# Patient Record
Sex: MALE | Race: BLACK OR AFRICAN AMERICAN | NOT HISPANIC OR LATINO | Employment: UNEMPLOYED | URBAN - METROPOLITAN AREA
[De-identification: names, ages, dates, MRNs, and addresses within clinical notes are randomized per-mention and may not be internally consistent; named-entity substitution may affect disease eponyms.]

---

## 2020-03-17 ENCOUNTER — HOSPITAL ENCOUNTER (EMERGENCY)
Facility: HOSPITAL | Age: 50
Discharge: HOME | End: 2020-03-17
Payer: MEDICAID

## 2020-03-17 VITALS
WEIGHT: 204 LBS | HEART RATE: 103 BPM | TEMPERATURE: 99 F | HEIGHT: 69 IN | OXYGEN SATURATION: 96 % | BODY MASS INDEX: 30.21 KG/M2

## 2020-03-17 DIAGNOSIS — J06.9 VIRAL URI WITH COUGH: Primary | ICD-10-CM

## 2020-03-17 LAB
INFLUENZA A, MOLECULAR: NEGATIVE
INFLUENZA B, MOLECULAR: NEGATIVE
SPECIMEN SOURCE: NORMAL

## 2020-03-17 PROCEDURE — 99284 EMERGENCY DEPT VISIT MOD MDM: CPT

## 2020-03-17 PROCEDURE — 87502 INFLUENZA DNA AMP PROBE: CPT

## 2020-03-17 RX ORDER — ONDANSETRON 4 MG/1
4 TABLET, ORALLY DISINTEGRATING ORAL EVERY 6 HOURS PRN
Qty: 20 TABLET | Refills: 0 | Status: ON HOLD | OUTPATIENT
Start: 2020-03-17 | End: 2022-12-22 | Stop reason: HOSPADM

## 2020-03-17 RX ORDER — GUAIFENESIN 100 MG/5ML
100-200 SOLUTION ORAL EVERY 4 HOURS PRN
Qty: 60 ML | Refills: 0 | Status: SHIPPED | OUTPATIENT
Start: 2020-03-17 | End: 2020-03-27

## 2020-03-17 RX ORDER — LORATADINE 10 MG/1
10 TABLET ORAL DAILY
Qty: 30 TABLET | Refills: 0 | Status: ON HOLD | OUTPATIENT
Start: 2020-03-17 | End: 2022-12-22 | Stop reason: HOSPADM

## 2020-03-17 RX ORDER — FLUTICASONE PROPIONATE 50 MCG
1 SPRAY, SUSPENSION (ML) NASAL 2 TIMES DAILY PRN
Qty: 15 G | Refills: 0 | Status: SHIPPED | OUTPATIENT
Start: 2020-03-17

## 2020-03-17 RX ORDER — ALBUTEROL SULFATE 90 UG/1
1-2 AEROSOL, METERED RESPIRATORY (INHALATION) EVERY 6 HOURS PRN
Qty: 6.7 G | Refills: 0 | Status: ON HOLD | OUTPATIENT
Start: 2020-03-17 | End: 2022-12-22 | Stop reason: HOSPADM

## 2020-03-17 NOTE — ED NOTES
Unable to obtain bp after multiple attempts on all limbs with 2 different machines.  Pt cleared for discharge.

## 2020-03-17 NOTE — ED NOTES
Pt here for evaluation of fever, cough, diarrhea. Pt is awake, alert and oriented. Resp even and unlabored. Pt denies chest pain or sob.

## 2020-03-17 NOTE — DISCHARGE INSTRUCTIONS
You do not meet the criteria for testing. Recommend you self quarantine (stay home and avoid public places). If your symptoms change or worsen, you should call the [facility] On Call line at [phone] or [phone] for immediate assistance and guidance on whether an in-person visit is needed.?For worsening symptoms, chest pain, shortness of breath, increased abdominal pain, high grade fever, stroke or stroke like symptoms, immediately go to the nearest Emergency Room or call 911 as soon as possible.

## 2020-03-17 NOTE — ED PROVIDER NOTES
Encounter Date: 3/17/2020    SCRIBE #1 NOTE: IHolly, am scribing for, and in the presence of, Shivani Bryant PA-C.       History     Chief Complaint   Patient presents with    Fever    Cough    Diarrhea     Time seen by provider: 2:07 PM on 03/17/2020    Spencer Burton Jr. is a 49 y.o. male with PMHx of HTN, CHF, hyperlipidemia and DM-2 who presents to the ED with an onset of a fever, cough, congestion, diarrhea and vomiting for three days. The patient states that he has felt weak all weekend. He vomited 3 in the last 24 hours. He has been able to keep down food since today. He has not taken any medications at home. He denied any recent sick contact. He denied any recent travel. The patient denies having a runny nose, or any other symptoms at this time. No pertinent PSHx. He states he has no pre-existing lung conditions.     The history is provided by the patient.     Review of patient's allergies indicates:  No Known Allergies  Past Medical History:   Diagnosis Date    CHF (congestive heart failure)     COPD (chronic obstructive pulmonary disease)     Diabetes mellitus     Hyperlipidemia     Hypertension      Past Surgical History:   Procedure Laterality Date    APPENDECTOMY      CHOLECYSTECTOMY       No family history on file.  Social History     Tobacco Use    Smoking status: Never Smoker   Substance Use Topics    Alcohol use: No     Comment: socially    Drug use: No     Review of Systems   Constitutional: Positive for fever. Negative for activity change, appetite change and chills.   HENT: Positive for congestion. Negative for rhinorrhea and sore throat.    Eyes: Negative for redness and visual disturbance.   Respiratory: Positive for cough. Negative for chest tightness and shortness of breath.    Cardiovascular: Negative for chest pain.   Gastrointestinal: Positive for diarrhea and vomiting. Negative for abdominal pain and nausea.   Genitourinary: Negative for dysuria and frequency.    Musculoskeletal: Negative for back pain, neck pain and neck stiffness.   Skin: Negative for rash.   Neurological: Positive for weakness. Negative for dizziness, syncope, numbness and headaches.       Physical Exam     Initial Vitals [03/17/20 1413]   BP Pulse Resp Temp SpO2   -- 103 -- 99.1 °F (37.3 °C) 96 %      MAP       --         Physical Exam    Nursing note and vitals reviewed.  Constitutional: He appears well-developed and well-nourished. He is cooperative.  Non-toxic appearance. He does not have a sickly appearance.   HENT:   Head: Normocephalic and atraumatic.   Right Ear: External ear normal.   Left Ear: External ear normal.   Nose: Nose normal.   Mouth/Throat: Uvula is midline and oropharynx is clear and moist.   Eyes: Conjunctivae and lids are normal.   Neck: Normal range of motion and full passive range of motion without pain. Neck supple.   Cardiovascular: Normal rate, regular rhythm and normal heart sounds. Exam reveals no gallop and no friction rub.    No murmur heard.  Pulmonary/Chest: Breath sounds normal. He has no wheezes. He has no rhonchi. He has no rales.   Abdominal: Soft. Normal appearance. There is no tenderness. There is no rigidity, no rebound and no guarding.   Neurological: He is alert.   Skin: Skin is warm, dry and intact. No rash noted.         ED Course   Procedures  Labs Reviewed   INFLUENZA A & B BY MOLECULAR          Imaging Results    None          Medical Decision Making:   History:   Old Medical Records: I decided to obtain old medical records.  Clinical Tests:   Lab Tests: Ordered and Reviewed       APC / Resident Notes:   Urgent evaluation of a 49 year male who presents with flu like symptoms. Vital signs reviewed. Abdomen is soft and nontender.  There is no rebound, rigidity or distention.  I doubt intra-abdominal process.  Bilateral TMs with no erythema, retraction or perforation.  There is no mastoid tenderness.  There is no movement tenderness to bilateral ears.  No  tonsillar swelling or exudate noted.  Uvula is midline. I doubt strep. Breath sounds are clear and equal bilaterally. I doubt pneumonia. He requests to be notified if he test is positive and be discharge. He was given RX for symptomatic treatment. He states his BP is always 230/110 on a good day. He has been out of his medications for four years. He needs to see his primary care provider and keep a blood pressure log. Discussed results with patient. Return precautions given. Patient is to follow up with their primary care provider.          Scribe Attestation:   Scribe #1: I performed the above scribed service and the documentation accurately describes the services I performed. I attest to the accuracy of the note.                      I, Shivani Bryant PA-C, personally performed the services described in this documentation. All medical record entries made by the scribe were at my direction and in my presence.  I have reviewed the chart and agree that the record reflects my personal performance and is accurate and complete. Shivani Bryant PA-C.  3:36 PM 03/17/2020      Clinical Impression:       ICD-10-CM ICD-9-CM   1. Viral URI with cough J06.9 465.9    B97.89              ED Disposition Condition    Discharge Stable        ED Prescriptions     Medication Sig Dispense Start Date End Date Auth. Provider    fluticasone propionate (FLONASE) 50 mcg/actuation nasal spray 1 spray (50 mcg total) by Each Nostril route 2 (two) times daily as needed for Rhinitis. 15 g 3/17/2020  Shivani Bryant PA-C    albuterol (PROVENTIL/VENTOLIN HFA) 90 mcg/actuation inhaler Inhale 1-2 puffs into the lungs every 6 (six) hours as needed for Wheezing. Rescue 6.7 g 3/17/2020 3/17/2021 Shivani Bryant PA-C    guaifenesin 100 mg/5 ml (ROBITUSSIN) 100 mg/5 mL syrup Take 5-10 mLs (100-200 mg total) by mouth every 4 (four) hours as needed for Cough or Congestion. 60 mL 3/17/2020 3/27/2020 Shivani Rose  ANGELICA Bryant    loratadine (CLARITIN) 10 mg tablet Take 1 tablet (10 mg total) by mouth once daily. 30 tablet 3/17/2020 3/17/2021 Shivani Bryant PA-C    ondansetron (ZOFRAN-ODT) 4 MG TbDL Take 1 tablet (4 mg total) by mouth every 6 (six) hours as needed (nausea/vomiting). 20 tablet 3/17/2020  Shivani Bryant PA-C        Follow-up Information     Follow up With Specialties Details Why Contact Info    [address] [id number] [phone]      [facility]  Schedule an appointment as soon as possible for a visit  For follow up if you don't have a primary care provider [phone]    [facility] Emergency Medicine  As needed [address] [place] [id number] [phone]                                     Shivani Bryant PA-C  03/17/20 5543

## 2020-07-23 ENCOUNTER — HOSPITAL ENCOUNTER (EMERGENCY)
Facility: HOSPITAL | Age: 50
Discharge: HOME | End: 2020-07-23
Attending: EMERGENCY MEDICINE
Payer: MEDICAID

## 2020-07-23 VITALS
WEIGHT: 204.06 LBS | SYSTOLIC BLOOD PRESSURE: 175 MMHG | DIASTOLIC BLOOD PRESSURE: 111 MMHG | BODY MASS INDEX: 30.22 KG/M2 | HEART RATE: 77 BPM | RESPIRATION RATE: 16 BRPM | HEIGHT: 69 IN | OXYGEN SATURATION: 99 % | TEMPERATURE: 99 F

## 2020-07-23 DIAGNOSIS — E11.9 DM (DIABETES MELLITUS): ICD-10-CM

## 2020-07-23 DIAGNOSIS — I10 HTN (HYPERTENSION): ICD-10-CM

## 2020-07-23 DIAGNOSIS — H35.61 RETINAL HEMORRHAGE OF RIGHT EYE: ICD-10-CM

## 2020-07-23 DIAGNOSIS — I11.0 HYPERTENSIVE CARDIOMYOPATHY, WITH HEART FAILURE: ICD-10-CM

## 2020-07-23 DIAGNOSIS — E11.22 DIABETES MELLITUS WITH CHRONIC KIDNEY DISEASE: ICD-10-CM

## 2020-07-23 DIAGNOSIS — I10 UNCONTROLLED HYPERTENSION: ICD-10-CM

## 2020-07-23 DIAGNOSIS — Z23 NEED FOR PROPHYLACTIC VACCINATION AGAINST STREPTOCOCCUS PNEUMONIAE (PNEUMOCOCCUS): ICD-10-CM

## 2020-07-23 DIAGNOSIS — I10 HYPERTENSION: ICD-10-CM

## 2020-07-23 DIAGNOSIS — E78.5 HYPERLIPIDEMIA: ICD-10-CM

## 2020-07-23 DIAGNOSIS — E13.9 DIABETES MELLITUS DUE TO ABNORMAL INSULIN: ICD-10-CM

## 2020-07-23 DIAGNOSIS — S61.411A LACERATION OF SKIN OF RIGHT HAND, INITIAL ENCOUNTER: Primary | ICD-10-CM

## 2020-07-23 DIAGNOSIS — I43 HYPERTENSIVE CARDIOMYOPATHY, WITH HEART FAILURE: ICD-10-CM

## 2020-07-23 DIAGNOSIS — Z76.0 MEDICATION REFILL: ICD-10-CM

## 2020-07-23 DIAGNOSIS — R60.9 EDEMA: ICD-10-CM

## 2020-07-23 DIAGNOSIS — N18.30 KIDNEY DISEASE, CHRONIC, STAGE III (GFR 30-59 ML/MIN): ICD-10-CM

## 2020-07-23 PROCEDURE — 25000003 PHARM REV CODE 250: Performed by: EMERGENCY MEDICINE

## 2020-07-23 PROCEDURE — 12001 RPR S/N/AX/GEN/TRNK 2.5CM/<: CPT

## 2020-07-23 PROCEDURE — 99284 EMERGENCY DEPT VISIT MOD MDM: CPT | Mod: 25

## 2020-07-23 RX ORDER — BACITRACIN ZINC 500 UNIT/G
1 OINTMENT (GRAM) TOPICAL
Status: COMPLETED | OUTPATIENT
Start: 2020-07-23 | End: 2020-07-23

## 2020-07-23 RX ORDER — CARVEDILOL 6.25 MG/1
6.25 TABLET ORAL 2 TIMES DAILY WITH MEALS
Qty: 60 TABLET | Refills: 0 | Status: ON HOLD | OUTPATIENT
Start: 2020-07-23 | End: 2021-11-03

## 2020-07-23 RX ORDER — CEPHALEXIN 500 MG/1
500 CAPSULE ORAL EVERY 8 HOURS
Qty: 21 CAPSULE | Refills: 0 | Status: SHIPPED | OUTPATIENT
Start: 2020-07-23 | End: 2020-07-30

## 2020-07-23 RX ORDER — AMLODIPINE BESYLATE 10 MG/1
10 TABLET ORAL DAILY
Qty: 30 TABLET | Refills: 0 | Status: ON HOLD | OUTPATIENT
Start: 2020-07-23 | End: 2022-12-22 | Stop reason: HOSPADM

## 2020-07-23 RX ORDER — LIDOCAINE HYDROCHLORIDE 10 MG/ML
10 INJECTION INFILTRATION; PERINEURAL
Status: COMPLETED | OUTPATIENT
Start: 2020-07-23 | End: 2020-07-23

## 2020-07-23 RX ORDER — GLIPIZIDE 10 MG/1
10 TABLET ORAL 2 TIMES DAILY WITH MEALS
Qty: 60 TABLET | Refills: 0 | Status: ON HOLD | OUTPATIENT
Start: 2020-07-23 | End: 2022-12-22 | Stop reason: HOSPADM

## 2020-07-23 RX ORDER — TRAMADOL HYDROCHLORIDE 50 MG/1
50 TABLET ORAL EVERY 8 HOURS PRN
Qty: 9 TABLET | Refills: 0 | Status: ON HOLD | OUTPATIENT
Start: 2020-07-23 | End: 2022-12-22 | Stop reason: HOSPADM

## 2020-07-23 RX ORDER — ATORVASTATIN CALCIUM 40 MG/1
40 TABLET, FILM COATED ORAL NIGHTLY
Qty: 30 TABLET | Refills: 0 | Status: ON HOLD | OUTPATIENT
Start: 2020-07-23 | End: 2022-12-22 | Stop reason: HOSPADM

## 2020-07-23 RX ADMIN — LIDOCAINE HYDROCHLORIDE 10 ML: 10 INJECTION, SOLUTION INFILTRATION; PERINEURAL at 07:07

## 2020-07-23 RX ADMIN — BACITRACIN ZINC 1 TUBE: 500 OINTMENT TOPICAL at 07:07

## 2020-07-23 NOTE — ED PROVIDER NOTES
Encounter Date: 7/23/2020    SCRIBE #1 NOTE: I, Liseth Elias, am scribing for, and in the presence of, Rishi Coley MD.       History     Chief Complaint   Patient presents with    Laceration     laceration to right hand between second and third fingers       Time seen by provider: 7:24 PM on 07/23/2020    Spencer Burton Jr. is a 50 y.o. male with a PMHx of DM and HTN who presents to the ED with an onset of a wound between the 3rd and 4th fingers on the right hand. Patient states he was working on a car just PTA when he hit a sensor causing the airbag to deploy which resulted in the injury. Patient cannot recall last Tetanus. Patient is a nonsmoker. The patient denies any other symptoms at this time. No pertinent PSHx.      The history is provided by the patient.     Review of patient's allergies indicates:  No Known Allergies  Past Medical History:   Diagnosis Date    CHF (congestive heart failure)     COPD (chronic obstructive pulmonary disease)     Diabetes mellitus     Hyperlipidemia     Hypertension      Past Surgical History:   Procedure Laterality Date    APPENDECTOMY      CHOLECYSTECTOMY       No family history on file.  Social History     Tobacco Use    Smoking status: Never Smoker   Substance Use Topics    Alcohol use: No     Comment: socially    Drug use: No     Review of Systems   Constitutional: Negative for fever.   HENT: Negative for sore throat.    Respiratory: Negative for shortness of breath.    Cardiovascular: Negative for chest pain.   Gastrointestinal: Negative for nausea.   Genitourinary: Negative for dysuria.   Musculoskeletal: Negative for back pain.   Skin: Positive for wound. Negative for rash.   Neurological: Negative for weakness.   Hematological: Does not bruise/bleed easily.       Physical Exam     Initial Vitals [07/23/20 1848]   BP Pulse Resp Temp SpO2   (!) 175/111 77 16 98.8 °F (37.1 °C) 99 %      MAP       --         Physical Exam    Nursing note and vitals  reviewed.  Constitutional: He appears well-developed and well-nourished. No distress.   Non-toxic, well-appearing male.   HENT:   Head: Normocephalic and atraumatic.   Eyes: Conjunctivae and EOM are normal. Pupils are equal, round, and reactive to light.   Neck: Neck supple.   Cardiovascular: Normal rate, regular rhythm and normal heart sounds. Exam reveals no gallop and no friction rub.    No murmur heard.  Pulmonary/Chest: Breath sounds normal. No respiratory distress. He has no wheezes. He has no rhonchi. He has no rales.   Abdominal: Soft. Bowel sounds are normal. He exhibits no distension. There is no abdominal tenderness.   Musculoskeletal: Normal range of motion. No tenderness or edema.   Neurological: He is alert and oriented to person, place, and time.   Skin: Skin is warm and dry.   2 cm deep laceration to the web space between the 3rd and 4th fingers of the right hand that extends from the dorsal surface to the palmar surface.   Psychiatric: He has a normal mood and affect.         ED Course   Lac Repair    Date/Time: 7/23/2020 8:01 PM  Performed by: Rishi Coley MD  Authorized by: Rishi Coley MD   Consent Done: Yes  Consent: Verbal consent obtained. Written consent obtained.  Consent given by: patient  Body area: upper extremity (Web space between 3rd and 4th fingers of right hand)  Laceration length: 2 cm  Foreign bodies: no foreign bodies  Anesthesia: local infiltration    Anesthesia:  Local Anesthetic: lidocaine 1% without epinephrine  Patient sedated: no  Preparation: Patient was prepped and draped in the usual sterile fashion.  Number of sutures: 11  Suture technique: Simple interrupted.  Patient tolerance: Patient tolerated the procedure well with no immediate complications        Labs Reviewed - No data to display       Imaging Results    None          Medical Decision Making:   History:   Old Medical Records: I decided to obtain old medical records.  Laceration in the webspace of the  hand.  He appears to be neurovascularly and tendons are intact.  No foreign body.  No signs of bony injury.  Sutures need to be removed in approximately 10-14 days.  I explained to the patient he can lina tape his fingers.            Scribe Attestation:   Scribe #1: I performed the above scribed service and the documentation accurately describes the services I performed. I attest to the accuracy of the note.    I, Dr. Rishi Coley personally performed the services described in this documentation. All medical record entries made by the scribe were at my direction and in my presence.  I have reviewed the chart and agree that the record reflects my personal performance and is accurate and complete. Rishi Coley MD.  3:35 AM 07/24/2020    DISCLAIMER: This note was prepared with Dragon NaturallySpeaking voice recognition transcription software. Garbled syntax, mangled pronouns, and other bizarre constructions may be attributed to that software system                       Clinical Impression:       ICD-10-CM ICD-9-CM   1. Laceration of skin of right hand, initial encounter  S61.411A 882.0       Disposition:   Disposition: Discharged  Condition: Stable     ED Disposition Condition    Discharge Stable        ED Prescriptions     None        Follow-up Information    None                                    Rishi Coley MD  07/24/20 0335

## 2020-07-23 NOTE — Clinical Note
Spencer Burton was seen and treated in our emergency department on 7/23/2020.  He may return to work on 07/27/2020.       If you have any questions or concerns, please don't hesitate to call.       RN

## 2020-07-23 NOTE — ED TRIAGE NOTES
Patient presents with laceration to right hand between second and third fingers.  Bleeding is controlled.  ROM and sensation are intact.

## 2020-07-24 NOTE — ED NOTES
Laceration repair bandaged, THANH, pt. Tolerated well, no needs at this time, will continue to monitor.

## 2020-07-24 NOTE — ED NOTES
Laceration to R hand irrigated and cleaned, THANH, no needs at this time, will continue to monitor.

## 2020-08-10 ENCOUNTER — HOSPITAL ENCOUNTER (EMERGENCY)
Facility: HOSPITAL | Age: 50
Discharge: HOME | End: 2020-08-10
Attending: EMERGENCY MEDICINE
Payer: MEDICAID

## 2020-08-10 VITALS
BODY MASS INDEX: 29.62 KG/M2 | TEMPERATURE: 99 F | HEART RATE: 70 BPM | HEIGHT: 69 IN | DIASTOLIC BLOOD PRESSURE: 104 MMHG | RESPIRATION RATE: 18 BRPM | SYSTOLIC BLOOD PRESSURE: 158 MMHG | OXYGEN SATURATION: 98 % | WEIGHT: 200 LBS

## 2020-08-10 DIAGNOSIS — Z48.02 VISIT FOR SUTURE REMOVAL: Primary | ICD-10-CM

## 2020-08-10 PROCEDURE — 99283 EMERGENCY DEPT VISIT LOW MDM: CPT

## 2020-08-10 PROCEDURE — 25000003 PHARM REV CODE 250: Performed by: EMERGENCY MEDICINE

## 2020-08-10 RX ADMIN — BACITRACIN, NEOMYCIN, POLYMYXIN B 1 EACH: 400; 3.5; 5 OINTMENT TOPICAL at 09:08

## 2020-08-10 NOTE — Clinical Note
Spencer Burton was seen and treated in our emergency department on 8/10/2020.  He may return to work on 08/15/2020.       If you have any questions or concerns, please don't hesitate to call.       LPN

## 2020-08-10 NOTE — Clinical Note
Spencer Cousin was seen and treated in our emergency department on 8/10/2020.  He may return with limitations on 08/12/2020.  No heavy lifting more than 5 pounds for two weeks.     Sincerely,      Lee Ann ANDERSON

## 2020-08-11 NOTE — ED PROVIDER NOTES
Encounter Date: 8/10/2020    SCRIBE #1 NOTE: Linda MARINA, am scribing for, and in the presence of, Turner Sy MD.       History     Chief Complaint   Patient presents with    Suture / Staple Removal     placed 3 weeks ago on right hand       Time seen by provider: 9:40 PM on 08/10/2020    Spencer Burton Jr. is a 50 y.o. male who presents to the ED for suture removal. Patient had sutures placed to the right hand between the 3rd and 4th finger ~3 weeks ago on 7/23/2020. He denies fever or drainage from the suture site. He denies other new symptoms. PMHx includes HTN, CHF, HLD, and DM. No PSHx.     The history is provided by the patient.     Review of patient's allergies indicates:  No Known Allergies  Past Medical History:   Diagnosis Date    CHF (congestive heart failure)     COPD (chronic obstructive pulmonary disease)     Diabetes mellitus     Hyperlipidemia     Hypertension      Past Surgical History:   Procedure Laterality Date    APPENDECTOMY      CHOLECYSTECTOMY       History reviewed. No pertinent family history.  Social History     Tobacco Use    Smoking status: Never Smoker   Substance Use Topics    Alcohol use: No     Comment: socially    Drug use: No     Review of Systems   Constitutional: Negative for chills and fever.   Eyes: Negative for discharge.   Respiratory: Negative for cough and shortness of breath.    Cardiovascular: Negative for chest pain.   Gastrointestinal: Negative for vomiting.   Musculoskeletal: Negative for gait problem and joint swelling.   Skin: Positive for wound (sutures intact). Negative for pallor and rash.   Neurological: Negative for weakness and numbness.   Hematological: Does not bruise/bleed easily.   Psychiatric/Behavioral: The patient is not nervous/anxious.        Physical Exam     Initial Vitals [08/10/20 2124]   BP Pulse Resp Temp SpO2   (!) 158/104 70 18 99.1 °F (37.3 °C) 98 %      MAP       --         Physical Exam    Nursing note and vitals  reviewed.  Constitutional: He appears well-developed and well-nourished.   HENT:   Head: Normocephalic and atraumatic.   Eyes: Conjunctivae are normal.   Cardiovascular: Normal rate, regular rhythm and normal heart sounds. Exam reveals no gallop and no friction rub.    No murmur heard.  Pulmonary/Chest: Breath sounds normal. He has no wheezes.   Musculoskeletal: Normal range of motion.   Neurological: He is alert and oriented to person, place, and time.   Skin: Skin is warm and dry.   11 sutures intact to web space between 3rd and 4th digits of the right hand; dehisced, no erythema or drainage.          ED Course   Suture Removal    Date/Time: 8/10/2020 9:49 PM  Location procedure was performed: [facility] EMERGENCY DEPARTMENT  Performed by: Turner Sy III, MD  Authorized by: Turner Sy III, MD   Body area: upper extremity  Location details: right hand  Wound Appearance: clean, nontender and no drainage (dehisced)  Sutures Removed: 11  Post-removal: dressing applied  Facility: sutures placed in this facility  Complications: No  Specimens: No  Implants: No  Patient tolerance: Patient tolerated the procedure well with no immediate complications        Labs Reviewed - No data to display       Imaging Results    None          Medical Decision Making:   History:   Old Medical Records: I decided to obtain old medical records.  ED Management:  50-year-old male presents for suture removal of sutures that were placed 17 days ago.  There was poor wound margin approximation but no evidence of infection.  Sutures are removed and he is referred to Orthopedic surgery for further management       APC / Resident Notes:   I, Dr. Turner Sy III, personally performed the services described in this documentation. All medical record entries made by the scribe were at my direction and in my presence.  I have reviewed the chart and agree that the record reflects my personal performance and is accurate and complete       Scribe  Attestation:   Scribe #1: I performed the above scribed service and the documentation accurately describes the services I performed. I attest to the accuracy of the note.               Clinical Impression:       ICD-10-CM ICD-9-CM   1. Visit for suture removal  Z48.02 V58.32         Disposition:   Disposition: Discharged  Condition: Stable     ED Disposition Condition    Discharge Stable        ED Prescriptions     None        Follow-up Information     Follow up With Specialties Details Why Contact Info    Tre Valentine MD Orthopedic Surgery, Surgery In 3 days  [address] [place] [address] [address]fra [address] [id number]  [phone]                                       Turner Sy III, MD  08/10/20 0084

## 2020-08-11 NOTE — ED NOTES
Pt here for suture removal from previous accident between webbing of his R hand middle finger and Ring finger. No drainage or odor. Wound in healing stage, edges are well approximated. No signs of infection. Pt is AAOx4, ABC's intact, NADN.

## 2020-08-12 ENCOUNTER — PATIENT OUTREACH (OUTPATIENT)
Dept: EMERGENCY MEDICINE | Facility: HOSPITAL | Age: 50
End: 2020-08-12

## 2020-08-13 NOTE — PROGRESS NOTES
Shikha Otto  ED Navigator  Emergency Department    Project: [facility] ED Navigator  Role: Community Health Worker    Date: 08/13/2020  Patient Name: Spencer Burton Jr.  MRN: [id number]  PCP: Primary Doctor No    Assessment:     Spencer Burton Jr. is a 50 y.o. male who has presented to ED for Suture / Staple Removal  . Patient has visited the ED 2 times in the past 3 months. Patient did not contact PCP.     ED Navigator Patient Assessment    Consent to Services  Does patient consent to completing the assessment?: Yes  Transportation  Does the patient have issues with Transportation?: No  Insurance Coverage  Do you have coverage/adequate coverage?: Yes  Type/kind of coverage: Medicaid -[employer] community plan  Is patient able to afford co-pays/deductibles?: Yes  Is patient able to afford HME or supplies?: Yes  Does patient have an established [facility] PCP?: No  Does patient need assistance finding a PCP?: Yes  Does the patient have a lack of adequate coverage?: No  Specialist Appointment  Did the patient come to the ED to see a specialist?: No  Does the patient have a pending specialist referral?: No  Does the patient have a specialist appointment made?: No  PCP Follow Up Appointment  Does the patient have a follow up appontment with their PCP?: No  Why does the patient not have a follow up scheduled?: Other (see comments) (Comment: Patient would like to change PCP to an [facility] PCP)  Medications  Is patient able to afford medication?: Yes  Is patient unable to get medication due to lack of transportation?: No  Psychological  Does the patient have psycho-social concerns?: No  Food  Does the patient have concerns about food?: No  Communication/Education  Does the patient have limited English proficiency/English not primary language?: No  Does patient have low literacy and/or low health literacy?: No  Does patient have concerns with care?: No  Does patient have dissatisfaction with care?: No  Other Financial Concers  Does the  patient have immediate financial distress?: No  Does the patient have general financial concerns?: No  Other Social Barriers/Concerns  Does the patient have any additional barriers or concerns?: None         Social History     Socioeconomic History    Marital status: Single     Spouse name: Not on file    Number of children: Not on file    Years of education: Not on file    Highest education level: Not on file   Occupational History    Not on file   Social Needs    Financial resource strain: Not hard at all    Food insecurity     Worry: Never true     Inability: Never true    Transportation needs     Medical: No     Non-medical: No   Tobacco Use    Smoking status: Never Smoker   Substance and Sexual Activity    Alcohol use: No     Comment: socially    Drug use: No    Sexual activity: Yes     Partners: Female   Lifestyle    Physical activity     Days per week: 0 days     Minutes per session: 0 min    Stress: Not at all   Relationships    Social connections     Talks on phone: More than three times a week     Gets together: More than three times a week     Attends Spiritism service: Never     Active member of club or organization: No     Attends meetings of clubs or organizations: Never     Relationship status: Not on file   Other Topics Concern    Not on file   Social History Narrative    Not on file       Plan:   Patient reports having a PCP at [facility] but would like to obtain a [facility] PCP for established care. Discussed resources and information will be mailed for [facility] PCP assistance line ([phone]), [facility] On [facility] 24/7 Nurse triage line, [phone] or 1-866-[facility] ([phone]), [facility] and [facility] locations.     Education on COVID 19 provided to patient during call. Instructed patient to call [facility] on Call first if experiencing fever greater than 100.4, coughing and SOB. Provided patient [facility] on [facility] phone  number [phone] as well as [facility] COVID 19 hotline [phone], verbalized understanding. Informed patient that they may contact their PCP for further guidance as well. Reviewed with patient precautions to take to help prevent the spread of this respiratory virus: Wash hands often (for 20 seconds singing Happy Birthday), cover your cough or sneeze into your elbow or a tissue, stay away from people who are sick, don't touch your eyes, nose or mouth with unwashed hands. Provided Instruction to stay home as directed by local government officials and only make trips that are of essential needs.

## 2020-09-04 ENCOUNTER — PATIENT OUTREACH (OUTPATIENT)
Dept: EMERGENCY MEDICINE | Facility: HOSPITAL | Age: 50
End: 2020-09-04

## 2020-09-04 NOTE — PROGRESS NOTES
Spoke to patient for follow up.  Patient states he has not change PCP and no follow up appointments are scheduled at this time.  Encouraged patient to contact the Medicaid PCP line provided when he decides to change PCP and resource to schedule appointment.  Patient reports no other needs or concerns at this time.

## 2020-09-18 NOTE — TELEPHONE ENCOUNTER
I have reviewed the notes done by the ED Navigator, and I concur with the documentation.  Neeta Choe RN

## 2021-11-01 ENCOUNTER — HOSPITAL ENCOUNTER (OUTPATIENT)
Facility: HOSPITAL | Age: 51
Discharge: HOME | End: 2021-11-03
Attending: EMERGENCY MEDICINE | Admitting: STUDENT IN AN ORGANIZED HEALTH CARE EDUCATION/TRAINING PROGRAM
Payer: MEDICAID

## 2021-11-01 DIAGNOSIS — E87.6 HYPOKALEMIA: ICD-10-CM

## 2021-11-01 DIAGNOSIS — I11.0 HYPERTENSIVE CARDIOMYOPATHY, WITH HEART FAILURE: ICD-10-CM

## 2021-11-01 DIAGNOSIS — I87.2 VENOUS INSUFFICIENCY: ICD-10-CM

## 2021-11-01 DIAGNOSIS — M25.569 KNEE PAIN: ICD-10-CM

## 2021-11-01 DIAGNOSIS — R79.89 ELEVATED TROPONIN: ICD-10-CM

## 2021-11-01 DIAGNOSIS — M79.89 LEG SWELLING: ICD-10-CM

## 2021-11-01 DIAGNOSIS — I10 ESSENTIAL HYPERTENSION: ICD-10-CM

## 2021-11-01 DIAGNOSIS — I43 HYPERTENSIVE CARDIOMYOPATHY, WITH HEART FAILURE: ICD-10-CM

## 2021-11-01 DIAGNOSIS — R07.9 CHEST PAIN: ICD-10-CM

## 2021-11-01 DIAGNOSIS — I10 ACCELERATED HYPERTENSION: Primary | ICD-10-CM

## 2021-11-01 DIAGNOSIS — R79.89 TROPONIN I ABOVE REFERENCE RANGE: ICD-10-CM

## 2021-11-01 DIAGNOSIS — R06.02 SHORTNESS OF BREATH: ICD-10-CM

## 2021-11-01 DIAGNOSIS — I12.9 MALIGNANT HYPERTENSION (ARTERIOLAR NEPHROSCLEROSIS), STAGE 1-4 OR UNSPECIFIED CHRONIC KIDNEY DISEASE: ICD-10-CM

## 2021-11-01 DIAGNOSIS — E11.65 UNCONTROLLED TYPE 2 DIABETES MELLITUS WITH HYPERGLYCEMIA: ICD-10-CM

## 2021-11-01 DIAGNOSIS — M79.671 RIGHT FOOT PAIN: ICD-10-CM

## 2021-11-01 PROCEDURE — 96374 THER/PROPH/DIAG INJ IV PUSH: CPT

## 2021-11-01 PROCEDURE — 99285 EMERGENCY DEPT VISIT HI MDM: CPT | Mod: 25

## 2021-11-02 PROBLEM — N18.4 CKD (CHRONIC KIDNEY DISEASE), STAGE IV: Status: ACTIVE | Noted: 2021-11-02

## 2021-11-02 PROBLEM — N25.81 SECONDARY HYPERPARATHYROIDISM: Status: ACTIVE | Noted: 2021-11-02

## 2021-11-02 PROBLEM — I12.9: Status: ACTIVE | Noted: 2021-11-02

## 2021-11-02 LAB
ALBUMIN SERPL BCP-MCNC: 3.1 G/DL (ref 3.5–5.2)
ALBUMIN SERPL BCP-MCNC: 3.3 G/DL (ref 3.5–5.2)
ALP SERPL-CCNC: 78 U/L (ref 55–135)
ALP SERPL-CCNC: 88 U/L (ref 55–135)
ALT SERPL W/O P-5'-P-CCNC: 27 U/L (ref 10–44)
ALT SERPL W/O P-5'-P-CCNC: 28 U/L (ref 10–44)
AMPHET+METHAMPHET UR QL: NEGATIVE
ANION GAP SERPL CALC-SCNC: 10 MMOL/L (ref 8–16)
ANION GAP SERPL CALC-SCNC: 9 MMOL/L (ref 8–16)
AST SERPL-CCNC: 21 U/L (ref 10–40)
AST SERPL-CCNC: 23 U/L (ref 10–40)
BARBITURATES UR QL SCN>200 NG/ML: NEGATIVE
BASOPHILS # BLD AUTO: 0.06 K/UL (ref 0–0.2)
BASOPHILS # BLD AUTO: 0.06 K/UL (ref 0–0.2)
BASOPHILS NFR BLD: 0.5 % (ref 0–1.9)
BASOPHILS NFR BLD: 0.7 % (ref 0–1.9)
BENZODIAZ UR QL SCN>200 NG/ML: NEGATIVE
BILIRUB SERPL-MCNC: 0.7 MG/DL (ref 0.1–1)
BILIRUB SERPL-MCNC: 0.8 MG/DL (ref 0.1–1)
BNP SERPL-MCNC: 384 PG/ML (ref 0–99)
BUN SERPL-MCNC: 33 MG/DL (ref 6–20)
BUN SERPL-MCNC: 35 MG/DL (ref 6–20)
BZE UR QL SCN: NEGATIVE
CALCIUM SERPL-MCNC: 8.6 MG/DL (ref 8.7–10.5)
CALCIUM SERPL-MCNC: 9 MG/DL (ref 8.7–10.5)
CANNABINOIDS UR QL SCN: NEGATIVE
CHLORIDE SERPL-SCNC: 106 MMOL/L (ref 95–110)
CHLORIDE SERPL-SCNC: 107 MMOL/L (ref 95–110)
CO2 SERPL-SCNC: 26 MMOL/L (ref 23–29)
CO2 SERPL-SCNC: 31 MMOL/L (ref 23–29)
CREAT SERPL-MCNC: 3.4 MG/DL (ref 0.5–1.4)
CREAT SERPL-MCNC: 3.7 MG/DL (ref 0.5–1.4)
CREAT UR-MCNC: 49.1 MG/DL (ref 23–375)
DIFFERENTIAL METHOD: ABNORMAL
DIFFERENTIAL METHOD: ABNORMAL
EOSINOPHIL # BLD AUTO: 0 K/UL (ref 0–0.5)
EOSINOPHIL # BLD AUTO: 0.1 K/UL (ref 0–0.5)
EOSINOPHIL NFR BLD: 0.3 % (ref 0–8)
EOSINOPHIL NFR BLD: 0.8 % (ref 0–8)
ERYTHROCYTE [DISTWIDTH] IN BLOOD BY AUTOMATED COUNT: 13.2 % (ref 11.5–14.5)
ERYTHROCYTE [DISTWIDTH] IN BLOOD BY AUTOMATED COUNT: 13.3 % (ref 11.5–14.5)
EST. GFR  (AFRICAN AMERICAN): 21 ML/MIN/1.73 M^2
EST. GFR  (AFRICAN AMERICAN): 23 ML/MIN/1.73 M^2
EST. GFR  (NON AFRICAN AMERICAN): 18 ML/MIN/1.73 M^2
EST. GFR  (NON AFRICAN AMERICAN): 20 ML/MIN/1.73 M^2
GLUCOSE SERPL-MCNC: 207 MG/DL (ref 70–110)
GLUCOSE SERPL-MCNC: 305 MG/DL (ref 70–110)
HCT VFR BLD AUTO: 38.5 % (ref 40–54)
HCT VFR BLD AUTO: 41 % (ref 40–54)
HGB BLD-MCNC: 12.6 G/DL (ref 14–18)
HGB BLD-MCNC: 13.3 G/DL (ref 14–18)
IMM GRANULOCYTES # BLD AUTO: 0.03 K/UL (ref 0–0.04)
IMM GRANULOCYTES # BLD AUTO: 0.03 K/UL (ref 0–0.04)
IMM GRANULOCYTES NFR BLD AUTO: 0.2 % (ref 0–0.5)
IMM GRANULOCYTES NFR BLD AUTO: 0.3 % (ref 0–0.5)
LYMPHOCYTES # BLD AUTO: 1.5 K/UL (ref 1–4.8)
LYMPHOCYTES # BLD AUTO: 1.6 K/UL (ref 1–4.8)
LYMPHOCYTES NFR BLD: 12.3 % (ref 18–48)
LYMPHOCYTES NFR BLD: 18.6 % (ref 18–48)
MAGNESIUM SERPL-MCNC: 1.9 MG/DL (ref 1.6–2.6)
MCH RBC QN AUTO: 27.9 PG (ref 27–31)
MCH RBC QN AUTO: 27.9 PG (ref 27–31)
MCHC RBC AUTO-ENTMCNC: 32.4 G/DL (ref 32–36)
MCHC RBC AUTO-ENTMCNC: 32.7 G/DL (ref 32–36)
MCV RBC AUTO: 85 FL (ref 82–98)
MCV RBC AUTO: 86 FL (ref 82–98)
METHADONE UR QL SCN>300 NG/ML: NEGATIVE
MONOCYTES # BLD AUTO: 0.5 K/UL (ref 0.3–1)
MONOCYTES # BLD AUTO: 0.6 K/UL (ref 0.3–1)
MONOCYTES NFR BLD: 4.3 % (ref 4–15)
MONOCYTES NFR BLD: 6.7 % (ref 4–15)
NEUTROPHILS # BLD AUTO: 10.1 K/UL (ref 1.8–7.7)
NEUTROPHILS # BLD AUTO: 6.3 K/UL (ref 1.8–7.7)
NEUTROPHILS NFR BLD: 72.9 % (ref 38–73)
NEUTROPHILS NFR BLD: 82.4 % (ref 38–73)
NRBC BLD-RTO: 0 /100 WBC
NRBC BLD-RTO: 0 /100 WBC
OPIATES UR QL SCN: NEGATIVE
PCP UR QL SCN>25 NG/ML: NEGATIVE
PHOSPHATE SERPL-MCNC: 2 MG/DL (ref 2.7–4.5)
PLATELET # BLD AUTO: 157 K/UL (ref 150–450)
PLATELET # BLD AUTO: 172 K/UL (ref 150–450)
PMV BLD AUTO: 12.6 FL (ref 9.2–12.9)
PMV BLD AUTO: 12.8 FL (ref 9.2–12.9)
POCT GLUCOSE: 184 MG/DL (ref 70–110)
POCT GLUCOSE: 242 MG/DL (ref 70–110)
POCT GLUCOSE: 244 MG/DL (ref 70–110)
POCT GLUCOSE: 266 MG/DL (ref 70–110)
POTASSIUM SERPL-SCNC: 3.1 MMOL/L (ref 3.5–5.1)
POTASSIUM SERPL-SCNC: 3.3 MMOL/L (ref 3.5–5.1)
PROT SERPL-MCNC: 6.7 G/DL (ref 6–8.4)
PROT SERPL-MCNC: 7.1 G/DL (ref 6–8.4)
RBC # BLD AUTO: 4.52 M/UL (ref 4.6–6.2)
RBC # BLD AUTO: 4.77 M/UL (ref 4.6–6.2)
SARS-COV-2 RDRP RESP QL NAA+PROBE: NEGATIVE
SODIUM SERPL-SCNC: 143 MMOL/L (ref 136–145)
SODIUM SERPL-SCNC: 146 MMOL/L (ref 136–145)
TOXICOLOGY INFORMATION: NORMAL
TROPONIN I SERPL DL<=0.01 NG/ML-MCNC: 0.17 NG/ML (ref 0–0.03)
TROPONIN I SERPL DL<=0.01 NG/ML-MCNC: 0.17 NG/ML (ref 0–0.03)
TROPONIN I SERPL DL<=0.01 NG/ML-MCNC: 0.2 NG/ML (ref 0–0.03)
WBC # BLD AUTO: 12.21 K/UL (ref 3.9–12.7)
WBC # BLD AUTO: 8.67 K/UL (ref 3.9–12.7)

## 2021-11-02 PROCEDURE — 99220 PR INITIAL OBSERVATION CARE,LEVL III: CPT | Mod: ,,, | Performed by: GENERAL PRACTICE

## 2021-11-02 PROCEDURE — 93010 EKG 12-LEAD: ICD-10-PCS | Mod: ,,, | Performed by: INTERNAL MEDICINE

## 2021-11-02 PROCEDURE — 90715 TDAP VACCINE 7 YRS/> IM: CPT | Performed by: NURSE PRACTITIONER

## 2021-11-02 PROCEDURE — 84484 ASSAY OF TROPONIN QUANT: CPT | Mod: 91 | Performed by: NURSE PRACTITIONER

## 2021-11-02 PROCEDURE — 83880 ASSAY OF NATRIURETIC PEPTIDE: CPT | Performed by: EMERGENCY MEDICINE

## 2021-11-02 PROCEDURE — 63600175 PHARM REV CODE 636 W HCPCS: Performed by: NURSE PRACTITIONER

## 2021-11-02 PROCEDURE — 80053 COMPREHEN METABOLIC PANEL: CPT | Performed by: NURSE PRACTITIONER

## 2021-11-02 PROCEDURE — 99900035 HC TECH TIME PER 15 MIN (STAT)

## 2021-11-02 PROCEDURE — 96372 THER/PROPH/DIAG INJ SC/IM: CPT | Mod: 59

## 2021-11-02 PROCEDURE — 93005 ELECTROCARDIOGRAM TRACING: CPT

## 2021-11-02 PROCEDURE — 25000003 PHARM REV CODE 250: Performed by: EMERGENCY MEDICINE

## 2021-11-02 PROCEDURE — 94761 N-INVAS EAR/PLS OXIMETRY MLT: CPT

## 2021-11-02 PROCEDURE — 93010 ELECTROCARDIOGRAM REPORT: CPT | Mod: ,,, | Performed by: INTERNAL MEDICINE

## 2021-11-02 PROCEDURE — C9399 UNCLASSIFIED DRUGS OR BIOLOG: HCPCS | Performed by: NURSE PRACTITIONER

## 2021-11-02 PROCEDURE — G0378 HOSPITAL OBSERVATION PER HR: HCPCS

## 2021-11-02 PROCEDURE — 80053 COMPREHEN METABOLIC PANEL: CPT | Mod: 91 | Performed by: NURSE PRACTITIONER

## 2021-11-02 PROCEDURE — 25000003 PHARM REV CODE 250: Performed by: NURSE PRACTITIONER

## 2021-11-02 PROCEDURE — 90471 IMMUNIZATION ADMIN: CPT | Performed by: NURSE PRACTITIONER

## 2021-11-02 PROCEDURE — 36415 COLL VENOUS BLD VENIPUNCTURE: CPT | Performed by: EMERGENCY MEDICINE

## 2021-11-02 PROCEDURE — 83735 ASSAY OF MAGNESIUM: CPT | Performed by: NURSE PRACTITIONER

## 2021-11-02 PROCEDURE — 84100 ASSAY OF PHOSPHORUS: CPT | Performed by: NURSE PRACTITIONER

## 2021-11-02 PROCEDURE — 85025 COMPLETE CBC W/AUTO DIFF WBC: CPT | Mod: 91 | Performed by: NURSE PRACTITIONER

## 2021-11-02 PROCEDURE — 36415 COLL VENOUS BLD VENIPUNCTURE: CPT | Performed by: NURSE PRACTITIONER

## 2021-11-02 PROCEDURE — 85025 COMPLETE CBC W/AUTO DIFF WBC: CPT | Performed by: NURSE PRACTITIONER

## 2021-11-02 PROCEDURE — 99220 PR INITIAL OBSERVATION CARE,LEVL III: ICD-10-PCS | Mod: ,,, | Performed by: GENERAL PRACTICE

## 2021-11-02 PROCEDURE — 96375 TX/PRO/DX INJ NEW DRUG ADDON: CPT

## 2021-11-02 PROCEDURE — 84484 ASSAY OF TROPONIN QUANT: CPT | Performed by: NURSE PRACTITIONER

## 2021-11-02 PROCEDURE — 80307 DRUG TEST PRSMV CHEM ANLYZR: CPT | Performed by: EMERGENCY MEDICINE

## 2021-11-02 PROCEDURE — U0002 COVID-19 LAB TEST NON-CDC: HCPCS | Performed by: EMERGENCY MEDICINE

## 2021-11-02 RX ORDER — LANOLIN ALCOHOL/MO/W.PET/CERES
800 CREAM (GRAM) TOPICAL
Status: DISCONTINUED | OUTPATIENT
Start: 2021-11-02 | End: 2021-11-03 | Stop reason: HOSPADM

## 2021-11-02 RX ORDER — FUROSEMIDE 40 MG/1
40 TABLET ORAL 2 TIMES DAILY
Status: DISCONTINUED | OUTPATIENT
Start: 2021-11-02 | End: 2021-11-03

## 2021-11-02 RX ORDER — CETIRIZINE HYDROCHLORIDE 10 MG/1
10 TABLET ORAL DAILY
Status: DISCONTINUED | OUTPATIENT
Start: 2021-11-02 | End: 2021-11-03 | Stop reason: HOSPADM

## 2021-11-02 RX ORDER — MAG HYDROX/ALUMINUM HYD/SIMETH 200-200-20
30 SUSPENSION, ORAL (FINAL DOSE FORM) ORAL 4 TIMES DAILY PRN
Status: DISCONTINUED | OUTPATIENT
Start: 2021-11-02 | End: 2021-11-03 | Stop reason: HOSPADM

## 2021-11-02 RX ORDER — CALCITRIOL 0.25 UG/1
0.25 CAPSULE ORAL DAILY
Status: DISCONTINUED | OUTPATIENT
Start: 2021-11-02 | End: 2021-11-03 | Stop reason: HOSPADM

## 2021-11-02 RX ORDER — SODIUM,POTASSIUM PHOSPHATES 280-250MG
2 POWDER IN PACKET (EA) ORAL
Status: DISCONTINUED | OUTPATIENT
Start: 2021-11-02 | End: 2021-11-03 | Stop reason: HOSPADM

## 2021-11-02 RX ORDER — GLUCAGON 1 MG
1 KIT INJECTION
Status: DISCONTINUED | OUTPATIENT
Start: 2021-11-02 | End: 2021-11-03 | Stop reason: HOSPADM

## 2021-11-02 RX ORDER — AMLODIPINE BESYLATE 5 MG/1
10 TABLET ORAL DAILY
Status: DISCONTINUED | OUTPATIENT
Start: 2021-11-02 | End: 2021-11-03 | Stop reason: HOSPADM

## 2021-11-02 RX ORDER — FLUTICASONE PROPIONATE 50 MCG
1 SPRAY, SUSPENSION (ML) NASAL DAILY
Status: DISCONTINUED | OUTPATIENT
Start: 2021-11-02 | End: 2021-11-03 | Stop reason: HOSPADM

## 2021-11-02 RX ORDER — IBUPROFEN 200 MG
16 TABLET ORAL
Status: DISCONTINUED | OUTPATIENT
Start: 2021-11-02 | End: 2021-11-03 | Stop reason: HOSPADM

## 2021-11-02 RX ORDER — SILDENAFIL 100 MG/1
TABLET, FILM COATED ORAL
COMMUNITY
Start: 2021-08-07

## 2021-11-02 RX ORDER — CLONIDINE HYDROCHLORIDE 0.2 MG/1
0.2 TABLET ORAL
Status: COMPLETED | OUTPATIENT
Start: 2021-11-02 | End: 2021-11-02

## 2021-11-02 RX ORDER — FUROSEMIDE 40 MG/1
40 TABLET ORAL 2 TIMES DAILY
Status: ON HOLD | COMMUNITY
Start: 2021-05-31 | End: 2022-12-22 | Stop reason: HOSPADM

## 2021-11-02 RX ORDER — CALCITRIOL 0.25 UG/1
1 CAPSULE ORAL DAILY
Status: ON HOLD | COMMUNITY
Start: 2021-10-11 | End: 2022-12-22 | Stop reason: HOSPADM

## 2021-11-02 RX ORDER — TALC
6 POWDER (GRAM) TOPICAL NIGHTLY PRN
Status: DISCONTINUED | OUTPATIENT
Start: 2021-11-02 | End: 2021-11-03 | Stop reason: HOSPADM

## 2021-11-02 RX ORDER — ENOXAPARIN SODIUM 100 MG/ML
30 INJECTION SUBCUTANEOUS EVERY 24 HOURS
Status: DISCONTINUED | OUTPATIENT
Start: 2021-11-02 | End: 2021-11-03 | Stop reason: HOSPADM

## 2021-11-02 RX ORDER — IPRATROPIUM BROMIDE AND ALBUTEROL SULFATE 2.5; .5 MG/3ML; MG/3ML
3 SOLUTION RESPIRATORY (INHALATION) EVERY 6 HOURS PRN
Status: DISCONTINUED | OUTPATIENT
Start: 2021-11-02 | End: 2021-11-03 | Stop reason: HOSPADM

## 2021-11-02 RX ORDER — LABETALOL HYDROCHLORIDE 5 MG/ML
20 INJECTION, SOLUTION INTRAVENOUS
Status: COMPLETED | OUTPATIENT
Start: 2021-11-02 | End: 2021-11-02

## 2021-11-02 RX ORDER — HYDRALAZINE HYDROCHLORIDE 20 MG/ML
10 INJECTION INTRAMUSCULAR; INTRAVENOUS EVERY 8 HOURS PRN
Status: DISCONTINUED | OUTPATIENT
Start: 2021-11-02 | End: 2021-11-03 | Stop reason: HOSPADM

## 2021-11-02 RX ORDER — GLIPIZIDE 5 MG/1
5 TABLET ORAL
Status: ON HOLD | COMMUNITY
Start: 2021-08-13 | End: 2022-11-15 | Stop reason: HOSPADM

## 2021-11-02 RX ORDER — SODIUM CHLORIDE 0.9 % (FLUSH) 0.9 %
10 SYRINGE (ML) INJECTION EVERY 8 HOURS PRN
Status: DISCONTINUED | OUTPATIENT
Start: 2021-11-02 | End: 2021-11-03 | Stop reason: HOSPADM

## 2021-11-02 RX ORDER — L. ACIDOPHILUS/L.BULGARICUS 100MM CELL
1 GRANULES IN PACKET (EA) ORAL 2 TIMES DAILY
Status: DISCONTINUED | OUTPATIENT
Start: 2021-11-02 | End: 2021-11-03 | Stop reason: HOSPADM

## 2021-11-02 RX ORDER — SIMETHICONE 80 MG
1 TABLET,CHEWABLE ORAL 4 TIMES DAILY PRN
Status: DISCONTINUED | OUTPATIENT
Start: 2021-11-02 | End: 2021-11-03 | Stop reason: HOSPADM

## 2021-11-02 RX ORDER — CARVEDILOL 6.25 MG/1
6.25 TABLET ORAL
Status: COMPLETED | OUTPATIENT
Start: 2021-11-02 | End: 2021-11-02

## 2021-11-02 RX ORDER — CLONIDINE 0.3 MG/24H
1 PATCH, EXTENDED RELEASE TRANSDERMAL WEEKLY
Status: DISCONTINUED | OUTPATIENT
Start: 2021-11-02 | End: 2021-11-03 | Stop reason: HOSPADM

## 2021-11-02 RX ORDER — IBUPROFEN 200 MG
24 TABLET ORAL
Status: DISCONTINUED | OUTPATIENT
Start: 2021-11-02 | End: 2021-11-03 | Stop reason: HOSPADM

## 2021-11-02 RX ORDER — AMOXICILLIN 250 MG
1 CAPSULE ORAL DAILY PRN
Status: DISCONTINUED | OUTPATIENT
Start: 2021-11-02 | End: 2021-11-03 | Stop reason: HOSPADM

## 2021-11-02 RX ORDER — NALOXONE HCL 0.4 MG/ML
0.02 VIAL (ML) INJECTION
Status: DISCONTINUED | OUTPATIENT
Start: 2021-11-02 | End: 2021-11-03 | Stop reason: HOSPADM

## 2021-11-02 RX ORDER — AMLODIPINE BESYLATE 5 MG/1
10 TABLET ORAL
Status: COMPLETED | OUTPATIENT
Start: 2021-11-02 | End: 2021-11-02

## 2021-11-02 RX ORDER — PIOGLITAZONEHYDROCHLORIDE 15 MG/1
1 TABLET ORAL DAILY
Status: ON HOLD | COMMUNITY
Start: 2021-02-22 | End: 2022-12-22 | Stop reason: HOSPADM

## 2021-11-02 RX ORDER — OXYCODONE HYDROCHLORIDE 10 MG/1
10 TABLET ORAL
Status: COMPLETED | OUTPATIENT
Start: 2021-11-02 | End: 2021-11-02

## 2021-11-02 RX ORDER — CALCITRIOL 0.25 UG/1
0.25 CAPSULE ORAL DAILY
Status: ON HOLD | COMMUNITY
Start: 2021-10-11 | End: 2022-12-22 | Stop reason: HOSPADM

## 2021-11-02 RX ORDER — AMLODIPINE BESYLATE 10 MG/1
1 TABLET ORAL DAILY
Status: ON HOLD | COMMUNITY
Start: 2021-05-31 | End: 2022-12-22 | Stop reason: HOSPADM

## 2021-11-02 RX ORDER — INSULIN ASPART 100 [IU]/ML
1-10 INJECTION, SOLUTION INTRAVENOUS; SUBCUTANEOUS
Status: DISCONTINUED | OUTPATIENT
Start: 2021-11-02 | End: 2021-11-03 | Stop reason: HOSPADM

## 2021-11-02 RX ORDER — ACETAMINOPHEN 325 MG/1
650 TABLET ORAL EVERY 8 HOURS PRN
Status: DISCONTINUED | OUTPATIENT
Start: 2021-11-02 | End: 2021-11-03 | Stop reason: HOSPADM

## 2021-11-02 RX ORDER — LOPERAMIDE HYDROCHLORIDE 2 MG/1
2 CAPSULE ORAL 3 TIMES DAILY PRN
Status: DISCONTINUED | OUTPATIENT
Start: 2021-11-02 | End: 2021-11-03 | Stop reason: HOSPADM

## 2021-11-02 RX ORDER — PROCHLORPERAZINE EDISYLATE 5 MG/ML
5 INJECTION INTRAMUSCULAR; INTRAVENOUS EVERY 6 HOURS PRN
Status: DISCONTINUED | OUTPATIENT
Start: 2021-11-02 | End: 2021-11-03 | Stop reason: HOSPADM

## 2021-11-02 RX ORDER — ONDANSETRON 2 MG/ML
4 INJECTION INTRAMUSCULAR; INTRAVENOUS EVERY 8 HOURS PRN
Status: DISCONTINUED | OUTPATIENT
Start: 2021-11-02 | End: 2021-11-03 | Stop reason: HOSPADM

## 2021-11-02 RX ORDER — ATORVASTATIN CALCIUM 40 MG/1
40 TABLET, FILM COATED ORAL NIGHTLY
Status: DISCONTINUED | OUTPATIENT
Start: 2021-11-02 | End: 2021-11-03 | Stop reason: HOSPADM

## 2021-11-02 RX ORDER — POTASSIUM CHLORIDE 20 MEQ/1
40 TABLET, EXTENDED RELEASE ORAL
Status: COMPLETED | OUTPATIENT
Start: 2021-11-02 | End: 2021-11-02

## 2021-11-02 RX ADMIN — CLONIDINE 1 PATCH: 0.3 PATCH TRANSDERMAL at 09:11

## 2021-11-02 RX ADMIN — Medication 6 MG: at 10:11

## 2021-11-02 RX ADMIN — INSULIN ASPART 2 UNITS: 100 INJECTION, SOLUTION INTRAVENOUS; SUBCUTANEOUS at 04:11

## 2021-11-02 RX ADMIN — LACTOBACILLUS ACIDOPHILUS / LACTOBACILLUS BULGARICUS 1 EACH: 100 MILLION CFU STRENGTH GRANULES at 10:11

## 2021-11-02 RX ADMIN — HYDRALAZINE HYDROCHLORIDE 10 MG: 20 INJECTION INTRAMUSCULAR; INTRAVENOUS at 05:11

## 2021-11-02 RX ADMIN — LABETALOL HYDROCHLORIDE 20 MG: 5 INJECTION, SOLUTION INTRAVENOUS at 03:11

## 2021-11-02 RX ADMIN — LOPERAMIDE HYDROCHLORIDE 2 MG: 2 CAPSULE ORAL at 12:11

## 2021-11-02 RX ADMIN — ACETAMINOPHEN 650 MG: 325 TABLET ORAL at 09:11

## 2021-11-02 RX ADMIN — CARVEDILOL 6.25 MG: 6.25 TABLET, FILM COATED ORAL at 01:11

## 2021-11-02 RX ADMIN — INSULIN DETEMIR 15 UNITS: 100 INJECTION, SOLUTION SUBCUTANEOUS at 10:11

## 2021-11-02 RX ADMIN — TETANUS TOXOID, REDUCED DIPHTHERIA TOXOID AND ACELLULAR PERTUSSIS VACCINE, ADSORBED 0.5 ML: 5; 2.5; 8; 8; 2.5 SUSPENSION INTRAMUSCULAR at 01:11

## 2021-11-02 RX ADMIN — AMLODIPINE BESYLATE 10 MG: 5 TABLET ORAL at 09:11

## 2021-11-02 RX ADMIN — POTASSIUM BICARBONATE 35 MEQ: 391 TABLET, EFFERVESCENT ORAL at 10:11

## 2021-11-02 RX ADMIN — OXYCODONE HYDROCHLORIDE 10 MG: 10 TABLET ORAL at 01:11

## 2021-11-02 RX ADMIN — INSULIN ASPART 4 UNITS: 100 INJECTION, SOLUTION INTRAVENOUS; SUBCUTANEOUS at 11:11

## 2021-11-02 RX ADMIN — ACETAMINOPHEN 650 MG: 325 TABLET ORAL at 10:11

## 2021-11-02 RX ADMIN — INSULIN ASPART 2 UNITS: 100 INJECTION, SOLUTION INTRAVENOUS; SUBCUTANEOUS at 10:11

## 2021-11-02 RX ADMIN — CETIRIZINE HYDROCHLORIDE 10 MG: 10 TABLET, FILM COATED ORAL at 09:11

## 2021-11-02 RX ADMIN — LOPERAMIDE HYDROCHLORIDE 2 MG: 2 CAPSULE ORAL at 10:11

## 2021-11-02 RX ADMIN — ONDANSETRON 4 MG: 2 INJECTION INTRAMUSCULAR; INTRAVENOUS at 06:11

## 2021-11-02 RX ADMIN — INSULIN ASPART 4 UNITS: 100 INJECTION, SOLUTION INTRAVENOUS; SUBCUTANEOUS at 05:11

## 2021-11-02 RX ADMIN — FUROSEMIDE 40 MG: 40 TABLET ORAL at 10:11

## 2021-11-02 RX ADMIN — ENOXAPARIN SODIUM 30 MG: 30 INJECTION, SOLUTION INTRAVENOUS; SUBCUTANEOUS at 04:11

## 2021-11-02 RX ADMIN — POTASSIUM CHLORIDE 40 MEQ: 1500 TABLET, EXTENDED RELEASE ORAL at 02:11

## 2021-11-02 RX ADMIN — CLONIDINE HYDROCHLORIDE 0.2 MG: 0.2 TABLET ORAL at 02:11

## 2021-11-02 RX ADMIN — AMLODIPINE BESYLATE 10 MG: 5 TABLET ORAL at 01:11

## 2021-11-02 RX ADMIN — FUROSEMIDE 40 MG: 40 TABLET ORAL at 09:11

## 2021-11-02 RX ADMIN — CALCITRIOL CAPSULES 0.25 MCG 0.25 MCG: 0.25 CAPSULE ORAL at 09:11

## 2021-11-02 RX ADMIN — ATORVASTATIN CALCIUM 40 MG: 40 TABLET, FILM COATED ORAL at 10:11

## 2021-11-02 RX ADMIN — LACTOBACILLUS ACIDOPHILUS / LACTOBACILLUS BULGARICUS 1 EACH: 100 MILLION CFU STRENGTH GRANULES at 04:11

## 2021-11-03 VITALS
RESPIRATION RATE: 20 BRPM | HEIGHT: 69 IN | SYSTOLIC BLOOD PRESSURE: 168 MMHG | HEART RATE: 97 BPM | OXYGEN SATURATION: 97 % | BODY MASS INDEX: 31.1 KG/M2 | WEIGHT: 210 LBS | TEMPERATURE: 97 F | DIASTOLIC BLOOD PRESSURE: 104 MMHG

## 2021-11-03 PROBLEM — M79.671 RIGHT FOOT PAIN: Status: ACTIVE | Noted: 2021-11-03

## 2021-11-03 LAB
ALBUMIN SERPL BCP-MCNC: 2.9 G/DL (ref 3.5–5.2)
ALP SERPL-CCNC: 70 U/L (ref 55–135)
ALT SERPL W/O P-5'-P-CCNC: 21 U/L (ref 10–44)
ANION GAP SERPL CALC-SCNC: 11 MMOL/L (ref 8–16)
ANION GAP SERPL CALC-SCNC: 11 MMOL/L (ref 8–16)
AORTIC ROOT ANNULUS: 2.97 CM
AORTIC VALVE CUSP SEPERATION: 2.05 CM
AST SERPL-CCNC: 17 U/L (ref 10–40)
AV INDEX (PROSTH): 0.64
AV MEAN GRADIENT: 6 MMHG
AV PEAK GRADIENT: 10 MMHG
AV VALVE AREA: 1.99 CM2
AV VELOCITY RATIO: 0.71
BASOPHILS # BLD AUTO: 0.06 K/UL (ref 0–0.2)
BASOPHILS NFR BLD: 0.6 % (ref 0–1.9)
BILIRUB SERPL-MCNC: 0.8 MG/DL (ref 0.1–1)
BSA FOR ECHO PROCEDURE: 2.15 M2
BUN SERPL-MCNC: 27 MG/DL (ref 6–20)
BUN SERPL-MCNC: 27 MG/DL (ref 6–20)
CALCIUM SERPL-MCNC: 8.4 MG/DL (ref 8.7–10.5)
CALCIUM SERPL-MCNC: 8.9 MG/DL (ref 8.7–10.5)
CHLORIDE SERPL-SCNC: 103 MMOL/L (ref 95–110)
CHLORIDE SERPL-SCNC: 106 MMOL/L (ref 95–110)
CO2 SERPL-SCNC: 28 MMOL/L (ref 23–29)
CO2 SERPL-SCNC: 29 MMOL/L (ref 23–29)
CREAT SERPL-MCNC: 3.4 MG/DL (ref 0.5–1.4)
CREAT SERPL-MCNC: 3.4 MG/DL (ref 0.5–1.4)
CV ECHO LV RWT: 0.62 CM
DIFFERENTIAL METHOD: ABNORMAL
DOP CALC AO PEAK VEL: 1.57 M/S
DOP CALC AO VTI: 27.08 CM
DOP CALC LVOT AREA: 3.1 CM2
DOP CALC LVOT DIAMETER: 1.99 CM
DOP CALC LVOT PEAK VEL: 1.11 M/S
DOP CALC LVOT STROKE VOLUME: 53.78 CM3
DOP CALC MV VTI: 17.65 CM
DOP CALCLVOT PEAK VEL VTI: 17.3 CM
E WAVE DECELERATION TIME: 204.64 MSEC
E/A RATIO: 0.78
E/E' RATIO: 16.22 M/S
ECHO LV POSTERIOR WALL: 1.55 CM (ref 0.6–1.1)
EJECTION FRACTION: 60 %
EOSINOPHIL # BLD AUTO: 0.1 K/UL (ref 0–0.5)
EOSINOPHIL NFR BLD: 1 % (ref 0–8)
ERYTHROCYTE [DISTWIDTH] IN BLOOD BY AUTOMATED COUNT: 13.3 % (ref 11.5–14.5)
EST. GFR  (AFRICAN AMERICAN): 23 ML/MIN/1.73 M^2
EST. GFR  (AFRICAN AMERICAN): 23 ML/MIN/1.73 M^2
EST. GFR  (NON AFRICAN AMERICAN): 20 ML/MIN/1.73 M^2
EST. GFR  (NON AFRICAN AMERICAN): 20 ML/MIN/1.73 M^2
FRACTIONAL SHORTENING: 26 % (ref 28–44)
GLUCOSE SERPL-MCNC: 185 MG/DL (ref 70–110)
GLUCOSE SERPL-MCNC: 264 MG/DL (ref 70–110)
HCT VFR BLD AUTO: 38.4 % (ref 40–54)
HGB BLD-MCNC: 12.7 G/DL (ref 14–18)
IMM GRANULOCYTES # BLD AUTO: 0.02 K/UL (ref 0–0.04)
IMM GRANULOCYTES NFR BLD AUTO: 0.2 % (ref 0–0.5)
INTERVENTRICULAR SEPTUM: 1.59 CM (ref 0.6–1.1)
IVRT: 106.57 MSEC
LA MAJOR: 5.14 CM
LA MINOR: 5.62 CM
LA WIDTH: 3.22 CM
LEFT ATRIUM SIZE: 4.44 CM
LEFT ATRIUM VOLUME INDEX MOD: 20.3 ML/M2
LEFT ATRIUM VOLUME INDEX: 30.9 ML/M2
LEFT ATRIUM VOLUME MOD: 42.8 CM3
LEFT ATRIUM VOLUME: 65.25 CM3
LEFT INTERNAL DIMENSION IN SYSTOLE: 3.73 CM (ref 2.1–4)
LEFT VENTRICLE DIASTOLIC VOLUME INDEX: 56.32 ML/M2
LEFT VENTRICLE DIASTOLIC VOLUME: 118.83 ML
LEFT VENTRICLE MASS INDEX: 164 G/M2
LEFT VENTRICLE SYSTOLIC VOLUME INDEX: 28.2 ML/M2
LEFT VENTRICLE SYSTOLIC VOLUME: 59.45 ML
LEFT VENTRICULAR INTERNAL DIMENSION IN DIASTOLE: 5.01 CM (ref 3.5–6)
LEFT VENTRICULAR MASS: 346.37 G
LV LATERAL E/E' RATIO: 14.6 M/S
LV SEPTAL E/E' RATIO: 18.25 M/S
LYMPHOCYTES # BLD AUTO: 1.8 K/UL (ref 1–4.8)
LYMPHOCYTES NFR BLD: 18.4 % (ref 18–48)
MAGNESIUM SERPL-MCNC: 1.7 MG/DL (ref 1.6–2.6)
MCH RBC QN AUTO: 28.5 PG (ref 27–31)
MCHC RBC AUTO-ENTMCNC: 33.1 G/DL (ref 32–36)
MCV RBC AUTO: 86 FL (ref 82–98)
MONOCYTES # BLD AUTO: 0.7 K/UL (ref 0.3–1)
MONOCYTES NFR BLD: 6.7 % (ref 4–15)
MV A" WAVE DURATION": 9.9 MSEC
MV MEAN GRADIENT: 1 MMHG
MV PEAK A VEL: 0.94 M/S
MV PEAK E VEL: 0.73 M/S
MV PEAK GRADIENT: 3 MMHG
MV STENOSIS PRESSURE HALF TIME: 59.35 MS
MV VALVE AREA BY CONTINUITY EQUATION: 3.05 CM2
MV VALVE AREA P 1/2 METHOD: 3.71 CM2
NEUTROPHILS # BLD AUTO: 7.1 K/UL (ref 1.8–7.7)
NEUTROPHILS NFR BLD: 73.1 % (ref 38–73)
NRBC BLD-RTO: 0 /100 WBC
PHOSPHATE SERPL-MCNC: 2.2 MG/DL (ref 2.7–4.5)
PISA MRMAX VEL: 0.04 M/S
PISA TR MAX VEL: 2.79 M/S
PLATELET # BLD AUTO: 162 K/UL (ref 150–450)
PMV BLD AUTO: 12.9 FL (ref 9.2–12.9)
POCT GLUCOSE: 169 MG/DL (ref 70–110)
POCT GLUCOSE: 238 MG/DL (ref 70–110)
POTASSIUM SERPL-SCNC: 2.9 MMOL/L (ref 3.5–5.1)
POTASSIUM SERPL-SCNC: 3.8 MMOL/L (ref 3.5–5.1)
PROT SERPL-MCNC: 6.4 G/DL (ref 6–8.4)
PV PEAK VELOCITY: 1.07 CM/S
RA MAJOR: 4.63 CM
RA PRESSURE: 8 MMHG
RA WIDTH: 3.33 CM
RBC # BLD AUTO: 4.46 M/UL (ref 4.6–6.2)
RIGHT VENTRICULAR END-DIASTOLIC DIMENSION: 2.55 CM
RV TISSUE DOPPLER FREE WALL SYSTOLIC VELOCITY 1 (APICAL 4 CHAMBER VIEW): 11.83 CM/S
SODIUM SERPL-SCNC: 143 MMOL/L (ref 136–145)
SODIUM SERPL-SCNC: 145 MMOL/L (ref 136–145)
TDI LATERAL: 0.05 M/S
TDI SEPTAL: 0.04 M/S
TDI: 0.05 M/S
TR MAX PG: 31 MMHG
TRICUSPID ANNULAR PLANE SYSTOLIC EXCURSION: 2.97 CM
TV REST PULMONARY ARTERY PRESSURE: 39 MMHG
WBC # BLD AUTO: 9.77 K/UL (ref 3.9–12.7)

## 2021-11-03 PROCEDURE — 83735 ASSAY OF MAGNESIUM: CPT | Performed by: NURSE PRACTITIONER

## 2021-11-03 PROCEDURE — 96376 TX/PRO/DX INJ SAME DRUG ADON: CPT | Mod: 59

## 2021-11-03 PROCEDURE — 99900035 HC TECH TIME PER 15 MIN (STAT)

## 2021-11-03 PROCEDURE — 94761 N-INVAS EAR/PLS OXIMETRY MLT: CPT

## 2021-11-03 PROCEDURE — 96372 THER/PROPH/DIAG INJ SC/IM: CPT | Mod: 59

## 2021-11-03 PROCEDURE — 63600175 PHARM REV CODE 636 W HCPCS: Performed by: NURSE PRACTITIONER

## 2021-11-03 PROCEDURE — 25000003 PHARM REV CODE 250: Performed by: NURSE PRACTITIONER

## 2021-11-03 PROCEDURE — 36415 COLL VENOUS BLD VENIPUNCTURE: CPT | Performed by: NURSE PRACTITIONER

## 2021-11-03 PROCEDURE — 25000003 PHARM REV CODE 250: Performed by: STUDENT IN AN ORGANIZED HEALTH CARE EDUCATION/TRAINING PROGRAM

## 2021-11-03 PROCEDURE — 84100 ASSAY OF PHOSPHORUS: CPT | Performed by: NURSE PRACTITIONER

## 2021-11-03 PROCEDURE — 85025 COMPLETE CBC W/AUTO DIFF WBC: CPT | Performed by: NURSE PRACTITIONER

## 2021-11-03 PROCEDURE — 80048 BASIC METABOLIC PNL TOTAL CA: CPT | Performed by: NURSE PRACTITIONER

## 2021-11-03 PROCEDURE — 80053 COMPREHEN METABOLIC PANEL: CPT | Performed by: NURSE PRACTITIONER

## 2021-11-03 PROCEDURE — 97161 PT EVAL LOW COMPLEX 20 MIN: CPT

## 2021-11-03 PROCEDURE — G0378 HOSPITAL OBSERVATION PER HR: HCPCS

## 2021-11-03 PROCEDURE — 96375 TX/PRO/DX INJ NEW DRUG ADDON: CPT | Mod: 59

## 2021-11-03 RX ORDER — POTASSIUM CHLORIDE 20 MEQ/1
40 TABLET, EXTENDED RELEASE ORAL ONCE
Status: COMPLETED | OUTPATIENT
Start: 2021-11-03 | End: 2021-11-03

## 2021-11-03 RX ORDER — CARVEDILOL 6.25 MG/1
6.25 TABLET ORAL 2 TIMES DAILY WITH MEALS
Qty: 180 TABLET | Refills: 1 | Status: SHIPPED | OUTPATIENT
Start: 2021-11-03 | End: 2021-11-03 | Stop reason: HOSPADM

## 2021-11-03 RX ORDER — HYDRALAZINE HYDROCHLORIDE 25 MG/1
25 TABLET, FILM COATED ORAL EVERY 12 HOURS
Qty: 60 TABLET | Refills: 1 | Status: SHIPPED | OUTPATIENT
Start: 2021-11-03 | End: 2022-11-03

## 2021-11-03 RX ORDER — HYDRALAZINE HYDROCHLORIDE 25 MG/1
25 TABLET, FILM COATED ORAL EVERY 8 HOURS
Status: DISCONTINUED | OUTPATIENT
Start: 2021-11-03 | End: 2021-11-03 | Stop reason: HOSPADM

## 2021-11-03 RX ORDER — FUROSEMIDE 10 MG/ML
40 INJECTION INTRAMUSCULAR; INTRAVENOUS ONCE
Status: COMPLETED | OUTPATIENT
Start: 2021-11-03 | End: 2021-11-03

## 2021-11-03 RX ORDER — CARVEDILOL 6.25 MG/1
6.25 TABLET ORAL 2 TIMES DAILY
Qty: 180 TABLET | Refills: 1 | Status: SHIPPED | OUTPATIENT
Start: 2021-11-03 | End: 2022-11-03

## 2021-11-03 RX ORDER — CARVEDILOL 6.25 MG/1
6.25 TABLET ORAL 2 TIMES DAILY
Status: DISCONTINUED | OUTPATIENT
Start: 2021-11-03 | End: 2021-11-03 | Stop reason: HOSPADM

## 2021-11-03 RX ORDER — FUROSEMIDE 40 MG/1
40 TABLET ORAL 2 TIMES DAILY
Status: DISCONTINUED | OUTPATIENT
Start: 2021-11-03 | End: 2021-11-03 | Stop reason: HOSPADM

## 2021-11-03 RX ORDER — HYDROCODONE BITARTRATE AND ACETAMINOPHEN 7.5; 325 MG/1; MG/1
1 TABLET ORAL ONCE
Status: COMPLETED | OUTPATIENT
Start: 2021-11-03 | End: 2021-11-03

## 2021-11-03 RX ADMIN — ENOXAPARIN SODIUM 30 MG: 30 INJECTION, SOLUTION INTRAVENOUS; SUBCUTANEOUS at 04:11

## 2021-11-03 RX ADMIN — HYDRALAZINE HYDROCHLORIDE 25 MG: 25 TABLET, FILM COATED ORAL at 11:11

## 2021-11-03 RX ADMIN — CALCITRIOL CAPSULES 0.25 MCG 0.25 MCG: 0.25 CAPSULE ORAL at 09:11

## 2021-11-03 RX ADMIN — POTASSIUM BICARBONATE 35 MEQ: 391 TABLET, EFFERVESCENT ORAL at 12:11

## 2021-11-03 RX ADMIN — FUROSEMIDE 40 MG: 10 INJECTION INTRAMUSCULAR; INTRAVENOUS at 09:11

## 2021-11-03 RX ADMIN — CETIRIZINE HYDROCHLORIDE 10 MG: 10 TABLET, FILM COATED ORAL at 09:11

## 2021-11-03 RX ADMIN — LACTOBACILLUS ACIDOPHILUS / LACTOBACILLUS BULGARICUS 1 EACH: 100 MILLION CFU STRENGTH GRANULES at 09:11

## 2021-11-03 RX ADMIN — CARVEDILOL 6.25 MG: 6.25 TABLET, FILM COATED ORAL at 11:11

## 2021-11-03 RX ADMIN — INSULIN ASPART 4 UNITS: 100 INJECTION, SOLUTION INTRAVENOUS; SUBCUTANEOUS at 11:11

## 2021-11-03 RX ADMIN — HYDRALAZINE HYDROCHLORIDE 10 MG: 20 INJECTION INTRAMUSCULAR; INTRAVENOUS at 09:11

## 2021-11-03 RX ADMIN — AMLODIPINE BESYLATE 10 MG: 5 TABLET ORAL at 09:11

## 2021-11-03 RX ADMIN — POTASSIUM CHLORIDE 40 MEQ: 1500 TABLET, EXTENDED RELEASE ORAL at 11:11

## 2021-11-03 RX ADMIN — HYDROCODONE BITARTRATE AND ACETAMINOPHEN 1 TABLET: 7.5; 325 TABLET ORAL at 10:11

## 2022-02-22 ENCOUNTER — HOSPITAL ENCOUNTER (OUTPATIENT)
Dept: RADIOLOGY | Facility: HOSPITAL | Age: 52
Discharge: HOME | End: 2022-02-22
Attending: PHYSICAL MEDICINE & REHABILITATION
Payer: MEDICAID

## 2022-02-22 PROCEDURE — 71045 XR CHEST 1 VIEW: ICD-10-PCS | Mod: 26,,, | Performed by: RADIOLOGY

## 2022-02-22 PROCEDURE — 71045 X-RAY EXAM CHEST 1 VIEW: CPT | Mod: 26,,, | Performed by: RADIOLOGY

## 2022-02-22 PROCEDURE — 71045 X-RAY EXAM CHEST 1 VIEW: CPT | Mod: TC,PO

## 2022-03-02 ENCOUNTER — HOSPITAL ENCOUNTER (OUTPATIENT)
Dept: RADIOLOGY | Facility: HOSPITAL | Age: 52
Discharge: HOME | End: 2022-03-02
Attending: FAMILY MEDICINE
Payer: MEDICAID

## 2022-03-02 ENCOUNTER — HOSPITAL ENCOUNTER (OUTPATIENT)
Dept: RADIOLOGY | Facility: HOSPITAL | Age: 52
Discharge: HOME | End: 2022-03-02
Attending: NURSE PRACTITIONER
Payer: MEDICAID

## 2022-03-02 DIAGNOSIS — R60.9 SWELLING: ICD-10-CM

## 2022-03-02 PROCEDURE — 74018 XR ABDOMEN AP 1 VIEW: ICD-10-PCS | Mod: 26,,, | Performed by: RADIOLOGY

## 2022-03-02 PROCEDURE — 73590 X-RAY EXAM OF LOWER LEG: CPT | Mod: 26,RT,, | Performed by: RADIOLOGY

## 2022-03-02 PROCEDURE — 74018 RADEX ABDOMEN 1 VIEW: CPT | Mod: TC,PO

## 2022-03-02 PROCEDURE — 73590 X-RAY EXAM OF LOWER LEG: CPT | Mod: TC,PO,RT

## 2022-03-02 PROCEDURE — 73590 XR TIBIA FIBULA 2 VIEW RIGHT: ICD-10-PCS | Mod: 26,RT,, | Performed by: RADIOLOGY

## 2022-03-02 PROCEDURE — 74018 RADEX ABDOMEN 1 VIEW: CPT | Mod: 26,,, | Performed by: RADIOLOGY

## 2022-06-25 ENCOUNTER — HOSPITAL ENCOUNTER (EMERGENCY)
Facility: HOSPITAL | Age: 52
Discharge: HOME | End: 2022-06-25
Attending: EMERGENCY MEDICINE
Payer: MEDICAID

## 2022-06-25 VITALS
HEIGHT: 69 IN | OXYGEN SATURATION: 98 % | BODY MASS INDEX: 26.36 KG/M2 | WEIGHT: 178 LBS | SYSTOLIC BLOOD PRESSURE: 132 MMHG | HEART RATE: 82 BPM | RESPIRATION RATE: 18 BRPM | DIASTOLIC BLOOD PRESSURE: 94 MMHG | TEMPERATURE: 98 F

## 2022-06-25 DIAGNOSIS — T81.31XA POSTOPERATIVE DEHISCENCE OF SKIN WOUND, INITIAL ENCOUNTER: Primary | ICD-10-CM

## 2022-06-25 PROCEDURE — 99281 EMR DPT VST MAYX REQ PHY/QHP: CPT

## 2022-06-25 NOTE — ED PROVIDER NOTES
Encounter Date: 6/25/2022       History     Chief Complaint   Patient presents with    Post-op Problem     Bleeding from skin graft donor site / left leg      Chief complaint:  Bleeding from wound    HPI:  52-year-old male who head skin harvested from his left anterior thigh for a skin graft yesterday presents with bleeding from that wound today.  He denies any dizziness or palpitations.  He takes no blood thinners.  Past medical history is significant for COPD, diabetes, hypertension, hyperlipidemia and CHF.        Review of patient's allergies indicates:  No Known Allergies  Past Medical History:   Diagnosis Date    CHF (congestive heart failure)     COPD (chronic obstructive pulmonary disease)     Diabetes mellitus     Hyperlipidemia     Hypertension      Past Surgical History:   Procedure Laterality Date    APPENDECTOMY      CHOLECYSTECTOMY       Family History   Problem Relation Age of Onset    Hypertension Mother     Schizophrenia Father     Hypertension Father     Diabetes Father      Social History     Tobacco Use    Smoking status: Never Smoker    Smokeless tobacco: Never Used   Substance Use Topics    Alcohol use: No     Comment: socially    Drug use: No     Review of Systems   Constitutional: Negative for fever.   Respiratory: Negative for shortness of breath.    Genitourinary: Negative for flank pain.   Musculoskeletal: Negative for gait problem.   Neurological: Negative for weakness.   Psychiatric/Behavioral: Negative for confusion.       Physical Exam     Initial Vitals [06/25/22 1150]   BP Pulse Resp Temp SpO2   (!) 132/94 82 18 98.4 °F (36.9 °C) 98 %      MAP       --         Physical Exam    Constitutional: Vital signs are normal. He is not diaphoretic.  Non-toxic appearance. He does not have a sickly appearance. No distress.   HENT:   Head: Normocephalic and atraumatic.   Eyes: Conjunctivae are normal.   Neck: Phonation normal. Neck supple. No thyromegaly present. No tracheal  deviation present.   Cardiovascular: Normal rate.   Abdominal: He exhibits no distension.   Musculoskeletal:      Cervical back: Neck supple.     Neurological: He is alert and oriented to person, place, and time.   Skin: Skin is warm, dry and intact. No pallor.   Blood at the inferior portion of the skin graft on the left anterior thigh.  Good granulation of the tissue with no erythema, calor or exudates   Psychiatric: He has a normal mood and affect. His speech is normal and behavior is normal. Thought content normal.         ED Course   Procedures  Labs Reviewed - No data to display       Imaging Results    None          Medications - No data to display  Medical Decision Making:   ED Management:  52-year-old male presents with bleeding from a recent skin graft harvest site.  There is no active bleeding during the exam.  The dressing is removed and replaced with a pressure dressing.  He is encouraged to follow-up with his surgeon in 2 days and is to follow-up at [facility] for further bleeding.                      Clinical Impression:   Final diagnoses:  [T81.31XA] Postoperative dehiscence of skin wound, initial encounter (Primary)                 Turner Sy III, MD  06/25/22 0346

## 2022-06-25 NOTE — ED NOTES
S/P redressing of surgical graft donor (Lt anterior thigh) with op-site & bulky gauze dressing secured with Coban. Tolerated well

## 2022-08-27 ENCOUNTER — HOSPITAL ENCOUNTER (EMERGENCY)
Facility: HOSPITAL | Age: 52
Discharge: HOME | End: 2022-08-27
Attending: EMERGENCY MEDICINE
Payer: MEDICAID

## 2022-08-27 VITALS
WEIGHT: 162 LBS | SYSTOLIC BLOOD PRESSURE: 185 MMHG | TEMPERATURE: 99 F | HEIGHT: 69 IN | BODY MASS INDEX: 23.99 KG/M2 | HEART RATE: 59 BPM | DIASTOLIC BLOOD PRESSURE: 117 MMHG | OXYGEN SATURATION: 98 % | RESPIRATION RATE: 18 BRPM

## 2022-08-27 DIAGNOSIS — N48.1 BALANITIS: Primary | ICD-10-CM

## 2022-08-27 PROCEDURE — 99282 EMERGENCY DEPT VISIT SF MDM: CPT

## 2022-08-27 RX ORDER — CLOTRIMAZOLE 1 %
CREAM (GRAM) TOPICAL 2 TIMES DAILY
Qty: 1 EACH | Refills: 0 | Status: ON HOLD | OUTPATIENT
Start: 2022-08-27 | End: 2022-12-22 | Stop reason: HOSPADM

## 2022-08-27 NOTE — ED PROVIDER NOTES
Encounter Date: 8/27/2022       History     Chief Complaint   Patient presents with    Personal Problem     Possible paraphimosis or phimosis      52-year-old male CHF COPD diabetes presents with several days of irritation to the head of his penis.  He reports redness with a white discharge.  He reports the same symptoms previously when he had a yeast infection.  This resolved with topical anti fungal cream.  He has no discharge from the penis.  He has no dysuria he has no flank pain or other complaints.    The history is provided by the patient.   Review of patient's allergies indicates:  No Known Allergies  Past Medical History:   Diagnosis Date    CHF (congestive heart failure)     COPD (chronic obstructive pulmonary disease)     Diabetes mellitus     Hyperlipidemia     Hypertension      Past Surgical History:   Procedure Laterality Date    APPENDECTOMY      CHOLECYSTECTOMY       Family History   Problem Relation Age of Onset    Hypertension Mother     Schizophrenia Father     Hypertension Father     Diabetes Father      Social History     Tobacco Use    Smoking status: Never    Smokeless tobacco: Never   Substance Use Topics    Alcohol use: No     Comment: socially    Drug use: No     Review of Systems   Constitutional:  Negative for fever.   Genitourinary:  Positive for penile pain.   Skin:  Positive for rash.   Hematological:  Negative for adenopathy.     Physical Exam     Initial Vitals   BP Pulse Resp Temp SpO2   08/27/22 0348 08/27/22 0348 08/27/22 0348 08/27/22 0350 08/27/22 0348   (!) 185/117 (!) 59 18 98.9 °F (37.2 °C) 98 %      MAP       --                Physical Exam    Nursing note and vitals reviewed.  Constitutional: He appears well-developed and well-nourished. He is not diaphoretic.  Non-toxic appearance. He does not have a sickly appearance. He does not appear ill. No distress.   HENT:   Head: Normocephalic and atraumatic.   Eyes: EOM are normal.   Neck: Neck supple.   Normal range of  motion.  Pulmonary/Chest: No respiratory distress.   Genitourinary:    Genitourinary Comments: Uncircumcised, white discharge at the glans with irritation.     Musculoskeletal:         General: Normal range of motion.      Cervical back: Normal range of motion and neck supple. No rigidity. Normal range of motion.     Neurological: He is alert and oriented to person, place, and time.   Skin: Skin is warm and dry. No rash noted.   Psychiatric: He has a normal mood and affect. His behavior is normal. Judgment and thought content normal.       ED Course   Procedures  Labs Reviewed - No data to display       Imaging Results    None          Medications - No data to display              ED Course as of 08/27/22 0403   Sat Aug 27, 2022   0351 BP(!): 185/117 [EF]   0351 Temp: 98.9 °F (37.2 °C) [EF]   0351 Temp src: Oral [EF]   0351 Pulse(!): 59 [EF]   0351 Resp: 18 [EF]   0351 SpO2: 98 % [EF]      ED Course User Index  [EF] Richy Albarran MD             Clinical Impression:   Final diagnoses:  [N48.1] Balanitis (Primary)        ED Disposition Condition    Discharge Stable          ED Prescriptions       Medication Sig Dispense Start Date End Date Auth. Provider    clotrimazole (LOTRIMIN) 1 % cream Apply topically 2 (two) times daily. for 10 days 1 each 8/27/2022 9/6/2022 Richy Albarran MD          Follow-up Information       Follow up With Specialties Details Why Contact [facility] Emergency Dept Emergency Medicine  As needed, If symptoms worsen [address] [place] [id number] [phone]            Diabetic male presents with irritation to the glans penis with a white discharge.  He is uncircumcised but has no phimosis or paraphimosis.  Symptoms are classic for fungal balanitis.     Richy Albarran MD  08/27/22 0406

## 2022-11-06 ENCOUNTER — HOSPITAL ENCOUNTER (INPATIENT)
Facility: HOSPITAL | Age: 52
LOS: 1 days | Discharge: LEFT BEFORE DISCHARGE | DRG: 293 | End: 2022-11-06
Attending: STUDENT IN AN ORGANIZED HEALTH CARE EDUCATION/TRAINING PROGRAM | Admitting: STUDENT IN AN ORGANIZED HEALTH CARE EDUCATION/TRAINING PROGRAM
Payer: MEDICAID

## 2022-11-06 VITALS
HEIGHT: 69 IN | DIASTOLIC BLOOD PRESSURE: 148 MMHG | HEART RATE: 96 BPM | RESPIRATION RATE: 20 BRPM | OXYGEN SATURATION: 95 % | WEIGHT: 190 LBS | SYSTOLIC BLOOD PRESSURE: 208 MMHG | BODY MASS INDEX: 28.14 KG/M2 | TEMPERATURE: 98 F

## 2022-11-06 DIAGNOSIS — I10 HYPERTENSION: ICD-10-CM

## 2022-11-06 DIAGNOSIS — R06.02 SHORTNESS OF BREATH: ICD-10-CM

## 2022-11-06 DIAGNOSIS — I50.9 ACUTE ON CHRONIC CONGESTIVE HEART FAILURE, UNSPECIFIED HEART FAILURE TYPE: Primary | ICD-10-CM

## 2022-11-06 PROBLEM — J44.9 COPD (CHRONIC OBSTRUCTIVE PULMONARY DISEASE): Chronic | Status: ACTIVE | Noted: 2022-11-06

## 2022-11-06 PROBLEM — N18.4 CKD (CHRONIC KIDNEY DISEASE), STAGE IV: Chronic | Status: ACTIVE | Noted: 2021-11-02

## 2022-11-06 PROBLEM — J44.9 COPD (CHRONIC OBSTRUCTIVE PULMONARY DISEASE): Status: ACTIVE | Noted: 2022-11-06

## 2022-11-06 LAB
ALBUMIN SERPL BCP-MCNC: 3.6 G/DL (ref 3.5–5.2)
ALP SERPL-CCNC: 84 U/L (ref 55–135)
ALT SERPL W/O P-5'-P-CCNC: 41 U/L (ref 10–44)
ANION GAP SERPL CALC-SCNC: 16 MMOL/L (ref 8–16)
AST SERPL-CCNC: 19 U/L (ref 10–40)
BASOPHILS # BLD AUTO: 0.04 K/UL (ref 0–0.2)
BASOPHILS NFR BLD: 0.5 % (ref 0–1.9)
BILIRUB SERPL-MCNC: 1.9 MG/DL (ref 0.1–1)
BNP SERPL-MCNC: 2587 PG/ML (ref 0–99)
BUN SERPL-MCNC: 29 MG/DL (ref 6–20)
CALCIUM SERPL-MCNC: 9.7 MG/DL (ref 8.7–10.5)
CHLORIDE SERPL-SCNC: 105 MMOL/L (ref 95–110)
CO2 SERPL-SCNC: 23 MMOL/L (ref 23–29)
CREAT SERPL-MCNC: 3.9 MG/DL (ref 0.5–1.4)
DIFFERENTIAL METHOD: ABNORMAL
EOSINOPHIL # BLD AUTO: 0.1 K/UL (ref 0–0.5)
EOSINOPHIL NFR BLD: 0.8 % (ref 0–8)
ERYTHROCYTE [DISTWIDTH] IN BLOOD BY AUTOMATED COUNT: 14 % (ref 11.5–14.5)
EST. GFR  (NO RACE VARIABLE): 18 ML/MIN/1.73 M^2
GLUCOSE SERPL-MCNC: 163 MG/DL (ref 70–110)
HCT VFR BLD AUTO: 37.2 % (ref 40–54)
HGB BLD-MCNC: 12.2 G/DL (ref 14–18)
IMM GRANULOCYTES # BLD AUTO: 0.02 K/UL (ref 0–0.04)
IMM GRANULOCYTES NFR BLD AUTO: 0.3 % (ref 0–0.5)
INFLUENZA A, MOLECULAR: NEGATIVE
INFLUENZA B, MOLECULAR: NEGATIVE
LYMPHOCYTES # BLD AUTO: 1.7 K/UL (ref 1–4.8)
LYMPHOCYTES NFR BLD: 23.2 % (ref 18–48)
MAGNESIUM SERPL-MCNC: 1.9 MG/DL (ref 1.6–2.6)
MCH RBC QN AUTO: 28.4 PG (ref 27–31)
MCHC RBC AUTO-ENTMCNC: 32.8 G/DL (ref 32–36)
MCV RBC AUTO: 87 FL (ref 82–98)
MONOCYTES # BLD AUTO: 0.4 K/UL (ref 0.3–1)
MONOCYTES NFR BLD: 5.5 % (ref 4–15)
NEUTROPHILS # BLD AUTO: 5.1 K/UL (ref 1.8–7.7)
NEUTROPHILS NFR BLD: 69.7 % (ref 38–73)
NRBC BLD-RTO: 0 /100 WBC
PLATELET # BLD AUTO: 227 K/UL (ref 150–450)
PMV BLD AUTO: 11.5 FL (ref 9.2–12.9)
POTASSIUM SERPL-SCNC: 3 MMOL/L (ref 3.5–5.1)
PROT SERPL-MCNC: 7.5 G/DL (ref 6–8.4)
RBC # BLD AUTO: 4.3 M/UL (ref 4.6–6.2)
SARS-COV-2 RDRP RESP QL NAA+PROBE: NEGATIVE
SODIUM SERPL-SCNC: 144 MMOL/L (ref 136–145)
SPECIMEN SOURCE: NORMAL
TROPONIN I SERPL DL<=0.01 NG/ML-MCNC: 0.19 NG/ML (ref 0–0.03)
WBC # BLD AUTO: 7.32 K/UL (ref 3.9–12.7)

## 2022-11-06 PROCEDURE — U0002 COVID-19 LAB TEST NON-CDC: HCPCS | Performed by: STUDENT IN AN ORGANIZED HEALTH CARE EDUCATION/TRAINING PROGRAM

## 2022-11-06 PROCEDURE — 85025 COMPLETE CBC W/AUTO DIFF WBC: CPT | Performed by: STUDENT IN AN ORGANIZED HEALTH CARE EDUCATION/TRAINING PROGRAM

## 2022-11-06 PROCEDURE — 83735 ASSAY OF MAGNESIUM: CPT | Performed by: STUDENT IN AN ORGANIZED HEALTH CARE EDUCATION/TRAINING PROGRAM

## 2022-11-06 PROCEDURE — 96375 TX/PRO/DX INJ NEW DRUG ADDON: CPT

## 2022-11-06 PROCEDURE — 36415 COLL VENOUS BLD VENIPUNCTURE: CPT | Performed by: STUDENT IN AN ORGANIZED HEALTH CARE EDUCATION/TRAINING PROGRAM

## 2022-11-06 PROCEDURE — 84484 ASSAY OF TROPONIN QUANT: CPT | Performed by: STUDENT IN AN ORGANIZED HEALTH CARE EDUCATION/TRAINING PROGRAM

## 2022-11-06 PROCEDURE — 12000002 HC ACUTE/MED SURGE SEMI-PRIVATE ROOM

## 2022-11-06 PROCEDURE — 99285 EMERGENCY DEPT VISIT HI MDM: CPT | Mod: 25

## 2022-11-06 PROCEDURE — 25000003 PHARM REV CODE 250: Performed by: STUDENT IN AN ORGANIZED HEALTH CARE EDUCATION/TRAINING PROGRAM

## 2022-11-06 PROCEDURE — 93005 ELECTROCARDIOGRAM TRACING: CPT

## 2022-11-06 PROCEDURE — 83880 ASSAY OF NATRIURETIC PEPTIDE: CPT | Performed by: STUDENT IN AN ORGANIZED HEALTH CARE EDUCATION/TRAINING PROGRAM

## 2022-11-06 PROCEDURE — 63600175 PHARM REV CODE 636 W HCPCS: Performed by: STUDENT IN AN ORGANIZED HEALTH CARE EDUCATION/TRAINING PROGRAM

## 2022-11-06 PROCEDURE — 80053 COMPREHEN METABOLIC PANEL: CPT | Performed by: STUDENT IN AN ORGANIZED HEALTH CARE EDUCATION/TRAINING PROGRAM

## 2022-11-06 PROCEDURE — 93010 EKG 12-LEAD: ICD-10-PCS | Mod: ,,, | Performed by: INTERNAL MEDICINE

## 2022-11-06 PROCEDURE — 94761 N-INVAS EAR/PLS OXIMETRY MLT: CPT

## 2022-11-06 PROCEDURE — 93010 ELECTROCARDIOGRAM REPORT: CPT | Mod: ,,, | Performed by: INTERNAL MEDICINE

## 2022-11-06 PROCEDURE — 96374 THER/PROPH/DIAG INJ IV PUSH: CPT

## 2022-11-06 PROCEDURE — 87502 INFLUENZA DNA AMP PROBE: CPT | Performed by: STUDENT IN AN ORGANIZED HEALTH CARE EDUCATION/TRAINING PROGRAM

## 2022-11-06 RX ORDER — POTASSIUM CHLORIDE 20 MEQ/1
40 TABLET, EXTENDED RELEASE ORAL ONCE
Status: COMPLETED | OUTPATIENT
Start: 2022-11-06 | End: 2022-11-06

## 2022-11-06 RX ORDER — GLUCAGON 1 MG
1 KIT INJECTION
Status: CANCELLED | OUTPATIENT
Start: 2022-11-06

## 2022-11-06 RX ORDER — ONDANSETRON 4 MG/1
4 TABLET, ORALLY DISINTEGRATING ORAL EVERY 8 HOURS PRN
Status: CANCELLED | OUTPATIENT
Start: 2022-11-06

## 2022-11-06 RX ORDER — SODIUM CHLORIDE 0.9 % (FLUSH) 0.9 %
10 SYRINGE (ML) INJECTION
Status: CANCELLED | OUTPATIENT
Start: 2022-11-06

## 2022-11-06 RX ORDER — IBUPROFEN 200 MG
16 TABLET ORAL
Status: CANCELLED | OUTPATIENT
Start: 2022-11-06

## 2022-11-06 RX ORDER — ACETAMINOPHEN 325 MG/1
650 TABLET ORAL EVERY 6 HOURS PRN
Status: CANCELLED | OUTPATIENT
Start: 2022-11-06

## 2022-11-06 RX ORDER — INSULIN ASPART 100 [IU]/ML
0-5 INJECTION, SOLUTION INTRAVENOUS; SUBCUTANEOUS
Status: CANCELLED | OUTPATIENT
Start: 2022-11-06

## 2022-11-06 RX ORDER — IPRATROPIUM BROMIDE AND ALBUTEROL SULFATE 2.5; .5 MG/3ML; MG/3ML
3 SOLUTION RESPIRATORY (INHALATION) EVERY 6 HOURS PRN
Status: CANCELLED | OUTPATIENT
Start: 2022-11-06

## 2022-11-06 RX ORDER — CALCITRIOL 0.25 UG/1
0.25 CAPSULE ORAL DAILY
Status: CANCELLED | OUTPATIENT
Start: 2022-11-07

## 2022-11-06 RX ORDER — IBUPROFEN 200 MG
24 TABLET ORAL
Status: CANCELLED | OUTPATIENT
Start: 2022-11-06

## 2022-11-06 RX ORDER — HYDROCODONE BITARTRATE AND ACETAMINOPHEN 5; 325 MG/1; MG/1
1 TABLET ORAL EVERY 6 HOURS PRN
Status: CANCELLED | OUTPATIENT
Start: 2022-11-06

## 2022-11-06 RX ORDER — ONDANSETRON 2 MG/ML
4 INJECTION INTRAMUSCULAR; INTRAVENOUS
Status: COMPLETED | OUTPATIENT
Start: 2022-11-06 | End: 2022-11-06

## 2022-11-06 RX ORDER — FUROSEMIDE 10 MG/ML
60 INJECTION INTRAMUSCULAR; INTRAVENOUS
Status: CANCELLED | OUTPATIENT
Start: 2022-11-06

## 2022-11-06 RX ORDER — FUROSEMIDE 10 MG/ML
80 INJECTION INTRAMUSCULAR; INTRAVENOUS
Status: COMPLETED | OUTPATIENT
Start: 2022-11-06 | End: 2022-11-06

## 2022-11-06 RX ORDER — AMLODIPINE BESYLATE 5 MG/1
10 TABLET ORAL DAILY
Status: CANCELLED | OUTPATIENT
Start: 2022-11-07

## 2022-11-06 RX ORDER — ATORVASTATIN CALCIUM 40 MG/1
40 TABLET, FILM COATED ORAL NIGHTLY
Status: CANCELLED | OUTPATIENT
Start: 2022-11-06

## 2022-11-06 RX ORDER — CARVEDILOL 6.25 MG/1
6.25 TABLET ORAL 2 TIMES DAILY
Status: CANCELLED | OUTPATIENT
Start: 2022-11-06

## 2022-11-06 RX ORDER — HYDRALAZINE HYDROCHLORIDE 25 MG/1
25 TABLET, FILM COATED ORAL EVERY 12 HOURS
Status: CANCELLED | OUTPATIENT
Start: 2022-11-06

## 2022-11-06 RX ADMIN — ONDANSETRON 4 MG: 2 INJECTION INTRAMUSCULAR; INTRAVENOUS at 02:11

## 2022-11-06 RX ADMIN — POTASSIUM CHLORIDE 40 MEQ: 1500 TABLET, EXTENDED RELEASE ORAL at 04:11

## 2022-11-06 RX ADMIN — FUROSEMIDE 80 MG: 10 INJECTION, SOLUTION INTRAMUSCULAR; INTRAVENOUS at 02:11

## 2022-11-06 NOTE — ED NOTES
Mahamed Mojica supervisor, Dr. Howard, and Alis Dumont LPN educated pt on risks of leaving thus including pt takes full responsibilities of what may happen after leaving. Pt verbalized understanding, AMA paper signed.

## 2022-11-06 NOTE — ED NOTES
Pt AAO x 3, no distress noted, Vitals stable with High BP noted. IV intact and dry. Updated with care, verbalized understanding.

## 2022-11-06 NOTE — LETTER
Patient: Spencer Burton  YOB: 1970  Date: 11/6/2022 Time: 5:16 PM  Location: [facility]    Leaving the Hospital Against Medical Advice    Chart #:[id number]    This will certify that I, the undersigned,    ______________________________________________________________________    A patient in the above named medical center, having requested discharge and removal from the medical center against the advice of my attending physician(s), hereby release [facility], its physicians, officers and employees, severally and individually, from any and all liability of any nature whatsoever for any injury or harm or complication of any kind that may result directly or indirectly, by reason of my terminating my stay as a patient at [facility] and my departure from [facility], and hereby waive any and all rights of action I may now have or later acquire as a result of my voluntary departure from [facility] and the termination of my stay as a patient therein.    This release is made with the full knowledge of the danger that may result from the action which I am taking.      Date:_______________________                         ___________________________                                                                                    Patient/Legal Representative    Witness:        ____________________________                          ___________________________  Nurse                                                                        Physician

## 2022-11-06 NOTE — ED PROVIDER NOTES
"Encounter Date: 11/6/2022    SCRIBE #1 NOTE: I, Juana Pulido, am scribing for, and in the presence of,  Fabio Howard MD.     History     Chief Complaint   Patient presents with    Nausea     With diarrhea x 1 week, "not feeling well"     Time seen by provider: 2:32 PM on 11/06/2022    Spencer Burton Jr. is a 52 y.o. male who presents to the ED with an onset of worsening SOB x 1 week. SOB is exacerbated when patient is lying down flat on his back. Patient notes swelling to legs bilaterally. He has a Hx of heart failure for which he takes Lasix. However, he notes noncompliance with medication the last 2-3 days. Typically, when he is taking Lasix regularly he is urinating a lot and has good output. The patient denies any other symptoms at this time. He denies prior episodes of similar Sx. PMHx of CHF, COPD, HTN, DM, and HLD. No cardiopulmonary PSHx.     The history is provided by the patient.   Review of patient's allergies indicates:  No Known Allergies  Past Medical History:   Diagnosis Date    CHF (congestive heart failure)     COPD (chronic obstructive pulmonary disease)     Diabetes mellitus     Hyperlipidemia     Hypertension      Past Surgical History:   Procedure Laterality Date    APPENDECTOMY      CHOLECYSTECTOMY       Family History   Problem Relation Age of Onset    Hypertension Mother     Schizophrenia Father     Hypertension Father     Diabetes Father      Social History     Tobacco Use    Smoking status: Never    Smokeless tobacco: Never   Substance Use Topics    Alcohol use: No     Comment: socially    Drug use: No     Review of Systems   Constitutional:  Negative for fever.   HENT:  Negative for sore throat.    Respiratory:  Positive for shortness of breath.    Cardiovascular:  Positive for leg swelling. Negative for chest pain.   Gastrointestinal:  Negative for nausea.   Genitourinary:  Negative for dysuria.   Musculoskeletal:  Negative for back pain.   Skin:  Negative for rash.   Neurological:  " Negative for weakness.   Hematological:  Does not bruise/bleed easily.     Physical Exam     Initial Vitals   BP Pulse Resp Temp SpO2   11/06/22 1413 11/06/22 1413 11/06/22 1412 11/06/22 1412 11/06/22 1413   (!) 202/141 97 20 98.4 °F (36.9 °C) 99 %      MAP       --                Physical Exam    Nursing note and vitals reviewed.  Constitutional: He appears well-developed and well-nourished.   HENT:   Head: Normocephalic and atraumatic.   Eyes: Conjunctivae are normal.   Neck: Neck supple.   Normal range of motion.  Cardiovascular:  Normal rate and regular rhythm.     Exam reveals no gallop and no friction rub.       Murmur heard.  Systolic murmur is present.  Pulmonary/Chest: Breath sounds normal. Tachypnea noted. No respiratory distress. He has no wheezes. He has no rhonchi. He has no rales.   Mildly tachypneic. No increased work of breathing.   Abdominal: Abdomen is soft. He exhibits no distension. There is no abdominal tenderness.   Musculoskeletal:         General: Normal range of motion.      Cervical back: Normal range of motion and neck supple.      Right lower leg: Edema present.      Left lower leg: Edema present.      Comments: 2-3+ edema bilaterally.     Neurological: He is alert and oriented to person, place, and time.   Skin: Skin is warm and dry.   Psychiatric: He has a normal mood and affect.       ED Course   Procedures  Labs Reviewed   CBC W/ AUTO DIFFERENTIAL - Abnormal; Notable for the following components:       Result Value    RBC 4.30 (*)     Hemoglobin 12.2 (*)     Hematocrit 37.2 (*)     All other components within normal limits   COMPREHENSIVE METABOLIC PANEL - Abnormal; Notable for the following components:    Potassium 3.0 (*)     Glucose 163 (*)     BUN 29 (*)     Creatinine 3.9 (*)     Total Bilirubin 1.9 (*)     eGFR 18 (*)     All other components within normal limits   TROPONIN I - Abnormal; Notable for the following components:    Troponin I 0.188 (*)     All other components  within normal limits   B-TYPE NATRIURETIC PEPTIDE - Abnormal; Notable for the following components:    BNP 2,587 (*)     All other components within normal limits   INFLUENZA A & B BY MOLECULAR   SARS-COV-2 RNA AMPLIFICATION, QUAL   MAGNESIUM     EKG Readings: (Independently Interpreted)   Normal sinus rhythm at ventricular rate of 93 bpm. Left axis deviation. Poor R-wave progression and low amplitude. No STEMI.     Imaging Results              X-Ray Chest AP Portable (Final result)  Result time 11/06/22 14:57:34      Final result by Alexandria Noel MD (11/06/22 14:57:34)                   Impression:      There is bibasilar atelectasis versus infiltrate new from 02/22/2022      Electronically signed by: Alexandria Noel MD  Date:    11/06/2022  Time:    14:57               Narrative:    EXAMINATION:  XR CHEST AP PORTABLE    CLINICAL HISTORY:  CHF;    TECHNIQUE:  Single frontal view of the chest was performed.    COMPARISON:  None    FINDINGS:  There is hazy opacification at the lung bases new from prior exam.  There is no pneumothorax or pleural effusion.                                       Medications   furosemide injection 80 mg (80 mg Intravenous Given 11/6/22 1456)   ondansetron injection 4 mg (4 mg Intravenous Given 11/6/22 1456)   potassium chloride SA CR tablet 40 mEq (40 mEq Oral Given 11/6/22 1657)     Medical Decision Making:   History:   Old Medical Records: I decided to obtain old medical records.  Independently Interpreted Test(s):   I have ordered and independently interpreted X-rays - see summary below.       <> Summary of X-Ray Reading(s): Edema more likely than bilateral consolidation.  I have ordered and independently interpreted EKG Reading(s) - see prior notes  Clinical Tests:   Lab Tests: Reviewed and Ordered  Radiological Study: Ordered and Reviewed  Medical Tests: Reviewed and Ordered  ED Management:  Patient did have reasonable output with Lasix administration but maintained tachypnea and  intermittent hypoxia.  Patient very volume overloaded after being noncompliant with medications for the past week.  Also with hypertensive urgency.  Discussed case with Hospital Medicine who agreed to bring patient in for further evaluation management.  Patient however declined admission and decided to leave against medical advice.  Other:   I have discussed this case with another health care provider.  Additional MDM:     Heart Failure Score:   Systolic BP = >200  Heart Rate = 95-99  Sodium = >139  COPD = No  Black = Yes  BUN = 20-29  Age = 50-59  Patient has a GWTG HF Risk Score for In-Hospital Mortality of 20 which translates into a probability of death of <1% (Low Risk).     Scribe Attestation:   Scribe #1: I performed the above scribed service and the documentation accurately describes the services I performed. I attest to the accuracy of the note.    Attending Attestation:           Physician Attestation for Scribe:  Physician Attestation Statement for Scribe #1: I, Fabio Howard MD, reviewed documentation, as scribed by Juana Pulido in my presence, and it is both accurate and complete.         Attending ED Notes:   3:45 PM  Consulted with Dr. Natarajan. Patient to be admitted for heart failure.                 Clinical Impression:   Final diagnoses:  [I10] Hypertension  [R06.02] Shortness of breath  [I50.9] Acute on chronic congestive heart failure, unspecified heart failure type (Primary)        ED Disposition Condition    TATYANA Howard MD  11/06/22 7353

## 2022-11-06 NOTE — Clinical Note
Diagnosis: Acute on chronic congestive heart failure, unspecified heart failure type [[id number]]   Admitting Provider:: AVERY GRANT [[id number]]   Future Attending Provider: AVERY GRANT [[id number]]   Reason for IP Medical Treatment  (Clinical interventions that can only be accomplished in the IP setting? ) :: CHF needing significant diuresis   Estimated Length of Stay:: 2 midnights   I certify that Inpatient services for greater than or equal to 2 midnights are medically necessary:: Yes   Plans for Post-Acute care--if anticipated (pick the single best option):: A. No post acute care anticipated at this time

## 2022-11-13 ENCOUNTER — HOSPITAL ENCOUNTER (INPATIENT)
Facility: HOSPITAL | Age: 52
LOS: 2 days | Discharge: LEFT BEFORE DISCHARGE | DRG: 291 | End: 2022-11-15
Attending: EMERGENCY MEDICINE | Admitting: HOSPITALIST
Payer: MEDICAID

## 2022-11-13 DIAGNOSIS — R07.9 CHEST PAIN: ICD-10-CM

## 2022-11-13 DIAGNOSIS — I50.9 ACUTE CONGESTIVE HEART FAILURE, UNSPECIFIED HEART FAILURE TYPE: Primary | ICD-10-CM

## 2022-11-13 DIAGNOSIS — R06.02 SHORTNESS OF BREATH: ICD-10-CM

## 2022-11-13 DIAGNOSIS — I50.9 CHF (CONGESTIVE HEART FAILURE): ICD-10-CM

## 2022-11-13 PROBLEM — D64.9 ANEMIA: Status: ACTIVE | Noted: 2017-08-07

## 2022-11-13 LAB
ANION GAP SERPL CALC-SCNC: 11 MMOL/L (ref 8–16)
BASOPHILS # BLD AUTO: 0.06 K/UL (ref 0–0.2)
BASOPHILS NFR BLD: 0.8 % (ref 0–1.9)
BNP SERPL-MCNC: 2603 PG/ML (ref 0–99)
BUN SERPL-MCNC: 28 MG/DL (ref 6–20)
CALCIUM SERPL-MCNC: 8.8 MG/DL (ref 8.7–10.5)
CHLORIDE SERPL-SCNC: 104 MMOL/L (ref 95–110)
CO2 SERPL-SCNC: 25 MMOL/L (ref 23–29)
CREAT SERPL-MCNC: 3.9 MG/DL (ref 0.5–1.4)
DIFFERENTIAL METHOD: ABNORMAL
EOSINOPHIL # BLD AUTO: 0.1 K/UL (ref 0–0.5)
EOSINOPHIL NFR BLD: 1 % (ref 0–8)
ERYTHROCYTE [DISTWIDTH] IN BLOOD BY AUTOMATED COUNT: 13.9 % (ref 11.5–14.5)
EST. GFR  (NO RACE VARIABLE): 18 ML/MIN/1.73 M^2
GLUCOSE SERPL-MCNC: 175 MG/DL (ref 70–110)
HCT VFR BLD AUTO: 37 % (ref 40–54)
HGB BLD-MCNC: 12 G/DL (ref 14–18)
IMM GRANULOCYTES # BLD AUTO: 0.01 K/UL (ref 0–0.04)
IMM GRANULOCYTES NFR BLD AUTO: 0.1 % (ref 0–0.5)
LYMPHOCYTES # BLD AUTO: 1.5 K/UL (ref 1–4.8)
LYMPHOCYTES NFR BLD: 21 % (ref 18–48)
MCH RBC QN AUTO: 28 PG (ref 27–31)
MCHC RBC AUTO-ENTMCNC: 32.4 G/DL (ref 32–36)
MCV RBC AUTO: 86 FL (ref 82–98)
MONOCYTES # BLD AUTO: 0.4 K/UL (ref 0.3–1)
MONOCYTES NFR BLD: 5.3 % (ref 4–15)
NEUTROPHILS # BLD AUTO: 5.3 K/UL (ref 1.8–7.7)
NEUTROPHILS NFR BLD: 71.8 % (ref 38–73)
NRBC BLD-RTO: 0 /100 WBC
PLATELET # BLD AUTO: 243 K/UL (ref 150–450)
PMV BLD AUTO: 11.7 FL (ref 9.2–12.9)
POTASSIUM SERPL-SCNC: 2.9 MMOL/L (ref 3.5–5.1)
RBC # BLD AUTO: 4.29 M/UL (ref 4.6–6.2)
SODIUM SERPL-SCNC: 140 MMOL/L (ref 136–145)
TROPONIN I SERPL DL<=0.01 NG/ML-MCNC: 0.3 NG/ML (ref 0–0.03)
WBC # BLD AUTO: 7.33 K/UL (ref 3.9–12.7)

## 2022-11-13 PROCEDURE — 63600175 PHARM REV CODE 636 W HCPCS: Performed by: EMERGENCY MEDICINE

## 2022-11-13 PROCEDURE — 80048 BASIC METABOLIC PNL TOTAL CA: CPT | Performed by: EMERGENCY MEDICINE

## 2022-11-13 PROCEDURE — 36415 COLL VENOUS BLD VENIPUNCTURE: CPT | Performed by: EMERGENCY MEDICINE

## 2022-11-13 PROCEDURE — 85025 COMPLETE CBC W/AUTO DIFF WBC: CPT | Performed by: EMERGENCY MEDICINE

## 2022-11-13 PROCEDURE — 83880 ASSAY OF NATRIURETIC PEPTIDE: CPT | Performed by: NURSE PRACTITIONER

## 2022-11-13 PROCEDURE — 93010 ELECTROCARDIOGRAM REPORT: CPT | Mod: ,,, | Performed by: INTERNAL MEDICINE

## 2022-11-13 PROCEDURE — 12000002 HC ACUTE/MED SURGE SEMI-PRIVATE ROOM

## 2022-11-13 PROCEDURE — G0378 HOSPITAL OBSERVATION PER HR: HCPCS

## 2022-11-13 PROCEDURE — 96375 TX/PRO/DX INJ NEW DRUG ADDON: CPT

## 2022-11-13 PROCEDURE — 93010 EKG 12-LEAD: ICD-10-PCS | Mod: ,,, | Performed by: INTERNAL MEDICINE

## 2022-11-13 PROCEDURE — 96365 THER/PROPH/DIAG IV INF INIT: CPT

## 2022-11-13 PROCEDURE — 93005 ELECTROCARDIOGRAM TRACING: CPT

## 2022-11-13 PROCEDURE — 82962 GLUCOSE BLOOD TEST: CPT | Mod: 59

## 2022-11-13 PROCEDURE — 36415 COLL VENOUS BLD VENIPUNCTURE: CPT | Performed by: NURSE PRACTITIONER

## 2022-11-13 PROCEDURE — 99285 EMERGENCY DEPT VISIT HI MDM: CPT | Mod: 25

## 2022-11-13 PROCEDURE — 84484 ASSAY OF TROPONIN QUANT: CPT | Performed by: EMERGENCY MEDICINE

## 2022-11-13 RX ORDER — ISOSORBIDE MONONITRATE 60 MG/1
60 TABLET, EXTENDED RELEASE ORAL DAILY
Status: ON HOLD | COMMUNITY
Start: 2022-03-05 | End: 2022-12-22 | Stop reason: HOSPADM

## 2022-11-13 RX ORDER — DIPHENHYDRAMINE HCL 25 MG
TABLET ORAL
COMMUNITY
Start: 2022-05-22

## 2022-11-13 RX ORDER — GLUCOSAM/CHON-MSM1/C/MANG/BOSW 500-416.6
TABLET ORAL
Status: ON HOLD | COMMUNITY
Start: 2022-05-22 | End: 2022-12-22 | Stop reason: HOSPADM

## 2022-11-13 RX ORDER — CIPROFLOXACIN 500 MG/1
500 TABLET ORAL 2 TIMES DAILY
Status: ON HOLD | COMMUNITY
Start: 2022-06-24 | End: 2022-12-22 | Stop reason: HOSPADM

## 2022-11-13 RX ORDER — FERROUS SULFATE 325(65) MG
1 TABLET ORAL DAILY
Status: ON HOLD | COMMUNITY
Start: 2022-03-05 | End: 2022-12-22 | Stop reason: HOSPADM

## 2022-11-13 RX ORDER — GLIPIZIDE 5 MG/1
5 TABLET ORAL
Status: ON HOLD | COMMUNITY
Start: 2022-03-04 | End: 2022-11-15 | Stop reason: HOSPADM

## 2022-11-13 RX ORDER — ESCITALOPRAM OXALATE 20 MG/1
20 TABLET ORAL DAILY
Status: ON HOLD | COMMUNITY
Start: 2022-03-05 | End: 2022-12-22 | Stop reason: HOSPADM

## 2022-11-13 RX ORDER — HYDROCODONE BITARTRATE AND ACETAMINOPHEN 5; 325 MG/1; MG/1
1 TABLET ORAL EVERY 6 HOURS PRN
Status: ON HOLD | COMMUNITY
Start: 2022-06-15 | End: 2022-12-22 | Stop reason: HOSPADM

## 2022-11-13 RX ORDER — POTASSIUM CHLORIDE 7.45 MG/ML
10 INJECTION INTRAVENOUS
Status: COMPLETED | OUTPATIENT
Start: 2022-11-13 | End: 2022-11-14

## 2022-11-13 RX ORDER — POTASSIUM CHLORIDE 7.45 MG/ML
10 INJECTION INTRAVENOUS
Status: DISPENSED | OUTPATIENT
Start: 2022-11-14 | End: 2022-11-14

## 2022-11-13 RX ORDER — GLIPIZIDE 10 MG/1
10 TABLET ORAL
Status: ON HOLD | COMMUNITY
End: 2022-11-15 | Stop reason: HOSPADM

## 2022-11-13 RX ORDER — NAPROXEN SODIUM 220 MG/1
81 TABLET, FILM COATED ORAL DAILY
Status: ON HOLD | COMMUNITY
Start: 2022-03-05 | End: 2022-12-22 | Stop reason: HOSPADM

## 2022-11-13 RX ORDER — LOSARTAN POTASSIUM 100 MG/1
100 TABLET ORAL
Status: ON HOLD | COMMUNITY
Start: 2022-03-05 | End: 2022-12-22 | Stop reason: HOSPADM

## 2022-11-13 RX ORDER — CARVEDILOL 25 MG/1
25 TABLET ORAL
Status: ON HOLD | COMMUNITY
Start: 2022-03-04 | End: 2022-11-15 | Stop reason: HOSPADM

## 2022-11-13 RX ORDER — FUROSEMIDE 10 MG/ML
20 INJECTION INTRAMUSCULAR; INTRAVENOUS
Status: COMPLETED | OUTPATIENT
Start: 2022-11-13 | End: 2022-11-13

## 2022-11-13 RX ORDER — ACETAMINOPHEN 325 MG/1
650 TABLET ORAL EVERY 6 HOURS PRN
COMMUNITY
Start: 2022-03-04

## 2022-11-13 RX ORDER — SULFAMETHOXAZOLE AND TRIMETHOPRIM 400; 80 MG/1; MG/1
1 TABLET ORAL 2 TIMES DAILY
Status: ON HOLD | COMMUNITY
Start: 2022-06-15 | End: 2022-12-22 | Stop reason: HOSPADM

## 2022-11-13 RX ORDER — ATORVASTATIN CALCIUM 20 MG/1
20 TABLET, FILM COATED ORAL
Status: ON HOLD | COMMUNITY
Start: 2022-03-05 | End: 2022-11-15 | Stop reason: HOSPADM

## 2022-11-13 RX ORDER — HYDRALAZINE HYDROCHLORIDE 25 MG/1
25 TABLET, FILM COATED ORAL
Status: ON HOLD | COMMUNITY
Start: 2022-03-04 | End: 2022-11-15 | Stop reason: HOSPADM

## 2022-11-13 RX ORDER — SILVER SULFADIAZINE 10 G/1000G
CREAM TOPICAL
Status: ON HOLD | COMMUNITY
Start: 2022-03-04 | End: 2022-12-22 | Stop reason: HOSPADM

## 2022-11-13 RX ORDER — CALCIUM CITRATE/VITAMIN D3 200MG-6.25
1 TABLET ORAL 2 TIMES DAILY
COMMUNITY
Start: 2022-06-16

## 2022-11-13 RX ORDER — OXYCODONE AND ACETAMINOPHEN 5; 325 MG/1; MG/1
1 TABLET ORAL EVERY 4 HOURS PRN
Status: ON HOLD | COMMUNITY
Start: 2022-06-24 | End: 2022-12-22 | Stop reason: HOSPADM

## 2022-11-13 RX ORDER — INSULIN HUMAN 100 [IU]/ML
INJECTION, SUSPENSION SUBCUTANEOUS
Status: ON HOLD | COMMUNITY
Start: 2022-05-22 | End: 2022-12-22 | Stop reason: HOSPADM

## 2022-11-13 RX ORDER — FUROSEMIDE 10 MG/ML
20 INJECTION INTRAMUSCULAR; INTRAVENOUS ONCE
Status: COMPLETED | OUTPATIENT
Start: 2022-11-14 | End: 2022-11-14

## 2022-11-13 RX ADMIN — FUROSEMIDE 20 MG: 10 INJECTION, SOLUTION INTRAMUSCULAR; INTRAVENOUS at 11:11

## 2022-11-13 RX ADMIN — POTASSIUM CHLORIDE 10 MEQ: 7.46 INJECTION, SOLUTION INTRAVENOUS at 11:11

## 2022-11-13 NOTE — Clinical Note
Diagnosis: Acute congestive heart failure, unspecified heart failure type [[id number]]   Future Attending Provider: RUFUS DRAKE [[id number]]   Admitting Provider:: RUFUS DRAKE [[id number]]

## 2022-11-14 LAB
ALBUMIN/CREAT UR: 640 UG/MG (ref 0–30)
AORTIC ROOT ANNULUS: 3.11 CM
AORTIC VALVE CUSP SEPERATION: 2.11 CM
AV INDEX (PROSTH): 1
AV MEAN GRADIENT: 5 MMHG
AV PEAK GRADIENT: 7 MMHG
AV REGURGITATION PRESSURE HALF TIME: 531.17 MS
AV VALVE AREA: 3.68 CM2
AV VELOCITY RATIO: 1.07
BACTERIA #/AREA URNS HPF: NORMAL /HPF
BILIRUB UR QL STRIP: NEGATIVE
BSA FOR ECHO PROCEDURE: 2.1 M2
CLARITY UR: CLEAR
COLOR UR: YELLOW
CREAT UR-MCNC: 35 MG/DL (ref 23–375)
CREAT UR-MCNC: 35 MG/DL (ref 23–375)
CV ECHO LV RWT: 0.66 CM
DOP CALC AO PEAK VEL: 1.33 M/S
DOP CALC AO VTI: 20.8 CM
DOP CALC LVOT AREA: 3.7 CM2
DOP CALC LVOT DIAMETER: 2.16 CM
DOP CALC LVOT PEAK VEL: 1.42 M/S
DOP CALC LVOT STROKE VOLUME: 76.55 CM3
DOP CALC MV VTI: 21.6 CM
DOP CALCLVOT PEAK VEL VTI: 20.9 CM
E WAVE DECELERATION TIME: 100.37 MSEC
E/A RATIO: 4.04
E/E' RATIO: 13.63 M/S
ECHO LV POSTERIOR WALL: 1.59 CM (ref 0.6–1.1)
EJECTION FRACTION: 40 %
ESTIMATED AVG GLUCOSE: 197 MG/DL (ref 68–131)
FRACTIONAL SHORTENING: 20 % (ref 28–44)
GLUCOSE UR QL STRIP: ABNORMAL
HBA1C MFR BLD: 8.5 % (ref 4–5.6)
HGB UR QL STRIP: ABNORMAL
HYALINE CASTS #/AREA URNS LPF: 0 /LPF
INTERVENTRICULAR SEPTUM: 1.39 CM (ref 0.6–1.1)
IVRT: 79.92 MSEC
KETONES UR QL STRIP: NEGATIVE
LA MAJOR: 6.2 CM
LA MINOR: 5.71 CM
LEFT ATRIUM VOLUME INDEX MOD: 37.4 ML/M2
LEFT ATRIUM VOLUME MOD: 77.45 CM3
LEFT INTERNAL DIMENSION IN SYSTOLE: 3.88 CM (ref 2.1–4)
LEFT VENTRICLE DIASTOLIC VOLUME INDEX: 52.51 ML/M2
LEFT VENTRICLE DIASTOLIC VOLUME: 108.7 ML
LEFT VENTRICLE MASS INDEX: 146 G/M2
LEFT VENTRICLE SYSTOLIC VOLUME INDEX: 31.6 ML/M2
LEFT VENTRICLE SYSTOLIC VOLUME: 65.31 ML
LEFT VENTRICULAR INTERNAL DIMENSION IN DIASTOLE: 4.82 CM (ref 3.5–6)
LEFT VENTRICULAR MASS: 302.26 G
LEUKOCYTE ESTERASE UR QL STRIP: NEGATIVE
LV LATERAL E/E' RATIO: 10.9 M/S
LV SEPTAL E/E' RATIO: 18.17 M/S
LVOT MG: 3.93 MMHG
LVOT MV: 0.9 CM/S
MICROALBUMIN UR DL<=1MG/L-MCNC: 224 UG/ML
MICROSCOPIC COMMENT: NORMAL
MV A" WAVE DURATION": 121.79 MSEC
MV MEAN GRADIENT: 2 MMHG
MV PEAK A VEL: 0.27 M/S
MV PEAK E VEL: 1.09 M/S
MV PEAK GRADIENT: 5 MMHG
MV STENOSIS PRESSURE HALF TIME: 41.92 MS
MV VALVE AREA BY CONTINUITY EQUATION: 3.54 CM2
MV VALVE AREA P 1/2 METHOD: 5.25 CM2
NITRITE UR QL STRIP: NEGATIVE
PH UR STRIP: 7 [PH] (ref 5–8)
PISA AR MAX VEL: 4.81 M/S
PISA MRMAX VEL: 4.43 M/S
PISA TR MAX VEL: 3.28 M/S
POCT GLUCOSE: 126 MG/DL (ref 70–110)
POCT GLUCOSE: 130 MG/DL (ref 70–110)
POCT GLUCOSE: 203 MG/DL (ref 70–110)
POTASSIUM SERPL-SCNC: 3.1 MMOL/L (ref 3.5–5.1)
PROT UR QL STRIP: ABNORMAL
PULM VEIN S/D RATIO: 0.37
PV MV: 0.65 M/S
PV PEAK D VEL: 0.76 M/S
PV PEAK S VEL: 0.28 M/S
PV PEAK VELOCITY: 0.88 CM/S
RA MAJOR: 5.32 CM
RA PRESSURE: 8 MMHG
RA VOL SYS: 80.28 ML
RA WIDTH: 4.32 CM
RBC #/AREA URNS HPF: 2 /HPF (ref 0–4)
RIGHT VENTRICULAR END-DIASTOLIC DIMENSION: 3.46 CM
RIGHT VENTRICULAR LENGTH IN DIASTOLE (APICAL 4-CHAMBER VIEW): 6.79 CM
RV TISSUE DOPPLER FREE WALL SYSTOLIC VELOCITY 1 (APICAL 4 CHAMBER VIEW): 0.01 CM/S
SP GR UR STRIP: 1.01 (ref 1–1.03)
SQUAMOUS #/AREA URNS HPF: 3 /HPF
TDI LATERAL: 0.1 M/S
TDI SEPTAL: 0.06 M/S
TDI: 0.08 M/S
TR MAX PG: 43 MMHG
TRICUSPID ANNULAR PLANE SYSTOLIC EXCURSION: 2.09 CM
TROPONIN I SERPL DL<=0.01 NG/ML-MCNC: 0.31 NG/ML (ref 0–0.03)
TROPONIN I SERPL DL<=0.01 NG/ML-MCNC: 0.36 NG/ML (ref 0–0.03)
TV REST PULMONARY ARTERY PRESSURE: 51 MMHG
URN SPEC COLLECT METH UR: ABNORMAL
UROBILINOGEN UR STRIP-ACNC: NEGATIVE EU/DL
WBC #/AREA URNS HPF: 0 /HPF (ref 0–5)
YEAST URNS QL MICRO: NORMAL

## 2022-11-14 PROCEDURE — 11000001 HC ACUTE MED/SURG PRIVATE ROOM

## 2022-11-14 PROCEDURE — 36415 COLL VENOUS BLD VENIPUNCTURE: CPT | Performed by: NURSE PRACTITIONER

## 2022-11-14 PROCEDURE — 82043 UR ALBUMIN QUANTITATIVE: CPT | Performed by: NURSE PRACTITIONER

## 2022-11-14 PROCEDURE — 99223 PR INITIAL HOSPITAL CARE,LEVL III: ICD-10-PCS | Mod: ,,, | Performed by: INTERNAL MEDICINE

## 2022-11-14 PROCEDURE — 25000003 PHARM REV CODE 250: Performed by: NURSE PRACTITIONER

## 2022-11-14 PROCEDURE — 99223 1ST HOSP IP/OBS HIGH 75: CPT | Mod: ,,, | Performed by: INTERNAL MEDICINE

## 2022-11-14 PROCEDURE — 84484 ASSAY OF TROPONIN QUANT: CPT | Performed by: NURSE PRACTITIONER

## 2022-11-14 PROCEDURE — 94761 N-INVAS EAR/PLS OXIMETRY MLT: CPT

## 2022-11-14 PROCEDURE — 84132 ASSAY OF SERUM POTASSIUM: CPT

## 2022-11-14 PROCEDURE — 82570 ASSAY OF URINE CREATININE: CPT | Performed by: NURSE PRACTITIONER

## 2022-11-14 PROCEDURE — 83036 HEMOGLOBIN GLYCOSYLATED A1C: CPT | Performed by: NURSE PRACTITIONER

## 2022-11-14 PROCEDURE — 63600175 PHARM REV CODE 636 W HCPCS: Performed by: NURSE PRACTITIONER

## 2022-11-14 PROCEDURE — 27000221 HC OXYGEN, UP TO 24 HOURS

## 2022-11-14 PROCEDURE — 63600175 PHARM REV CODE 636 W HCPCS

## 2022-11-14 PROCEDURE — 36415 COLL VENOUS BLD VENIPUNCTURE: CPT

## 2022-11-14 PROCEDURE — 81000 URINALYSIS NONAUTO W/SCOPE: CPT | Performed by: NURSE PRACTITIONER

## 2022-11-14 PROCEDURE — 25000003 PHARM REV CODE 250

## 2022-11-14 RX ORDER — POTASSIUM CHLORIDE 7.45 MG/ML
10 INJECTION INTRAVENOUS 2 TIMES DAILY
Status: COMPLETED | OUTPATIENT
Start: 2022-11-14 | End: 2022-11-14

## 2022-11-14 RX ORDER — POTASSIUM CHLORIDE 20 MEQ/1
20 TABLET, EXTENDED RELEASE ORAL ONCE
Status: COMPLETED | OUTPATIENT
Start: 2022-11-14 | End: 2022-11-14

## 2022-11-14 RX ORDER — HYDRALAZINE HYDROCHLORIDE 20 MG/ML
10 INJECTION INTRAMUSCULAR; INTRAVENOUS EVERY 6 HOURS PRN
Status: DISCONTINUED | OUTPATIENT
Start: 2022-11-14 | End: 2022-11-15 | Stop reason: HOSPADM

## 2022-11-14 RX ORDER — INSULIN ASPART 100 [IU]/ML
1-10 INJECTION, SOLUTION INTRAVENOUS; SUBCUTANEOUS
Status: DISCONTINUED | OUTPATIENT
Start: 2022-11-14 | End: 2022-11-15 | Stop reason: HOSPADM

## 2022-11-14 RX ORDER — IBUPROFEN 200 MG
16 TABLET ORAL
Status: DISCONTINUED | OUTPATIENT
Start: 2022-11-14 | End: 2022-11-15 | Stop reason: HOSPADM

## 2022-11-14 RX ORDER — CARVEDILOL 25 MG/1
25 TABLET ORAL 2 TIMES DAILY
Status: DISCONTINUED | OUTPATIENT
Start: 2022-11-14 | End: 2022-11-15 | Stop reason: HOSPADM

## 2022-11-14 RX ORDER — FUROSEMIDE 10 MG/ML
40 INJECTION INTRAMUSCULAR; INTRAVENOUS
Status: DISCONTINUED | OUTPATIENT
Start: 2022-11-14 | End: 2022-11-14

## 2022-11-14 RX ORDER — NAPROXEN SODIUM 220 MG/1
81 TABLET, FILM COATED ORAL DAILY
Status: DISCONTINUED | OUTPATIENT
Start: 2022-11-14 | End: 2022-11-15 | Stop reason: HOSPADM

## 2022-11-14 RX ORDER — GLUCAGON 1 MG
1 KIT INJECTION
Status: DISCONTINUED | OUTPATIENT
Start: 2022-11-14 | End: 2022-11-15 | Stop reason: HOSPADM

## 2022-11-14 RX ORDER — ATORVASTATIN CALCIUM 40 MG/1
40 TABLET, FILM COATED ORAL NIGHTLY
Status: DISCONTINUED | OUTPATIENT
Start: 2022-11-14 | End: 2022-11-15 | Stop reason: HOSPADM

## 2022-11-14 RX ORDER — IBUPROFEN 200 MG
24 TABLET ORAL
Status: DISCONTINUED | OUTPATIENT
Start: 2022-11-14 | End: 2022-11-15 | Stop reason: HOSPADM

## 2022-11-14 RX ORDER — FUROSEMIDE 10 MG/ML
40 INJECTION INTRAMUSCULAR; INTRAVENOUS
Status: DISCONTINUED | OUTPATIENT
Start: 2022-11-14 | End: 2022-11-15 | Stop reason: HOSPADM

## 2022-11-14 RX ORDER — HYDRALAZINE HYDROCHLORIDE 25 MG/1
25 TABLET, FILM COATED ORAL EVERY 12 HOURS
Status: DISCONTINUED | OUTPATIENT
Start: 2022-11-14 | End: 2022-11-15 | Stop reason: HOSPADM

## 2022-11-14 RX ORDER — TALC
6 POWDER (GRAM) TOPICAL NIGHTLY PRN
Status: DISCONTINUED | OUTPATIENT
Start: 2022-11-14 | End: 2022-11-15 | Stop reason: HOSPADM

## 2022-11-14 RX ADMIN — Medication 6 MG: at 10:11

## 2022-11-14 RX ADMIN — ATORVASTATIN CALCIUM 40 MG: 40 TABLET, FILM COATED ORAL at 09:11

## 2022-11-14 RX ADMIN — ASPIRIN 81 MG CHEWABLE TABLET 81 MG: 81 TABLET CHEWABLE at 09:11

## 2022-11-14 RX ADMIN — HYDRALAZINE HYDROCHLORIDE 25 MG: 25 TABLET, FILM COATED ORAL at 09:11

## 2022-11-14 RX ADMIN — POTASSIUM CHLORIDE 20 MEQ: 1500 TABLET, EXTENDED RELEASE ORAL at 02:11

## 2022-11-14 RX ADMIN — POTASSIUM CHLORIDE 10 MEQ: 7.46 INJECTION, SOLUTION INTRAVENOUS at 10:11

## 2022-11-14 RX ADMIN — FUROSEMIDE 20 MG: 10 INJECTION, SOLUTION INTRAMUSCULAR; INTRAVENOUS at 01:11

## 2022-11-14 RX ADMIN — NICARDIPINE HYDROCHLORIDE 5 MG/HR: 0.2 INJECTION, SOLUTION INTRAVENOUS at 01:11

## 2022-11-14 RX ADMIN — INSULIN ASPART 2 UNITS: 100 INJECTION, SOLUTION INTRAVENOUS; SUBCUTANEOUS at 09:11

## 2022-11-14 RX ADMIN — CARVEDILOL 25 MG: 25 TABLET, FILM COATED ORAL at 09:11

## 2022-11-14 RX ADMIN — POTASSIUM CHLORIDE 10 MEQ: 7.46 INJECTION, SOLUTION INTRAVENOUS at 12:11

## 2022-11-14 RX ADMIN — FUROSEMIDE 40 MG: 10 INJECTION, SOLUTION INTRAMUSCULAR; INTRAVENOUS at 08:11

## 2022-11-14 RX ADMIN — FUROSEMIDE 40 MG: 10 INJECTION, SOLUTION INTRAMUSCULAR; INTRAVENOUS at 09:11

## 2022-11-14 NOTE — EICU
Rounding (Video Assessment):  Video rounding completed.  Pt resting quiet, no distress noted.  Infusing Nicardipine at 2.5 mg/hr.  B/P 159/110, HR 85, RR 20, POx 95%.

## 2022-11-14 NOTE — EICU
Video rounds completed.  Resting quietly in bed,  nad noted. Rounding (Video Assessment):  Yes    HR 89, RR22. B/P 152/96.ats 97%  Cardene infusing @ 2.5mg/hr.

## 2022-11-14 NOTE — NURSING
RESOURCE ROUNDING     Resource nurse rounding on pt. Awake and oriented. Sitting up in bed. No c/o pain. Hypertensive, denies headache. States he took his hydralazine prior to coming to ER. O2 sats 93% room air. IV access intact. Admission d/w pt. Verbalized understanding.  Will continue to monitor throughout shift.

## 2022-11-14 NOTE — CARE UPDATE
11/14/22 0711   Patient Assessment/Suction   Level of Consciousness (AVPU) alert   PRE-TX-O2   O2 Device (Oxygen Therapy) nasal cannula   $ Is the patient on Low Flow Oxygen? Yes   SpO2 97 %   Pulse Oximetry Type Continuous   $ Pulse Oximetry - Multiple Charge Pulse Oximetry - Multiple   Pulse (!) 235   Resp (!) 8

## 2022-11-14 NOTE — H&P
"[facility] Medicine  History & Physical    Patient Name: Spencer Burton Jr.  MRN: [id number]  Patient Class: IP- Inpatient  Admission Date: 11/13/2022  Attending Physician: Yariel Govea MD   Primary Care Provider: Primary Doctor No         Patient information was obtained from patient, past medical records and ER records.     Subjective:     Principal Problem:Acute on chronic congestive heart failure    Chief Complaint:   Chief Complaint   Patient presents with    Shortness of Breath     Has not improved since last week.        HPI: Spencer Burton Jr. is a 52 year old male with a past medical history of COPD, CHF, hypertension, and DM type 2 on insulin who presented with worsening shortness of breath for the past week.  Patient states he was seen in the ED last week with similar symptoms and admission was recommended.  Patient states he left AMA because he did not want to be admitted.  He reports shortness of breath worsens with movement and when laying down and also reports associated shortness of breath and bilateral lower extremity swelling.  Patient states he takes 10 mg of Lasix once a day and has had no improvement with his symptoms.  Patient hypertensive upon arriving to ED with /140's.  He admits that has not been taking his other medications as prescribed because he has been having problems getting them from the pharmacy and is not sure what he takes."  ED workup revealed mildly elevated troponin 0.3, creatinine 3.9 (baseline near 3.4), potassium 2.9 and BNP 2 603.  Chest x-ray showed pulmonary edema.  Patient received IV Lasix 20 mg and 10 mEq K+ in the ED and was referred to Hospital Medicine.  Patient reports feeling short of breath and denies chest pain.  Patient also denies fever, chills, headache, change in vision, nausea, vomiting, and diarrhea.  Patient will be admitted to Hospital Medicine for further evaluation and management.            Past Medical History: "   Diagnosis Date    CHF (congestive heart failure)     COPD (chronic obstructive pulmonary disease)     Diabetes mellitus     Hyperlipidemia     Hypertension        Past Surgical History:   Procedure Laterality Date    APPENDECTOMY      CHOLECYSTECTOMY         Review of patient's allergies indicates:  No Known Allergies    Current Facility-Administered Medications on File Prior to Encounter   Medication    nitroGLYCERIN 0.4 MG/DOSE TL SPRY 400 mcg/spray spray 2 spray     Current Outpatient Medications on File Prior to Encounter   Medication Sig    acetaminophen (TYLENOL) 325 MG tablet Take 650 mg by mouth every 6 (six) hours as needed.    aspirin 81 MG Chew Take 81 mg by mouth.    atorvastatin (LIPITOR) 20 MG tablet Take 20 mg by mouth.    carvediloL (COREG) 25 MG tablet Take 25 mg by mouth.    EScitalopram oxalate (LEXAPRO) 20 MG tablet Take 20 mg by mouth.    ferrous sulfate (FEOSOL) 325 mg (65 mg iron) Tab tablet Take 1 tablet by mouth once daily.    glipiZIDE (GLUCOTROL) 5 MG tablet Take 5 mg by mouth.    hydrALAZINE (APRESOLINE) 25 MG tablet Take 25 mg by mouth.    isosorbide mononitrate (IMDUR) 60 MG 24 hr tablet Take 60 mg by mouth.    losartan (COZAAR) 100 MG tablet Take 100 mg by mouth.    silver sulfADIAZINE 1% (SILVADENE) 1 % cream Apply topically.    albuterol (PROVENTIL/VENTOLIN HFA) 90 mcg/actuation inhaler Inhale 1-2 puffs into the lungs every 6 (six) hours as needed for Wheezing. Rescue    amLODIPine (NORVASC) 10 MG tablet Take 1 tablet (10 mg total) by mouth once daily.    amLODIPine (NORVASC) 10 MG tablet Take 1 tablet by mouth once daily.    atorvastatin (LIPITOR) 40 MG tablet Take 1 tablet (40 mg total) by mouth every evening.    calcitRIOL (ROCALTROL) 0.25 MCG Cap Take 0.25 mcg by mouth once daily.    calcitRIOL (ROCALTROL) 0.25 MCG Cap Take 1 capsule by mouth once daily.    ciprofloxacin HCl (CIPRO) 500 MG tablet Take 500 mg by mouth 2 (two) times daily.    cloNIDine  "0.3 mg/24 hr td ptwk (CATAPRES) 0.3 mg/24 hr Place 1 patch onto the skin once a week. medically necessary with dx codes 401.9, 428.43, 428.0.    clotrimazole (LOTRIMIN) 1 % cream Apply topically 2 (two) times daily. for 10 days    fluticasone propionate (FLONASE) 50 mcg/actuation nasal spray 1 spray (50 mcg total) by Each Nostril route 2 (two) times daily as needed for Rhinitis.    furosemide (LASIX) 40 MG tablet Take 40 mg by mouth 2 (two) times daily.    glipiZIDE (GLUCOTROL) 10 MG tablet Take 1 tablet (10 mg total) by mouth 2 (two) times daily with meals.    glipiZIDE (GLUCOTROL) 10 MG tablet Take 10 mg by mouth.    glipiZIDE (GLUCOTROL) 5 MG tablet Take 5 mg by mouth.    HUMULIN 70/30 U-100 INSULIN 100 unit/mL (70-30) injection Inject into the skin.    HYDROcodone-acetaminophen (NORCO) 5-325 mg per tablet Take 1 tablet by mouth every 6 (six) hours as needed.    insulin detemir (LEVEMIR) 100 unit/mL injection Inject 15 Units into the skin every evening.    insulin needles, disposable, 31 x 3/16 " Ndle Use daily to inject insulin  DX:250.00    lancets (ONE TOUCH DELICA LANCETS) 33 gauge Misc 1 lancet by Misc.(Non-Drug; Combo Route) route 4 (four) times daily. DX:250.00   Medically necessary to test blood sugar    loratadine (CLARITIN) 10 mg tablet Take 1 tablet (10 mg total) by mouth once daily.    ondansetron (ZOFRAN-ODT) 4 MG TbDL Take 1 tablet (4 mg total) by mouth every 6 (six) hours as needed (nausea/vomiting).    oxyCODONE-acetaminophen (PERCOCET) 5-325 mg per tablet Take 1 tablet by mouth every 4 (four) hours as needed.    pioglitazone (ACTOS) 15 MG tablet Take 1 tablet by mouth once daily.    potassium chloride (KLOR-CON) 10 MEQ TbSR     sildenafiL (VIAGRA) 100 MG tablet TAKE ONE DAILY AS NEEDED    sulfamethoxazole-trimethoprim 400-80mg (BACTRIM,SEPTRA) 400-80 mg per tablet Take 1 tablet by mouth 2 (two) times daily.    traMADoL (ULTRAM) 50 mg tablet Take 1 tablet (50 mg total) by mouth " every 8 (eight) hours as needed for Pain.    TRUE METRIX AIR GLUCOSE METER Misc USE TO CHECK GLUCOSE TWICE DAILY AS DIRECTED    TRUE METRIX GLUCOSE TEST STRIP Strp 1 strip 2 (two) times daily.    TRUEPLUS LANCETS 30 gauge Misc USE 1 TO CHECK GLUCOSE TWICE DAILY     Family History       Problem Relation (Age of Onset)    Diabetes Father    Hypertension Mother, Father    Schizophrenia Father          Tobacco Use    Smoking status: Never    Smokeless tobacco: Never   Substance and Sexual Activity    Alcohol use: No     Comment: socially    Drug use: No    Sexual activity: Yes     Partners: Female     Review of Systems   Constitutional:  Positive for appetite change. Negative for activity change, chills and fever.   HENT:  Negative for congestion, sore throat and trouble swallowing.    Eyes:  Negative for photophobia and visual disturbance.   Respiratory:  Positive for shortness of breath. Negative for cough and chest tightness.    Cardiovascular:  Positive for leg swelling. Negative for chest pain and palpitations.   Gastrointestinal:  Negative for abdominal pain, diarrhea and nausea.   Genitourinary:  Negative for dysuria, flank pain and hematuria.   Musculoskeletal:  Negative for back pain.   Neurological:  Negative for dizziness, weakness and headaches.   Psychiatric/Behavioral:  Negative for confusion.    Objective:     Vital Signs (Most Recent):  Temp: 98.4 °F (36.9 °C) (11/13/22 2127)  Pulse: 97 (11/13/22 2307)  Resp: 20 (11/13/22 2127)  BP: (!) 216/141 (11/13/22 2307)  SpO2: 96 % (11/13/22 2307)   Vital Signs (24h Range):  Temp:  [98.4 °F (36.9 °C)] 98.4 °F (36.9 °C)  Pulse:  [] 97  Resp:  [20] 20  SpO2:  [96 %-98 %] 96 %  BP: (184-216)/(131-141) 216/141     Weight: 86.2 kg (190 lb)  Body mass index is 28.06 kg/m².    Physical Exam  Vitals reviewed.   Constitutional:       Appearance: Normal appearance. He is normal weight.   HENT:      Head: Normocephalic.      Mouth/Throat:      Mouth: Mucous  membranes are moist.      Pharynx: Oropharynx is clear.   Eyes:      Pupils: Pupils are equal, round, and reactive to light.   Cardiovascular:      Rate and Rhythm: Normal rate and regular rhythm.      Pulses: Normal pulses.      Heart sounds: Normal heart sounds.   Pulmonary:      Effort: Pulmonary effort is normal.      Breath sounds: Normal breath sounds.   Abdominal:      General: Bowel sounds are normal.      Palpations: Abdomen is soft.   Musculoskeletal:         General: Normal range of motion.      Cervical back: Normal range of motion.      Right lower le+ Edema present.      Left lower le+ Edema present.   Skin:     General: Skin is warm and dry.   Neurological:      Mental Status: He is alert and oriented to person, place, and time. Mental status is at baseline.   Psychiatric:         Mood and Affect: Mood normal.         CRANIAL NERVES     CN III, IV, VI   Pupils are equal, round, and reactive to light.     Significant Labs: All pertinent labs within the past 24 hours have been reviewed.  CBC:   Recent Labs   Lab 22   WBC 7.33   HGB 12.0*   HCT 37.0*        CMP:   Recent Labs   Lab 22      K 2.9*      CO2 25   *   BUN 28*   CREATININE 3.9*   CALCIUM 8.8   ANIONGAP 11       Significant Imaging: I have reviewed all pertinent imaging results/findings within the past 24 hours.    Imaging Results              X-Ray Chest 1 View (Final result)  Result time 22 22:40:14      Final result by Joey Styles MD (22 22:40:14)                   Impression:      Persistent airspace opacities in both lung bases.  Correlate for pulmonary edema versus pneumonia.    Stable cardiac size with mild left ventricular enlargement.      Electronically signed by: Joey Styles  Date:    2022  Time:    22:40               Narrative:    EXAMINATION:  XR CHEST 1 VIEW    CLINICAL HISTORY:  SOB;    TECHNIQUE:  Single frontal view of the chest was  performed.    COMPARISON:  11/06/2022    FINDINGS:  The heart is stable in size with mild left ventricular configuration.  The bibasilar airspace opacities do not appear significantly changed.  The                                        Assessment/Plan:     * Acute on chronic congestive heart failure  Patient is identified as having Diastolic (HFpEF) heart failure that is Acute on Chronic. CHF is currently uncontrolled due to Dyspnea not returned to baseline after 1 doses of IV diuretic and Pulmonary edema/pleural effusion on CXR. Latest ECHO performed and demonstrates- Results for orders placed during the hospital encounter of 11/01/21    Echo    Interpretation Summary  · The left ventricle is normal in size with severe concentric hypertrophy and normal systolic function.  · Moderate left atrial enlargement.  · Indeterminate left ventricular diastolic function.  · The estimated PA systolic pressure is 39 mmHg.  · Normal right ventricular size with normal right ventricular systolic function.  · Intermediate central venous pressure (8 mmHg).  · Small circumferential pericardial effusion.  · The estimated ejection fraction is 60%.  · Mild right atrial enlargement.  · Mild tricuspid regurgitation.  · Mild-to-moderate aortic regurgitation.  . Continue Beta Blocker Furosemide and monitor clinical status closely. Monitor on telemetry. Patient is on CHF pathway.  Monitor strict Is&Os and daily weights.  Place on fluid restriction of 1 L. Continue to stress to patient importance of self efficacy and  on diet for CHF. Last BNP reviewed- and noted below   Recent Labs   Lab 11/13/22  2311   BNP 2,603*   .    -Cardiology consult  -Telemetry  -SW consult       CKD (chronic kidney disease), stage IV  Creatine stable for now. BMP reviewed- noted Estimated Creatinine Clearance: 25.2 mL/min (A) (based on SCr of 3.9 mg/dL (H)). according to latest data. Monitor UOP and serial BMP and adjust therapy as needed. Renally dose  meds.          Hypertension associated with diabetes  Chronic, uncontrolled.  Latest blood pressure and vitals reviewed-   Temp:  [98 °F (36.7 °C)-98.4 °F (36.9 °C)]   Pulse:  []   Resp:  [18-29]   BP: (152-216)/()   SpO2:  [91 %-100 %] .   Home meds for hypertension were reviewed and noted below.   Hypertension Medications             amLODIPine (NORVASC) 10 MG tablet Take 1 tablet (10 mg total) by mouth once daily.    amLODIPine (NORVASC) 10 MG tablet Take 1 tablet by mouth once daily.    carvediloL (COREG) 25 MG tablet Take 25 mg by mouth.    cloNIDine 0.3 mg/24 hr td ptwk (CATAPRES) 0.3 mg/24 hr Place 1 patch onto the skin once a week. medically necessary with dx codes 401.9, 428.43, 428.0.    furosemide (LASIX) 40 MG tablet Take 40 mg by mouth 2 (two) times daily.    hydrALAZINE (APRESOLINE) 25 MG tablet Take 25 mg by mouth.    isosorbide mononitrate (IMDUR) 60 MG 24 hr tablet Take 60 mg by mouth.    losartan (COZAAR) 100 MG tablet Take 100 mg by mouth.          While in the hospital, will manage blood pressure as follows; Adjust home antihypertensive regimen as follows- nicardipine gtt    Will utilize p.r.n. blood pressure medication only if patient's blood pressure greater than  180/110 and he develops symptoms such as worsening chest pain or shortness of breath.        Type 2 diabetes mellitus, with long-term current use of insulin  Patient's FSGs are controlled on current medication regimen.  Last A1c reviewed-   Lab Results   Component Value Date    HGBA1C 8.9 (H) 04/20/2022     Most recent fingerstick glucose reviewed-   Recent Labs   Lab 11/14/22  0025   POCTGLUCOSE 130*     Current correctional scale  Medium  Maintain anti-hyperglycemic dose as follows-   Antihyperglycemics (From admission, onward)    Start     Stop Route Frequency Ordered    11/14/22 0101  insulin aspart U-100 pen 1-10 Units         -- SubQ Before meals & nightly PRN 11/14/22 0002        Hold Oral hypoglycemics while  patient is in the hospital.      VTE Risk Mitigation (From admission, onward)    None             Cassandra Shrestha NP  Department of Hospital Medicine   [facility]

## 2022-11-14 NOTE — ASSESSMENT & PLAN NOTE
Patient's FSGs are controlled on current medication regimen.  Last A1c reviewed-   Lab Results   Component Value Date    HGBA1C 8.9 (H) 04/20/2022     Most recent fingerstick glucose reviewed-   Recent Labs   Lab 11/14/22  0025   POCTGLUCOSE 130*     Current correctional scale  Medium  Maintain anti-hyperglycemic dose as follows-   Antihyperglycemics (From admission, onward)    Start     Stop Route Frequency Ordered    11/14/22 0101  insulin aspart U-100 pen 1-10 Units         -- SubQ Before meals & nightly PRN 11/14/22 0002        Hold Oral hypoglycemics while patient is in the hospital.

## 2022-11-14 NOTE — ASSESSMENT & PLAN NOTE
Chronic, uncontrolled.  Latest blood pressure and vitals reviewed-   Temp:  [98 °F (36.7 °C)-98.4 °F (36.9 °C)]   Pulse:  []   Resp:  [0-29]   BP: (151-216)/()   SpO2:  [67 %-100 %] .   Home meds for hypertension were reviewed and noted below.   Hypertension Medications             amLODIPine (NORVASC) 10 MG tablet Take 1 tablet (10 mg total) by mouth once daily.    amLODIPine (NORVASC) 10 MG tablet Take 1 tablet by mouth once daily.    carvediloL (COREG) 25 MG tablet Take 25 mg by mouth.    cloNIDine 0.3 mg/24 hr td ptwk (CATAPRES) 0.3 mg/24 hr Place 1 patch onto the skin once a week. medically necessary with dx codes 401.9, 428.43, 428.0.    furosemide (LASIX) 40 MG tablet Take 40 mg by mouth 2 (two) times daily.    hydrALAZINE (APRESOLINE) 25 MG tablet Take 25 mg by mouth.    isosorbide mononitrate (IMDUR) 60 MG 24 hr tablet Take 60 mg by mouth.    losartan (COZAAR) 100 MG tablet Take 100 mg by mouth.          While in the hospital, will manage blood pressure as follows; Adjust home antihypertensive regimen as follows- nicardipine gtt    Will utilize p.r.n. blood pressure medication only if patient's blood pressure greater than  180/110 and he develops symptoms such as worsening chest pain or shortness of breath.

## 2022-11-14 NOTE — ED NOTES
Report given to PCU RN on PCU. All questions/concerns addressed at this time. Pt updated on plan of care. Pt to be transferred to room 220A via W/C with all personal belongings. Pt placed on [facility] for transport. Spencer Burton Jr. with MRN [id number] visualized by resource RN, Telma, at this time. Pt is suitable for inpatient step-down floor assignment.

## 2022-11-14 NOTE — SUBJECTIVE & OBJECTIVE
Interval History: Notes reviewed, no acute events overnight. Patient seen this morning resting in bed with no acute distress. He reports improvement of SOB this morning. He denies any chest pain, palpitations, N/V, or abdominal pain.  Voiced frustrations over NPO status. Instructed patient to maintain NPO status until cardiology evaluation. Echo ordered and pending. Nephrology consulted for CKD stage IV with poor compliance. Cardiology consult pending. Rechecking K+ after repletion. BP stable with IV cardene infusion at 25mg/hr. UOP approximately 1500ml since admission. Continue to monitor I&Os. Awaiting specialist recommendations.      Review of Systems   Constitutional:  Positive for fatigue. Negative for chills and fever.   Respiratory:  Positive for shortness of breath. Negative for cough and wheezing.    Cardiovascular:  Positive for leg swelling. Negative for chest pain and palpitations.   Gastrointestinal:  Negative for abdominal distention, abdominal pain, nausea and vomiting.   Endocrine: Negative for polydipsia and polyuria.   Genitourinary:  Negative for difficulty urinating, dysuria and hematuria.   Musculoskeletal:  Negative for arthralgias and myalgias.   Neurological:  Positive for headaches. Negative for tremors, seizures and light-headedness.   Psychiatric/Behavioral:  Negative for behavioral problems and confusion.    All other systems reviewed and are negative.  Objective:     Vital Signs (Most Recent):  Temp: 98.2 °F (36.8 °C) (11/14/22 0900)  Pulse: 88 (11/14/22 0900)  Resp: (!) 25 (11/14/22 0900)  BP: (!) 155/96 (11/14/22 0900)  SpO2: 96 % (11/14/22 0900)   Vital Signs (24h Range):  Temp:  [98 °F (36.7 °C)-98.4 °F (36.9 °C)] 98.2 °F (36.8 °C)  Pulse:  [] 88  Resp:  [0-29] 25  SpO2:  [67 %-100 %] 96 %  BP: (151-216)/() 155/96     Weight: 90.8 kg (200 lb 2.8 oz)  Body mass index is 29.56 kg/m².    Intake/Output Summary (Last 24 hours) at 11/14/2022 1119  Last data filed at 11/14/2022  0908  Gross per 24 hour   Intake 100 ml   Output 1575 ml   Net -1475 ml      Physical Exam  Constitutional:       General: He is not in acute distress.     Appearance: He is ill-appearing.   HENT:      Head: Normocephalic and atraumatic.      Right Ear: External ear normal.      Left Ear: External ear normal.   Cardiovascular:      Rate and Rhythm: Normal rate and regular rhythm.      Pulses: Normal pulses.      Heart sounds: No murmur heard.    No gallop.   Pulmonary:      Effort: Pulmonary effort is normal. No respiratory distress.      Breath sounds: No wheezing.   Abdominal:      General: Abdomen is flat. There is no distension.      Palpations: Abdomen is soft.      Tenderness: There is no abdominal tenderness.   Musculoskeletal:      Right lower leg: Edema present.      Left lower leg: Edema present.      Comments: 3+ pitting edema to BLE    Skin:     General: Skin is warm.      Coloration: Skin is not jaundiced.      Findings: No bruising.   Neurological:      General: No focal deficit present.      Mental Status: He is alert and oriented to person, place, and time.      Cranial Nerves: No cranial nerve deficit.      Sensory: No sensory deficit.   Psychiatric:         Mood and Affect: Mood normal.       Significant Labs: All pertinent labs within the past 24 hours have been reviewed.  CBC:   Recent Labs   Lab 11/13/22 2208   WBC 7.33   HGB 12.0*   HCT 37.0*        CMP:   Recent Labs   Lab 11/13/22 2208      K 2.9*      CO2 25   *   BUN 28*   CREATININE 3.9*   CALCIUM 8.8   ANIONGAP 11       Significant Imaging: I have reviewed all pertinent imaging results/findings within the past 24 hours.

## 2022-11-14 NOTE — ASSESSMENT & PLAN NOTE
Creatine stable for now. BMP reviewed- noted Estimated Creatinine Clearance: 25.2 mL/min (A) (based on SCr of 3.9 mg/dL (H)). according to latest data. Monitor UOP and serial BMP and adjust therapy as needed. Renally dose meds.

## 2022-11-14 NOTE — NURSING
Attempted to start second IV on patient due to needing continuous Cardene gtt and potassium replacements.  Attempted to stick patient x2 with no success.

## 2022-11-14 NOTE — ASSESSMENT & PLAN NOTE
Creatine stable for now. BMP reviewed- noted Estimated Creatinine Clearance: 25.2 mL/min (A) (based on SCr of 3.9 mg/dL (H)). according to latest data. Monitor UOP and serial BMP and adjust therapy as needed. Renally dose meds. Nephrology consulted

## 2022-11-14 NOTE — ASSESSMENT & PLAN NOTE
Patient is identified as having Diastolic (HFpEF) heart failure that is Acute on Chronic. CHF is currently uncontrolled due to Dyspnea not returned to baseline after 1 doses of IV diuretic and Pulmonary edema/pleural effusion on CXR. Latest ECHO performed and demonstrates- Results for orders placed during the hospital encounter of 11/01/21    Echo    Interpretation Summary  · The left ventricle is normal in size with severe concentric hypertrophy and normal systolic function.  · Moderate left atrial enlargement.  · Indeterminate left ventricular diastolic function.  · The estimated PA systolic pressure is 39 mmHg.  · Normal right ventricular size with normal right ventricular systolic function.  · Intermediate central venous pressure (8 mmHg).  · Small circumferential pericardial effusion.  · The estimated ejection fraction is 60%.  · Mild right atrial enlargement.  · Mild tricuspid regurgitation.  · Mild-to-moderate aortic regurgitation.  . Continue Beta Blocker Furosemide and monitor clinical status closely. Monitor on telemetry. Patient is on CHF pathway.  Monitor strict Is&Os and daily weights.  Place on fluid restriction of 1 L. Continue to stress to patient importance of self efficacy and  on diet for CHF. Last BNP reviewed- and noted below   Recent Labs   Lab 11/13/22  2311   BNP 2,603*   .    -Cardiology consult  -Telemetry  - consult

## 2022-11-14 NOTE — PROGRESS NOTES
"[facility] Hospital Medicine  Progress Note    Patient Name: Spencer Burton Jr.  MRN: [id number]  Patient Class: IP- Inpatient   Admission Date: 11/13/2022  Length of Stay: 0 days  Attending Physician: Tirso Cesar MD  Primary Care Provider: Primary Doctor No        Subjective:     Principal Problem:Acute on chronic congestive heart failure        HPI:  Spencer Burton Jr. is a 52 year old male with a past medical history of COPD, CHF, hypertension, and DM type 2 on insulin who presented with worsening shortness of breath for the past week.  Patient states he was seen in the ED last week with similar symptoms and admission was recommended.  Patient states he left AMA because he did not want to be admitted.  He reports shortness of breath worsens with movement and when laying down and also reports associated shortness of breath and bilateral lower extremity swelling.  Patient states he takes 10 mg of Lasix once a day and has had no improvement with his symptoms.  Patient hypertensive upon arriving to ED with /140's.  He admits that has not been taking his other medications as prescribed because he has been having problems getting them from the pharmacy and is not sure what he takes."  ED workup revealed mildly elevated troponin 0.3, creatinine 3.9 (baseline near 3.4), potassium 2.9 and BNP 2 603.  Chest x-ray showed pulmonary edema.  Patient received IV Lasix 20 mg and 10 mEq K+ in the ED and was referred to Hospital Medicine.  Patient reports feeling short of breath and denies chest pain.  Patient also denies fever, chills, headache, change in vision, nausea, vomiting, and diarrhea.  Patient will be admitted to Hospital Medicine for further evaluation and management.            Overview/Hospital Course:  No notes on file    Interval History: Notes reviewed, no acute events overnight. Patient seen this morning resting in bed with no acute distress. He reports improvement of SOB this morning. He " denies any chest pain, palpitations, N/V, or abdominal pain.  Voiced frustrations over NPO status. Instructed patient to maintain NPO status until cardiology evaluation. Echo ordered and pending. Nephrology consulted for CKD stage IV with poor compliance. Cardiology consult pending. Rechecking K+ after repletion. BP stable with IV cardene infusion at 25mg/hr. Will DC IV cardene and start home BP meds. UOP approximately 1500ml since admission. Continue to monitor I&Os. Awaiting specialist recommendations.      Review of Systems   Constitutional:  Positive for fatigue. Negative for chills and fever.   Respiratory:  Positive for shortness of breath. Negative for cough and wheezing.    Cardiovascular:  Positive for leg swelling. Negative for chest pain and palpitations.   Gastrointestinal:  Negative for abdominal distention, abdominal pain, nausea and vomiting.   Endocrine: Negative for polydipsia and polyuria.   Genitourinary:  Negative for difficulty urinating, dysuria and hematuria.   Musculoskeletal:  Negative for arthralgias and myalgias.   Neurological:  Positive for headaches. Negative for tremors, seizures and light-headedness.   Psychiatric/Behavioral:  Negative for behavioral problems and confusion.    All other systems reviewed and are negative.  Objective:     Vital Signs (Most Recent):  Temp: 98.2 °F (36.8 °C) (11/14/22 0900)  Pulse: 88 (11/14/22 0900)  Resp: (!) 25 (11/14/22 0900)  BP: (!) 155/96 (11/14/22 0900)  SpO2: 96 % (11/14/22 0900)   Vital Signs (24h Range):  Temp:  [98 °F (36.7 °C)-98.4 °F (36.9 °C)] 98.2 °F (36.8 °C)  Pulse:  [] 88  Resp:  [0-29] 25  SpO2:  [67 %-100 %] 96 %  BP: (151-216)/() 155/96     Weight: 90.8 kg (200 lb 2.8 oz)  Body mass index is 29.56 kg/m².    Intake/Output Summary (Last 24 hours) at 11/14/2022 1119  Last data filed at 11/14/2022 0908  Gross per 24 hour   Intake 100 ml   Output 1575 ml   Net -1475 ml      Physical Exam  Constitutional:       General: He is  not in acute distress.     Appearance: He is ill-appearing.   HENT:      Head: Normocephalic and atraumatic.      Right Ear: External ear normal.      Left Ear: External ear normal.   Cardiovascular:      Rate and Rhythm: Normal rate and regular rhythm.      Pulses: Normal pulses.      Heart sounds: No murmur heard.    No gallop.   Pulmonary:      Effort: Pulmonary effort is normal. No respiratory distress.      Breath sounds: No wheezing.   Abdominal:      General: Abdomen is flat. There is no distension.      Palpations: Abdomen is soft.      Tenderness: There is no abdominal tenderness.   Musculoskeletal:      Right lower leg: Edema present.      Left lower leg: Edema present.      Comments: 3+ pitting edema to BLE    Skin:     General: Skin is warm.      Coloration: Skin is not jaundiced.      Findings: No bruising.   Neurological:      General: No focal deficit present.      Mental Status: He is alert and oriented to person, place, and time.      Cranial Nerves: No cranial nerve deficit.      Sensory: No sensory deficit.   Psychiatric:         Mood and Affect: Mood normal.       Significant Labs: All pertinent labs within the past 24 hours have been reviewed.  CBC:   Recent Labs   Lab 11/13/22  2208   WBC 7.33   HGB 12.0*   HCT 37.0*        CMP:   Recent Labs   Lab 11/13/22 2208      K 2.9*      CO2 25   *   BUN 28*   CREATININE 3.9*   CALCIUM 8.8   ANIONGAP 11       Significant Imaging: I have reviewed all pertinent imaging results/findings within the past 24 hours.      Assessment/Plan:      * Acute on chronic congestive heart failure  Patient is identified as having Diastolic (HFpEF) heart failure that is Acute on Chronic. CHF is currently uncontrolled due to Dyspnea not returned to baseline after 1 doses of IV diuretic and Pulmonary edema/pleural effusion on CXR. Latest ECHO performed and demonstrates- Results for orders placed during the hospital encounter of  11/01/21    Echo    Interpretation Summary  · The left ventricle is normal in size with severe concentric hypertrophy and normal systolic function.  · Moderate left atrial enlargement.  · Indeterminate left ventricular diastolic function.  · The estimated PA systolic pressure is 39 mmHg.  · Normal right ventricular size with normal right ventricular systolic function.  · Intermediate central venous pressure (8 mmHg).  · Small circumferential pericardial effusion.  · The estimated ejection fraction is 60%.  · Mild right atrial enlargement.  · Mild tricuspid regurgitation.  · Mild-to-moderate aortic regurgitation.  . Continue Beta Blocker Furosemide and monitor clinical status closely. Monitor on telemetry. Patient is on CHF pathway.  Monitor strict Is&Os and daily weights.  Place on fluid restriction of 1 L. Continue to stress to patient importance of self efficacy and  on diet for CHF. Last BNP reviewed- and noted below   Recent Labs   Lab 11/13/22  2311   BNP 2,603*   .    -Cardiology consult  -Telemetry  -SW consult       CKD (chronic kidney disease), stage IV  Creatine stable for now. BMP reviewed- noted Estimated Creatinine Clearance: 25.2 mL/min (A) (based on SCr of 3.9 mg/dL (H)). according to latest data. Monitor UOP and serial BMP and adjust therapy as needed. Renally dose meds. Nephrology consulted           Dyslipidemia associated with type 2 diabetes mellitus  Chronic  Resume home medication    Hypertension associated with diabetes  Chronic, uncontrolled.  Latest blood pressure and vitals reviewed-   Temp:  [98 °F (36.7 °C)-98.4 °F (36.9 °C)]   Pulse:  []   Resp:  [0-29]   BP: (151-216)/()   SpO2:  [67 %-100 %] .   Home meds for hypertension were reviewed and noted below.   Hypertension Medications               amLODIPine (NORVASC) 10 MG tablet Take 1 tablet (10 mg total) by mouth once daily.    amLODIPine (NORVASC) 10 MG tablet Take 1 tablet by mouth once daily.    carvediloL (COREG)  25 MG tablet Take 25 mg by mouth.    cloNIDine 0.3 mg/24 hr td ptwk (CATAPRES) 0.3 mg/24 hr Place 1 patch onto the skin once a week. medically necessary with dx codes 401.9, 428.43, 428.0.    furosemide (LASIX) 40 MG tablet Take 40 mg by mouth 2 (two) times daily.    hydrALAZINE (APRESOLINE) 25 MG tablet Take 25 mg by mouth.    isosorbide mononitrate (IMDUR) 60 MG 24 hr tablet Take 60 mg by mouth.    losartan (COZAAR) 100 MG tablet Take 100 mg by mouth.            While in the hospital, will manage blood pressure as follows; Adjust home antihypertensive regimen as follows- nicardipine gtt    Will utilize p.r.n. blood pressure medication only if patient's blood pressure greater than  180/110 and he develops symptoms such as worsening chest pain or shortness of breath.        Type 2 diabetes mellitus, with long-term current use of insulin  Patient's FSGs are controlled on current medication regimen.  Last A1c reviewed-   Lab Results   Component Value Date    HGBA1C 8.9 (H) 04/20/2022     Most recent fingerstick glucose reviewed-   Recent Labs   Lab 11/14/22  0025 11/14/22  1113   POCTGLUCOSE 130* 126*     Current correctional scale  Medium  Maintain anti-hyperglycemic dose as follows-   Antihyperglycemics (From admission, onward)      Start     Stop Route Frequency Ordered    11/14/22 0101  insulin aspart U-100 pen 1-10 Units         -- SubQ Before meals & nightly PRN 11/14/22 0002          Hold Oral hypoglycemics while patient is in the hospital.      VTE Risk Mitigation (From admission, onward)      None            Discharge Planning   ADELINA:      Code Status: Prior   Is the patient medically ready for discharge?:     Reason for patient still in hospital (select all that apply): Patient trending condition and Consult recommendations                     Boris Fernando PA-C  Department of Hospital Medicine   [facility]

## 2022-11-14 NOTE — HPI
"Spencer Burton Jr. is a 52 year old male with a past medical history of COPD, CHF, hypertension, and DM type 2 on insulin who presented with worsening shortness of breath for the past week.  Patient states he was seen in the ED last week with similar symptoms and admission was recommended.  Patient states he left AMA because he did not want to be admitted.  He reports shortness of breath worsens with movement and when laying down and also reports associated shortness of breath and bilateral lower extremity swelling.  Patient states he takes 10 mg of Lasix once a day and has had no improvement with his symptoms.  Patient hypertensive upon arriving to ED with /140's.  He admits that has not been taking his other medications as prescribed because he has been having problems getting them from the pharmacy and is not sure what he takes."  ED workup revealed mildly elevated troponin 0.3, creatinine 3.9 (baseline near 3.4), potassium 2.9 and BNP 2 603.  Chest x-ray showed pulmonary edema.  Patient received IV Lasix 20 mg and 10 mEq K+ in the ED and was referred to Hospital Medicine.  Patient reports feeling short of breath and denies chest pain.  Patient also denies fever, chills, headache, change in vision, nausea, vomiting, and diarrhea.  Patient will be admitted to Hospital Medicine for further evaluation and management.        "

## 2022-11-14 NOTE — ASSESSMENT & PLAN NOTE
Chronic, uncontrolled.  Latest blood pressure and vitals reviewed-   Temp:  [98 °F (36.7 °C)-98.4 °F (36.9 °C)]   Pulse:  []   Resp:  [18-29]   BP: (152-216)/()   SpO2:  [91 %-100 %] .   Home meds for hypertension were reviewed and noted below.   Hypertension Medications             amLODIPine (NORVASC) 10 MG tablet Take 1 tablet (10 mg total) by mouth once daily.    amLODIPine (NORVASC) 10 MG tablet Take 1 tablet by mouth once daily.    carvediloL (COREG) 25 MG tablet Take 25 mg by mouth.    cloNIDine 0.3 mg/24 hr td ptwk (CATAPRES) 0.3 mg/24 hr Place 1 patch onto the skin once a week. medically necessary with dx codes 401.9, 428.43, 428.0.    furosemide (LASIX) 40 MG tablet Take 40 mg by mouth 2 (two) times daily.    hydrALAZINE (APRESOLINE) 25 MG tablet Take 25 mg by mouth.    isosorbide mononitrate (IMDUR) 60 MG 24 hr tablet Take 60 mg by mouth.    losartan (COZAAR) 100 MG tablet Take 100 mg by mouth.          While in the hospital, will manage blood pressure as follows; Adjust home antihypertensive regimen as follows- nicardipine gtt    Will utilize p.r.n. blood pressure medication only if patient's blood pressure greater than  180/110 and he develops symptoms such as worsening chest pain or shortness of breath.

## 2022-11-14 NOTE — PLAN OF CARE
[facility]  Initial Discharge Assessment       Primary Care Provider: Primary Doctor No    Admission Diagnosis: Shortness of breath [R06.02]  Acute congestive heart failure, unspecified heart failure type [I50.9]    Admission Date: 11/13/2022  Expected Discharge Date:  11/15/2022    SW met with pt at bedside to complete discharge assessment, verified pharmacy and information on facesheet.  No  HH or dialysis.  See DME below.  Pt hast difficulty purchasing Clonidine, may need assistance or change of med.  Pt will drive self home.    Discharge Barriers Identified: None    Payor: MEDICAID / Plan: [place] [employer] / Product Type: Managed Medicaid /     Extended Emergency Contact Information  Primary Emergency Contact: darinjoy  Mobile Phone: [phone]  Relation: Mother  Preferred language: English   needed? No    Discharge Plan A: Home  Discharge Plan B: Home      [facility] - [place], [place] - [address]  [address] [facility] [address]  Phone: [phone] Fax: [phone]      Initial Assessment (most recent)       Adult Discharge Assessment - 11/14/22 1606          Discharge Assessment    Assessment Type Discharge Planning Assessment     Confirmed/corrected address, phone number and insurance Yes     Confirmed Demographics Correct on Facesheet     Source of Information patient     Communicated ADELINA with patient/caregiver No     Lives With parent(s)     Do you expect to return to your current living situation? Yes     Prior to hospitilization cognitive status: Alert/Oriented     Current cognitive status: Alert/Oriented     Walking or Climbing Stairs Difficulty none     Dressing/Bathing Difficulty none     Equipment Currently Used at Home crutches;walker, rolling;bedside commode;glucometer     Readmission within 30 days? No     Patient currently being followed by outpatient case management? No     Do you currently have service(s) that help you manage your  care at home? No     Do you take prescription medications? Yes     Do you have prescription coverage? Yes     Coverage Medicaid     Do you have any problems affording any of your prescribed medications? Yes     If yes, what medications? Clonidine     Who is going to help you get home at discharge? self     How do you get to doctors appointments? car, drives self     Are you on dialysis? No     Do you take coumadin? No     Discharge Plan A Home     Discharge Plan B Home     DME Needed Upon Discharge  none     Discharge Barriers Identified None        Physical Activity    On average, how many days per week do you engage in moderate to strenuous exercise (like a brisk walk)? 0 days     On average, how many minutes do you engage in exercise at this level? 0 min        Financial Resource Strain    How hard is it for you to pay for the very basics like food, housing, medical care, and heating? Somewhat hard        Housing Stability    In the last 12 months, was there a time when you were not able to pay the mortgage or rent on time? No     In the last 12 months, was there a time when you did not have a steady place to sleep or slept in a shelter (including now)? No        Transportation Needs    In the past 12 months, has lack of transportation kept you from medical appointments or from getting medications? No     In the past 12 months, has lack of transportation kept you from meetings, work, or from getting things needed for daily living? No        Food Insecurity    Within the past 12 months, you worried that your food would run out before you got the money to buy more. Never true     Within the past 12 months, the food you bought just didn't last and you didn't have money to get more. Never true        Social Connections    In a typical week, how many times do you talk on the phone with family, friends, or neighbors? More than three times a week     How often do you get together with friends or relatives? Three times a  week     How often do you attend Religious or Zoroastrianism services? More than 4 times per year     Do you belong to any clubs or organizations such as Religious groups, unions, fraternal or athletic groups, or school groups? No     How often do you attend meetings of the clubs or organizations you belong to? Never     Are you , , , , never , or living with a partner? Never         Alcohol Use    Q1: How often do you have a drink containing alcohol? Never

## 2022-11-14 NOTE — SUBJECTIVE & OBJECTIVE
Past Medical History:   Diagnosis Date    CHF (congestive heart failure)     COPD (chronic obstructive pulmonary disease)     Diabetes mellitus     Hyperlipidemia     Hypertension        Past Surgical History:   Procedure Laterality Date    APPENDECTOMY      CHOLECYSTECTOMY         Review of patient's allergies indicates:  No Known Allergies    Current Facility-Administered Medications on File Prior to Encounter   Medication    nitroGLYCERIN 0.4 MG/DOSE TL SPRY 400 mcg/spray spray 2 spray     Current Outpatient Medications on File Prior to Encounter   Medication Sig    acetaminophen (TYLENOL) 325 MG tablet Take 650 mg by mouth every 6 (six) hours as needed.    aspirin 81 MG Chew Take 81 mg by mouth.    atorvastatin (LIPITOR) 20 MG tablet Take 20 mg by mouth.    carvediloL (COREG) 25 MG tablet Take 25 mg by mouth.    EScitalopram oxalate (LEXAPRO) 20 MG tablet Take 20 mg by mouth.    ferrous sulfate (FEOSOL) 325 mg (65 mg iron) Tab tablet Take 1 tablet by mouth once daily.    glipiZIDE (GLUCOTROL) 5 MG tablet Take 5 mg by mouth.    hydrALAZINE (APRESOLINE) 25 MG tablet Take 25 mg by mouth.    isosorbide mononitrate (IMDUR) 60 MG 24 hr tablet Take 60 mg by mouth.    losartan (COZAAR) 100 MG tablet Take 100 mg by mouth.    silver sulfADIAZINE 1% (SILVADENE) 1 % cream Apply topically.    albuterol (PROVENTIL/VENTOLIN HFA) 90 mcg/actuation inhaler Inhale 1-2 puffs into the lungs every 6 (six) hours as needed for Wheezing. Rescue    amLODIPine (NORVASC) 10 MG tablet Take 1 tablet (10 mg total) by mouth once daily.    amLODIPine (NORVASC) 10 MG tablet Take 1 tablet by mouth once daily.    atorvastatin (LIPITOR) 40 MG tablet Take 1 tablet (40 mg total) by mouth every evening.    calcitRIOL (ROCALTROL) 0.25 MCG Cap Take 0.25 mcg by mouth once daily.    calcitRIOL (ROCALTROL) 0.25 MCG Cap Take 1 capsule by mouth once daily.    ciprofloxacin HCl (CIPRO) 500 MG tablet Take 500 mg by mouth 2 (two) times daily.    cloNIDine 0.3  "mg/24 hr td ptwk (CATAPRES) 0.3 mg/24 hr Place 1 patch onto the skin once a week. medically necessary with dx codes 401.9, 428.43, 428.0.    clotrimazole (LOTRIMIN) 1 % cream Apply topically 2 (two) times daily. for 10 days    fluticasone propionate (FLONASE) 50 mcg/actuation nasal spray 1 spray (50 mcg total) by Each Nostril route 2 (two) times daily as needed for Rhinitis.    furosemide (LASIX) 40 MG tablet Take 40 mg by mouth 2 (two) times daily.    glipiZIDE (GLUCOTROL) 10 MG tablet Take 1 tablet (10 mg total) by mouth 2 (two) times daily with meals.    glipiZIDE (GLUCOTROL) 10 MG tablet Take 10 mg by mouth.    glipiZIDE (GLUCOTROL) 5 MG tablet Take 5 mg by mouth.    HUMULIN 70/30 U-100 INSULIN 100 unit/mL (70-30) injection Inject into the skin.    HYDROcodone-acetaminophen (NORCO) 5-325 mg per tablet Take 1 tablet by mouth every 6 (six) hours as needed.    insulin detemir (LEVEMIR) 100 unit/mL injection Inject 15 Units into the skin every evening.    insulin needles, disposable, 31 x 3/16 " Ndle Use daily to inject insulin  DX:250.00    lancets (ONE TOUCH DELICA LANCETS) 33 gauge Misc 1 lancet by Misc.(Non-Drug; Combo Route) route 4 (four) times daily. DX:250.00   Medically necessary to test blood sugar    loratadine (CLARITIN) 10 mg tablet Take 1 tablet (10 mg total) by mouth once daily.    ondansetron (ZOFRAN-ODT) 4 MG TbDL Take 1 tablet (4 mg total) by mouth every 6 (six) hours as needed (nausea/vomiting).    oxyCODONE-acetaminophen (PERCOCET) 5-325 mg per tablet Take 1 tablet by mouth every 4 (four) hours as needed.    pioglitazone (ACTOS) 15 MG tablet Take 1 tablet by mouth once daily.    potassium chloride (KLOR-CON) 10 MEQ TbSR     sildenafiL (VIAGRA) 100 MG tablet TAKE ONE DAILY AS NEEDED    sulfamethoxazole-trimethoprim 400-80mg (BACTRIM,SEPTRA) 400-80 mg per tablet Take 1 tablet by mouth 2 (two) times daily.    traMADoL (ULTRAM) 50 mg tablet Take 1 tablet (50 mg total) by mouth every 8 (eight) hours " as needed for Pain.    TRUE METRIX AIR GLUCOSE METER Misc USE TO CHECK GLUCOSE TWICE DAILY AS DIRECTED    TRUE METRIX GLUCOSE TEST STRIP Strp 1 strip 2 (two) times daily.    TRUEPLUS LANCETS 30 gauge Misc USE 1 TO CHECK GLUCOSE TWICE DAILY     Family History       Problem Relation (Age of Onset)    Diabetes Father    Hypertension Mother, Father    Schizophrenia Father          Tobacco Use    Smoking status: Never    Smokeless tobacco: Never   Substance and Sexual Activity    Alcohol use: No     Comment: socially    Drug use: No    Sexual activity: Yes     Partners: Female     Review of Systems   Constitutional:  Positive for appetite change. Negative for activity change, chills and fever.   HENT:  Negative for congestion, sore throat and trouble swallowing.    Eyes:  Negative for photophobia and visual disturbance.   Respiratory:  Positive for shortness of breath. Negative for cough and chest tightness.    Cardiovascular:  Positive for leg swelling. Negative for chest pain and palpitations.   Gastrointestinal:  Negative for abdominal pain, diarrhea and nausea.   Genitourinary:  Negative for dysuria, flank pain and hematuria.   Musculoskeletal:  Negative for back pain.   Neurological:  Negative for dizziness, weakness and headaches.   Psychiatric/Behavioral:  Negative for confusion.    Objective:     Vital Signs (Most Recent):  Temp: 98.4 °F (36.9 °C) (11/13/22 2127)  Pulse: 97 (11/13/22 2307)  Resp: 20 (11/13/22 2127)  BP: (!) 216/141 (11/13/22 2307)  SpO2: 96 % (11/13/22 2307)   Vital Signs (24h Range):  Temp:  [98.4 °F (36.9 °C)] 98.4 °F (36.9 °C)  Pulse:  [] 97  Resp:  [20] 20  SpO2:  [96 %-98 %] 96 %  BP: (184-216)/(131-141) 216/141     Weight: 86.2 kg (190 lb)  Body mass index is 28.06 kg/m².    Physical Exam  Vitals reviewed.   Constitutional:       Appearance: Normal appearance. He is normal weight.   HENT:      Head: Normocephalic.      Mouth/Throat:      Mouth: Mucous membranes are moist.       Pharynx: Oropharynx is clear.   Eyes:      Pupils: Pupils are equal, round, and reactive to light.   Cardiovascular:      Rate and Rhythm: Normal rate and regular rhythm.      Pulses: Normal pulses.      Heart sounds: Normal heart sounds.   Pulmonary:      Effort: Pulmonary effort is normal.      Breath sounds: Normal breath sounds.   Abdominal:      General: Bowel sounds are normal.      Palpations: Abdomen is soft.   Musculoskeletal:         General: Normal range of motion.      Cervical back: Normal range of motion.      Right lower le+ Edema present.      Left lower le+ Edema present.   Skin:     General: Skin is warm and dry.   Neurological:      Mental Status: He is alert and oriented to person, place, and time. Mental status is at baseline.   Psychiatric:         Mood and Affect: Mood normal.         CRANIAL NERVES     CN III, IV, VI   Pupils are equal, round, and reactive to light.     Significant Labs: All pertinent labs within the past 24 hours have been reviewed.  CBC:   Recent Labs   Lab 22   WBC 7.33   HGB 12.0*   HCT 37.0*        CMP:   Recent Labs   Lab 22      K 2.9*      CO2 25   *   BUN 28*   CREATININE 3.9*   CALCIUM 8.8   ANIONGAP 11       Significant Imaging: I have reviewed all pertinent imaging results/findings within the past 24 hours.    Imaging Results              X-Ray Chest 1 View (Final result)  Result time 22 22:40:14      Final result by Joey Styles MD (22 22:40:14)                   Impression:      Persistent airspace opacities in both lung bases.  Correlate for pulmonary edema versus pneumonia.    Stable cardiac size with mild left ventricular enlargement.      Electronically signed by: Joey Styles  Date:    2022  Time:    22:40               Narrative:    EXAMINATION:  XR CHEST 1 VIEW    CLINICAL HISTORY:  SOB;    TECHNIQUE:  Single frontal view of the chest was  performed.    COMPARISON:  11/06/2022    FINDINGS:  The heart is stable in size with mild left ventricular configuration.  The bibasilar airspace opacities do not appear significantly changed.  The

## 2022-11-14 NOTE — ASSESSMENT & PLAN NOTE
Patient's FSGs are controlled on current medication regimen.  Last A1c reviewed-   Lab Results   Component Value Date    HGBA1C 8.9 (H) 04/20/2022     Most recent fingerstick glucose reviewed-   Recent Labs   Lab 11/14/22  0025 11/14/22  1113   POCTGLUCOSE 130* 126*     Current correctional scale  Medium  Maintain anti-hyperglycemic dose as follows-   Antihyperglycemics (From admission, onward)    Start     Stop Route Frequency Ordered    11/14/22 0101  insulin aspart U-100 pen 1-10 Units         -- SubQ Before meals & nightly PRN 11/14/22 0002        Hold Oral hypoglycemics while patient is in the hospital.

## 2022-11-14 NOTE — CONSULTS
"[facility]  Nephrology  Consult Note    Patient Name: Spencer Burton Jr.  MRN: [id number]  Admission Date: 11/13/2022  Hospital Length of Stay: 0 days  Attending Provider: Tirso Cesar MD   Primary Care Physician: Primary Doctor No  Principal Problem:Acute on chronic congestive heart failure    Inpatient consult to Nephrology  Consult performed by: DINORA Pandya  Consult ordered by: Boris Fernando PA-C  Reason for consult: CKD 4 - poor compliance      Subjective:     HPI:   Spencer Burton is a 52 year old  male with a past medical history significant for HFrEF 40% w/ pulmonary hypertension, HTN and Type II DM "for a long time", > 15 yr, COPD, and HLD. Initially presented to [facility] emergency department with complaints of shortness of breath with exertion, orthopnea, and lower extremity edema. Work-up in the emergency department revealed an elevated , CXR with persistent airspace opacities in both lung bases. Correlate for pulmonary edema versus pneumonia. Cardiology was consulted and has been diuresed with improvement in oxygen demands (currently on room air). Initially required initiation of nicardipine infusion for hypertensive emergency--> since weaned and now controlled on oral regimen.     In regards to his renal history--> reports he follows with a nephrologist on the [place] (unable to recall name) and has an appointment with him in January. He is is aware of his CKD IV status and has been told he has proteinuria in the past (on Losartan outpatient, will quantify this admission).  Longstanding uncontrolled HTN and DM. No recent dye studies, no history of kidney stones. Denies chronic NSAID use. Denies hematuria, dysuria, pyuria, or flank pain at this time.     Nephrology consulted for evaluation and management of acute on chronic CKD IV.     Past Medical History:   Diagnosis Date    CHF (congestive heart failure)     COPD (chronic obstructive pulmonary disease)     " Diabetes mellitus     Hyperlipidemia     Hypertension        Past Surgical History:   Procedure Laterality Date    APPENDECTOMY      CHOLECYSTECTOMY         Review of patient's allergies indicates:  No Known Allergies  Current Facility-Administered Medications   Medication Frequency    aspirin chewable tablet 81 mg Daily    atorvastatin tablet 40 mg QHS    carvediloL tablet 25 mg BID    dextrose 10% bolus 125 mL PRN    dextrose 10% bolus 250 mL PRN    furosemide injection 40 mg Q12H    glucagon (human recombinant) injection 1 mg PRN    glucose chewable tablet 16 g PRN    glucose chewable tablet 24 g PRN    hydrALAZINE tablet 25 mg Q12H    insulin aspart U-100 pen 1-10 Units QID (AC + HS) PRN    potassium chloride 10 mEq in 100 mL IVPB BID     Family History       Problem Relation (Age of Onset)    Diabetes Father    Hypertension Mother, Father    Schizophrenia Father          Tobacco Use    Smoking status: Never    Smokeless tobacco: Never   Substance and Sexual Activity    Alcohol use: No     Comment: socially    Drug use: No    Sexual activity: Yes     Partners: Female     Review of Systems   Constitutional:  Positive for unexpected weight change.   Respiratory:  Positive for shortness of breath.    Cardiovascular:  Positive for leg swelling.   Objective:     Vital Signs (Most Recent):  Temp: 98.2 °F (36.8 °C) (11/14/22 0900)  Pulse: 88 (11/14/22 0900)  Resp: (!) 25 (11/14/22 0900)  BP: 132/86 (11/14/22 1206)  SpO2: 96 % (11/14/22 0900)  O2 Device (Oxygen Therapy): nasal cannula (11/14/22 0711)   Vital Signs (24h Range):  Temp:  [98 °F (36.7 °C)-98.4 °F (36.9 °C)] 98.2 °F (36.8 °C)  Pulse:  [] 88  Resp:  [0-29] 25  SpO2:  [67 %-100 %] 96 %  BP: (130-216)/() 132/86     Weight: 90.7 kg (200 lb) (11/14/22 0900)  Body mass index is 29.53 kg/m².  Body surface area is 2.1 meters squared.    I/O last 3 completed shifts:  In: 100 [IV Piggyback:100]  Out: 775 [Urine:775]    Physical Exam  Vitals and nursing  note reviewed.   Constitutional:       General: He is not in acute distress.     Appearance: Normal appearance. He is obese. He is not ill-appearing.   HENT:      Head: Normocephalic and atraumatic.      Mouth/Throat:      Mouth: Mucous membranes are moist.   Eyes:      Pupils: Pupils are equal, round, and reactive to light.   Neck:      Vascular: No JVD.   Cardiovascular:      Rate and Rhythm: Normal rate and regular rhythm.      Pulses: Normal pulses.      Heart sounds: Normal heart sounds.     No friction rub. No gallop.   Pulmonary:      Effort: Pulmonary effort is normal. No respiratory distress.      Breath sounds: Normal breath sounds. No wheezing or rales.   Abdominal:      General: Abdomen is flat. Bowel sounds are normal. There is no distension.      Palpations: Abdomen is soft.      Tenderness: There is no abdominal tenderness. There is no guarding or rebound.   Musculoskeletal:         General: Normal range of motion.      Cervical back: Normal range of motion and neck supple.      Right lower leg: 3+ Edema present.      Left lower leg: 3+ Edema present.   Skin:     General: Skin is warm and dry.      Capillary Refill: Capillary refill takes less than 2 seconds.   Neurological:      General: No focal deficit present.      Mental Status: He is alert and oriented to person, place, and time. Mental status is at baseline.   Psychiatric:         Behavior: Behavior normal.       Significant Labs:  ABGs: No results for input(s): PH, PCO2, HCO3, POCSATURATED, BE in the last 168 hours.  BMP:   Recent Labs   Lab 11/13/22 2208 11/14/22  1157   *  --      --    K 2.9* 3.1*     --    CO2 25  --    BUN 28*  --    CREATININE 3.9*  --    CALCIUM 8.8  --      Cardiac Markers: No results for input(s): CKMB, TROPONINT, MYOGLOBIN in the last 168 hours.  CBC:   Recent Labs   Lab 11/13/22 2208   WBC 7.33   RBC 4.29*   HGB 12.0*   HCT 37.0*      MCV 86   MCH 28.0   MCHC 32.4     CMP:   Recent Labs    Lab 11/13/22  2208 11/14/22  1157   *  --    CALCIUM 8.8  --      --    K 2.9* 3.1*   CO2 25  --      --    BUN 28*  --    CREATININE 3.9*  --      Coagulation: No results for input(s): PT, INR, APTT in the last 168 hours.  LFTs: No results for input(s): ALT, AST, ALKPHOS, BILITOT, PROT, ALBUMIN in the last 168 hours.  PTH: No results for input(s): PTH in the last 168 hours.  TSH: No results for input(s): TSH in the last 168 hours.  No results for input(s): COLORU, CLARITYU, SPECGRAV, PHUR, PROTEINUA, GLUCOSEU, BILIRUBINCON, BLOODU, WBCU, RBCU, BACTERIA, MUCUS, NITRITE, LEUKOCYTESUR, UROBILINOGEN, HYALINECASTS in the last 168 hours.    Significant Imaging:  Pertinent labs and imaging reviewed    Assessment/Plan:     Acute on Chronic CKD IV  -baseline Cr 3.4-3.7 since 2021  -presented with a sCr 3.9 and eGFR  18-> near baseline function  -obtain UA  -obtain UACR, UPCR, urine eosinophils  -f/u uric acid and CPK levels  -obtain baseline renal imaging  -maintain diuresis  -strict and accurate I&O  -avoiding nephrotoxic agents  -further recommendations pending clinical course and labs/imaging above.   -nephrology to follow.     HFrEF 40% w/ Exacerbation  -Echo dated 11/14: EF 40 %, Grade III DD, mildy decreased left ventricular systolic function, small posterior pericardial effusion, severe left atrial enlargement, moderate right atrial enlargement, mild to mod TR, mild to mod AR, CVP 8 mmHG with estimated PA systolic pressure 51 mmHg. Pulmonary hypertension.   -BNP elevated  -CXR with pulmonary edema  -cardiolgy consulted  -currently diuresisi--> UOP     Hypertensive Emergency  -blood pressure currently controlled 132/86 with HR 88\  -cardene weaned--> currently maintained on carvedilol 25 mg BID, hydralazine  25 mg BID, and Lasix 40 mg IV Q 12 hrs.   -noted hypokalemia--> although in the setting of diuretics outpatient. Will f/u renal US w/ duplex. F/u plasma renin activity and aldosterone levels  in am. F/u 24 hr metanephrines/catecholamines.     Uncontrolled Type II DM  -last HgbA1C 8.5% Nov 2022  -managed per hospital medicine service    Anemia Evaluation  -Hgb  12.0 (MCV 86)  -f/u iron studies, ferritin levels in am    MBD Evaluation  -calcium stable 8.8--> f/u renal panel for correction  -f/u PTH and Vit D levels    Hyperlipidemia  -continue statin for risk optimization and MACE reduction    Patient condition and plan of care discussed and formulated with Dr. Joey Mendieta. See above assessment and plan.     Thank you for this consultation. Further recommendations pending clinical course. Will follow.       LUIS Pandya FNP  Nephrology  [facility]

## 2022-11-14 NOTE — ED PROVIDER NOTES
Encounter Date: 11/13/2022    SCRIBE #1 NOTE: I, Sumeet Centeno, am scribing for, and in the presence of,  Diego Kimball MD.     History     Chief Complaint   Patient presents with    Shortness of Breath     Has not improved since last week.     Time seen by provider: 9:46 PM on 11/13/2022    Spencer Burton Jr. is a 52 y.o. male who presents to the ED with SOB. The patient reports experiencing SOB for a week. He notes being in the ER a week ago for the same complaint but left AMA and has not followed up with a doctor since then. The patient states that his symptoms currently feel the same as they did a week ago. The patient suspects that he has fluid in his lungs. He confirms experiencing a loss of appetite and currently feels a little SOB. The patient notes that the SOB is exacerbated when he lays down or walks around. He also mentions experiencing swelling at his bilateral lower extremities. Per the patient, he takes 10 mg of lasix once a day for his CHF with no improvement to his symptoms. He was recently admitted for CHF exacerbation and is non-compliant with medication. The patient denies fever, cough, congestion, or any other symptoms at this time. PMHx of COPD, CHF, HTN, and DM. No pertinent PSHx.    The history is provided by the patient.   Review of patient's allergies indicates:  No Known Allergies  Past Medical History:   Diagnosis Date    CHF (congestive heart failure)     COPD (chronic obstructive pulmonary disease)     Diabetes mellitus     Hyperlipidemia     Hypertension      Past Surgical History:   Procedure Laterality Date    APPENDECTOMY      CHOLECYSTECTOMY       Family History   Problem Relation Age of Onset    Hypertension Mother     Schizophrenia Father     Hypertension Father     Diabetes Father      Social History     Tobacco Use    Smoking status: Never    Smokeless tobacco: Never   Substance Use Topics    Alcohol use: No     Comment: socially    Drug use: No     Review of Systems    Constitutional:  Positive for appetite change. Negative for fever.   HENT:  Negative for congestion and sore throat.    Respiratory:  Positive for shortness of breath. Negative for cough.    Cardiovascular:  Positive for leg swelling. Negative for chest pain.   Gastrointestinal:  Negative for nausea.   Genitourinary:  Negative for difficulty urinating and dysuria.   Musculoskeletal:  Negative for back pain.   Skin:  Negative for rash.   Neurological:  Negative for weakness.   Hematological:  Does not bruise/bleed easily.     Physical Exam     Initial Vitals [22]   BP Pulse Resp Temp SpO2   (!) 191/131 102 20 98.4 °F (36.9 °C) 98 %      MAP       --         Physical Exam    Nursing note and vitals reviewed.  Constitutional: He appears well-developed and well-nourished. He is not diaphoretic. No distress.   HENT:   Head: Normocephalic and atraumatic.   Mouth/Throat: Oropharynx is clear and moist.   Eyes: Conjunctivae are normal.   Neck: Neck supple.   Cardiovascular:  Normal rate, regular rhythm, normal heart sounds and intact distal pulses.     Exam reveals no gallop and no friction rub.       No murmur heard.  Pulmonary/Chest: Breath sounds normal. No tachypnea. He has no wheezes. He has no rhonchi. He has no rales.   Abdominal: Abdomen is soft. He exhibits no distension. There is no abdominal tenderness.   Musculoskeletal:         General: Normal range of motion.      Cervical back: Neck supple.      Right lower le+ Pitting Edema present.      Left lower le+ Pitting Edema present.     Neurological: He is alert and oriented to person, place, and time.   Skin: No rash noted. No erythema.       ED Course   Procedures  Labs Reviewed   CBC W/ AUTO DIFFERENTIAL - Abnormal; Notable for the following components:       Result Value    RBC 4.29 (*)     Hemoglobin 12.0 (*)     Hematocrit 37.0 (*)     All other components within normal limits   BASIC METABOLIC PANEL - Abnormal; Notable for the following  components:    Potassium 2.9 (*)     Glucose 175 (*)     BUN 28 (*)     Creatinine 3.9 (*)     eGFR 18 (*)     All other components within normal limits   TROPONIN I - Abnormal; Notable for the following components:    Troponin I 0.301 (*)     All other components within normal limits   B-TYPE NATRIURETIC PEPTIDE          Imaging Results              X-Ray Chest 1 View (Final result)  Result time 11/13/22 22:40:14      Final result by Joey Styles MD (11/13/22 22:40:14)                   Impression:      Persistent airspace opacities in both lung bases.  Correlate for pulmonary edema versus pneumonia.    Stable cardiac size with mild left ventricular enlargement.      Electronically signed by: Joey Styles  Date:    11/13/2022  Time:    22:40               Narrative:    EXAMINATION:  XR CHEST 1 VIEW    CLINICAL HISTORY:  SOB;    TECHNIQUE:  Single frontal view of the chest was performed.    COMPARISON:  11/06/2022    FINDINGS:  The heart is stable in size with mild left ventricular configuration.  The bibasilar airspace opacities do not appear significantly changed.  The                                  (radiology reading, visualized by me)       Medications   potassium chloride 10 mEq in 100 mL IVPB (has no administration in time range)   furosemide injection 20 mg (has no administration in time range)     Medical Decision Making:   History:   Old Medical Records: I decided to obtain old medical records.  Independently Interpreted Test(s):   I have ordered and independently interpreted EKG Reading(s) - see prior notes  Clinical Tests:   Lab Tests: Ordered and Reviewed  Radiological Study: Ordered and Reviewed  Medical Tests: Ordered and Reviewed        Scribe Attestation:   Scribe #1: I performed the above scribed service and the documentation accurately describes the services I performed. I attest to the accuracy of the note.      ED Course as of 11/13/22 2305   Sun Nov 13, 2022   2205 CXR:  Faint  bibasilar infiltrate similar to last prior. (my read) [MR]      ED Course User Index  [MR] Diego Kimball MD               I, Dr. Diego Kimball, personally performed the services described in this documentation. All medical record entries made by the scribe were at my direction and in my presence.  I have reviewed the chart and agree that the record reflects my personal performance and is accurate and complete. Diego Kimball MD.  11:05 PM 11/13/2022    Spencer Burton Jr. is a 52 y.o. male presenting with shortness of breath worse with exertion and laying supine along with increased lower extremity consistent with CHF exacerbation.  Patient left against medical advice under similar circumstances.  Continues on furosemide only once daily at low dose.  I will admit for diuresis and further medical management.  He does not require positive-pressure ventilation or supplemental oxygen at this point.  I have very low suspicion for alternative life-threatening process such as pneumonia.  I do not think antibiotics are indicated.  I doubt PE.  I do not think D-dimer or CT angiography of the chest is indicated.  Mildly elevated troponin similar to previous range noted that may be trended.  I doubt ACS.  This is likely related to chronic heart failure.  No marked change in anemia or renal function.  Hypokalemia noted with repletion initiated here along with diuresis with IV furosemide begun in the emergency department.  I have discussed with hospital medicine who will assume care.    Clinical Impression:   Final diagnoses:  [R06.02] Shortness of breath  [I50.9] Acute congestive heart failure, unspecified heart failure type (Primary)      ED Disposition Condition    Admit Stable                Diego Kimball MD  11/13/22 4684

## 2022-11-14 NOTE — NURSING
Attempted to try to go over home medications, patient was unable to clearly tell me what meds he takes everyday and what he does not.

## 2022-11-15 LAB
25(OH)D3+25(OH)D2 SERPL-MCNC: 21 NG/ML (ref 30–96)
ALBUMIN SERPL BCP-MCNC: 2.6 G/DL (ref 3.5–5.2)
ANION GAP SERPL CALC-SCNC: 11 MMOL/L (ref 8–16)
BUN SERPL-MCNC: 29 MG/DL (ref 6–20)
CALCIUM SERPL-MCNC: 8.4 MG/DL (ref 8.7–10.5)
CHLORIDE SERPL-SCNC: 106 MMOL/L (ref 95–110)
CK SERPL-CCNC: 83 U/L (ref 20–200)
CO2 SERPL-SCNC: 27 MMOL/L (ref 23–29)
CREAT SERPL-MCNC: 3.8 MG/DL (ref 0.5–1.4)
EST. GFR  (NO RACE VARIABLE): 18 ML/MIN/1.73 M^2
FERRITIN SERPL-MCNC: 74 NG/ML (ref 20–300)
GLUCOSE SERPL-MCNC: 101 MG/DL (ref 70–110)
IRON SERPL-MCNC: 28 UG/DL (ref 45–160)
MAGNESIUM SERPL-MCNC: 1.7 MG/DL (ref 1.6–2.6)
PHOSPHATE SERPL-MCNC: 3.2 MG/DL (ref 2.7–4.5)
POCT GLUCOSE: 128 MG/DL (ref 70–110)
POCT GLUCOSE: 234 MG/DL (ref 70–110)
POCT GLUCOSE: 271 MG/DL (ref 70–110)
POTASSIUM SERPL-SCNC: 3.1 MMOL/L (ref 3.5–5.1)
PTH-INTACT SERPL-MCNC: 353.5 PG/ML (ref 9–77)
SATURATED IRON: 9 % (ref 20–50)
SODIUM SERPL-SCNC: 144 MMOL/L (ref 136–145)
T4 FREE SERPL-MCNC: 1.1 NG/DL (ref 0.71–1.51)
TOTAL IRON BINDING CAPACITY: 326 UG/DL (ref 250–450)
TRANSFERRIN SERPL-MCNC: 220 MG/DL (ref 200–375)
TSH SERPL DL<=0.005 MIU/L-ACNC: 0.37 UIU/ML (ref 0.4–4)
URATE SERPL-MCNC: 11.1 MG/DL (ref 3.4–7)

## 2022-11-15 PROCEDURE — 36415 COLL VENOUS BLD VENIPUNCTURE: CPT

## 2022-11-15 PROCEDURE — 82306 VITAMIN D 25 HYDROXY: CPT | Performed by: NURSE PRACTITIONER

## 2022-11-15 PROCEDURE — 80069 RENAL FUNCTION PANEL: CPT | Performed by: NURSE PRACTITIONER

## 2022-11-15 PROCEDURE — 27000221 HC OXYGEN, UP TO 24 HOURS

## 2022-11-15 PROCEDURE — 84550 ASSAY OF BLOOD/URIC ACID: CPT | Performed by: NURSE PRACTITIONER

## 2022-11-15 PROCEDURE — 84244 ASSAY OF RENIN: CPT | Performed by: NURSE PRACTITIONER

## 2022-11-15 PROCEDURE — 84443 ASSAY THYROID STIM HORMONE: CPT | Performed by: NURSE PRACTITIONER

## 2022-11-15 PROCEDURE — 25000003 PHARM REV CODE 250: Performed by: NURSE PRACTITIONER

## 2022-11-15 PROCEDURE — 83735 ASSAY OF MAGNESIUM: CPT

## 2022-11-15 PROCEDURE — 63600175 PHARM REV CODE 636 W HCPCS: Performed by: NURSE PRACTITIONER

## 2022-11-15 PROCEDURE — 94760 N-INVAS EAR/PLS OXIMETRY 1: CPT

## 2022-11-15 PROCEDURE — 82728 ASSAY OF FERRITIN: CPT | Performed by: NURSE PRACTITIONER

## 2022-11-15 PROCEDURE — 84439 ASSAY OF FREE THYROXINE: CPT | Performed by: NURSE PRACTITIONER

## 2022-11-15 PROCEDURE — 36415 COLL VENOUS BLD VENIPUNCTURE: CPT | Performed by: NURSE PRACTITIONER

## 2022-11-15 PROCEDURE — 83970 ASSAY OF PARATHORMONE: CPT | Performed by: NURSE PRACTITIONER

## 2022-11-15 PROCEDURE — 94761 N-INVAS EAR/PLS OXIMETRY MLT: CPT

## 2022-11-15 PROCEDURE — 82550 ASSAY OF CK (CPK): CPT | Performed by: NURSE PRACTITIONER

## 2022-11-15 PROCEDURE — 84466 ASSAY OF TRANSFERRIN: CPT | Performed by: NURSE PRACTITIONER

## 2022-11-15 PROCEDURE — 25000003 PHARM REV CODE 250

## 2022-11-15 RX ORDER — ALLOPURINOL 100 MG/1
200 TABLET ORAL DAILY
Status: DISCONTINUED | OUTPATIENT
Start: 2022-11-15 | End: 2022-11-15 | Stop reason: HOSPADM

## 2022-11-15 RX ORDER — LOSARTAN POTASSIUM 100 MG/1
100 TABLET ORAL DAILY
Status: CANCELLED | OUTPATIENT
Start: 2022-11-15

## 2022-11-15 RX ORDER — CALCITRIOL 0.25 UG/1
0.25 CAPSULE ORAL DAILY
Status: DISCONTINUED | OUTPATIENT
Start: 2022-11-15 | End: 2022-11-15 | Stop reason: HOSPADM

## 2022-11-15 RX ORDER — NALOXONE HCL 0.4 MG/ML
0.02 VIAL (ML) INJECTION
Status: DISCONTINUED | OUTPATIENT
Start: 2022-11-15 | End: 2022-11-15 | Stop reason: HOSPADM

## 2022-11-15 RX ORDER — POTASSIUM CHLORIDE 20 MEQ/1
40 TABLET, EXTENDED RELEASE ORAL ONCE
Status: COMPLETED | OUTPATIENT
Start: 2022-11-15 | End: 2022-11-15

## 2022-11-15 RX ORDER — AMLODIPINE BESYLATE 5 MG/1
10 TABLET ORAL DAILY
Status: DISCONTINUED | OUTPATIENT
Start: 2022-11-15 | End: 2022-11-15 | Stop reason: HOSPADM

## 2022-11-15 RX ADMIN — AMLODIPINE BESYLATE 10 MG: 5 TABLET ORAL at 08:11

## 2022-11-15 RX ADMIN — CARVEDILOL 25 MG: 25 TABLET, FILM COATED ORAL at 08:11

## 2022-11-15 RX ADMIN — INSULIN ASPART 4 UNITS: 100 INJECTION, SOLUTION INTRAVENOUS; SUBCUTANEOUS at 11:11

## 2022-11-15 RX ADMIN — INSULIN ASPART 4 UNITS: 100 INJECTION, SOLUTION INTRAVENOUS; SUBCUTANEOUS at 05:11

## 2022-11-15 RX ADMIN — HYDRALAZINE HYDROCHLORIDE 25 MG: 25 TABLET, FILM COATED ORAL at 08:11

## 2022-11-15 RX ADMIN — ASPIRIN 81 MG CHEWABLE TABLET 81 MG: 81 TABLET CHEWABLE at 08:11

## 2022-11-15 RX ADMIN — ALLOPURINOL 200 MG: 100 TABLET ORAL at 11:11

## 2022-11-15 RX ADMIN — FUROSEMIDE 40 MG: 10 INJECTION, SOLUTION INTRAMUSCULAR; INTRAVENOUS at 09:11

## 2022-11-15 RX ADMIN — CALCITRIOL CAPSULES 0.25 MCG 0.25 MCG: 0.25 CAPSULE ORAL at 11:11

## 2022-11-15 RX ADMIN — POTASSIUM CHLORIDE 40 MEQ: 1500 TABLET, EXTENDED RELEASE ORAL at 11:11

## 2022-11-15 NOTE — PROGRESS NOTES
[facility]  Nephrology  Progress Note    Patient Name: Spencer Burton Jr.  MRN: [id number]  Admission Date: 11/13/2022  Hospital Length of Stay: 1 days  Attending Provider: Yariel Govea MD   Primary Care Physician: Primary Doctor No  Principal Problem:Acute on chronic congestive heart failure    Subjective:     Interval History:   Seen and examined at bedside. No acute events overnight  Diuresing well--> UOP 3800 cc noted over last 24 hr interval.   Currently maintained on 2L NC  BP elevated this am 183/134 with HR 72--> improved 138/98 with HR 75 during rounds.   Renal functions remain stable    Review of patient's allergies indicates:  No Known Allergies  Current Facility-Administered Medications   Medication Frequency    amLODIPine tablet 10 mg Daily    aspirin chewable tablet 81 mg Daily    atorvastatin tablet 40 mg QHS    carvediloL tablet 25 mg BID    dextrose 10% bolus 125 mL PRN    dextrose 10% bolus 250 mL PRN    furosemide injection 40 mg Q12H    glucagon (human recombinant) injection 1 mg PRN    glucose chewable tablet 16 g PRN    glucose chewable tablet 24 g PRN    hydrALAZINE injection 10 mg Q6H PRN    hydrALAZINE tablet 25 mg Q12H    insulin aspart U-100 pen 1-10 Units QID (AC + HS) PRN    melatonin tablet 6 mg Nightly PRN    naloxone 0.4 mg/mL injection 0.02 mg PRN    potassium bicarbonate disintegrating tablet 35 mEq PRN    potassium bicarbonate disintegrating tablet 50 mEq PRN    potassium bicarbonate disintegrating tablet 60 mEq PRN       Objective:     Vital Signs (Most Recent):  Temp: 96.2 °F (35.7 °C) (11/15/22 0730)  Pulse: 72 (11/15/22 0730)  Resp: (!) 21 (11/15/22 0730)  BP: (!) 183/134 (11/15/22 0730)  SpO2: 97 % (11/15/22 0839)  O2 Device (Oxygen Therapy): nasal cannula (11/15/22 0815)   Vital Signs (24h Range):  Temp:  [96.2 °F (35.7 °C)-99.1 °F (37.3 °C)] 96.2 °F (35.7 °C)  Pulse:  [72-86] 72  Resp:  [16-23] 21  SpO2:  [84 %-100 %] 97 %  BP: (130-183)/() 183/134      Weight: 90.7 kg (200 lb) (11/14/22 0900)  Body mass index is 29.53 kg/m².  Body surface area is 2.1 meters squared.    I/O last 3 completed shifts:  In: 1180 [P.O.:1080; IV Piggyback:100]  Out: 4575 [Urine:4575]    Physical Exam  Vitals and nursing note reviewed.   Constitutional:       General: He is not in acute distress.     Appearance: Normal appearance. He is obese. He is not ill-appearing.   HENT:      Head: Normocephalic and atraumatic.      Mouth/Throat:      Mouth: Mucous membranes are moist.   Eyes:      Pupils: Pupils are equal, round, and reactive to light.   Neck:      Vascular: JVD present.   Cardiovascular:      Rate and Rhythm: Normal rate and regular rhythm.      Pulses: Normal pulses.      Heart sounds: Normal heart sounds.     No friction rub. No gallop.   Pulmonary:      Effort: Pulmonary effort is normal. No respiratory distress.      Breath sounds: Decreased breath sounds present. No wheezing or rales.   Abdominal:      General: Abdomen is flat. Bowel sounds are normal. There is no distension.      Palpations: Abdomen is soft.      Tenderness: There is no abdominal tenderness. There is no guarding or rebound.   Musculoskeletal:         General: Normal range of motion.      Cervical back: Normal range of motion and neck supple.      Right lower leg: 3+ Edema present.      Left lower leg: 3+ Edema present.   Skin:     General: Skin is warm and dry.      Capillary Refill: Capillary refill takes less than 2 seconds.   Neurological:      General: No focal deficit present.      Mental Status: He is alert and oriented to person, place, and time. Mental status is at baseline.   Psychiatric:         Behavior: Behavior normal.       Significant Labs:sureABGs: No results for input(s): PH, PCO2, HCO3, POCSATURATED, BE in the last 168 hours.  BMP:   Recent Labs   Lab 11/15/22  0453         K 3.1*      CO2 27   BUN 29*   CREATININE 3.8*   CALCIUM 8.4*   MG 1.7     Cardiac Markers: No  results for input(s): CKMB, TROPONINT, MYOGLOBIN in the last 168 hours.  CBC:   Recent Labs   Lab 11/13/22  2208   WBC 7.33   RBC 4.29*   HGB 12.0*   HCT 37.0*      MCV 86   MCH 28.0   MCHC 32.4     CMP:   Recent Labs   Lab 11/15/22  0453      CALCIUM 8.4*   ALBUMIN 2.6*      K 3.1*   CO2 27      BUN 29*   CREATININE 3.8*     Coagulation: No results for input(s): PT, INR, APTT in the last 168 hours.  LFTs:   Recent Labs   Lab 11/15/22  0453   ALBUMIN 2.6*     Microbiology Results (last 7 days)       ** No results found for the last 168 hours. **          Specimen (24h ago, onward)      None          PTH:   Recent Labs   Lab 11/15/22  0453   .5*     TSH:   Recent Labs   Lab 11/15/22  0453   TSH 0.366*     Recent Labs   Lab 11/14/22  1705   COLORU Yellow   SPECGRAV 1.015   PHUR 7.0   PROTEINUA 1+*   BACTERIA None   NITRITE Negative   LEUKOCYTESUR Negative   UROBILINOGEN Negative   HYALINECASTS 0       Significant Imaging:  Pertinent labs and imaging reviewed.     Assessment/Plan:     Acute on Chronic CKD IV  -baseline Cr 3.4-3.7 since 2021  -presented with a sCr 3.9 and eGFR  18-> near baseline function--> 3.8 mg/dL today  -UA reviewed: 1+ protein, 3+ glucose, trace blood/2 rbc  -urine eos pending (not collected)  -UACR 640 ug/mg and UPCR pending ( not collected)--> needs re-initiation of RAASi. Since renal functions at baseline, okay from a nephrology standpoint to resume home losartan.   -renal imaging pending  -maintain diuresis  -UOP excellent-->net negative 3.6 liters since admit--. 3,800 cc UOP documented over last 24 hr interval.   -maintain strict and accurate I&O  -avoid nephrotoxic agents  -further recommendations pending clinical course and labs/imaging above.   -nephrology to follow.      HFrEF 40% w/ Exacerbation  -Echo dated 11/14: EF 40 %, Grade III DD, mildy decreased left ventricular systolic function, small posterior pericardial effusion, severe left atrial  enlargement, moderate right atrial enlargement, mild to mod TR, mild to mod AR, CVP 8 mmHG with estimated PA systolic pressure 51 mmHg. Pulmonary hypertension.   -BNP elevated  -cardiolgy consulted and following  -currently diuresing well--> consider transition to oral torsemide upon discharge     Hypertensive Emergency  -blood pressure elevated--. 183/134 with HR 72  -cardene weaned--> currently maintained on carvedilol 25 mg BID, hydralazine  25 mg BID, Amlodipine 10 mg QD, and Lasix 40 mg IV Q 12 hrs.   -would resume home Losartan @ 100 mg QD.   -noted hypokalemia--> although in the setting of diuretics---> will f/u renal US w/ duplex. F/u plasma renin activity and aldosterone levels in am. F/u 24 hr metanephrines/catecholamines. (all remain pending)     Uncontrolled Type II DM  -last HgbA1C 8.5% Nov 2022  -managed per hospital medicine service  -discussed the importance of aggressive control    Hypokalemia  -in the setting of diuresis--> replace with 40 mEq PO x 1 dose today.     Hyperuricemia  -uric acid significantly elevated > 11  -start allopurinol 200 mg QD    Anemia Evaluation  -Hgb  12.0 (MCV 86)  -Ferritin 74--> recommend oral iron supplementation outpatient      MBD Evaluation  -calcium stable corrects for albumin to 9.5 mg/dL  -PO4 3.2 stable  -.5, Vit D levels pending  -resume home dose of calcitriol 0.25 mcg daily     Hyperlipidemia  -continue statin for risk optimization and MACE reduction           Patient condition and plan of care discussed and formulated with Dr. Joey Mendieta. See above assessment and plan.      Thank you for this consultation. Further recommendations pending clinical course. Will follow. Patient to follow up with his nephrologist Dr. Yunior Saldana MD upon discharge at                [facility]  Nephrology  [facility]

## 2022-11-15 NOTE — ASSESSMENT & PLAN NOTE
Chronic, uncontrolled.  Latest blood pressure and vitals reviewed-   Temp:  [96.2 °F (35.7 °C)-99.1 °F (37.3 °C)]   Pulse:  [72-86]   Resp:  [12-23]   BP: (138-183)/()   SpO2:  [84 %-100 %] .   Home meds for hypertension were reviewed and noted below.   Hypertension Medications             amLODIPine (NORVASC) 10 MG tablet Take 1 tablet (10 mg total) by mouth once daily.    amLODIPine (NORVASC) 10 MG tablet Take 1 tablet by mouth once daily.    carvediloL (COREG) 25 MG tablet Take 25 mg by mouth.    cloNIDine 0.3 mg/24 hr td ptwk (CATAPRES) 0.3 mg/24 hr Place 1 patch onto the skin once a week. medically necessary with dx codes 401.9, 428.43, 428.0.    furosemide (LASIX) 40 MG tablet Take 40 mg by mouth 2 (two) times daily.    hydrALAZINE (APRESOLINE) 25 MG tablet Take 25 mg by mouth.    isosorbide mononitrate (IMDUR) 60 MG 24 hr tablet Take 60 mg by mouth.    losartan (COZAAR) 100 MG tablet Take 100 mg by mouth.          While in the hospital, will manage blood pressure as follows; Adjust home antihypertensive regimen as follows- nicardipine gtt    Will utilize p.r.n. blood pressure medication only if patient's blood pressure greater than  180/110 and he develops symptoms such as worsening chest pain or shortness of breath.

## 2022-11-15 NOTE — CONSULTS
"[facility]  Department of Cardiology  Consult Note      PATIENT NAME: Spencer Burton Jr.    MRN: [id number]  TODAY'S DATE: 11/14/2022  ADMIT DATE: 11/13/2022                          CONSULT REQUESTED BY: Tirso Cesar MD    SUBJECTIVE     PRINCIPAL PROBLEM: Acute on chronic congestive heart failure  Spencer Burton Jr. is a 52 year old male with a past medical history of COPD, CHF, hypertension, and DM type 2 on insulin who presented with worsening shortness of breath for the past week.  Patient states he was seen in the ED last week with similar symptoms and admission was recommended.  Patient states he left AMA because he did not want to be admitted.  He reports shortness of breath worsens with movement and when laying down and also reports associated shortness of breath and bilateral lower extremity swelling.  Patient states he takes 10 mg of Lasix once a day and has had no improvement with his symptoms.  Patient hypertensive upon arriving to ED with /140's.  He admits that has not been taking his other medications as prescribed because he has been having problems getting them from the pharmacy and is not sure what he takes."  ED workup revealed mildly elevated troponin 0.3, creatinine 3.9 (baseline near 3.4), potassium 2.9 and BNP 2 603.  Chest x-ray showed pulmonary edema.  Patient received IV Lasix 20 mg and 10 mEq K+ in the ED and was referred to Hospital Medicine.  Patient reports feeling short of breath and denies chest pain.  Patient also denies fever, chills, headache, change in vision, nausea, vomiting, and diarrhea.  Patient will be admitted to Hospital Medicine for further evaluation and management.    Interval History: Notes reviewed, no acute events overnight. Patient seen this morning resting in bed with no acute distress. He reports improvement of SOB this morning. He denies any chest pain, palpitations, N/V, or abdominal pain.  Voiced frustrations over NPO status. Instructed patient " to maintain NPO status until cardiology evaluation. Echo ordered and pending. Nephrology consulted for CKD stage IV with poor compliance. Cardiology consult pending. Rechecking K+ after repletion. BP stable with IV cardene infusion at 25mg/hr. Will DC IV cardene and start home BP meds. UOP approximately 1500ml since admission. Continue to monitor I&Os. Awaiting specialist recommendations.    REASON FOR CONSULT:  Shortness  of breath      HPI:    Patient is a 52-year-old male presented to the emergency room with complaints of having increased shortness of breath or difficulty in breathing.  And patient states that this has been happening recurrently.  Patient was in the emergency room a week ago with complaints of having increased shortness of breath and dyspnea on exertion and patient had left the hospital against medical advice and has not seen any physician into in between.  Patient also stated that his increased shortness of breath is worse when he lies down flat.  And is unable to lie down flat and is also complaining of having increased swelling in both his lower extremities.  Patient had prior hospital admission with complaints of having shortness of breath was found to have CHF exacerbation.  Patient has not taken some of the medications that were prescribed to him.  At the present time patient denies any chest pain or tightness or heaviness his breathing has improved his shortness of breath is much better.  Denies any dizziness or lightheadedness or loss of consciousness        Review of patient's allergies indicates:  No Known Allergies    Past Medical History:   Diagnosis Date    CHF (congestive heart failure)     COPD (chronic obstructive pulmonary disease)     Diabetes mellitus     Hyperlipidemia     Hypertension      Past Surgical History:   Procedure Laterality Date    APPENDECTOMY      CHOLECYSTECTOMY       Social History     Tobacco Use    Smoking status: Never    Smokeless tobacco: Never   Substance  Use Topics    Alcohol use: No     Comment: socially    Drug use: No        REVIEW OF SYSTEMS  CONSTITUTIONAL: Negative for chills, fatigue and fever.   EYES: No double vision, No blurred vision  NEURO: No headaches, No dizziness  RESPIRATORY:  Positive for cough, shortness of breath and wheezing.    CARDIOVASCULAR: Negative for chest pain. Negative for palpitations and leg swelling.   GI: Negative for abdominal pain, No melena, diarrhea, nausea and vomiting.   : Negative for dysuria and frequency, Negative for hematuria  SKIN: Negative for bruising, Negative for edema or discoloration noted.   ENDOCRINE: Negative for polyphagia, Negative for heat intolerance, Negative for cold intolerance  PSYCHIATRIC: Negative for depression, Negative for anxiety, Negative for memory loss  MUSCULOSKELETAL: Negative for neck pain, Negative for muscle weakness, Negative for back pain     OBJECTIVE     VITAL SIGNS (Most Recent)  Temp: 98.2 °F (36.8 °C) (11/14/22 0900)  Pulse: 81 (11/14/22 1700)  Resp: 16 (11/14/22 1700)  BP: (!) 167/100 (11/14/22 1700)  SpO2: (!) 92 % (11/14/22 1700)    VENTILATION STATUS  Resp: 16 (11/14/22 1700)  SpO2: (!) 92 % (11/14/22 1700)       I & O (Last 24H):  Intake/Output Summary (Last 24 hours) at 11/14/2022 1833  Last data filed at 11/14/2022 1723  Gross per 24 hour   Intake 700 ml   Output 3675 ml   Net -2975 ml       WEIGHTS  Wt Readings from Last 1 Encounters:   11/14/22 0900 90.7 kg (200 lb)   11/14/22 0058 90.8 kg (200 lb 2.8 oz)   11/13/22 2127 86.2 kg (190 lb)       PHYSICAL EXAM  GENERAL: well built, well nourished, well-developed in no apparent distress alert and oriented.   HEENT: Normocephalic. Pupils normal ..   NECK: No JVD. No bruit..   THYROID: Thyroid not examined.   CARDIAC: Regular rate and rhythm. S1 is normal.S2 is normal.  Systolic murmur audible   CHEST ANATOMY: normal.   LUNGS:  Bilateral decreased breath sounds in both lung bases with scattered rhonchi and rales..   ABDOMEN:  Soft   URINARY: No rolle catheter   EXTREMITIES:  Bilateral lower extremity edema with chronic skin changes.   PERIPHERAL VASCULAR SYSTEM: palpable on chronic pulse systolic and diastolic heart failure distal pulses.   CENTRAL NERVOUS SYSTEM: No focal motor or sensory deficits noted.   SKIN: Skin without lesions, moist, well perfused.   MUSCLE STRENGTH & TONE: No noteable weakness, atrophy or abnormal movement.     HOME MEDICATIONS:  No current facility-administered medications on file prior to encounter.     Current Outpatient Medications on File Prior to Encounter   Medication Sig Dispense Refill    acetaminophen (TYLENOL) 325 MG tablet Take 650 mg by mouth every 6 (six) hours as needed.      amLODIPine (NORVASC) 10 MG tablet Take 1 tablet (10 mg total) by mouth once daily. 30 tablet 0    amLODIPine (NORVASC) 10 MG tablet Take 1 tablet by mouth once daily.      aspirin 81 MG Chew Take 81 mg by mouth.      atorvastatin (LIPITOR) 20 MG tablet Take 20 mg by mouth.      atorvastatin (LIPITOR) 40 MG tablet Take 1 tablet (40 mg total) by mouth every evening. 30 tablet 0    calcitRIOL (ROCALTROL) 0.25 MCG Cap Take 0.25 mcg by mouth once daily.      calcitRIOL (ROCALTROL) 0.25 MCG Cap Take 1 capsule by mouth once daily.      carvediloL (COREG) 25 MG tablet Take 25 mg by mouth.      ciprofloxacin HCl (CIPRO) 500 MG tablet Take 500 mg by mouth 2 (two) times daily.      cloNIDine 0.3 mg/24 hr td ptwk (CATAPRES) 0.3 mg/24 hr Place 1 patch onto the skin once a week. medically necessary with dx codes 401.9, 428.43, 428.0. 30 patch 11    EScitalopram oxalate (LEXAPRO) 20 MG tablet Take 20 mg by mouth.      ferrous sulfate (FEOSOL) 325 mg (65 mg iron) Tab tablet Take 1 tablet by mouth once daily.      fluticasone propionate (FLONASE) 50 mcg/actuation nasal spray 1 spray (50 mcg total) by Each Nostril route 2 (two) times daily as needed for Rhinitis. 15 g 0    furosemide (LASIX) 40 MG tablet Take 40 mg by mouth 2 (two) times  "daily.      glipiZIDE (GLUCOTROL) 10 MG tablet Take 1 tablet (10 mg total) by mouth 2 (two) times daily with meals. 60 tablet 0    glipiZIDE (GLUCOTROL) 10 MG tablet Take 10 mg by mouth.      glipiZIDE (GLUCOTROL) 5 MG tablet Take 5 mg by mouth.      glipiZIDE (GLUCOTROL) 5 MG tablet Take 5 mg by mouth.      HUMULIN 70/30 U-100 INSULIN 100 unit/mL (70-30) injection Inject into the skin.      hydrALAZINE (APRESOLINE) 25 MG tablet Take 25 mg by mouth.      HYDROcodone-acetaminophen (NORCO) 5-325 mg per tablet Take 1 tablet by mouth every 6 (six) hours as needed.      isosorbide mononitrate (IMDUR) 60 MG 24 hr tablet Take 60 mg by mouth.      losartan (COZAAR) 100 MG tablet Take 100 mg by mouth.      ondansetron (ZOFRAN-ODT) 4 MG TbDL Take 1 tablet (4 mg total) by mouth every 6 (six) hours as needed (nausea/vomiting). 20 tablet 0    sildenafiL (VIAGRA) 100 MG tablet TAKE ONE DAILY AS NEEDED      silver sulfADIAZINE 1% (SILVADENE) 1 % cream Apply topically.      sulfamethoxazole-trimethoprim 400-80mg (BACTRIM,SEPTRA) 400-80 mg per tablet Take 1 tablet by mouth 2 (two) times daily.      traMADoL (ULTRAM) 50 mg tablet Take 1 tablet (50 mg total) by mouth every 8 (eight) hours as needed for Pain. 9 tablet 0    TRUE METRIX AIR GLUCOSE METER [facility] USE TO CHECK GLUCOSE TWICE DAILY AS DIRECTED      TRUE METRIX GLUCOSE TEST STRIP Strp 1 strip 2 (two) times daily.      TRUEPLUS LANCETS 30 gauge Misc USE 1 TO CHECK GLUCOSE TWICE DAILY      albuterol (PROVENTIL/VENTOLIN HFA) 90 mcg/actuation inhaler Inhale 1-2 puffs into the lungs every 6 (six) hours as needed for Wheezing. Rescue 6.7 g 0    clotrimazole (LOTRIMIN) 1 % cream Apply topically 2 (two) times daily. for 10 days 1 each 0    insulin detemir (LEVEMIR) 100 unit/mL injection Inject 15 Units into the skin every evening. 4.5 mL 2    insulin needles, disposable, 31 x 3/16 " Ndle Use daily to inject insulin  DX:250.00 100 each 11    lancets (ONE TOUCH DELICA LANCETS) 33 gauge " Misc 1 lancet by Misc.(Non-Drug; Combo Route) route 4 (four) times daily. DX:250.00   Medically necessary to test blood sugar 200 each 6    loratadine (CLARITIN) 10 mg tablet Take 1 tablet (10 mg total) by mouth once daily. 30 tablet 0    oxyCODONE-acetaminophen (PERCOCET) 5-325 mg per tablet Take 1 tablet by mouth every 4 (four) hours as needed.      pioglitazone (ACTOS) 15 MG tablet Take 1 tablet by mouth once daily.      potassium chloride (KLOR-CON) 10 MEQ TbSR          SCHEDULED MEDS:   aspirin  81 mg Oral Daily    atorvastatin  40 mg Oral QHS    carvediloL  25 mg Oral BID    furosemide (LASIX) injection  40 mg Intravenous Q12H    hydrALAZINE  25 mg Oral Q12H    potassium chloride  10 mEq Intravenous BID       CONTINUOUS INFUSIONS:    PRN MEDS:dextrose 10%, dextrose 10%, glucagon (human recombinant), glucose, glucose, hydrALAZINE, insulin aspart U-100    LABS AND DIAGNOSTICS     CBC LAST 3 DAYS  Recent Labs   Lab 11/13/22 2208   WBC 7.33   RBC 4.29*   HGB 12.0*   HCT 37.0*   MCV 86   MCH 28.0   MCHC 32.4   RDW 13.9      MPV 11.7   GRAN 71.8  5.3   LYMPH 21.0  1.5   MONO 5.3  0.4   BASO 0.06   NRBC 0       COAGULATION LAST 3 DAYS  No results for input(s): LABPT, INR, APTT in the last 168 hours.    CHEMISTRY LAST 3 DAYS  Recent Labs   Lab 11/13/22 2208 11/14/22  1157     --    K 2.9* 3.1*     --    CO2 25  --    ANIONGAP 11  --    BUN 28*  --    CREATININE 3.9*  --    *  --    CALCIUM 8.8  --        CARDIAC PROFILE LAST 3 DAYS  Recent Labs   Lab 11/13/22 2208 11/13/22  2311 11/14/22  0043 11/14/22  0604   BNP  --  2,603*  --   --    TROPONINI 0.301*  --  0.310* 0.359*       ENDOCRINE LAST 3 DAYS  No results for input(s): TSH, PROCAL in the last 168 hours.    LAST ARTERIAL BLOOD GAS  ABG  No results for input(s): PH, PO2, PCO2, HCO3, BE in the last 168 hours.    LAST 7 DAYS MICROBIOLOGY   Microbiology Results (last 7 days)       ** No results found for the last 168 hours. **             MOST RECENT IMAGING  Echo  · The left ventricle is normal in size with mildly decreased systolic   function.  · The estimated ejection fraction is 40%.  · Grade III left ventricular diastolic dysfunction.  · Small posterior pericardial effusion.  · There is mild left ventricular global hypokinesis.  · Normal right ventricular size with normal right ventricular systolic   function.  · Severe left atrial enlargement.  · Moderate right atrial enlargement.  · Mild pulmonic regurgitation.  · Mild to moderate tricuspid regurgitation.  · Mild-to-moderate aortic regurgitation.  · Intermediate central venous pressure (8 mmHg).  · The estimated PA systolic pressure is 51 mmHg.  · There is pulmonary hypertension.         ECHOCARDIOGRAM RESULTS (last 5)  Results for orders placed during the hospital encounter of 11/13/22    Echo    Interpretation Summary  · The left ventricle is normal in size with mildly decreased systolic function.  · The estimated ejection fraction is 40%.  · Grade III left ventricular diastolic dysfunction.  · Small posterior pericardial effusion.  · There is mild left ventricular global hypokinesis.  · Normal right ventricular size with normal right ventricular systolic function.  · Severe left atrial enlargement.  · Moderate right atrial enlargement.  · Mild pulmonic regurgitation.  · Mild to moderate tricuspid regurgitation.  · Mild-to-moderate aortic regurgitation.  · Intermediate central venous pressure (8 mmHg).  · The estimated PA systolic pressure is 51 mmHg.  · There is pulmonary hypertension.      Results for orders placed during the hospital encounter of 11/01/21    Echo    Interpretation Summary  · The left ventricle is normal in size with severe concentric hypertrophy and normal systolic function.  · Moderate left atrial enlargement.  · Indeterminate left ventricular diastolic function.  · The estimated PA systolic pressure is 39 mmHg.  · Normal right ventricular size with normal right  ventricular systolic function.  · Intermediate central venous pressure (8 mmHg).  · Small circumferential pericardial effusion.  · The estimated ejection fraction is 60%.  · Mild right atrial enlargement.  · Mild tricuspid regurgitation.  · Mild-to-moderate aortic regurgitation.      CURRENT/PREVIOUS VISIT EKG  Results for orders placed or performed during the hospital encounter of 11/13/22   EKG 12-lead    Collection Time: 11/13/22 10:38 PM    Narrative    Test Reason : R06.02,    Vent. Rate : 094 BPM     Atrial Rate : 094 BPM     P-R Int : 150 ms          QRS Dur : 088 ms      QT Int : 402 ms       P-R-T Axes : 070 178 065 degrees     QTc Int : 502 ms    Normal sinus rhythm  Right axis deviation  Septal infarct (cited on or before 02-NOV-2021)  Prolonged QT  Abnormal ECG  When compared with ECG of 06-NOV-2022 14:18,  Questionable change in The axis    Referred By: [email or web address]   SELF           Confirmed By:            ASSESSMENT/PLAN:     Active Hospital Problems    Diagnosis    *Acute on chronic congestive heart failure    CKD (chronic kidney disease), stage IV    Hypertension associated with diabetes    Dyslipidemia associated with type 2 diabetes mellitus    Type 2 diabetes mellitus, with long-term current use of insulin       ASSESSMENT & PLAN:    1. Acute on chronic congestive heart failure both systolic and diastolic heart failure.  2. Chronic kidney disease  3. Hypertension associated with diabetes   4. Mixed hyperlipidemia  5. Type 2 diabetes mellitus  6. Noncompliance with medication   7. Hypokalemia        RECOMMENDATIONS:  1. Patient is responding well to IV Lasix would continue IV Lasix at the present time.  Patient would benefit from hydralazine.  2. Strict input and output and daily weights.    3. Patient has underlying renal insufficiency with a creatinine of 3.8 and significant hypokalemia will carefully replace potassium and avoid hyperkalemia.  4. After admission I hope patient stays as he has a  tendency to leave the hospital against medical advice.  5. Further recommendations follow will follow with you thank you for the consultation.          Dustin Lee MD  [facility]  Department of Cardiology  Date of Service: 11/14/2022  6:33 PM

## 2022-11-15 NOTE — PLAN OF CARE
24 hour urinated initiated at 19:17     Problem: Adult Inpatient Plan of Care  Goal: Plan of Care Review  Outcome: Ongoing, Progressing     Problem: Diabetes Comorbidity  Goal: Blood Glucose Level Within Targeted Range  Outcome: Ongoing, Progressing     Problem: Fluid Volume Excess  Goal: Fluid Balance  Outcome: Ongoing, Progressing    Problem: Hypertension Acute  Goal: Blood Pressure Within Desired Range  Outcome: Ongoing, Progressing      Problem: Fall Injury Risk  Goal: Absence of Fall and Fall-Related Injury  Outcome: Ongoing, Progressing

## 2022-11-15 NOTE — ASSESSMENT & PLAN NOTE
Creatine stable for now. BMP reviewed- noted Estimated Creatinine Clearance: 25.9 mL/min (A) (based on SCr of 3.8 mg/dL (H)). according to latest data. Monitor UOP and serial BMP and adjust therapy as needed. Renally dose meds. Nephrology consulted

## 2022-11-15 NOTE — PROGRESS NOTES
"[facility] Hospital Medicine  Progress Note    Patient Name: Spencer Burton Jr.  MRN: [id number]  Patient Class: IP- Inpatient   Admission Date: 11/13/2022  Length of Stay: 1 days  Attending Physician: Yariel Govea MD  Primary Care Provider: Primary Doctor No        Subjective:     Principal Problem:Acute on chronic congestive heart failure        HPI:  Spencer Burton Jr. is a 52 year old male with a past medical history of COPD, CHF, hypertension, and DM type 2 on insulin who presented with worsening shortness of breath for the past week.  Patient states he was seen in the ED last week with similar symptoms and admission was recommended.  Patient states he left AMA because he did not want to be admitted.  He reports shortness of breath worsens with movement and when laying down and also reports associated shortness of breath and bilateral lower extremity swelling.  Patient states he takes 10 mg of Lasix once a day and has had no improvement with his symptoms.  Patient hypertensive upon arriving to ED with /140's.  He admits that has not been taking his other medications as prescribed because he has been having problems getting them from the pharmacy and is not sure what he takes."  ED workup revealed mildly elevated troponin 0.3, creatinine 3.9 (baseline near 3.4), potassium 2.9 and BNP 2 603.  Chest x-ray showed pulmonary edema.  Patient received IV Lasix 20 mg and 10 mEq K+ in the ED and was referred to Hospital Medicine.  Patient reports feeling short of breath and denies chest pain.  Patient also denies fever, chills, headache, change in vision, nausea, vomiting, and diarrhea.  Patient will be admitted to Hospital Medicine for further evaluation and management.            Overview/Hospital Course:  No notes on file    Interval History: Notes reviewed, no acute events overnight. Patient seen this morning resting in bed with no acute distress. He reports improvement in SOB this morning. " BP controlled with home BP medications at this time. Cr back to baseline at 3.8.  Patient reports diuresing well. UOP approximately 4 L. Awaiting cardiac recs. Replace K+ with IV and oral replacement. Started on allopurinol per nephrology for hyperuricemia. Patient eager for discharge. Continue with IV diuresis and follow specialist recommendations.     Review of Systems   Constitutional:  Negative for chills, fatigue and fever.   Respiratory:  Negative for cough, shortness of breath and wheezing.    Cardiovascular:  Positive for leg swelling. Negative for chest pain and palpitations.   Gastrointestinal:  Negative for abdominal distention, abdominal pain, nausea and vomiting.   Endocrine: Negative for polydipsia and polyuria.   Genitourinary:  Positive for frequency. Negative for difficulty urinating and hematuria.   Musculoskeletal:  Negative for arthralgias and myalgias.   Neurological:  Negative for dizziness, light-headedness and headaches.   Psychiatric/Behavioral:  Negative for behavioral problems and confusion.    All other systems reviewed and are negative.  Objective:     Vital Signs (Most Recent):  Temp: 98.2 °F (36.8 °C) (11/15/22 1100)  Pulse: 72 (11/15/22 1100)  Resp: 12 (11/15/22 1100)  BP: (!) 138/98 (11/15/22 1100)  SpO2: 97 % (11/15/22 0839)   Vital Signs (24h Range):  Temp:  [96.2 °F (35.7 °C)-99.1 °F (37.3 °C)] 98.2 °F (36.8 °C)  Pulse:  [72-86] 72  Resp:  [12-23] 12  SpO2:  [84 %-100 %] 97 %  BP: (138-183)/() 138/98     Weight: 90.7 kg (200 lb)  Body mass index is 29.53 kg/m².    Intake/Output Summary (Last 24 hours) at 11/15/2022 1245  Last data filed at 11/15/2022 0715  Gross per 24 hour   Intake 1080 ml   Output 3250 ml   Net -2170 ml      Physical Exam  Vitals and nursing note reviewed.   Constitutional:       General: He is not in acute distress.     Appearance: He is ill-appearing.   HENT:      Head: Normocephalic and atraumatic.      Right Ear: External ear normal.      Left Ear:  External ear normal.   Cardiovascular:      Rate and Rhythm: Normal rate and regular rhythm.      Pulses: Normal pulses.      Heart sounds: Normal heart sounds. No murmur heard.    No gallop.   Pulmonary:      Effort: Pulmonary effort is normal. No respiratory distress.      Breath sounds: No wheezing or rales.      Comments: Decreased breathe sounds   Abdominal:      General: Abdomen is flat. There is no distension.      Palpations: Abdomen is soft.      Tenderness: There is no abdominal tenderness.   Musculoskeletal:      Right lower leg: Edema present.      Left lower leg: Edema present.      Comments: BLE swelling with mild improvement    Neurological:      General: No focal deficit present.      Mental Status: He is alert and oriented to person, place, and time.      Cranial Nerves: No cranial nerve deficit.      Motor: No weakness.   Psychiatric:         Mood and Affect: Mood normal.         Behavior: Behavior normal.       Significant Labs: All pertinent labs within the past 24 hours have been reviewed.  CBC:   Recent Labs   Lab 11/13/22 2208   WBC 7.33   HGB 12.0*   HCT 37.0*        CMP:   Recent Labs   Lab 11/13/22  2208 11/14/22  1157 11/15/22  0453     --  144   K 2.9* 3.1* 3.1*     --  106   CO2 25  --  27   *  --  101   BUN 28*  --  29*   CREATININE 3.9*  --  3.8*   CALCIUM 8.8  --  8.4*   ALBUMIN  --   --  2.6*   ANIONGAP 11  --  11       Significant Imaging: I have reviewed all pertinent imaging results/findings within the past 24 hours.      Assessment/Plan:      * Acute on chronic congestive heart failure  Patient is identified as having Diastolic (HFpEF) heart failure that is Acute on Chronic. CHF is currently uncontrolled due to Dyspnea not returned to baseline after 1 doses of IV diuretic and Pulmonary edema/pleural effusion on CXR. Latest ECHO performed and demonstrates- Results for orders placed during the hospital encounter of 11/01/21    Echo    Interpretation  Summary  · The left ventricle is normal in size with severe concentric hypertrophy and normal systolic function.  · Moderate left atrial enlargement.  · Indeterminate left ventricular diastolic function.  · The estimated PA systolic pressure is 39 mmHg.  · Normal right ventricular size with normal right ventricular systolic function.  · Intermediate central venous pressure (8 mmHg).  · Small circumferential pericardial effusion.  · The estimated ejection fraction is 60%.  · Mild right atrial enlargement.  · Mild tricuspid regurgitation.  · Mild-to-moderate aortic regurgitation.  . Continue Beta Blocker Furosemide and monitor clinical status closely. Monitor on telemetry. Patient is on CHF pathway.  Monitor strict Is&Os and daily weights.  Place on fluid restriction of 1 L. Continue to stress to patient importance of self efficacy and  on diet for CHF. Last BNP reviewed- and noted below   Recent Labs   Lab 11/13/22  2311   BNP 2,603*   .    -Cardiology consult  -Telemetry  -SW consult       CKD (chronic kidney disease), stage IV  Creatine stable for now. BMP reviewed- noted Estimated Creatinine Clearance: 25.9 mL/min (A) (based on SCr of 3.8 mg/dL (H)). according to latest data. Monitor UOP and serial BMP and adjust therapy as needed. Renally dose meds. Nephrology consulted           Dyslipidemia associated with type 2 diabetes mellitus  Chronic  Resume home medication    Hypertension associated with diabetes  Chronic, uncontrolled.  Latest blood pressure and vitals reviewed-   Temp:  [96.2 °F (35.7 °C)-99.1 °F (37.3 °C)]   Pulse:  [72-86]   Resp:  [12-23]   BP: (138-183)/()   SpO2:  [84 %-100 %] .   Home meds for hypertension were reviewed and noted below.   Hypertension Medications             amLODIPine (NORVASC) 10 MG tablet Take 1 tablet (10 mg total) by mouth once daily.    amLODIPine (NORVASC) 10 MG tablet Take 1 tablet by mouth once daily.    carvediloL (COREG) 25 MG tablet Take 25 mg by mouth.     cloNIDine 0.3 mg/24 hr td ptwk (CATAPRES) 0.3 mg/24 hr Place 1 patch onto the skin once a week. medically necessary with dx codes 401.9, 428.43, 428.0.    furosemide (LASIX) 40 MG tablet Take 40 mg by mouth 2 (two) times daily.    hydrALAZINE (APRESOLINE) 25 MG tablet Take 25 mg by mouth.    isosorbide mononitrate (IMDUR) 60 MG 24 hr tablet Take 60 mg by mouth.    losartan (COZAAR) 100 MG tablet Take 100 mg by mouth.          While in the hospital, will manage blood pressure as follows; Adjust home antihypertensive regimen as follows- nicardipine gtt    Will utilize p.r.n. blood pressure medication only if patient's blood pressure greater than  180/110 and he develops symptoms such as worsening chest pain or shortness of breath.        Type 2 diabetes mellitus, with long-term current use of insulin  Patient's FSGs are controlled on current medication regimen.  Last A1c reviewed-   Lab Results   Component Value Date    HGBA1C 8.5 (H) 11/14/2022     Most recent fingerstick glucose reviewed-   Recent Labs   Lab 11/14/22  2117 11/15/22  0740 11/15/22  1128   POCTGLUCOSE 203* 128* 271*     Current correctional scale  Medium  Maintain anti-hyperglycemic dose as follows-   Antihyperglycemics (From admission, onward)    Start     Stop Route Frequency Ordered    11/14/22 0101  insulin aspart U-100 pen 1-10 Units         -- SubQ Before meals & nightly PRN 11/14/22 0002        Hold Oral hypoglycemics while patient is in the hospital.      VTE Risk Mitigation (From admission, onward)         Ordered     Place sequential compression device  Until discontinued         11/15/22 1013                Discharge Planning   ADELINA:      Code Status: Full Code   Is the patient medically ready for discharge?:     Reason for patient still in hospital (select all that apply): Patient trending condition, Treatment and Consult recommendations  Discharge Plan A: Home                  Boris Fernando PA-C  Department of Hospital Medicine    [facility]

## 2022-11-15 NOTE — EICU
Rounding (Video Assessment):  Yes    Intervention Initiated From:  [facility] / EICU    Comments: video rounds completed.  Patient asleep.  VS:  74, 99%, no infusions noted

## 2022-11-15 NOTE — SUBJECTIVE & OBJECTIVE
Interval History: Notes reviewed, no acute events overnight. Patient seen this morning resting in bed with no acute distress. He reports improvement in SOB this morning. BP controlled with home BP medications at this time. Cr back to baseline at 3.8.  Patient reports diuresing well. UOP approximately 4 L. Awaiting cardiac recs. Replace K+ with IV and oral replacement. Started on allopurinol per nephrology for hyperuricemia. Patient eager for discharge. Continue with IV diuresis and follow specialist recommendations.     Review of Systems   Constitutional:  Negative for chills, fatigue and fever.   Respiratory:  Negative for cough, shortness of breath and wheezing.    Cardiovascular:  Positive for leg swelling. Negative for chest pain and palpitations.   Gastrointestinal:  Negative for abdominal distention, abdominal pain, nausea and vomiting.   Endocrine: Negative for polydipsia and polyuria.   Genitourinary:  Positive for frequency. Negative for difficulty urinating and hematuria.   Musculoskeletal:  Negative for arthralgias and myalgias.   Neurological:  Negative for dizziness, light-headedness and headaches.   Psychiatric/Behavioral:  Negative for behavioral problems and confusion.    All other systems reviewed and are negative.  Objective:     Vital Signs (Most Recent):  Temp: 98.2 °F (36.8 °C) (11/15/22 1100)  Pulse: 72 (11/15/22 1100)  Resp: 12 (11/15/22 1100)  BP: (!) 138/98 (11/15/22 1100)  SpO2: 97 % (11/15/22 0839)   Vital Signs (24h Range):  Temp:  [96.2 °F (35.7 °C)-99.1 °F (37.3 °C)] 98.2 °F (36.8 °C)  Pulse:  [72-86] 72  Resp:  [12-23] 12  SpO2:  [84 %-100 %] 97 %  BP: (138-183)/() 138/98     Weight: 90.7 kg (200 lb)  Body mass index is 29.53 kg/m².    Intake/Output Summary (Last 24 hours) at 11/15/2022 1245  Last data filed at 11/15/2022 0715  Gross per 24 hour   Intake 1080 ml   Output 3250 ml   Net -2170 ml      Physical Exam  Vitals and nursing note reviewed.   Constitutional:       General:  He is not in acute distress.     Appearance: He is ill-appearing.   HENT:      Head: Normocephalic and atraumatic.      Right Ear: External ear normal.      Left Ear: External ear normal.   Cardiovascular:      Rate and Rhythm: Normal rate and regular rhythm.      Pulses: Normal pulses.      Heart sounds: Normal heart sounds. No murmur heard.    No gallop.   Pulmonary:      Effort: Pulmonary effort is normal. No respiratory distress.      Breath sounds: No wheezing or rales.      Comments: Decreased breathe sounds   Abdominal:      General: Abdomen is flat. There is no distension.      Palpations: Abdomen is soft.      Tenderness: There is no abdominal tenderness.   Musculoskeletal:      Right lower leg: Edema present.      Left lower leg: Edema present.      Comments: BLE swelling with mild improvement    Neurological:      General: No focal deficit present.      Mental Status: He is alert and oriented to person, place, and time.      Cranial Nerves: No cranial nerve deficit.      Motor: No weakness.   Psychiatric:         Mood and Affect: Mood normal.         Behavior: Behavior normal.       Significant Labs: All pertinent labs within the past 24 hours have been reviewed.  CBC:   Recent Labs   Lab 11/13/22 2208   WBC 7.33   HGB 12.0*   HCT 37.0*        CMP:   Recent Labs   Lab 11/13/22  2208 11/14/22  1157 11/15/22  0453     --  144   K 2.9* 3.1* 3.1*     --  106   CO2 25  --  27   *  --  101   BUN 28*  --  29*   CREATININE 3.9*  --  3.8*   CALCIUM 8.8  --  8.4*   ALBUMIN  --   --  2.6*   ANIONGAP 11  --  11       Significant Imaging: I have reviewed all pertinent imaging results/findings within the past 24 hours.

## 2022-11-15 NOTE — CARE UPDATE
11/14/22 1921   Patient Assessment/Suction   Level of Consciousness (AVPU) alert   Respiratory Effort Unlabored   PRE-TX-O2   O2 Device (Oxygen Therapy) nasal cannula   $ Is the patient on Low Flow Oxygen? Yes   Flow (L/min) 2   SpO2 (!) 93 %   Pulse Oximetry Type Continuous   $ Pulse Oximetry - Multiple Charge Pulse Oximetry - Multiple   Pulse 86   Resp (!) 22

## 2022-11-15 NOTE — CARE UPDATE
11/15/22 0815   PRE-TX-O2   O2 Device (Oxygen Therapy) nasal cannula   $ Is the patient on Low Flow Oxygen? Yes   Flow (L/min) 2   SpO2 99 %   Pulse Oximetry Type Continuous   $ Pulse Oximetry - Multiple Charge Pulse Oximetry - Multiple

## 2022-11-15 NOTE — EICU
Rounding (Video Assessment):  Yes    Intervention Initiated From:  [facility] / EICU    Inna Communicated with Bedside Nurse regarding:  Other rounding    Nurse Notified:  No    Doctor Notified:  No    Comments: Rounding done. Patient resting without c/o voiced

## 2022-11-15 NOTE — ASSESSMENT & PLAN NOTE
Patient's FSGs are controlled on current medication regimen.  Last A1c reviewed-   Lab Results   Component Value Date    HGBA1C 8.5 (H) 11/14/2022     Most recent fingerstick glucose reviewed-   Recent Labs   Lab 11/14/22  2117 11/15/22  0740 11/15/22  1128   POCTGLUCOSE 203* 128* 271*     Current correctional scale  Medium  Maintain anti-hyperglycemic dose as follows-   Antihyperglycemics (From admission, onward)    Start     Stop Route Frequency Ordered    11/14/22 0101  insulin aspart U-100 pen 1-10 Units         -- SubQ Before meals & nightly PRN 11/14/22 0002        Hold Oral hypoglycemics while patient is in the hospital.

## 2022-11-15 NOTE — PLAN OF CARE
Problem: Adult Inpatient Plan of Care  Goal: Plan of Care Review  Outcome: Ongoing, Progressing  Goal: Patient-Specific Goal (Individualized)  Outcome: Ongoing, Progressing  Goal: Absence of Hospital-Acquired Illness or Injury  Outcome: Ongoing, Progressing  Goal: Optimal Comfort and Wellbeing  Outcome: Ongoing, Progressing  Goal: Readiness for Transition of Care  Outcome: Ongoing, Progressing     Problem: Diabetes Comorbidity  Goal: Blood Glucose Level Within Targeted Range  Outcome: Ongoing, Progressing     Problem: Fluid Volume Excess  Goal: Fluid Balance  Outcome: Ongoing, Progressing     Problem: Fall Injury Risk  Goal: Absence of Fall and Fall-Related Injury  Outcome: Ongoing, Progressing

## 2022-11-15 NOTE — EICU
Rounding (Video Assessment):  Yes    Intervention Initiated From:  COR / EICU    Inna Communicated with Bedside Nurse regarding:  BP    Comments: touched base with RNCait regarding bp 183/134

## 2022-11-16 VITALS
DIASTOLIC BLOOD PRESSURE: 119 MMHG | TEMPERATURE: 98 F | BODY MASS INDEX: 29.62 KG/M2 | RESPIRATION RATE: 17 BRPM | WEIGHT: 200 LBS | OXYGEN SATURATION: 97 % | HEART RATE: 76 BPM | SYSTOLIC BLOOD PRESSURE: 170 MMHG | HEIGHT: 69 IN

## 2022-11-16 NOTE — HOSPITAL COURSE
Spencer Cousin is a 52-year-old male who was admitted to the hospital for treatment and management of acute on chronic diastolic heart failure, CKD, and hypertension.  He has a history of heart failure, hypertension, CKD, diabetes, noncompliance.  He also has a history of frequently signing out against medical advice.  Patient was diuresed with good results.  Shortness of breath resolved.  Patient was evaluated by Cardiology as well as Nephrology.  He appears to have improved.  On 11/15/2022 at 7:20 p.m. I was called to bedside to discuss patient's desire to leave AMA.  Patient requested to sign out against medical advice.  He feels as though he is not receiving any medical care? ? .  He is awake alert oriented person place time situation.  He does not appear in any acute distress.  Respirations are smooth and unlabored.  He appears to have all of his faculties.  Advised the patient of the risk of refusing such as but not limited to worsening of present condition, permanent disability and even possible death.  Patient verbalized a clear and concise understanding acceptance of these risk.  Patient independently and freely signed AMA form.  Patient was advised any time he felt the need should return to the nearest emergency room for evaluation.

## 2022-11-16 NOTE — PLAN OF CARE
11/16/22 0725   Final Note   Assessment Type Final Discharge Note   Anticipated Discharge Disposition Left Against

## 2022-11-16 NOTE — DISCHARGE SUMMARY
"[facility] Hospital Medicine  Discharge Summary      Patient Name: Spencer Burton Jr.  MRN: [id number]  RODNEY: [id number]  Patient Class: IP- Inpatient  Admission Date: 11/13/2022  Hospital Length of Stay: 1 days  Discharge Date and Time:  11/15/2022 7:36 PM  Attending Physician: Yariel Govea MD   Discharging Provider: Tee Tang NP  Primary Care Provider: Primary Doctor Monik    Primary Care Team: Networked reference to record PCT     HPI:   Spencer Burton Jr. is a 52 year old male with a past medical history of COPD, CHF, hypertension, and DM type 2 on insulin who presented with worsening shortness of breath for the past week.  Patient states he was seen in the ED last week with similar symptoms and admission was recommended.  Patient states he left AMA because he did not want to be admitted.  He reports shortness of breath worsens with movement and when laying down and also reports associated shortness of breath and bilateral lower extremity swelling.  Patient states he takes 10 mg of Lasix once a day and has had no improvement with his symptoms.  Patient hypertensive upon arriving to ED with /140's.  He admits that has not been taking his other medications as prescribed because he has been having problems getting them from the pharmacy and is not sure what he takes."  ED workup revealed mildly elevated troponin 0.3, creatinine 3.9 (baseline near 3.4), potassium 2.9 and BNP 2 603.  Chest x-ray showed pulmonary edema.  Patient received IV Lasix 20 mg and 10 mEq K+ in the ED and was referred to Hospital Medicine.  Patient reports feeling short of breath and denies chest pain.  Patient also denies fever, chills, headache, change in vision, nausea, vomiting, and diarrhea.  Patient will be admitted to Hospital Medicine for further evaluation and management.            * No surgery found *      Hospital Course:   Spencer Burton is a 52-year-old male who was admitted to the hospital for " treatment and management of acute on chronic diastolic heart failure, CKD, and hypertension.  He has a history of heart failure, hypertension, CKD, diabetes, noncompliance.  He also has a history of frequently signing out against medical advice.  Patient was diuresed with good results.  Shortness of breath resolved.  Patient was evaluated by Cardiology as well as Nephrology.  He appears to have improved.  On 11/15/2022 at 7:20 p.m. I was called to bedside to discuss patient's desire to leave AMA.  Patient requested to sign out against medical advice.  He feels as though he is not receiving any medical care? ? .  He is awake alert oriented person place time situation.  He does not appear in any acute distress.  Respirations are smooth and unlabored.  He appears to have all of his faculties.  Advised the patient of the risk of refusing such as but not limited to worsening of present condition, permanent disability and even possible death.  Patient verbalized a clear and concise understanding acceptance of these risk.  Patient independently and freely signed AMA form.  Patient was advised any time he felt the need should return to the nearest emergency room for evaluation.       Goals of Care Treatment Preferences:  Code Status: Full Code      Consults:   Consults (From admission, onward)        Status Ordering Provider     Inpatient consult to Nephrology  Once        Provider:  Dewayne Hernandez MD    Completed AIMEE DUMONT     Inpatient consult to Cardiology  Once        Provider:  Dustin Lee MD    Acknowledged GILBERT SEAMAN     Inpatient consult to Social Work/Case Management  Once        Provider:  (Not yet assigned)    GILBERT Espinoza          No new Assessment & Plan notes have been filed under this hospital service since the last note was generated.  Service: Hospital Medicine    Final Active Diagnoses:    Diagnosis Date Noted POA    PRINCIPAL PROBLEM:  Acute on chronic congestive  heart failure [I50.9] 11/06/2022 Yes    CKD (chronic kidney disease), stage IV [N18.4] 11/02/2021 Yes    Hypertension associated with diabetes [E11.59, I15.2] 09/04/2015 Yes    Dyslipidemia associated with type 2 diabetes mellitus [E11.69, E78.5] 09/04/2015 Yes     Chronic    Type 2 diabetes mellitus, with long-term current use of insulin [E11.9, Z79.4] 11/06/2012 Not Applicable     Chronic      Problems Resolved During this Admission:       Discharged Condition: fair    Disposition:     Follow Up:   Follow-up Information     Primary Doctor No. Schedule an appointment as soon as possible for a visit in 1 day(s).                     Patient Instructions:   No discharge procedures on file.    Significant Diagnostic Studies: Labs:   BMP:   Recent Labs   Lab 11/13/22  2208 11/14/22  1157 11/15/22  0453   *  --  101     --  144   K 2.9* 3.1* 3.1*     --  106   CO2 25  --  27   BUN 28*  --  29*   CREATININE 3.9*  --  3.8*   CALCIUM 8.8  --  8.4*   MG  --   --  1.7   , CMP   Recent Labs   Lab 11/13/22  2208 11/14/22  1157 11/15/22  0453     --  144   K 2.9* 3.1* 3.1*     --  106   CO2 25  --  27   *  --  101   BUN 28*  --  29*   CREATININE 3.9*  --  3.8*   CALCIUM 8.8  --  8.4*   ALBUMIN  --   --  2.6*   ANIONGAP 11  --  11    and Troponin   Recent Labs   Lab 11/13/22 2208 11/14/22  0043 11/14/22  0604   TROPONINI 0.301* 0.310* 0.359*       Pending Diagnostic Studies:     Procedure Component Value Units Date/Time    Aldosterone/Renin Activity Ratio [[id number]] Collected: 11/15/22 0453    Order Status: Sent Lab Status: In process Updated: 11/15/22 1151    Specimen: Blood     CBC Auto Differential [[id number]]     Order Status: Sent Lab Status: No result     Specimen: Blood          Medications:  Reconciled Home Medications:      Medication List      CHANGE how you take these medications    atorvastatin 40 MG tablet  Commonly known as: LIPITOR  Take 1 tablet (40 mg total) by mouth  every evening.  What changed: Another medication with the same name was removed. Continue taking this medication, and follow the directions you see here.     glipiZIDE 10 MG tablet  Commonly known as: GLUCOTROL  Take 1 tablet (10 mg total) by mouth 2 (two) times daily with meals.  What changed: Another medication with the same name was removed. Continue taking this medication, and follow the directions you see here.        CONTINUE taking these medications    acetaminophen 325 MG tablet  Commonly known as: TYLENOL  Take 650 mg by mouth every 6 (six) hours as needed.     albuterol 90 mcg/actuation inhaler  Commonly known as: PROVENTIL/VENTOLIN HFA  Inhale 1-2 puffs into the lungs every 6 (six) hours as needed for Wheezing. Rescue     aspirin 81 MG Chew  Take 81 mg by mouth.     cloNIDine 0.3 mg/24 hr td ptwk 0.3 mg/24 hr  Commonly known as: CATAPRES  Place 1 patch onto the skin once a week. medically necessary with dx codes 401.9, 428.43, 428.0.     EScitalopram oxalate 20 MG tablet  Commonly known as: LEXAPRO  Take 20 mg by mouth.     ferrous sulfate 325 mg (65 mg iron) Tab tablet  Commonly known as: FEOSOL  Take 1 tablet by mouth once daily.     fluticasone propionate 50 mcg/actuation nasal spray  Commonly known as: FLONASE  1 spray (50 mcg total) by Each Nostril route 2 (two) times daily as needed for Rhinitis.     furosemide 40 MG tablet  Commonly known as: LASIX  Take 40 mg by mouth 2 (two) times daily.     HumuLIN 70/30 U-100 Insulin 100 unit/mL (70-30) injection  Generic drug: insulin NPH-insulin regular (70/30)  Inject into the skin.     HYDROcodone-acetaminophen 5-325 mg per tablet  Commonly known as: NORCO  Take 1 tablet by mouth every 6 (six) hours as needed.     isosorbide mononitrate 60 MG 24 hr tablet  Commonly known as: IMDUR  Take 60 mg by mouth.     losartan 100 MG tablet  Commonly known as: COZAAR  Take 100 mg by mouth.     ondansetron 4 MG Tbdl  Commonly known as: ZOFRAN-ODT  Take 1 tablet (4 mg  total) by mouth every 6 (six) hours as needed (nausea/vomiting).     sildenafiL 100 MG tablet  Commonly known as: VIAGRA  TAKE ONE DAILY AS NEEDED        STOP taking these medications    carvediloL 25 MG tablet  Commonly known as: COREG     hydrALAZINE 25 MG tablet  Commonly known as: APRESOLINE        ASK your doctor about these medications    * amLODIPine 10 MG tablet  Commonly known as: NORVASC  Take 1 tablet (10 mg total) by mouth once daily.     * amLODIPine 10 MG tablet  Commonly known as: NORVASC  Take 1 tablet by mouth once daily.     * calcitRIOL 0.25 MCG Cap  Commonly known as: ROCALTROL  Take 0.25 mcg by mouth once daily.     * calcitRIOL 0.25 MCG Cap  Commonly known as: ROCALTROL  Take 1 capsule by mouth once daily.     carvediloL 6.25 MG tablet  Commonly known as: COREG  Take 1 tablet (6.25 mg total) by mouth 2 (two) times daily.  Ask about: Should I take this medication?     ciprofloxacin HCl 500 MG tablet  Commonly known as: CIPRO  Take 500 mg by mouth 2 (two) times daily.     clotrimazole 1 % cream  Commonly known as: LOTRIMIN  Apply topically 2 (two) times daily. for 10 days     hydrALAZINE 25 MG tablet  Commonly known as: APRESOLINE  Take 1 tablet (25 mg total) by mouth every 12 (twelve) hours.  Ask about: Should I take this medication?     insulin detemir U-100 100 unit/mL injection  Commonly known as: LEVEMIR U-100 INSULIN  Inject 15 Units into the skin every evening.     * lancets 33 gauge Misc  Commonly known as: ONETOUCH DELICA LANCETS  1 lancet by Misc.(Non-Drug; Combo Route) route 4 (four) times daily. DX:250.00   Medically necessary to test blood sugar     * TRUEPLUS LANCETS 30 gauge Misc  Generic drug: lancets  USE 1 TO CHECK GLUCOSE TWICE DAILY     loratadine 10 mg tablet  Commonly known as: CLARITIN  Take 1 tablet (10 mg total) by mouth once daily.     oxyCODONE-acetaminophen 5-325 mg per tablet  Commonly known as: PERCOCET  Take 1 tablet by mouth every 4 (four) hours as needed.     pen  "needle, diabetic 31 gauge x 3/16" Ndle  Use daily to inject insulin  DX:250.00     pioglitazone 15 MG tablet  Commonly known as: ACTOS  Take 1 tablet by mouth once daily.     potassium chloride 10 MEQ Tbsr  Commonly known as: KLOR-CON     silver sulfADIAZINE 1% 1 % cream  Commonly known as: SILVADENE  Apply topically.     sulfamethoxazole-trimethoprim 400-80mg 400-80 mg per tablet  Commonly known as: BACTRIM,SEPTRA  Take 1 tablet by mouth 2 (two) times daily.     traMADoL 50 mg tablet  Commonly known as: ULTRAM  Take 1 tablet (50 mg total) by mouth every 8 (eight) hours as needed for Pain.     TRUE METRIX AIR GLUCOSE METER Misc  Generic drug: blood-glucose meter  USE TO CHECK GLUCOSE TWICE DAILY AS DIRECTED     TRUE METRIX GLUCOSE TEST STRIP Strp  Generic drug: blood sugar diagnostic  1 strip 2 (two) times daily.         * This list has 6 medication(s) that are the same as other medications prescribed for you. Read the directions carefully, and ask your doctor or other care provider to review them with you.                Indwelling Lines/Drains at time of discharge:   Lines/Drains/Airways     None                 Time spent on the discharge of patient: 45 minutes         Tee Tang NP  Department of Hospital Medicine  [facility]  "

## 2022-11-18 LAB
ALDOST SERPL-MCNC: 33.1 NG/DL
ALDOST/RENIN PLAS-RTO: 331 RATIO
RENIN PLAS-CCNC: 0.1 NG/ML/HR

## 2022-11-23 NOTE — PHYSICIAN QUERY
PT Name: Spencer Burton Jr.  MR #: [id number]    DOCUMENTATION CLARIFICATION      CDS/: Margie Geiger RN CDIS             Contact information:  [email or web address]      This form is a permanent document in the medical record.     Query Date: November 23, 2022    By submitting this query, we are merely seeking further clarification of documentation. Please utilize your independent clinical judgment when addressing the question(s) below.    The Medical Record contains the following:   Indicators   Supporting Clinical Findings Location in Medical Record   x Hypertension associated with diabetes documented Hypertension associated with diabetes  Chronic, uncontrolled  While in the hospital, will manage blood pressure as follows; Adjust home antihypertensive regimen as follows- nicardipine gtt     Will utilize p.r.n. blood pressure medication only if patient's blood pressure greater than  180/110 and he develops symptoms such as worsening chest pain or shortness of breath. HM note 11/15    x Chronic condition(s) Type 2 diabetes mellitus, with long-term current use of insulin  CKD (chronic kidney disease), stage IV  Acute on chronic congestive heart failure HM note 11/15     Vital signs      Treatment/Medication      Other     Provider, please clarify the relationship between the Hypertension and Diabetes Mellitus  [   ] Hypertension is a manifestation of Diabetes Mellitus (Secondary Hypertension)   [  x ]  Hypertension is not a manifestation of Diabetes Mellitus (Essential Hypertension)   [   ] Other Clarification (please specify): ____________   [  ] Clinically Undetermined       Please document in your progress notes daily for the duration of treatment, until resolved, and include in your discharge summary.

## 2022-11-23 NOTE — PHYSICIAN QUERY
PT Name: Spencer Burton Jr.  MR #: [id number]     DOCUMENTATION CLARIFICATION     CDS/: Margie Geiger RN CDIS             Contact information: [email or web address]    This form is a permanent document in the medical record.     Query Date: November 23, 2022    By submitting this query, we are merely seeking further clarification of documentation.  Please utilize your independent clinical judgment when addressing the question(s) below.    The Medical Record contains the following   Indicators Supporting Clinical Findings Location in Medical Record   x Heart Failure documented Diastolic (HFpEF) heart failure that is Acute on Chronic    Acute on chronic congestive heart failure both systolic and diastolic heart failure   note 11/15     Cards consult    x BNP 2603  note 11/15    x EF/Echo The left ventricle is normal in size with mildly decreased systolic function.  The estimated ejection fraction is 40%.  Grade III left ventricular diastolic dysfunction.  Small posterior pericardial effusion.  There is mild left ventricular global hypokinesis.  Normal right ventricular size with normal right ventricular systolic function.  Severe left atrial enlargement.  Moderate right atrial enlargement.  Mild pulmonic regurgitation.  Mild to moderate tricuspid regurgitation.  Mild-to-moderate aortic regurgitation.  Intermediate central venous pressure (8 mmHg).  The estimated PA systolic pressure is 51 mmHg.  There is pulmonary hypertension.    Echo 11/22   x Radiology findings  Chest x-ray showed pulmonary edema Cards consult    x Subjective/Objective Respiratory Conditions CHF is currently uncontrolled due to Dyspnea not returned to baseline after 1 doses of IV diuretic and Pulmonary edema/pleural effusion on CXR.  note 11/15     Recent/Current MI      Heart Transplant, LVAD     x Edema, JVD EXTREMITIES:  Bilateral lower extremity edema with chronic skin changes. Cards consult     Ascites     x Diuretics/Meds furosemide  injection 20 mg  Dose: 20 mg  Freq: Once Route: IV  Start: 11/14/22 0100 End: 11/14/22 0110    furosemide injection 20 mg  Dose: 20 mg  Freq: ED 1 Time Route: IV  Start: 11/13/22 2300 End: 11/13/22 2312    furosemide injection 40 mg  Dose: 40 mg  Freq: Every 12 hours (non-standard times) Route: IV  Start: 11/14/22 0900 End: 11/15/22 2157 MAR           MAR           MAR    x Other Treatment Continue Beta Blocker Furosemide and monitor clinical status closely. Monitor on telemetry. Patient is on CHF pathway.  Monitor strict Is&Os and daily weights.  Place on fluid restriction of 1 L. Continue to stress to patient importance of self efficacy and  on diet for CHF. HM note 11/15     Other       Heart failure is a clinical diagnosis which includes symptomatic fluid retention, elevated intracardiac pressures, and/or the inability of the heart to deliver adequate blood flow.    Heart Failure with reduced Ejection Fraction (HFrEF) or Systolic Heart Failure (loses ability to contract normally, EF is <40%)    Heart Failure with preserved Ejection Fraction (HFpEF) or Diastolic Heart Failure (stiff ventricles, does not relax properly, EF is >50%)     Heart Failure with Combined Systolic and Diastolic Failure (stiff ventricles, does not relax properly and EF is <50%)    Mid-range or mildly reduced ejection fraction (HFmrEF) is classified as systolic heart failure.  Congestive heart failure with a recovered EF is classified as Diastolic Heart Failure.  Common clues to acute exacerbation:  Rapidly progressive symptoms (w/in 2 weeks of presentation), using IV diuretics, using supplemental O2, pulmonary edema on Xray, new or worsening pleural effusion, +JVD or other signs of volume overload, MI w/in 4 weeks, and/or BNP >500  The clinical guidelines noted are only system guidelines, and do not replace the providers clinical judgment.    Due to conflicting documentation please clarify the TYPE of heart failure .    [ x  ]   Acute on Chronic Diastolic Heart Failure (HFpEF) - worsening of CHF signs/symptoms in preexisting CHF   [   ]  Acute on Chronic Combined Systolic and Diastolic Heart Failure - worsening of CHF signs/symptoms in preexisting CHF   [   ]  Other (please specify): ___________________________________   [  ]  Clinically Undetermined           Please document in your progress notes daily for the duration of treatment until resolved and include in your discharge summary.    References:  American Heart Association editorial staff. (2017, May). Ejection Fraction Heart Failure Measurement. American Heart Association. https://www.heart.org/en/health-topics/heart-failure/diagnosing-heart-failure/ejection-fraction-heart-failure-measurement#:~:text=Ejection%20fraction%20(EF)%20is%20a,pushed%20out%20with%20each%20heartbeat  KATHY Kelly (2020, December 15). Heart failure with preserved ejection fraction: Clinical manifestations and diagnosis. [employer]. https://www.[employer]contents/heart-failure-with-preserved-ejection-fraction-clinical-manifestations-and-diagnosis.  ICD-10-CM/PCS Coding Clinic Third Quarter ICD-10, Effective with discharges: September 8, 2020 [facility] § Heart failure with mid-range or mildly reduced ejection fraction (2020).  ICD-10-CM/PCS Coding Clinic Third Quarter ICD-10, Effective with discharges: September 8, 2020 [facility] § Heart failure with recovered ejection fraction (2020).  Form No. 13070

## 2022-12-03 ENCOUNTER — HOSPITAL ENCOUNTER (INPATIENT)
Facility: HOSPITAL | Age: 52
LOS: 5 days | Discharge: ANOTHER HOSPITAL | DRG: 064 | End: 2022-12-08
Attending: EMERGENCY MEDICINE | Admitting: INTERNAL MEDICINE
Payer: MEDICAID

## 2022-12-03 DIAGNOSIS — R07.9 CHEST PAIN: ICD-10-CM

## 2022-12-03 DIAGNOSIS — I63.9 EMBOLIC STROKE: ICD-10-CM

## 2022-12-03 DIAGNOSIS — I63.9 CVA (CEREBRAL VASCULAR ACCIDENT): ICD-10-CM

## 2022-12-03 DIAGNOSIS — I63.9 STROKE: ICD-10-CM

## 2022-12-03 DIAGNOSIS — I63.512 ACUTE ISCHEMIC LEFT MCA STROKE: Primary | ICD-10-CM

## 2022-12-03 PROBLEM — G93.41 ENCEPHALOPATHY, METABOLIC: Status: ACTIVE | Noted: 2022-12-03

## 2022-12-03 LAB
ALBUMIN SERPL BCP-MCNC: 3.6 G/DL (ref 3.5–5.2)
ALLENS TEST: ABNORMAL
ALLENS TEST: ABNORMAL
ALP SERPL-CCNC: 83 U/L (ref 55–135)
ALT SERPL W/O P-5'-P-CCNC: 60 U/L (ref 10–44)
ANION GAP SERPL CALC-SCNC: 14 MMOL/L (ref 8–16)
ANISOCYTOSIS BLD QL SMEAR: SLIGHT
AST SERPL-CCNC: 47 U/L (ref 10–40)
BASOPHILS # BLD AUTO: 0.05 K/UL (ref 0–0.2)
BASOPHILS NFR BLD: 0.6 % (ref 0–1.9)
BILIRUB SERPL-MCNC: 2.9 MG/DL (ref 0.1–1)
BNP SERPL-MCNC: 3309 PG/ML (ref 0–99)
BUN SERPL-MCNC: 42 MG/DL (ref 6–20)
CALCIUM SERPL-MCNC: 9.3 MG/DL (ref 8.7–10.5)
CHLORIDE SERPL-SCNC: 102 MMOL/L (ref 95–110)
CO2 SERPL-SCNC: 23 MMOL/L (ref 23–29)
CREAT SERPL-MCNC: 4.7 MG/DL (ref 0.5–1.4)
CREAT SERPL-MCNC: 5.3 MG/DL (ref 0.5–1.4)
CRP SERPL-MCNC: 0.72 MG/DL
DELSYS: ABNORMAL
DELSYS: ABNORMAL
DIFFERENTIAL METHOD: ABNORMAL
EOSINOPHIL # BLD AUTO: 0 K/UL (ref 0–0.5)
EOSINOPHIL NFR BLD: 0.1 % (ref 0–8)
ERYTHROCYTE [DISTWIDTH] IN BLOOD BY AUTOMATED COUNT: 14.9 % (ref 11.5–14.5)
EST. GFR  (NO RACE VARIABLE): 14.3 ML/MIN/1.73 M^2
GLUCOSE SERPL-MCNC: 285 MG/DL (ref 70–110)
GLUCOSE SERPL-MCNC: 290 MG/DL (ref 70–110)
GLUCOSE SERPL-MCNC: 316 MG/DL (ref 70–110)
HCO3 UR-SCNC: 21.3 MMOL/L (ref 24–28)
HCO3 UR-SCNC: 21.8 MMOL/L (ref 24–28)
HCT VFR BLD AUTO: 40.6 % (ref 40–54)
HCT VFR BLD CALC: 40 %PCV (ref 36–54)
HGB BLD-MCNC: 13.4 G/DL (ref 14–18)
IMM GRANULOCYTES # BLD AUTO: 0.01 K/UL (ref 0–0.04)
IMM GRANULOCYTES NFR BLD AUTO: 0.1 % (ref 0–0.5)
INFLUENZA A, MOLECULAR: NEGATIVE
INFLUENZA B, MOLECULAR: NEGATIVE
INR PPP: 1.4
LACTATE SERPL-SCNC: 3.8 MMOL/L (ref 0.5–1.9)
LYMPHOCYTES # BLD AUTO: 1 K/UL (ref 1–4.8)
LYMPHOCYTES NFR BLD: 11.6 % (ref 18–48)
MCH RBC QN AUTO: 28 PG (ref 27–31)
MCHC RBC AUTO-ENTMCNC: 33 G/DL (ref 32–36)
MCV RBC AUTO: 85 FL (ref 82–98)
MONOCYTES # BLD AUTO: 0.4 K/UL (ref 0.3–1)
MONOCYTES NFR BLD: 4.2 % (ref 4–15)
NEUTROPHILS # BLD AUTO: 7.4 K/UL (ref 1.8–7.7)
NEUTROPHILS NFR BLD: 83.4 % (ref 38–73)
NRBC BLD-RTO: 0 /100 WBC
PCO2 BLDA: 33.3 MMHG (ref 35–45)
PCO2 BLDA: 38.9 MMHG (ref 35–45)
PH SMN: 7.36 [PH] (ref 7.35–7.45)
PH SMN: 7.41 [PH] (ref 7.35–7.45)
PLATELET # BLD AUTO: 209 K/UL (ref 150–450)
PLATELET BLD QL SMEAR: ABNORMAL
PMV BLD AUTO: 13.8 FL (ref 9.2–12.9)
PO2 BLDA: 112 MMHG (ref 80–100)
PO2 BLDA: 87 MMHG (ref 80–100)
POC BE: -3 MMOL/L
POC BE: -4 MMOL/L
POC IONIZED CALCIUM: 1.18 MMOL/L (ref 1.06–1.42)
POC SATURATED O2: 96 % (ref 95–100)
POC SATURATED O2: 99 % (ref 95–100)
POC TCO2: 22 MMOL/L (ref 23–27)
POC TCO2: 23 MMOL/L (ref 23–27)
POIKILOCYTOSIS BLD QL SMEAR: SLIGHT
POTASSIUM BLD-SCNC: 3.2 MMOL/L (ref 3.5–5.1)
POTASSIUM SERPL-SCNC: 3.2 MMOL/L (ref 3.5–5.1)
PROCALCITONIN SERPL IA-MCNC: 0.47 NG/ML (ref 0–0.5)
PROT SERPL-MCNC: 6.9 G/DL (ref 6–8.4)
PROTHROMBIN TIME: 16.2 SEC (ref 11.4–13.7)
RBC # BLD AUTO: 4.79 M/UL (ref 4.6–6.2)
SAMPLE: ABNORMAL
SARS-COV-2 RDRP RESP QL NAA+PROBE: NEGATIVE
SITE: ABNORMAL
SITE: ABNORMAL
SODIUM BLD-SCNC: 140 MMOL/L (ref 136–145)
SODIUM SERPL-SCNC: 139 MMOL/L (ref 136–145)
SPECIMEN SOURCE: NORMAL
TROPONIN I SERPL HS-MCNC: 350.7 PG/ML (ref 0–14.9)
TROPONIN I SERPL HS-MCNC: 430.2 PG/ML (ref 0–14.9)
TSH SERPL DL<=0.005 MIU/L-ACNC: 2.07 UIU/ML (ref 0.34–5.6)
WBC # BLD AUTO: 8.88 K/UL (ref 3.9–12.7)

## 2022-12-03 PROCEDURE — 84443 ASSAY THYROID STIM HORMONE: CPT | Performed by: FAMILY MEDICINE

## 2022-12-03 PROCEDURE — 84295 ASSAY OF SERUM SODIUM: CPT

## 2022-12-03 PROCEDURE — U0002 COVID-19 LAB TEST NON-CDC: HCPCS | Performed by: FAMILY MEDICINE

## 2022-12-03 PROCEDURE — 96374 THER/PROPH/DIAG INJ IV PUSH: CPT

## 2022-12-03 PROCEDURE — 85025 COMPLETE CBC W/AUTO DIFF WBC: CPT | Performed by: EMERGENCY MEDICINE

## 2022-12-03 PROCEDURE — 63600175 PHARM REV CODE 636 W HCPCS: Performed by: INTERNAL MEDICINE

## 2022-12-03 PROCEDURE — 87502 INFLUENZA DNA AMP PROBE: CPT | Performed by: FAMILY MEDICINE

## 2022-12-03 PROCEDURE — 93005 ELECTROCARDIOGRAM TRACING: CPT | Performed by: INTERNAL MEDICINE

## 2022-12-03 PROCEDURE — 85014 HEMATOCRIT: CPT

## 2022-12-03 PROCEDURE — 82330 ASSAY OF CALCIUM: CPT

## 2022-12-03 PROCEDURE — 83605 ASSAY OF LACTIC ACID: CPT | Performed by: FAMILY MEDICINE

## 2022-12-03 PROCEDURE — 84132 ASSAY OF SERUM POTASSIUM: CPT

## 2022-12-03 PROCEDURE — 82962 GLUCOSE BLOOD TEST: CPT

## 2022-12-03 PROCEDURE — 84484 ASSAY OF TROPONIN QUANT: CPT | Performed by: EMERGENCY MEDICINE

## 2022-12-03 PROCEDURE — G0378 HOSPITAL OBSERVATION PER HR: HCPCS

## 2022-12-03 PROCEDURE — 63600175 PHARM REV CODE 636 W HCPCS: Performed by: EMERGENCY MEDICINE

## 2022-12-03 PROCEDURE — 25000003 PHARM REV CODE 250: Performed by: EMERGENCY MEDICINE

## 2022-12-03 PROCEDURE — 84145 PROCALCITONIN (PCT): CPT | Performed by: INTERNAL MEDICINE

## 2022-12-03 PROCEDURE — 94761 N-INVAS EAR/PLS OXIMETRY MLT: CPT

## 2022-12-03 PROCEDURE — 99900035 HC TECH TIME PER 15 MIN (STAT)

## 2022-12-03 PROCEDURE — 36600 WITHDRAWAL OF ARTERIAL BLOOD: CPT

## 2022-12-03 PROCEDURE — 80053 COMPREHEN METABOLIC PANEL: CPT | Performed by: EMERGENCY MEDICINE

## 2022-12-03 PROCEDURE — 86140 C-REACTIVE PROTEIN: CPT | Performed by: INTERNAL MEDICINE

## 2022-12-03 PROCEDURE — 36415 COLL VENOUS BLD VENIPUNCTURE: CPT | Performed by: FAMILY MEDICINE

## 2022-12-03 PROCEDURE — 82803 BLOOD GASES ANY COMBINATION: CPT

## 2022-12-03 PROCEDURE — 99204 PR OFFICE/OUTPT VISIT, NEW, LEVL IV, 45-59 MIN: ICD-10-PCS | Mod: 95,,, | Performed by: PSYCHIATRY & NEUROLOGY

## 2022-12-03 PROCEDURE — 96376 TX/PRO/DX INJ SAME DRUG ADON: CPT

## 2022-12-03 PROCEDURE — 93010 ELECTROCARDIOGRAM REPORT: CPT | Mod: ,,, | Performed by: INTERNAL MEDICINE

## 2022-12-03 PROCEDURE — 27000221 HC OXYGEN, UP TO 24 HOURS

## 2022-12-03 PROCEDURE — 99285 EMERGENCY DEPT VISIT HI MDM: CPT | Mod: 25

## 2022-12-03 PROCEDURE — 80048 BASIC METABOLIC PNL TOTAL CA: CPT | Performed by: FAMILY MEDICINE

## 2022-12-03 PROCEDURE — 84484 ASSAY OF TROPONIN QUANT: CPT | Mod: 91 | Performed by: INTERNAL MEDICINE

## 2022-12-03 PROCEDURE — 93010 EKG 12-LEAD: ICD-10-PCS | Mod: ,,, | Performed by: INTERNAL MEDICINE

## 2022-12-03 PROCEDURE — 83880 ASSAY OF NATRIURETIC PEPTIDE: CPT | Performed by: EMERGENCY MEDICINE

## 2022-12-03 PROCEDURE — 96375 TX/PRO/DX INJ NEW DRUG ADDON: CPT

## 2022-12-03 PROCEDURE — 87641 MR-STAPH DNA AMP PROBE: CPT | Performed by: FAMILY MEDICINE

## 2022-12-03 PROCEDURE — 63600175 PHARM REV CODE 636 W HCPCS: Performed by: FAMILY MEDICINE

## 2022-12-03 PROCEDURE — 99204 OFFICE O/P NEW MOD 45 MIN: CPT | Mod: 95,,, | Performed by: PSYCHIATRY & NEUROLOGY

## 2022-12-03 PROCEDURE — 20000000 HC ICU ROOM

## 2022-12-03 PROCEDURE — 85610 PROTHROMBIN TIME: CPT | Performed by: EMERGENCY MEDICINE

## 2022-12-03 RX ORDER — TALC
9 POWDER (GRAM) TOPICAL NIGHTLY PRN
Status: DISCONTINUED | OUTPATIENT
Start: 2022-12-03 | End: 2022-12-08 | Stop reason: HOSPADM

## 2022-12-03 RX ORDER — IBUPROFEN 200 MG
16 TABLET ORAL
Status: DISCONTINUED | OUTPATIENT
Start: 2022-12-03 | End: 2022-12-05

## 2022-12-03 RX ORDER — PROCHLORPERAZINE EDISYLATE 5 MG/ML
5 INJECTION INTRAMUSCULAR; INTRAVENOUS EVERY 6 HOURS PRN
Status: DISCONTINUED | OUTPATIENT
Start: 2022-12-03 | End: 2022-12-03

## 2022-12-03 RX ORDER — LABETALOL HYDROCHLORIDE 5 MG/ML
INJECTION, SOLUTION INTRAVENOUS
Status: DISPENSED
Start: 2022-12-03 | End: 2022-12-04

## 2022-12-03 RX ORDER — ACETAMINOPHEN 500 MG
1000 TABLET ORAL EVERY 8 HOURS PRN
Status: DISCONTINUED | OUTPATIENT
Start: 2022-12-03 | End: 2022-12-08 | Stop reason: HOSPADM

## 2022-12-03 RX ORDER — GLUCAGON 1 MG
1 KIT INJECTION
Status: DISCONTINUED | OUTPATIENT
Start: 2022-12-03 | End: 2022-12-05

## 2022-12-03 RX ORDER — ENOXAPARIN SODIUM 100 MG/ML
30 INJECTION SUBCUTANEOUS EVERY 24 HOURS
Status: DISCONTINUED | OUTPATIENT
Start: 2022-12-03 | End: 2022-12-03

## 2022-12-03 RX ORDER — NAPROXEN SODIUM 220 MG/1
81 TABLET, FILM COATED ORAL DAILY
Status: DISCONTINUED | OUTPATIENT
Start: 2022-12-04 | End: 2022-12-08 | Stop reason: HOSPADM

## 2022-12-03 RX ORDER — ATORVASTATIN CALCIUM 40 MG/1
40 TABLET, FILM COATED ORAL NIGHTLY
Status: DISCONTINUED | OUTPATIENT
Start: 2022-12-03 | End: 2022-12-04

## 2022-12-03 RX ORDER — HYDROCODONE BITARTRATE AND ACETAMINOPHEN 5; 325 MG/1; MG/1
1 TABLET ORAL EVERY 6 HOURS PRN
Status: DISCONTINUED | OUTPATIENT
Start: 2022-12-03 | End: 2022-12-08 | Stop reason: HOSPADM

## 2022-12-03 RX ORDER — POTASSIUM CHLORIDE 7.45 MG/ML
10 INJECTION INTRAVENOUS ONCE
Status: DISCONTINUED | OUTPATIENT
Start: 2022-12-03 | End: 2022-12-03

## 2022-12-03 RX ORDER — MORPHINE SULFATE 4 MG/ML
4 INJECTION, SOLUTION INTRAMUSCULAR; INTRAVENOUS EVERY 4 HOURS PRN
Status: DISCONTINUED | OUTPATIENT
Start: 2022-12-03 | End: 2022-12-08 | Stop reason: HOSPADM

## 2022-12-03 RX ORDER — ASPIRIN 325 MG
325 TABLET ORAL
Status: ACTIVE | OUTPATIENT
Start: 2022-12-03 | End: 2022-12-04

## 2022-12-03 RX ORDER — FUROSEMIDE 10 MG/ML
60 INJECTION INTRAMUSCULAR; INTRAVENOUS 2 TIMES DAILY
Status: DISCONTINUED | OUTPATIENT
Start: 2022-12-03 | End: 2022-12-08 | Stop reason: HOSPADM

## 2022-12-03 RX ORDER — SODIUM CHLORIDE 0.9 % (FLUSH) 0.9 %
10 SYRINGE (ML) INJECTION EVERY 6 HOURS PRN
Status: DISCONTINUED | OUTPATIENT
Start: 2022-12-03 | End: 2022-12-08 | Stop reason: HOSPADM

## 2022-12-03 RX ORDER — MAG HYDROX/ALUMINUM HYD/SIMETH 200-200-20
30 SUSPENSION, ORAL (FINAL DOSE FORM) ORAL 4 TIMES DAILY PRN
Status: DISCONTINUED | OUTPATIENT
Start: 2022-12-03 | End: 2022-12-08 | Stop reason: HOSPADM

## 2022-12-03 RX ORDER — SIMETHICONE 80 MG
1 TABLET,CHEWABLE ORAL 4 TIMES DAILY PRN
Status: DISCONTINUED | OUTPATIENT
Start: 2022-12-03 | End: 2022-12-08 | Stop reason: HOSPADM

## 2022-12-03 RX ORDER — ACETAMINOPHEN 325 MG/1
650 TABLET ORAL EVERY 4 HOURS PRN
Status: DISCONTINUED | OUTPATIENT
Start: 2022-12-03 | End: 2022-12-08 | Stop reason: HOSPADM

## 2022-12-03 RX ORDER — MAGNESIUM SULFATE 1 G/100ML
1 INJECTION INTRAVENOUS ONCE
Status: DISCONTINUED | OUTPATIENT
Start: 2022-12-03 | End: 2022-12-04

## 2022-12-03 RX ORDER — HYDRALAZINE HYDROCHLORIDE 20 MG/ML
10 INJECTION INTRAMUSCULAR; INTRAVENOUS EVERY 6 HOURS PRN
Status: DISCONTINUED | OUTPATIENT
Start: 2022-12-03 | End: 2022-12-08 | Stop reason: HOSPADM

## 2022-12-03 RX ORDER — NAPROXEN SODIUM 220 MG/1
81 TABLET, FILM COATED ORAL DAILY
Status: DISCONTINUED | OUTPATIENT
Start: 2022-12-04 | End: 2022-12-03

## 2022-12-03 RX ORDER — NALOXONE HCL 0.4 MG/ML
0.02 VIAL (ML) INJECTION
Status: DISCONTINUED | OUTPATIENT
Start: 2022-12-03 | End: 2022-12-08 | Stop reason: HOSPADM

## 2022-12-03 RX ORDER — ENOXAPARIN SODIUM 100 MG/ML
40 INJECTION SUBCUTANEOUS EVERY 24 HOURS
Status: DISCONTINUED | OUTPATIENT
Start: 2022-12-03 | End: 2022-12-03

## 2022-12-03 RX ORDER — IBUPROFEN 200 MG
24 TABLET ORAL
Status: DISCONTINUED | OUTPATIENT
Start: 2022-12-03 | End: 2022-12-05

## 2022-12-03 RX ORDER — LABETALOL HYDROCHLORIDE 5 MG/ML
5 INJECTION, SOLUTION INTRAVENOUS ONCE
Status: COMPLETED | OUTPATIENT
Start: 2022-12-03 | End: 2022-12-03

## 2022-12-03 RX ORDER — ONDANSETRON 2 MG/ML
4 INJECTION INTRAMUSCULAR; INTRAVENOUS EVERY 6 HOURS PRN
Status: DISCONTINUED | OUTPATIENT
Start: 2022-12-03 | End: 2022-12-03

## 2022-12-03 RX ORDER — LABETALOL HYDROCHLORIDE 5 MG/ML
10 INJECTION, SOLUTION INTRAVENOUS
Status: DISCONTINUED | OUTPATIENT
Start: 2022-12-03 | End: 2022-12-03

## 2022-12-03 RX ORDER — SODIUM CHLORIDE 0.9 % (FLUSH) 0.9 %
10 SYRINGE (ML) INJECTION
Status: DISCONTINUED | OUTPATIENT
Start: 2022-12-03 | End: 2022-12-08 | Stop reason: HOSPADM

## 2022-12-03 RX ORDER — FUROSEMIDE 10 MG/ML
40 INJECTION INTRAMUSCULAR; INTRAVENOUS
Status: DISCONTINUED | OUTPATIENT
Start: 2022-12-03 | End: 2022-12-03

## 2022-12-03 RX ORDER — FUROSEMIDE 10 MG/ML
40 INJECTION INTRAMUSCULAR; INTRAVENOUS
Status: COMPLETED | OUTPATIENT
Start: 2022-12-03 | End: 2022-12-03

## 2022-12-03 RX ADMIN — FUROSEMIDE 40 MG: 10 INJECTION, SOLUTION INTRAMUSCULAR; INTRAVENOUS at 04:12

## 2022-12-03 RX ADMIN — FUROSEMIDE 60 MG: 10 INJECTION, SOLUTION INTRAVENOUS at 11:12

## 2022-12-03 RX ADMIN — LABETALOL HYDROCHLORIDE 5 MG: 5 INJECTION, SOLUTION INTRAVENOUS at 02:12

## 2022-12-03 RX ADMIN — LABETALOL HYDROCHLORIDE 5 MG: 5 INJECTION, SOLUTION INTRAVENOUS at 03:12

## 2022-12-03 NOTE — H&P
[facility] History & Physical Examination   Patient Name: Spencer Burton Jr.  MRN: [id number]  Patient Class: OP- Observation   Admission Date: 12/3/2022  1:37 PM  Length of Stay: 0  Attending Physician: Chris Maldonado MD  Primary Care Provider: Primary Doctor No  Face-to-Face encounter date: 12/03/2022  Code Status: Full Code  Chief Complaint: Right Sided Weakness        HISTORY OF PRESENT ILLNESS:   Spencer Burton Jr. is a 52 y.o. Black or  male with known history of CHF presented with right sided weakness. He was noticed stumbling in gas station so the staff there called EMS and he was brought to the ER for evaluation. CT scan and MRI consistent with bilateral cerebral hemispheres, left basal ganglia and left cerebellar hemisphere. Tele-stroke consulted and TPA was not given due to waxing and waning symptoms and elevated blood pressures. Hospital medicine consulted for medical management. During my evaluation, patient lethargic and confused and not able to follow commands and provide any reliable information.     REVIEW OF SYSTEMS:   ROS unobtainable     PAST MEDICAL HISTORY:     Past Medical History:   Diagnosis Date    CHF (congestive heart failure)     COPD (chronic obstructive pulmonary disease)     Diabetes mellitus     Hyperlipidemia     Hypertension        PAST SURGICAL HISTORY:     Past Surgical History:   Procedure Laterality Date    APPENDECTOMY      CHOLECYSTECTOMY         ALLERGIES:   Patient has no allergy information on record.    FAMILY HISTORY:     Family History   Problem Relation Age of Onset    Hypertension Mother     Schizophrenia Father     Hypertension Father     Diabetes Father        SOCIAL HISTORY:     Social History     Tobacco Use    Smoking status: Never    Smokeless tobacco: Never   Substance Use Topics    Alcohol use: No     Comment: socially        Social History     Substance and Sexual Activity   Sexual Activity Yes    Partners:  Female        HOME MEDICATIONS:     Prior to Admission medications    Medication Sig Start Date End Date Taking? Authorizing Provider   acetaminophen (TYLENOL) 325 MG tablet Take 650 mg by mouth every 6 (six) hours as needed. 3/4/22   Historical Provider   albuterol (PROVENTIL/VENTOLIN HFA) 90 mcg/actuation inhaler Inhale 1-2 puffs into the lungs every 6 (six) hours as needed for Wheezing. Rescue 3/17/20 3/17/21  Shivani Bryant PA-C   amLODIPine (NORVASC) 10 MG tablet Take 1 tablet (10 mg total) by mouth once daily. 7/23/20   Rishi Coley MD   amLODIPine (NORVASC) 10 MG tablet Take 1 tablet by mouth once daily. 5/31/21   Historical Provider   aspirin 81 MG Chew Take 81 mg by mouth once daily. 3/5/22   Historical Provider   atorvastatin (LIPITOR) 40 MG tablet Take 1 tablet (40 mg total) by mouth every evening. 7/23/20   Rishi Coley MD   calcitRIOL (ROCALTROL) 0.25 MCG Cap Take 0.25 mcg by mouth once daily. 10/11/21   Historical Provider   calcitRIOL (ROCALTROL) 0.25 MCG Cap Take 1 capsule by mouth once daily. 10/11/21   Historical Provider   ciprofloxacin HCl (CIPRO) 500 MG tablet Take 500 mg by mouth 2 (two) times daily. 6/24/22   Historical Provider   cloNIDine 0.3 mg/24 hr td ptwk (CATAPRES) 0.3 mg/24 hr Place 1 patch onto the skin once a week. medically necessary with dx codes 401.9, 428.43, 428.0. 9/15/15   Surjit Rivas MD   clotrimazole (LOTRIMIN) 1 % cream Apply topically 2 (two) times daily. for 10 days  Patient taking differently: Apply 1 application topically 2 (two) times daily. 8/27/22 9/6/22  Richy Albarran MD   EScitalopram oxalate (LEXAPRO) 20 MG tablet Take 20 mg by mouth once daily. 3/5/22   Historical Provider   ferrous sulfate (FEOSOL) 325 mg (65 mg iron) Tab tablet Take 1 tablet by mouth once daily. 3/5/22   Historical Provider   fluticasone propionate (FLONASE) 50 mcg/actuation nasal spray 1 spray (50 mcg total) by Each Nostril route 2 (two) times daily as needed for  "Rhinitis. 3/17/20   Shivani Bryant PA-C   furosemide (LASIX) 40 MG tablet Take 40 mg by mouth 2 (two) times daily. 5/31/21   Historical Provider   glipiZIDE (GLUCOTROL) 10 MG tablet Take 1 tablet (10 mg total) by mouth 2 (two) times daily with meals. 7/23/20   Rishi Coley MD   HUMULIN 70/30 U-100 INSULIN 100 unit/mL (70-30) injection Inject into the skin 3 (three) times daily before meals. 5/22/22   Historical Provider   HYDROcodone-acetaminophen (NORCO) 5-325 mg per tablet Take 1 tablet by mouth every 6 (six) hours as needed. 6/15/22   Historical Provider   insulin detemir (LEVEMIR) 100 unit/mL injection Inject 15 Units into the skin every evening. 9/1/15 8/31/16  Zechariah Loomis PA-C   insulin needles, disposable, 31 x 3/16 " Ndle Use daily to inject insulin  DX:250.00 2/5/15   Luba Julio MD   isosorbide mononitrate (IMDUR) 60 MG 24 hr tablet Take 60 mg by mouth once daily. 3/5/22   Historical Provider   lancets (ONE TOUCH DELICA LANCETS) 33 gauge Misc 1 lancet by Misc.(Non-Drug; Combo Route) route 4 (four) times daily. DX:250.00   Medically necessary to test blood sugar 2/5/15   Luba Julio MD   loratadine (CLARITIN) 10 mg tablet Take 1 tablet (10 mg total) by mouth once daily. 3/17/20 3/17/21  Shivani Bryant PA-C   losartan (COZAAR) 100 MG tablet Take 100 mg by mouth. 3/5/22   Historical Provider   ondansetron (ZOFRAN-ODT) 4 MG TbDL Take 1 tablet (4 mg total) by mouth every 6 (six) hours as needed (nausea/vomiting). 3/17/20   Shivani Bryant PA-C   oxyCODONE-acetaminophen (PERCOCET) 5-325 mg per tablet Take 1 tablet by mouth every 4 (four) hours as needed. 6/24/22   Historical Provider   pioglitazone (ACTOS) 15 MG tablet Take 1 tablet by mouth once daily. 2/22/21   Historical Provider   potassium chloride (KLOR-CON) 10 MEQ TbSR  9/1/15   Historical Provider   sildenafiL (VIAGRA) 100 MG tablet TAKE ONE DAILY AS NEEDED 8/7/21   Historical Provider   jojo" "sulfADIAZINE 1% (SILVADENE) 1 % cream Apply topically. 3/4/22   Historical Provider   sulfamethoxazole-trimethoprim 400-80mg (BACTRIM,SEPTRA) 400-80 mg per tablet Take 1 tablet by mouth 2 (two) times daily. 6/15/22   Historical Provider   traMADoL (ULTRAM) 50 mg tablet Take 1 tablet (50 mg total) by mouth every 8 (eight) hours as needed for Pain. 7/23/20   Rishi Coley MD   TRUE METRIX AIR GLUCOSE METER Misc USE TO CHECK GLUCOSE TWICE DAILY AS DIRECTED 5/22/22   Historical Provider   TRUE METRIX GLUCOSE TEST STRIP Strp 1 strip 2 (two) times daily. 6/16/22   Historical Provider   TRUEPLUS LANCETS 30 gauge Misc USE 1 TO CHECK GLUCOSE TWICE DAILY 5/22/22   Historical Provider         PHYSICAL EXAM:   BP (!) 185/122   Pulse 93   Resp 17   Ht 5' 9" (1.753 m)   Wt 90.7 kg (200 lb)   SpO2 98%   BMI 29.53 kg/m²   Vitals Reviewed  General appearance: Black or  male in no apparent distress.  Skin: No Rash.   Neuro: encephalopathy precluded more thorough neurological exam  HENT: Atraumatic head. Moist mucous membranes of oral cavity.  Eyes: Normal extraocular movements.   Neck: Supple. No evidence of lymphadenopathy. No thyroidomegaly.  Lungs: decreased air entry bilaterally   Heart: Regular rate and rhythm. S1 and S2 present with no murmurs/gallop/rub. No pedal edema. No JVD present.   Abdomen: Soft, non-distended, non-tender. No rebound tenderness/guarding. No masses or organomegaly. Bowel sounds are normal. Bladder is not palpable.   Extremities: No cyanosis, clubbing.  Psych/mental status: lethargic   EMERGENCY DEPARTMENT LABS AND IMAGING:     Labs Reviewed   CBC W/ AUTO DIFFERENTIAL - Abnormal; Notable for the following components:       Result Value    Hemoglobin 13.4 (*)     RDW 14.9 (*)     MPV 13.8 (*)     Gran % 83.4 (*)     Lymph % 11.6 (*)     All other components within normal limits   COMPREHENSIVE METABOLIC PANEL - Abnormal; Notable for the following components:    Potassium 3.2 (*)     " Glucose 290 (*)     BUN 42 (*)     Creatinine 4.7 (*)     Total Bilirubin 2.9 (*)     AST 47 (*)     ALT 60 (*)     eGFR 14.3 (*)     All other components within normal limits    Narrative:     Recoll. [id number] by JBMohit at 12/03/2022 14:51, reason: Specimen   hemolyzed   PROTIME-INR - Abnormal; Notable for the following components:    PT 16.2 (*)     All other components within normal limits   TROPONIN I HIGH SENSITIVITY - Abnormal; Notable for the following components:    Troponin I High Sensitivity 350.7 (*)     All other components within normal limits    Narrative:     [facility] critical result(s) repeated. Called and verbal readback   obtained from Hector Marion RN/ED by HUNTER 12/03/2022 15:53  Recoll. [id number] by HUNTER at 12/03/2022 14:51, reason: Specimen   hemolyzed   B-TYPE NATRIURETIC PEPTIDE - Abnormal; Notable for the following components:    BNP 3,309 (*)     All other components within normal limits   ISTAT CREATININE - Abnormal; Notable for the following components:    POC Creatinine 5.3 (*)     All other components within normal limits   POCT GLUCOSE - Abnormal; Notable for the following components:    POC Glucose 316 (*)     All other components within normal limits   ISTAT PROCEDURE - Abnormal; Notable for the following components:    POC HCO3 21.8 (*)     All other components within normal limits   ALCOHOL,MEDICAL (ETHANOL)   DRUG SCREEN PANEL, URINE EMERGENCY   PROCALCITONIN   C-REACTIVE PROTEIN   TROPONIN I HIGH SENSITIVITY   POCT GLUCOSE MONITORING CONTINUOUS       MRI Brain Without Contrast   Final Result      CT HEAD FOR STROKE   Final Result      MRA Brain without contrast    (Results Pending)   US Abdomen Limited    (Results Pending)       ASSESSMENT & PLAN:   Spencer Burton Jr. is a 52 y.o. male admitted for    Active Hospital Problems    Diagnosis    *Acute ischemic left MCA stroke    Encephalopathy, metabolic -     Acute on chronic congestive heart failure    COPD (chronic obstructive  pulmonary disease)    CKD (chronic kidney disease), stage IV    Diabetes mellitus with chronic kidney disease    Hypertension, hypertensive emergency    Hyperbilirubinemia           Plan  Admit to Telemetry, Cardiac Monitoring, Oxygen Supplementation, Serial Cardiac Enzymes,  aspirin and statin, Echocardiography, inflammatory markers including CRP and pro calcitonin , IV Lasix 40 mg BID, Strict I&O, Daily weight, Low Sodium Diet, Fluid restriction, Nutrition consultation for CHF education, holding nephrotoxic agents, Insulin Sliding Scale, Supportive Care with Anti-emetics, Replacement of Electrolytes, resume home medications, and holding anti-hypertensive medications to maintain BP around 180. PT/OT/ST consultation, infectious workup pending, holding antibiotics, encephalopathy could be due to stroke and hypertension      Diet: NPO    DVT Prophylaxis: low molecular weight heparin and Encourage ambulation. OOB as tolerated.     Discharge Planning and Disposition:  Home with physical therapy    Expected LOS:  1-2 days    ________________________________________________________________________________    Face-to-Face encounter date: 12/03/2022  Encounter included review of the medical records, interviewing and examining the patient face-to-face, discussion with family and other health care providers including emergency medicine physician, admission orders, interpreting lab/test results and formulating a plan of care.   Medical Decision Making during this encounter was High Complexity due to CVA  ________________________________________________________________________________    INPATIENT LIST OF MEDICATIONS     Current Facility-Administered Medications:     aspirin tablet 325 mg, 325 mg, Oral, ED 1 Time, Natalie Silva MD    furosemide injection 40 mg, 40 mg, Intravenous, Q12H, Chris Maldonado MD    nitroGLYCERIN 0.4 MG/DOSE TL SPRY 400 mcg/spray spray 2 spray, 2 spray, Sublingual, Q5 Min PRN, Surjit Rivas MD,  2 spray at 09/15/15 [id number]    Current Outpatient Medications:     acetaminophen (TYLENOL) 325 MG tablet, Take 650 mg by mouth every 6 (six) hours as needed., Disp: , Rfl:     albuterol (PROVENTIL/VENTOLIN HFA) 90 mcg/actuation inhaler, Inhale 1-2 puffs into the lungs every 6 (six) hours as needed for Wheezing. Rescue, Disp: 6.7 g, Rfl: 0    amLODIPine (NORVASC) 10 MG tablet, Take 1 tablet (10 mg total) by mouth once daily., Disp: 30 tablet, Rfl: 0    amLODIPine (NORVASC) 10 MG tablet, Take 1 tablet by mouth once daily., Disp: , Rfl:     aspirin 81 MG Chew, Take 81 mg by mouth once daily., Disp: , Rfl:     atorvastatin (LIPITOR) 40 MG tablet, Take 1 tablet (40 mg total) by mouth every evening., Disp: 30 tablet, Rfl: 0    calcitRIOL (ROCALTROL) 0.25 MCG Cap, Take 0.25 mcg by mouth once daily., Disp: , Rfl:     calcitRIOL (ROCALTROL) 0.25 MCG Cap, Take 1 capsule by mouth once daily., Disp: , Rfl:     ciprofloxacin HCl (CIPRO) 500 MG tablet, Take 500 mg by mouth 2 (two) times daily., Disp: , Rfl:     cloNIDine 0.3 mg/24 hr td ptwk (CATAPRES) 0.3 mg/24 hr, Place 1 patch onto the skin once a week. medically necessary with dx codes 401.9, 428.43, 428.0., Disp: 30 patch, Rfl: 11    clotrimazole (LOTRIMIN) 1 % cream, Apply topically 2 (two) times daily. for 10 days (Patient taking differently: Apply 1 application topically 2 (two) times daily.), Disp: 1 each, Rfl: 0    EScitalopram oxalate (LEXAPRO) 20 MG tablet, Take 20 mg by mouth once daily., Disp: , Rfl:     ferrous sulfate (FEOSOL) 325 mg (65 mg iron) Tab tablet, Take 1 tablet by mouth once daily., Disp: , Rfl:     fluticasone propionate (FLONASE) 50 mcg/actuation nasal spray, 1 spray (50 mcg total) by Each Nostril route 2 (two) times daily as needed for Rhinitis., Disp: 15 g, Rfl: 0    furosemide (LASIX) 40 MG tablet, Take 40 mg by mouth 2 (two) times daily., Disp: , Rfl:     glipiZIDE (GLUCOTROL) 10 MG tablet, Take 1 tablet (10 mg total) by mouth 2 (two) times daily  "with meals., Disp: 60 tablet, Rfl: 0    HUMULIN 70/30 U-100 INSULIN 100 unit/mL (70-30) injection, Inject into the skin 3 (three) times daily before meals., Disp: , Rfl:     HYDROcodone-acetaminophen (NORCO) 5-325 mg per tablet, Take 1 tablet by mouth every 6 (six) hours as needed., Disp: , Rfl:     insulin detemir (LEVEMIR) 100 unit/mL injection, Inject 15 Units into the skin every evening., Disp: 4.5 mL, Rfl: 2    insulin needles, disposable, 31 x 3/16 " Ndle, Use daily to inject insulin  DX:250.00, Disp: 100 each, Rfl: 11    isosorbide mononitrate (IMDUR) 60 MG 24 hr tablet, Take 60 mg by mouth once daily., Disp: , Rfl:     lancets (ONE TOUCH DELICA LANCETS) 33 gauge Misc, 1 lancet by Misc.(Non-Drug; Combo Route) route 4 (four) times daily. DX:250.00   Medically necessary to test blood sugar, Disp: 200 each, Rfl: 6    loratadine (CLARITIN) 10 mg tablet, Take 1 tablet (10 mg total) by mouth once daily., Disp: 30 tablet, Rfl: 0    losartan (COZAAR) 100 MG tablet, Take 100 mg by mouth., Disp: , Rfl:     ondansetron (ZOFRAN-ODT) 4 MG TbDL, Take 1 tablet (4 mg total) by mouth every 6 (six) hours as needed (nausea/vomiting)., Disp: 20 tablet, Rfl: 0    oxyCODONE-acetaminophen (PERCOCET) 5-325 mg per tablet, Take 1 tablet by mouth every 4 (four) hours as needed., Disp: , Rfl:     pioglitazone (ACTOS) 15 MG tablet, Take 1 tablet by mouth once daily., Disp: , Rfl:     potassium chloride (KLOR-CON) 10 MEQ TbSR, , Disp: , Rfl:     sildenafiL (VIAGRA) 100 MG tablet, TAKE ONE DAILY AS NEEDED, Disp: , Rfl:     silver sulfADIAZINE 1% (SILVADENE) 1 % cream, Apply topically., Disp: , Rfl:     sulfamethoxazole-trimethoprim 400-80mg (BACTRIM,SEPTRA) 400-80 mg per tablet, Take 1 tablet by mouth 2 (two) times daily., Disp: , Rfl:     traMADoL (ULTRAM) 50 mg tablet, Take 1 tablet (50 mg total) by mouth every 8 (eight) hours as needed for Pain., Disp: 9 tablet, Rfl: 0    TRUE METRIX AIR GLUCOSE METER [facility], USE TO CHECK GLUCOSE TWICE " DAILY AS DIRECTED, Disp: , Rfl:     TRUE METRIX GLUCOSE TEST STRIP Strp, 1 strip 2 (two) times daily., Disp: , Rfl:     TRUEPLUS LANCETS 30 gauge Misc, USE 1 TO CHECK GLUCOSE TWICE DAILY, Disp: , Rfl:       Scheduled Meds:   aspirin  325 mg Oral ED 1 Time    furosemide (LASIX) injection  40 mg Intravenous Q12H     Continuous Infusions:  PRN Meds:.nitroGLYCERIN 0.4 MG/DOSE ANTONINO Maldonado  [facility] Hospitalist  12/03/2022

## 2022-12-03 NOTE — ED PROVIDER NOTES
Encounter Date: 12/3/2022       History     Chief Complaint   Patient presents with    Right Sided Weakness     52-year-old male presents by EMS with right-sided weakness patient walked into gas station and was noted to at least seem normal however when leaving gas station patient was noticed to be stumbling, staff went to check on patient and called EMS, EMS reports that patient has had some confusion and right-sided facial droop as well as right-sided weakness on their exam.  Patient has a history of CHF, COPD, diabetes, hypertension, hyperlipidemia and medical noncompliance.  Patient recently admitted to [facility] for COPD exacerbation.    Review of patient's allergies indicates:  No Known Allergies  Past Medical History:   Diagnosis Date    CHF (congestive heart failure)     COPD (chronic obstructive pulmonary disease)     Diabetes mellitus     Hyperlipidemia     Hypertension      Past Surgical History:   Procedure Laterality Date    APPENDECTOMY      CHOLECYSTECTOMY       Family History   Problem Relation Age of Onset    Hypertension Mother     Schizophrenia Father     Hypertension Father     Diabetes Father      Social History     Tobacco Use    Smoking status: Never    Smokeless tobacco: Never   Substance Use Topics    Alcohol use: No     Comment: socially    Drug use: No     Review of Systems   Unable to perform ROS: Mental status change   Neurological:  Positive for facial asymmetry, speech difficulty and weakness.     Physical Exam     Initial Vitals   BP Pulse Resp Temp SpO2   12/03/22 1350 12/03/22 1350 12/03/22 1350 12/03/22 1818 12/03/22 1350   (!) 244/156 82 18 97 °F (36.1 °C) 100 %      MAP       --                Physical Exam    Nursing note and vitals reviewed.  Constitutional: He appears well-developed and well-nourished. He is not diaphoretic. No distress.   HENT:   Head: Normocephalic and atraumatic.   Right Ear: External ear normal.   Left Ear: External ear normal.   Nose: Nose normal.    Mouth/Throat: Oropharynx is clear and moist. No oropharyngeal exudate.   Eyes: Conjunctivae and EOM are normal. Pupils are equal, round, and reactive to light. Right eye exhibits no discharge. Left eye exhibits no discharge. No scleral icterus.   Neck: Neck supple. No thyromegaly present. No tracheal deviation present. No JVD present.   Normal range of motion.  Cardiovascular:  Normal rate, regular rhythm, normal heart sounds and intact distal pulses.     Exam reveals no gallop and no friction rub.       No murmur heard.  Pulmonary/Chest: Breath sounds normal. No stridor. No respiratory distress. He has no wheezes. He has no rhonchi. He has no rales. He exhibits no tenderness.   Abdominal: Abdomen is soft. Bowel sounds are normal. He exhibits no distension and no mass. There is no abdominal tenderness. There is no rebound and no guarding.   Musculoskeletal:         General: No tenderness or edema. Normal range of motion.      Cervical back: Normal range of motion and neck supple.     Lymphadenopathy:     He has no cervical adenopathy.   Neurological: He is alert. He has normal strength. No cranial nerve deficit or sensory deficit.   Oriented to person and place    Patient has right-sided facial droop and 4/5 strength on the right, patient has confusion with GCS 14, patient has decreased light touch sensation on the right.   Skin: Skin is warm and dry. No rash noted. No erythema.       ED Course   Procedures  Labs Reviewed   CBC W/ AUTO DIFFERENTIAL - Abnormal; Notable for the following components:       Result Value    Hemoglobin 13.4 (*)     RDW 14.9 (*)     MPV 13.8 (*)     Gran % 83.4 (*)     Lymph % 11.6 (*)     All other components within normal limits   COMPREHENSIVE METABOLIC PANEL - Abnormal; Notable for the following components:    Potassium 3.2 (*)     Glucose 290 (*)     BUN 42 (*)     Creatinine 4.7 (*)     Total Bilirubin 2.9 (*)     AST 47 (*)     ALT 60 (*)     eGFR 14.3 (*)     All other components  within normal limits    Narrative:     Recoll. [id number] by [email or web address] at 12/03/2022 14:51, reason: Specimen   hemolyzed   PROTIME-INR - Abnormal; Notable for the following components:    PT 16.2 (*)     All other components within normal limits   TROPONIN I HIGH SENSITIVITY - Abnormal; Notable for the following components:    Troponin I High Sensitivity 350.7 (*)     All other components within normal limits    Narrative:     [facility] critical result(s) repeated. Called and verbal readback   obtained from Hector Marion RN/ED by HUNTER 12/03/2022 15:53  Recoll. [id number] by [email or web address] at 12/03/2022 14:51, reason: Specimen   hemolyzed   B-TYPE NATRIURETIC PEPTIDE - Abnormal; Notable for the following components:    BNP 3,309 (*)     All other components within normal limits   ISTAT CREATININE - Abnormal; Notable for the following components:    POC Creatinine 5.3 (*)     All other components within normal limits   POCT GLUCOSE - Abnormal; Notable for the following components:    POC Glucose 316 (*)     All other components within normal limits   ISTAT PROCEDURE - Abnormal; Notable for the following components:    POC HCO3 21.8 (*)     All other components within normal limits   ALCOHOL,MEDICAL (ETHANOL)        ECG Results              ECG 12 lead (Final result)  Result time 12/06/22 20:15:39      Final result by Interface, Lab In [facility] (12/06/22 20:15:39)                   Narrative:    Test Reason : I63.9,    Vent. Rate : 099 BPM     Atrial Rate : 099 BPM     P-R Int : 148 ms          QRS Dur : 090 ms      QT Int : 400 ms       P-R-T Axes : 069 -89 072 degrees     QTc Int : 513 ms    Normal sinus rhythm  Possible Left atrial enlargement  Left axis deviation  Anteroseptal infarct (cited on or before 02-NOV-2021)  Prolonged QT  Abnormal ECG  When compared with ECG of 13-NOV-2022 22:38,  Questionable change in The axis  Confirmed by Valdo Faustin MD ([id number]) on 12/6/2022 8:15:31 PM    Referred By: MADAY   SELF            Confirmed By:Valdo Faustin MD                                  Imaging Results              US Abdomen Limited (Final result)  Result time 12/03/22 21:12:08      Final result by Des Glass MD (12/03/22 21:12:08)                   Narrative:    EXAM DESCRIPTION:  US ABDOMEN LIMITED    CLINICAL HISTORY:  52 years  Male  hyperbiliruninemia    COMPARISON:  None    TECHNIQUE:  Ultrasound of the right upper quadrant was performed.      FINDINGS:    Liver: The liver is not enlarged and is within normal limits in echogenicity. 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    Gallbladder/bile ducts: Absent. The common bile duct measures up to 0.2cm.    Pancreas: Visualized portions of the pancreatic head, neck and proximal body are unremarkable without ductal dilation. The remaining body and tail are obscured by overlying bowel gas and cannot be visualized.    Right kidney: Measures 8.9 x 5.2 x 4.8cm. No hydronephrosis or conspicuous concerning lesion. No shadowing calculi. Simple cyst of the interpolar region measures 0.7 x 0.7 x 0.8 cm and requires no follow-up.    Aorta: No aortic aneurysm proximally. The mid and distal aorta are obscured.    IVC: Unremarkable.    Other: No ascites. Right pleural effusion.    IMPRESSION:  1. Absent blood flow/occluded main portal vein.  2. Right pleural effusion.    Electronically signed by:  Des Glass MD  12/3/2022 9:12 PM CST Workstation: [id number]                                     MRI Brain Without Contrast (Final result)  Result time 12/03/22 16:20:17      Final result by Des Alicea MD (12/03/22 16:20:17)                   Narrative:    REASON: Stroke, follow up    TECHNIQUE: Multiplanar and multi sequential MRI of the brain, without IV contrast.    COMPARISON: None    FINDINGS:    Movement artifact limits evaluation. No intracranial hemorrhage observed. The ventricles are midline. There are mild involutional changes of the parenchyma with ex vacuo dilatation of the  ventricles. There are multiple areas of restricted diffusion involving the bilateral cerebral hemispheres, left basal ganglia and left cerebellar hemisphere. There is no mass effect or midline shift of structures. Periventricular and deep white matter FLAIR and T2 hyperintensities are noted, consistent with changes of chronic low vessel ischemic disease.    IMPRESSION:    Multifocal areas of restricted diffusion are felt to reflect acute infarcts. Findings reported to Dr. Natalie Silva at 4:18 PM.    Electronically signed by:  Des Alicea DO  12/3/2022 4:20 PM CST Workstation: [id number]                                     CT HEAD FOR STROKE (Final result)  Result time 12/03/22 14:21:20   Procedure changed from CT Head Without Contrast     Final result by Des Alicea MD (12/03/22 14:21:20)                   Narrative:    CMS MANDATED QUALITY DATA - CT RADIATION - 436    All CT scans at this facility utilize dose modulation, iterative reconstruction, and/or weight based dosing when appropriate to reduce radiation dose to as low as reasonably achievable.        Reason: Neuro deficit, acute, stroke suspected STROKE PROTOCOL    TECHNIQUE: Head CT without IV contrast.    COMPARISON: None    FINDINGS:    There is loss of gray-white matter distinction involving the left basal ganglia. There is no mass effect or midline shift. Periventricular and deep white matter hypoattenuation noted felt to reflect changes of chronic small vessel ischemic disease. No intracranial hemorrhage.    The ventricles and cisterns are maintained.    Calvarium is intact. Visualized sinuses are clear.    IMPRESSION:    Loss of gray-white matter distinction in involving the left basal ganglia could reflect changes of chronic small vessel ischemic disease versus cytotoxic edema from acute infarct. Further evaluation with MRI recommended. Findings reported to Dr. Huber at 2:21 PM  Periventricular deep white matter changes of chronic small  vessel ischemic disease.        Electronically signed by:  Des Alicea DO  12/3/2022 2:21 PM CST Workstation: [id number]                                     Medications   aspirin tablet 325 mg (has no administration in time range)   labetaloL injection 5 mg ( Intravenous Canceled Entry 12/3/22 1430)   labetaloL injection 5 mg ( Intravenous Canceled Entry 12/3/22 1615)   furosemide injection 40 mg (40 mg Intravenous Given 12/3/22 1649)   potassium chloride 10 mEq in 100 mL IVPB (10 mEq Intravenous New Bag 12/5/22 0723)   potassium chloride 10 mEq in 100 mL IVPB (10 mEq Intravenous New Bag 12/5/22 0955)   EPINEPHrine (ADRENALIN) 0.1 mg/mL injection (0 mg  Return to Cabinet 12/6/22 0915)   atropine 0.1 mg/mL injection (0 mg  Return to Cabinet 12/6/22 0915)   flumazeniL (ROMAZICON) 0.1 mg/mL injection (0 mg  Return to Cabinet 12/6/22 0915)   iohexoL (OMNIPAQUE 350) injection 100 mL (100 mLs Intravenous Given 12/8/22 1214)     Medical Decision Making:   History:   Old Medical Records: I decided to obtain old medical records.  Initial Assessment:   Emergent evaluation of a 52-year-old male presenting with altered mental status, right-sided weakness, patient immediately stroke activated upon arrival and seen and evaluated by stroke telemedicine services.  Initially they recommended management of patient's blood pressure then to consider possible thrombolytics.          Attending Attestation:         Attending Critical Care:   Critical Care Times:   Direct Patient Care (initial evaluation, reassessments, and time considering the case)................................................................25 minutes.   Additional History from reviewing old medical records or taking additional history from the family, EMS, PCP, etc.......................5 minutes.   Ordering, Reviewing, and Interpreting Diagnostic  Studies...............................................................................................................5 minutes.   Documentation..................................................................................................................................................................................10 minutes.   Consultation with other Physicians. .................................................................................................................................................10 minutes.   ==============================================================  Total Critical Care Time - exclusive of procedural time: 55 minutes.  ==============================================================  Critical care was time spent personally by me on the following activities: obtaining history from patient or relative, examination of patient, review of old charts, ordering lab, x-rays, and/or EKG, development of treatment plan with patient or relative, ordering and performing treatments and interventions, evaluation of patient's response to treatment and discussion with consultants.   Critical Care Condition: potentially life-threatening       Attending ED Notes:   Patient reassessed and noted to have hypoxia, patient had oxygen saturations noted to be in the 70s patient does have a history of COPD, patient is not currently on any oxygen at home as far as I can tell, patient placed on oxygen and oxygen saturations improved to 100% patient facial droop has also improved markedly, review of patient's CT of the head shows a ischemic change in the left basal ganglia, given these findings and waxing and waning symptoms as well as rapid improvement I reconsulted tele stroke services and they agree with blood pressure management and admission to the hospital without administering thrombolytics at this time.  Patient will be monitored closely.    Patient turned over to Dr Silva at 3pm.                    Clinical Impression:   Final diagnoses:  [I63.9] Stroke        ED Disposition Condition    Observation                 Derrell Huber MD  12/09/22 2020

## 2022-12-03 NOTE — CONSULTS
[facility]  Vascular Neurology  Comprehensive Stroke Center  TeleVascular Neurology Acute Consultation Note      Consults    Consulting Provider: DIMITRI RANDALL  Current Providers  No providers found    Patient Location:  [facility] EMERGENCY DEPARTMENT Emergency Department  [facility] hospital nurse at bedside with patient assisting consultant.     Patient information was obtained from patient and EMS personnel.         Assessment/Plan:       Diagnoses:   Acute ischemic left MCA stroke  Likely L MCA ischemic stroke.  DDx includes post-ictal state and hypertensive encephalopathy.  Rec treating BP to <185/110 and then treating with TNK if not better.  CTA pending for e/o LVO.        STROKE DOCUMENTATION     Acute Stroke Times:   Acute Stroke Times   Last Known Normal Date: 12/03/22  Stroke Team Arrival Time: 1348  CT Interpretation Time: 1404    NIH Scale:  Interval: baseline  1a. Level of Consciousness: 1-->Not alert, but arousable by minor stimulation to obey, answer, or respond  1b. LOC Questions: 0-->Answers both questions correctly  1c. LOC Commands: 0-->Performs both tasks correctly  2. Best Gaze: 0-->Normal  3. Visual: 0-->No visual loss  4. Facial Palsy: 1-->Minor paralysis (flattened nasolabial fold, asymmetry on smiling)  5a. Motor Arm, Left: 0-->No drift, limb holds 90 (or 45) degrees for full 10 secs  5b. Motor Arm, Right: 1-->Drift, limb holds 90 (or 45) degrees, but drifts down before full 10 secs, does not hit bed or other support  6a. Motor Leg, Left: 1-->Drift, leg falls by the end of the 5-sec period but does not hit bed  6b. Motor Leg, Right: 2-->Some effort against gravity, leg falls to bed by 5 secs, but has some effort against gravity  7. Limb Ataxia: 0-->Absent  8. Sensory: 0-->Normal, no sensory loss  9. Best Language: 2-->Severe aphasia, all communication is through fragmentary expression, great need for inference, questioning, and guessing by the listener. Range of  "information that can be exchanged is limited, listener carries burden of. . . (see row details)  10. Dysarthria: 0-->Normal  11. Extinction and Inattention (formerly Neglect): 0-->No abnormality  Total (NIH Stroke Scale): 8     Modified [place] Coma Scale:    ABCD2 Score:    [id number] Score:   HAS -BLED Score:   ICH Score:   Hunt & Leblanc Classification:       Blood pressure (!) 244/156, pulse 82, resp. rate 18, height 5' 9" (1.753 m), weight 90.7 kg (200 lb), SpO2 100 %.  Eligible for thrombolytic therapy?: Yes (pending Tx of BP)  Thrombolytic therapy recomended: Recommend IV Tenecteplase 0.25mg/kg IV push (max dose 25mg); If Tenecteplase is not available use Alteplase 0.9mg/kg IV bolus followed by infusion (max dose 90mg)     Additional Recommendations:   Neurological assessment and vital signs (except temperature) every 15 minutes x 2 hours, then every 30 minutes x 6 hours, then every hour x 16 hours..  Frequency of BP assessments may need to be increased if systolic BP stays >= 180 mm Hg or diastolic BP stays >= 105 mm Hg. Administer antihypertensive meds as ordered  Temperature every 4 hours or as required.  Follow hospital protocol for further orders re: post thrombolytic therapy patient management.  No antithrombotics or anticoagulants (including but not limited to: heparin, warfarin, aspirin, clopidigrel, or dipyridamole) for 24 hours, then start antithrombotics as ordered by treating physician    Adapted from the American Heart Association/American Stroke Association (AHA/ASA) and American Association of Neuroscience Nurses (AANN) Guidelines.   Possible Interventional Revascularization Candidate? Awaiting CTA results from [facility] for determination     Disposition Recommendation: pending further studies    Subjective:     History of Present Illness:  51 y/o M with R sided weakness and language disturbance.  Walked into gas station without difficulty and came out with symptoms around 1:15p.    Glu " 319  233/150      Woke up with symptoms?: no    Recent bleeding noted: unknown  Does the patient take any Blood Thinners? yes  Medications: Antiplatelets:  aspirin  Current Facility-Administered Medications on File Prior to Encounter   Medication Dose Route Frequency Provider Last Rate Last Admin    nitroGLYCERIN 0.4 MG/DOSE TL SPRY 400 mcg/spray spray 2 spray  2 spray Sublingual Q5 Min PRN Surjit Rivas MD   2 spray at 09/15/15 0917     Current Outpatient Medications on File Prior to Encounter   Medication Sig Dispense Refill    acetaminophen (TYLENOL) 325 MG tablet Take 650 mg by mouth every 6 (six) hours as needed.      albuterol (PROVENTIL/VENTOLIN HFA) 90 mcg/actuation inhaler Inhale 1-2 puffs into the lungs every 6 (six) hours as needed for Wheezing. Rescue 6.7 g 0    amLODIPine (NORVASC) 10 MG tablet Take 1 tablet (10 mg total) by mouth once daily. 30 tablet 0    amLODIPine (NORVASC) 10 MG tablet Take 1 tablet by mouth once daily.      aspirin 81 MG Chew Take 81 mg by mouth.      atorvastatin (LIPITOR) 40 MG tablet Take 1 tablet (40 mg total) by mouth every evening. 30 tablet 0    calcitRIOL (ROCALTROL) 0.25 MCG Cap Take 0.25 mcg by mouth once daily.      calcitRIOL (ROCALTROL) 0.25 MCG Cap Take 1 capsule by mouth once daily.      ciprofloxacin HCl (CIPRO) 500 MG tablet Take 500 mg by mouth 2 (two) times daily.      cloNIDine 0.3 mg/24 hr td ptwk (CATAPRES) 0.3 mg/24 hr Place 1 patch onto the skin once a week. medically necessary with dx codes 401.9, 428.43, 428.0. 30 patch 11    clotrimazole (LOTRIMIN) 1 % cream Apply topically 2 (two) times daily. for 10 days 1 each 0    EScitalopram oxalate (LEXAPRO) 20 MG tablet Take 20 mg by mouth.      ferrous sulfate (FEOSOL) 325 mg (65 mg iron) Tab tablet Take 1 tablet by mouth once daily.      fluticasone propionate (FLONASE) 50 mcg/actuation nasal spray 1 spray (50 mcg total) by Each Nostril route 2 (two) times daily as needed for Rhinitis. 15 g 0    furosemide  "(LASIX) 40 MG tablet Take 40 mg by mouth 2 (two) times daily.      glipiZIDE (GLUCOTROL) 10 MG tablet Take 1 tablet (10 mg total) by mouth 2 (two) times daily with meals. 60 tablet 0    HUMULIN 70/30 U-100 INSULIN 100 unit/mL (70-30) injection Inject into the skin.      HYDROcodone-acetaminophen (NORCO) 5-325 mg per tablet Take 1 tablet by mouth every 6 (six) hours as needed.      insulin detemir (LEVEMIR) 100 unit/mL injection Inject 15 Units into the skin every evening. 4.5 mL 2    insulin needles, disposable, 31 x 3/16 " Ndle Use daily to inject insulin  DX:250.00 100 each 11    isosorbide mononitrate (IMDUR) 60 MG 24 hr tablet Take 60 mg by mouth.      lancets (ONE TOUCH DELICA LANCETS) 33 gauge Misc 1 lancet by Misc.(Non-Drug; Combo Route) route 4 (four) times daily. DX:250.00   Medically necessary to test blood sugar 200 each 6    loratadine (CLARITIN) 10 mg tablet Take 1 tablet (10 mg total) by mouth once daily. 30 tablet 0    losartan (COZAAR) 100 MG tablet Take 100 mg by mouth.      ondansetron (ZOFRAN-ODT) 4 MG TbDL Take 1 tablet (4 mg total) by mouth every 6 (six) hours as needed (nausea/vomiting). 20 tablet 0    oxyCODONE-acetaminophen (PERCOCET) 5-325 mg per tablet Take 1 tablet by mouth every 4 (four) hours as needed.      pioglitazone (ACTOS) 15 MG tablet Take 1 tablet by mouth once daily.      potassium chloride (KLOR-CON) 10 MEQ TbSR       sildenafiL (VIAGRA) 100 MG tablet TAKE ONE DAILY AS NEEDED      silver sulfADIAZINE 1% (SILVADENE) 1 % cream Apply topically.      sulfamethoxazole-trimethoprim 400-80mg (BACTRIM,SEPTRA) 400-80 mg per tablet Take 1 tablet by mouth 2 (two) times daily.      traMADoL (ULTRAM) 50 mg tablet Take 1 tablet (50 mg total) by mouth every 8 (eight) hours as needed for Pain. 9 tablet 0    TRUE METRIX AIR GLUCOSE METER [facility] USE TO CHECK GLUCOSE TWICE DAILY AS DIRECTED      TRUE METRIX GLUCOSE TEST STRIP Strp 1 strip 2 (two) times daily.      TRUEPLUS LANCETS 30 gauge Misc USE " "1 TO CHECK GLUCOSE TWICE DAILY           Past Medical History:   Diagnosis Date    CHF (congestive heart failure)     COPD (chronic obstructive pulmonary disease)     Diabetes mellitus     Hyperlipidemia     Hypertension      Past Surgical History:   Procedure Laterality Date    APPENDECTOMY      CHOLECYSTECTOMY       Social History     Tobacco Use    Smoking status: Never    Smokeless tobacco: Never   Substance Use Topics    Alcohol use: No     Comment: socially    Drug use: No     Family History   Problem Relation Age of Onset    Hypertension Mother     Schizophrenia Father     Hypertension Father     Diabetes Father      Allergies: No Known Allergies     Review of Systems  Objective:   Vitals: Height 5' 9" (1.753 m), weight 90.7 kg (200 lb).      CT READ: Yes  Abnormal CT WM changes; cannot exclude early changes deep in L MCA territory.     Physical Exam  Vitals reviewed.             Recommended the emergency room physician to have a brief discussion with the patient and/or family if available regarding the  risks and benefits of treatment, and to briefly document the occurrence of that discussion in his clinical encounter note.     The attending portion of this evaluation, treatment, and documentation was performed per Tee Marsh MD via audiovisual.    Billing code:  (time dependent stroke, complex case, unstable patient, hemorrhages, any intervention, some mimics)    This patient has a very critical neurological condition/illness, with very high morbidity and mortality.  There is a very high probability for acute neurological change leading to clinical and possibly life-threatening deterioration requiring highest level of physician preparedness for urgent intervention.  There is possibility that this condition will require treatment with high risk medications as quickly as possible.  There is also a possibility that the patient may benefit from further, more advance complex therapies (e.g. " endovascular therapy) that will require prompt diagnosis and care.  Care was coordinated with other physicians involved in the patient's care.  Radiologic studies and laboratory data were reviewed and interpreted, and plan of care was re-assessed based on the results.  Diagnosis, treatment options and prognosis may have been discussed with the patient and/or family members or caregiver.  Further advanced medical management and further evaluation is warranted for his care.    In your opinion, this was a: Tier 1 Van Positive    Consult End Time: 2:12 PM     Tee Marsh MD  [facility]  Vascular Neurology   [facility]

## 2022-12-03 NOTE — HPI
"Spencer Burton Jr. is a 52 y.o. male with a history of  CHF who initially presented to OSH with right sided weakness. After arrival, CT scan and MRI consistent with bilateral cerebral hemispheres, left basal ganglia and left cerebellar hemisphere. Tele-stroke consulted and TPA was not given due to waxing and waning symptoms and elevated blood pressures. Hospital medicine consulted for medical management. Per tele stroke documentation, the patient had been "found to be hypoxic.  With correction of hypoxia and lowering of BP, patient had some clinical improvement". The patient was noted to have been lethargic and with aphasia over his hospital course. MRI brain done showed embolic infarcts in the left corona radiata, left frontal lobe, right frontoparietal lobe. RAGHAV at OSH also demonstrated a large thrombus in the left ventricle, as well as decreased systolic function with EF 38%.     On 12/8, the patient was noted to have had a neurologic exam change at 1145AM (LKN 11AM), at which time the patient was globally aphasic with "deep" R hemiparesis and L gaze deviation. CTH was stable following this change, however CTA demonstrated a left MCA M2 division occlusion for which the patient was transferred to [facility] for possible intervention and higher level of care.         "

## 2022-12-03 NOTE — CARE UPDATE
Telestroke - update    ED MD and I spoke earlier.  Patient found to be hypoxic.  With correction of hypoxia and lowering of BP, patient had some clinical improvement.  Radiology was also concerned the L deep lucency was sub-acute.  For these reasons we decided against thrombolysis.  Given patient's aphasia, precise onset cannot be determined and I am concerned onset was earlier than when he presented to the gas station given CT finding.    Tee Marsh

## 2022-12-03 NOTE — SUBJECTIVE & OBJECTIVE
Woke up with symptoms?: no    Recent bleeding noted: unknown  Does the patient take any Blood Thinners? yes  Medications: Antiplatelets:  aspirin  Current Facility-Administered Medications on File Prior to Encounter   Medication Dose Route Frequency Provider Last Rate Last Admin    nitroGLYCERIN 0.4 MG/DOSE TL SPRY 400 mcg/spray spray 2 spray  2 spray Sublingual Q5 Min PRN Surjit Rivas MD   2 spray at 09/15/15 0917     Current Outpatient Medications on File Prior to Encounter   Medication Sig Dispense Refill    acetaminophen (TYLENOL) 325 MG tablet Take 650 mg by mouth every 6 (six) hours as needed.      albuterol (PROVENTIL/VENTOLIN HFA) 90 mcg/actuation inhaler Inhale 1-2 puffs into the lungs every 6 (six) hours as needed for Wheezing. Rescue 6.7 g 0    amLODIPine (NORVASC) 10 MG tablet Take 1 tablet (10 mg total) by mouth once daily. 30 tablet 0    amLODIPine (NORVASC) 10 MG tablet Take 1 tablet by mouth once daily.      aspirin 81 MG Chew Take 81 mg by mouth.      atorvastatin (LIPITOR) 40 MG tablet Take 1 tablet (40 mg total) by mouth every evening. 30 tablet 0    calcitRIOL (ROCALTROL) 0.25 MCG Cap Take 0.25 mcg by mouth once daily.      calcitRIOL (ROCALTROL) 0.25 MCG Cap Take 1 capsule by mouth once daily.      ciprofloxacin HCl (CIPRO) 500 MG tablet Take 500 mg by mouth 2 (two) times daily.      cloNIDine 0.3 mg/24 hr td ptwk (CATAPRES) 0.3 mg/24 hr Place 1 patch onto the skin once a week. medically necessary with dx codes 401.9, 428.43, 428.0. 30 patch 11    clotrimazole (LOTRIMIN) 1 % cream Apply topically 2 (two) times daily. for 10 days 1 each 0    EScitalopram oxalate (LEXAPRO) 20 MG tablet Take 20 mg by mouth.      ferrous sulfate (FEOSOL) 325 mg (65 mg iron) Tab tablet Take 1 tablet by mouth once daily.      fluticasone propionate (FLONASE) 50 mcg/actuation nasal spray 1 spray (50 mcg total) by Each Nostril route 2 (two) times daily as needed for Rhinitis. 15 g 0    furosemide  "(LASIX) 40 MG tablet Take 40 mg by mouth 2 (two) times daily.      glipiZIDE (GLUCOTROL) 10 MG tablet Take 1 tablet (10 mg total) by mouth 2 (two) times daily with meals. 60 tablet 0    HUMULIN 70/30 U-100 INSULIN 100 unit/mL (70-30) injection Inject into the skin.      HYDROcodone-acetaminophen (NORCO) 5-325 mg per tablet Take 1 tablet by mouth every 6 (six) hours as needed.      insulin detemir (LEVEMIR) 100 unit/mL injection Inject 15 Units into the skin every evening. 4.5 mL 2    insulin needles, disposable, 31 x 3/16 " Ndle Use daily to inject insulin  DX:250.00 100 each 11    isosorbide mononitrate (IMDUR) 60 MG 24 hr tablet Take 60 mg by mouth.      lancets (ONE TOUCH DELICA LANCETS) 33 gauge Misc 1 lancet by Misc.(Non-Drug; Combo Route) route 4 (four) times daily. DX:250.00   Medically necessary to test blood sugar 200 each 6    loratadine (CLARITIN) 10 mg tablet Take 1 tablet (10 mg total) by mouth once daily. 30 tablet 0    losartan (COZAAR) 100 MG tablet Take 100 mg by mouth.      ondansetron (ZOFRAN-ODT) 4 MG TbDL Take 1 tablet (4 mg total) by mouth every 6 (six) hours as needed (nausea/vomiting). 20 tablet 0    oxyCODONE-acetaminophen (PERCOCET) 5-325 mg per tablet Take 1 tablet by mouth every 4 (four) hours as needed.      pioglitazone (ACTOS) 15 MG tablet Take 1 tablet by mouth once daily.      potassium chloride (KLOR-CON) 10 MEQ TbSR       sildenafiL (VIAGRA) 100 MG tablet TAKE ONE DAILY AS NEEDED      silver sulfADIAZINE 1% (SILVADENE) 1 % cream Apply topically.      sulfamethoxazole-trimethoprim 400-80mg (BACTRIM,SEPTRA) 400-80 mg per tablet Take 1 tablet by mouth 2 (two) times daily.      traMADoL (ULTRAM) 50 mg tablet Take 1 tablet (50 mg total) by mouth every 8 (eight) hours as needed for Pain. 9 tablet 0    TRUE METRIX AIR GLUCOSE METER [facility] USE TO CHECK GLUCOSE TWICE DAILY AS DIRECTED      TRUE METRIX GLUCOSE TEST STRIP Strp 1 strip 2 (two) times daily.      TRUEPLUS " "LANCETS 30 gauge Misc USE 1 TO CHECK GLUCOSE TWICE DAILY           Past Medical History:   Diagnosis Date    CHF (congestive heart failure)     COPD (chronic obstructive pulmonary disease)     Diabetes mellitus     Hyperlipidemia     Hypertension      Past Surgical History:   Procedure Laterality Date    APPENDECTOMY      CHOLECYSTECTOMY       Social History     Tobacco Use    Smoking status: Never    Smokeless tobacco: Never   Substance Use Topics    Alcohol use: No     Comment: socially    Drug use: No     Family History   Problem Relation Age of Onset    Hypertension Mother     Schizophrenia Father     Hypertension Father     Diabetes Father      Allergies: No Known Allergies     Review of Systems  Objective:   Vitals: Height 5' 9" (1.753 m), weight 90.7 kg (200 lb).      CT READ: Yes  Abnormal CT WM changes; cannot exclude early changes deep in L MCA territory.     Physical Exam  Vitals reviewed.           "

## 2022-12-03 NOTE — PROGRESS NOTES
Attempted MRI of the brain. Patient was AMS and confused. Patient was not following commands. Sent best available images.

## 2022-12-04 ENCOUNTER — CLINICAL SUPPORT (OUTPATIENT)
Dept: CARDIOLOGY | Facility: HOSPITAL | Age: 52
DRG: 064 | End: 2022-12-04
Attending: INTERNAL MEDICINE
Payer: MEDICAID

## 2022-12-04 ENCOUNTER — ANESTHESIA EVENT (OUTPATIENT)
Dept: INTENSIVE CARE | Facility: HOSPITAL | Age: 52
DRG: 064 | End: 2022-12-04
Payer: MEDICAID

## 2022-12-04 ENCOUNTER — ANESTHESIA (OUTPATIENT)
Dept: INTENSIVE CARE | Facility: HOSPITAL | Age: 52
DRG: 064 | End: 2022-12-04
Payer: MEDICAID

## 2022-12-04 LAB
ALBUMIN SERPL BCP-MCNC: 3.2 G/DL (ref 3.5–5.2)
ALBUMIN SERPL BCP-MCNC: 4 G/DL (ref 3.5–5.2)
ALP SERPL-CCNC: 102 U/L (ref 55–135)
ALP SERPL-CCNC: 116 U/L (ref 55–135)
ALT SERPL W/O P-5'-P-CCNC: 64 U/L (ref 10–44)
ALT SERPL W/O P-5'-P-CCNC: 71 U/L (ref 10–44)
AMPHET+METHAMPHET UR QL: NEGATIVE
ANION GAP SERPL CALC-SCNC: 13 MMOL/L (ref 8–16)
ANION GAP SERPL CALC-SCNC: 15 MMOL/L (ref 8–16)
ANION GAP SERPL CALC-SCNC: 17 MMOL/L (ref 8–16)
ANION GAP SERPL CALC-SCNC: 17 MMOL/L (ref 8–16)
APTT PPP: 25.1 SEC (ref 23.3–35.1)
AST SERPL-CCNC: 38 U/L (ref 10–40)
AST SERPL-CCNC: 57 U/L (ref 10–40)
BACTERIA #/AREA URNS HPF: NEGATIVE /HPF
BARBITURATES UR QL SCN>200 NG/ML: NEGATIVE
BASOPHILS # BLD AUTO: 0.07 K/UL (ref 0–0.2)
BASOPHILS # BLD AUTO: 0.07 K/UL (ref 0–0.2)
BASOPHILS NFR BLD: 0.7 % (ref 0–1.9)
BASOPHILS NFR BLD: 0.7 % (ref 0–1.9)
BENZODIAZ UR QL SCN>200 NG/ML: NEGATIVE
BILIRUB SERPL-MCNC: 3.1 MG/DL (ref 0.1–1)
BILIRUB SERPL-MCNC: 3.4 MG/DL (ref 0.1–1)
BILIRUB UR QL STRIP: NEGATIVE
BILIRUB UR QL STRIP: NEGATIVE
BUN SERPL-MCNC: 48 MG/DL (ref 6–20)
BUN SERPL-MCNC: 52 MG/DL (ref 6–20)
BZE UR QL SCN: NEGATIVE
CALCIUM SERPL-MCNC: 8.9 MG/DL (ref 8.7–10.5)
CALCIUM SERPL-MCNC: 8.9 MG/DL (ref 8.7–10.5)
CALCIUM SERPL-MCNC: 9.4 MG/DL (ref 8.7–10.5)
CALCIUM SERPL-MCNC: 9.4 MG/DL (ref 8.7–10.5)
CANNABINOIDS UR QL SCN: NEGATIVE
CHLORIDE SERPL-SCNC: 101 MMOL/L (ref 95–110)
CHLORIDE SERPL-SCNC: 101 MMOL/L (ref 95–110)
CHLORIDE SERPL-SCNC: 102 MMOL/L (ref 95–110)
CHLORIDE SERPL-SCNC: 103 MMOL/L (ref 95–110)
CHOLEST SERPL-MCNC: 181 MG/DL (ref 120–199)
CHOLEST/HDLC SERPL: 3.6 {RATIO} (ref 2–5)
CK MB SERPL-MCNC: 6.6 NG/ML (ref 0.1–6.5)
CLARITY UR: CLEAR
CLARITY UR: CLEAR
CO2 SERPL-SCNC: 22 MMOL/L (ref 23–29)
CO2 SERPL-SCNC: 23 MMOL/L (ref 23–29)
COLOR UR: COLORLESS
COLOR UR: COLORLESS
CREAT SERPL-MCNC: 4.9 MG/DL (ref 0.5–1.4)
CREAT SERPL-MCNC: 4.9 MG/DL (ref 0.5–1.4)
CREAT SERPL-MCNC: 5.1 MG/DL (ref 0.5–1.4)
CREAT SERPL-MCNC: 5.2 MG/DL (ref 0.5–1.4)
CREAT UR-MCNC: 34 MG/DL (ref 23–375)
DIFFERENTIAL METHOD: ABNORMAL
DIFFERENTIAL METHOD: ABNORMAL
EOSINOPHIL # BLD AUTO: 0 K/UL (ref 0–0.5)
EOSINOPHIL # BLD AUTO: 0 K/UL (ref 0–0.5)
EOSINOPHIL NFR BLD: 0.1 % (ref 0–8)
EOSINOPHIL NFR BLD: 0.2 % (ref 0–8)
ERYTHROCYTE [DISTWIDTH] IN BLOOD BY AUTOMATED COUNT: 14.5 % (ref 11.5–14.5)
ERYTHROCYTE [DISTWIDTH] IN BLOOD BY AUTOMATED COUNT: 14.6 % (ref 11.5–14.5)
EST. GFR  (NO RACE VARIABLE): 12.7 ML/MIN/1.73 M^2
EST. GFR  (NO RACE VARIABLE): 13 ML/MIN/1.73 M^2
EST. GFR  (NO RACE VARIABLE): 13.6 ML/MIN/1.73 M^2
EST. GFR  (NO RACE VARIABLE): 13.6 ML/MIN/1.73 M^2
ESTIMATED AVG GLUCOSE: 209 MG/DL (ref 68–131)
ETHANOL SERPL-MCNC: <5 MG/DL
GLUCOSE SERPL-MCNC: 251 MG/DL (ref 70–110)
GLUCOSE SERPL-MCNC: 256 MG/DL (ref 70–110)
GLUCOSE SERPL-MCNC: 256 MG/DL (ref 70–110)
GLUCOSE SERPL-MCNC: 266 MG/DL (ref 70–110)
GLUCOSE SERPL-MCNC: 299 MG/DL (ref 70–110)
GLUCOSE UR QL STRIP: ABNORMAL
GLUCOSE UR QL STRIP: ABNORMAL
HBA1C MFR BLD: 8.9 % (ref 4.5–6.2)
HCT VFR BLD AUTO: 39.9 % (ref 40–54)
HCT VFR BLD AUTO: 44.5 % (ref 40–54)
HDLC SERPL-MCNC: 50 MG/DL (ref 40–75)
HDLC SERPL: 27.6 % (ref 20–50)
HGB BLD-MCNC: 12.7 G/DL (ref 14–18)
HGB BLD-MCNC: 14.1 G/DL (ref 14–18)
HGB UR QL STRIP: ABNORMAL
HGB UR QL STRIP: ABNORMAL
HYALINE CASTS #/AREA URNS LPF: 1 /LPF
IMM GRANULOCYTES # BLD AUTO: 0.03 K/UL (ref 0–0.04)
IMM GRANULOCYTES # BLD AUTO: 0.03 K/UL (ref 0–0.04)
IMM GRANULOCYTES NFR BLD AUTO: 0.3 % (ref 0–0.5)
IMM GRANULOCYTES NFR BLD AUTO: 0.3 % (ref 0–0.5)
INR PPP: 1.3
KETONES UR QL STRIP: NEGATIVE
KETONES UR QL STRIP: NEGATIVE
LACTATE SERPL-SCNC: 3.2 MMOL/L (ref 0.5–1.9)
LDLC SERPL CALC-MCNC: 112.8 MG/DL (ref 63–159)
LEUKOCYTE ESTERASE UR QL STRIP: NEGATIVE
LEUKOCYTE ESTERASE UR QL STRIP: NEGATIVE
LYMPHOCYTES # BLD AUTO: 1.2 K/UL (ref 1–4.8)
LYMPHOCYTES # BLD AUTO: 1.8 K/UL (ref 1–4.8)
LYMPHOCYTES NFR BLD: 11.9 % (ref 18–48)
LYMPHOCYTES NFR BLD: 17.8 % (ref 18–48)
MAGNESIUM SERPL-MCNC: 2 MG/DL (ref 1.6–2.6)
MAGNESIUM SERPL-MCNC: 2 MG/DL (ref 1.6–2.6)
MCH RBC QN AUTO: 27.2 PG (ref 27–31)
MCH RBC QN AUTO: 27.4 PG (ref 27–31)
MCHC RBC AUTO-ENTMCNC: 31.7 G/DL (ref 32–36)
MCHC RBC AUTO-ENTMCNC: 31.8 G/DL (ref 32–36)
MCV RBC AUTO: 86 FL (ref 82–98)
MCV RBC AUTO: 86 FL (ref 82–98)
MICROSCOPIC COMMENT: NORMAL
MONOCYTES # BLD AUTO: 0.4 K/UL (ref 0.3–1)
MONOCYTES # BLD AUTO: 0.5 K/UL (ref 0.3–1)
MONOCYTES NFR BLD: 4.4 % (ref 4–15)
MONOCYTES NFR BLD: 4.6 % (ref 4–15)
MRSA SCREEN BY PCR: NEGATIVE
NEUTROPHILS # BLD AUTO: 7.5 K/UL (ref 1.8–7.7)
NEUTROPHILS # BLD AUTO: 8.5 K/UL (ref 1.8–7.7)
NEUTROPHILS NFR BLD: 76.6 % (ref 38–73)
NEUTROPHILS NFR BLD: 82.4 % (ref 38–73)
NITRITE UR QL STRIP: NEGATIVE
NITRITE UR QL STRIP: NEGATIVE
NONHDLC SERPL-MCNC: 131 MG/DL
NRBC BLD-RTO: 0 /100 WBC
NRBC BLD-RTO: 0 /100 WBC
OPIATES UR QL SCN: NEGATIVE
PCP UR QL SCN>25 NG/ML: NEGATIVE
PH UR STRIP: 6 [PH] (ref 5–8)
PH UR STRIP: 6 [PH] (ref 5–8)
PHOSPHATE SERPL-MCNC: 4.9 MG/DL (ref 2.7–4.5)
PLATELET # BLD AUTO: 141 K/UL (ref 150–450)
PLATELET # BLD AUTO: 224 K/UL (ref 150–450)
PLATELET BLD QL SMEAR: ABNORMAL
PMV BLD AUTO: 11.9 FL (ref 9.2–12.9)
PMV BLD AUTO: 12.4 FL (ref 9.2–12.9)
POTASSIUM SERPL-SCNC: 3.1 MMOL/L (ref 3.5–5.1)
POTASSIUM SERPL-SCNC: 3.2 MMOL/L (ref 3.5–5.1)
POTASSIUM SERPL-SCNC: 3.2 MMOL/L (ref 3.5–5.1)
POTASSIUM SERPL-SCNC: 3.5 MMOL/L (ref 3.5–5.1)
PROT SERPL-MCNC: 6.5 G/DL (ref 6–8.4)
PROT SERPL-MCNC: 7.6 G/DL (ref 6–8.4)
PROT UR QL STRIP: ABNORMAL
PROT UR QL STRIP: ABNORMAL
PROTHROMBIN TIME: 15.7 SEC (ref 11.4–13.7)
PTH-INTACT SERPL-MCNC: 496.8 PG/ML (ref 9–77)
RBC # BLD AUTO: 4.64 M/UL (ref 4.6–6.2)
RBC # BLD AUTO: 5.18 M/UL (ref 4.6–6.2)
RBC #/AREA URNS HPF: 1 /HPF (ref 0–4)
SODIUM SERPL-SCNC: 139 MMOL/L (ref 136–145)
SODIUM SERPL-SCNC: 139 MMOL/L (ref 136–145)
SODIUM SERPL-SCNC: 140 MMOL/L (ref 136–145)
SODIUM SERPL-SCNC: 140 MMOL/L (ref 136–145)
SP GR UR STRIP: 1.01 (ref 1–1.03)
SP GR UR STRIP: 1.01 (ref 1–1.03)
SQUAMOUS #/AREA URNS HPF: 1 /HPF
TOXICOLOGY INFORMATION: NORMAL
TRIGL SERPL-MCNC: 91 MG/DL (ref 30–150)
TROPONIN I SERPL HS-MCNC: 463 PG/ML (ref 0–14.9)
TROPONIN I SERPL HS-MCNC: 617.7 PG/ML (ref 0–14.9)
URN SPEC COLLECT METH UR: ABNORMAL
URN SPEC COLLECT METH UR: ABNORMAL
UROBILINOGEN UR STRIP-ACNC: NEGATIVE EU/DL
UROBILINOGEN UR STRIP-ACNC: NEGATIVE EU/DL
WBC # BLD AUTO: 10.3 K/UL (ref 3.9–12.7)
WBC # BLD AUTO: 9.84 K/UL (ref 3.9–12.7)
WBC #/AREA URNS HPF: 0 /HPF (ref 0–5)

## 2022-12-04 PROCEDURE — 25000003 PHARM REV CODE 250: Performed by: INTERNAL MEDICINE

## 2022-12-04 PROCEDURE — 93005 ELECTROCARDIOGRAM TRACING: CPT | Performed by: INTERNAL MEDICINE

## 2022-12-04 PROCEDURE — 84484 ASSAY OF TROPONIN QUANT: CPT | Mod: 91 | Performed by: INTERNAL MEDICINE

## 2022-12-04 PROCEDURE — 83735 ASSAY OF MAGNESIUM: CPT | Mod: 91 | Performed by: INTERNAL MEDICINE

## 2022-12-04 PROCEDURE — 93010 EKG 12-LEAD: ICD-10-PCS | Mod: ,,, | Performed by: INTERNAL MEDICINE

## 2022-12-04 PROCEDURE — 99223 1ST HOSP IP/OBS HIGH 75: CPT | Mod: ,,, | Performed by: INTERNAL MEDICINE

## 2022-12-04 PROCEDURE — 82077 ASSAY SPEC XCP UR&BREATH IA: CPT | Performed by: INTERNAL MEDICINE

## 2022-12-04 PROCEDURE — 80307 DRUG TEST PRSMV CHEM ANLYZR: CPT | Performed by: EMERGENCY MEDICINE

## 2022-12-04 PROCEDURE — 97530 THERAPEUTIC ACTIVITIES: CPT

## 2022-12-04 PROCEDURE — 27000221 HC OXYGEN, UP TO 24 HOURS

## 2022-12-04 PROCEDURE — 96375 TX/PRO/DX INJ NEW DRUG ADDON: CPT

## 2022-12-04 PROCEDURE — 51702 INSERT TEMP BLADDER CATH: CPT

## 2022-12-04 PROCEDURE — 63600175 PHARM REV CODE 636 W HCPCS: Performed by: INTERNAL MEDICINE

## 2022-12-04 PROCEDURE — 80053 COMPREHEN METABOLIC PANEL: CPT | Performed by: FAMILY MEDICINE

## 2022-12-04 PROCEDURE — 85730 THROMBOPLASTIN TIME PARTIAL: CPT | Performed by: FAMILY MEDICINE

## 2022-12-04 PROCEDURE — 36415 COLL VENOUS BLD VENIPUNCTURE: CPT | Performed by: INTERNAL MEDICINE

## 2022-12-04 PROCEDURE — 36415 COLL VENOUS BLD VENIPUNCTURE: CPT | Performed by: FAMILY MEDICINE

## 2022-12-04 PROCEDURE — 87040 BLOOD CULTURE FOR BACTERIA: CPT | Performed by: FAMILY MEDICINE

## 2022-12-04 PROCEDURE — 63600175 PHARM REV CODE 636 W HCPCS: Performed by: FAMILY MEDICINE

## 2022-12-04 PROCEDURE — 92610 EVALUATE SWALLOWING FUNCTION: CPT

## 2022-12-04 PROCEDURE — 36410 VNPNXR 3YR/> PHY/QHP DX/THER: CPT

## 2022-12-04 PROCEDURE — 83605 ASSAY OF LACTIC ACID: CPT | Performed by: FAMILY MEDICINE

## 2022-12-04 PROCEDURE — 85025 COMPLETE CBC W/AUTO DIFF WBC: CPT | Performed by: INTERNAL MEDICINE

## 2022-12-04 PROCEDURE — 83735 ASSAY OF MAGNESIUM: CPT | Performed by: INTERNAL MEDICINE

## 2022-12-04 PROCEDURE — 84484 ASSAY OF TROPONIN QUANT: CPT | Performed by: FAMILY MEDICINE

## 2022-12-04 PROCEDURE — 97166 OT EVAL MOD COMPLEX 45 MIN: CPT

## 2022-12-04 PROCEDURE — 99223 PR INITIAL HOSPITAL CARE,LEVL III: ICD-10-PCS | Mod: ,,, | Performed by: INTERNAL MEDICINE

## 2022-12-04 PROCEDURE — 80053 COMPREHEN METABOLIC PANEL: CPT | Mod: 91 | Performed by: INTERNAL MEDICINE

## 2022-12-04 PROCEDURE — 87040 BLOOD CULTURE FOR BACTERIA: CPT | Mod: 59 | Performed by: INTERNAL MEDICINE

## 2022-12-04 PROCEDURE — 82553 CREATINE MB FRACTION: CPT | Performed by: FAMILY MEDICINE

## 2022-12-04 PROCEDURE — 83036 HEMOGLOBIN GLYCOSYLATED A1C: CPT | Performed by: INTERNAL MEDICINE

## 2022-12-04 PROCEDURE — 83970 ASSAY OF PARATHORMONE: CPT | Performed by: INTERNAL MEDICINE

## 2022-12-04 PROCEDURE — 20000000 HC ICU ROOM

## 2022-12-04 PROCEDURE — 99900035 HC TECH TIME PER 15 MIN (STAT)

## 2022-12-04 PROCEDURE — C1751 CATH, INF, PER/CENT/MIDLINE: HCPCS

## 2022-12-04 PROCEDURE — 84100 ASSAY OF PHOSPHORUS: CPT | Performed by: FAMILY MEDICINE

## 2022-12-04 PROCEDURE — 94761 N-INVAS EAR/PLS OXIMETRY MLT: CPT

## 2022-12-04 PROCEDURE — 96376 TX/PRO/DX INJ SAME DRUG ADON: CPT

## 2022-12-04 PROCEDURE — 85610 PROTHROMBIN TIME: CPT | Performed by: FAMILY MEDICINE

## 2022-12-04 PROCEDURE — 81001 URINALYSIS AUTO W/SCOPE: CPT | Performed by: INTERNAL MEDICINE

## 2022-12-04 PROCEDURE — 80061 LIPID PANEL: CPT | Performed by: INTERNAL MEDICINE

## 2022-12-04 PROCEDURE — 93010 ELECTROCARDIOGRAM REPORT: CPT | Mod: ,,, | Performed by: INTERNAL MEDICINE

## 2022-12-04 RX ORDER — IBUPROFEN 200 MG
16 TABLET ORAL
Status: DISCONTINUED | OUTPATIENT
Start: 2022-12-04 | End: 2022-12-08 | Stop reason: HOSPADM

## 2022-12-04 RX ORDER — CALCITRIOL 0.25 UG/1
0.25 CAPSULE ORAL DAILY
Status: DISCONTINUED | OUTPATIENT
Start: 2022-12-04 | End: 2022-12-08 | Stop reason: HOSPADM

## 2022-12-04 RX ORDER — GLUCAGON 1 MG
1 KIT INJECTION
Status: DISCONTINUED | OUTPATIENT
Start: 2022-12-04 | End: 2022-12-08 | Stop reason: HOSPADM

## 2022-12-04 RX ORDER — METOPROLOL TARTRATE 1 MG/ML
INJECTION, SOLUTION INTRAVENOUS
Status: DISPENSED
Start: 2022-12-04 | End: 2022-12-04

## 2022-12-04 RX ORDER — DEXMEDETOMIDINE HYDROCHLORIDE 4 UG/ML
0-1.4 INJECTION, SOLUTION INTRAVENOUS CONTINUOUS
Status: DISCONTINUED | OUTPATIENT
Start: 2022-12-04 | End: 2022-12-08 | Stop reason: HOSPADM

## 2022-12-04 RX ORDER — IBUPROFEN 200 MG
24 TABLET ORAL
Status: DISCONTINUED | OUTPATIENT
Start: 2022-12-04 | End: 2022-12-08 | Stop reason: HOSPADM

## 2022-12-04 RX ORDER — METOPROLOL TARTRATE 1 MG/ML
5 INJECTION, SOLUTION INTRAVENOUS EVERY 6 HOURS
Status: DISCONTINUED | OUTPATIENT
Start: 2022-12-04 | End: 2022-12-08 | Stop reason: HOSPADM

## 2022-12-04 RX ORDER — ATORVASTATIN CALCIUM 40 MG/1
80 TABLET, FILM COATED ORAL NIGHTLY
Status: DISCONTINUED | OUTPATIENT
Start: 2022-12-04 | End: 2022-12-08 | Stop reason: HOSPADM

## 2022-12-04 RX ADMIN — METOPROLOL TARTRATE 5 MG: 1 INJECTION, SOLUTION INTRAVENOUS at 05:12

## 2022-12-04 RX ADMIN — INSULIN DETEMIR 15 UNITS: 100 INJECTION, SOLUTION SUBCUTANEOUS at 10:12

## 2022-12-04 RX ADMIN — DEXMEDETOMIDINE HYDROCHLORIDE 0.8 MCG/KG/HR: 400 INJECTION INTRAVENOUS at 02:12

## 2022-12-04 RX ADMIN — FUROSEMIDE 60 MG: 10 INJECTION, SOLUTION INTRAVENOUS at 10:12

## 2022-12-04 RX ADMIN — METOPROLOL TARTRATE 5 MG: 1 INJECTION, SOLUTION INTRAVENOUS at 10:12

## 2022-12-04 RX ADMIN — FUROSEMIDE 60 MG: 10 INJECTION, SOLUTION INTRAVENOUS at 05:12

## 2022-12-04 NOTE — ANESTHESIA PREPROCEDURE EVALUATION
12/04/2022  Spencer Burton Jr. is a 52 y.o., male.    Patient Active Problem List   Diagnosis    Hypertension, poor control    Type 2 diabetes mellitus, with long-term current use of insulin    Diabetes mellitus with chronic kidney disease    Kidney disease, chronic, stage III (GFR 30-59 ml/min)    Elevated troponin    Combined systolic and diastolic heart failure    Hypertension    Hypertensive cardiomyopathy    Conjunctival hemorrhage of right eye    Finding of cocaine in blood    Hematoma of leg    Retinal hemorrhage of right eye    Chest pain at rest    Hypokalemia    Hypertension associated with diabetes    Dyslipidemia associated with type 2 diabetes mellitus    Malignant hypertension (arteriolar nephrosclerosis), stage 1-4 or unspecified chronic kidney disease    CKD (chronic kidney disease), stage IV    Secondary hyperparathyroidism    Right foot pain    Acute on chronic congestive heart failure    COPD (chronic obstructive pulmonary disease)    Anemia    Acute ischemic left MCA stroke    Encephalopathy, metabolic       Past Surgical History:   Procedure Laterality Date    APPENDECTOMY      CHOLECYSTECTOMY          Tobacco Use:  The patient  reports that he has never smoked. He has never used smokeless tobacco.     Results for orders placed or performed during the hospital encounter of 12/03/22   EKG 12-lead    Collection Time: 12/04/22  3:35 AM    Narrative    Test Reason : R07.9,    Vent. Rate : 099 BPM     Atrial Rate : 099 BPM     P-R Int : 142 ms          QRS Dur : 096 ms      QT Int : 376 ms       P-R-T Axes : 067 -21 018 degrees     QTc Int : 482 ms    Normal sinus rhythm  Possible Left atrial enlargement  Low voltage QRS  Septal infarct (cited on or before 02-NOV-2021)  Abnormal ECG  When compared with ECG of 03-DEC-2022 14:16,  The axis Shifted right  Questionable  change in initial forces of Anterior leads  Nonspecific T wave abnormality now evident in Inferior leads    Referred By: [email or web address]   SELF           Confirmed By:         Imaging Results          US Abdomen Limited (Final result)  Result time 12/03/22 21:12:08    Final result by Des Glass MD (12/03/22 21:12:08)                 Narrative:    EXAM DESCRIPTION:  US ABDOMEN LIMITED    CLINICAL HISTORY:  52 years  Male  hyperbiliruninemia    COMPARISON:  None    TECHNIQUE:  Ultrasound of the right upper quadrant was performed.      FINDINGS:    Liver: The liver is not enlarged and is within normal limits in echogenicity. 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    Gallbladder/bile ducts: Absent. The common bile duct measures up to 0.2cm.    Pancreas: Visualized portions of the pancreatic head, neck and proximal body are unremarkable without ductal dilation. The remaining body and tail are obscured by overlying bowel gas and cannot be visualized.    Right kidney: Measures 8.9 x 5.2 x 4.8cm. No hydronephrosis or conspicuous concerning lesion. No shadowing calculi. Simple cyst of the interpolar region measures 0.7 x 0.7 x 0.8 cm and requires no follow-up.    Aorta: No aortic aneurysm proximally. The mid and distal aorta are obscured.    IVC: Unremarkable.    Other: No ascites. Right pleural effusion.    IMPRESSION:  1. Absent blood flow/occluded main portal vein.  2. Right pleural effusion.    Electronically signed by:  Des Glass MD  12/3/2022 9:12 PM CST Workstation: [id number]                             MRI Brain Without Contrast (Final result)  Result time 12/03/22 16:20:17    Final result by Des Alicea MD (12/03/22 16:20:17)                 Narrative:    REASON: Stroke, follow up    TECHNIQUE: Multiplanar and multi sequential MRI of the brain, without IV contrast.    COMPARISON: None    FINDINGS:    Movement artifact limits evaluation. No intracranial hemorrhage observed. The ventricles are midline.  There are mild involutional changes of the parenchyma with ex vacuo dilatation of the ventricles. There are multiple areas of restricted diffusion involving the bilateral cerebral hemispheres, left basal ganglia and left cerebellar hemisphere. There is no mass effect or midline shift of structures. Periventricular and deep white matter FLAIR and T2 hyperintensities are noted, consistent with changes of chronic low vessel ischemic disease.    IMPRESSION:    Multifocal areas of restricted diffusion are felt to reflect acute infarcts. Findings reported to Dr. Natalie Silva at 4:18 PM.    Electronically signed by:  Des Alicea DO  12/3/2022 4:20 PM CST Workstation: [id number]                             CT HEAD FOR STROKE (Final result)  Result time 12/03/22 14:21:20   Procedure changed from CT Head Without Contrast     Final result by Des Alicea MD (12/03/22 14:21:20)                 Narrative:    CMS MANDATED QUALITY DATA - CT RADIATION - 436    All CT scans at this facility utilize dose modulation, iterative reconstruction, and/or weight based dosing when appropriate to reduce radiation dose to as low as reasonably achievable.        Reason: Neuro deficit, acute, stroke suspected STROKE PROTOCOL    TECHNIQUE: Head CT without IV contrast.    COMPARISON: None    FINDINGS:    There is loss of gray-white matter distinction involving the left basal ganglia. There is no mass effect or midline shift. Periventricular and deep white matter hypoattenuation noted felt to reflect changes of chronic small vessel ischemic disease. No intracranial hemorrhage.    The ventricles and cisterns are maintained.    Calvarium is intact. Visualized sinuses are clear.    IMPRESSION:    Loss of gray-white matter distinction in involving the left basal ganglia could reflect changes of chronic small vessel ischemic disease versus cytotoxic edema from acute infarct. Further evaluation with MRI recommended. Findings reported to Dr. Huber  at 2:21 PM  Periventricular deep white matter changes of chronic small vessel ischemic disease.        Electronically signed by:  Des Alicea DO  12/3/2022 2:21 PM CST Workstation: [id number]                               Lab Results   Component Value Date    WBC 10.30 12/04/2022    HGB 12.7 (L) 12/04/2022    HCT 39.9 (L) 12/04/2022    MCV 86 12/04/2022     12/04/2022     BMP  Lab Results   Component Value Date     12/04/2022    K 3.5 12/04/2022     12/04/2022    CO2 23 12/04/2022    BUN 52 (H) 12/04/2022    CREATININE 5.2 (H) 12/04/2022    CALCIUM 8.9 12/04/2022    ANIONGAP 13 12/04/2022     (H) 12/04/2022     (H) 12/04/2022     (H) 12/04/2022       Results for orders placed during the hospital encounter of 11/13/22    Echo    Interpretation Summary  · The left ventricle is normal in size with mildly decreased systolic function.  · The estimated ejection fraction is 40%.  · Grade III left ventricular diastolic dysfunction.  · Small posterior pericardial effusion.  · There is mild left ventricular global hypokinesis.  · Normal right ventricular size with normal right ventricular systolic function.  · Severe left atrial enlargement.  · Moderate right atrial enlargement.  · Mild pulmonic regurgitation.  · Mild to moderate tricuspid regurgitation.  · Mild-to-moderate aortic regurgitation.  · Intermediate central venous pressure (8 mmHg).  · The estimated PA systolic pressure is 51 mmHg.  · There is pulmonary hypertension.            Pre-op Assessment    I have reviewed the Patient Summary Reports.     I have reviewed the Nursing Notes. I have reviewed the NPO Status.   I have reviewed the Medications.     Review of Systems  Anesthesia Hx:  No problems with previous Anesthesia  Denies Family Hx of Anesthesia complications.   Denies Personal Hx of Anesthesia complications.   Social:  Non-Smoker    Hematology/Oncology:  Hematology Normal        EENT/Dental:EENT/Dental Normal    Cardiovascular:   Hypertension CHF    Pulmonary:   COPD    Renal/:   Chronic Renal Disease, CKD    Hepatic/GI:  Hepatic/GI Normal    Musculoskeletal:  Musculoskeletal Normal    Neurological:   CVA    Endocrine:   Diabetes    Psych:  Psychiatric Normal           Physical Exam  General: Well nourished    Airway:  Mallampati: II   Mouth Opening: Normal  TM Distance: Normal  Tongue: Normal  Neck ROM: Normal ROM    Dental:  Intact    Chest/Lungs:  Clear to auscultation, Normal Respiratory Rate    Heart:  Rate: Normal  Rhythm: Regular Rhythm        Anesthesia Plan  Type of Anesthesia, risks & benefits discussed:    Anesthesia Type: MAC  Intra-op Monitoring Plan: Standard ASA Monitors  Post Op Pain Control Plan: IV/PO Opioids PRN  (medical reason for not using multimodal pain management)  Informed Consent: Informed consent signed with the Patient and all parties understand the risks and agree with anesthesia plan.  All questions answered.   ASA Score: 3  Anesthesia Plan Notes: MAC with propofol    Ready For Surgery From Anesthesia Perspective.     .

## 2022-12-04 NOTE — CARE UPDATE
12/04/22 0757   PRE-TX-O2   O2 Device (Oxygen Therapy) nasal cannula   $ Is the patient on Low Flow Oxygen? Yes   Flow (L/min) 3   SpO2 98 %   Pulse Oximetry Type Continuous   $ Pulse Oximetry - Multiple Charge Pulse Oximetry - Multiple   Pulse 95   Resp 18   Respiratory Evaluation   $ Care Plan Tech Time 15 min

## 2022-12-04 NOTE — PLAN OF CARE
Plan of Care and Education reviewed with Patient (when alert) and Family ( Mother and Sister). Verbalization of understanding expressed. Modifiable Risk factors for Ischemic CVA's discussed with family. History of Tobacco and etoh use denied by patient and confirmed by family. Pt's increase in agitation effectively controlled with the addition of precidex. Consents signed by patient's mother for bedside RAGHAV planned for 12/5. Bed locked in low position. Call light within reach. Pt sitter to remain at bedside along with activation of bed alarm at all times. Will continue to monitor.   Problem: Adult Inpatient Plan of Care  Goal: Plan of Care Review  Outcome: Ongoing, Progressing  Goal: Patient-Specific Goal (Individualized)  Outcome: Ongoing, Progressing  Goal: Absence of Hospital-Acquired Illness or Injury  Outcome: Ongoing, Progressing  Goal: Optimal Comfort and Wellbeing  Outcome: Ongoing, Progressing  Goal: Readiness for Transition of Care  Outcome: Ongoing, Progressing     Problem: Diabetes Comorbidity  Goal: Blood Glucose Level Within Targeted Range  Outcome: Ongoing, Progressing     Problem: Fall Injury Risk  Goal: Absence of Fall and Fall-Related Injury  Outcome: Ongoing, Progressing     Problem: Infection  Goal: Absence of Infection Signs and Symptoms  Outcome: Ongoing, Progressing     Problem: Adjustment to Illness (Stroke, Ischemic/Transient Ischemic Attack)  Goal: Optimal Coping  Outcome: Ongoing, Progressing     Problem: Bowel Elimination Impaired (Stroke, Ischemic/Transient Ischemic Attack)  Goal: Effective Bowel Elimination  Outcome: Ongoing, Progressing     Problem: Cerebral Tissue Perfusion (Stroke, Ischemic/Transient Ischemic Attack)  Goal: Optimal Cerebral Tissue Perfusion  Outcome: Ongoing, Progressing     Problem: Cognitive Impairment (Stroke, Ischemic/Transient Ischemic Attack)  Goal: Optimal Cognitive Function  Outcome: Ongoing, Progressing     Problem: Communication Impairment (Stroke,  Ischemic/Transient Ischemic Attack)  Goal: Improved Communication Skills  Outcome: Ongoing, Progressing     Problem: Functional Ability Impaired (Stroke, Ischemic/Transient Ischemic Attack)  Goal: Optimal Functional Ability  Outcome: Ongoing, Progressing     Problem: Respiratory Compromise (Stroke, Ischemic/Transient Ischemic Attack)  Goal: Effective Oxygenation and Ventilation  Outcome: Ongoing, Progressing     Problem: Sensorimotor Impairment (Stroke, Ischemic/Transient Ischemic Attack)  Goal: Improved Sensorimotor Function  Outcome: Ongoing, Progressing     Problem: Swallowing Impairment (Stroke, Ischemic/Transient Ischemic Attack)  Goal: Optimal Eating and Swallowing without Aspiration  Outcome: Ongoing, Progressing     Problem: Urinary Elimination Impaired (Stroke, Ischemic/Transient Ischemic Attack)  Goal: Effective Urinary Elimination  Outcome: Ongoing, Progressing     Problem: Skin Injury Risk Increased  Goal: Skin Health and Integrity  Outcome: Ongoing, Progressing     Problem: Adjustment to Illness (Delirium)  Goal: Optimal Coping  Outcome: Ongoing, Progressing     Problem: Altered Behavior (Delirium)  Goal: Improved Behavioral Control  Outcome: Ongoing, Progressing     Problem: Sleep Disturbance (Delirium)  Goal: Improved Sleep  Outcome: Ongoing, Progressing

## 2022-12-04 NOTE — NURSING
"Ultrasound tech at bedside to attempt carotid ultrasound. Patient uncooperative, rolling around in bed, trying to pull probe away from neck, and saying "stop". Ultrasound tech will attempt again in the AM.  "

## 2022-12-04 NOTE — PLAN OF CARE
[facility]  Initial Discharge Assessment       Primary Care Provider: Primary Doctor No    Admission Diagnosis: Stroke [I63.9]    Admission Date: 12/3/2022  Expected Discharge Date:          Payor: MEDICAID / Plan: [place] [employer] / Product Type: Managed Medicaid /     Extended Emergency Contact Information  Primary Emergency Contact: joy webster  Mobile Phone: [phone]  Relation: Mother  Preferred language: English   needed? No    Discharge Plan A: Rehab  Discharge Plan B: Rehab      [facility] [address] [place] [address] [address]  [address] [facility] [address]  Phone: [phone] Fax: [phone]      Initial Assessment (most recent)       Adult Discharge Assessment - 12/04/22 1340          Discharge Assessment    Assessment Type Discharge Planning Assessment     Confirmed/corrected address, phone number and insurance Yes     Confirmed Demographics Correct on Facesheet     Source of Information health record     Reason For Admission Acute Ischemic Left MCA stroke     Lives With parent(s);sibling(s)     Facility Arrived From: home     Do you expect to return to your current living situation? Yes     Do you have help at home or someone to help you manage your care at home? Yes     Who are your caregiver(s) and their phone number(s)? joy webster (Mother)   [phone] (Mobile)     Prior to hospitilization cognitive status: Alert/Oriented     Current cognitive status: Unable to Assess     Walking or Climbing Stairs Difficulty ambulation difficulty, requires equipment     Mobility Management crutches and rolling walker     Dressing/Bathing Difficulty bathing difficulty, requires equipment     Dressing/Bathing Management bedside commode     Equipment Currently Used at Home bedside commode;walker, rolling;crutches     Readmission within 30 days? No     Patient currently being followed by outpatient case management? No     Do you currently have service(s) that  help you manage your care at home? No     Do you take prescription medications? Yes     Do you have prescription coverage? Yes     [place]age [place] [employer]- [place]     Do you have any problems affording any of your prescribed medications? TBD     Is the patient taking medications as prescribed? yes     Who is going to help you get home at discharge? joy webster (Mother)   [phone] (Mobile)     How do you get to doctors appointments? family or friend will provide;car, drives self     Are you on dialysis? No     Do you take coumadin? No     Discharge Plan A Rehab     Discharge Plan B Rehab     DME Needed Upon Discharge  none        Physical Activity    On average, how many days per week do you engage in moderate to strenuous exercise (like a brisk walk)? Patient refused     On average, how many minutes do you engage in exercise at this level? Patient refused        Financial Resource Strain    How hard is it for you to pay for the very basics like food, housing, medical care, and heating? Patient refused        Housing Stability    In the last 12 months, was there a time when you were not able to pay the mortgage or rent on time? Patient refused     In the last 12 months, was there a time when you did not have a steady place to sleep or slept in a shelter (including now)? Patient refused        Transportation Needs    In the past 12 months, has lack of transportation kept you from medical appointments or from getting medications? Patient refused     In the past 12 months, has lack of transportation kept you from meetings, work, or from getting things needed for daily living? Patient refused        Food Insecurity    Within the past 12 months, you worried that your food would run out before you got the money to buy more. Patient refused     Within the past 12 months, the food you bought just didn't last and you didn't have money to get more. Patient refused        Stress    Do you feel stress - tense, restless,  nervous, or anxious, or unable to sleep at night because your mind is troubled all the time - these days? Patient refused        Social Connections    In a typical week, how many times do you talk on the phone with family, friends, or neighbors? Patient refused     How often do you get together with friends or relatives? Patient refused     How often do you attend Anabaptist or Alevism services? Patient refused     Do you belong to any clubs or organizations such as Anabaptist groups, unions, fraternal or athletic groups, or school groups? Patient refused     How often do you attend meetings of the clubs or organizations you belong to? Patient refused     Are you , , , , never , or living with a partner? Patient refused        Alcohol Use    Q1: How often do you have a drink containing alcohol? Patient refused     Q2: How many drinks containing alcohol do you have on a typical day when you are drinking? Patient refused     Q3: How often do you have six or more drinks on one occasion? Patient refused

## 2022-12-04 NOTE — PT/OT/SLP EVAL
Occupational Therapy   Evaluation    Name: Spencer Burton Jr.  MRN: [id number]  Admitting Diagnosis:  Acute ischemic left MCA stroke  Recent Surgery: * No surgery found *      Recommendations:     Discharge Recommendations: rehabilitation facility  Discharge Equipment Recommendations:   (TBD)  Barriers to discharge:  Decreased caregiver support    Assessment:     Spencer Burton Jr. is a 52 y.o. male with a medical diagnosis of Acute ischemic left MCA stroke.  He presents with general weakness, confusion and expressive aphasia. Performance deficits affecting function: weakness, impaired endurance, impaired sensation, impaired self care skills, impaired functional mobility, gait instability, impaired balance, impaired cognition, decreased safety awareness.      Rehab Prognosis: Good; patient would benefit from acute skilled OT services to address these deficits and reach maximum level of function.       Plan:     Patient to be seen 6 x/week to address the above listed problems via self-care/home management, therapeutic activities, therapeutic exercises  Plan of Care Expires: 01/04/23  Plan of Care Reviewed with: patient    Subjective     Chief Complaint: general weakness, confusion and expressive aphasia  Patient/Family Comments/goals: improved cognition, speech and functional mobility.    Occupational Profile:  Equipment Used at Home:  crutches, walker, rolling, bedside commode    Pain/Comfort:  Pain Rating 1: 0/10  Pain Rating Post-Intervention 1: 0/10    Patients cultural, spiritual, Taoist conflicts given the current situation: no    Objective:     Communicated with: nurse prior to session.  Patient found HOB elevated with telemetry, peripheral IV upon OT entry to room.    General Precautions: Standard, fall   Orthopedic Precautions:    Braces: N/A  Respiratory Status: Nasal cannula, flow 3 L/min    Occupational Performance:    Bed Mobility:    Patient completed Scooting/Bridging with minimum assistance  Patient  completed Supine to Sit with minimum assistance  Patient completed Sit to Supine with minimum assistance  Perform unsupported sitting EOB with contact guard assistance.     Functional Mobility/Transfers:  Patient completed Sit <> Stand x1 Trial with contact guard assistance using hand-held assist     Cognitive/Visual Perceptual:  Cognitive/Psychosocial Skills:     -       Oriented to: Person   -       Follows Commands/attention:inconsistently followed 1 step commands, unable to maintain alertness, eyes closed most of session  -       Communication: expressive aphasia  -       Memory: Poor immediate recall  -       Safety awareness/insight to disability: impaired   -       Mood/Affect/Coping skills/emotional control: Flat affect  Visual/Perceptual:      -unable to assess secondary to confusion      Physical Exam:  Balance:    -       Sitting: Contact Guard, Standing: Contact Guard  Upper Extremity Range of Motion:     -       Right Upper Extremity: WFL  -       Left Upper Extremity: WFL  Upper Extremity Strength:    -       Right Upper Extremity: 4/5  -       Left Upper Extremity: 4/5   Strength:    -       Right Upper Extremity: fair  -       Left Upper Extremity: fair  Fine Motor Coordination:    -       Intact    AMPAC 6 Click ADL:  AMPAC Total Score: 12    Treatment & Education:  Patient educated on the purpose of Occupational Therapy and the importance of getting OOB.    Patient left HOB elevated with all lines intact, call button in reach, bed alarm on, and sitter present    GOALS:   Multidisciplinary Problems       Occupational Therapy Goals          Problem: Occupational Therapy    Goal Priority Disciplines Outcome Interventions   Occupational Therapy Goal     OT, PT/OT     Description: Goals to be met by: 1/4/2022     Patient will increase functional independence with ADLs by performing:    UE Dressing with Stand-by Assistance.  Grooming while standing at sink with Stand-by Assistance.  Toileting from  toilet with Stand-by Assistance for hygiene and clothing management.   Supine to sit with Stand-by Assistance.  Toilet transfer to toilet with Stand-by Assistance.  Upper extremity exercise program x10 reps per handout, with assistance as needed.  Attend to ADL task for 5 minutes with minimal verbal/tactile cues.                         History:     Past Medical History:   Diagnosis Date    CHF (congestive heart failure)     COPD (chronic obstructive pulmonary disease)     Diabetes mellitus     Hyperlipidemia     Hypertension          Past Surgical History:   Procedure Laterality Date    APPENDECTOMY      CHOLECYSTECTOMY         Time Tracking:     OT Date of Treatment: 12/04/22  OT Start Time: 0831  OT Stop Time: 0848  OT Total Time (min): 17 min    Billable Minutes:Evaluation 5  Therapeutic Activity 12    12/4/2022

## 2022-12-04 NOTE — CONSULTS
[facility]  Department of Cardiology  Consult Note      PATIENT NAME: Spencer Burton Jr.  MRN: [id number]  TODAY'S DATE: 12/04/2022  ADMIT DATE: 12/3/2022                          CONSULT REQUESTED BY: Yandy Guerra MD    SUBJECTIVE     PRINCIPAL PROBLEM: Acute ischemic left MCA stroke      REASON FOR CONSULT:  Elevated Troponin      HPI:      HPI Supplemented from nurse and Chart.  Patient lying in bed with restraints on nasal cannula not very cooperative, confused..  MRI brain done showed embolic infarcts in the left corona radiata, left frontal lobe, right frontoparietal lobe.      FROM H AND P  HISTORY OF PRESENT ILLNESS:   Spencer Burton Jr. is a 52 y.o. Black or  male with known history of CHF presented with right sided weakness. He was noticed stumbling in gas station so the staff there called EMS and he was brought to the ER for evaluation. CT scan and MRI consistent with bilateral cerebral hemispheres, left basal ganglia and left cerebellar hemisphere. Tele-stroke consulted and TPA was not given due to waxing and waning symptoms and elevated blood pressures. Hospital medicine consulted for medical management. During my evaluation, patient lethargic and confused and not able to follow commands and provide any reliable information.               Review of patient's allergies indicates:  No Known Allergies    Past Medical History:   Diagnosis Date    CHF (congestive heart failure)     COPD (chronic obstructive pulmonary disease)     Diabetes mellitus     Hyperlipidemia     Hypertension      Past Surgical History:   Procedure Laterality Date    APPENDECTOMY      CHOLECYSTECTOMY       Social History     Tobacco Use    Smoking status: Never    Smokeless tobacco: Never   Substance Use Topics    Alcohol use: No     Comment: socially    Drug use: No        REVIEW OF SYSTEMS      Unable to Assess  OBJECTIVE     VITAL SIGNS (Most Recent)  Temp: 98 °F (36.7 °C) (12/04/22 1101)  Pulse: 86  (12/04/22 1301)  Resp: (!) 36 (12/04/22 1301)  BP: (!) 180/132 (12/04/22 1301)  SpO2: (!) 94 % (12/04/22 1301)    VENTILATION STATUS  Resp: (!) 36 (12/04/22 1301)  SpO2: (!) 94 % (12/04/22 1301)       I & O (Last 24H):  Intake/Output Summary (Last 24 hours) at 12/4/2022 1315  Last data filed at 12/4/2022 0556  Gross per 24 hour   Intake 0 ml   Output 965 ml   Net -965 ml       WEIGHTS  Wt Readings from Last 3 Encounters:   12/03/22 2121 88 kg (194 lb 0.1 oz)   12/03/22 1345 90.7 kg (200 lb)   11/14/22 0900 90.7 kg (200 lb)   11/14/22 0058 90.8 kg (200 lb 2.8 oz)   11/13/22 2127 86.2 kg (190 lb)   11/06/22 1412 86.2 kg (190 lb)       PHYSICAL EXAM    Patient is not waking up much not following commands not really cooperating for exam  Patient seems to be moving all extremities.  Lungs are diminished  Heart sounds S4 heard, soft diastolic murmur noted  Legs Lymphadema seen and skin grafts  [place] with clear yellow urine        HOME MEDICATIONS:  No current facility-administered medications on file prior to encounter.     Current Outpatient Medications on File Prior to Encounter   Medication Sig Dispense Refill    acetaminophen (TYLENOL) 325 MG tablet Take 650 mg by mouth every 6 (six) hours as needed.      albuterol (PROVENTIL/VENTOLIN HFA) 90 mcg/actuation inhaler Inhale 1-2 puffs into the lungs every 6 (six) hours as needed for Wheezing. Rescue 6.7 g 0    amLODIPine (NORVASC) 10 MG tablet Take 1 tablet (10 mg total) by mouth once daily. 30 tablet 0    amLODIPine (NORVASC) 10 MG tablet Take 1 tablet by mouth once daily.      aspirin 81 MG Chew Take 81 mg by mouth once daily.      atorvastatin (LIPITOR) 40 MG tablet Take 1 tablet (40 mg total) by mouth every evening. 30 tablet 0    calcitRIOL (ROCALTROL) 0.25 MCG Cap Take 0.25 mcg by mouth once daily.      calcitRIOL (ROCALTROL) 0.25 MCG Cap Take 1 capsule by mouth once daily.      ciprofloxacin HCl (CIPRO) 500 MG tablet Take 500 mg by mouth 2 (two) times daily.       "cloNIDine 0.3 mg/24 hr td ptwk (CATAPRES) 0.3 mg/24 hr Place 1 patch onto the skin once a week. medically necessary with dx codes 401.9, 428.43, 428.0. 30 patch 11    clotrimazole (LOTRIMIN) 1 % cream Apply topically 2 (two) times daily. for 10 days (Patient taking differently: Apply 1 application topically 2 (two) times daily.) 1 each 0    EScitalopram oxalate (LEXAPRO) 20 MG tablet Take 20 mg by mouth once daily.      ferrous sulfate (FEOSOL) 325 mg (65 mg iron) Tab tablet Take 1 tablet by mouth once daily.      fluticasone propionate (FLONASE) 50 mcg/actuation nasal spray 1 spray (50 mcg total) by Each Nostril route 2 (two) times daily as needed for Rhinitis. 15 g 0    furosemide (LASIX) 40 MG tablet Take 40 mg by mouth 2 (two) times daily.      glipiZIDE (GLUCOTROL) 10 MG tablet Take 1 tablet (10 mg total) by mouth 2 (two) times daily with meals. 60 tablet 0    HUMULIN 70/30 U-100 INSULIN 100 unit/mL (70-30) injection Inject into the skin 3 (three) times daily before meals.      HYDROcodone-acetaminophen (NORCO) 5-325 mg per tablet Take 1 tablet by mouth every 6 (six) hours as needed.      insulin detemir (LEVEMIR) 100 unit/mL injection Inject 15 Units into the skin every evening. 4.5 mL 2    insulin needles, disposable, 31 x 3/16 " Ndle Use daily to inject insulin  DX:250.00 100 each 11    isosorbide mononitrate (IMDUR) 60 MG 24 hr tablet Take 60 mg by mouth once daily.      lancets (ONE TOUCH DELICA LANCETS) 33 gauge Misc 1 lancet by Misc.(Non-Drug; Combo Route) route 4 (four) times daily. DX:250.00   Medically necessary to test blood sugar 200 each 6    loratadine (CLARITIN) 10 mg tablet Take 1 tablet (10 mg total) by mouth once daily. 30 tablet 0    losartan (COZAAR) 100 MG tablet Take 100 mg by mouth.      ondansetron (ZOFRAN-ODT) 4 MG TbDL Take 1 tablet (4 mg total) by mouth every 6 (six) hours as needed (nausea/vomiting). 20 tablet 0    oxyCODONE-acetaminophen (PERCOCET) 5-325 mg per tablet Take 1 tablet " by mouth every 4 (four) hours as needed.      pioglitazone (ACTOS) 15 MG tablet Take 1 tablet by mouth once daily.      potassium chloride (KLOR-CON) 10 MEQ TbSR       sildenafiL (VIAGRA) 100 MG tablet TAKE ONE DAILY AS NEEDED      silver sulfADIAZINE 1% (SILVADENE) 1 % cream Apply topically.      sulfamethoxazole-trimethoprim 400-80mg (BACTRIM,SEPTRA) 400-80 mg per tablet Take 1 tablet by mouth 2 (two) times daily.      traMADoL (ULTRAM) 50 mg tablet Take 1 tablet (50 mg total) by mouth every 8 (eight) hours as needed for Pain. 9 tablet 0    TRUE METRIX AIR GLUCOSE METER Misc USE TO CHECK GLUCOSE TWICE DAILY AS DIRECTED      TRUE METRIX GLUCOSE TEST STRIP Strp 1 strip 2 (two) times daily.      TRUEPLUS LANCETS 30 gauge Misc USE 1 TO CHECK GLUCOSE TWICE DAILY         SCHEDULED MEDS:   aspirin  81 mg Oral Daily    atorvastatin  80 mg Oral QHS    calcitRIOL  0.25 mcg Oral Daily    furosemide (LASIX) injection  60 mg Intravenous BID    insulin detemir U-100  15 Units Subcutaneous QHS    metoprolol        metoprolol  5 mg Intravenous Q6H       CONTINUOUS INFUSIONS:   dexmedetomidine (PRECEDEX) infusion         PRN MEDS:acetaminophen, acetaminophen, aluminum-magnesium hydroxide-simethicone, dextrose 10%, dextrose 10%, dextrose 10%, dextrose 10%, glucagon (human recombinant), glucagon (human recombinant), glucose, glucose, glucose, glucose, hydrALAZINE, HYDROcodone-acetaminophen, melatonin, morphine, naloxone, simethicone, sodium chloride 0.9%, sodium chloride 0.9%    LABS AND DIAGNOSTICS     CBC LAST 3 DAYS  Recent Labs   Lab 12/03/22  1404 12/03/22  2032 12/04/22  0231   WBC 8.88  --  9.84   RBC 4.79  --  5.18   HGB 13.4*  --  14.1   HCT 40.6 40 44.5   MCV 85  --  86   MCH 28.0  --  27.2   MCHC 33.0  --  31.7*   RDW 14.9*  --  14.6*     --  141*   MPV 13.8*  --  11.9   GRAN 83.4*  7.4  --  76.6*  7.5   LYMPH 11.6*  1.0  --  17.8*  1.8   MONO 4.2  0.4  --  4.4  0.4   BASO 0.05  --  0.07   NRBC 0  --  0        COAGULATION LAST 3 DAYS  Recent Labs   Lab 12/03/22  1404 12/04/22 0231   LABPT 16.2* 15.7*   INR 1.4 1.3   APTT  --  25.1       CHEMISTRY LAST 3 DAYS  Recent Labs   Lab 12/03/22  1446 12/03/22  1451 12/03/22 2032 12/03/22  2324 12/04/22 0231   NA  --  139  --  139 140  140   K  --  3.2*  --  3.1* 3.2*  3.2*   CL  --  102  --  102 101  101   CO2  --  23  --  22* 22*  22*   ANIONGAP  --  14  --  15 17*  17*   BUN  --  42*  --  48* 48*  48*   CREATININE  --  4.7*  --  5.1* 4.9*  4.9*   GLU  --  290*  --  266* 256*  256*   CALCIUM  --  9.3  --  8.9 9.4  9.4   PH 7.358  --  7.413  --   --    MG  --   --   --   --  2.0   ALBUMIN  --  3.6  --   --  4.0   PROT  --  6.9  --   --  7.6   ALKPHOS  --  83  --   --  116   ALT  --  60*  --   --  71*   AST  --  47*  --   --  57*   BILITOT  --  2.9*  --   --  3.4*       CARDIAC PROFILE LAST 3 DAYS  Recent Labs   Lab 12/03/22  1404 12/03/22  1451 12/03/22 2120 12/04/22 0231   BNP 3,309*  --   --   --    CPKMB  --   --   --  6.6*   TROPONINIHS  --  350.7* 430.2* 617.7*       ENDOCRINE LAST 3 DAYS  Recent Labs   Lab 12/03/22 2120   TSH 2.070   PROCAL 0.47       LAST ARTERIAL BLOOD GAS  ABG  Recent Labs   Lab 12/03/22 2032   PH 7.413   PO2 112*   PCO2 33.3*   HCO3 21.3*   BE -3       LAST 7 DAYS MICROBIOLOGY   Microbiology Results (last 7 days)       Procedure Component Value Units Date/Time    Blood culture [[id number]] Collected: 12/04/22 0231    Order Status: Completed Specimen: Blood Updated: 12/04/22 0917     Blood Culture, Routine No Growth to date    Narrative:      Collection has been rescheduled by BRUNILDA at 12/04/2022 00:00 Reason:   Nurse Cassandra said to re-time the labs for 1:30 am.  Collection has been rescheduled by BRUNILDA at 12/04/2022 00:00 Reason:   Nurse Cassandra said to re-time the labs for 1:30 am.    MRSA Screen by PCR [[id number]] Collected: 12/03/22 2212    Order Status: Completed Specimen: Nasopharyngeal Swab from Nasal Updated: 12/04/22 0019      MRSA SCREEN BY PCR Negative    Blood culture [[id number]]     Order Status: Sent Specimen: Blood             MOST RECENT IMAGING  X-Ray Chest AP Portable  HISTORY: hypoxemia    FINDINGS: Portable chest radiograph at 2102 hours compared to prior exams shows stable enlarged cardiac silhouette and normal pulmonary vascularity.    The lungs are normally expanded, with no consolidation, large pleural effusion, or evidence of pulmonary edema. No confluent infiltrates or pneumothorax. There are no significant osseous abnormalities.    IMPRESSION: No evidence of active cardiopulmonary disease.    Electronically signed by:  Jersey Coronado MD  12/4/2022 7:59 AM CST Workstation: [id number]  US Carotid Bilateral  CMS MANDATED QUALITY DATA - CAROTID - [id number]    HISTORY: CVA,  syncope.    FINDINGS: Grayscale, color and spectral Doppler analysis was performed. Criteria for stenosis is based upon the Society of Radiologists in Ultrasound Consensus Conference, 2003 (Radiology, November 2003, 229, 340-346). This is in accordance with NASCET criteria. No prior studies for comparison.    Gray scale images show mild diffuse hypoechoic carotid intimal hyperplasia. There is no significant atheromatous plaque of the carotid bulbs or ICA origins.    Peak systolic velocity in the proximal right ICA is 26.2 cm/sec, and in the distal right ICA 14.7 cm/sec, with ICA/CCA ratio of 0.7.    Peak systolic velocity in the proximal left ICA is 25.5 cm/sec, and in the distal left ICA 29.8 cm/sec, with ICA/CCA ratio of 0.9.    The vertebral arteries are patent, with normal waveforms, and normal antegrade flow bilaterally.    IMPRESSION:  1. Carotid intimal hyperplasia, with no findings of hemodynamically significant carotid arterial stenosis or vascular occlusion.  2. Patent vertebral arteries with antegrade flow bilaterally.    Electronically signed by:  Jersey Coronado MD  12/4/2022 7:32 AM CST Workstation: [id number]      ECHOCARDIOGRAM RESULTS (last  5)  Results for orders placed during the hospital encounter of 11/13/22    Echo    Interpretation Summary  · The left ventricle is normal in size with mildly decreased systolic function.  · The estimated ejection fraction is 40%.  · Grade III left ventricular diastolic dysfunction.  · Small posterior pericardial effusion.  · There is mild left ventricular global hypokinesis.  · Normal right ventricular size with normal right ventricular systolic function.  · Severe left atrial enlargement.  · Moderate right atrial enlargement.  · Mild pulmonic regurgitation.  · Mild to moderate tricuspid regurgitation.  · Mild-to-moderate aortic regurgitation.  · Intermediate central venous pressure (8 mmHg).  · The estimated PA systolic pressure is 51 mmHg.  · There is pulmonary hypertension.      Results for orders placed during the hospital encounter of 11/01/21    Echo    Interpretation Summary  · The left ventricle is normal in size with severe concentric hypertrophy and normal systolic function.  · Moderate left atrial enlargement.  · Indeterminate left ventricular diastolic function.  · The estimated PA systolic pressure is 39 mmHg.  · Normal right ventricular size with normal right ventricular systolic function.  · Intermediate central venous pressure (8 mmHg).  · Small circumferential pericardial effusion.  · The estimated ejection fraction is 60%.  · Mild right atrial enlargement.  · Mild tricuspid regurgitation.  · Mild-to-moderate aortic regurgitation.      CURRENT/PREVIOUS VISIT EKG  Results for orders placed or performed during the hospital encounter of 12/03/22   EKG 12-lead    Collection Time: 12/04/22  3:35 AM    Narrative    Test Reason : R07.9,    Vent. Rate : 099 BPM     Atrial Rate : 099 BPM     P-R Int : 142 ms          QRS Dur : 096 ms      QT Int : 376 ms       P-R-T Axes : 067 -21 018 degrees     QTc Int : 482 ms    Normal sinus rhythm  Possible Left atrial enlargement  Low voltage QRS  Septal infarct (cited  on or before 02-NOV-2021)  Abnormal ECG  When compared with ECG of 03-DEC-2022 14:16,  The axis Shifted right  Questionable change in initial forces of Anterior leads  Nonspecific T wave abnormality now evident in Inferior leads    Referred By: [email or web address]   SELF           Confirmed By:            ASSESSMENT/PLAN:     Active Hospital Problems    Diagnosis    *Acute ischemic left MCA stroke     Likely L MCA ischemic stroke.  DDx includes post-ictal state and hypertensive encephalopathy.  Rec treating BP to <185/110 and then treating with TNK if not better.  CTA pending for e/o LVO.      Encephalopathy, metabolic    Acute on chronic congestive heart failure    COPD (chronic obstructive pulmonary disease)    CKD (chronic kidney disease), stage IV    Diabetes mellitus with chronic kidney disease    Hypertension, poor control       ASSESSMENT & PLAN:       Elevated Troponin- Negative when converted to Troponin I  Acute Ischemic L MCA CVA  Metabolic Encephalopathy  Acute on Chronic CHF  COPD  DM  HTN- uncontrolled        RECOMMENDATIONS:      Troponin 617.7- 0.06 when converted - continue to trend  Needs tighter BP control  We have been consulted for RAGHAV  Will plan for this tomorrow if family can give consent  BP per neuro recommendations  Consider A line for accurate BP monitoring  Will follow   Thank you for the consultation      Cassandra Sharpe NP  [facility]  Department of Cardiology  Date of Service: 12/04/2022

## 2022-12-04 NOTE — PT/OT/SLP PROGRESS
Physical Therapy      Patient Name:  Spencer Burton Jr.   MRN:  [id number]    Patient not seen today secondary to Other (Comment) (elevated BP (227/154) while sleeping). Will follow-up 12/5/22.

## 2022-12-04 NOTE — PT/OT/SLP EVAL
Speech Language Pathology Evaluation  Bedside Swallow    Patient Name:  Spencer Burton Jr.   MRN:  [id number]  Admitting Diagnosis: Acute ischemic left MCA stroke    Recommendations:                 General Recommendations:  Cognitive-linguistic evaluation and follow for diet tolerance/upgrade  Diet recommendations:  Mechanical soft, Thin   Aspiration Precautions: 1 bite/sip at a time, Alternating bites/sips, Assistance with meals, Check for pocketing/oral residue, Eliminate distractions, Feed only when awake/alert, HOB to 90 degrees, Monitor for s/s of aspiration, Remain upright 30 minutes post meal, Small bites/sips, and Strict aspiration precautions wear oxygen during intake  General Precautions: Standard, fall  Communication strategies:  yes/no questions only and provide increased time to answer    History:     Past Medical History:   Diagnosis Date    CHF (congestive heart failure)     COPD (chronic obstructive pulmonary disease)     Diabetes mellitus     Hyperlipidemia     Hypertension        Past Surgical History:   Procedure Laterality Date    APPENDECTOMY      CHOLECYSTECTOMY         Social History: unable to assess given patient's expressive aphasia. No family at bedside.     Chest X-Rays: Details    Reading Physician Reading Date Result Priority   Jersey Coronado MD  [phone] 12/4/2022      Narrative & Impression  HISTORY: hypoxemia     FINDINGS: Portable chest radiograph at 2102 hours compared to prior exams shows stable enlarged cardiac silhouette and normal pulmonary vascularity.     The lungs are normally expanded, with no consolidation, large pleural effusion, or evidence of pulmonary edema. No confluent infiltrates or pneumothorax. There are no significant osseous abnormalities.     IMPRESSION: No evidence of active cardiopulmonary disease.     Electronically signed by:  Jersey Coronado MD  12/4/2022 7:59 AM CST Workstation: [id number]    Prior diet: unable to assess secondary to patient expressive  "aphasia at this time.    Occupation/hobbies/homemaking: none stated.    Subjective     Patient cleared for bedside swallow evaluation by RN. Patient agreeable to evaluation given moderate encouragement and verbal/tactile cues. Patient oriented to self.   Patient goals: "get in the car and go"     Pain/Comfort:       Respiratory Status: Nasal cannula, flow 3 L/min    Objective:     Oral Musculature Evaluation  Oral Musculature:  (unable to assess; patient did not follow OME instructions)  Dentition: present and adequate  Secretion Management: adequate  Mandibular Strength and Mobility:  (unable to assess; did not follow OME instructions)  Oral Labial Strength and Mobility:  (unable to assess; did not follow OME instructions)  Lingual Strength and Mobility:  (unable to assess; did not follow OME instructions)  Buccal Strength and Mobility:  (unable to assess; did not follow OME instructions)  Volitional Cough: unable to assess; did not follow OME instructions  Volitional Swallow: unable to assess; did not follow OME instructions  Voice Prior to PO Intake: clear  Oral Musculature Comments: unable to assess; did not follow OME instructions; however, during PO trials, no gross observations of oral musculature deficits    Bedside Swallow Eval:   Consistencies Assessed:  Thin liquids regulated straw sip water x7  Puree tsp applesauce x2  Soft solids tsp peaches x12    Solids deferred secondary to level of assistance needed at this time    Oral Phase:   WFL    Pharyngeal Phase:   no overt clinical signs/symptoms of aspiration  no overt clinical signs/symptoms of pharyngeal dysphagia    Compensatory Strategies  None    Treatment: Patient seen for bedside swallow evaluation this date. Patient presented with difficulty following instructions related to oral mechanism exam; however, did accept dry spoon to mouth with adequate oral-labial closure around utensil. Patient communicated mainly yes/no answers this date however, " "reliability may be skewed at this time. Patient presented with some phrases throughout: "5:03, get in the car and go, what they trying to do?" Patient did present with difficulty remaining awake/alert throughout and required moderate verbal/tactile cues to participate in bedside swallow evaluation. Comprehensive cognitive-linguistic evaluation is recommended at a later date. Patient accepted the following PO trials this date: water via regulated straw sip x7, tsp puree x2, tsp peaches x12 all administered by clinician. Patient presented with adequate oral-labial closure around utensils, no anterior loss of bolus, timely mastication observed at the bedside, HLE present upon digital palpation, no overt s/s of airway compromise observed with any consistency assessed. He did present with increased belching after trials of water and x2 episodes of hiccups during the evaluation. Patient does require 1:1 supervision during meals and assistance with meals. Not safe for self-feeding at this time. Regulated straws or cup sips only at this time. Only feed when awake/alert. Recommend mechanical soft/thin liquids at this time secondary to decreased alertness. ST to follow for comprehensive cognitive-linguistic evaluation and diet tolerance/upgrade if necessary.    Assessment:     Spencer Burton Jr. is a 52 y.o. male with a presenting diagnosis of acute ischemic left MCA stroke. At this time patient is safe for mechanical soft diet with thin liquids with strict supervision and aspiration precautions. ST to follow for diet tolerance and cognitive-linguistic evaluation.     Goals:   Multidisciplinary Problems       SLP Goals       Not on file                    Plan:     Patient to be seen:  4 x/week   Plan of Care expires:     Plan of Care reviewed with:  patient   SLP Follow-Up:  Yes       Discharge recommendations:   (TBD)   Barriers to Discharge:  Level of Skilled Assistance Needed      Time Tracking:     SLP Treatment Date:   " 12/04/22  Speech Start Time:  0847  Speech Stop Time:  0908     Speech Total Time (min):  21 min    Billable Minutes: Eval Swallow and Oral Function 21 12/04/2022

## 2022-12-04 NOTE — CONSULTS
[facility]  Department of Neurology  Neurology Consultation Note        PATIENT NAME: Spencer Burton Jr.  MRN: [id number]  CSN: [id number]      TODAY'S DATE: 12/04/2022  ADMIT DATE: 12/3/2022                            CONSULTING PROVIDER: Timbo Galeano MD  CONSULT REQUESTED BY: Yandy Guerra MD      Reason for consult: CVA       History obtained from chart review and the patient.    HPI per EMR: Spencer Burton Jr. is a 52 y.o. male with a history of  CHF presented with right sided weakness. He was noticed stumbling in gas station so the staff there called EMS and he was brought to the ER for evaluation. CT scan and MRI consistent with bilateral cerebral hemispheres, left basal ganglia and left cerebellar hemisphere. Tele-stroke consulted and TPA was not given due to waxing and waning symptoms and elevated blood pressures. Hospital medicine consulted for medical management. During my evaluation, patient lethargic and confused and not able to follow commands and provide any reliable information.     Per tele stroke documentation: ''Patient found to be hypoxic.  With correction of hypoxia and lowering of BP, patient had some clinical improvement.  Radiology was also concerned the L deep lucency was sub-acute.  For these reasons we decided against thrombolysis.  Given patient's aphasia, precise onset cannot be determined and I am concerned onset was earlier than when he presented to the gas station given CT finding.''    Neurology consult:  Patient seen and examined by me this morning.  He is very lethargic and only wakes up to repeated stimulus and follows commands.  He is not able to provide any history.  He does have significant aphasia and dysarthria on exam.  MRI brain done showed embolic infarcts in the left corona radiata, left frontal lobe, right frontoparietal lobe.    PREVIOUS MEDICAL HISTORY:  Past Medical History:   Diagnosis Date    CHF (congestive heart failure)     COPD (chronic  obstructive pulmonary disease)     Diabetes mellitus     Hyperlipidemia     Hypertension      PREVIOUS SURGICAL HISTORY:  Past Surgical History:   Procedure Laterality Date    APPENDECTOMY      CHOLECYSTECTOMY       FAMILY MEDICAL HISTORY:  Family History   Problem Relation Age of Onset    Hypertension Mother     Schizophrenia Father     Hypertension Father     Diabetes Father      SOCIAL HISTORY:  Social History     Tobacco Use    Smoking status: Never    Smokeless tobacco: Never   Substance Use Topics    Alcohol use: No     Comment: socially    Drug use: No     ALLERGIES:  Review of patient's allergies indicates:  Not on File  HOME MEDICATIONS:  Prior to Admission medications    Medication Sig Start Date End Date Taking? Authorizing Provider   acetaminophen (TYLENOL) 325 MG tablet Take 650 mg by mouth every 6 (six) hours as needed. 3/4/22   Historical Provider   albuterol (PROVENTIL/VENTOLIN HFA) 90 mcg/actuation inhaler Inhale 1-2 puffs into the lungs every 6 (six) hours as needed for Wheezing. Rescue 3/17/20 3/17/21  Shivani Bryant PA-C   amLODIPine (NORVASC) 10 MG tablet Take 1 tablet (10 mg total) by mouth once daily. 7/23/20   Rishi Coley MD   amLODIPine (NORVASC) 10 MG tablet Take 1 tablet by mouth once daily. 5/31/21   Historical Provider   aspirin 81 MG Chew Take 81 mg by mouth once daily. 3/5/22   Historical Provider   atorvastatin (LIPITOR) 40 MG tablet Take 1 tablet (40 mg total) by mouth every evening. 7/23/20   Rishi Coley MD   calcitRIOL (ROCALTROL) 0.25 MCG Cap Take 0.25 mcg by mouth once daily. 10/11/21   Historical Provider   calcitRIOL (ROCALTROL) 0.25 MCG Cap Take 1 capsule by mouth once daily. 10/11/21   Historical Provider   ciprofloxacin HCl (CIPRO) 500 MG tablet Take 500 mg by mouth 2 (two) times daily. 6/24/22   Historical Provider   cloNIDine 0.3 mg/24 hr td ptwk (CATAPRES) 0.3 mg/24 hr Place 1 patch onto the skin once a week. medically necessary with dx codes 401.9,  "428.43, 428.0. 9/15/15   Surjit Rivas MD   clotrimazole (LOTRIMIN) 1 % cream Apply topically 2 (two) times daily. for 10 days  Patient taking differently: Apply 1 application topically 2 (two) times daily. 8/27/22 9/6/22  Richy Albarran MD   EScitalopram oxalate (LEXAPRO) 20 MG tablet Take 20 mg by mouth once daily. 3/5/22   Historical Provider   ferrous sulfate (FEOSOL) 325 mg (65 mg iron) Tab tablet Take 1 tablet by mouth once daily. 3/5/22   Historical Provider   fluticasone propionate (FLONASE) 50 mcg/actuation nasal spray 1 spray (50 mcg total) by Each Nostril route 2 (two) times daily as needed for Rhinitis. 3/17/20   Shivani Bryant PA-C   furosemide (LASIX) 40 MG tablet Take 40 mg by mouth 2 (two) times daily. 5/31/21   Historical Provider   glipiZIDE (GLUCOTROL) 10 MG tablet Take 1 tablet (10 mg total) by mouth 2 (two) times daily with meals. 7/23/20   Rishi Coley MD   HUMULIN 70/30 U-100 INSULIN 100 unit/mL (70-30) injection Inject into the skin 3 (three) times daily before meals. 5/22/22   Historical Provider   HYDROcodone-acetaminophen (NORCO) 5-325 mg per tablet Take 1 tablet by mouth every 6 (six) hours as needed. 6/15/22   Historical Provider   insulin detemir (LEVEMIR) 100 unit/mL injection Inject 15 Units into the skin every evening. 9/1/15 8/31/16  Zechariah Loomis PA-C   insulin needles, disposable, 31 x 3/16 " Ndle Use daily to inject insulin  DX:250.00 2/5/15   Luba Julio MD   isosorbide mononitrate (IMDUR) 60 MG 24 hr tablet Take 60 mg by mouth once daily. 3/5/22   Historical Provider   lancets (ONE TOUCH DELICA LANCETS) 33 gauge Misc 1 lancet by Misc.(Non-Drug; Combo Route) route 4 (four) times daily. DX:250.00   Medically necessary to test blood sugar 2/5/15   Luba Julio MD   loratadine (CLARITIN) 10 mg tablet Take 1 tablet (10 mg total) by mouth once daily. 3/17/20 3/17/21  Shivani Bryant PA-C   losartan (COZAAR) 100 MG tablet Take 100 mg by mouth. " 3/5/22   Historical Provider   ondansetron (ZOFRAN-ODT) 4 MG TbDL Take 1 tablet (4 mg total) by mouth every 6 (six) hours as needed (nausea/vomiting). 3/17/20   Shivani Bryant PA-C   oxyCODONE-acetaminophen (PERCOCET) 5-325 mg per tablet Take 1 tablet by mouth every 4 (four) hours as needed. 6/24/22   Historical Provider   pioglitazone (ACTOS) 15 MG tablet Take 1 tablet by mouth once daily. 2/22/21   Historical Provider   potassium chloride (KLOR-CON) 10 MEQ TbSR  9/1/15   Historical Provider   sildenafiL (VIAGRA) 100 MG tablet TAKE ONE DAILY AS NEEDED 8/7/21   Historical Provider   silver sulfADIAZINE 1% (SILVADENE) 1 % cream Apply topically. 3/4/22   Historical Provider   sulfamethoxazole-trimethoprim 400-80mg (BACTRIM,SEPTRA) 400-80 mg per tablet Take 1 tablet by mouth 2 (two) times daily. 6/15/22   Historical Provider   traMADoL (ULTRAM) 50 mg tablet Take 1 tablet (50 mg total) by mouth every 8 (eight) hours as needed for Pain. 7/23/20   Rishi Coley MD   TRUE METRIX AIR GLUCOSE METER Misc USE TO CHECK GLUCOSE TWICE DAILY AS DIRECTED 5/22/22   Historical Provider   TRUE METRIX GLUCOSE TEST STRIP Strp 1 strip 2 (two) times daily. 6/16/22   Historical Provider   TRUEPLUS LANCETS 30 gauge Misc USE 1 TO CHECK GLUCOSE TWICE DAILY 5/22/22   Historical Provider     CURRENT SCHEDULED MEDICATIONS:   aspirin  81 mg Oral Daily    atorvastatin  40 mg Oral QHS    furosemide (LASIX) injection  60 mg Intravenous BID     CURRENT INFUSIONS:    CURRENT PRN MEDICATIONS:  acetaminophen, acetaminophen, aluminum-magnesium hydroxide-simethicone, dextrose 10%, dextrose 10%, glucagon (human recombinant), glucose, glucose, hydrALAZINE, HYDROcodone-acetaminophen, melatonin, morphine, naloxone, simethicone, sodium chloride 0.9%, sodium chloride 0.9%    REVIEW OF SYSTEMS:  Please refer to the HPI for all pertinent positive and negative findings. A comprehensive review of all other systems was negative.       PHYSICAL  "EXAM:  Patient Vitals for the past 24 hrs:   BP Temp Temp src Pulse Resp SpO2 Height Weight   12/04/22 0757 -- -- -- 95 18 98 % -- --   12/04/22 0745 -- -- -- 96 20 100 % -- --   12/04/22 0730 (!) 136/58 -- -- 98 (!) 22 95 % -- --   12/04/22 0715 -- -- -- 97 (!) 21 100 % -- --   12/04/22 0701 (!) 202/141 97.6 °F (36.4 °C) Axillary 102 18 (!) 93 % -- --   12/04/22 0601 (!) 222/161 -- -- 100 (!) 25 (!) 93 % -- --   12/04/22 0531 (!) 192/120 -- -- 98 (!) 25 (!) 93 % -- --   12/04/22 0501 (!) 226/160 -- -- 100 19 95 % -- --   12/04/22 0431 (!) 214/133 -- -- 98 (!) 32 (!) 94 % -- --   12/04/22 0401 (!) 184/117 -- -- 100 (!) 26 (!) 86 % -- --   12/04/22 0331 (!) 217/145 -- -- 98 (!) 33 (!) 92 % -- --   12/04/22 0301 (!) 204/139 97.8 °F (36.6 °C) Axillary 100 (!) 24 (!) 94 % -- --   12/04/22 0231 (!) 208/147 -- -- 97 (!) 39 (!) 89 % -- --   12/04/22 0201 (!) 215/131 -- -- 99 (!) 41 99 % -- --   12/04/22 0131 (!) 199/151 -- -- 90 (!) 42 100 % -- --   12/04/22 0101 (!) 187/123 -- -- 87 (!) 24 100 % -- --   12/04/22 0031 (!) 187/123 -- -- 89 (!) 21 99 % -- --   12/04/22 0001 (!) 197/127 97.7 °F (36.5 °C) Axillary 90 20 99 % -- --   12/03/22 2346 (!) 201/137 -- -- 90 18 98 % -- --   12/03/22 2331 (!) 193/128 -- -- 95 18 96 % -- --   12/03/22 2316 (!) 212/146 -- -- 105 (!) 28 100 % -- --   12/03/22 2301 (!) 207/140 -- -- 101 (!) 28 98 % -- --   12/03/22 2246 (!) 193/151 -- -- 98 (!) 31 (!) 77 % -- --   12/03/22 2231 (!) 183/136 -- -- 96 (!) 6 97 % -- --   12/03/22 2216 (!) 213/147 -- -- 101 (!) 32 (!) 94 % -- --   12/03/22 2201 (!) 206/149 -- -- 94 (!) 21 (!) 93 % -- --   12/03/22 2141 (!) 194/143 -- -- 92 18 99 % -- --   12/03/22 2131 (!) 201/138 -- -- 94 (!) 22 98 % -- --   12/03/22 2121 (!) 192/118 99.5 °F (37.5 °C) Axillary 99 (!) 26 95 % 5' 9" (1.753 m) 88 kg (194 lb 0.1 oz)   12/03/22 2032 -- -- -- 91 (!) 22 96 % -- --   12/03/22 2000 (!) 196/125 98.7 °F (37.1 °C) Axillary 99 (!) 27 97 % -- --   12/03/22 1913 (!) 202/130 " "98 °F (36.7 °C) Axillary 101 (!) 26 (!) 94 % -- --   12/03/22 1818 (!) 195/140 97 °F (36.1 °C) Axillary 94 (!) 26 98 % -- --   12/03/22 1810 (!) 213/135 -- -- -- -- -- -- --   12/03/22 1704 (!) 185/122 -- -- 93 -- -- -- --   12/03/22 1635 (!) 210/137 -- -- 92 -- 98 % -- --   12/03/22 1620 (!) 197/120 -- -- 88 17 95 % -- --   12/03/22 1535 (!) 184/127 -- -- 88 (!) 40 99 % -- --   12/03/22 1516 (!) 200/113 -- -- 86 -- (!) 94 % -- --   12/03/22 1511 (!) 209/137 -- -- 87 (!) 24 99 % -- --   12/03/22 1500 (!) 202/126 -- -- 89 10 100 % -- --   12/03/22 1447 (!) 200/145 -- -- 90 (!) 39 99 % -- --   12/03/22 1432 (!) 200/144 -- -- -- -- -- -- --   12/03/22 1426 (!) 226/143 -- -- 100 (!) 24 99 % -- --   12/03/22 1350 (!) 244/156 -- -- 82 18 100 % -- --   12/03/22 1345 -- -- -- -- -- -- 5' 9" (1.753 m) 90.7 kg (200 lb)       GENERAL APPEARANCE:  Lethargic  male in no acute distress.  HEENT: Normocephalic and atraumatic. PERRL. Oropharynx unremarkable.  PULM: Normal respiratory effort. No accessory muscle use.  CV: RRR.  ABDOMEN: Soft, nontender.  EXTREMITIES: No obvious signs of vascular compromise. Pulses present. No cyanosis, clubbing or edema.  SKIN: Clear; no rashes, lesions or skin breaks in exposed areas.    NEURO:  MENTAL STATUS:  Patient is lethargic, opens eyes to repeated stimulus and follows some commands.  He has significant aphasia and dysarthria.  CRANIAL NERVES:  CN I: Not tested.  CN II: Fundoscopic exam deferred.  CN III, IV, VI: Pupils equal, round and reactive to light.  Extraocular movements full and intact.  CN V: Facial sensation normal.  CN VII: Facial asymmetry noted for right facial droop.  CN VIII: Hearing grossly normal and equal bilaterally.  No skew deviation or pathologic nystagmus.  CN IX, X: Palate elevates symmetrically. Speech/articulation is dysarthric and aphasic.  CN XI: Shoulder shrug and chin rotation equal with good strength.  CN XII: Tongue protrusion midline.    MOTOR:  Bulk normal. " Tone normal and symmetric throughout.  Abnormal movements absent.  Tremor: none present.  Strength  5/5 in right upper and lower extremity, 4+/5 in left upper and lower extremities. .    REFLEXES:  DTRs 2+ throughout.  Plantar response equivocal bilaterally.  SENSATION:  cannot be tested due to mental status however withdraws all extremities symmetrically to stimulus .  COORDINATION:  Can not be tested .  STATION: not tested.  GAIT: not tested.      NIHSS:  1a      Level of Consciousness (alert, drowsy, etc.):   2=not alert, requires repeated stimulation to attend, or is obtunded and requires strong or painful stimulation to make movements (not stereotyped)  1b.     Level of Consciousness Questions (month, age): 1= able to answer 1 of 2 questions  1c.     Level of Consciousness Commands (open, close eyes, make fist, let go):  0=Answers both tasks correctly  2.      Best Gaze (eyes open - patient follows examiner's finger or face):      0=normal  3.      Visual (introduce visual stimulus/threat to patient's visual field quadrants):  0=No visual loss  4.      Facial Palsy (show teeth, raise eyebrows and squeeze eyes shut):        2=Partial paralysis (total or near total paralysis of the lower face)  5a.     Motor Arm - Left (elevate extremity 90 degrees and score drift/movement):       1=Drift, limb holds 90 (or 45) degrees but drifts down before full 10 seconds: does not hit bed  5b.     Motor Arm - Right (elevate extremity 90 degrees and score drift/movement):      0=No drift, limb holds 90 (or 45) degrees for full 10 seconds  6a.     Motor Leg - Left (elevate extremity 30 degrees and score drift/movement):       0=No drift, limb holds 90 (or 45) degrees for full 10 seconds  6b      Motor Leg - Right (elevate extremity 30 degrees and score drift/movement):      0=No drift, limb holds 90 (or 45) degrees for full 10 seconds  7.      Limb Ataxia (finger-nose, heel down shin):      0=Absent  8.      Sensory (pin prick  to face, arm, trunk, and leg - compare side to side):        0=Normal; no sensory loss  9.      Best Language (name items, describe a picture and read sentences):      1=Mild to moderate aphasia; some obvious loss of fluency or facility of comprehension without significant limitation on ideas expressed or form of expression.  10.     Dysarthria (evaluate speech clarity by patient repeating listed words): 2=Severe; patient speech is so slurred as to be unintelligible in the absence of or our of proportion to any dysphagia, or is mute/anarthric  11.     Extinction and Inattention (use prior testing to identify neglect or double simultaneous stimuli testing):      0=No abnormality          NIH Stroke Scale Total:         9      Labs:  Recent Labs   Lab 12/03/22  1451 12/03/22  2324 12/04/22  0231    139 140  140   K 3.2* 3.1* 3.2*  3.2*    102 101  101   CO2 23 22* 22*  22*   BUN 42* 48* 48*  48*   CREATININE 4.7* 5.1* 4.9*  4.9*   * 266* 256*  256*   CALCIUM 9.3 8.9 9.4  9.4   PHOS  --   --  4.9*   MG  --   --  2.0     Recent Labs   Lab 12/03/22  1404 12/03/22 2032 12/04/22  0231   WBC 8.88  --  9.84   HGB 13.4*  --  14.1   HCT 40.6 40 44.5     --  141*     Recent Labs   Lab 12/03/22  1451 12/04/22  0231   ALBUMIN 3.6 4.0   PROT 6.9 7.6   BILITOT 2.9* 3.4*   ALKPHOS 83 116   ALT 60* 71*   AST 47* 57*     Lab Results   Component Value Date    INR 1.3 12/04/2022     Lab Results   Component Value Date    TRIG 91 12/04/2022    HDL 50 12/04/2022    CHOLHDL 27.6 12/04/2022     Lab Results   Component Value Date    HGBA1C 8.9 (H) 12/04/2022     No results found for: PROTEINCSF, GLUCCSF, WBCCSF    Imaging:  I have reviewed and interpreted the pertinent imaging and lab results.      X-Ray Chest AP Portable  HISTORY: hypoxemia    FINDINGS: Portable chest radiograph at 2102 hours compared to prior exams shows stable enlarged cardiac silhouette and normal pulmonary vascularity.    The lungs are  normally expanded, with no consolidation, large pleural effusion, or evidence of pulmonary edema. No confluent infiltrates or pneumothorax. There are no significant osseous abnormalities.    IMPRESSION: No evidence of active cardiopulmonary disease.    Electronically signed by:  Jersey Coronado MD  12/4/2022 7:59 AM CST Workstation: [id number]  US Carotid Bilateral  CMS MANDATED QUALITY DATA - CAROTID - [id number]    HISTORY: CVA,  syncope.    FINDINGS: Grayscale, color and spectral Doppler analysis was performed. Criteria for stenosis is based upon the Society of Radiologists in Ultrasound Consensus Conference, 2003 (Radiology, November 2003, 229, 340-346). This is in accordance with NASCET criteria. No prior studies for comparison.    Gray scale images show mild diffuse hypoechoic carotid intimal hyperplasia. There is no significant atheromatous plaque of the carotid bulbs or ICA origins.    Peak systolic velocity in the proximal right ICA is 26.2 cm/sec, and in the distal right ICA 14.7 cm/sec, with ICA/CCA ratio of 0.7.    Peak systolic velocity in the proximal left ICA is 25.5 cm/sec, and in the distal left ICA 29.8 cm/sec, with ICA/CCA ratio of 0.9.    The vertebral arteries are patent, with normal waveforms, and normal antegrade flow bilaterally.    IMPRESSION:  1. Carotid intimal hyperplasia, with no findings of hemodynamically significant carotid arterial stenosis or vascular occlusion.  2. Patent vertebral arteries with antegrade flow bilaterally.    Electronically signed by:  Jersey Coronado MD  12/4/2022 7:32 AM CST Workstation: [id number]         ASSESSMENT & PLAN:    Acute ischemic infarct  CKD   Elevated troponins    Workup  CTH: Loss of gray-white matter distinction in involving the left basal ganglia  CTA head and neck:  Was not performed due to kidney function  MRI brain:  Acute ischemic infarcts involving right frontoparietal lobe, left corona radiata, left frontal lobe.  MRA head: pending   MRA neck:  pending  ECHO 11/2022:  EF 40%, severe left atrial enlargement, grade 3 left ventricular diastolic dysfunction  LDL: 112  HbA1c: 8.9         Plan  Admitted for further stroke workup.  Etiology of stroke is cryptogenic however high concern for cardioembolism.  Start with Aspirin 81 mg and Lipitor 80mg  Slowly normalize blood pressure  Recommend transesophageal echocardiogram  Cardiology Nephrology following  PT OT  Speech therapy  DVT prophylaxis with chemo/SCD prophylaxis  Discussed lifestyle modifications as prophylactic measures for stroke prevention including, adequate blood pressure management, healthy diet and regular exercise.        Thank you kindly for including us in the care of this patient. Please do not hesitate to contact us with any questions.      Critical Care:  55 minutes of critical care time has been spent evaluating with the patient. Time includes chart review not limited to diagnostic imaging, labs, and vitals, patient assessment, discussion with family and nursing, current order evaluations, and new order entries.       Timbo Galeano MD  Neurology/vascular Neurology  Date of Service: 12/04/2022  9:55 AM    --------------------------------------------------------------------------------------------------------------------------------------------------------------------------------------------------------------------------------------------------------------  Please note: This note was transcribed using voice recognition software. Because of this technology there are often uinintended grammatical, spelling, and other transcription errors. Please disregard these errors.

## 2022-12-04 NOTE — PROGRESS NOTES
[facility] Medicine  Progress Note    Patient name: Spencer Burton Jr.  MRN: [id number]  Admit Date: 12/3/2022   LOS: 0 days     SUBJECTIVE:     Principal problem: Acute ischemic left MCA stroke    Interval History:  52 year old male admitted with acute ischemic CVA with MRI brain showing multiple areas of ischemia involving bilateral cerebral hemispheres, left basal ganglia and left cerebellar hemisphere. CT head revealed loss of gray-white matter distinction in the left basal ganglia concerning for acute infarct.  Given infarct was already evident on CT scan of the brain, it was concerning that some of these lesions might be subacute and thus time of last normal was not accurate.  Given this, tpa was decided against.  Patient has olman admitted to the ICU.  He is agitated and encephalopathic.  Blood pressures are severely elevated. Discussed with Nephrology, Neurology, RN and will have arterial line placed for accurate blood pressure measurements and start Precedex gtt as concerned about his agitation and driving up his blood pressure worse- risk/benefit favors starting despite possibly skewing neuro exam. Will still allow for permissive HTN for another day and shoot for dropping blood pressure no lower than  if he does require prn treatment. On asa and statin for secondary prevention.  On IV lasix for acute on chronic HF exacerbation.  Cardiology consulted for the same and elevated troponin. Troponin was initially trending up but has peaked and now trending down.  History is not reliable to assess for chest pain. Cardiology also consulted for RAGHAV as it is felt this is likely an embolic event. Carotid US with no significant stenosis. CTA head and neck not performed due to renal function.  Nephrology consulted given JR on CKD.  Cr initially 5.1.  Baseline appears to be around 3.8.  Labs also revealing of new transaminitis. Due to hepatic congestion from HF?  Last echo with EF 40% and  Grade III diastolic dysfunction. Lactic acid elevated at 3.8 on admission.  No indication of infection at this time.      Hospital Course:    Scheduled Meds:   aspirin  81 mg Oral Daily    atorvastatin  80 mg Oral QHS    calcitRIOL  0.25 mcg Oral Daily    furosemide (LASIX) injection  60 mg Intravenous BID    insulin detemir U-100  15 Units Subcutaneous QHS    metoprolol  5 mg Intravenous Q6H     Continuous Infusions:   dexmedetomidine (PRECEDEX) infusion 0.4 mcg/kg/hr (12/04/22 1648)     PRN Meds:acetaminophen, acetaminophen, aluminum-magnesium hydroxide-simethicone, dextrose 10%, dextrose 10%, dextrose 10%, dextrose 10%, glucagon (human recombinant), glucagon (human recombinant), glucose, glucose, glucose, glucose, hydrALAZINE, HYDROcodone-acetaminophen, melatonin, morphine, naloxone, simethicone, sodium chloride 0.9%, sodium chloride 0.9%    Review of patient's allergies indicates:  No Known Allergies    Review of Systems: As per interval history    OBJECTIVE:     Vital Signs (Most Recent)  Temp: 98.2 °F (36.8 °C) (12/04/22 1501)  Pulse: 72 (12/04/22 1715)  Resp: (!) 5 (12/04/22 1715)  BP: (!) 189/127 (12/04/22 1700)  SpO2: 100 % (12/04/22 1715)    Vital Signs Range (Last 24H):  Temp:  [97 °F (36.1 °C)-99.5 °F (37.5 °C)]   Pulse:  []   Resp:  [0-42]   BP: (136-227)/()   SpO2:  [77 %-100 %]     I & O (Last 24H):  Intake/Output Summary (Last 24 hours) at 12/4/2022 1729  Last data filed at 12/4/2022 0556  Gross per 24 hour   Intake 0 ml   Output 965 ml   Net -965 ml       Physical Exam:    Vitals and nursing note reviewed.     Constitutional:       General: Not in acute distress.     Appearance: Well-developed.   HENT:      Head: Normocephalic and atraumatic.   Eyes:      Pupils: Pupils are equal, round, and reactive to light.   Cardiovascular:      Rate and Rhythm: Regular rhythm.   Pulmonary:      Effort: Pulmonary effort is normal.      Breath sounds: Normal breath sounds. No wheezing.   Abdominal:       General: There is no distension.      Palpations: Abdomen is soft.      Tenderness: There is no abdominal tenderness. There is no guarding or rebound.   Musculoskeletal:         General: Normal range of motion.      Cervical back: Normal range of motion.   Skin:     Findings: No rash.   Neurological:      Mental Status: Both somnolent and agitated.  Does not answer questions.  Did not follow my commands but did participate with therapy today.         Laboratory:  I have reviewed all pertinent lab results within the past 24 hours.  CBC:   Recent Labs   Lab 12/04/22 1426   WBC 10.30   RBC 4.64   HGB 12.7*   HCT 39.9*      MCV 86   MCH 27.4   MCHC 31.8*     CMP:   Recent Labs   Lab 12/04/22 1426   *   CALCIUM 8.9   ALBUMIN 3.2*   PROT 6.5      K 3.5   CO2 23      BUN 52*   CREATININE 5.2*   ALKPHOS 102   ALT 64*   AST 38   BILITOT 3.1*     Cardiac markers:   Recent Labs   Lab 12/04/22 0231   CPKMB 6.6*     Microbiology Results (last 7 days)       Procedure Component Value Units Date/Time    Blood culture [[id number]] Collected: 12/04/22 1426    Order Status: Sent Specimen: Blood from Peripheral, Upper Arm, Left Updated: 12/04/22 1530    Blood culture [[id number]] Collected: 12/04/22 0231    Order Status: Completed Specimen: Blood Updated: 12/04/22 0917     Blood Culture, Routine No Growth to date    Narrative:      Collection has been rescheduled by CHET at 12/04/2022 00:00 Reason:   Nurse Cassandra said to re-time the labs for 1:30 am.  Collection has been rescheduled by JSDARSHAN at 12/04/2022 00:00 Reason:   Nurse Cassandra said to re-time the labs for 1:30 am.    MRSA Screen by PCR [[id number]] Collected: 12/03/22 2212    Order Status: Completed Specimen: Nasopharyngeal Swab from Nasal Updated: 12/04/22 0019     MRSA SCREEN BY PCR Negative    Blood culture [[id number]]     Order Status: Sent Specimen: Blood             Diagnostic Results:      ASSESSMENT/PLAN:         Active Hospital Problems     Diagnosis  POA    *Acute ischemic left MCA stroke [I63.512]  Yes     Likely L MCA ischemic stroke.  DDx includes post-ictal state and hypertensive encephalopathy.  Rec treating BP to <185/110 and then treating with TNK if not better.  CTA pending for e/o LVO.      Encephalopathy, metabolic [G93.41]  Yes    Acute on chronic congestive heart failure [I50.9]  Yes    COPD (chronic obstructive pulmonary disease) [J44.9]  Yes     Chronic    CKD (chronic kidney disease), stage IV [N18.4]  Yes    Diabetes mellitus with chronic kidney disease [E11.22]  Yes    Hypertension, poor control [I10]  Yes     Chronic      Resolved Hospital Problems   No resolved problems to display.         Plan:     -Continuous hemodynamic monitoring in the ICU for acute ischemic CVA.    -Suspect embolic event and thus RAGHAV requested. Tele monitoring. Carotid US with no significant stenosis.  CTA contraindicated in setting of present renal function.  -ASA and statin for secondary prevention  -Neurology following  -Neuro checks  -PT/OT/ST  -Nephrology consulted for Jacquie on CKD.  Last Cr was 3.8.  -Cardiology consulted for acute on chronic SHF and elevated troponin a little beyond his baseline elevation.  IV lasix.  Watching renal tolerance.   -ISS.  Accuchecks.  -Trending transaminases and bilirubin.  We will see if they improve with diuresis which would suggest hepatic congestion from HF.   -Permissive HTN with treatment parameters. We don't want to overshoot and should avoid SBP less than 150 at this time.      VTE Risk Mitigation (From admission, onward)           Ordered     IP VTE HIGH RISK PATIENT  Once         12/03/22 2003     Place sequential compression device  Until discontinued         12/03/22 9399                      [facility]  Yandy Guerra MD  Date of service: 12/04/2022

## 2022-12-04 NOTE — NURSING
New admission at shift change pt BP was elevated as expected per stroke orders. Pt was exteremly anxious and breathing heavy even though his O2 was WDL at 97 - 99%. Non compliment and anxious tossing and turning. Pt is transferred to ICU to closely monitor pt neuro q1hr. Mother is at bedside. Pt transferred via bed to ICU. Safety and comfort measures have been met.

## 2022-12-04 NOTE — CONSULTS
INPATIENT NEPHROLOGY CONSULT   [facility]    Patient Name: Spencer Burton Jr.  Date: 12/04/2022    Reason for consultation: JR    Chief Complaint:   Chief Complaint   Patient presents with    Right Sided Weakness       History of Present Illness:  Spencer Burton Jr. is a 52 y.o. Black or  male with known history of CHF presented with right sided weakness. He was noticed stumbling in gas station so the staff there called EMS and he was brought to the ER for evaluation. CT scan and MRI consistent with bilateral cerebral hemispheres, left basal ganglia and left cerebellar hemisphere. Carotid duplex neg. Abd u/s with occluded portal vein. Tele-stroke consulted and TPA was not given due to waxing and waning symptoms and elevated blood pressures. We are consulted for JR.    11/13/22 renal imaging with 8-9cm kidneys c/w atrophy  No utility in renal duplex at this stage     Interval History:  12/4- /141, on 3-4L NC, UOP 965cc- spoke with Dr. Guerra and nurse- advise precedex gtt and arterial line- we can then get a true sense of what his BP medication needs are and get accurate labs to replete electrolytes appropriately- continue rolle    Plan of Care:    Assessment:  Acute CVA  JR/CKD IV- likely progressive CKD due to uncontrolled HTN  HTN emergency/Demand ischemia  Uncontrolled DMII  Hypokalemia  SHPT  Anemia of CKD    Plan:    - will review neuro recs  - for now, needs sedation and arterial line for BP and labs  - continue rolle; no nsaids or IV contrast; dose meds for CrCl < 20  - aim for -160- may need cardene gtt- ok with clonidine patch, norvasc, hydral/nitrate  - ok with lasix 60mg IV BID  - no RAAS agents  - on long acting insulin- this is fine  - will replete K, Mg if needed  - start calcitriol daily  - no acute JONAH needs    He may require RRT this admission- to soon to tell.    Thank you for allowing us to participate in this patient's care. We will continue to  follow.    Vital Signs:  Temp Readings from Last 3 Encounters:   12/04/22 97.6 °F (36.4 °C) (Axillary)   11/15/22 98.2 °F (36.8 °C) (Oral)   11/06/22 98.4 °F (36.9 °C) (Oral)       Pulse Readings from Last 3 Encounters:   12/04/22 96   11/15/22 76   11/06/22 96       BP Readings from Last 3 Encounters:   12/04/22 (!) 222/141   11/15/22 (!) 170/119   11/06/22 (!) 208/148       Weight:  Wt Readings from Last 3 Encounters:   12/03/22 88 kg (194 lb 0.1 oz)   11/14/22 90.7 kg (200 lb)   11/06/22 86.2 kg (190 lb)       INS/OUTS:  I/O last 3 completed shifts:  In: 0   Out: 965 [Urine:965]  No intake/output data recorded.    Past Medical & Surgical History:  Past Medical History:   Diagnosis Date    CHF (congestive heart failure)     COPD (chronic obstructive pulmonary disease)     Diabetes mellitus     Hyperlipidemia     Hypertension        Past Surgical History:   Procedure Laterality Date    APPENDECTOMY      CHOLECYSTECTOMY         Past Social History:  Social History     Socioeconomic History    Marital status: Single   Tobacco Use    Smoking status: Never    Smokeless tobacco: Never   Substance and Sexual Activity    Alcohol use: No     Comment: socially    Drug use: No    Sexual activity: Yes     Partners: Female     Social Determinants of Health     Financial Resource Strain: Medium Risk    Difficulty of Paying Living Expenses: Somewhat hard   Food Insecurity: No Food Insecurity    Worried About Running Out of Food in the Last Year: Never true    Ran Out of Food in the Last Year: Never true   Transportation Needs: No Transportation Needs    Lack of Transportation (Medical): No    Lack of Transportation (Non-Medical): No   Physical Activity: Inactive    Days of Exercise per Week: 0 days    Minutes of Exercise per Session: 0 min   Social Connections: Moderately Isolated    Frequency of Communication with Friends and Family: More than three times a week    Frequency of Social Gatherings with Friends and Family: Three  times a week    Attends Yarsani Services: More than 4 times per year    Active Member of Clubs or Organizations: No    Attends Club or Organization Meetings: Never    Marital Status: Never    Housing Stability: Unknown    Unable to Pay for Housing in the Last Year: No    Unstable Housing in the Last Year: No       Medications:  Scheduled Meds:   aspirin  81 mg Oral Daily    atorvastatin  40 mg Oral QHS    furosemide (LASIX) injection  60 mg Intravenous BID     Continuous Infusions:  PRN Meds:.acetaminophen, acetaminophen, aluminum-magnesium hydroxide-simethicone, dextrose 10%, dextrose 10%, glucagon (human recombinant), glucose, glucose, hydrALAZINE, HYDROcodone-acetaminophen, melatonin, morphine, naloxone, simethicone, sodium chloride 0.9%, sodium chloride 0.9%  No current facility-administered medications on file prior to encounter.     Current Outpatient Medications on File Prior to Encounter   Medication Sig Dispense Refill    acetaminophen (TYLENOL) 325 MG tablet Take 650 mg by mouth every 6 (six) hours as needed.      albuterol (PROVENTIL/VENTOLIN HFA) 90 mcg/actuation inhaler Inhale 1-2 puffs into the lungs every 6 (six) hours as needed for Wheezing. Rescue 6.7 g 0    amLODIPine (NORVASC) 10 MG tablet Take 1 tablet (10 mg total) by mouth once daily. 30 tablet 0    amLODIPine (NORVASC) 10 MG tablet Take 1 tablet by mouth once daily.      aspirin 81 MG Chew Take 81 mg by mouth once daily.      atorvastatin (LIPITOR) 40 MG tablet Take 1 tablet (40 mg total) by mouth every evening. 30 tablet 0    calcitRIOL (ROCALTROL) 0.25 MCG Cap Take 0.25 mcg by mouth once daily.      calcitRIOL (ROCALTROL) 0.25 MCG Cap Take 1 capsule by mouth once daily.      ciprofloxacin HCl (CIPRO) 500 MG tablet Take 500 mg by mouth 2 (two) times daily.      cloNIDine 0.3 mg/24 hr td ptwk (CATAPRES) 0.3 mg/24 hr Place 1 patch onto the skin once a week. medically necessary with dx codes 401.9, 428.43, 428.0. 30 patch 11     "clotrimazole (LOTRIMIN) 1 % cream Apply topically 2 (two) times daily. for 10 days (Patient taking differently: Apply 1 application topically 2 (two) times daily.) 1 each 0    EScitalopram oxalate (LEXAPRO) 20 MG tablet Take 20 mg by mouth once daily.      ferrous sulfate (FEOSOL) 325 mg (65 mg iron) Tab tablet Take 1 tablet by mouth once daily.      fluticasone propionate (FLONASE) 50 mcg/actuation nasal spray 1 spray (50 mcg total) by Each Nostril route 2 (two) times daily as needed for Rhinitis. 15 g 0    furosemide (LASIX) 40 MG tablet Take 40 mg by mouth 2 (two) times daily.      glipiZIDE (GLUCOTROL) 10 MG tablet Take 1 tablet (10 mg total) by mouth 2 (two) times daily with meals. 60 tablet 0    HUMULIN 70/30 U-100 INSULIN 100 unit/mL (70-30) injection Inject into the skin 3 (three) times daily before meals.      HYDROcodone-acetaminophen (NORCO) 5-325 mg per tablet Take 1 tablet by mouth every 6 (six) hours as needed.      insulin detemir (LEVEMIR) 100 unit/mL injection Inject 15 Units into the skin every evening. 4.5 mL 2    insulin needles, disposable, 31 x 3/16 " Ndle Use daily to inject insulin  DX:250.00 100 each 11    isosorbide mononitrate (IMDUR) 60 MG 24 hr tablet Take 60 mg by mouth once daily.      lancets (ONE TOUCH DELICA LANCETS) 33 gauge Misc 1 lancet by Misc.(Non-Drug; Combo Route) route 4 (four) times daily. DX:250.00   Medically necessary to test blood sugar 200 each 6    loratadine (CLARITIN) 10 mg tablet Take 1 tablet (10 mg total) by mouth once daily. 30 tablet 0    losartan (COZAAR) 100 MG tablet Take 100 mg by mouth.      ondansetron (ZOFRAN-ODT) 4 MG TbDL Take 1 tablet (4 mg total) by mouth every 6 (six) hours as needed (nausea/vomiting). 20 tablet 0    oxyCODONE-acetaminophen (PERCOCET) 5-325 mg per tablet Take 1 tablet by mouth every 4 (four) hours as needed.      pioglitazone (ACTOS) 15 MG tablet Take 1 tablet by mouth once daily.      potassium chloride (KLOR-CON) 10 MEQ TbSR       " "sildenafiL (VIAGRA) 100 MG tablet TAKE ONE DAILY AS NEEDED      silver sulfADIAZINE 1% (SILVADENE) 1 % cream Apply topically.      sulfamethoxazole-trimethoprim 400-80mg (BACTRIM,SEPTRA) 400-80 mg per tablet Take 1 tablet by mouth 2 (two) times daily.      traMADoL (ULTRAM) 50 mg tablet Take 1 tablet (50 mg total) by mouth every 8 (eight) hours as needed for Pain. 9 tablet 0    TRUE METRIX AIR GLUCOSE METER Misc USE TO CHECK GLUCOSE TWICE DAILY AS DIRECTED      TRUE METRIX GLUCOSE TEST STRIP Strp 1 strip 2 (two) times daily.      TRUEPLUS LANCETS 30 gauge Misc USE 1 TO CHECK GLUCOSE TWICE DAILY         Allergies:  Patient has no allergy information on record.    Past Family History:  Reviewed; refer to Hospitalist Admission Note    Review of Systems:  Limited    Physical Exam:  BP (!) 222/141   Pulse 96   Temp 97.6 °F (36.4 °C) (Axillary)   Resp (!) 24   Ht 5' 9" (1.753 m)   Wt 88 kg (194 lb 0.1 oz)   SpO2 (!) 92%   BMI 28.65 kg/m²     General Appearance:    Anxious at times, then somnolent, cannot hold conversation, doesn't demonstrate understanding or follows commands or verbalize anything, appears stated age   Head:    Normocephalic, atraumatic   Eyes:    PER, EOMI, and conjunctiva/sclera clear bilaterally        Mouth:   Moist mucus membranes   Back:     No CVA tenderness   Lungs:     Coarse   Heart:    Regular rate and rhythm, S1 and S2 normal, no murmur, rub   or gallop   Abdomen:     Soft, non-tender, non-distended, bowel sounds active all four   quadrants, no RT or guarding, no masses, no organomegaly   Extremities:   Warm and well perfused, distal pulses intact, no cyanosis or    peripheral edema   MSK:   No joint or muscle swelling, tenderness or deformity   Skin:   Skin color, texture, turgor normal, no rashes or lesions   Neurologic/Psychiatric:   Moves all extremities     Results:  Lab Results   Component Value Date     12/04/2022     12/04/2022    K 3.2 (L) 12/04/2022    K 3.2 (L) " 12/04/2022     12/04/2022     12/04/2022    CO2 22 (L) 12/04/2022    CO2 22 (L) 12/04/2022    BUN 48 (H) 12/04/2022    BUN 48 (H) 12/04/2022    CREATININE 4.9 (H) 12/04/2022    CREATININE 4.9 (H) 12/04/2022    CALCIUM 9.4 12/04/2022    CALCIUM 9.4 12/04/2022    ANIONGAP 17 (H) 12/04/2022    ANIONGAP 17 (H) 12/04/2022    ESTGFRAFRICA 23 (A) 11/03/2021    EGFRNONAA 20 (A) 11/03/2021       Lab Results   Component Value Date    CALCIUM 9.4 12/04/2022    CALCIUM 9.4 12/04/2022    PHOS 4.9 (H) 12/04/2022       Recent Labs   Lab 12/04/22  0231   WBC 9.84   RBC 5.18   HGB 14.1   HCT 44.5   *   MCV 86   MCH 27.2   MCHC 31.7*       I have personally reviewed pertinent radiological imaging and reports.    I have spent > 70 minutes providing care for this patient for the above diagnoses. These services have included chart/data/imaging review, evaluation, exam, formulation of plan, , note preparation, and discussions with staff involved in this patient's care.    Nicolás Ambriz MD MPH  [facility] [place]  [address] [place] [id number]  [phone] (p)  [phone] (f)

## 2022-12-05 LAB
ALBUMIN SERPL BCP-MCNC: 3.1 G/DL (ref 3.5–5.2)
ALP SERPL-CCNC: 115 U/L (ref 55–135)
ALT SERPL W/O P-5'-P-CCNC: 58 U/L (ref 10–44)
ANION GAP SERPL CALC-SCNC: 10 MMOL/L (ref 8–16)
AST SERPL-CCNC: 39 U/L (ref 10–40)
BASOPHILS # BLD AUTO: 0.09 K/UL (ref 0–0.2)
BASOPHILS NFR BLD: 0.9 % (ref 0–1.9)
BILIRUB SERPL-MCNC: 2.4 MG/DL (ref 0.1–1)
BUN SERPL-MCNC: 56 MG/DL (ref 6–20)
CALCIUM SERPL-MCNC: 8.8 MG/DL (ref 8.7–10.5)
CHLORIDE SERPL-SCNC: 105 MMOL/L (ref 95–110)
CO2 SERPL-SCNC: 27 MMOL/L (ref 23–29)
CREAT SERPL-MCNC: 5 MG/DL (ref 0.5–1.4)
DIFFERENTIAL METHOD: ABNORMAL
EOSINOPHIL # BLD AUTO: 0.1 K/UL (ref 0–0.5)
EOSINOPHIL NFR BLD: 0.7 % (ref 0–8)
ERYTHROCYTE [DISTWIDTH] IN BLOOD BY AUTOMATED COUNT: 14.6 % (ref 11.5–14.5)
EST. GFR  (NO RACE VARIABLE): 13.3 ML/MIN/1.73 M^2
GLUCOSE SERPL-MCNC: 133 MG/DL (ref 70–110)
GLUCOSE SERPL-MCNC: 212 MG/DL (ref 70–110)
GLUCOSE SERPL-MCNC: 68 MG/DL (ref 70–110)
HCT VFR BLD AUTO: 40.3 % (ref 40–54)
HGB BLD-MCNC: 13.1 G/DL (ref 14–18)
IMM GRANULOCYTES # BLD AUTO: 0.03 K/UL (ref 0–0.04)
IMM GRANULOCYTES NFR BLD AUTO: 0.3 % (ref 0–0.5)
LYMPHOCYTES # BLD AUTO: 1.6 K/UL (ref 1–4.8)
LYMPHOCYTES NFR BLD: 15.3 % (ref 18–48)
MAGNESIUM SERPL-MCNC: 2 MG/DL (ref 1.6–2.6)
MCH RBC QN AUTO: 27.9 PG (ref 27–31)
MCHC RBC AUTO-ENTMCNC: 32.5 G/DL (ref 32–36)
MCV RBC AUTO: 86 FL (ref 82–98)
MONOCYTES # BLD AUTO: 0.5 K/UL (ref 0.3–1)
MONOCYTES NFR BLD: 5 % (ref 4–15)
NEUTROPHILS # BLD AUTO: 8.1 K/UL (ref 1.8–7.7)
NEUTROPHILS NFR BLD: 77.8 % (ref 38–73)
NRBC BLD-RTO: 0 /100 WBC
PLATELET # BLD AUTO: 188 K/UL (ref 150–450)
PMV BLD AUTO: 12.2 FL (ref 9.2–12.9)
POTASSIUM SERPL-SCNC: 2.9 MMOL/L (ref 3.5–5.1)
PROT SERPL-MCNC: 6 G/DL (ref 6–8.4)
RBC # BLD AUTO: 4.69 M/UL (ref 4.6–6.2)
SODIUM SERPL-SCNC: 142 MMOL/L (ref 136–145)
WBC # BLD AUTO: 10.39 K/UL (ref 3.9–12.7)

## 2022-12-05 PROCEDURE — 20000000 HC ICU ROOM

## 2022-12-05 PROCEDURE — 63600175 PHARM REV CODE 636 W HCPCS: Performed by: INTERNAL MEDICINE

## 2022-12-05 PROCEDURE — 97530 THERAPEUTIC ACTIVITIES: CPT

## 2022-12-05 PROCEDURE — 94761 N-INVAS EAR/PLS OXIMETRY MLT: CPT

## 2022-12-05 PROCEDURE — 85025 COMPLETE CBC W/AUTO DIFF WBC: CPT | Performed by: INTERNAL MEDICINE

## 2022-12-05 PROCEDURE — 97162 PT EVAL MOD COMPLEX 30 MIN: CPT

## 2022-12-05 PROCEDURE — 25000003 PHARM REV CODE 250: Performed by: INTERNAL MEDICINE

## 2022-12-05 PROCEDURE — 83735 ASSAY OF MAGNESIUM: CPT | Performed by: INTERNAL MEDICINE

## 2022-12-05 PROCEDURE — 80053 COMPREHEN METABOLIC PANEL: CPT | Performed by: INTERNAL MEDICINE

## 2022-12-05 RX ORDER — POTASSIUM CHLORIDE 7.45 MG/ML
10 INJECTION INTRAVENOUS
Status: COMPLETED | OUTPATIENT
Start: 2022-12-05 | End: 2022-12-05

## 2022-12-05 RX ADMIN — POTASSIUM CHLORIDE 10 MEQ: 7.46 INJECTION, SOLUTION INTRAVENOUS at 07:12

## 2022-12-05 RX ADMIN — POTASSIUM CHLORIDE 10 MEQ: 7.46 INJECTION, SOLUTION INTRAVENOUS at 09:12

## 2022-12-05 RX ADMIN — METOPROLOL TARTRATE 5 MG: 1 INJECTION, SOLUTION INTRAVENOUS at 04:12

## 2022-12-05 RX ADMIN — POTASSIUM CHLORIDE 10 MEQ: 7.46 INJECTION, SOLUTION INTRAVENOUS at 08:12

## 2022-12-05 RX ADMIN — POTASSIUM CHLORIDE 10 MEQ: 7.46 INJECTION, SOLUTION INTRAVENOUS at 05:12

## 2022-12-05 RX ADMIN — METOPROLOL TARTRATE 5 MG: 1 INJECTION, SOLUTION INTRAVENOUS at 05:12

## 2022-12-05 RX ADMIN — METOPROLOL TARTRATE 5 MG: 1 INJECTION, SOLUTION INTRAVENOUS at 12:12

## 2022-12-05 NOTE — PROGRESS NOTES
[facility]  Department of Cardiology  Consult Note      PATIENT NAME: Spencer Burton Jr.  MRN: [id number]  TODAY'S DATE: 12/05/2022  ADMIT DATE: 12/3/2022                          CONSULT REQUESTED BY: Yandy Guerra MD    SUBJECTIVE     PRINCIPAL PROBLEM: Acute ischemic left MCA stroke      REASON FOR CONSULT:  Elevated Troponin    INTERVAL HISTORY:  12/7/22  Patient resting in bed; Unable to speak very much other than one word answers. Denies chest pain or shortness of breath  Potassium 2.9 this AM  BP on high side; Neuro allowing permissive BP around  180/100;  S/P RAGHAV yesterday with LV thrombus noted  Anticoagulation on hold and to be initiated per neurology    HPI:      HPI Supplemented from nurse and Chart.  Patient lying in bed with restraints on nasal cannula not very cooperative, confused..  MRI brain done showed embolic infarcts in the left corona radiata, left frontal lobe, right frontoparietal lobe.      FROM H AND P  HISTORY OF PRESENT ILLNESS:   Spencer Burton Jr. is a 52 y.o. Black or  male with known history of CHF presented with right sided weakness. He was noticed stumbling in gas station so the staff there called EMS and he was brought to the ER for evaluation. CT scan and MRI consistent with bilateral cerebral hemispheres, left basal ganglia and left cerebellar hemisphere. Tele-stroke consulted and TPA was not given due to waxing and waning symptoms and elevated blood pressures. Hospital medicine consulted for medical management. During my evaluation, patient lethargic and confused and not able to follow commands and provide any reliable information.               Review of patient's allergies indicates:  No Known Allergies    Past Medical History:   Diagnosis Date    CHF (congestive heart failure)     COPD (chronic obstructive pulmonary disease)     Diabetes mellitus     Hyperlipidemia     Hypertension      Past Surgical History:   Procedure Laterality Date     APPENDECTOMY      CHOLECYSTECTOMY       Social History     Tobacco Use    Smoking status: Never    Smokeless tobacco: Never   Substance Use Topics    Alcohol use: No     Comment: socially    Drug use: No        REVIEW OF SYSTEMS  No chest pain or shortness of breath  Difficulty obtaining ROS d/t mental status        OBJECTIVE     VITAL SIGNS (Most Recent)  Temp: 97.9 °F (36.6 °C) (12/05/22 0701)  Pulse: 67 (12/05/22 0901)  Resp: 12 (12/05/22 0901)  BP: (!) 133/94 (12/05/22 0901)  SpO2: 97 % (12/05/22 0901)    VENTILATION STATUS  Resp: 12 (12/05/22 0901)  SpO2: 97 % (12/05/22 0901)       I & O (Last 24H):  Intake/Output Summary (Last 24 hours) at 12/5/2022 1005  Last data filed at 12/5/2022 0901  Gross per 24 hour   Intake 307.46 ml   Output 4200 ml   Net -3892.54 ml       WEIGHTS  Wt Readings from Last 3 Encounters:   12/04/22 1859 84.5 kg (186 lb 4.6 oz)   12/03/22 2121 88 kg (194 lb 0.1 oz)   12/03/22 1345 90.7 kg (200 lb)   11/14/22 0900 90.7 kg (200 lb)   11/14/22 0058 90.8 kg (200 lb 2.8 oz)   11/13/22 2127 86.2 kg (190 lb)   11/06/22 1412 86.2 kg (190 lb)       PHYSICAL EXAM  GEN: pt is awake, able to  phone and tries to communicate some words  CV: RRR on telemetry; HR controlled;   RESP: 2lpm nc, diminished breath sounds, no coughing or labored respirations  GI: soft, non tender  : [place] cath with small amount concentrated urine  EXT: no edema noted        HOME MEDICATIONS:  No current facility-administered medications on file prior to encounter.     Current Outpatient Medications on File Prior to Encounter   Medication Sig Dispense Refill    acetaminophen (TYLENOL) 325 MG tablet Take 650 mg by mouth every 6 (six) hours as needed.      albuterol (PROVENTIL/VENTOLIN HFA) 90 mcg/actuation inhaler Inhale 1-2 puffs into the lungs every 6 (six) hours as needed for Wheezing. Rescue 6.7 g 0    amLODIPine (NORVASC) 10 MG tablet Take 1 tablet (10 mg total) by mouth once daily. 30 tablet 0    amLODIPine  "(NORVASC) 10 MG tablet Take 1 tablet by mouth once daily.      aspirin 81 MG Chew Take 81 mg by mouth once daily.      atorvastatin (LIPITOR) 40 MG tablet Take 1 tablet (40 mg total) by mouth every evening. 30 tablet 0    calcitRIOL (ROCALTROL) 0.25 MCG Cap Take 0.25 mcg by mouth once daily.      calcitRIOL (ROCALTROL) 0.25 MCG Cap Take 1 capsule by mouth once daily.      ciprofloxacin HCl (CIPRO) 500 MG tablet Take 500 mg by mouth 2 (two) times daily.      cloNIDine 0.3 mg/24 hr td ptwk (CATAPRES) 0.3 mg/24 hr Place 1 patch onto the skin once a week. medically necessary with dx codes 401.9, 428.43, 428.0. 30 patch 11    clotrimazole (LOTRIMIN) 1 % cream Apply topically 2 (two) times daily. for 10 days (Patient taking differently: Apply 1 application topically 2 (two) times daily.) 1 each 0    EScitalopram oxalate (LEXAPRO) 20 MG tablet Take 20 mg by mouth once daily.      ferrous sulfate (FEOSOL) 325 mg (65 mg iron) Tab tablet Take 1 tablet by mouth once daily.      fluticasone propionate (FLONASE) 50 mcg/actuation nasal spray 1 spray (50 mcg total) by Each Nostril route 2 (two) times daily as needed for Rhinitis. 15 g 0    furosemide (LASIX) 40 MG tablet Take 40 mg by mouth 2 (two) times daily.      glipiZIDE (GLUCOTROL) 10 MG tablet Take 1 tablet (10 mg total) by mouth 2 (two) times daily with meals. 60 tablet 0    HUMULIN 70/30 U-100 INSULIN 100 unit/mL (70-30) injection Inject into the skin 3 (three) times daily before meals.      HYDROcodone-acetaminophen (NORCO) 5-325 mg per tablet Take 1 tablet by mouth every 6 (six) hours as needed.      insulin detemir (LEVEMIR) 100 unit/mL injection Inject 15 Units into the skin every evening. 4.5 mL 2    insulin needles, disposable, 31 x 3/16 " Ndle Use daily to inject insulin  DX:250.00 100 each 11    isosorbide mononitrate (IMDUR) 60 MG 24 hr tablet Take 60 mg by mouth once daily.      lancets (ONE TOUCH DELICA LANCETS) 33 gauge Misc 1 lancet by Misc.(Non-Drug; Combo " Route) route 4 (four) times daily. DX:250.00   Medically necessary to test blood sugar 200 each 6    loratadine (CLARITIN) 10 mg tablet Take 1 tablet (10 mg total) by mouth once daily. 30 tablet 0    losartan (COZAAR) 100 MG tablet Take 100 mg by mouth.      ondansetron (ZOFRAN-ODT) 4 MG TbDL Take 1 tablet (4 mg total) by mouth every 6 (six) hours as needed (nausea/vomiting). 20 tablet 0    oxyCODONE-acetaminophen (PERCOCET) 5-325 mg per tablet Take 1 tablet by mouth every 4 (four) hours as needed.      pioglitazone (ACTOS) 15 MG tablet Take 1 tablet by mouth once daily.      potassium chloride (KLOR-CON) 10 MEQ TbSR       sildenafiL (VIAGRA) 100 MG tablet TAKE ONE DAILY AS NEEDED      silver sulfADIAZINE 1% (SILVADENE) 1 % cream Apply topically.      sulfamethoxazole-trimethoprim 400-80mg (BACTRIM,SEPTRA) 400-80 mg per tablet Take 1 tablet by mouth 2 (two) times daily.      traMADoL (ULTRAM) 50 mg tablet Take 1 tablet (50 mg total) by mouth every 8 (eight) hours as needed for Pain. 9 tablet 0    TRUE METRIX AIR GLUCOSE METER Misc USE TO CHECK GLUCOSE TWICE DAILY AS DIRECTED      TRUE METRIX GLUCOSE TEST STRIP Strp 1 strip 2 (two) times daily.      TRUEPLUS LANCETS 30 gauge Misc USE 1 TO CHECK GLUCOSE TWICE DAILY         SCHEDULED MEDS:   aspirin  81 mg Oral Daily    atorvastatin  80 mg Oral QHS    calcitRIOL  0.25 mcg Oral Daily    furosemide (LASIX) injection  60 mg Intravenous BID    insulin detemir U-100  15 Units Subcutaneous QHS    metoprolol  5 mg Intravenous Q6H       CONTINUOUS INFUSIONS:   dexmedetomidine (PRECEDEX) infusion Stopped (12/04/22 1800)       PRN MEDS:acetaminophen, acetaminophen, aluminum-magnesium hydroxide-simethicone, dextrose 10%, dextrose 10%, dextrose 10%, dextrose 10%, glucagon (human recombinant), glucagon (human recombinant), glucose, glucose, glucose, glucose, hydrALAZINE, HYDROcodone-acetaminophen, melatonin, morphine, naloxone, simethicone, sodium chloride 0.9%, sodium chloride  0.9%    LABS AND DIAGNOSTICS     CBC LAST 3 DAYS  Recent Labs   Lab 12/04/22  0231 12/04/22  1426 12/05/22  0321   WBC 9.84 10.30 10.39   RBC 5.18 4.64 4.69   HGB 14.1 12.7* 13.1*   HCT 44.5 39.9* 40.3   MCV 86 86 86   MCH 27.2 27.4 27.9   MCHC 31.7* 31.8* 32.5   RDW 14.6* 14.5 14.6*   * 224 188   MPV 11.9 12.4 12.2   GRAN 76.6*  7.5 82.4*  8.5* 77.8*  8.1*   LYMPH 17.8*  1.8 11.9*  1.2 15.3*  1.6   MONO 4.4  0.4 4.6  0.5 5.0  0.5   BASO 0.07 0.07 0.09   NRBC 0 0 0       COAGULATION LAST 3 DAYS  Recent Labs   Lab 12/03/22  1404 12/04/22  0231   LABPT 16.2* 15.7*   INR 1.4 1.3   APTT  --  25.1       CHEMISTRY LAST 3 DAYS  Recent Labs   Lab 12/03/22  1446 12/03/22  1451 12/03/22 2032 12/03/22  2324 12/04/22  0231 12/04/22  1426 12/05/22  0321   NA  --    < >  --    < > 140  140 139 142   K  --    < >  --    < > 3.2*  3.2* 3.5 2.9*   CL  --    < >  --    < > 101  101 103 105   CO2  --    < >  --    < > 22*  22* 23 27   ANIONGAP  --    < >  --    < > 17*  17* 13 10   BUN  --    < >  --    < > 48*  48* 52* 56*   CREATININE  --    < >  --    < > 4.9*  4.9* 5.2* 5.0*   GLU  --    < >  --    < > 256*  256* 299* 212*   CALCIUM  --    < >  --    < > 9.4  9.4 8.9 8.8   PH 7.358  --  7.413  --   --   --   --    MG  --   --   --   --  2.0 2.0 2.0   ALBUMIN  --    < >  --   --  4.0 3.2* 3.1*   PROT  --    < >  --   --  7.6 6.5 6.0   ALKPHOS  --    < >  --   --  116 102 115   ALT  --    < >  --   --  71* 64* 58*   AST  --    < >  --   --  57* 38 39   BILITOT  --    < >  --   --  3.4* 3.1* 2.4*    < > = values in this interval not displayed.       CARDIAC PROFILE LAST 3 DAYS  Recent Labs   Lab 12/03/22  1404 12/03/22  1451 12/03/22  2120 12/04/22  0231 12/04/22  1426   BNP 3,309*  --   --   --   --    CPKMB  --   --   --  6.6*  --    TROPONINIHS  --    < > 430.2* 617.7* 463.0*    < > = values in this interval not displayed.       ENDOCRINE LAST 3 DAYS  Recent Labs   Lab 12/03/22 2120   TSH 2.070   PROCAL  0.47       LAST ARTERIAL BLOOD GAS  ABG  Recent Labs   Lab 12/03/22 2032   PH 7.413   PO2 112*   PCO2 33.3*   HCO3 21.3*   BE -3       LAST 7 DAYS MICROBIOLOGY   Microbiology Results (last 7 days)       Procedure Component Value Units Date/Time    Blood culture [[id number]] Collected: 12/04/22 0231    Order Status: Completed Specimen: Blood Updated: 12/05/22 0432     Blood Culture, Routine No Growth to date      No Growth to date    Narrative:      Collection has been rescheduled by JSDARSHAN at 12/04/2022 00:00 Reason:   Nurse Cassandra said to re-time the labs for 1:30 am.  Collection has been rescheduled by JSDARSHAN at 12/04/2022 00:00 Reason:   Nurse Cassandra said to re-time the labs for 1:30 am.    Blood culture [[id number]] Collected: 12/04/22 1426    Order Status: Completed Specimen: Blood from Peripheral, Upper Arm, Left Updated: 12/05/22 0117     Blood Culture, Routine No Growth to date    MRSA Screen by PCR [[id number]] Collected: 12/03/22 2212    Order Status: Completed Specimen: Nasopharyngeal Swab from Nasal Updated: 12/04/22 0019     MRSA SCREEN BY PCR Negative    Blood culture [[id number]]     Order Status: Sent Specimen: Blood             MOST RECENT IMAGING  X-Ray Chest AP Portable  HISTORY: hypoxemia    FINDINGS: Portable chest radiograph at 2102 hours compared to prior exams shows stable enlarged cardiac silhouette and normal pulmonary vascularity.    The lungs are normally expanded, with no consolidation, large pleural effusion, or evidence of pulmonary edema. No confluent infiltrates or pneumothorax. There are no significant osseous abnormalities.    IMPRESSION: No evidence of active cardiopulmonary disease.    Electronically signed by:  Jersey Coronado MD  12/4/2022 7:59 AM CST Workstation: [id number]   Carotid Bilateral  CMS MANDATED QUALITY DATA - CAROTID - [id number]    HISTORY: CVA,  syncope.    FINDINGS: Grayscale, color and spectral Doppler analysis was performed. Criteria for stenosis is based upon the  Society of Radiologists in Ultrasound Consensus Conference, 2003 (Radiology, November 2003, 229, 340-346). This is in accordance with NASCET criteria. No prior studies for comparison.    Gray scale images show mild diffuse hypoechoic carotid intimal hyperplasia. There is no significant atheromatous plaque of the carotid bulbs or ICA origins.    Peak systolic velocity in the proximal right ICA is 26.2 cm/sec, and in the distal right ICA 14.7 cm/sec, with ICA/CCA ratio of 0.7.    Peak systolic velocity in the proximal left ICA is 25.5 cm/sec, and in the distal left ICA 29.8 cm/sec, with ICA/CCA ratio of 0.9.    The vertebral arteries are patent, with normal waveforms, and normal antegrade flow bilaterally.    IMPRESSION:  1. Carotid intimal hyperplasia, with no findings of hemodynamically significant carotid arterial stenosis or vascular occlusion.  2. Patent vertebral arteries with antegrade flow bilaterally.    Electronically signed by:  Jersey Coronado MD  12/4/2022 7:32 AM CST Workstation: [id number]      ECHOCARDIOGRAM RESULTS (last 5)  Results for orders placed during the hospital encounter of 11/13/22    Echo    Interpretation Summary  · The left ventricle is normal in size with mildly decreased systolic function.  · The estimated ejection fraction is 40%.  · Grade III left ventricular diastolic dysfunction.  · Small posterior pericardial effusion.  · There is mild left ventricular global hypokinesis.  · Normal right ventricular size with normal right ventricular systolic function.  · Severe left atrial enlargement.  · Moderate right atrial enlargement.  · Mild pulmonic regurgitation.  · Mild to moderate tricuspid regurgitation.  · Mild-to-moderate aortic regurgitation.  · Intermediate central venous pressure (8 mmHg).  · The estimated PA systolic pressure is 51 mmHg.  · There is pulmonary hypertension.      Results for orders placed during the hospital encounter of 11/01/21    Echo    Interpretation Summary  ·  The left ventricle is normal in size with severe concentric hypertrophy and normal systolic function.  · Moderate left atrial enlargement.  · Indeterminate left ventricular diastolic function.  · The estimated PA systolic pressure is 39 mmHg.  · Normal right ventricular size with normal right ventricular systolic function.  · Intermediate central venous pressure (8 mmHg).  · Small circumferential pericardial effusion.  · The estimated ejection fraction is 60%.  · Mild right atrial enlargement.  · Mild tricuspid regurgitation.  · Mild-to-moderate aortic regurgitation.      CURRENT/PREVIOUS VISIT EKG  Results for orders placed or performed during the hospital encounter of 12/03/22   EKG 12-lead    Collection Time: 12/04/22  3:35 AM    Narrative    Test Reason : R07.9,    Vent. Rate : 099 BPM     Atrial Rate : 099 BPM     P-R Int : 142 ms          QRS Dur : 096 ms      QT Int : 376 ms       P-R-T Axes : 067 -21 018 degrees     QTc Int : 482 ms    Normal sinus rhythm  Possible Left atrial enlargement  Low voltage QRS  Septal infarct (cited on or before 02-NOV-2021)  Abnormal ECG  When compared with ECG of 03-DEC-2022 14:16,  The axis Shifted right  Questionable change in initial forces of Anterior leads  Nonspecific T wave abnormality now evident in Inferior leads    Referred By: [email or web address]   SELF           Confirmed By:            ASSESSMENT/PLAN:     Active Hospital Problems    Diagnosis    *Acute ischemic left MCA stroke     Likely L MCA ischemic stroke.  DDx includes post-ictal state and hypertensive encephalopathy.  Rec treating BP to <185/110 and then treating with TNK if not better.  CTA pending for e/o LVO.      Encephalopathy, metabolic    Acute on chronic congestive heart failure    COPD (chronic obstructive pulmonary disease)    CKD (chronic kidney disease), stage IV    Diabetes mellitus with chronic kidney disease    Hypertension, poor control       ASSESSMENT & PLAN:       Elevated Troponin- Negative when  converted to Troponin I  Acute Ischemic L MCA CVA  Metabolic Encephalopathy  Acute on Chronic CHF  COPD  DM  HTN- uncontrolled  8. Hypokalemia      RECOMMENDATIONS:  Moderate sized LV Thrombus noted on Echocardiogram. Neuro plans for CT head tomorrow; hopeful anticoagulation will be initiated, possibly tomorrow- defer to neurology for anticoagulation per guidelines  2.   BP at goal. Will need to get closer to 160/100 if anticoagulation started per neurology  3. Severely low K today; Replete per protocol.    LUIS Garvin  [facility]  Department of Cardiology  Date of Service: 12/05/2022      Heparin for first day or two, then warfarin vs xarelto for compliance     ECHO REVIEWED

## 2022-12-05 NOTE — PROGRESS NOTES
INPATIENT NEPHROLOGY CONSULT   [facility]    Patient Name: Spencer Burton Jr.  Date: 12/05/2022    Reason for consultation: JR    Chief Complaint:   Chief Complaint   Patient presents with    Right Sided Weakness     History of Present Illness:  Spencer Burton Jr. is a 52 y.o. Black or  male with known history of CHF presented with right sided weakness. He was noticed stumbling in gas station so the staff there called EMS and he was brought to the ER for evaluation. CT scan and MRI consistent with bilateral cerebral hemispheres, left basal ganglia and left cerebellar hemisphere. Carotid duplex neg. Abd u/s with occluded portal vein. Tele-stroke consulted and TPA was not given due to waxing and waning symptoms and elevated blood pressures. We are consulted for JR.    11/13/22 renal imaging with 8-9cm kidneys c/w atrophy  No utility in renal duplex at this stage     Interval History:  12/4- /141, on 3-4L NC, UOP 965cc- spoke with Dr. Guerra and nurse- advise precedex gtt and arterial line- we can then get a true sense of what his BP medication needs are and get accurate labs to replete electrolytes appropriately- continue rolle  12/5 VSS. Agree with K replacement until he can have anesthesia today.    Plan of Care:    Acute CVA  JR/CKD IV- likely progressive CKD due to uncontrolled HTN  HTN emergency/Demand ischemia  Uncontrolled DMII  Hypokalemia  SHPT  Anemia of CKD    Plan:    - agree with IV KCL replacement  - continue rolle; no nsaids or IV contrast; dose meds for CrCl < 20  - aim for -160- may need cardene gtt- ok with clonidine patch, norvasc, hydral/nitrate  - ok with lasix 60mg IV BID as needed, hold until K is corrected  - no RAAS agents  - on long acting insulin- this is fine  - will replete K, Mg if needed  - started calcitriol daily  - no acute JONAH needs    He may require RRT this admission- too soon to tell.    Thank you for allowing us to participate in  this patient's care. We will continue to follow.    Vital Signs:  Temp Readings from Last 3 Encounters:   12/05/22 97.9 °F (36.6 °C) (Axillary)   11/15/22 98.2 °F (36.8 °C) (Oral)   11/06/22 98.4 °F (36.9 °C) (Oral)       Pulse Readings from Last 3 Encounters:   12/05/22 67   11/15/22 76   11/06/22 96       BP Readings from Last 3 Encounters:   12/05/22 (!) 133/94   11/15/22 (!) 170/119   11/06/22 (!) 208/148       Weight:  Wt Readings from Last 3 Encounters:   12/04/22 84.5 kg (186 lb 4.6 oz)   11/14/22 90.7 kg (200 lb)   11/06/22 86.2 kg (190 lb)       INS/OUTS:  I/O last 3 completed shifts:  In: 307.5 [P.O.:240; I.V.:42.5; IV Piggyback:25]  Out: 4315 [Urine:4315]  I/O this shift:  In: -   Out: 850 [Urine:850]    Past Medical & Surgical History:  Past Medical History:   Diagnosis Date    CHF (congestive heart failure)     COPD (chronic obstructive pulmonary disease)     Diabetes mellitus     Hyperlipidemia     Hypertension        Past Surgical History:   Procedure Laterality Date    APPENDECTOMY      CHOLECYSTECTOMY         Past Social History:  Social History     Socioeconomic History    Marital status: Single   Tobacco Use    Smoking status: Never    Smokeless tobacco: Never   Substance and Sexual Activity    Alcohol use: No     Comment: socially    Drug use: No    Sexual activity: Yes     Partners: Female     Social Determinants of Health     Financial Resource Strain: Unknown    Difficulty of Paying Living Expenses: Patient refused   Food Insecurity: Unknown    Worried About Running Out of Food in the Last Year: Patient refused    Ran Out of Food in the Last Year: Patient refused   Transportation Needs: Unknown    Lack of Transportation (Medical): Patient refused    Lack of Transportation (Non-Medical): Patient refused   Physical Activity: Unknown    Days of Exercise per Week: Patient refused    Minutes of Exercise per Session: Patient refused   Stress: Unknown    Feeling of Stress : Patient refused   Social  Connections: Unknown    Frequency of Communication with Friends and Family: Patient refused    Frequency of Social Gatherings with Friends and Family: Patient refused    Attends Jainism Services: Patient refused    Active Member of Clubs or Organizations: Patient refused    Attends Club or Organization Meetings: Patient refused    Marital Status: Patient refused   Housing Stability: Unknown    Unable to Pay for Housing in the Last Year: Patient refused    Unstable Housing in the Last Year: Patient refused       Medications:  Scheduled Meds:   aspirin  81 mg Oral Daily    atorvastatin  80 mg Oral QHS    calcitRIOL  0.25 mcg Oral Daily    furosemide (LASIX) injection  60 mg Intravenous BID    insulin detemir U-100  15 Units Subcutaneous QHS    metoprolol  5 mg Intravenous Q6H     Continuous Infusions:   dexmedetomidine (PRECEDEX) infusion Stopped (12/04/22 1800)     PRN Meds:.acetaminophen, acetaminophen, aluminum-magnesium hydroxide-simethicone, dextrose 10%, dextrose 10%, dextrose 10%, dextrose 10%, glucagon (human recombinant), glucagon (human recombinant), glucose, glucose, glucose, glucose, hydrALAZINE, HYDROcodone-acetaminophen, melatonin, morphine, naloxone, simethicone, sodium chloride 0.9%, sodium chloride 0.9%  No current facility-administered medications on file prior to encounter.     Current Outpatient Medications on File Prior to Encounter   Medication Sig Dispense Refill    acetaminophen (TYLENOL) 325 MG tablet Take 650 mg by mouth every 6 (six) hours as needed.      albuterol (PROVENTIL/VENTOLIN HFA) 90 mcg/actuation inhaler Inhale 1-2 puffs into the lungs every 6 (six) hours as needed for Wheezing. Rescue 6.7 g 0    amLODIPine (NORVASC) 10 MG tablet Take 1 tablet (10 mg total) by mouth once daily. 30 tablet 0    amLODIPine (NORVASC) 10 MG tablet Take 1 tablet by mouth once daily.      aspirin 81 MG Chew Take 81 mg by mouth once daily.      atorvastatin (LIPITOR) 40 MG tablet Take 1 tablet (40 mg  "total) by mouth every evening. 30 tablet 0    calcitRIOL (ROCALTROL) 0.25 MCG Cap Take 0.25 mcg by mouth once daily.      calcitRIOL (ROCALTROL) 0.25 MCG Cap Take 1 capsule by mouth once daily.      ciprofloxacin HCl (CIPRO) 500 MG tablet Take 500 mg by mouth 2 (two) times daily.      cloNIDine 0.3 mg/24 hr td ptwk (CATAPRES) 0.3 mg/24 hr Place 1 patch onto the skin once a week. medically necessary with dx codes 401.9, 428.43, 428.0. 30 patch 11    clotrimazole (LOTRIMIN) 1 % cream Apply topically 2 (two) times daily. for 10 days (Patient taking differently: Apply 1 application topically 2 (two) times daily.) 1 each 0    EScitalopram oxalate (LEXAPRO) 20 MG tablet Take 20 mg by mouth once daily.      ferrous sulfate (FEOSOL) 325 mg (65 mg iron) Tab tablet Take 1 tablet by mouth once daily.      fluticasone propionate (FLONASE) 50 mcg/actuation nasal spray 1 spray (50 mcg total) by Each Nostril route 2 (two) times daily as needed for Rhinitis. 15 g 0    furosemide (LASIX) 40 MG tablet Take 40 mg by mouth 2 (two) times daily.      glipiZIDE (GLUCOTROL) 10 MG tablet Take 1 tablet (10 mg total) by mouth 2 (two) times daily with meals. 60 tablet 0    HUMULIN 70/30 U-100 INSULIN 100 unit/mL (70-30) injection Inject into the skin 3 (three) times daily before meals.      HYDROcodone-acetaminophen (NORCO) 5-325 mg per tablet Take 1 tablet by mouth every 6 (six) hours as needed.      insulin detemir (LEVEMIR) 100 unit/mL injection Inject 15 Units into the skin every evening. 4.5 mL 2    insulin needles, disposable, 31 x 3/16 " Ndle Use daily to inject insulin  DX:250.00 100 each 11    isosorbide mononitrate (IMDUR) 60 MG 24 hr tablet Take 60 mg by mouth once daily.      lancets (ONE TOUCH DELICA LANCETS) 33 gauge Misc 1 lancet by Misc.(Non-Drug; Combo Route) route 4 (four) times daily. DX:250.00   Medically necessary to test blood sugar 200 each 6    loratadine (CLARITIN) 10 mg tablet Take 1 tablet (10 mg total) by mouth once " "daily. 30 tablet 0    losartan (COZAAR) 100 MG tablet Take 100 mg by mouth.      ondansetron (ZOFRAN-ODT) 4 MG TbDL Take 1 tablet (4 mg total) by mouth every 6 (six) hours as needed (nausea/vomiting). 20 tablet 0    oxyCODONE-acetaminophen (PERCOCET) 5-325 mg per tablet Take 1 tablet by mouth every 4 (four) hours as needed.      pioglitazone (ACTOS) 15 MG tablet Take 1 tablet by mouth once daily.      potassium chloride (KLOR-CON) 10 MEQ TbSR       sildenafiL (VIAGRA) 100 MG tablet TAKE ONE DAILY AS NEEDED      silver sulfADIAZINE 1% (SILVADENE) 1 % cream Apply topically.      sulfamethoxazole-trimethoprim 400-80mg (BACTRIM,SEPTRA) 400-80 mg per tablet Take 1 tablet by mouth 2 (two) times daily.      traMADoL (ULTRAM) 50 mg tablet Take 1 tablet (50 mg total) by mouth every 8 (eight) hours as needed for Pain. 9 tablet 0    TRUE METRIX AIR GLUCOSE METER Misc USE TO CHECK GLUCOSE TWICE DAILY AS DIRECTED      TRUE METRIX GLUCOSE TEST STRIP Strp 1 strip 2 (two) times daily.      TRUEPLUS LANCETS 30 gauge Misc USE 1 TO CHECK GLUCOSE TWICE DAILY         Allergies:  Patient has no known allergies.    Past Family History:  Reviewed; refer to Hospitalist Admission Note    Review of Systems:  Limited    Physical Exam:  BP (!) 133/94   Pulse 67   Temp 97.9 °F (36.6 °C) (Axillary)   Resp 12   Ht 5' 9" (1.753 m)   Wt 84.5 kg (186 lb 4.6 oz)   SpO2 97%   BMI 27.51 kg/m²     General Appearance:    Anxious at times, then somnolent, cannot hold conversation, doesn't demonstrate understanding or follows commands or verbalize anything, appears stated age   Head:    Normocephalic, atraumatic   Eyes:    PER, EOMI, and conjunctiva/sclera clear bilaterally        Mouth:   Moist mucus membranes   Back:     No CVA tenderness   Lungs:     Coarse   Heart:    Regular rate and rhythm, S1 and S2 normal, no murmur, rub   or gallop   Abdomen:     Soft, non-tender, non-distended, bowel sounds active all four   quadrants, no RT or guarding, no " masses, no organomegaly   Extremities:   Warm and well perfused, distal pulses intact, no cyanosis or    peripheral edema   MSK:   No joint or muscle swelling, tenderness or deformity   Skin:   Skin color, texture, turgor normal, no rashes or lesions   Neurologic/Psychiatric:   Moves all extremities     Results:  Lab Results   Component Value Date     12/05/2022    K 2.9 (LL) 12/05/2022     12/05/2022    CO2 27 12/05/2022    BUN 56 (H) 12/05/2022    CREATININE 5.0 (H) 12/05/2022    CALCIUM 8.8 12/05/2022    ANIONGAP 10 12/05/2022    ESTGFRAFRICA 23 (A) 11/03/2021    EGFRNONAA 20 (A) 11/03/2021       Lab Results   Component Value Date    CALCIUM 8.8 12/05/2022    PHOS 4.9 (H) 12/04/2022       Recent Labs   Lab 12/05/22  0321   WBC 10.39   RBC 4.69   HGB 13.1*   HCT 40.3      MCV 86   MCH 27.9   MCHC 32.5       I have spent >  minutes providing care for this patient for the above diagnoses. These services have included chart/data/imaging review, evaluation, exam, formulation of plan, , note preparation, and discussions with staff involved in this patient's care.    Joseph Osorio MD  [facility]  [address] [place] [id number]  [phone] (p)  [phone] (f)

## 2022-12-05 NOTE — PT/OT/SLP PROGRESS
Speech Language Pathology      Spencer Solervale .  MRN: [id number]    Patient not seen today secondary to Other (Comment) (Pt is currently NPO for RAGHAV, per nsg). Will follow-up next scheduled visit day.

## 2022-12-05 NOTE — PROGRESS NOTES
"[facility]  Adult Nutrition   Progress Note (Initial Assessment)     SUMMARY     Recommendations  Recommendation/Intervention:   1) Resume mechanical soft diet w/ cardiac, renal, and diabetic restrictions in place when medically appropriate.   2) K (2.9). Received riders this AM. Continue to monitor and manage per MD.   3) RD to follow for plan of care and make recommendations as needed.    Goals: Pt to consume > 75% PO intake to meet estimated energy + protein needs when diet is resumed.  Nutrition Goal Status: new    Dietitian Rounds Brief  Pt is a 51 y/o M admitted for acute ischemic left MCA stroke. Brought to ED for evaluation after seen stumbling into gas station. NPO for procedure today. SLP following pt- recommended mechanical soft diet w/ thin liquids.    Diet order:   Current Diet Order: NPO     Evaluation of Received Nutrient/Fluid Intake  Energy Calories Required: not meeting needs  Protein Required: not meeting needs  Fluid Required: not meeting needs  Tolerance: other (see comments) (NPO)     % Intake of Estimated Energy Needs: 0%  % Meal Intake: NPO      Intake/Output Summary (Last 24 hours) at 12/5/2022 1346  Last data filed at 12/5/2022 0901  Gross per 24 hour   Intake 307.46 ml   Output 4200 ml   Net -3892.54 ml        Anthropometrics  Temp: 97.9 °F (36.6 °C)  Height Method: Stated  Height: 5' 9" (175.3 cm)  Height (inches): 69 in  Weight Method: Bed Scale  Weight: 84.5 kg (186 lb 4.6 oz)  Weight (lb): 186.29 lb  Ideal Body Weight (IBW), Male: 160 lb  % Ideal Body Weight, Male (lb): 121.26 %  BMI (Calculated): 27.5  BMI Grade: 25 - 29.9 - overweight       Estimated/Assessed Needs  Weight Used For Calorie Calculations: 84.5 kg (186 lb 4.6 oz)  Energy Calorie Requirements (kcal): 6372-0482 (20-25)  Energy Need Method: Kcal/kg  Protein Requirements: 51-68 (0.6-0.8 gm/kg)  Weight Used For Protein Calculations: 84.5 kg (186 lb 4.6 oz)  Fluid Requirements (mL): 2113 (25 ml/kg)     RDA " Method (mL): 1690       Reason for Assessment  Reason For Assessment: other (see comments) (ICU admit)  Diagnosis: stroke/CVA  Relevant Medical History: HTN, CKD stage 4, CHF, COPD, DM 2  Interdisciplinary Rounds: attended    Nutrition/Diet History  Spiritual, Cultural Beliefs, Zoroastrian Practices, Values that Affect Care: no  Food Allergies: NKFA  Factors Affecting Nutritional Intake: NPO    Nutrition Risk Screen  Nutrition Risk Screen: no indicators present     MST Score: 0  Have you recently lost weight without trying?: No  Weight loss score: 0  Have you been eating poorly because of a decreased appetite?: No  Appetite score: 0       Weight History:  Wt Readings from Last 5 Encounters:   12/04/22 84.5 kg (186 lb 4.6 oz)   11/14/22 90.7 kg (200 lb)   11/06/22 86.2 kg (190 lb)   08/27/22 73.5 kg (162 lb)   06/25/22 80.7 kg (178 lb)        Medications:Pertinent Medications Reviewed  Scheduled Meds:   aspirin  81 mg Oral Daily    atorvastatin  80 mg Oral QHS    calcitRIOL  0.25 mcg Oral Daily    furosemide (LASIX) injection  60 mg Intravenous BID    insulin detemir U-100  15 Units Subcutaneous QHS    metoprolol  5 mg Intravenous Q6H     Continuous Infusions:   dexmedetomidine (PRECEDEX) infusion Stopped (12/04/22 1800)     PRN Meds:.acetaminophen, acetaminophen, aluminum-magnesium hydroxide-simethicone, dextrose 10%, dextrose 10%, dextrose 10%, dextrose 10%, glucagon (human recombinant), glucagon (human recombinant), glucose, glucose, glucose, glucose, hydrALAZINE, HYDROcodone-acetaminophen, melatonin, morphine, naloxone, simethicone, sodium chloride 0.9%, sodium chloride 0.9%    Labs: Pertinent Labs Reviewed  Clinical Chemistry:  Recent Labs   Lab 12/04/22  0231 12/04/22  1426 12/05/22  0321     140   < > 142   K 3.2*  3.2*   < > 2.9*     101   < > 105   CO2 22*  22*   < > 27   *  256*   < > 212*   BUN 48*  48*   < > 56*   CREATININE 4.9*  4.9*   < > 5.0*   CALCIUM 9.4  9.4   < > 8.8    PROT 7.6   < > 6.0   ALBUMIN 4.0   < > 3.1*   BILITOT 3.4*   < > 2.4*   ALKPHOS 116   < > 115   AST 57*   < > 39   ALT 71*   < > 58*   ANIONGAP 17*  17*   < > 10   MG 2.0   < > 2.0   PHOS 4.9*  --   --     < > = values in this interval not displayed.     CBC:   Recent Labs   Lab 12/05/22  0321   WBC 10.39   RBC 4.69   HGB 13.1*   HCT 40.3      MCV 86   MCH 27.9   MCHC 32.5     Lipid Panel:  Recent Labs   Lab 12/04/22  0231   CHOL 181   HDL 50   LDLCALC 112.8   TRIG 91   CHOLHDL 27.6     Cardiac Profile:  Recent Labs   Lab 12/03/22  1404 12/04/22  0231   BNP 3,309*  --    CPKMB  --  6.6*     Inflammatory Labs:  Recent Labs   Lab 12/03/22 2120   CRP 0.72     Diabetes:  Recent Labs   Lab 12/04/22 0231   HGBA1C 8.9*     Thyroid & Parathyroid:  Recent Labs   Lab 12/03/22 2120   TSH 2.070       Monitor and Evaluation  Food and Nutrient Intake: energy intake, food and beverage intake  Food and Nutrient Adminstration: diet order  Knowledge/Beliefs/Attitudes: food and nutrition knowledge/skill  Physical Activity and Function: nutrition-related ADLs and IADLs  Anthropometric Measurements: weight, weight change, body mass index  Biochemical Data, Medical Tests and Procedures: electrolyte and renal panel, gastrointestinal profile, glucose/endocrine profile  Nutrition-Focused Physical Findings: overall appearance     Nutrition Risk  Level of Risk/Frequency of Follow-up: high     Nutrition Follow-Up  RD Follow-up?: Yes      Tess Martino RD 12/05/2022 1:46 PM

## 2022-12-05 NOTE — PROGRESS NOTES
[facility]  Department of Neurology  Neurology Progress Note        PATIENT NAME: Spencer Burton Jr.  MRN: [id number]  CSN: [id number]      TODAY'S DATE: 12/05/2022  ADMIT DATE: 12/3/2022                            CONSULTING PROVIDER: Alison Nelson MD  CONSULT REQUESTED BY: Yandy Guerra MD      Reason for consult: CVA       History obtained from chart review and the patient.    HPI per EMR: Spencer Burton Jr. is a 52 y.o. male with a history of  CHF presented with right sided weakness. He was noticed stumbling in gas station so the staff there called EMS and he was brought to the ER for evaluation. CT scan and MRI consistent with bilateral cerebral hemispheres, left basal ganglia and left cerebellar hemisphere. Tele-stroke consulted and TPA was not given due to waxing and waning symptoms and elevated blood pressures. Hospital medicine consulted for medical management. During my evaluation, patient lethargic and confused and not able to follow commands and provide any reliable information.     Per tele stroke documentation: ''Patient found to be hypoxic.  With correction of hypoxia and lowering of BP, patient had some clinical improvement.  Radiology was also concerned the L deep lucency was sub-acute.  For these reasons we decided against thrombolysis.  Given patient's aphasia, precise onset cannot be determined and I am concerned onset was earlier than when he presented to the gas station given CT finding.''    Neurology consult:  Patient seen and examined by me this morning.  He is very lethargic and only wakes up to repeated stimulus and follows commands.  He is not able to provide any history.  He does have significant aphasia and dysarthria on exam.  MRI brain done showed embolic infarcts in the left corona radiata, left frontal lobe, right frontoparietal lobe.    12/5/22:  Patient was seen and examined by me today.  He is somnolent but less than yesterday.  Remains with aphasia and  dysarthria which seems to be improving, and follows commands consistently.  Denies any new neuro deficits.  Transesophageal echo still pending.    CURRENT SCHEDULED MEDICATIONS:   aspirin  81 mg Oral Daily    atorvastatin  80 mg Oral QHS    calcitRIOL  0.25 mcg Oral Daily    furosemide (LASIX) injection  60 mg Intravenous BID    insulin detemir U-100  15 Units Subcutaneous QHS    metoprolol  5 mg Intravenous Q6H    potassium chloride  10 mEq Intravenous Q1H     CURRENT INFUSIONS:   dexmedetomidine (PRECEDEX) infusion Stopped (12/04/22 1800)     CURRENT PRN MEDICATIONS:  acetaminophen, acetaminophen, aluminum-magnesium hydroxide-simethicone, dextrose 10%, dextrose 10%, dextrose 10%, dextrose 10%, glucagon (human recombinant), glucagon (human recombinant), glucose, glucose, glucose, glucose, hydrALAZINE, HYDROcodone-acetaminophen, melatonin, morphine, naloxone, simethicone, sodium chloride 0.9%, sodium chloride 0.9%      PHYSICAL EXAM:  Patient Vitals for the past 24 hrs:   BP Temp Temp src Pulse Resp SpO2 Weight   12/05/22 0800 (!) 140/92 -- -- 66 11 98 % --   12/05/22 0701 (!) 147/96 97.9 °F (36.6 °C) Axillary 68 (!) 24 95 % --   12/05/22 0700 (!) 147/96 -- -- 68 (!) 0 99 % --   12/05/22 0600 (!) 154/106 -- -- 71 19 99 % --   12/05/22 0556 (!) 156/95 -- -- -- -- -- --   12/05/22 0500 (!) 156/95 -- -- 73 20 97 % --   12/05/22 0309 (!) 142/88 97.7 °F (36.5 °C) Axillary 82 (!) 8 99 % --   12/05/22 0200 (!) 177/140 -- -- 77 19 98 % --   12/05/22 0100 (!) 171/112 -- -- 70 16 100 % --   12/05/22 0045 (!) 174/117 97.7 °F (36.5 °C) Axillary 70 (!) 22 100 % --   12/05/22 0001 (!) 179/130 -- -- -- -- -- --   12/05/22 0000 (!) 179/130 -- -- 74 18 100 % --   12/04/22 2300 (!) 187/123 -- -- 78 20 (!) 94 % --   12/04/22 2200 (!) 177/125 -- -- 79 (!) 31 (!) 79 % --   12/04/22 2100 (!) 177/127 -- -- 74 19 100 % --   12/04/22 2000 (!) 174/126 -- -- 72 12 100 % --   12/04/22 1900 (!) 179/125 -- -- 70 19 100 % --   12/04/22 1859 (!)  179/125 97.9 °F (36.6 °C) Axillary 69 18 100 % 84.5 kg (186 lb 4.6 oz)   12/04/22 1800 (!) 189/125 -- -- 71 (!) 22 100 % --   12/04/22 1745 -- -- -- 72 (!) 24 100 % --   12/04/22 1730 -- -- -- 72 14 100 % --   12/04/22 1715 -- -- -- 72 (!) 5 100 % --   12/04/22 1701 -- -- -- 73 16 100 % --   12/04/22 1700 (!) 189/127 -- -- 74 (!) 25 99 % --   12/04/22 1645 -- -- -- 75 (!) 24 100 % --   12/04/22 1630 -- -- -- 74 (!) 23 100 % --   12/04/22 1615 -- -- -- 74 (!) 25 100 % --   12/04/22 1600 (!) 176/113 -- -- 75 (!) 22 100 % --   12/04/22 1545 -- -- -- 76 (!) 27 100 % --   12/04/22 1530 -- -- -- 77 (!) 26 100 % --   12/04/22 1515 -- -- -- 80 (!) 27 100 % --   12/04/22 1501 (!) 206/136 98.2 °F (36.8 °C) Axillary 89 (!) 36 99 % --   12/04/22 1500 (!) 204/136 -- -- 91 (!) 25 100 % --   12/04/22 1445 -- -- -- 92 (!) 25 98 % --   12/04/22 1430 -- -- -- 91 (!) 35 (!) 91 % --   12/04/22 1415 -- -- -- 93 -- (!) 87 % --   12/04/22 1400 (!) 187/142 -- -- 92 (!) 0 97 % --   12/04/22 1345 -- -- -- 87 (!) 5 98 % --   12/04/22 1330 -- -- -- 88 (!) 25 95 % --   12/04/22 1315 -- -- -- 88 (!) 5 96 % --   12/04/22 1301 (!) 180/132 -- -- 86 (!) 36 (!) 94 % --   12/04/22 1300 (!) 180/132 -- -- 90 16 99 % --   12/04/22 1245 -- -- -- 91 (!) 42 (!) 79 % --   12/04/22 1230 -- -- -- 87 20 98 % --   12/04/22 1215 -- -- -- 89 (!) 33 95 % --   12/04/22 1200 (!) 213/138 -- -- 87 19 (!) 94 % --   12/04/22 1145 -- -- -- 88 19 (!) 92 % --   12/04/22 1130 -- -- -- 86 (!) 33 (!) 89 % --   12/04/22 1115 -- -- -- 87 11 95 % --   12/04/22 1101 (!) 192/144 98 °F (36.7 °C) Axillary 85 17 (!) 93 % --   12/04/22 1100 (!) 192/144 -- -- 84 (!) 39 (!) 85 % --   12/04/22 1045 -- -- -- 86 (!) 35 (!) 91 % --   12/04/22 1030 -- -- -- 97 15 96 % --   12/04/22 1015 (!) 217/160 -- -- 95 (!) 8 99 % --   12/04/22 1000 (!) 222/141 -- -- 96 (!) 24 (!) 92 % --   12/04/22 0945 -- -- -- 91 20 96 % --   12/04/22 0930 (!) 195/141 -- -- 92 (!) 2 98 % --   12/04/22 0915 -- -- -- 96  (!) 28 (!) 80 % --         GENERAL APPEARANCE: Drowsy male in no acute distress.  HEENT: Normocephalic and atraumatic. PERRL. Oropharynx unremarkable.  PULM: Normal respiratory effort. No accessory muscle use.  CV: RRR.  ABDOMEN: Soft, nontender.  EXTREMITIES: No obvious signs of vascular compromise. Pulses present. No cyanosis, clubbing or edema.  SKIN: Clear; no rashes, lesions or skin breaks in exposed areas.    NEURO:  MENTAL STATUS:  Patient is drowsy but easily arousable, and follows some commands.  He has significant aphasia and dysarthria.  CRANIAL NERVES:  CN I: Not tested.  CN II: Fundoscopic exam deferred.  CN III, IV, VI: Pupils equal, round and reactive to light.  Extraocular movements full and intact.  CN V: Facial sensation normal.  CN VII: Facial asymmetry noted for right facial droop.  CN VIII: Hearing grossly normal and equal bilaterally.  No skew deviation or pathologic nystagmus.  CN IX, X: Palate elevates symmetrically. Speech/articulation is dysarthric and aphasic.  CN XI: Shoulder shrug and chin rotation equal with good strength.  CN XII: Tongue protrusion midline.    MOTOR:  Bulk normal. Tone normal and symmetric throughout.  Abnormal movements absent.  Tremor: none present.  Strength  5/5 in right upper and lower extremity, 4+/5 in left upper and lower extremities. .    REFLEXES:  DTRs 2+ throughout.  Plantar response equivocal bilaterally.  SENSATION:  cannot be tested due to mental status however withdraws all extremities symmetrically to stimulus .  COORDINATION:  Can not be tested .  STATION: not tested.  GAIT: not tested.      NIHSS:  1a      Level of Consciousness (alert, drowsy, etc.):   1=not alert but arousable by minor stimulation to obey, answer or respond  1b.     Level of Consciousness Questions (month, age): 0= able to answer 2 of 2 questions  1c.     Level of Consciousness Commands (open, close eyes, make fist, let go):  0=Answers both tasks correctly  2.      Best Gaze (eyes  open - patient follows examiner's finger or face):      0=normal  3.      Visual (introduce visual stimulus/threat to patient's visual field quadrants):  0=No visual loss  4.      Facial Palsy (show teeth, raise eyebrows and squeeze eyes shut):        2=Partial paralysis (total or near total paralysis of the lower face)  5a.     Motor Arm - Left (elevate extremity 90 degrees and score drift/movement):       1=Drift, limb holds 90 (or 45) degrees but drifts down before full 10 seconds: does not hit bed  5b.     Motor Arm - Right (elevate extremity 90 degrees and score drift/movement):      0=No drift, limb holds 90 (or 45) degrees for full 10 seconds  6a.     Motor Leg - Left (elevate extremity 30 degrees and score drift/movement):       0=No drift, limb holds 90 (or 45) degrees for full 10 seconds  6b      Motor Leg - Right (elevate extremity 30 degrees and score drift/movement):      0=No drift, limb holds 90 (or 45) degrees for full 10 seconds  7.      Limb Ataxia (finger-nose, heel down shin):      0=Absent  8.      Sensory (pin prick to face, arm, trunk, and leg - compare side to side):        0=Normal; no sensory loss  9.      Best Language (name items, describe a picture and read sentences):      1=Mild to moderate aphasia; some obvious loss of fluency or facility of comprehension without significant limitation on ideas expressed or form of expression.  10.     Dysarthria (evaluate speech clarity by patient repeating listed words): 1=Mild to moderate, patient slurs at least some words and at worst, can be understood with some difficulty  11.     Extinction and Inattention (use prior testing to identify neglect or double simultaneous stimuli testing):      0=No abnormality          NIH Stroke Scale Total:         6      Labs:  Recent Labs   Lab 12/04/22  0231 12/04/22  1426 12/05/22  0321     140 139 142   K 3.2*  3.2* 3.5 2.9*     101 103 105   CO2 22*  22* 23 27   BUN 48*  48* 52* 56*    CREATININE 4.9*  4.9* 5.2* 5.0*   *  256* 299* 212*   CALCIUM 9.4  9.4 8.9 8.8   PHOS 4.9*  --   --    MG 2.0 2.0 2.0       Recent Labs   Lab 12/04/22  0231 12/04/22  1426 12/05/22  0321   WBC 9.84 10.30 10.39   HGB 14.1 12.7* 13.1*   HCT 44.5 39.9* 40.3   * 224 188       Recent Labs   Lab 12/04/22  0231 12/04/22  1426 12/05/22  0321   ALBUMIN 4.0 3.2* 3.1*   PROT 7.6 6.5 6.0   BILITOT 3.4* 3.1* 2.4*   ALKPHOS 116 102 115   ALT 71* 64* 58*   AST 57* 38 39       Lab Results   Component Value Date    INR 1.3 12/04/2022     Lab Results   Component Value Date    TRIG 91 12/04/2022    HDL 50 12/04/2022    CHOLHDL 27.6 12/04/2022     Lab Results   Component Value Date    HGBA1C 8.9 (H) 12/04/2022     No results found for: PROTEINCSF, GLUCCSF, WBCCSF    Imaging:  I have reviewed and interpreted the pertinent imaging and lab results.      X-Ray Chest AP Portable  HISTORY: hypoxemia    FINDINGS: Portable chest radiograph at 2102 hours compared to prior exams shows stable enlarged cardiac silhouette and normal pulmonary vascularity.    The lungs are normally expanded, with no consolidation, large pleural effusion, or evidence of pulmonary edema. No confluent infiltrates or pneumothorax. There are no significant osseous abnormalities.    IMPRESSION: No evidence of active cardiopulmonary disease.    Electronically signed by:  Jersey Coronado MD  12/4/2022 7:59 AM CST Workstation: [id number]  US Carotid Bilateral  CMS MANDATED QUALITY DATA - CAROTID - [id number]    HISTORY: CVA,  syncope.    FINDINGS: Grayscale, color and spectral Doppler analysis was performed. Criteria for stenosis is based upon the Society of Radiologists in Ultrasound Consensus Conference, 2003 (Radiology, November 2003, 229, 340-346). This is in accordance with NASCET criteria. No prior studies for comparison.    Gray scale images show mild diffuse hypoechoic carotid intimal hyperplasia. There is no significant atheromatous plaque of the carotid  bulbs or ICA origins.    Peak systolic velocity in the proximal right ICA is 26.2 cm/sec, and in the distal right ICA 14.7 cm/sec, with ICA/CCA ratio of 0.7.    Peak systolic velocity in the proximal left ICA is 25.5 cm/sec, and in the distal left ICA 29.8 cm/sec, with ICA/CCA ratio of 0.9.    The vertebral arteries are patent, with normal waveforms, and normal antegrade flow bilaterally.    IMPRESSION:  1. Carotid intimal hyperplasia, with no findings of hemodynamically significant carotid arterial stenosis or vascular occlusion.  2. Patent vertebral arteries with antegrade flow bilaterally.    Electronically signed by:  Jersey Coronado MD  12/4/2022 7:32 AM CST Workstation: [id number]         ASSESSMENT & PLAN:    Acute ischemic stroke - multifocal, concern for embolic etiology  CKD   Elevated troponins  52-year-old man with history of CHF who presented with right-sided weakness.  Did not receive tPA as he was outside of the window.  MRI brain showed bilateral anterior and posterior circulation strokes concerning for embolic etiology.  Patient will undergo RAGHAV.    Workup  CTH: Loss of gray-white matter distinction in involving the left basal ganglia  CTA head and neck:  Was not performed due to kidney function  MRI brain:  Acute ischemic infarcts involving right frontoparietal lobe, left corona radiata, left frontal lobe.  MRA brain: pending   ECHO 11/2022:  EF 40%, severe left atrial enlargement, grade 3 left ventricular diastolic dysfunction  RAGHAV pending  LDL: 112  HbA1c: 8.9         Plan  Admitted for further stroke workup.  Etiology of stroke is cryptogenic however high concern for cardioembolism.  Continue with Aspirin 81 mg and Lipitor 80mg for now, patient may require anticoagulation  Slowly normalize blood pressure  Recommended transesophageal echocardiogram, pending  Cardiology and Nephrology following  PT OT  Speech therapy  DVT prophylaxis with chemo/SCD prophylaxis  Discussed lifestyle modifications as  prophylactic measures for stroke prevention including, adequate blood pressure management, healthy diet and regular exercise.        Thank you kindly for including us in the care of this patient. Please do not hesitate to contact us with any questions.      Critical Care:  45 minutes of critical care time has been spent evaluating with the patient. Time includes chart review not limited to diagnostic imaging, labs, and vitals, patient assessment, discussion with family and nursing, current order evaluations, and new order entries.       Alison Nelson MD  Neurology/vascular Neurology  Date of Service: 12/05/2022

## 2022-12-05 NOTE — NURSING
Spoke with Diagnostic Cardiology at 0915. Pt's K less that 3.0 required replacement overnight before any anesthesia can be administered per Dr. Ryder. Due to pt's Cr/BUN replacement was not ordered on 12/4. At this time, Pt is in the process of receiving the 40 mEq of IV K riders that were ordered this AM in hopes that K serum levels will rise enough to safely administer anesthesia for RAGHAV procedure. Will redraw K level after last rider completes and inform diagnostic cardiology. Held AM dose of 60mg lasix as pt's urine output this am as of 07:00 is already >800ml, clear, yellow. BP is currently 133/94 with a pulse rage of 65-70 BPM. Will continue to monitor. Pt to remain NPO for procedure.

## 2022-12-05 NOTE — PT/OT/SLP EVAL
"Physical Therapy Evaluation    Patient Name:  Spencer Burton Jr.   MRN:  [id number]    Recommendations:     Discharge Recommendations: rehabilitation facility   Discharge Equipment Recommendations:  (TBD)   Barriers to discharge: Decreased caregiver support    Assessment:     Spencer Burton Jr. is a 52 y.o. male admitted with a medical diagnosis of Acute ischemic left MCA stroke.  He presents with the following impairments/functional limitations: weakness, impaired endurance, impaired self care skills, impaired functional mobility, gait instability, impaired balance, decreased safety awareness, impaired cardiopulmonary response to activity.    Pt found in L sidelying and somnolent/slow to process questions & respond. Pt able to tell PT/SPT his name, month/day of birth but not year and answered that he lives with his mother. After that pt started trying to write his responses in air which PT/SPT did not recognize. When asked to continue to verbally respond pt stated "I'm trying, but it's hard."  During the session when pt was falling asleep his resp rate was 5-11 rpm then would increase to as many as 45 rpm when awake and attempting to answer a question. The pt sat up in bed long sitting with SBA and telemetry alarmed indicating the pt was in Asystole although he was not, but PT had pt return to supine and session was ended at that point with RN present. The pt tolerated session poorly and has the following co-morbidities: HTN, DM, COPd, CKD, CHF, Encephalopathy.  Pt would benefit from acute PT during hospitalization to increase strength, endurance and safety with mobility and would benefit from IRF prior to discharge home.    Rehab Prognosis: Fair; patient would benefit from acute skilled PT services to address these deficits and reach maximum level of function.    Recent Surgery: * No surgery found *      Plan:     During this hospitalization, patient to be seen 6 x/week to address the identified rehab impairments via " "gait training, therapeutic activities, therapeutic exercises, neuromuscular re-education and progress toward the following goals:    Plan of Care Expires:  01/09/23    Subjective     Chief Complaint: Pt stated "I'm trying, but it's hard" when asked to respond to questions verbally.  Patient/Family Comments/goals: IRF  Pain/Comfort:  Pain Rating 1:  (Pt did not indicate pain)    Patients cultural, spiritual, Religion conflicts given the current situation:      Living Environment:  Pt lives with his mother, but was unable to describe home environment.  Prior to admission, patients level of function was independent mobility and driving.  Equipment used at home: bedside commode, walker, rolling, crutches.  DME owned (not currently used): none.  Upon discharge, patient will have assistance from facility staff then his mother.    Objective:     Communicated with SERGIO Nguyen prior to session.  Patient found left sidelying with bed alarm, blood pressure cuff  upon PT entry to room.    General Precautions: Standard, fall, aphasia, diabetic  Orthopedic Precautions:N/A   Braces: N/A  Respiratory Status: Nasal cannula, flow 3 L/min    Exams:  Cognitive Exam:  Patient is partially oriented to Person (year "1992" instead of 1972) and unable to answer any other questions but that he lives with his mother.  RLE ROM: WFL  RLE Strength: Unable to test  LLE ROM: WFL  LLE Strength: Unable to test    Functional Mobility:  Bed Mobility:     Supine to Sit: stand by assistance to achieve long sitting in bed then telemetry alarmed that pt was in Asystole(which he was not), but PT had pt return to supine  and session was ended due to pt's inability/unwillingness to remain awake for active participation with PT including following directions and answering questions.   Sit to Supine: minimum assistance      AM-PAC 6 CLICK MOBILITY  Total Score:19       Treatment & Education:  Pt was educated on the following: call light use, importance of OOB " activity and functional mobility to negate the negative effects of prolonged bed rest during this hospitalization, safe transfers/ambulation and discharge planning recommendations/options.      Patient left HOB elevated with all lines intact, call button in reach, bed alarm on, and RN present.    GOALS:   Multidisciplinary Problems       Physical Therapy Goals          Problem: Physical Therapy    Goal Priority Disciplines Outcome Goal Variances Interventions   Physical Therapy Goal     PT, PT/OT      Description: Goals to be met by: 23     Patient will increase functional independence with mobility by performin. Supine to sit with Supervision  2. Sit to stand transfer with Supervision  3. Bed to chair transfer with Supervision using Rolling Walker  4. Gait  x 150 feet with Supervision using Rolling Walker.                              History:     Past Medical History:   Diagnosis Date    CHF (congestive heart failure)     COPD (chronic obstructive pulmonary disease)     Diabetes mellitus     Hyperlipidemia     Hypertension        Past Surgical History:   Procedure Laterality Date    APPENDECTOMY      CHOLECYSTECTOMY         Time Tracking:     PT Received On: 22  PT Start Time: 1018     PT Stop Time: 1035  PT Total Time (min): 17 min     Billable Minutes: Evaluation 9 and Therapeutic Activity 8      2022

## 2022-12-05 NOTE — PROGRESS NOTES
[facility] Medicine  Progress Note    Patient name: Spencer Burton Jr.  MRN: [id number]  Admit Date: 12/3/2022   LOS: 1 day     SUBJECTIVE:     Principal problem: Acute ischemic left MCA stroke    Interval History:  Patient off precedex gtt.  He was oriented to self and he tried to tell me he was in a hospital but couldn't get the word out.  He did not know why he was in a hospital.  He was not able to answer most of my questions but he was less agitated and seemed to be able to focus on me.  RAGHAV was held this AM so that his K could be replaced.     Hospital Course:    52 year old male admitted with acute ischemic CVA with MRI brain showing multiple areas of ischemia involving bilateral cerebral hemispheres, left basal ganglia and left cerebellar hemisphere. CT head revealed loss of gray-white matter distinction in the left basal ganglia concerning for acute infarct.  Given infarct was already evident on CT scan of the brain, it was concerning that some of these lesions might be subacute and thus time of last normal was not accurate.  Given this, tpa was decided against.  Patient has olman admitted to the ICU.  He is agitated and encephalopathic.  Blood pressures are severely elevated. Discussed with Nephrology, Neurology, RN and will have arterial line placed for accurate blood pressure measurements and start Precedex gtt as concerned about his agitation and driving up his blood pressure worse- risk/benefit favors starting despite possibly skewing neuro exam. Will still allow for permissive HTN for another day and shoot for dropping blood pressure no lower than  if he does require prn treatment. On asa and statin for secondary prevention.  On IV lasix for acute on chronic HF exacerbation.  Cardiology consulted for the same and elevated troponin. Troponin was initially trending up but has peaked and now trending down.  History is not reliable to assess for chest pain. Cardiology also  consulted for RAGHAV as it is felt this is likely an embolic event. Carotid US with no significant stenosis. CTA head and neck not performed due to renal function.  Nephrology consulted given JR on CKD.  Cr initially 5.1.  Baseline appears to be around 3.8.  Labs also revealing of new transaminitis. Due to hepatic congestion from HF?  Last echo with EF 40% and Grade III diastolic dysfunction. Lactic acid elevated at 3.8 on admission.  No indication of infection at this time.      Scheduled Meds:   aspirin  81 mg Oral Daily    atorvastatin  80 mg Oral QHS    calcitRIOL  0.25 mcg Oral Daily    furosemide (LASIX) injection  60 mg Intravenous BID    insulin detemir U-100  15 Units Subcutaneous QHS    metoprolol  5 mg Intravenous Q6H     Continuous Infusions:   dexmedetomidine (PRECEDEX) infusion Stopped (12/04/22 1800)     PRN Meds:acetaminophen, acetaminophen, aluminum-magnesium hydroxide-simethicone, dextrose 10%, dextrose 10%, dextrose 10%, dextrose 10%, glucagon (human recombinant), glucagon (human recombinant), glucose, glucose, glucose, glucose, hydrALAZINE, HYDROcodone-acetaminophen, melatonin, morphine, naloxone, simethicone, sodium chloride 0.9%, sodium chloride 0.9%    Review of patient's allergies indicates:  No Known Allergies    Review of Systems: As per interval history    OBJECTIVE:     Vital Signs (Most Recent)  Temp: 97.9 °F (36.6 °C) (12/05/22 0701)  Pulse: 68 (12/05/22 1000)  Resp: (!) 24 (12/05/22 1000)  BP: (!) 134/103 (12/05/22 1000)  SpO2: 100 % (12/05/22 1000)    Vital Signs Range (Last 24H):  Temp:  [97.7 °F (36.5 °C)-97.9 °F (36.6 °C)]   Pulse:  [66-82]   Resp:  [0-35]   BP: (133-189)/()   SpO2:  [79 %-100 %]     I & O (Last 24H):  Intake/Output Summary (Last 24 hours) at 12/5/2022 1517  Last data filed at 12/5/2022 0901  Gross per 24 hour   Intake 307.46 ml   Output 4200 ml   Net -3892.54 ml         Physical Exam:    Vitals and nursing note reviewed.     Constitutional:       General: Not  in acute distress.     Appearance: Well-developed.   HENT:      Head: Normocephalic and atraumatic.   Eyes:      Pupils: Pupils are equal, round, and reactive to light.   Cardiovascular:      Rate and Rhythm: Regular rhythm.   Pulmonary:      Effort: Pulmonary effort is normal.      Breath sounds: Normal breath sounds. No wheezing.   Abdominal:      General: There is no distension.      Palpations: Abdomen is soft.      Tenderness: There is no abdominal tenderness. There is no guarding or rebound.   Musculoskeletal:         General: Normal range of motion.      Cervical back: Normal range of motion.   Skin:     Findings: No rash.   Neurological:      Mental Status: More alert. Oriented to self and hospital. Could not tell me he was here for a stroke. Did not answer most of my questions.          Laboratory:  I have reviewed all pertinent lab results within the past 24 hours.  CBC:   Recent Labs   Lab 12/05/22 0321   WBC 10.39   RBC 4.69   HGB 13.1*   HCT 40.3      MCV 86   MCH 27.9   MCHC 32.5       CMP:   Recent Labs   Lab 12/05/22 0321   *   CALCIUM 8.8   ALBUMIN 3.1*   PROT 6.0      K 2.9*   CO2 27      BUN 56*   CREATININE 5.0*   ALKPHOS 115   ALT 58*   AST 39   BILITOT 2.4*       Cardiac markers:   Recent Labs   Lab 12/04/22 0231   CPKMB 6.6*       Microbiology Results (last 7 days)       Procedure Component Value Units Date/Time    Blood culture [[id number]] Collected: 12/04/22 0231    Order Status: Completed Specimen: Blood Updated: 12/05/22 0432     Blood Culture, Routine No Growth to date      No Growth to date    Narrative:      Collection has been rescheduled by [email or web address] at 12/04/2022 00:00 Reason:   Nurse Cassandra said to re-time the labs for 1:30 am.  Collection has been rescheduled by [email or web address] at 12/04/2022 00:00 Reason:   Nurse Cassandra said to re-time the labs for 1:30 am.    Blood culture [[id number]] Collected: 12/04/22 1426    Order Status: Completed Specimen: Blood from  Peripheral, Upper Arm, Left Updated: 12/05/22 0117     Blood Culture, Routine No Growth to date    MRSA Screen by PCR [[id number]] Collected: 12/03/22 2212    Order Status: Completed Specimen: Nasopharyngeal Swab from Nasal Updated: 12/04/22 0019     MRSA SCREEN BY PCR Negative    Blood culture [[id number]]     Order Status: Sent Specimen: Blood             Diagnostic Results:      ASSESSMENT/PLAN:         Active Hospital Problems    Diagnosis  POA    *Acute ischemic left MCA stroke [I63.512]  Yes     Likely L MCA ischemic stroke.  DDx includes post-ictal state and hypertensive encephalopathy.  Rec treating BP to <185/110 and then treating with TNK if not better.  CTA pending for e/o LVO.      Encephalopathy, metabolic [G93.41]  Yes    Acute on chronic congestive heart failure [I50.9]  Yes    COPD (chronic obstructive pulmonary disease) [J44.9]  Yes     Chronic    CKD (chronic kidney disease), stage IV [N18.4]  Yes    Diabetes mellitus with chronic kidney disease [E11.22]  Yes    Hypertension, poor control [I10]  Yes     Chronic      Resolved Hospital Problems   No resolved problems to display.         Plan:     -Continuous hemodynamic monitoring in the ICU for acute ischemic CVA.    -Suspect embolic event and thus RAGHAV requested- planned for 12/5 but holding while replacing K.   -Tele monitoring.   -Carotid US with no significant stenosis.  CTA contraindicated in setting of present renal function.  -ASA and statin for secondary prevention  -Neurology following  -Neuro checks  -PT/OT/ST  -Nephrology consulted for Jacquie on CKD.  Last Cr was 3.8.  -Cardiology consulted for acute on chronic SHF and elevated troponin a little beyond his baseline elevation.  IV lasix.  Watching renal tolerance.   -ISS.  Accuchecks.  -Trending transaminases and bilirubin.  We will see if they improve with diuresis which would suggest hepatic congestion from HF.   -Permissive HTN with treatment parameters. We don't want to overshoot and should  avoid SBP less than 150 at this time.      VTE Risk Mitigation (From admission, onward)           Ordered     IP VTE HIGH RISK PATIENT  Once         12/03/22 2003     Place sequential compression device  Until discontinued         12/03/22 1907                      Department Hospital Medicine  [facility]  Yandy Guerra MD  Date of service: 12/05/2022

## 2022-12-05 NOTE — PT/OT/SLP PROGRESS
Occupational Therapy      Patient Name:  Spencer Burton Jr.   MRN:  [id number]    Patient not seen today secondary to Other (Comment) (Drowsy, unable to keep eyes open to participate). Will follow-up next service date.    12/5/2022

## 2022-12-06 ENCOUNTER — CLINICAL SUPPORT (OUTPATIENT)
Dept: CARDIOLOGY | Facility: HOSPITAL | Age: 52
DRG: 064 | End: 2022-12-06
Payer: MEDICAID

## 2022-12-06 VITALS
DIASTOLIC BLOOD PRESSURE: 102 MMHG | OXYGEN SATURATION: 100 % | HEART RATE: 69 BPM | HEIGHT: 69 IN | SYSTOLIC BLOOD PRESSURE: 157 MMHG | RESPIRATION RATE: 23 BRPM | BODY MASS INDEX: 27.55 KG/M2 | WEIGHT: 186 LBS

## 2022-12-06 LAB
ALBUMIN SERPL BCP-MCNC: 3.2 G/DL (ref 3.5–5.2)
ALP SERPL-CCNC: 105 U/L (ref 55–135)
ALT SERPL W/O P-5'-P-CCNC: 65 U/L (ref 10–44)
ANION GAP SERPL CALC-SCNC: 10 MMOL/L (ref 8–16)
AST SERPL-CCNC: 36 U/L (ref 10–40)
BASOPHILS # BLD AUTO: 0.07 K/UL (ref 0–0.2)
BASOPHILS NFR BLD: 0.6 % (ref 0–1.9)
BILIRUB SERPL-MCNC: 2.3 MG/DL (ref 0.1–1)
BSA FOR ECHO PROCEDURE: 2.03 M2
BUN SERPL-MCNC: 56 MG/DL (ref 6–20)
CALCIUM SERPL-MCNC: 8.8 MG/DL (ref 8.7–10.5)
CHLORIDE SERPL-SCNC: 107 MMOL/L (ref 95–110)
CO2 SERPL-SCNC: 28 MMOL/L (ref 23–29)
CREAT SERPL-MCNC: 4.8 MG/DL (ref 0.5–1.4)
DIFFERENTIAL METHOD: ABNORMAL
EJECTION FRACTION: 38 %
EOSINOPHIL # BLD AUTO: 0.1 K/UL (ref 0–0.5)
EOSINOPHIL NFR BLD: 0.8 % (ref 0–8)
ERYTHROCYTE [DISTWIDTH] IN BLOOD BY AUTOMATED COUNT: 14.6 % (ref 11.5–14.5)
EST. GFR  (NO RACE VARIABLE): 14 ML/MIN/1.73 M^2
GLUCOSE SERPL-MCNC: 133 MG/DL (ref 70–110)
GLUCOSE SERPL-MCNC: 229 MG/DL (ref 70–110)
HCT VFR BLD AUTO: 42.8 % (ref 40–54)
HGB BLD-MCNC: 13.7 G/DL (ref 14–18)
IMM GRANULOCYTES # BLD AUTO: 0.04 K/UL (ref 0–0.04)
IMM GRANULOCYTES NFR BLD AUTO: 0.4 % (ref 0–0.5)
LYMPHOCYTES # BLD AUTO: 1.7 K/UL (ref 1–4.8)
LYMPHOCYTES NFR BLD: 15.7 % (ref 18–48)
MAGNESIUM SERPL-MCNC: 2 MG/DL (ref 1.6–2.6)
MCH RBC QN AUTO: 27.8 PG (ref 27–31)
MCHC RBC AUTO-ENTMCNC: 32 G/DL (ref 32–36)
MCV RBC AUTO: 87 FL (ref 82–98)
MONOCYTES # BLD AUTO: 0.6 K/UL (ref 0.3–1)
MONOCYTES NFR BLD: 5.8 % (ref 4–15)
NEUTROPHILS # BLD AUTO: 8.4 K/UL (ref 1.8–7.7)
NEUTROPHILS NFR BLD: 76.7 % (ref 38–73)
NRBC BLD-RTO: 0 /100 WBC
PLATELET # BLD AUTO: 175 K/UL (ref 150–450)
PMV BLD AUTO: 11.8 FL (ref 9.2–12.9)
POTASSIUM SERPL-SCNC: 3.3 MMOL/L (ref 3.5–5.1)
PROT SERPL-MCNC: 6.4 G/DL (ref 6–8.4)
RBC # BLD AUTO: 4.92 M/UL (ref 4.6–6.2)
SODIUM SERPL-SCNC: 145 MMOL/L (ref 136–145)
WBC # BLD AUTO: 10.92 K/UL (ref 3.9–12.7)

## 2022-12-06 PROCEDURE — 25000003 PHARM REV CODE 250: Performed by: INTERNAL MEDICINE

## 2022-12-06 PROCEDURE — 20000000 HC ICU ROOM

## 2022-12-06 PROCEDURE — 94761 N-INVAS EAR/PLS OXIMETRY MLT: CPT

## 2022-12-06 PROCEDURE — 99900035 HC TECH TIME PER 15 MIN (STAT)

## 2022-12-06 PROCEDURE — 93325 TRANSESOPHAGEAL ECHO (TEE) (CUPID ONLY): ICD-10-PCS | Mod: 26,,, | Performed by: INTERNAL MEDICINE

## 2022-12-06 PROCEDURE — 80053 COMPREHEN METABOLIC PANEL: CPT | Performed by: INTERNAL MEDICINE

## 2022-12-06 PROCEDURE — 92526 ORAL FUNCTION THERAPY: CPT

## 2022-12-06 PROCEDURE — 51702 INSERT TEMP BLADDER CATH: CPT

## 2022-12-06 PROCEDURE — 93312 ECHO TRANSESOPHAGEAL: CPT | Mod: 26,,, | Performed by: INTERNAL MEDICINE

## 2022-12-06 PROCEDURE — 25000003 PHARM REV CODE 250: Performed by: NURSE ANESTHETIST, CERTIFIED REGISTERED

## 2022-12-06 PROCEDURE — 36415 COLL VENOUS BLD VENIPUNCTURE: CPT | Performed by: INTERNAL MEDICINE

## 2022-12-06 PROCEDURE — 63600175 PHARM REV CODE 636 W HCPCS: Performed by: NURSE ANESTHETIST, CERTIFIED REGISTERED

## 2022-12-06 PROCEDURE — 25000003 PHARM REV CODE 250: Performed by: STUDENT IN AN ORGANIZED HEALTH CARE EDUCATION/TRAINING PROGRAM

## 2022-12-06 PROCEDURE — 93312 ECHO TRANSESOPHAGEAL: CPT

## 2022-12-06 PROCEDURE — 94799 UNLISTED PULMONARY SVC/PX: CPT

## 2022-12-06 PROCEDURE — 83735 ASSAY OF MAGNESIUM: CPT | Performed by: INTERNAL MEDICINE

## 2022-12-06 PROCEDURE — 93312 TRANSESOPHAGEAL ECHO (TEE) (CUPID ONLY): ICD-10-PCS | Mod: 26,,, | Performed by: INTERNAL MEDICINE

## 2022-12-06 PROCEDURE — 97116 GAIT TRAINING THERAPY: CPT | Mod: CQ

## 2022-12-06 PROCEDURE — 97535 SELF CARE MNGMENT TRAINING: CPT

## 2022-12-06 PROCEDURE — 93325 DOPPLER ECHO COLOR FLOW MAPG: CPT | Mod: 26,,, | Performed by: INTERNAL MEDICINE

## 2022-12-06 PROCEDURE — 93320 TRANSESOPHAGEAL ECHO (TEE) (CUPID ONLY): ICD-10-PCS | Mod: 26,,, | Performed by: INTERNAL MEDICINE

## 2022-12-06 PROCEDURE — 93320 DOPPLER ECHO COMPLETE: CPT | Mod: 26,,, | Performed by: INTERNAL MEDICINE

## 2022-12-06 PROCEDURE — 63600175 PHARM REV CODE 636 W HCPCS: Performed by: INTERNAL MEDICINE

## 2022-12-06 PROCEDURE — 99900031 HC PATIENT EDUCATION (STAT)

## 2022-12-06 PROCEDURE — 27000221 HC OXYGEN, UP TO 24 HOURS

## 2022-12-06 PROCEDURE — 85025 COMPLETE CBC W/AUTO DIFF WBC: CPT | Performed by: INTERNAL MEDICINE

## 2022-12-06 PROCEDURE — 63600175 PHARM REV CODE 636 W HCPCS: Performed by: FAMILY MEDICINE

## 2022-12-06 RX ORDER — AMLODIPINE BESYLATE 5 MG/1
5 TABLET ORAL DAILY
Status: DISCONTINUED | OUTPATIENT
Start: 2022-12-06 | End: 2022-12-07

## 2022-12-06 RX ORDER — EPINEPHRINE 0.1 MG/ML
INJECTION INTRAVENOUS
Status: COMPLETED
Start: 2022-12-06 | End: 2022-12-06

## 2022-12-06 RX ORDER — FLUMAZENIL 0.1 MG/ML
INJECTION INTRAVENOUS
Status: COMPLETED
Start: 2022-12-06 | End: 2022-12-06

## 2022-12-06 RX ORDER — PROPOFOL 10 MG/ML
VIAL (ML) INTRAVENOUS
Status: DISCONTINUED | OUTPATIENT
Start: 2022-12-06 | End: 2022-12-06

## 2022-12-06 RX ORDER — ATROPINE SULFATE 0.1 MG/ML
INJECTION INTRAVENOUS
Status: COMPLETED
Start: 2022-12-06 | End: 2022-12-06

## 2022-12-06 RX ORDER — NICARDIPINE HYDROCHLORIDE 0.2 MG/ML
0-15 INJECTION INTRAVENOUS CONTINUOUS
Status: DISCONTINUED | OUTPATIENT
Start: 2022-12-06 | End: 2022-12-08 | Stop reason: HOSPADM

## 2022-12-06 RX ADMIN — FUROSEMIDE 60 MG: 10 INJECTION, SOLUTION INTRAVENOUS at 11:12

## 2022-12-06 RX ADMIN — FUROSEMIDE 60 MG: 10 INJECTION, SOLUTION INTRAVENOUS at 05:12

## 2022-12-06 RX ADMIN — METOPROLOL TARTRATE 5 MG: 1 INJECTION, SOLUTION INTRAVENOUS at 12:12

## 2022-12-06 RX ADMIN — METOPROLOL TARTRATE 5 MG: 1 INJECTION, SOLUTION INTRAVENOUS at 05:12

## 2022-12-06 RX ADMIN — AMLODIPINE BESYLATE 5 MG: 5 TABLET ORAL at 12:12

## 2022-12-06 RX ADMIN — ATORVASTATIN CALCIUM 80 MG: 40 TABLET, FILM COATED ORAL at 08:12

## 2022-12-06 RX ADMIN — INSULIN DETEMIR 15 UNITS: 100 INJECTION, SOLUTION SUBCUTANEOUS at 08:12

## 2022-12-06 RX ADMIN — PROPOFOL 50 MG: 10 INJECTION, EMULSION INTRAVENOUS at 11:12

## 2022-12-06 RX ADMIN — HYDRALAZINE HYDROCHLORIDE 10 MG: 20 INJECTION, SOLUTION INTRAMUSCULAR; INTRAVENOUS at 02:12

## 2022-12-06 RX ADMIN — CALCITRIOL CAPSULES 0.25 MCG 0.25 MCG: 0.25 CAPSULE ORAL at 12:12

## 2022-12-06 RX ADMIN — SODIUM CHLORIDE: 0.9 INJECTION, SOLUTION INTRAVENOUS at 11:12

## 2022-12-06 RX ADMIN — ASPIRIN 81 MG CHEWABLE TABLET 81 MG: 81 TABLET CHEWABLE at 12:12

## 2022-12-06 RX ADMIN — NICARDIPINE HYDROCHLORIDE 2.5 MG/HR: 0.2 INJECTION INTRAVENOUS at 02:12

## 2022-12-06 NOTE — PT/OT/SLP PROGRESS
Physical Therapy Treatment    Patient Name:  Spencer Burton Jr.   MRN:  [id number]    Recommendations:     Discharge Recommendations: rehabilitation facility  Discharge Equipment Recommendations:  (TBD)  Barriers to discharge:  increased assist with mobility    Assessment:     Spencer Burton Jr. is a 52 y.o. male admitted with a medical diagnosis of Acute ischemic left MCA stroke.  He presents with the following impairments/functional limitations: weakness, impaired endurance, impaired self care skills, impaired functional mobility, gait instability, impaired balance, decreased safety awareness, impaired cardiopulmonary response to activity.  Pt agreeable to visit. Pt impulsive with mobility and requires max verbal cuing for safety. Pt with poor command follow. Pt performed sit to stand with CGA and no AD with verbal cuing for safety. Pt ambulated 30' with RW and min assist due to pt being impulsive with poor RW management. As pt returned to chair, he was cued to turn to the left as not to get his lines tangled but instead turned to the right and would not follow commands to stop so that lines could be straightened out causing monitor line to tangle around therapist with pt safely transferred back to chair.    Rehab Prognosis: Fair; patient would benefit from acute skilled PT services to address these deficits and reach maximum level of function.    Recent Surgery: * No surgery found *      Plan:     During this hospitalization, patient to be seen 6 x/week to address the identified rehab impairments via gait training, therapeutic activities, therapeutic exercises, neuromuscular re-education and progress toward the following goals:    Plan of Care Expires:  01/09/23    Subjective     Chief Complaint: none verbalized  Patient/Family Comments/goals: to get better  Pain/Comfort:  Pain Rating 1: 0/10      Objective:     Communicated with RN prior to session.  Patient found up in chair with chair check, blood pressure cuff,  telemetry, oxygen, pulse ox (continuous) upon PT entry to room.     General Precautions: Standard, fall, aphasia, diabetic  Orthopedic Precautions: N/A  Braces: N/A  Respiratory Status: Nasal cannula, flow 3 L/min     Functional Mobility:  Transfers:     Sit to Stand:  contact guard assistance with no AD  Gait: x 30' with RW and Brody due to impulsive movements and poor RW management. Poor command follow      AM-PAC 6 CLICK MOBILITY          Treatment & Education:  Pt educated on importance of time OOB, importance of intermittent mobility, safe techniques for transfers/ambulation, discharge recommendations/options, and use of call light for assistance and fall prevention.      Patient left up in chair with all lines intact, call button in reach, chair alarm on, and RN notified..    GOALS:   Multidisciplinary Problems       Physical Therapy Goals          Problem: Physical Therapy    Goal Priority Disciplines Outcome Goal Variances Interventions   Physical Therapy Goal     PT, PT/OT      Description: Goals to be met by: 23     Patient will increase functional independence with mobility by performin. Supine to sit with Supervision  2. Sit to stand transfer with Supervision  3. Bed to chair transfer with Supervision using Rolling Walker  4. Gait  x 150 feet with Supervision using Rolling Walker.                              Time Tracking:     PT Received On: 22  PT Start Time: 1002     PT Stop Time: 1017  PT Total Time (min): 15 min     Billable Minutes: Gait Training 15    Treatment Type: Treatment  PT/PTA: PTA     PTA Visit Number: 1     2022

## 2022-12-06 NOTE — PLAN OF CARE
Problem: Occupational Therapy  Goal: Occupational Therapy Goal  Description: Goals to be met by: 1/4/2022     Patient will increase functional independence with ADLs by performing:    UE Dressing with Stand-by Assistance.  Grooming while standing at sink with Stand-by Assistance.  Toileting from toilet with Stand-by Assistance for hygiene and clothing management.   Supine to sit with Stand-by Assistance.  Toilet transfer to toilet with Stand-by Assistance.  Upper extremity exercise program x10 reps per handout, with assistance as needed.  Attend to ADL task for 5 minutes with minimal verbal/tactile cues.    Outcome: Ongoing, Progressing

## 2022-12-06 NOTE — ANESTHESIA POSTPROCEDURE EVALUATION
Anesthesia Post Evaluation    Patient: Spencer Burton Jr.    Procedure(s) Performed: * No procedures listed *    Final Anesthesia Type: MAC      Patient location during evaluation: ICU  Patient participation: Yes- Able to Participate  Level of consciousness: awake and alert  Post-procedure vital signs: reviewed and stable  Pain management: adequate  Airway patency: patent    PONV status at discharge: No PONV  Anesthetic complications: no      Cardiovascular status: hemodynamically stable  Respiratory status: unassisted and spontaneous ventilation  Follow-up not needed.          Vitals Value Taken Time   /116 12/06/22 1151   Temp 36.7 °C (98 °F) 12/06/22 0701   Pulse 66 12/06/22 1153   Resp 17 12/06/22 1153   SpO2 93 % 12/06/22 1153   Vitals shown include unvalidated device data.      No case tracking events are documented in the log.      Pain/Emmy Score: No data recorded

## 2022-12-06 NOTE — NURSING
Pt stated name/  correct. Unable to tell  RN correct year or location. Stated  and [facility].

## 2022-12-06 NOTE — PROGRESS NOTES
INPATIENT NEPHROLOGY CONSULT   [facility]    Patient Name: Spencer Burton Jr.  Date: 12/06/2022    Reason for consultation: JR    Chief Complaint:   Chief Complaint   Patient presents with    Right Sided Weakness     History of Present Illness:  Spencer Burton Jr. is a 52 y.o. Black or  male with known history of CHF presented with right sided weakness. He was noticed stumbling in gas station so the staff there called EMS and he was brought to the ER for evaluation. CT scan and MRI consistent with bilateral cerebral hemispheres, left basal ganglia and left cerebellar hemisphere. Carotid duplex neg. Abd u/s with occluded portal vein. Tele-stroke consulted and TPA was not given due to waxing and waning symptoms and elevated blood pressures. We are consulted for JR.    11/13/22 renal imaging with 8-9cm kidneys c/w atrophy  No utility in renal duplex at this stage     Interval History:  12/4- /141, on 3-4L NC, UOP 965cc- spoke with Dr. Guerra and nurse- advise precedex gtt and arterial line- we can then get a true sense of what his BP medication needs are and get accurate labs to replete electrolytes appropriately- continue rolle  12/5 VSS. Agree with K replacement until he can have anesthesia today.  12/6 VSS, no new complains. RAGHAV today. K is better.    Plan of Care:    Acute CVA  JR/CKD IV- likely progressive CKD due to uncontrolled HTN  HTN emergency/Demand ischemia  Uncontrolled DMII  Hypokalemia  SHPT  Anemia of CKD    Plan:    - agree with IV KCL replacement  - continue rolle; no nsaids or IV contrast; dose meds for CrCl < 20  - aim for -160- may need cardene gtt- ok with clonidine patch, norvasc, hydral/nitrate  - ok with lasix 60mg IV BID as needed, hold until K is corrected  - no RAAS agents  - on long acting insulin- this is fine  - will replete K, Mg if needed  - started calcitriol daily  - no acute JONAH needs    He may require RRT this admission- too soon to  tell.    Thank you for allowing us to participate in this patient's care. We will continue to follow.    Vital Signs:  Temp Readings from Last 3 Encounters:   12/06/22 98 °F (36.7 °C) (Oral)   11/15/22 98.2 °F (36.8 °C) (Oral)   11/06/22 98.4 °F (36.9 °C) (Oral)       Pulse Readings from Last 3 Encounters:   12/06/22 80   11/15/22 76   11/06/22 96       BP Readings from Last 3 Encounters:   12/06/22 (!) 197/143   11/15/22 (!) 170/119   11/06/22 (!) 208/148       Weight:  Wt Readings from Last 3 Encounters:   12/04/22 84.5 kg (186 lb 4.6 oz)   11/14/22 90.7 kg (200 lb)   11/06/22 86.2 kg (190 lb)       INS/OUTS:  I/O last 3 completed shifts:  In: 505 [P.O.:480; IV Piggyback:25]  Out: 3450 [Urine:3450]  I/O this shift:  In: -   Out: 75 [Urine:75]    Past Medical & Surgical History:  Past Medical History:   Diagnosis Date    CHF (congestive heart failure)     COPD (chronic obstructive pulmonary disease)     Diabetes mellitus     Hyperlipidemia     Hypertension        Past Surgical History:   Procedure Laterality Date    APPENDECTOMY      CHOLECYSTECTOMY         Past Social History:  Social History     Socioeconomic History    Marital status: Single   Tobacco Use    Smoking status: Never    Smokeless tobacco: Never   Substance and Sexual Activity    Alcohol use: No     Comment: socially    Drug use: No    Sexual activity: Yes     Partners: Female     Social Determinants of Health     Financial Resource Strain: Unknown    Difficulty of Paying Living Expenses: Patient refused   Food Insecurity: Unknown    Worried About Running Out of Food in the Last Year: Patient refused    Ran Out of Food in the Last Year: Patient refused   Transportation Needs: Unknown    Lack of Transportation (Medical): Patient refused    Lack of Transportation (Non-Medical): Patient refused   Physical Activity: Unknown    Days of Exercise per Week: Patient refused    Minutes of Exercise per Session: Patient refused   Stress: Unknown    Feeling of  Stress : Patient refused   Social Connections: Unknown    Frequency of Communication with Friends and Family: Patient refused    Frequency of Social Gatherings with Friends and Family: Patient refused    Attends Mosque Services: Patient refused    Active Member of Clubs or Organizations: Patient refused    Attends Club or Organization Meetings: Patient refused    Marital Status: Patient refused   Housing Stability: Unknown    Unable to Pay for Housing in the Last Year: Patient refused    Unstable Housing in the Last Year: Patient refused       Medications:  Scheduled Meds:   aspirin  81 mg Oral Daily    atorvastatin  80 mg Oral QHS    atropine        calcitRIOL  0.25 mcg Oral Daily    EPINEPHrine        flumazeniL        furosemide (LASIX) injection  60 mg Intravenous BID    insulin detemir U-100  15 Units Subcutaneous QHS    metoprolol  5 mg Intravenous Q6H     Continuous Infusions:   dexmedetomidine (PRECEDEX) infusion Stopped (12/04/22 1800)     PRN Meds:.acetaminophen, acetaminophen, aluminum-magnesium hydroxide-simethicone, dextrose 10%, dextrose 10%, glucagon (human recombinant), glucose, glucose, hydrALAZINE, HYDROcodone-acetaminophen, melatonin, morphine, naloxone, simethicone, sodium chloride 0.9%, sodium chloride 0.9%  No current facility-administered medications on file prior to encounter.     Current Outpatient Medications on File Prior to Encounter   Medication Sig Dispense Refill    acetaminophen (TYLENOL) 325 MG tablet Take 650 mg by mouth every 6 (six) hours as needed.      albuterol (PROVENTIL/VENTOLIN HFA) 90 mcg/actuation inhaler Inhale 1-2 puffs into the lungs every 6 (six) hours as needed for Wheezing. Rescue 6.7 g 0    amLODIPine (NORVASC) 10 MG tablet Take 1 tablet (10 mg total) by mouth once daily. 30 tablet 0    amLODIPine (NORVASC) 10 MG tablet Take 1 tablet by mouth once daily.      aspirin 81 MG Chew Take 81 mg by mouth once daily.      atorvastatin (LIPITOR) 40 MG tablet Take 1 tablet  "(40 mg total) by mouth every evening. 30 tablet 0    calcitRIOL (ROCALTROL) 0.25 MCG Cap Take 0.25 mcg by mouth once daily.      calcitRIOL (ROCALTROL) 0.25 MCG Cap Take 1 capsule by mouth once daily.      ciprofloxacin HCl (CIPRO) 500 MG tablet Take 500 mg by mouth 2 (two) times daily.      cloNIDine 0.3 mg/24 hr td ptwk (CATAPRES) 0.3 mg/24 hr Place 1 patch onto the skin once a week. medically necessary with dx codes 401.9, 428.43, 428.0. 30 patch 11    clotrimazole (LOTRIMIN) 1 % cream Apply topically 2 (two) times daily. for 10 days (Patient taking differently: Apply 1 application topically 2 (two) times daily.) 1 each 0    EScitalopram oxalate (LEXAPRO) 20 MG tablet Take 20 mg by mouth once daily.      ferrous sulfate (FEOSOL) 325 mg (65 mg iron) Tab tablet Take 1 tablet by mouth once daily.      fluticasone propionate (FLONASE) 50 mcg/actuation nasal spray 1 spray (50 mcg total) by Each Nostril route 2 (two) times daily as needed for Rhinitis. 15 g 0    furosemide (LASIX) 40 MG tablet Take 40 mg by mouth 2 (two) times daily.      glipiZIDE (GLUCOTROL) 10 MG tablet Take 1 tablet (10 mg total) by mouth 2 (two) times daily with meals. 60 tablet 0    HUMULIN 70/30 U-100 INSULIN 100 unit/mL (70-30) injection Inject into the skin 3 (three) times daily before meals.      HYDROcodone-acetaminophen (NORCO) 5-325 mg per tablet Take 1 tablet by mouth every 6 (six) hours as needed.      insulin detemir (LEVEMIR) 100 unit/mL injection Inject 15 Units into the skin every evening. 4.5 mL 2    insulin needles, disposable, 31 x 3/16 " Ndle Use daily to inject insulin  DX:250.00 100 each 11    isosorbide mononitrate (IMDUR) 60 MG 24 hr tablet Take 60 mg by mouth once daily.      lancets (ONE TOUCH DELICA LANCETS) 33 gauge Misc 1 lancet by Misc.(Non-Drug; Combo Route) route 4 (four) times daily. DX:250.00   Medically necessary to test blood sugar 200 each 6    loratadine (CLARITIN) 10 mg tablet Take 1 tablet (10 mg total) by " "mouth once daily. 30 tablet 0    losartan (COZAAR) 100 MG tablet Take 100 mg by mouth.      ondansetron (ZOFRAN-ODT) 4 MG TbDL Take 1 tablet (4 mg total) by mouth every 6 (six) hours as needed (nausea/vomiting). 20 tablet 0    oxyCODONE-acetaminophen (PERCOCET) 5-325 mg per tablet Take 1 tablet by mouth every 4 (four) hours as needed.      pioglitazone (ACTOS) 15 MG tablet Take 1 tablet by mouth once daily.      potassium chloride (KLOR-CON) 10 MEQ TbSR       sildenafiL (VIAGRA) 100 MG tablet TAKE ONE DAILY AS NEEDED      silver sulfADIAZINE 1% (SILVADENE) 1 % cream Apply topically.      sulfamethoxazole-trimethoprim 400-80mg (BACTRIM,SEPTRA) 400-80 mg per tablet Take 1 tablet by mouth 2 (two) times daily.      traMADoL (ULTRAM) 50 mg tablet Take 1 tablet (50 mg total) by mouth every 8 (eight) hours as needed for Pain. 9 tablet 0    TRUE METRIX AIR GLUCOSE METER Misc USE TO CHECK GLUCOSE TWICE DAILY AS DIRECTED      TRUE METRIX GLUCOSE TEST STRIP Strp 1 strip 2 (two) times daily.      TRUEPLUS LANCETS 30 gauge Misc USE 1 TO CHECK GLUCOSE TWICE DAILY         Allergies:  Patient has no known allergies.    Past Family History:  Reviewed; refer to Hospitalist Admission Note    Review of Systems:  Limited    Physical Exam:  BP (!) 197/143   Pulse 80   Temp 98 °F (36.7 °C) (Oral)   Resp 13   Ht 5' 9" (1.753 m)   Wt 84.5 kg (186 lb 4.6 oz)   SpO2 (!) 94%   BMI 27.51 kg/m²     General Appearance:    Anxious at times, then somnolent, cannot hold conversation, doesn't demonstrate understanding or follows commands or verbalize anything, appears stated age   Head:    Normocephalic, atraumatic   Eyes:    PER, EOMI, and conjunctiva/sclera clear bilaterally        Mouth:   Moist mucus membranes   Back:     No CVA tenderness   Lungs:     Coarse   Heart:    Regular rate and rhythm, S1 and S2 normal, no murmur, rub   or gallop   Abdomen:     Soft, non-tender, non-distended, bowel sounds active all four   quadrants, no RT or " guarding, no masses, no organomegaly   Extremities:   Warm and well perfused, distal pulses intact, no cyanosis or    peripheral edema   MSK:   No joint or muscle swelling, tenderness or deformity   Skin:   Skin color, texture, turgor normal, no rashes or lesions   Neurologic/Psychiatric:   Moves all extremities     Results:  Lab Results   Component Value Date     12/06/2022    K 3.3 (L) 12/06/2022     12/06/2022    CO2 28 12/06/2022    BUN 56 (H) 12/06/2022    CREATININE 4.8 (H) 12/06/2022    CALCIUM 8.8 12/06/2022    ANIONGAP 10 12/06/2022    ESTGFRAFRICA 23 (A) 11/03/2021    EGFRNONAA 20 (A) 11/03/2021       Lab Results   Component Value Date    CALCIUM 8.8 12/06/2022    PHOS 4.9 (H) 12/04/2022       Recent Labs   Lab 12/06/22  0435   WBC 10.92   RBC 4.92   HGB 13.7*   HCT 42.8      MCV 87   MCH 27.8   MCHC 32.0       I have spent >  minutes providing care for this patient for the above diagnoses. These services have included chart/data/imaging review, evaluation, exam, formulation of plan, , note preparation, and discussions with staff involved in this patient's care.    Joseph Osorio MD  [facility]  [address] [place] [id number]  [phone] (p)  [phone] (f)

## 2022-12-06 NOTE — PT/OT/SLP PROGRESS
Speech Language Pathology Treatment    Patient Name:  Spencer Burton Jr.   MRN:  [id number]  Admitting Diagnosis: Acute ischemic left MCA stroke    Recommendations:                 General Recommendations:  Dysphagia therapy and Cognitive-linguistic therapy  Diet recommendations:  Mechanical soft, Liquid Diet Level: Thin   Aspiration Precautions:  SUPERVISION with meal intake; and Strict aspiration precautions   General Precautions: Standard, fall  Communication strategies:  none    Subjective     Pt seen at b/s.  Pt was awakened from sleep.  Low alertness.  Nsg staff was present.  Patient goals: N/A     Pain/Comfort:   No    Respiratory Status: Nasal cannula, flow 2 L/min    Objective:     Has the patient been evaluated by SLP for swallowing?      Keep patient NPO?     Current Respiratory Status:        Swallowing:  Nsg staff and pt reported no difficulty with current meals.  Thin liquid trials were WFL; no overt s/s of aspiration/penetration.  However, pt with impulsive self-administration rate of intake with drinking, despite verbal cues, which places pt at a risk for asp/pene.      Cognition:    Pt participated in general orientation tasks.  Pt oriented to month, date, and CECILY.  Not oriented to year.  Even with cues/ choice of 3 pt chose/selected -- 1999 and 2018.  Assessment:     Pt demonstrates impulsive self-feeding.  Rec:  Cont current modified diet level / thin liquids with strict aspiration precautions and SUP with meal intake.  SLP will continue to follow for goals listed below.      Goals:   Multidisciplinary Problems       SLP Goals       Not on file                1.  Pt will demonstrate no overt s/s of aspiration/penetration with IDDSI-7/thin liquids, >95% of the time.  2.  Pt will utilize trained swallowing precautions with MIN A.  3.  Pt will complete a cog/ling evaluation.    Plan:     Patient to be seen:  3xs wk.  Plan of Care expires:     Plan of Care reviewed with:  patient   SLP Follow-Up:  Yes        Discharge recommendations:   (TBD)   Barriers to Discharge:  None    Time Tracking:     SLP Treatment Date:   12/06/22  Speech Start Time:  1450  Speech Stop Time:  1500     Speech Total Time (min):  10 min    Billable Minutes: Speech Therapy Individual 10 mins    12/06/2022

## 2022-12-06 NOTE — PLAN OF CARE
POC and stroke education reviewed with pt-verbalized understanding. Pt's mentation waxes/wanes but he is much more pleasant and cooperative today. RAGHAV done today-results reviewed with pt, daughter and mother. Plan for anticoag therapy once cleared by neuro. Hypertensive throughout shift despite IV PRN meds. Cardene gtt started- goal /100. Up to chair with therapy today. Safety checks done. Will cont to monitor and report off to oncoming nurse to assume care.     Problem: Adult Inpatient Plan of Care  Goal: Plan of Care Review  Outcome: Ongoing, Progressing     Problem: Adult Inpatient Plan of Care  Goal: Patient-Specific Goal (Individualized)  Outcome: Ongoing, Progressing

## 2022-12-06 NOTE — PT/OT/SLP PROGRESS
Occupational Therapy   Treatment    Name: Spencer Burton Jr.  MRN: [id number]  Admitting Diagnosis:  Acute ischemic left MCA stroke       Recommendations:     Discharge Recommendations: rehabilitation facility  Discharge Equipment Recommendations:   (TBD)  Barriers to discharge:       Assessment:     Spencer Burton Jr. is a 52 y.o. male with a medical diagnosis of Acute ischemic left MCA stroke.  Pt agreeable to OT therapy session this AM. Performance deficits affecting function are weakness, impaired endurance, impaired self care skills, impaired functional mobility, gait instability, impaired balance, decreased safety awareness, impaired cardiopulmonary response to activity. Pt impulsive throughout session and required max verbal/tactile cues for safety.   Pt's BUE strength and ROM are WFL this date.    Rehab Prognosis:  Fair; patient would benefit from acute skilled OT services to address these deficits and reach maximum level of function.       Plan:     Patient to be seen 5 x/week to address the above listed problems via self-care/home management, therapeutic activities, therapeutic exercises  Plan of Care Expires: 01/04/23  Plan of Care Reviewed with: patient    Subjective     Pain/Comfort:  Pain Rating 1: 0/10    Objective:     Communicated with: nursing prior to session.  Patient found HOB elevated with blood pressure cuff, pulse ox (continuous), telemetry, peripheral IV, bed alarm, rolle catheter upon OT entry to room.    General Precautions: Standard, fall, aphasia    Orthopedic Precautions:N/A  Braces: N/A  Respiratory Status: Nasal cannula, flow 2 L/min     Occupational Performance:     Bed Mobility:    Patient completed Supine to Sit with contact guard assistance     Functional Mobility/Transfers:  Patient completed Sit <> Stand Transfer with contact guard assistance  with  rolling walker   Patient completed Bed <> Chair Transfer using Stand Pivot technique with contact guard assistance with no assistive  device    Activities of Daily Living:  Grooming: stand by assistance seated EOB for oral care with swab and to wash face with warmed washcloth  Toileting: rolle      Treatment & Education:  Pt educated on role of OT/POC, importance of OOB/EOB activity, use of call bell, and safety during ADLs, transfers, and functional mobility.    Patient left up in chair with all lines intact, call button in reach, chair alarm on, and RN notified    GOALS:   Multidisciplinary Problems       Occupational Therapy Goals          Problem: Occupational Therapy    Goal Priority Disciplines Outcome Interventions   Occupational Therapy Goal     OT, PT/OT Ongoing, Progressing    Description: Goals to be met by: 1/4/2022     Patient will increase functional independence with ADLs by performing:    UE Dressing with Stand-by Assistance.  Grooming while standing at sink with Stand-by Assistance.  Toileting from toilet with Stand-by Assistance for hygiene and clothing management.   Supine to sit with Stand-by Assistance.  Toilet transfer to toilet with Stand-by Assistance.  Upper extremity exercise program x10 reps per handout, with assistance as needed.  Attend to ADL task for 5 minutes with minimal verbal/tactile cues.                         Time Tracking:     OT Date of Treatment: 12/06/22  OT Start Time: 0932  OT Stop Time: 0950  OT Total Time (min): 18 min    Billable Minutes:Self Care/Home Management 18    OT/PRINCESS: OT          12/6/2022

## 2022-12-06 NOTE — NURSING
Pt's /148 after receiving 10 mg Hydralazine and 5 mg Metoprolol IVP @ 1230. Pt not symptomatic, Dr. Bose made aware. Orders for Cardene gtt. Titrate to allow permissive hypertension-180's/100's-per Dr. Lee. Will cont to monitor.

## 2022-12-06 NOTE — TRANSFER OF CARE
"Anesthesia Transfer of Care Note    Patient: Spencer Burton Jr.    Procedure(s) Performed: * No procedures listed *    Patient location: ICU    Anesthesia Type: MAC    Transport from OR: Transported from OR on 2-3 L/min O2 by NC with adequate spontaneous ventilation    Post pain: adequate analgesia    Post assessment: no apparent anesthetic complications and tolerated procedure well    Post vital signs: stable    Level of consciousness: awake    Nausea/Vomiting: no nausea/vomiting    Complications: none    Transfer of care protocol was followed      Last vitals:   Visit Vitals  BP (!) 199/142   Pulse 80   Temp 36.7 °C (98 °F) (Oral)   Resp (!) 29   Ht 5' 9" (1.753 m)   Wt 84.5 kg (186 lb 4.6 oz)   SpO2 99%   BMI 27.51 kg/m²     "

## 2022-12-06 NOTE — CARE UPDATE
con   12/06/22 0700   PRE-TX-O2   O2 Device (Oxygen Therapy) nasal cannula   $ Is the patient on Low Flow Oxygen? Yes   Pulse Oximetry Type Continuous   $ Pulse Oximetry - Multiple Charge Pulse Oximetry - Multiple   Education   $ Education DME Oxygen;15 min   Respiratory Evaluation   $ Care Plan Tech Time 15 min   $ Eval/Re-eval Charges Re-evaluation   tinue oxygen

## 2022-12-06 NOTE — NURSING
1108: Anesthesia, echo tech, and V Rosa @ bedside for RAGHAV. Time out performed-see flowsheet. Consents placed in chart.     1125: Procedure complete, pt tolerated well. VSS.

## 2022-12-06 NOTE — PLAN OF CARE
Plan of Care and CVA education reviewed and reinforced with patient and family. Will attempt RAGHAV tomorrow once electrolytes are stable and attempt MRA once pt is more cooperative. Pt to be NPO after midnight. Behavior more appropriate and directable today. Bed alarm activated. Will continue to monitor.   Problem: Adult Inpatient Plan of Care  Goal: Plan of Care Review  Outcome: Ongoing, Progressing  Goal: Patient-Specific Goal (Individualized)  Outcome: Ongoing, Progressing  Goal: Absence of Hospital-Acquired Illness or Injury  Outcome: Ongoing, Progressing  Goal: Optimal Comfort and Wellbeing  Outcome: Ongoing, Progressing  Goal: Readiness for Transition of Care  Outcome: Ongoing, Progressing     Problem: Diabetes Comorbidity  Goal: Blood Glucose Level Within Targeted Range  Outcome: Ongoing, Progressing     Problem: Fall Injury Risk  Goal: Absence of Fall and Fall-Related Injury  Outcome: Ongoing, Progressing     Problem: Infection  Goal: Absence of Infection Signs and Symptoms  Outcome: Ongoing, Progressing     Problem: Adjustment to Illness (Stroke, Ischemic/Transient Ischemic Attack)  Goal: Optimal Coping  Outcome: Ongoing, Progressing     Problem: Bowel Elimination Impaired (Stroke, Ischemic/Transient Ischemic Attack)  Goal: Effective Bowel Elimination  Outcome: Ongoing, Progressing     Problem: Cerebral Tissue Perfusion (Stroke, Ischemic/Transient Ischemic Attack)  Goal: Optimal Cerebral Tissue Perfusion  Outcome: Ongoing, Progressing     Problem: Cognitive Impairment (Stroke, Ischemic/Transient Ischemic Attack)  Goal: Optimal Cognitive Function  Outcome: Ongoing, Progressing     Problem: Communication Impairment (Stroke, Ischemic/Transient Ischemic Attack)  Goal: Improved Communication Skills  Outcome: Ongoing, Progressing     Problem: Functional Ability Impaired (Stroke, Ischemic/Transient Ischemic Attack)  Goal: Optimal Functional Ability  Outcome: Ongoing, Progressing     Problem: Respiratory Compromise  (Stroke, Ischemic/Transient Ischemic Attack)  Goal: Effective Oxygenation and Ventilation  Outcome: Ongoing, Progressing     Problem: Sensorimotor Impairment (Stroke, Ischemic/Transient Ischemic Attack)  Goal: Improved Sensorimotor Function  Outcome: Ongoing, Progressing     Problem: Swallowing Impairment (Stroke, Ischemic/Transient Ischemic Attack)  Goal: Optimal Eating and Swallowing without Aspiration  Outcome: Ongoing, Progressing     Problem: Urinary Elimination Impaired (Stroke, Ischemic/Transient Ischemic Attack)  Goal: Effective Urinary Elimination  Outcome: Ongoing, Progressing     Problem: Skin Injury Risk Increased  Goal: Skin Health and Integrity  Outcome: Ongoing, Progressing     Problem: Adjustment to Illness (Delirium)  Goal: Optimal Coping  Outcome: Ongoing, Progressing     Problem: Altered Behavior (Delirium)  Goal: Improved Behavioral Control  Outcome: Ongoing, Progressing     Problem: Attention and Thought Clarity Impairment (Delirium)  Goal: Improved Attention and Thought Clarity  Outcome: Ongoing, Progressing     Problem: Sleep Disturbance (Delirium)  Goal: Improved Sleep  Outcome: Ongoing, Progressing

## 2022-12-07 LAB
ALBUMIN SERPL BCP-MCNC: 3.3 G/DL (ref 3.5–5.2)
ALP SERPL-CCNC: 110 U/L (ref 55–135)
ALT SERPL W/O P-5'-P-CCNC: 60 U/L (ref 10–44)
ANION GAP SERPL CALC-SCNC: 12 MMOL/L (ref 8–16)
AST SERPL-CCNC: 29 U/L (ref 10–40)
BASOPHILS # BLD AUTO: 0.08 K/UL (ref 0–0.2)
BASOPHILS NFR BLD: 0.5 % (ref 0–1.9)
BILIRUB SERPL-MCNC: 2.2 MG/DL (ref 0.1–1)
BUN SERPL-MCNC: 52 MG/DL (ref 6–20)
CALCIUM SERPL-MCNC: 8.6 MG/DL (ref 8.7–10.5)
CHLORIDE SERPL-SCNC: 102 MMOL/L (ref 95–110)
CO2 SERPL-SCNC: 27 MMOL/L (ref 23–29)
CREAT SERPL-MCNC: 4.2 MG/DL (ref 0.5–1.4)
DIFFERENTIAL METHOD: ABNORMAL
EOSINOPHIL # BLD AUTO: 0.2 K/UL (ref 0–0.5)
EOSINOPHIL NFR BLD: 1.5 % (ref 0–8)
ERYTHROCYTE [DISTWIDTH] IN BLOOD BY AUTOMATED COUNT: 14.7 % (ref 11.5–14.5)
EST. GFR  (NO RACE VARIABLE): 16.4 ML/MIN/1.73 M^2
GLUCOSE SERPL-MCNC: 163 MG/DL (ref 70–110)
GLUCOSE SERPL-MCNC: 176 MG/DL (ref 70–110)
GLUCOSE SERPL-MCNC: 180 MG/DL (ref 70–110)
GLUCOSE SERPL-MCNC: 189 MG/DL (ref 70–110)
HCT VFR BLD AUTO: 43.6 % (ref 40–54)
HGB BLD-MCNC: 14.2 G/DL (ref 14–18)
IMM GRANULOCYTES # BLD AUTO: 0.16 K/UL (ref 0–0.04)
IMM GRANULOCYTES NFR BLD AUTO: 1 % (ref 0–0.5)
LYMPHOCYTES # BLD AUTO: 1.1 K/UL (ref 1–4.8)
LYMPHOCYTES NFR BLD: 6.8 % (ref 18–48)
MAGNESIUM SERPL-MCNC: 1.9 MG/DL (ref 1.6–2.6)
MCH RBC QN AUTO: 27.6 PG (ref 27–31)
MCHC RBC AUTO-ENTMCNC: 32.6 G/DL (ref 32–36)
MCV RBC AUTO: 85 FL (ref 82–98)
MONOCYTES # BLD AUTO: 0.9 K/UL (ref 0.3–1)
MONOCYTES NFR BLD: 5.5 % (ref 4–15)
NEUTROPHILS # BLD AUTO: 13.3 K/UL (ref 1.8–7.7)
NEUTROPHILS NFR BLD: 84.7 % (ref 38–73)
NRBC BLD-RTO: 0 /100 WBC
PLATELET # BLD AUTO: 184 K/UL (ref 150–450)
PMV BLD AUTO: 12.4 FL (ref 9.2–12.9)
POTASSIUM SERPL-SCNC: 2.9 MMOL/L (ref 3.5–5.1)
POTASSIUM SERPL-SCNC: 2.9 MMOL/L (ref 3.5–5.1)
PROT SERPL-MCNC: 6.5 G/DL (ref 6–8.4)
RBC # BLD AUTO: 5.15 M/UL (ref 4.6–6.2)
SODIUM SERPL-SCNC: 141 MMOL/L (ref 136–145)
WBC # BLD AUTO: 15.69 K/UL (ref 3.9–12.7)

## 2022-12-07 PROCEDURE — 94761 N-INVAS EAR/PLS OXIMETRY MLT: CPT

## 2022-12-07 PROCEDURE — 93010 ELECTROCARDIOGRAM REPORT: CPT | Mod: ,,, | Performed by: INTERNAL MEDICINE

## 2022-12-07 PROCEDURE — 27000221 HC OXYGEN, UP TO 24 HOURS

## 2022-12-07 PROCEDURE — 25000003 PHARM REV CODE 250: Performed by: INTERNAL MEDICINE

## 2022-12-07 PROCEDURE — 80053 COMPREHEN METABOLIC PANEL: CPT | Performed by: STUDENT IN AN ORGANIZED HEALTH CARE EDUCATION/TRAINING PROGRAM

## 2022-12-07 PROCEDURE — 99900031 HC PATIENT EDUCATION (STAT)

## 2022-12-07 PROCEDURE — 94799 UNLISTED PULMONARY SVC/PX: CPT

## 2022-12-07 PROCEDURE — 99900035 HC TECH TIME PER 15 MIN (STAT)

## 2022-12-07 PROCEDURE — 25000003 PHARM REV CODE 250: Performed by: STUDENT IN AN ORGANIZED HEALTH CARE EDUCATION/TRAINING PROGRAM

## 2022-12-07 PROCEDURE — 82962 GLUCOSE BLOOD TEST: CPT

## 2022-12-07 PROCEDURE — 63600175 PHARM REV CODE 636 W HCPCS: Performed by: INTERNAL MEDICINE

## 2022-12-07 PROCEDURE — 97535 SELF CARE MNGMENT TRAINING: CPT

## 2022-12-07 PROCEDURE — 93010 EKG 12-LEAD: ICD-10-PCS | Mod: ,,, | Performed by: INTERNAL MEDICINE

## 2022-12-07 PROCEDURE — 20000000 HC ICU ROOM

## 2022-12-07 PROCEDURE — 36415 COLL VENOUS BLD VENIPUNCTURE: CPT | Performed by: STUDENT IN AN ORGANIZED HEALTH CARE EDUCATION/TRAINING PROGRAM

## 2022-12-07 PROCEDURE — 85025 COMPLETE CBC W/AUTO DIFF WBC: CPT | Performed by: INTERNAL MEDICINE

## 2022-12-07 PROCEDURE — 63600175 PHARM REV CODE 636 W HCPCS: Performed by: STUDENT IN AN ORGANIZED HEALTH CARE EDUCATION/TRAINING PROGRAM

## 2022-12-07 PROCEDURE — 99233 PR SUBSEQUENT HOSPITAL CARE,LEVL III: ICD-10-PCS | Mod: ,,, | Performed by: GENERAL PRACTICE

## 2022-12-07 PROCEDURE — 93005 ELECTROCARDIOGRAM TRACING: CPT | Performed by: INTERNAL MEDICINE

## 2022-12-07 PROCEDURE — 83735 ASSAY OF MAGNESIUM: CPT | Performed by: INTERNAL MEDICINE

## 2022-12-07 PROCEDURE — 92507 TX SP LANG VOICE COMM INDIV: CPT

## 2022-12-07 PROCEDURE — 99233 SBSQ HOSP IP/OBS HIGH 50: CPT | Mod: ,,, | Performed by: GENERAL PRACTICE

## 2022-12-07 RX ORDER — AMLODIPINE BESYLATE 5 MG/1
10 TABLET ORAL DAILY
Status: DISCONTINUED | OUTPATIENT
Start: 2022-12-07 | End: 2022-12-08 | Stop reason: HOSPADM

## 2022-12-07 RX ORDER — POTASSIUM CHLORIDE 20 MEQ/1
20 TABLET, EXTENDED RELEASE ORAL 3 TIMES DAILY
Status: DISCONTINUED | OUTPATIENT
Start: 2022-12-07 | End: 2022-12-08 | Stop reason: HOSPADM

## 2022-12-07 RX ORDER — INSULIN ASPART 100 [IU]/ML
1-10 INJECTION, SOLUTION INTRAVENOUS; SUBCUTANEOUS
Status: DISCONTINUED | OUTPATIENT
Start: 2022-12-07 | End: 2022-12-08 | Stop reason: HOSPADM

## 2022-12-07 RX ORDER — HYDRALAZINE HYDROCHLORIDE 25 MG/1
25 TABLET, FILM COATED ORAL EVERY 8 HOURS
Status: DISCONTINUED | OUTPATIENT
Start: 2022-12-07 | End: 2022-12-08

## 2022-12-07 RX ADMIN — INSULIN DETEMIR 18 UNITS: 100 INJECTION, SOLUTION SUBCUTANEOUS at 08:12

## 2022-12-07 RX ADMIN — FUROSEMIDE 60 MG: 10 INJECTION, SOLUTION INTRAVENOUS at 05:12

## 2022-12-07 RX ADMIN — NICARDIPINE HYDROCHLORIDE 2.5 MG/HR: 0.2 INJECTION INTRAVENOUS at 02:12

## 2022-12-07 RX ADMIN — HYDRALAZINE HYDROCHLORIDE 25 MG: 25 TABLET ORAL at 03:12

## 2022-12-07 RX ADMIN — POTASSIUM CHLORIDE 20 MEQ: 1500 TABLET, EXTENDED RELEASE ORAL at 03:12

## 2022-12-07 RX ADMIN — ASPIRIN 81 MG CHEWABLE TABLET 81 MG: 81 TABLET CHEWABLE at 08:12

## 2022-12-07 RX ADMIN — METOPROLOL TARTRATE 5 MG: 1 INJECTION, SOLUTION INTRAVENOUS at 05:12

## 2022-12-07 RX ADMIN — POTASSIUM CHLORIDE 20 MEQ: 1500 TABLET, EXTENDED RELEASE ORAL at 12:12

## 2022-12-07 RX ADMIN — INSULIN ASPART 1 UNITS: 100 INJECTION, SOLUTION INTRAVENOUS; SUBCUTANEOUS at 08:12

## 2022-12-07 RX ADMIN — CALCITRIOL CAPSULES 0.25 MCG 0.25 MCG: 0.25 CAPSULE ORAL at 08:12

## 2022-12-07 RX ADMIN — NICARDIPINE HYDROCHLORIDE 2.5 MG/HR: 0.2 INJECTION INTRAVENOUS at 05:12

## 2022-12-07 RX ADMIN — HYDRALAZINE HYDROCHLORIDE 25 MG: 25 TABLET ORAL at 09:12

## 2022-12-07 RX ADMIN — HYDRALAZINE HYDROCHLORIDE 25 MG: 25 TABLET ORAL at 08:12

## 2022-12-07 RX ADMIN — METOPROLOL TARTRATE 5 MG: 1 INJECTION, SOLUTION INTRAVENOUS at 12:12

## 2022-12-07 RX ADMIN — ATORVASTATIN CALCIUM 80 MG: 40 TABLET, FILM COATED ORAL at 08:12

## 2022-12-07 RX ADMIN — INSULIN ASPART 2 UNITS: 100 INJECTION, SOLUTION INTRAVENOUS; SUBCUTANEOUS at 12:12

## 2022-12-07 RX ADMIN — FUROSEMIDE 60 MG: 10 INJECTION, SOLUTION INTRAVENOUS at 08:12

## 2022-12-07 RX ADMIN — POTASSIUM CHLORIDE 20 MEQ: 1500 TABLET, EXTENDED RELEASE ORAL at 08:12

## 2022-12-07 RX ADMIN — AMLODIPINE BESYLATE 10 MG: 5 TABLET ORAL at 08:12

## 2022-12-07 NOTE — PT/OT/SLP PROGRESS
Speech Language Pathology Treatment    Patient Name:  Spencer Burton Jr.   MRN:  [id number]  Admitting Diagnosis: Acute ischemic left MCA stroke    Recommendations:     General Recommendations:  Dysphagia therapy, Speech/language therapy, and Formal Expressive Language Test--[place] Naming Test  Diet recommendations:  Mechanical soft, Liquid Diet Level: Thin   Aspiration Precautions: Standard aspiration precautions   General Precautions: Standard, fall  Communication strategies:  yes/no questions only and provide increased time to answer    Subjective     Pt seen at b/s. Low alertness but fair sustained attn to task.  Patient goals: Unable to elicit     Pain/Comfort:   No    Objective:     Has the patient been evaluated by SLP for swallowing?   No  Keep patient NPO?     Current Respiratory Status:        Pt participated in expressive language testing:  Word recall:  10% with common /everyday objects; With phonemic cue and context related-phrase closure performance increased to ~25%  Repetition of simple/common words: 50% with immediate and correct production  Basic Automatic / [place] speech:  70% with visual cues/gestures  Conversational Speech:  Moderate assistance with responsive language; No verbal initiation other than greeting    Assessment:     Spencer Burton Jr. is a 52 y.o. male with s/s of persisting, moderate-severe aphasia. SLP to cont to f/u for goals listed below.    Goals:   Multidisciplinary Problems       SLP Goals          Problem: SLP    Goal Priority Disciplines Outcome   SLP Goal     SLP Ongoing, Progressing   Description: 1.  Pt will demonstrate no overt s/s of aspiration/penetration with IDDSI-7/thin liquids, >95% of the time.  2.  Pt will utilize trained swallowing precautions with MIN A.  3.  Pt will complete a cog/ling evaluation.                   4.  Pt will complete a formal expressive language test (e.g. BNT).  5.  Pt will utilize trained expressive communication/compensatory strategies with  expression of simple wants/needs/ideas with MOD A.    Plan:     Patient to be seen:  4 x/week   Plan of Care expires:     Plan of Care reviewed with:  patient   SLP Follow-Up:  Yes       Discharge recommendations:   (TBD)   Barriers to Discharge:  None    Time Tracking:     SLP Treatment Date:   12/07/22  Speech Start Time:  1425  Speech Stop Time:  1435     Speech Total Time (min):  10 min    Billable Minutes: Speech Therapy Individual 10 mins    12/07/2022

## 2022-12-07 NOTE — PROGRESS NOTES
[facility] Medicine  Progress Note    Patient name: Spencer Burton Jr.  MRN: [id number]  Admit Date: 12/3/2022   LOS: 2 days     SUBJECTIVE:     Principal problem: Acute ischemic left MCA stroke    Interval History:      12/6-  resting quietly,started cardene today.  RAGHAV + for thrombus.      Hospital Course:    52 year old male admitted with acute ischemic CVA with MRI brain showing multiple areas of ischemia involving bilateral cerebral hemispheres, left basal ganglia and left cerebellar hemisphere. CT head revealed loss of gray-white matter distinction in the left basal ganglia concerning for acute infarct.  Given infarct was already evident on CT scan of the brain, it was concerning that some of these lesions might be subacute and thus time of last normal was not accurate.  Given this, tpa was decided against.  Patient has olman admitted to the ICU.  He is agitated and encephalopathic.  Blood pressures are severely elevated. Discussed with Nephrology, Neurology, RN and will have arterial line placed for accurate blood pressure measurements and start Precedex gtt as concerned about his agitation and driving up his blood pressure worse- risk/benefit favors starting despite possibly skewing neuro exam. Will still allow for permissive HTN for another day and shoot for dropping blood pressure no lower than  if he does require prn treatment. On asa and statin for secondary prevention.  On IV lasix for acute on chronic HF exacerbation.  Cardiology consulted for the same and elevated troponin. Troponin was initially trending up but has peaked and now trending down.  History is not reliable to assess for chest pain. Cardiology also consulted for RAGHAV as it is felt this is likely an embolic event. Carotid US with no significant stenosis. CTA head and neck not performed due to renal function.  Nephrology consulted given JR on CKD.  Cr initially 5.1.  Baseline appears to be around 3.8.  Labs also  revealing of new transaminitis. Due to hepatic congestion from HF?  Last echo with EF 40% and Grade III diastolic dysfunction. Lactic acid elevated at 3.8 on admission.  No indication of infection at this time.      Scheduled Meds:   amLODIPine  5 mg Oral Daily    aspirin  81 mg Oral Daily    atorvastatin  80 mg Oral QHS    calcitRIOL  0.25 mcg Oral Daily    furosemide (LASIX) injection  60 mg Intravenous BID    insulin detemir U-100  15 Units Subcutaneous QHS    metoprolol  5 mg Intravenous Q6H     Continuous Infusions:   dexmedetomidine (PRECEDEX) infusion Stopped (12/04/22 1800)    nicardipine 2.5 mg/hr (12/06/22 1747)     PRN Meds:acetaminophen, acetaminophen, aluminum-magnesium hydroxide-simethicone, dextrose 10%, dextrose 10%, glucagon (human recombinant), glucose, glucose, hydrALAZINE, HYDROcodone-acetaminophen, melatonin, morphine, naloxone, simethicone, sodium chloride 0.9%, sodium chloride 0.9%    Review of patient's allergies indicates:  No Known Allergies    Review of Systems: As per interval history    OBJECTIVE:     Vital Signs (Most Recent)  Temp: 97.8 °F (36.6 °C) (12/06/22 1501)  Pulse: 78 (12/06/22 1800)  Resp: 19 (12/06/22 1800)  BP: (!) 165/103 (12/06/22 1800)  SpO2: 100 % (12/06/22 1800)    Vital Signs Range (Last 24H):  Temp:  [97.4 °F (36.3 °C)-99.3 °F (37.4 °C)]   Pulse:  [67-92]   Resp:  [9-37]   BP: (152-210)/()   SpO2:  [74 %-100 %]     I & O (Last 24H):  Intake/Output Summary (Last 24 hours) at 12/6/2022 1818  Last data filed at 12/6/2022 1747  Gross per 24 hour   Intake 750.21 ml   Output 2510 ml   Net -1759.79 ml         Physical Exam:    Vitals and nursing note reviewed.     Constitutional:       General: Not in acute distress.     Appearance: Well-developed.   HENT:      Head: Normocephalic and atraumatic.   Eyes:      Pupils: Pupils are equal, round, and reactive to light.   Cardiovascular:      Rate and Rhythm: Regular rhythm.   Pulmonary:      Effort: Pulmonary effort is  normal.      Breath sounds: Normal breath sounds. No wheezing.   Abdominal:      General: There is no distension.      Palpations: Abdomen is soft.      Tenderness: There is no abdominal tenderness. There is no guarding or rebound.   Musculoskeletal:         General: Normal range of motion.      Cervical back: Normal range of motion.   Skin:     Findings: No rash.   Neurological:      Mental Status: alert     Motor:  LUE/LLE 4/5,  RUE and RLE 5/5         Laboratory:  I have reviewed all pertinent lab results within the past 24 hours.  CBC:   Recent Labs   Lab 12/06/22 0435   WBC 10.92   RBC 4.92   HGB 13.7*   HCT 42.8      MCV 87   MCH 27.8   MCHC 32.0       CMP:   Recent Labs   Lab 12/06/22 0435   *   CALCIUM 8.8   ALBUMIN 3.2*   PROT 6.4      K 3.3*   CO2 28      BUN 56*   CREATININE 4.8*   ALKPHOS 105   ALT 65*   AST 36   BILITOT 2.3*       Cardiac markers:   Recent Labs   Lab 12/04/22 0231   CPKMB 6.6*       Microbiology Results (last 7 days)       Procedure Component Value Units Date/Time    Blood culture [[id number]] Collected: 12/04/22 0231    Order Status: Completed Specimen: Blood Updated: 12/06/22 0432     Blood Culture, Routine No Growth to date      No Growth to date      No Growth to date    Narrative:      Collection has been rescheduled by [facility] at 12/04/2022 00:00 Reason:   Nurse Cassandra said to re-time the labs for 1:30 am.  Collection has been rescheduled by [facility] at 12/04/2022 00:00 Reason:   Nurse Cassandra said to re-time the labs for 1:30 am.    Blood culture [[id number]] Collected: 12/04/22 1426    Order Status: Completed Specimen: Blood from Peripheral, Upper Arm, Left Updated: 12/05/22 1832     Blood Culture, Routine No Growth to date      No Growth to date    MRSA Screen by PCR [[id number]] Collected: 12/03/22 2212    Order Status: Completed Specimen: Nasopharyngeal Swab from Nasal Updated: 12/04/22 0019     MRSA SCREEN BY PCR Negative    Blood culture [[id number]]      Order Status: Sent Specimen: Blood             Diagnostic Results:      ASSESSMENT/PLAN:         Active Hospital Problems    Diagnosis  POA    *Acute ischemic left MCA stroke [I63.512]  Yes     Likely L MCA ischemic stroke.  DDx includes post-ictal state and hypertensive encephalopathy.  Rec treating BP to <185/110 and then treating with TNK if not better.  CTA pending for e/o LVO.      Encephalopathy, metabolic [G93.41]  Yes    Acute on chronic congestive heart failure [I50.9]  Yes    COPD (chronic obstructive pulmonary disease) [J44.9]  Yes     Chronic    CKD (chronic kidney disease), stage IV [N18.4]  Yes    Diabetes mellitus with chronic kidney disease [E11.22]  Yes    Hypertension, poor control [I10]  Yes     Chronic      Resolved Hospital Problems   No resolved problems to display.         Plan:     -Continuous hemodynamic monitoring in the ICU for acute ischemic CVA.    -Suspect embolic event   -RAGHAV with large thrombus  -Carotid US with no significant stenosis.  CTA contraindicated in setting of present renal function.  -ASA and statin for secondary prevention  -Neurology following; appreciate recs  -Neuro checks  -PT/OT/ST  -Nephrology consulted for Jacquie on CKD.  Last Cr was 3.8.  -Cardiology consulted for acute on chronic SHF and elevated troponin a little beyond his baseline elevation.  IV lasix.  Watching renal tolerance.   -ISS.  Accuchecks.  -Trending transaminases and bilirubin.  We will see if they improve with diuresis which would suggest hepatic congestion from HF.   -Permissive HTN with treatment parameters. We don't want to overshoot and should avoid SBP less than 150 at this time.  -started cardene infusion      VTE Risk Mitigation (From admission, onward)           Ordered     IP VTE HIGH RISK PATIENT  Once         12/03/22 2003     Place sequential compression device  Until discontinued         12/03/22 1907                      Department Hospital Medicine  [facility]  [facility]  Pablo Bose MD  Date of service: 12/06/2022

## 2022-12-07 NOTE — PROGRESS NOTES
"[facility]  Adult Nutrition   Progress Note (Follow-Up)    SUMMARY     Recommendations  Recommendation/Intervention:   1) Added renal, cardiac, and diabetic restrictions to diet order. Texture/consistency per SLP.   2) Electrolyte abnormalities noted. K (2.9). Oral KCl to be started today per nephrology. Recommend continued monitoring and management per MD.   3) B-229. Currently getting long-lasting insulin. Continue management per MD. RD add diabetic restrictions to diet order.   4)  to continue to assist in meal choices daily.   5) RD to continue to monitor labs, PO intake/tolerance, and plan of care and will make recommendations as needed/warranted.    Goals: Pt to consume > 75% PO intake to meet estimated energy + protein needs.  Nutrition Goal Status: new    Dietitian Rounds Brief  Follow-up. Pt is disoriented. Evaluated by speech yesterday - recommended mechanical soft diet w/ thin liquids. Consuming % of meals per flowsheets. RD added restrictions to diet order in hopes of assisting in improvement of BG + renal related laboratory values. No needed for ONS at this time, will continue to monitor intake + tolerance. LBM noted: 22.    Diet order:   Current Diet Order: Mechanical soft     Evaluation of Received Nutrient/Fluid Intake  Energy Calories Required: meeting needs  Protein Required: meeting needs  Fluid Required: meeting needs  Tolerance: tolerating     % Intake of Estimated Energy Needs: 75 - 100 %  % Meal Intake: 75 - 100 %      Intake/Output Summary (Last 24 hours) at 2022 1145  Last data filed at 2022 0900  Gross per 24 hour   Intake 1030.21 ml   Output 5325 ml   Net -4294.79 ml        Anthropometrics  Temp: 98.3 °F (36.8 °C)  Height Method: Stated  Height: 5' 9" (175.3 cm)  Height (inches): 69 in  Weight Method: Bed Scale  Weight: 84.5 kg (186 lb 4.6 oz)  Weight (lb): 186.29 lb  Ideal Body Weight (IBW), Male: 160 lb  % Ideal Body Weight, Male (lb): " 121.26 %  BMI (Calculated): 27.5  BMI Grade: 25 - 29.9 - overweight       Estimated/Assessed Needs  Weight Used For Calorie Calculations: 84.5 kg (186 lb 4.6 oz)  Energy Calorie Requirements (kcal): 8546-7423 (20-25)  Energy Need Method: Kcal/kg  Protein Requirements: 51-68 (0.6-0.8 gm/kg)  Weight Used For Protein Calculations: 84.5 kg (186 lb 4.6 oz)  Fluid Requirements (mL): 2113 (25 ml/kg)     RDA Method (mL): 1690       Reason for Assessment  Reason For Assessment: RD follow-up  Diagnosis: stroke/CVA  Relevant Medical History: HTN, CKD stage 4, CHF, COPD, DM 2  Interdisciplinary Rounds: attended    Nutrition/Diet History  Spiritual, Cultural Beliefs, Adventist Practices, Values that Affect Care: no  Food Allergies: NKFA  Factors Affecting Nutritional Intake: difficulty/impaired swallowing    Nutrition Risk Screen  Nutrition Risk Screen: difficulty chewing/swallowing     MST Score: 0  Have you recently lost weight without trying?: No  Weight loss score: 0  Have you been eating poorly because of a decreased appetite?: No  Appetite score: 0       Weight History:  Wt Readings from Last 5 Encounters:   12/04/22 84.5 kg (186 lb 4.6 oz)   12/06/22 84.4 kg (186 lb)   11/14/22 90.7 kg (200 lb)   11/06/22 86.2 kg (190 lb)   08/27/22 73.5 kg (162 lb)        Medications:Pertinent Medications Reviewed  Scheduled Meds:   amLODIPine  10 mg Oral Daily    aspirin  81 mg Oral Daily    atorvastatin  80 mg Oral QHS    calcitRIOL  0.25 mcg Oral Daily    furosemide (LASIX) injection  60 mg Intravenous BID    hydrALAZINE  25 mg Oral Q8H    insulin detemir U-100  18 Units Subcutaneous QHS    metoprolol  5 mg Intravenous Q6H    potassium chloride  20 mEq Oral TID     Continuous Infusions:   dexmedetomidine (PRECEDEX) infusion Stopped (12/04/22 1800)    nicardipine 2.5 mg/hr (12/07/22 0528)     PRN Meds:.acetaminophen, acetaminophen, aluminum-magnesium hydroxide-simethicone, dextrose 10%, dextrose 10%, glucagon (human recombinant),  glucose, glucose, hydrALAZINE, HYDROcodone-acetaminophen, insulin aspart U-100, melatonin, morphine, naloxone, simethicone, sodium chloride 0.9%, sodium chloride 0.9%    Labs: Pertinent Labs Reviewed  Clinical Chemistry:  Recent Labs   Lab 12/04/22  0231 12/04/22  1426 12/07/22  0501     140   < > 141   K 3.2*  3.2*   < > 2.9*  2.9*     101   < > 102   CO2 22*  22*   < > 27   *  256*   < > 176*   BUN 48*  48*   < > 52*   CREATININE 4.9*  4.9*   < > 4.2*   CALCIUM 9.4  9.4   < > 8.6*   PROT 7.6   < > 6.5   ALBUMIN 4.0   < > 3.3*   BILITOT 3.4*   < > 2.2*   ALKPHOS 116   < > 110   AST 57*   < > 29   ALT 71*   < > 60*   ANIONGAP 17*  17*   < > 12   MG 2.0   < > 1.9   PHOS 4.9*  --   --     < > = values in this interval not displayed.     CBC:   Recent Labs   Lab 12/07/22  0501   WBC 15.69*   RBC 5.15   HGB 14.2   HCT 43.6      MCV 85   MCH 27.6   MCHC 32.6     Lipid Panel:  Recent Labs   Lab 12/04/22  0231   CHOL 181   HDL 50   LDLCALC 112.8   TRIG 91   CHOLHDL 27.6     Cardiac Profile:  Recent Labs   Lab 12/03/22  1404 12/04/22  0231   BNP 3,309*  --    CPKMB  --  6.6*     Inflammatory Labs:  Recent Labs   Lab 12/03/22  2120   CRP 0.72     Diabetes:  Recent Labs   Lab 12/04/22 0231   HGBA1C 8.9*     Thyroid & Parathyroid:  Recent Labs   Lab 12/03/22 2120   TSH 2.070       Monitor and Evaluation  Food and Nutrient Intake: energy intake, food and beverage intake  Food and Nutrient Adminstration: diet order  Knowledge/Beliefs/Attitudes: food and nutrition knowledge/skill  Physical Activity and Function: nutrition-related ADLs and IADLs  Anthropometric Measurements: weight, weight change, body mass index  Biochemical Data, Medical Tests and Procedures: electrolyte and renal panel, gastrointestinal profile, glucose/endocrine profile  Nutrition-Focused Physical Findings: overall appearance     Nutrition Risk  Level of Risk/Frequency of Follow-up: moderate     Nutrition Follow-Up  RD  Follow-up?: Yes      Tess Martino, RUSS 12/07/2022 11:45 AM

## 2022-12-07 NOTE — PROGRESS NOTES
INPATIENT NEPHROLOGY CONSULT   [facility]    Patient Name: [place] Burton Jr.  Date: 12/07/2022    Reason for consultation: JR    Chief Complaint:   Chief Complaint   Patient presents with    Right Sided Weakness     History of Present Illness:  [place] Burton Jr. is a 52 y.o. Black or  male with known history of CHF presented with right sided weakness. He was noticed stumbling in gas station so the staff there called EMS and he was brought to the ER for evaluation. CT scan and MRI consistent with bilateral cerebral hemispheres, left basal ganglia and left cerebellar hemisphere. Carotid duplex neg. Abd u/s with occluded portal vein. Tele-stroke consulted and TPA was not given due to waxing and waning symptoms and elevated blood pressures. We are consulted for JR.    11/13/22 renal imaging with 8-9cm kidneys c/w atrophy  No utility in renal duplex at this stage     Interval History:  12/4- /141, on 3-4L NC, UOP 965cc- spoke with Dr. Guerra and nurse- advise precedex gtt and arterial line- we can then get a true sense of what his BP medication needs are and get accurate labs to replete electrolytes appropriately- continue rolle  12/5 VSS. Agree with K replacement until he can have anesthesia today.  12/6 VSS, no new complains. RAGHAV today. K is better.  12/7 VSS. Start oral KCL ATC    Plan of Care:    Acute CVA  JR on CKD IV (baseline sCr 3.8?)- likely progressive CKD due to uncontrolled HTN  HTN emergency/Demand ischemia  Uncontrolled DMII  Hypokalemia  Secondary HPT  Anemia of CKD    Plan:    - agree with IV KCL replacement PRN, start oral KCL ATC.  - no nsaids or IV contrast; dose meds for CrCl < 20  - aim for -160-ok with clonidine patch, norvasc, hydral/nitrate  - ok with lasix 60mg IV BID as needed, added KCL supplement  - no RAAS agents  - on long acting insulin- this is fine  - started calcitriol daily  - no acute JONAH needs    He may require RRT this admission- too  soon to tell.    Thank you for allowing us to participate in this patient's care. We will continue to follow.    Vital Signs:  Temp Readings from Last 3 Encounters:   12/07/22 98.3 °F (36.8 °C) (Oral)   11/15/22 98.2 °F (36.8 °C) (Oral)   11/06/22 98.4 °F (36.9 °C) (Oral)       Pulse Readings from Last 3 Encounters:   12/07/22 87   12/06/22 69   11/15/22 76       BP Readings from Last 3 Encounters:   12/07/22 (!) 159/110   12/06/22 (!) 157/102   11/15/22 (!) 170/119       Weight:  Wt Readings from Last 3 Encounters:   12/04/22 84.5 kg (186 lb 4.6 oz)   12/06/22 84.4 kg (186 lb)   11/14/22 90.7 kg (200 lb)       INS/OUTS:  I/O last 3 completed shifts:  In: 750.2 [P.O.:500; I.V.:50.2; IV Piggyback:200]  Out: 6110 [Urine:6110]  I/O this shift:  In: 480 [P.O.:480]  Out: 750 [Urine:750]    Past Medical & Surgical History:  Past Medical History:   Diagnosis Date    CHF (congestive heart failure)     COPD (chronic obstructive pulmonary disease)     Diabetes mellitus     Hyperlipidemia     Hypertension        Past Surgical History:   Procedure Laterality Date    APPENDECTOMY      CHOLECYSTECTOMY         Past Social History:  Social History     Socioeconomic History    Marital status: Single   Tobacco Use    Smoking status: Never    Smokeless tobacco: Never   Substance and Sexual Activity    Alcohol use: No     Comment: socially    Drug use: No    Sexual activity: Yes     Partners: Female     Social Determinants of Health     Financial Resource Strain: Unknown    Difficulty of Paying Living Expenses: Patient refused   Food Insecurity: Unknown    Worried About Running Out of Food in the Last Year: Patient refused    Ran Out of Food in the Last Year: Patient refused   Transportation Needs: Unknown    Lack of Transportation (Medical): Patient refused    Lack of Transportation (Non-Medical): Patient refused   Physical Activity: Unknown    Days of Exercise per Week: Patient refused    Minutes of Exercise per Session: Patient  refused   Stress: Unknown    Feeling of Stress : Patient refused   Social Connections: Unknown    Frequency of Communication with Friends and Family: Patient refused    Frequency of Social Gatherings with Friends and Family: Patient refused    Attends Hoahaoism Services: Patient refused    Active Member of Clubs or Organizations: Patient refused    Attends Club or Organization Meetings: Patient refused    Marital Status: Patient refused   Housing Stability: Unknown    Unable to Pay for Housing in the Last Year: Patient refused    Unstable Housing in the Last Year: Patient refused       Medications:  Scheduled Meds:   amLODIPine  10 mg Oral Daily    aspirin  81 mg Oral Daily    atorvastatin  80 mg Oral QHS    calcitRIOL  0.25 mcg Oral Daily    furosemide (LASIX) injection  60 mg Intravenous BID    hydrALAZINE  25 mg Oral Q8H    insulin detemir U-100  18 Units Subcutaneous QHS    metoprolol  5 mg Intravenous Q6H     Continuous Infusions:   dexmedetomidine (PRECEDEX) infusion Stopped (12/04/22 1800)    nicardipine 2.5 mg/hr (12/07/22 0528)     PRN Meds:.acetaminophen, acetaminophen, aluminum-magnesium hydroxide-simethicone, dextrose 10%, dextrose 10%, glucagon (human recombinant), glucose, glucose, hydrALAZINE, HYDROcodone-acetaminophen, insulin aspart U-100, melatonin, morphine, naloxone, simethicone, sodium chloride 0.9%, sodium chloride 0.9%  No current facility-administered medications on file prior to encounter.     Current Outpatient Medications on File Prior to Encounter   Medication Sig Dispense Refill    acetaminophen (TYLENOL) 325 MG tablet Take 650 mg by mouth every 6 (six) hours as needed.      albuterol (PROVENTIL/VENTOLIN HFA) 90 mcg/actuation inhaler Inhale 1-2 puffs into the lungs every 6 (six) hours as needed for Wheezing. Rescue 6.7 g 0    amLODIPine (NORVASC) 10 MG tablet Take 1 tablet (10 mg total) by mouth once daily. 30 tablet 0    amLODIPine (NORVASC) 10 MG tablet Take 1 tablet by mouth once  "daily.      aspirin 81 MG Chew Take 81 mg by mouth once daily.      atorvastatin (LIPITOR) 40 MG tablet Take 1 tablet (40 mg total) by mouth every evening. 30 tablet 0    calcitRIOL (ROCALTROL) 0.25 MCG Cap Take 0.25 mcg by mouth once daily.      calcitRIOL (ROCALTROL) 0.25 MCG Cap Take 1 capsule by mouth once daily.      ciprofloxacin HCl (CIPRO) 500 MG tablet Take 500 mg by mouth 2 (two) times daily.      cloNIDine 0.3 mg/24 hr td ptwk (CATAPRES) 0.3 mg/24 hr Place 1 patch onto the skin once a week. medically necessary with dx codes 401.9, 428.43, 428.0. 30 patch 11    clotrimazole (LOTRIMIN) 1 % cream Apply topically 2 (two) times daily. for 10 days (Patient taking differently: Apply 1 application topically 2 (two) times daily.) 1 each 0    EScitalopram oxalate (LEXAPRO) 20 MG tablet Take 20 mg by mouth once daily.      ferrous sulfate (FEOSOL) 325 mg (65 mg iron) Tab tablet Take 1 tablet by mouth once daily.      fluticasone propionate (FLONASE) 50 mcg/actuation nasal spray 1 spray (50 mcg total) by Each Nostril route 2 (two) times daily as needed for Rhinitis. 15 g 0    furosemide (LASIX) 40 MG tablet Take 40 mg by mouth 2 (two) times daily.      glipiZIDE (GLUCOTROL) 10 MG tablet Take 1 tablet (10 mg total) by mouth 2 (two) times daily with meals. 60 tablet 0    HUMULIN 70/30 U-100 INSULIN 100 unit/mL (70-30) injection Inject into the skin 3 (three) times daily before meals.      HYDROcodone-acetaminophen (NORCO) 5-325 mg per tablet Take 1 tablet by mouth every 6 (six) hours as needed.      insulin detemir (LEVEMIR) 100 unit/mL injection Inject 15 Units into the skin every evening. 4.5 mL 2    insulin needles, disposable, 31 x 3/16 " Ndle Use daily to inject insulin  DX:250.00 100 each 11    isosorbide mononitrate (IMDUR) 60 MG 24 hr tablet Take 60 mg by mouth once daily.      lancets (ONE TOUCH DELICA LANCETS) 33 gauge Misc 1 lancet by Misc.(Non-Drug; Combo Route) route 4 (four) times daily. DX:250.00   " "Medically necessary to test blood sugar 200 each 6    loratadine (CLARITIN) 10 mg tablet Take 1 tablet (10 mg total) by mouth once daily. 30 tablet 0    losartan (COZAAR) 100 MG tablet Take 100 mg by mouth.      ondansetron (ZOFRAN-ODT) 4 MG TbDL Take 1 tablet (4 mg total) by mouth every 6 (six) hours as needed (nausea/vomiting). 20 tablet 0    oxyCODONE-acetaminophen (PERCOCET) 5-325 mg per tablet Take 1 tablet by mouth every 4 (four) hours as needed.      pioglitazone (ACTOS) 15 MG tablet Take 1 tablet by mouth once daily.      potassium chloride (KLOR-CON) 10 MEQ TbSR       sildenafiL (VIAGRA) 100 MG tablet TAKE ONE DAILY AS NEEDED      silver sulfADIAZINE 1% (SILVADENE) 1 % cream Apply topically.      sulfamethoxazole-trimethoprim 400-80mg (BACTRIM,SEPTRA) 400-80 mg per tablet Take 1 tablet by mouth 2 (two) times daily.      traMADoL (ULTRAM) 50 mg tablet Take 1 tablet (50 mg total) by mouth every 8 (eight) hours as needed for Pain. 9 tablet 0    TRUE METRIX AIR GLUCOSE METER Misc USE TO CHECK GLUCOSE TWICE DAILY AS DIRECTED      TRUE METRIX GLUCOSE TEST STRIP Strp 1 strip 2 (two) times daily.      TRUEPLUS LANCETS 30 gauge Misc USE 1 TO CHECK GLUCOSE TWICE DAILY         Allergies:  Patient has no known allergies.    Past Family History:  Reviewed; refer to Hospitalist Admission Note    Review of Systems:  Limited    Physical Exam:  BP (!) 159/110   Pulse 87   Temp 98.3 °F (36.8 °C) (Oral)   Resp (!) 21   Ht 5' 9" (1.753 m)   Wt 84.5 kg (186 lb 4.6 oz)   SpO2 97%   BMI 27.51 kg/m²     General Appearance:    Anxious at times, then somnolent, cannot hold conversation, doesn't demonstrate understanding or follows commands or verbalize anything, appears stated age   Head:    Normocephalic, atraumatic   Eyes:    PER, EOMI, and conjunctiva/sclera clear bilaterally        Mouth:   Moist mucus membranes   Back:     No CVA tenderness   Lungs:     Coarse   Heart:    Regular rate and rhythm, S1 and S2 normal, no " murmur, rub   or gallop   Abdomen:     Soft, non-tender, non-distended, bowel sounds active all four   quadrants, no RT or guarding, no masses, no organomegaly   Extremities:   Warm and well perfused, distal pulses intact, no cyanosis or    peripheral edema   MSK:   No joint or muscle swelling, tenderness or deformity   Skin:   Skin color, texture, turgor normal, no rashes or lesions   Neurologic/Psychiatric:   Moves all extremities     Results:  Recent Labs   Lab 12/05/22  0321 12/06/22  0435 12/07/22  0501    145 141   K 2.9* 3.3* 2.9*  2.9*    107 102   CO2 27 28 27   BUN 56* 56* 52*   CREATININE 5.0* 4.8* 4.2*   * 133* 176*       Recent Labs   Lab 12/04/22  0231 12/04/22  1426 12/05/22 0321 12/06/22  0435 12/07/22  0501   CALCIUM 9.4  9.4   < > 8.8 8.8 8.6*   ALBUMIN 4.0   < > 3.1* 3.2* 3.3*   PHOS 4.9*  --   --   --   --    MG 2.0   < > 2.0 2.0 1.9    < > = values in this interval not displayed.       Recent Labs   Lab 11/15/22  0453 12/04/22  0231   PTH, Intact 353.5 H 496.8 H       No results for input(s): POCTGLUCOSE in the last 168 hours.    Recent Labs   Lab 04/20/22  0917 11/14/22  0043 12/04/22  0231   Hemoglobin A1C 8.9 H 8.5 H 8.9 H       Recent Labs   Lab 12/05/22 0321 12/06/22  0435 12/07/22  0501   WBC 10.39 10.92 15.69*   HGB 13.1* 13.7* 14.2   HCT 40.3 42.8 43.6    175 184   MCV 86 87 85   MCHC 32.5 32.0 32.6   MONO 5.0  0.5 5.8  0.6 5.5  0.9       Recent Labs   Lab 12/05/22  0321 12/06/22  0435 12/07/22  0501   BILITOT 2.4* 2.3* 2.2*   PROT 6.0 6.4 6.5   ALBUMIN 3.1* 3.2* 3.3*   ALKPHOS 115 105 110   ALT 58* 65* 60*   AST 39 36 29       Recent Labs   Lab 11/14/22  1705 12/04/22  0021   Color, UA Yellow Colorless A  Colorless A   Appearance, UA Clear Clear  Clear   pH, UA 7.0 6.0  6.0   Specific Gravity, UA 1.015 1.010  1.010   Protein, UA 1+ A 1+ A  1+ A   Glucose, UA 3+ A 2+ A  2+ A   Ketones, UA Negative Negative  Negative   Urobilinogen, UA Negative  Negative  Negative   Bilirubin (UA) Negative Negative  Negative   Occult Blood UA Trace A 1+ A  1+ A   Nitrite, UA Negative Negative  Negative   RBC, UA 2 1   WBC, UA 0 0   Bacteria None Negative   Hyaline Casts, UA 0 1       Recent Labs   Lab 12/03/22  1352 12/03/22  1446 12/03/22 2032   POC PH  --  7.358 7.413   POC PCO2  --  38.9 33.3 L   POC HCO3  --  21.8 L 21.3 L   POC PO2  --  87 112 H   POC SATURATED O2  --  96 99   POC BE  --  -4 -3   Sample VENOUS ARTERIAL ARTERIAL       Microbiology Results (last 7 days)       Procedure Component Value Units Date/Time    Blood culture [[id number]] Collected: 12/04/22 0231    Order Status: Completed Specimen: Blood Updated: 12/07/22 0432     Blood Culture, Routine No Growth to date      No Growth to date      No Growth to date      No Growth to date    Narrative:      Collection has been rescheduled by BRUNILDA at 12/04/2022 00:00 Reason:   Nurse Cassandra said to re-time the labs for 1:30 am.  Collection has been rescheduled by BRUNILDA at 12/04/2022 00:00 Reason:   Nurse Cassandra said to re-time the labs for 1:30 am.    Blood culture [[id number]] Collected: 12/04/22 1426    Order Status: Completed Specimen: Blood from Peripheral, Upper Arm, Left Updated: 12/06/22 1832     Blood Culture, Routine No Growth to date      No Growth to date      No Growth to date    MRSA Screen by PCR [[id number]] Collected: 12/03/22 2212    Order Status: Completed Specimen: Nasopharyngeal Swab from Nasal Updated: 12/04/22 0019     MRSA SCREEN BY PCR Negative    Blood culture [[id number]]     Order Status: Sent Specimen: Blood           I have spent >  minutes providing care for this patient for the above diagnoses. These services have included chart/data/imaging review, evaluation, exam, formulation of plan, , note preparation, and discussions with staff involved in this patient's care.    Joseph Osorio MD  [facility]  [address] [place] [address]  [address]  [phone] (p)  [phone] (f)

## 2022-12-07 NOTE — PT/OT/SLP PROGRESS
Occupational Therapy   Treatment    Name: [place] Burton Jr.  MRN: [id number]  Admitting Diagnosis:  Acute ischemic left MCA stroke       Recommendations:     Discharge Recommendations: rehabilitation facility  Discharge Equipment Recommendations:   (TBD)  Barriers to discharge:       Assessment:     S[place]cer Burton Jr. is a 52 y.o. male with a medical diagnosis of Acute ischemic left MCA stroke.  Pt agreeable to limited OT therapy session this AM. Performance deficits affecting function are weakness, impaired endurance, impaired self care skills, impaired functional mobility, gait instability, impaired balance, impaired cognition, decreased coordination, decreased upper extremity function, decreased lower extremity function, decreased safety awareness, impaired cardiopulmonary response to activity. Pt required max encouragement to participate minimally in session.     Rehab Prognosis:  Fair; patient would benefit from acute skilled OT services to address these deficits and reach maximum level of function.       Plan:     Patient to be seen 5 x/week to address the above listed problems via therapeutic activities, therapeutic exercises, self-care/home management  Plan of Care Expires: 01/04/23  Plan of Care Reviewed with: patient    Subjective     Pain/Comfort:  Pain Rating 1: 0/10    Objective:     Communicated with: nursing prior to session.  Patient found HOB elevated with blood pressure cuff, pulse ox (continuous), telemetry, peripheral IV, bed alarm, rolle catheter upon OT entry to room.    General Precautions: Standard, fall, aphasia    Orthopedic Precautions:N/A  Braces: N/A  Respiratory Status: Room air     Occupational Performance:     Bed Mobility:    refused    Functional Mobility/Transfers:  Refused    Activities of Daily Living:  Grooming: setup assistance bed level to brush teeth and to wash face; pt required cues for correct cup to spit and rinse from, as pt kept trying to rinse with dirty water he already  had spit out      Treatment & Education:  Pt educated on role of OT/POC, importance of OOB/EOB activity, use of call bell, and safety during ADLs, transfers, and functional mobility.    Patient left HOB elevated with all lines intact, call button in reach, and bed alarm on    GOALS:   Multidisciplinary Problems       Occupational Therapy Goals          Problem: Occupational Therapy    Goal Priority Disciplines Outcome Interventions   Occupational Therapy Goal     OT, PT/OT Ongoing, Progressing    Description: Goals to be met by: 1/4/2022     Patient will increase functional independence with ADLs by performing:    UE Dressing with Stand-by Assistance.  Grooming while standing at sink with Stand-by Assistance.  Toileting from toilet with Stand-by Assistance for hygiene and clothing management.   Supine to sit with Stand-by Assistance.  Toilet transfer to toilet with Stand-by Assistance.  Upper extremity exercise program x10 reps per handout, with assistance as needed.  Attend to ADL task for 5 minutes with minimal verbal/tactile cues.                         Time Tracking:     OT Date of Treatment: 12/07/22  OT Start Time: 1112  OT Stop Time: 1124  OT Total Time (min): 12 min    Billable Minutes:Self Care/Home Management 12    OT/PRINCESS: OT          12/7/2022

## 2022-12-07 NOTE — NURSING
Pt orientated this morning to person, place, and time. Pt did not sleep well last night due to multiple IV issues and pt continuously pulling off leads in his sleep. Rn would have to then wake pt up to place back. Pt stayed calm and follow commands.

## 2022-12-07 NOTE — PLAN OF CARE
Problem: Adult Inpatient Plan of Care  Goal: Plan of Care Review  Outcome: Ongoing, Progressing  Goal: Patient-Specific Goal (Individualized)  Outcome: Ongoing, Progressing  Goal: Absence of Hospital-Acquired Illness or Injury  Outcome: Ongoing, Progressing  Goal: Optimal Comfort and Wellbeing  Outcome: Ongoing, Progressing  Goal: Readiness for Transition of Care  Outcome: Ongoing, Progressing     Problem: Diabetes Comorbidity  Goal: Blood Glucose Level Within Targeted Range  Outcome: Ongoing, Progressing     Problem: Fall Injury Risk  Goal: Absence of Fall and Fall-Related Injury  Outcome: Ongoing, Progressing     Problem: Infection  Goal: Absence of Infection Signs and Symptoms  Outcome: Ongoing, Progressing     Problem: Adjustment to Illness (Stroke, Ischemic/Transient Ischemic Attack)  Goal: Optimal Coping  Outcome: Ongoing, Progressing     Problem: Bowel Elimination Impaired (Stroke, Ischemic/Transient Ischemic Attack)  Goal: Effective Bowel Elimination  Outcome: Ongoing, Progressing     Problem: Cerebral Tissue Perfusion (Stroke, Ischemic/Transient Ischemic Attack)  Goal: Optimal Cerebral Tissue Perfusion  Outcome: Ongoing, Progressing     Problem: Cognitive Impairment (Stroke, Ischemic/Transient Ischemic Attack)  Goal: Optimal Cognitive Function  Outcome: Ongoing, Progressing     Problem: Communication Impairment (Stroke, Ischemic/Transient Ischemic Attack)  Goal: Improved Communication Skills  Outcome: Ongoing, Progressing     Problem: Functional Ability Impaired (Stroke, Ischemic/Transient Ischemic Attack)  Goal: Optimal Functional Ability  Outcome: Ongoing, Progressing     Problem: Respiratory Compromise (Stroke, Ischemic/Transient Ischemic Attack)  Goal: Effective Oxygenation and Ventilation  Outcome: Ongoing, Progressing     Problem: Sensorimotor Impairment (Stroke, Ischemic/Transient Ischemic Attack)  Goal: Improved Sensorimotor Function  Outcome: Ongoing, Progressing     Problem: Swallowing  Impairment (Stroke, Ischemic/Transient Ischemic Attack)  Goal: Optimal Eating and Swallowing without Aspiration  Outcome: Ongoing, Progressing     Problem: Urinary Elimination Impaired (Stroke, Ischemic/Transient Ischemic Attack)  Goal: Effective Urinary Elimination  Outcome: Ongoing, Progressing     Problem: Skin Injury Risk Increased  Goal: Skin Health and Integrity  Outcome: Ongoing, Progressing     Problem: Adjustment to Illness (Delirium)  Goal: Optimal Coping  Outcome: Ongoing, Progressing     Problem: Altered Behavior (Delirium)  Goal: Improved Behavioral Control  Outcome: Ongoing, Progressing     Problem: Attention and Thought Clarity Impairment (Delirium)  Goal: Improved Attention and Thought Clarity  Outcome: Ongoing, Progressing     Problem: Sleep Disturbance (Delirium)  Goal: Improved Sleep  Outcome: Ongoing, Progressing

## 2022-12-07 NOTE — PROGRESS NOTES
[facility]  Department of Neurology  Neurology Progress Note        PATIENT NAME: Spencer Burton Jr.  MRN: [id number]  CSN: [id number]      TODAY'S DATE: 12/07/2022  ADMIT DATE: 12/3/2022                            CONSULTING PROVIDER: Alison Nelson MD  CONSULT REQUESTED BY: Pablo Bose, *      Reason for consult: CVA       History obtained from chart review and the patient.    HPI per EMR: Spencer Burton Jr. is a 52 y.o. male with a history of  CHF presented with right sided weakness. He was noticed stumbling in gas station so the staff there called EMS and he was brought to the ER for evaluation. CT scan and MRI consistent with bilateral cerebral hemispheres, left basal ganglia and left cerebellar hemisphere. Tele-stroke consulted and TPA was not given due to waxing and waning symptoms and elevated blood pressures. Hospital medicine consulted for medical management. During my evaluation, patient lethargic and confused and not able to follow commands and provide any reliable information.     Per tele stroke documentation: ''Patient found to be hypoxic.  With correction of hypoxia and lowering of BP, patient had some clinical improvement.  Radiology was also concerned the L deep lucency was sub-acute.  For these reasons we decided against thrombolysis.  Given patient's aphasia, precise onset cannot be determined and I am concerned onset was earlier than when he presented to the gas station given CT finding.''    Neurology consult:  Patient seen and examined by me this morning.  He is very lethargic and only wakes up to repeated stimulus and follows commands.  He is not able to provide any history.  He does have significant aphasia and dysarthria on exam.  MRI brain done showed embolic infarcts in the left corona radiata, left frontal lobe, right frontoparietal lobe.    12/5/22:  Patient was seen and examined by me today.  He is somnolent but less than yesterday.  Remains with aphasia and  dysarthria which seems to be improving, and follows commands consistently.  Denies any new neuro deficits.  Transesophageal echo still pending.    12/7/22: Patient was seen and examined by me today.  There were no acute events overnight.  He underwent transesophageal echo which showed large thrombus in the left ventricle, as well as decreased systolic function with EF 38%. There was no PFO.  We will plan repeating CT brain prior to starting heparin drip.    CURRENT SCHEDULED MEDICATIONS:   amLODIPine  10 mg Oral Daily    aspirin  81 mg Oral Daily    atorvastatin  80 mg Oral QHS    calcitRIOL  0.25 mcg Oral Daily    furosemide (LASIX) injection  60 mg Intravenous BID    hydrALAZINE  25 mg Oral Q8H    insulin detemir U-100  18 Units Subcutaneous QHS    metoprolol  5 mg Intravenous Q6H     CURRENT INFUSIONS:   dexmedetomidine (PRECEDEX) infusion Stopped (12/04/22 1800)    nicardipine 2.5 mg/hr (12/07/22 0528)     CURRENT PRN MEDICATIONS:  acetaminophen, acetaminophen, aluminum-magnesium hydroxide-simethicone, dextrose 10%, dextrose 10%, glucagon (human recombinant), glucose, glucose, hydrALAZINE, HYDROcodone-acetaminophen, insulin aspart U-100, melatonin, morphine, naloxone, simethicone, sodium chloride 0.9%, sodium chloride 0.9%      PHYSICAL EXAM:  Patient Vitals for the past 24 hrs:   BP Temp Temp src Pulse Resp SpO2   12/07/22 0600 (!) 159/110 -- -- 77 16 (!) 94 %   12/07/22 0500 (!) 167/114 -- -- 91 19 97 %   12/07/22 0400 (!) 153/91 98.3 °F (36.8 °C) Oral 83 17 (!) 94 %   12/07/22 0300 (!) 171/105 -- -- 91 (!) 21 97 %   12/07/22 0245 (!) 140/83 -- -- 88 15 (!) 93 %   12/07/22 0200 (!) 152/109 -- -- 83 13 (!) 92 %   12/07/22 0100 (!) 154/99 -- -- 86 -- 95 %   12/07/22 0000 (!) 163/100 97.8 °F (36.6 °C) Oral 83 (!) 21 (!) 92 %   12/06/22 2330 (!) 168/107 -- -- 86 -- (!) 93 %   12/06/22 2200 (!) 163/105 -- -- 87 (!) 21 100 %   12/06/22 2100 (!) 175/113 -- -- 90 -- 96 %   12/06/22 2000 (!) 194/109 97.6 °F (36.4 °C)  Oral 83 (!) 21 96 %   12/06/22 1900 (!) 171/115 -- -- 85 20 99 %   12/06/22 1800 (!) 165/103 -- -- 78 19 100 %   12/06/22 1745 (!) 152/125 -- -- 79 (!) 26 96 %   12/06/22 1730 (!) 167/112 -- -- 78 (!) 28 95 %   12/06/22 1715 (!) 173/109 -- -- 84 20 100 %   12/06/22 1700 -- -- -- 92 (!) 24 (!) 91 %   12/06/22 1645 (!) 169/92 -- -- 82 20 100 %   12/06/22 1630 (!) 170/100 -- -- 85 18 100 %   12/06/22 1615 (!) 159/96 -- -- 84 (!) 37 99 %   12/06/22 1600 (!) 163/96 -- -- 84 (!) 32 98 %   12/06/22 1545 (!) 174/121 -- -- 84 (!) 23 98 %   12/06/22 1530 (!) 178/107 -- -- 85 20 100 %   12/06/22 1515 (!) 183/114 -- -- 85 (!) 24 98 %   12/06/22 1501 (!) 192/125 97.8 °F (36.6 °C) Oral 84 -- (!) 90 %   12/06/22 1500 (!) 192/125 -- -- 86 (!) 28 97 %   12/06/22 1445 (!) 198/136 -- -- 83 20 100 %   12/06/22 1415 (!) 202/148 -- -- 83 (!) 25 97 %   12/06/22 1400 (!) 196/131 -- -- 78 20 100 %   12/06/22 1345 (!) 205/133 -- -- 78 18 100 %   12/06/22 1330 (!) 202/154 -- -- 78 (!) 30 98 %   12/06/22 1300 (!) 210/135 -- -- 78 (!) 26 (!) 90 %   12/06/22 1245 (!) 196/122 -- -- 77 (!) 27 95 %   12/06/22 1230 (!) 163/130 -- -- 76 (!) 30 (!) 74 %   12/06/22 1215 (!) 207/144 -- -- 79 (!) 23 (!) 92 %   12/06/22 1200 (!) 169/117 -- -- 67 17 (!) 89 %   12/06/22 1145 (!) 168/110 -- -- 69 17 100 %   12/06/22 1130 (!) 159/99 -- -- 69 (!) 21 100 %   12/06/22 1101 (!) 208/137 98.8 °F (37.1 °C) Oral 77 16 99 %   12/06/22 1100 (!) 208/137 -- -- 77 (!) 23 100 %   12/06/22 1015 -- -- -- 80 (!) 29 99 %   12/06/22 1000 (!) 199/142 -- -- 78 (!) 22 (!) 94 %   12/06/22 0945 -- -- -- 83 (!) 27 100 %   12/06/22 0930 (!) 196/137 -- -- 80 13 (!) 94 %         GENERAL APPEARANCE: Drowsy male in no acute distress.  HEENT: Normocephalic and atraumatic. PERRL. Oropharynx unremarkable.  PULM: Normal respiratory effort. No accessory muscle use.  CV: RRR.  ABDOMEN: Soft, nontender.  EXTREMITIES: No obvious signs of vascular compromise. Pulses present. No cyanosis, clubbing  or edema.  SKIN: Clear; no rashes, lesions or skin breaks in exposed areas.    NEURO:  MENTAL STATUS:  Patient is drowsy but easily arousable, and follows some commands.  He has significant aphasia and dysarthria.  CRANIAL NERVES:  CN I: Not tested.  CN II: Fundoscopic exam deferred.  CN III, IV, VI: Pupils equal, round and reactive to light.  Extraocular movements full and intact.  CN V: Facial sensation normal.  CN VII: Facial asymmetry noted for right facial droop.  CN VIII: Hearing grossly normal and equal bilaterally.  No skew deviation or pathologic nystagmus.  CN IX, X: Palate elevates symmetrically. Speech/articulation is dysarthric and aphasic.  CN XI: Shoulder shrug and chin rotation equal with good strength.  CN XII: Tongue protrusion midline.    MOTOR:  Bulk normal. Tone normal and symmetric throughout.  Abnormal movements absent.  Tremor: none present.  Strength  5/5 in right upper and lower extremity, 4+/5 in left upper and lower extremities. .    REFLEXES:  DTRs 2+ throughout.  Plantar response equivocal bilaterally.  SENSATION:  cannot be tested due to mental status however withdraws all extremities symmetrically to stimulus .  COORDINATION:  Can not be tested .  STATION: not tested.  GAIT: not tested.      NIHSS:  1a      Level of Consciousness (alert, drowsy, etc.):   1=not alert but arousable by minor stimulation to obey, answer or respond  1b.     Level of Consciousness Questions (month, age): 0= able to answer 2 of 2 questions  1c.     Level of Consciousness Commands (open, close eyes, make fist, let go):  0=Answers both tasks correctly  2.      Best Gaze (eyes open - patient follows examiner's finger or face):      0=normal  3.      Visual (introduce visual stimulus/threat to patient's visual field quadrants):  0=No visual loss  4.      Facial Palsy (show teeth, raise eyebrows and squeeze eyes shut):        2=Partial paralysis (total or near total paralysis of the lower face)  5a.     Motor Arm  - Left (elevate extremity 90 degrees and score drift/movement):       1=Drift, limb holds 90 (or 45) degrees but drifts down before full 10 seconds: does not hit bed  5b.     Motor Arm - Right (elevate extremity 90 degrees and score drift/movement):      0=No drift, limb holds 90 (or 45) degrees for full 10 seconds  6a.     Motor Leg - Left (elevate extremity 30 degrees and score drift/movement):       0=No drift, limb holds 90 (or 45) degrees for full 10 seconds  6b      Motor Leg - Right (elevate extremity 30 degrees and score drift/movement):      0=No drift, limb holds 90 (or 45) degrees for full 10 seconds  7.      Limb Ataxia (finger-nose, heel down shin):      0=Absent  8.      Sensory (pin prick to face, arm, trunk, and leg - compare side to side):        0=Normal; no sensory loss  9.      Best Language (name items, describe a picture and read sentences):      1=Mild to moderate aphasia; some obvious loss of fluency or facility of comprehension without significant limitation on ideas expressed or form of expression.  10.     Dysarthria (evaluate speech clarity by patient repeating listed words): 1=Mild to moderate, patient slurs at least some words and at worst, can be understood with some difficulty  11.     Extinction and Inattention (use prior testing to identify neglect or double simultaneous stimuli testing):      0=No abnormality          NIH Stroke Scale Total:         6      Labs:  Recent Labs   Lab 12/04/22  0231 12/04/22  1426 12/05/22  0321 12/06/22  0435 12/07/22  0501     140   < > 142 145 141   K 3.2*  3.2*   < > 2.9* 3.3* 2.9*  2.9*     101   < > 105 107 102   CO2 22*  22*   < > 27 28 27   BUN 48*  48*   < > 56* 56* 52*   CREATININE 4.9*  4.9*   < > 5.0* 4.8* 4.2*   *  256*   < > 212* 133* 176*   CALCIUM 9.4  9.4   < > 8.8 8.8 8.6*   PHOS 4.9*  --   --   --   --    MG 2.0   < > 2.0 2.0 1.9    < > = values in this interval not displayed.       Recent Labs   Lab  12/05/22  0321 12/06/22  0435 12/07/22  0501   WBC 10.39 10.92 15.69*   HGB 13.1* 13.7* 14.2   HCT 40.3 42.8 43.6    175 184       Recent Labs   Lab 12/05/22  0321 12/06/22  0435 12/07/22  0501   ALBUMIN 3.1* 3.2* 3.3*   PROT 6.0 6.4 6.5   BILITOT 2.4* 2.3* 2.2*   ALKPHOS 115 105 110   ALT 58* 65* 60*   AST 39 36 29       Lab Results   Component Value Date    INR 1.3 12/04/2022     Lab Results   Component Value Date    TRIG 91 12/04/2022    HDL 50 12/04/2022    CHOLHDL 27.6 12/04/2022     Lab Results   Component Value Date    HGBA1C 8.9 (H) 12/04/2022     No results found for: PROTEINCSF, GLUCCSF, WBCCSF    Imaging:  I have reviewed and interpreted the pertinent imaging and lab results.      Transesophageal echo (RAGHAV)  · The left ventricle is small with moderately decreased systolic function.  · The estimated ejection fraction is 38%.  · Left ventricular diastolic dysfunction.  · There are segmental left ventricular wall motion abnormalities.  · No spontaneous echo contrast. A moderate protruding layered left   ventricular thrombus is present. The thrombus is fixed and located in the   apex.  · Normal right ventricular size.  · Mild left atrial enlargement.  · Mild right atrial enlargement.  · Mild aortic regurgitation.  · No interatrial septal defect present.  · Normal appearing left atrial appendage. No thrombus is present in the   appendage. SB occluder is absent. Abnormal appendage velocities.  · Mild mitral regurgitation.  · Agitated saline contrast study did not show any right to left shunt         ASSESSMENT & PLAN:    Acute ischemic stroke - multifocal - etiology cardioembolic in the setting of Left Ventricular Thrombus  CKD   Elevated troponins  52-year-old man with history of CHF who presented with right-sided weakness.  Did not receive tPA as he was outside of the window.  MRI brain showed bilateral anterior and posterior circulation strokes concerning for embolic etiology, so he underwent  transesophageal ECHO which confirmed the presence of left ventricular thrombus.  He will need to be started on early anticoagulation to decrease the risk of recurrent stroke.    Workup  CTH: Loss of gray-white matter distinction in involving the left basal ganglia  CTA head and neck:  Was not performed due to kidney function  MRI brain:  Acute ischemic infarcts involving right frontoparietal lobe, left corona radiata, left frontal lobe.  MRA brain: pending   ECHO 11/2022:  EF 40%, severe left atrial enlargement, grade 3 left ventricular diastolic dysfunction  RAGHAV decreased left ventricular systolic function with EF 38%, large left ventricular thrombus, no intracardiac shunt  LDL: 112  HbA1c: 8.9     Plan  Admitted for further stroke workup and treatment.  Etiology of stroke is cardioembolic in the setting of left ventricular thrombus.  Since stroke burden is moderate, will recommend to start anticoagulation between 4-5 days after the index event.  Recommend obtaining brain CT to rule out hemorrhagic transformation prior to starting.  Then recommend heparin drip, repeating CT brain when patient therapeutic on the drip, and if there is no hemorrhagic transformation can start bridge to warfarin.  Patient will need to be on anticoagulation with warfarin for a goal INR of 2.0-3.0 for at least 3 months as per American heart Association guidelines.  Will defer to Cardiology transitioning to direct oral anticoagulant after 3 month period with warfarin.  If there is any concern for compliance with warfarin, can use direct oral anticoagulant.  Though is not yet studied in left ventricular thrombus, has been successfully used for other indications such as pulmonary embolism and non valvular atrial fibrillation.  If choosing to use DOAC, recommend apixaban 5 mg BID  Slowly normalize blood pressure  Cardiology and Nephrology following  PT OT  Speech therapy  DVT prophylaxis with chemo/SCD prophylaxis  Discussed lifestyle  modifications as prophylactic measures for stroke prevention including, adequate blood pressure management, healthy diet and regular exercise.    Thank you kindly for including us in the care of this patient. Please do not hesitate to contact us with any questions.      Critical Care:  48 minutes of critical care time has been spent evaluating with the patient. Time includes chart review not limited to diagnostic imaging, labs, and vitals, patient assessment, discussion with family and nursing, current order evaluations, and new order entries.       Alison Nelson MD  Neurology/vascular Neurology  Date of Service: 12/07/2022

## 2022-12-07 NOTE — PT/OT/SLP PROGRESS
Physical Therapy      Patient Name:  Spencer Burton Jr.   MRN:  [id number]    Patient not seen today secondary to Patient unwilling to participate. Will follow-up 12/08/22.

## 2022-12-07 NOTE — CARE UPDATE
12/07/22 1048   Patient Assessment/Suction   Level of Consciousness (AVPU) alert   Respiratory Effort Unlabored   Expansion/Accessory Muscles/Retractions no use of accessory muscles   All Lung Fields Breath Sounds clear   Rhythm/Pattern, Respiratory unlabored   PRE-TX-O2   O2 Device (Oxygen Therapy) nasal cannula  (PLACED ON NC FROM RA)   $ Is the patient on Low Flow Oxygen? Yes   Flow (L/min) 2   SpO2 97 %   Pulse Oximetry Type Continuous   $ Pulse Oximetry - Multiple Charge Pulse Oximetry - Multiple   Pulse 87   Resp (!) 21   Education   $ Education Other (see comment);15 min  (SATS)   Respiratory Evaluation   $ Care Plan Tech Time 15 min   $ Eval/Re-eval Charges Re-evaluation   NURSE STATED PATIENT DESATTED WHILE ASLEEP INTO LOW 80S AND SATS INCREASED WHEN PATIENT AWOKE. UPON ENTERING ROOM SATS 97% ON RA, PATIENT WANTS TO TAKE NAP SO PLACED ON 2L NC TO TAKE NAP TO PREVENT DESATTING

## 2022-12-08 ENCOUNTER — HOSPITAL ENCOUNTER (INPATIENT)
Facility: HOSPITAL | Age: 52
LOS: 19 days | Discharge: NURSING, REHAB OR LONG-TERM CARE | DRG: 064 | End: 2022-12-27
Attending: EMERGENCY MEDICINE | Admitting: PSYCHIATRY & NEUROLOGY
Payer: MEDICAID

## 2022-12-08 VITALS
WEIGHT: 186.31 LBS | SYSTOLIC BLOOD PRESSURE: 166 MMHG | HEART RATE: 96 BPM | HEIGHT: 69 IN | DIASTOLIC BLOOD PRESSURE: 105 MMHG | TEMPERATURE: 99 F | BODY MASS INDEX: 27.6 KG/M2 | OXYGEN SATURATION: 90 % | RESPIRATION RATE: 14 BRPM

## 2022-12-08 DIAGNOSIS — E11.9 TYPE 2 DIABETES MELLITUS WITHOUT COMPLICATION, WITHOUT LONG-TERM CURRENT USE OF INSULIN: ICD-10-CM

## 2022-12-08 DIAGNOSIS — R13.12 OROPHARYNGEAL DYSPHAGIA: ICD-10-CM

## 2022-12-08 DIAGNOSIS — I63.512 ACUTE ISCHEMIC LEFT MCA STROKE: ICD-10-CM

## 2022-12-08 DIAGNOSIS — I63.412 EMBOLIC STROKE INVOLVING LEFT MIDDLE CEREBRAL ARTERY: Primary | ICD-10-CM

## 2022-12-08 DIAGNOSIS — N17.9 AKI (ACUTE KIDNEY INJURY): ICD-10-CM

## 2022-12-08 DIAGNOSIS — R53.81 DEBILITY: ICD-10-CM

## 2022-12-08 DIAGNOSIS — I10 ESSENTIAL HYPERTENSION: Chronic | ICD-10-CM

## 2022-12-08 PROBLEM — I51.3 LV (LEFT VENTRICULAR) MURAL THROMBUS: Status: ACTIVE | Noted: 2022-12-08

## 2022-12-08 PROBLEM — G93.6 CYTOTOXIC BRAIN EDEMA: Status: ACTIVE | Noted: 2022-12-08

## 2022-12-08 LAB
ABO + RH BLD: NORMAL
ANION GAP SERPL CALC-SCNC: 10 MMOL/L (ref 8–16)
APTT PPP: 25.2 SEC (ref 23.3–35.1)
BACTERIA #/AREA URNS AUTO: ABNORMAL /HPF
BASOPHILS # BLD AUTO: 0.06 K/UL (ref 0–0.2)
BASOPHILS NFR BLD: 0.4 % (ref 0–1.9)
BILIRUB UR QL STRIP: NEGATIVE
BLD GP AB SCN CELLS X3 SERPL QL: NORMAL
BUN SERPL-MCNC: 49 MG/DL (ref 6–20)
CALCIUM SERPL-MCNC: 8.4 MG/DL (ref 8.7–10.5)
CHLORIDE SERPL-SCNC: 101 MMOL/L (ref 95–110)
CLARITY UR REFRACT.AUTO: CLEAR
CO2 SERPL-SCNC: 31 MMOL/L (ref 23–29)
COLOR UR AUTO: YELLOW
CREAT SERPL-MCNC: 3.6 MG/DL (ref 0.5–1.4)
DIFFERENTIAL METHOD: ABNORMAL
EOSINOPHIL # BLD AUTO: 0.1 K/UL (ref 0–0.5)
EOSINOPHIL NFR BLD: 0.7 % (ref 0–8)
ERYTHROCYTE [DISTWIDTH] IN BLOOD BY AUTOMATED COUNT: 14.8 % (ref 11.5–14.5)
EST. GFR  (NO RACE VARIABLE): 19.7 ML/MIN/1.73 M^2
ESTIMATED AVG GLUCOSE: 214 MG/DL (ref 68–131)
GLUCOSE SERPL-MCNC: 192 MG/DL (ref 70–110)
GLUCOSE SERPL-MCNC: 98 MG/DL (ref 70–110)
GLUCOSE UR QL STRIP: NEGATIVE
HBA1C MFR BLD: 9.1 % (ref 4–5.6)
HCT VFR BLD AUTO: 44.7 % (ref 40–54)
HGB BLD-MCNC: 14.2 G/DL (ref 14–18)
HGB UR QL STRIP: ABNORMAL
HYALINE CASTS UR QL AUTO: 0 /LPF
IMM GRANULOCYTES # BLD AUTO: 0.05 K/UL (ref 0–0.04)
IMM GRANULOCYTES NFR BLD AUTO: 0.3 % (ref 0–0.5)
INR PPP: 1.2
KETONES UR QL STRIP: NEGATIVE
LEUKOCYTE ESTERASE UR QL STRIP: ABNORMAL
LYMPHOCYTES # BLD AUTO: 0.9 K/UL (ref 1–4.8)
LYMPHOCYTES NFR BLD: 5.4 % (ref 18–48)
MAGNESIUM SERPL-MCNC: 1.9 MG/DL (ref 1.6–2.6)
MCH RBC QN AUTO: 27.3 PG (ref 27–31)
MCHC RBC AUTO-ENTMCNC: 31.8 G/DL (ref 32–36)
MCV RBC AUTO: 86 FL (ref 82–98)
MICROSCOPIC COMMENT: ABNORMAL
MONOCYTES # BLD AUTO: 0.9 K/UL (ref 0.3–1)
MONOCYTES NFR BLD: 5.9 % (ref 4–15)
NEUTROPHILS # BLD AUTO: 13.9 K/UL (ref 1.8–7.7)
NEUTROPHILS NFR BLD: 87.3 % (ref 38–73)
NITRITE UR QL STRIP: NEGATIVE
NRBC BLD-RTO: 0 /100 WBC
PH UR STRIP: 7 [PH] (ref 5–8)
PLATELET # BLD AUTO: 179 K/UL (ref 150–450)
PMV BLD AUTO: 11.6 FL (ref 9.2–12.9)
POCT GLUCOSE: 121 MG/DL (ref 70–110)
POTASSIUM SERPL-SCNC: 3.1 MMOL/L (ref 3.5–5.1)
POTASSIUM SERPL-SCNC: 3.5 MMOL/L (ref 3.5–5.1)
PROCALCITONIN SERPL IA-MCNC: 0.51 NG/ML
PROT UR QL STRIP: ABNORMAL
PROTHROMBIN TIME: 14.2 SEC (ref 11.4–13.7)
RBC # BLD AUTO: 5.21 M/UL (ref 4.6–6.2)
RBC #/AREA URNS AUTO: 47 /HPF (ref 0–4)
SODIUM SERPL-SCNC: 142 MMOL/L (ref 136–145)
SP GR UR STRIP: 1.02 (ref 1–1.03)
TSH SERPL DL<=0.005 MIU/L-ACNC: 1.38 UIU/ML (ref 0.4–4)
URN SPEC COLLECT METH UR: ABNORMAL
WBC # BLD AUTO: 15.86 K/UL (ref 3.9–12.7)
WBC #/AREA URNS AUTO: 64 /HPF (ref 0–5)

## 2022-12-08 PROCEDURE — 99291 CRITICAL CARE FIRST HOUR: CPT | Mod: ,,, | Performed by: PHYSICIAN ASSISTANT

## 2022-12-08 PROCEDURE — 99291 PR CRITICAL CARE, E/M 30-74 MINUTES: ICD-10-PCS | Mod: ,,, | Performed by: PHYSICIAN ASSISTANT

## 2022-12-08 PROCEDURE — 87186 SC STD MICRODIL/AGAR DIL: CPT | Performed by: PHYSICIAN ASSISTANT

## 2022-12-08 PROCEDURE — 25000003 PHARM REV CODE 250: Performed by: INTERNAL MEDICINE

## 2022-12-08 PROCEDURE — 99291 PR CRITICAL CARE, E/M 30-74 MINUTES: ICD-10-PCS | Mod: ,,, | Performed by: EMERGENCY MEDICINE

## 2022-12-08 PROCEDURE — 97112 NEUROMUSCULAR REEDUCATION: CPT

## 2022-12-08 PROCEDURE — 25000003 PHARM REV CODE 250: Performed by: STUDENT IN AN ORGANIZED HEALTH CARE EDUCATION/TRAINING PROGRAM

## 2022-12-08 PROCEDURE — 99223 PR INITIAL HOSPITAL CARE,LEVL III: ICD-10-PCS | Mod: ,,, | Performed by: PSYCHIATRY & NEUROLOGY

## 2022-12-08 PROCEDURE — 63600175 PHARM REV CODE 636 W HCPCS: Performed by: NURSE PRACTITIONER

## 2022-12-08 PROCEDURE — 25000003 PHARM REV CODE 250: Performed by: NURSE PRACTITIONER

## 2022-12-08 PROCEDURE — 83735 ASSAY OF MAGNESIUM: CPT | Performed by: INTERNAL MEDICINE

## 2022-12-08 PROCEDURE — 81001 URINALYSIS AUTO W/SCOPE: CPT | Performed by: PHYSICIAN ASSISTANT

## 2022-12-08 PROCEDURE — 99233 SBSQ HOSP IP/OBS HIGH 50: CPT | Mod: ,,, | Performed by: GENERAL PRACTICE

## 2022-12-08 PROCEDURE — 20000000 HC ICU ROOM

## 2022-12-08 PROCEDURE — 25000003 PHARM REV CODE 250: Performed by: PHYSICIAN ASSISTANT

## 2022-12-08 PROCEDURE — 87088 URINE BACTERIA CULTURE: CPT | Performed by: PHYSICIAN ASSISTANT

## 2022-12-08 PROCEDURE — 87077 CULTURE AEROBIC IDENTIFY: CPT | Performed by: PHYSICIAN ASSISTANT

## 2022-12-08 PROCEDURE — 36415 COLL VENOUS BLD VENIPUNCTURE: CPT | Performed by: INTERNAL MEDICINE

## 2022-12-08 PROCEDURE — 87086 URINE CULTURE/COLONY COUNT: CPT | Performed by: PHYSICIAN ASSISTANT

## 2022-12-08 PROCEDURE — 84132 ASSAY OF SERUM POTASSIUM: CPT | Performed by: PSYCHIATRY & NEUROLOGY

## 2022-12-08 PROCEDURE — 92507 TX SP LANG VOICE COMM INDIV: CPT

## 2022-12-08 PROCEDURE — 85025 COMPLETE CBC W/AUTO DIFF WBC: CPT | Performed by: INTERNAL MEDICINE

## 2022-12-08 PROCEDURE — 25500020 PHARM REV CODE 255: Performed by: STUDENT IN AN ORGANIZED HEALTH CARE EDUCATION/TRAINING PROGRAM

## 2022-12-08 PROCEDURE — 85610 PROTHROMBIN TIME: CPT | Performed by: STUDENT IN AN ORGANIZED HEALTH CARE EDUCATION/TRAINING PROGRAM

## 2022-12-08 PROCEDURE — 87040 BLOOD CULTURE FOR BACTERIA: CPT | Mod: 59 | Performed by: NURSE PRACTITIONER

## 2022-12-08 PROCEDURE — 99223 1ST HOSP IP/OBS HIGH 75: CPT | Mod: ,,, | Performed by: PSYCHIATRY & NEUROLOGY

## 2022-12-08 PROCEDURE — 83036 HEMOGLOBIN GLYCOSYLATED A1C: CPT | Performed by: PHYSICIAN ASSISTANT

## 2022-12-08 PROCEDURE — 63600175 PHARM REV CODE 636 W HCPCS: Performed by: INTERNAL MEDICINE

## 2022-12-08 PROCEDURE — 84145 PROCALCITONIN (PCT): CPT | Performed by: NURSE PRACTITIONER

## 2022-12-08 PROCEDURE — 86850 RBC ANTIBODY SCREEN: CPT | Performed by: PHYSICIAN ASSISTANT

## 2022-12-08 PROCEDURE — 63600175 PHARM REV CODE 636 W HCPCS: Performed by: PSYCHIATRY & NEUROLOGY

## 2022-12-08 PROCEDURE — 80048 BASIC METABOLIC PNL TOTAL CA: CPT | Performed by: INTERNAL MEDICINE

## 2022-12-08 PROCEDURE — 97535 SELF CARE MNGMENT TRAINING: CPT

## 2022-12-08 PROCEDURE — 85730 THROMBOPLASTIN TIME PARTIAL: CPT | Performed by: STUDENT IN AN ORGANIZED HEALTH CARE EDUCATION/TRAINING PROGRAM

## 2022-12-08 PROCEDURE — 99233 PR SUBSEQUENT HOSPITAL CARE,LEVL III: ICD-10-PCS | Mod: ,,, | Performed by: GENERAL PRACTICE

## 2022-12-08 PROCEDURE — 99291 CRITICAL CARE FIRST HOUR: CPT | Mod: ,,, | Performed by: EMERGENCY MEDICINE

## 2022-12-08 PROCEDURE — 84443 ASSAY THYROID STIM HORMONE: CPT | Performed by: PHYSICIAN ASSISTANT

## 2022-12-08 PROCEDURE — 25000003 PHARM REV CODE 250: Performed by: PSYCHIATRY & NEUROLOGY

## 2022-12-08 PROCEDURE — 99285 EMERGENCY DEPT VISIT HI MDM: CPT | Mod: 25

## 2022-12-08 PROCEDURE — 36415 COLL VENOUS BLD VENIPUNCTURE: CPT | Performed by: STUDENT IN AN ORGANIZED HEALTH CARE EDUCATION/TRAINING PROGRAM

## 2022-12-08 RX ORDER — ATORVASTATIN CALCIUM 40 MG/1
40 TABLET, FILM COATED ORAL DAILY
Status: DISCONTINUED | OUTPATIENT
Start: 2022-12-09 | End: 2022-12-12

## 2022-12-08 RX ORDER — LABETALOL HYDROCHLORIDE 5 MG/ML
10 INJECTION, SOLUTION INTRAVENOUS EVERY 6 HOURS PRN
Status: DISCONTINUED | OUTPATIENT
Start: 2022-12-08 | End: 2022-12-15

## 2022-12-08 RX ORDER — SODIUM CHLORIDE 0.9 % (FLUSH) 0.9 %
10 SYRINGE (ML) INJECTION
Status: DISCONTINUED | OUTPATIENT
Start: 2022-12-08 | End: 2022-12-28 | Stop reason: HOSPADM

## 2022-12-08 RX ORDER — GLUCAGON 1 MG
1 KIT INJECTION
Status: DISCONTINUED | OUTPATIENT
Start: 2022-12-08 | End: 2022-12-10

## 2022-12-08 RX ORDER — AMOXICILLIN 250 MG
1 CAPSULE ORAL 2 TIMES DAILY
Status: DISCONTINUED | OUTPATIENT
Start: 2022-12-08 | End: 2022-12-10

## 2022-12-08 RX ORDER — INSULIN ASPART 100 [IU]/ML
1-10 INJECTION, SOLUTION INTRAVENOUS; SUBCUTANEOUS EVERY 4 HOURS PRN
Status: DISCONTINUED | OUTPATIENT
Start: 2022-12-08 | End: 2022-12-09

## 2022-12-08 RX ORDER — ONDANSETRON 2 MG/ML
4 INJECTION INTRAMUSCULAR; INTRAVENOUS EVERY 8 HOURS PRN
Status: DISCONTINUED | OUTPATIENT
Start: 2022-12-08 | End: 2022-12-10

## 2022-12-08 RX ORDER — DEXTROSE MONOHYDRATE 100 MG/ML
INJECTION, SOLUTION INTRAVENOUS CONTINUOUS PRN
Status: DISCONTINUED | OUTPATIENT
Start: 2022-12-08 | End: 2022-12-10

## 2022-12-08 RX ORDER — ACETAMINOPHEN 325 MG/1
650 TABLET ORAL EVERY 6 HOURS PRN
Status: DISCONTINUED | OUTPATIENT
Start: 2022-12-08 | End: 2022-12-09

## 2022-12-08 RX ORDER — HYDRALAZINE HYDROCHLORIDE 25 MG/1
50 TABLET, FILM COATED ORAL EVERY 8 HOURS
Status: DISCONTINUED | OUTPATIENT
Start: 2022-12-08 | End: 2022-12-08 | Stop reason: HOSPADM

## 2022-12-08 RX ADMIN — CALCITRIOL CAPSULES 0.25 MCG 0.25 MCG: 0.25 CAPSULE ORAL at 08:12

## 2022-12-08 RX ADMIN — INSULIN ASPART 2 UNITS: 100 INJECTION, SOLUTION INTRAVENOUS; SUBCUTANEOUS at 07:12

## 2022-12-08 RX ADMIN — POTASSIUM CHLORIDE 20 MEQ: 1500 TABLET, EXTENDED RELEASE ORAL at 08:12

## 2022-12-08 RX ADMIN — LABETALOL HYDROCHLORIDE 10 MG: 5 INJECTION INTRAVENOUS at 07:12

## 2022-12-08 RX ADMIN — METOPROLOL TARTRATE 5 MG: 1 INJECTION, SOLUTION INTRAVENOUS at 12:12

## 2022-12-08 RX ADMIN — METOPROLOL TARTRATE 5 MG: 1 INJECTION, SOLUTION INTRAVENOUS at 11:12

## 2022-12-08 RX ADMIN — SENNOSIDES AND DOCUSATE SODIUM 1 TABLET: 50; 8.6 TABLET ORAL at 08:12

## 2022-12-08 RX ADMIN — METOPROLOL TARTRATE 5 MG: 1 INJECTION, SOLUTION INTRAVENOUS at 05:12

## 2022-12-08 RX ADMIN — AMLODIPINE BESYLATE 10 MG: 5 TABLET ORAL at 08:12

## 2022-12-08 RX ADMIN — PIPERACILLIN SODIUM AND TAZOBACTAM SODIUM 4.5 G: 4; .5 INJECTION, POWDER, LYOPHILIZED, FOR SOLUTION INTRAVENOUS at 10:12

## 2022-12-08 RX ADMIN — VANCOMYCIN HYDROCHLORIDE 1750 MG: 500 INJECTION, POWDER, LYOPHILIZED, FOR SOLUTION INTRAVENOUS at 09:12

## 2022-12-08 RX ADMIN — ACETAMINOPHEN 650 MG: 325 TABLET ORAL at 08:12

## 2022-12-08 RX ADMIN — HYDRALAZINE HYDROCHLORIDE 25 MG: 25 TABLET ORAL at 05:12

## 2022-12-08 RX ADMIN — FUROSEMIDE 60 MG: 10 INJECTION, SOLUTION INTRAVENOUS at 08:12

## 2022-12-08 RX ADMIN — IOHEXOL 100 ML: 350 INJECTION, SOLUTION INTRAVENOUS at 12:12

## 2022-12-08 RX ADMIN — ASPIRIN 81 MG CHEWABLE TABLET 81 MG: 81 TABLET CHEWABLE at 08:12

## 2022-12-08 NOTE — ED NOTES
NG tube insertion explained to pt -- pt became anxious, tremulous and tearful. NG tube insertion deferred until after MRI

## 2022-12-08 NOTE — PT/OT/SLP PROGRESS
Speech Language Pathology Treatment    Patient Name:  Spencer Burton Jr.   MRN:  [id number]  Admitting Diagnosis: Acute ischemic left MCA stroke    Recommendations:                 General Recommendations:  Dysphagia therapy and Speech/language therapy  Diet recommendations:  Minced & Moist Diet - IDDSI Level 5, Liquid Diet Level: Thin liquids - IDDSI Level 0   Aspiration Precautions: Standard aspiration precautions   General Precautions: Standard, fall  Communication strategies:  yes/no questions only, provide increased time to answer, and provide visual and context cues when providing pt with directives.    Subjective     Pt seen at b/s.  Pt with eyes closed; low cognitive alertness.  Patient goals: Unable to elicit     Pain/Comfort:   No    Respiratory Status: Room air    Objective:     Has the patient been evaluated by SLP for swallowing?   No  Keep patient NPO?     Current Respiratory Status:        Pt participated in a series of language exercises:  EXP:  Automatic Speech with gestural cues (e.g., using finger gestures for verbal counting):  70% with MOD-MAX A.  Responsive speech with basic phrase closure/completion tasks:  80% with MOD A( including time delays).  Gestural training (e.g, pointing, mimic of object usage) with common core vocab in hospital setting:  <10% -- Pt required [place] Assistance/Total Dependent  Conversational speech:  Fair-WFL with basic greetings only within context.  Primary language error pattern: Perseveration or no response    REC:  1-step commands:  With hesitancy/delayed auditory processing/Modified Ind -- ~65-70%.  Primary language error pattern:  Perseveration; or unable to sustain immediate recall/repeat of prior command    Education:  Initiated training on appropriate compensatory strategies to facilitate comm exchange of basic needs/wants/hospital directives and procedures with pt, pt.'s nurse, and pt.'s PT.      Assessment:     Spencer Burton Jr. is a 52 y.o. male with s/s of an  ongoing severe mixed aphasia, with moderate-severe receptive / severe expressive deficit areas.  SLP to continue to f/u for goals listed below.    Goals:   Multidisciplinary Problems       SLP Goals          Problem: SLP    Goal Priority Disciplines Outcome   SLP Goal     SLP Ongoing, Progressing   Description: 1.  Pt will demonstrate no overt s/s of aspiration/penetration with IDDSI-7/thin liquids, >95% of the time.  2.  Pt will utilize trained swallowing precautions with MIN A.  3.  Pt will complete a cog/ling evaluation.  4.  Pt will complete a formal expressive language test (e.g. BNT).  MET  5.  Pt will utilize trained expressive communication/compensatory strategies with expression of simple wants/needs/ideas with MOD A.  6.  Pt will complete a complete formal aphasia evaluation.  7.  Pt will follow 1-step commands, with visual/context cues with >85% accuracy and MIN-MOD A.  8.  Pt/Family/CG/Staff education on appropriate communication strategies.                         Plan:     Patient to be seen:  5 x/week   Plan of Care expires:     Plan of Care reviewed with:  patient   SLP Follow-Up:  Yes       Discharge recommendations:   (TBD)   Barriers to Discharge:  None    Time Tracking:     SLP Treatment Date:   12/08/22  Speech Start Time:  0950  Speech Stop Time:  1020     Speech Total Time (min):  30 min    Billable Minutes: Speech Therapy Individual 30 mins    12/08/2022

## 2022-12-08 NOTE — SUBJECTIVE & OBJECTIVE
Past Medical History:   Diagnosis Date    CHF (congestive heart failure)     COPD (chronic obstructive pulmonary disease)     Diabetes mellitus     Hyperlipidemia     Hypertension      Past Surgical History:   Procedure Laterality Date    APPENDECTOMY      CHOLECYSTECTOMY       Family History   Problem Relation Age of Onset    Hypertension Mother     Schizophrenia Father     Hypertension Father     Diabetes Father      Social History     Tobacco Use    Smoking status: Never    Smokeless tobacco: Never   Substance Use Topics    Alcohol use: No     Comment: socially    Drug use: No     Review of patient's allergies indicates:  No Known Allergies    Medications: I have reviewed the current medication administration record.    (Not in a hospital admission)      Review of Systems   Constitutional:  Negative for chills and fever.   HENT:  Negative for rhinorrhea and sneezing.    Eyes:  Negative for discharge and redness.   Respiratory:  Negative for cough and wheezing.    Gastrointestinal:  Negative for diarrhea and vomiting.   Skin:  Negative for rash.   Neurological:  Positive for speech difficulty and weakness.   Psychiatric/Behavioral:  Positive for confusion and decreased concentration.    Objective:     Vital Signs (Most Recent):  Temp: 98.4 °F (36.9 °C) (12/08/22 1617)  Pulse: 94 (12/08/22 1617)  Resp: 16 (12/08/22 1617)  BP: (!) 170/112 (12/08/22 1617)  SpO2: 98 % (12/08/22 1617)    Vital Signs Range (Last 24H):  Temp:  [98.4 °F (36.9 °C)-98.9 °F (37.2 °C)]   Pulse:  []   Resp:  [11-33]   BP: (150-198)/()   SpO2:  [93 %-98 %]     Physical Exam  Constitutional:       Appearance: Normal appearance. He is not diaphoretic.   HENT:      Head: Normocephalic and atraumatic.      Nose: Nose normal.      Mouth/Throat:      Mouth: Mucous membranes are moist.      Pharynx: Oropharynx is clear.   Eyes:      General: No scleral icterus.     Conjunctiva/sclera: Conjunctivae normal.      Comments: L gaze    Cardiovascular:      Rate and Rhythm: Normal rate.      Pulses: Normal pulses.   Pulmonary:      Effort: Pulmonary effort is normal. No respiratory distress.   Abdominal:      General: Abdomen is flat. There is no distension.      Tenderness: There is no abdominal tenderness.   Musculoskeletal:         General: No tenderness or deformity.      Cervical back: No rigidity or tenderness.   Skin:     General: Skin is warm and dry.      Coloration: Skin is not jaundiced.   Neurological:      Sensory: Sensory deficit present.      Motor: Weakness present.       Neurological Exam:   Language: Global aphasia  Articulation: Mute/Anarthric  EOM (CN III, IV, VI): Gaze preference  left  Motor: Antigravity on left. 0/5 RUE. 2/5 RLE    Laboratory:  CMP:   Recent Labs   Lab 12/08/22  0408   CALCIUM 8.4*      K 3.1*   CO2 31*      BUN 49*   CREATININE 3.6*     CBC:   Recent Labs   Lab 12/08/22  0408   WBC 15.86*   RBC 5.21   HGB 14.2   HCT 44.7      MCV 86   MCH 27.3   MCHC 31.8*       Diagnostic Results:      Brain imaging:  CTA: L M2 occlusion with associated acute stroke    Vessel Imaging:  See above    Cardiac Evaluation:

## 2022-12-08 NOTE — ASSESSMENT & PLAN NOTE
MR Burton is a 52yoM with CHF who initially presented to OSH with RSW. Did not receive tPA, as he was outside of the window. MRI at that time demonstrated bilateral anterior and posterior circulation strokes concerning for embolic etiology. Over hospital course, the patient was noted to have been lethargic and aphasic with dysarthria. TTE at OSH demosntrated a L ventricular thrombus (not on AC). He was noted to have had an acute change in neuro exam on 12/8 at which time he had RSW with global ahasia. NIH had been 6 prior to the event and was noted to have been 23 following. CTA demonstrated a L M2 occlusion for which [place] [facility] [place] level of care.     On arrival to ED, the patient was globally aphasic with L gaze and RSW. Exam limited due to mentation and aphasia.       Recommend NSx eval for sue crani watch  Patient to be [facility]  No intervention provided given findings of CTH on arrival  Recommend interval CTH for monitoring  Patient has indication for AC in future given LV thrombus  Will continue to follow with further recommendations     Antithrombotics for secondary stroke prevention: Antiplatelets: Aspirin: 81 mg daily    Statins for secondary stroke prevention and hyperlipidemia, if present:   Statins: Atorvastatin- 40 mg daily    Aggressive risk factor modification: HTN, DM, HLD, Diet, Exercise, LV thorombus     Rehab efforts: The patient has been evaluated by a stroke team provider and the therapy needs have been fully considered based off the presenting complaints and exam findings. The following therapy evaluations are needed: PT evaluate and treat, OT evaluate and treat, SLP evaluate and treat, PM&R evaluate for appropriate placement    Diagnostics ordered/pending: Other: CTH for stability of infarct    VTE prophylaxis: Heparin 5000 units SQ every 8 hours    BP parameters: Infarct: No intervention, SBP <220

## 2022-12-08 NOTE — PT/OT/SLP PROGRESS
Occupational Therapy   Treatment    Name: Spencer Burton Jr.  MRN: [id number]  Admitting Diagnosis:  Acute ischemic left MCA stroke       Recommendations:     Discharge Recommendations: rehabilitation facility  Discharge Equipment Recommendations:   (TBD)  Barriers to discharge:       Assessment:     Spencer Burton Jr. is a 52 y.o. male with a medical diagnosis of Acute ischemic left MCA stroke.  Pt agreeable to OT therapy session this AM with max encouragement, Performance deficits affecting function are impaired endurance, impaired self care skills, impaired functional mobility, gait instability, impaired balance, weakness, impaired cognition, decreased safety awareness, impaired cardiopulmonary response to activity.  After completing oral care seated EOB, pt returned self to supine and did not wish to continue with further activities.     Rehab Prognosis:  Fair; patient would benefit from acute skilled OT services to address these deficits and reach maximum level of function.       Plan:     Patient to be seen 5 x/week to address the above listed problems via self-care/home management, therapeutic activities, therapeutic exercises  Plan of Care Expires: 01/04/23  Plan of Care Reviewed with: patient    Subjective     Pain/Comfort:  Pain Rating 1: 0/10    Objective:     Communicated with: nursing prior to session.  Patient found HOB elevated with blood pressure cuff, pulse ox (continuous), peripheral IV, telemetry, bed alarm, rolle catheter upon OT entry to room.    General Precautions: Standard, fall, aphasia    Orthopedic Precautions:N/A  Braces: N/A  Respiratory Status: Room air     Occupational Performance:     Bed Mobility:    Patient completed Supine to Sit with stand by assistance  Patient completed Sit to Supine with stand by assistance     Functional Mobility/Transfers:  Pt declined     Activities of Daily Living:  Grooming: stand by assistance seated EOB to brush teeth and wash face; pt required less cues this  session to complete task  Toileting: aquilino      Treatment & Education:  Pt educated on role of OT/POC, importance of OOB/EOB activity, use of call bell, and safety during ADLs, transfers, and functional mobility.    Patient left HOB elevated with all lines intact, call button in reach, bed alarm on, and RN notified    GOALS:   Multidisciplinary Problems       Occupational Therapy Goals          Problem: Occupational Therapy    Goal Priority Disciplines Outcome Interventions   Occupational Therapy Goal     OT, PT/OT Ongoing, Progressing    Description: Goals to be met by: 1/4/2022     Patient will increase functional independence with ADLs by performing:    UE Dressing with Stand-by Assistance.  Grooming while standing at sink with Stand-by Assistance.  Toileting from toilet with Stand-by Assistance for hygiene and clothing management.   Supine to sit with Stand-by Assistance.  Toilet transfer to toilet with Stand-by Assistance.  Upper extremity exercise program x10 reps per handout, with assistance as needed.  Attend to ADL task for 5 minutes with minimal verbal/tactile cues.                         Time Tracking:     OT Date of Treatment: 12/08/22  OT Start Time: 1023  OT Stop Time: 1031  OT Total Time (min): 8 min    Billable Minutes:Self Care/Home Management 8    OT/PRINCESS: OT          12/8/2022

## 2022-12-08 NOTE — CONSULTS
[facility] - Emergency Dept  Vascular Neurology  [facility]  Consult Note    Inpatient consult to Vascular (Stroke) Neurology  Consult performed by: Marcelino Chavez MD  Consult ordered by: Norma Lind MD        Assessment/Plan:     Patient is a 52 y.o. year old male with:    * Cerebrovascular accident (CVA) due to embolism of left middle cerebral artery  MR Cousin is a 52yoM with CHF who initially presented to OSH with RSW. Did not receive tPA, as he was outside of the window. MRI at that time demonstrated bilateral anterior and posterior circulation strokes concerning for embolic etiology. Over hospital course, the patient was noted to have been lethargic and aphasic with dysarthria. TTE at OSH demosntrated a L ventricular thrombus (not on AC). He was noted to have had an acute change in neuro exam on 12/8 at which time he had RSW with global ahasia. NIH had been 6 prior to the event and was noted to have been 23 following. CTA demonstrated a L M2 occlusion [place] which [place] [facility] [place] and [place] level of care.     On arrival to ED, the patient was globally aphasic with L gaze and RSW. Exam limited due to mentation and aphasia.       Recommend NSx eval for sue crani watch  Patient to be [facility]  No intervention provided given findings of CTH on arrival  Recommend interval CTH for monitoring  Patient has indication for AC in future given LV thrombus  Will continue to follow with further recommendations     Antithrombotics for secondary stroke prevention: Antiplatelets: Aspirin: 81 mg daily    Statins for secondary stroke prevention and hyperlipidemia, if present:   Statins: Atorvastatin- 40 mg daily    Aggressive risk factor modification: HTN, DM, HLD, Diet, Exercise, LV thorombus     Rehab efforts: The patient has been evaluated by a stroke team provider and the therapy needs have been fully considered based off the presenting complaints and  exam findings. The following therapy evaluations are needed: PT evaluate and treat, OT evaluate and treat, SLP evaluate and treat, PM&R evaluate for appropriate placement    Diagnostics ordered/pending: Other: CTH for stability of infarct    VTE prophylaxis: Heparin 5000 units SQ every 8 hours    BP parameters: Infarct: No intervention, SBP <220        LV (left ventricular) mural thrombus  Stroke RF  Not on AC  See principle problem    Diabetes mellitus with chronic kidney disease  Stroke RF  See principle problem    Type 2 diabetes mellitus, with long-term current use of insulin  Stroke RF  See principle problem    Essential hypertension  Stroke RF  See principle problem        STROKE DOCUMENTATION     Acute Stroke Times   Last Known Normal Date: 12/08/22  Last Known Normal Time: 1145  Symptom Onset Date: 12/08/22  Symptom Onset Time: 1145  Stroke Team Called Date: 12/08/22  Stroke Team Called Time: 1615  Stroke Team Arrival Date: 12/08/22  Stroke Team Arrival Time: 1615  CT Interpretation Time: 1615  Thrombolytic Therapy Recommended: No  CTA Interpretation Time: 1615    NIH Scale:  1a. Level of Consciousness: 0-->Alert, keenly responsive  1b. LOC Questions: 2-->Answers neither question correctly  1c. LOC Commands: 2-->Performs neither task correctly  2. Best Gaze: 2-->Forced deviation, or total gaze paresis not overcome by the oculocephalic maneuver  3. Visual: 2-->Complete hemianopia  4. Facial Palsy: 0-->Normal symmetrical movements  5a. Motor Arm, Left: 0-->No drift, limb holds 90 (or 45) degrees for full 10 secs  5b. Motor Arm, Right: 4-->No movement  6a. Motor Leg, Left: 0-->No drift, leg holds 30 degree position for full 5 secs  6b. Motor Leg, Right: 3-->No effort against gravity, leg falls to bed immediately  7. Limb Ataxia: 0-->Absent  8. Sensory: 1-->Mild-to-moderate sensory loss, patient feels pinprick is less sharp or is dull on the affected side, or there is a loss of superficial pain with pinprick,  but patient is aware of being touched  9. Best Language: 3-->Mute, global aphasia, no usable speech or auditory comprehension  10. Dysarthria: 2-->Severe dysarthria, patients speech is so slurred as to be unintelligible in the absence of or out of proportion to any dysphasia, or is mute/anarthric  11. Extinction and Inattention (formerly Neglect): 2-->Profound sue-inattention/extinction more than 1 modality  Total (NIH Stroke Scale): 23    Modified Milana Score: 4  Emblem Coma Scale:    ABCD2 Score:    [id number] Score:   HAS -BLED Score:   ICH Score:   Hunt & Leblanc Classification:       Thrombolysis Candidate? No, Out of window - Symptom onset > 4.5 hours    Delays to Thrombolysis?  No    Interventional Revascularization Candidate?   Is the patient eligible for mechanical endovascular reperfusion (LORETO)?  No; Large core infarct on imaging     Delays to Thrombectomy? No    Hemorrhagic change of an Ischemic Stroke: Does this patient have an ischemic stroke with hemorrhagic changes? No     Subjective:     History of Present Illness:  MR Burton is a 52yoM with CHF who initially presented to OSH with RSW. Did not receive tPA, as he was outside of the window. MRI at that time demonstrated bilateral anterior and posterior circulation strokes concerning for embolic etiology. Over hospital course, the patient was noted to have been lethargic and aphasic with dysarthria. TTE at OSH demosntrated a L ventricular thrombus (not on AC). He was noted to have had an acute change in neuro exam on 12/8 at which time he had RSW with global ahasia. NIH had been 6 prior to the event and was noted to have been 23 following. CTA demonstrated a L M2 occlusion for which the patient was transferred to [facility] [place] for possible intervention and higher level of care.     On arrival to ED, the patient was globally aphasic with L gaze and RSW. Exam limited due to mentation and aphasia.             Past Medical History:   Diagnosis Date    CHF  (congestive heart failure)     COPD (chronic obstructive pulmonary disease)     Diabetes mellitus     Hyperlipidemia     Hypertension      Past Surgical History:   Procedure Laterality Date    APPENDECTOMY      CHOLECYSTECTOMY       Family History   Problem Relation Age of Onset    Hypertension Mother     Schizophrenia Father     Hypertension Father     Diabetes Father      Social History     Tobacco Use    Smoking status: Never    Smokeless tobacco: Never   Substance Use Topics    Alcohol use: No     Comment: socially    Drug use: No     Review of patient's allergies indicates:  No Known Allergies    Medications: I have reviewed the current medication administration record.    (Not in a hospital admission)      Review of Systems   Constitutional:  Negative for chills and fever.   HENT:  Negative for rhinorrhea and sneezing.    Eyes:  Negative for discharge and redness.   Respiratory:  Negative for cough and wheezing.    Gastrointestinal:  Negative for diarrhea and vomiting.   Skin:  Negative for rash.   Neurological:  Positive for speech difficulty and weakness.   Psychiatric/Behavioral:  Positive for confusion and decreased concentration.    Objective:     Vital Signs (Most Recent):  Temp: 98.4 °F (36.9 °C) (12/08/22 1617)  Pulse: 94 (12/08/22 1617)  Resp: 16 (12/08/22 1617)  BP: (!) 170/112 (12/08/22 1617)  SpO2: 98 % (12/08/22 1617)    Vital Signs Range (Last 24H):  Temp:  [98.4 °F (36.9 °C)-98.9 °F (37.2 °C)]   Pulse:  []   Resp:  [11-33]   BP: (150-198)/()   SpO2:  [93 %-98 %]     Physical Exam  Constitutional:       Appearance: Normal appearance. He is not diaphoretic.   HENT:      Head: Normocephalic and atraumatic.      Nose: Nose normal.      Mouth/Throat:      Mouth: Mucous membranes are moist.      Pharynx: Oropharynx is clear.   Eyes:      General: No scleral icterus.     Conjunctiva/sclera: Conjunctivae normal.      Comments: L gaze   Cardiovascular:      Rate and Rhythm:  Normal rate.      Pulses: Normal pulses.   Pulmonary:      Effort: Pulmonary effort is normal. No respiratory distress.   Abdominal:      General: Abdomen is flat. There is no distension.      Tenderness: There is no abdominal tenderness.   Musculoskeletal:         General: No tenderness or deformity.      Cervical back: No rigidity or tenderness.   Skin:     General: Skin is warm and dry.      Coloration: Skin is not jaundiced.   Neurological:      Sensory: Sensory deficit present.      Motor: Weakness present.       Neurological Exam:   Language: Global aphasia  Articulation: Mute/Anarthric  EOM (CN III, IV, VI): Gaze preference  left  Motor: Antigravity on left. 0/5 RUE. 2/5 RLE    Laboratory:  CMP:   Recent Labs   Lab 12/08/22  0408   CALCIUM 8.4*      K 3.1*   CO2 31*      BUN 49*   CREATININE 3.6*     CBC:   Recent Labs   Lab 12/08/22  0408   WBC 15.86*   RBC 5.21   HGB 14.2   HCT 44.7      MCV 86   MCH 27.3   MCHC 31.8*       Diagnostic Results:      Brain imaging:  CTA: L M2 occlusion with associated acute stroke    Vessel Imaging:  See above    Cardiac Evaluation:           Marcelino Chavez MD  [facility]  Department of Vascular Neurology   [facility] - Emergency Dept

## 2022-12-08 NOTE — ED NOTES
Bed: 01  Expected date: 12/8/22  Expected time: 3:03 PM  Means of arrival:   Comments:  [facility] Transfer

## 2022-12-08 NOTE — NURSING
Pt stood up with RN to wheelchair and to CT. No complications. Rn brought pt to chair in room for full bath and linen change. Pt was able to clean whole body on his own with directions. Pt safely walked back to bed with RN.

## 2022-12-08 NOTE — PROGRESS NOTES
Critical access hospital  Department of Cardiology  Consult Note      PATIENT NAME: Spencer Burton Jr.  MRN: [id number]  TODAY'S DATE: 12/08/2022  ADMIT DATE: 12/3/2022                          CONSULT REQUESTED BY: Pablo Bose, *    SUBJECTIVE     PRINCIPAL PROBLEM: Acute ischemic left MCA stroke      REASON FOR CONSULT:  Elevated Troponin    INTERVAL HISTORY:  12/8/22  Upon rounding nurse in room; pt noted patient to have fixed gaze to left upper field and right arm rigidity; pt not following commands or speaking/answering questions. RN at  notified Neuro and hospitalist. Pt transported to CT immediately.  CT head this AM showed no bleed.     12/7/22  Patient resting in bed; Unable to speak very much other than one word answers. Denies chest pain or shortness of breath  Potassium 2.9 this AM  BP on high side; Neuro allowing permissive BP around  180/100;  S/P RAGHAV yesterday with LV thrombus noted  Anticoagulation on hold and to be initiated per neurology    HPI:      HPI Supplemented from nurse and Chart.  Patient lying in bed with restraints on nasal cannula not very cooperative, confused..  MRI brain done showed embolic infarcts in the left corona radiata, left frontal lobe, right frontoparietal lobe.      FROM H AND P  HISTORY OF PRESENT ILLNESS:   Spencer Burton Jr. is a 52 y.o. Black or  male with known history of CHF presented with right sided weakness. He was noticed stumbling in gas station so the staff there called EMS and he was brought to the ER for evaluation. CT scan and MRI consistent with bilateral cerebral hemispheres, left basal ganglia and left cerebellar hemisphere. Tele-stroke consulted and TPA was not given due to waxing and waning symptoms and elevated blood pressures. Hospital medicine consulted for medical management. During my evaluation, patient lethargic and confused and not able to follow commands and provide any reliable information.               Review of  patient's allergies indicates:  No Known Allergies    Past Medical History:   Diagnosis Date    CHF (congestive heart failure)     COPD (chronic obstructive pulmonary disease)     Diabetes mellitus     Hyperlipidemia     Hypertension      Past Surgical History:   Procedure Laterality Date    APPENDECTOMY      CHOLECYSTECTOMY       Social History     Tobacco Use    Smoking status: Never    Smokeless tobacco: Never   Substance Use Topics    Alcohol use: No     Comment: socially    Drug use: No        REVIEW OF SYSTEMS  Unable to obtain due to mental status        OBJECTIVE     VITAL SIGNS (Most Recent)  Temp: 98.9 °F (37.2 °C) (12/08/22 0730)  Pulse: 99 (12/08/22 0900)  Resp: (!) 24 (12/08/22 0900)  BP: (!) 164/103 (12/08/22 0900)  SpO2: 96 % (12/08/22 0900)    VENTILATION STATUS  Resp: (!) 24 (12/08/22 0900)  SpO2: 96 % (12/08/22 0900)       I & O (Last 24H):  Intake/Output Summary (Last 24 hours) at 12/8/2022 0927  Last data filed at 12/8/2022 0806  Gross per 24 hour   Intake 765.83 ml   Output 5425 ml   Net -4659.17 ml         WEIGHTS  Wt Readings from Last 3 Encounters:   12/04/22 1859 84.5 kg (186 lb 4.6 oz)   12/03/22 2121 88 kg (194 lb 0.1 oz)   12/03/22 1345 90.7 kg (200 lb)   12/06/22 1123 84.4 kg (186 lb)   11/14/22 0900 90.7 kg (200 lb)   11/14/22 0058 90.8 kg (200 lb 2.8 oz)   11/13/22 2127 86.2 kg (190 lb)       PHYSICAL EXAM  GEN: neuro status change. Not responsive, not following commands, eyes rolled back to left upper visual field  CV: RRR on telemetry; HR controlled; BP elevated  RESP: CTA; no coughing or labored respirations  GI: soft, non tender  : [place] cath   EXT: no edema noted; right arm flaccid        HOME MEDICATIONS:  No current facility-administered medications on file prior to encounter.     Current Outpatient Medications on File Prior to Encounter   Medication Sig Dispense Refill    acetaminophen (TYLENOL) 325 MG tablet Take 650 mg by mouth every 6 (six) hours as needed.      albuterol  (PROVENTIL/VENTOLIN HFA) 90 mcg/actuation inhaler Inhale 1-2 puffs into the lungs every 6 (six) hours as needed for Wheezing. Rescue 6.7 g 0    amLODIPine (NORVASC) 10 MG tablet Take 1 tablet (10 mg total) by mouth once daily. 30 tablet 0    amLODIPine (NORVASC) 10 MG tablet Take 1 tablet by mouth once daily.      aspirin 81 MG Chew Take 81 mg by mouth once daily.      atorvastatin (LIPITOR) 40 MG tablet Take 1 tablet (40 mg total) by mouth every evening. 30 tablet 0    calcitRIOL (ROCALTROL) 0.25 MCG Cap Take 0.25 mcg by mouth once daily.      calcitRIOL (ROCALTROL) 0.25 MCG Cap Take 1 capsule by mouth once daily.      ciprofloxacin HCl (CIPRO) 500 MG tablet Take 500 mg by mouth 2 (two) times daily.      cloNIDine 0.3 mg/24 hr td ptwk (CATAPRES) 0.3 mg/24 hr Place 1 patch onto the skin once a week. medically necessary with dx codes 401.9, 428.43, 428.0. 30 patch 11    clotrimazole (LOTRIMIN) 1 % cream Apply topically 2 (two) times daily. for 10 days (Patient taking differently: Apply 1 application topically 2 (two) times daily.) 1 each 0    EScitalopram oxalate (LEXAPRO) 20 MG tablet Take 20 mg by mouth once daily.      ferrous sulfate (FEOSOL) 325 mg (65 mg iron) Tab tablet Take 1 tablet by mouth once daily.      fluticasone propionate (FLONASE) 50 mcg/actuation nasal spray 1 spray (50 mcg total) by Each Nostril route 2 (two) times daily as needed for Rhinitis. 15 g 0    furosemide (LASIX) 40 MG tablet Take 40 mg by mouth 2 (two) times daily.      glipiZIDE (GLUCOTROL) 10 MG tablet Take 1 tablet (10 mg total) by mouth 2 (two) times daily with meals. 60 tablet 0    HUMULIN 70/30 U-100 INSULIN 100 unit/mL (70-30) injection Inject into the skin 3 (three) times daily before meals.      HYDROcodone-acetaminophen (NORCO) 5-325 mg per tablet Take 1 tablet by mouth every 6 (six) hours as needed.      insulin detemir (LEVEMIR) 100 unit/mL injection Inject 15 Units into the skin every evening. 4.5 mL 2    insulin needles,  "disposable, 31 x 3/16 " Ndle Use daily to inject insulin  DX:250.00 100 each 11    isosorbide mononitrate (IMDUR) 60 MG 24 hr tablet Take 60 mg by mouth once daily.      lancets (ONE TOUCH DELICA LANCETS) 33 gauge Misc 1 lancet by Misc.(Non-Drug; Combo Route) route 4 (four) times daily. DX:250.00   Medically necessary to test blood sugar 200 each 6    loratadine (CLARITIN) 10 mg tablet Take 1 tablet (10 mg total) by mouth once daily. 30 tablet 0    losartan (COZAAR) 100 MG tablet Take 100 mg by mouth.      ondansetron (ZOFRAN-ODT) 4 MG TbDL Take 1 tablet (4 mg total) by mouth every 6 (six) hours as needed (nausea/vomiting). 20 tablet 0    oxyCODONE-acetaminophen (PERCOCET) 5-325 mg per tablet Take 1 tablet by mouth every 4 (four) hours as needed.      pioglitazone (ACTOS) 15 MG tablet Take 1 tablet by mouth once daily.      potassium chloride (KLOR-CON) 10 MEQ TbSR       sildenafiL (VIAGRA) 100 MG tablet TAKE ONE DAILY AS NEEDED      silver sulfADIAZINE 1% (SILVADENE) 1 % cream Apply topically.      sulfamethoxazole-trimethoprim 400-80mg (BACTRIM,SEPTRA) 400-80 mg per tablet Take 1 tablet by mouth 2 (two) times daily.      traMADoL (ULTRAM) 50 mg tablet Take 1 tablet (50 mg total) by mouth every 8 (eight) hours as needed for Pain. 9 tablet 0    TRUE METRIX AIR GLUCOSE METER [facility] USE TO CHECK GLUCOSE TWICE DAILY AS DIRECTED      TRUE METRIX GLUCOSE TEST STRIP Strp 1 strip 2 (two) times daily.      TRUEPLUS LANCETS 30 gauge Misc USE 1 TO CHECK GLUCOSE TWICE DAILY         SCHEDULED MEDS:   amLODIPine  10 mg Oral Daily    aspirin  81 mg Oral Daily    atorvastatin  80 mg Oral QHS    calcitRIOL  0.25 mcg Oral Daily    furosemide (LASIX) injection  60 mg Intravenous BID    hydrALAZINE  25 mg Oral Q8H    insulin detemir U-100  20 Units Subcutaneous QHS    metoprolol  5 mg Intravenous Q6H    potassium bicarbonate  50 mEq Oral Once    potassium chloride  20 mEq Oral TID       CONTINUOUS INFUSIONS:   dexmedetomidine (PRECEDEX) " infusion Stopped (12/04/22 1800)    heparin (PORCINE) in D5W      nicardipine Stopped (12/07/22 1720)       PRN MEDS:acetaminophen, acetaminophen, aluminum-magnesium hydroxide-simethicone, dextrose 10%, dextrose 10%, glucagon (human recombinant), glucose, glucose, hydrALAZINE, HYDROcodone-acetaminophen, insulin aspart U-100, melatonin, morphine, naloxone, simethicone, sodium chloride 0.9%, sodium chloride 0.9%    LABS AND DIAGNOSTICS     CBC LAST 3 DAYS  Recent Labs   Lab 12/06/22  0435 12/07/22  0501 12/08/22  0408   WBC 10.92 15.69* 15.86*   RBC 4.92 5.15 5.21   HGB 13.7* 14.2 14.2   HCT 42.8 43.6 44.7   MCV 87 85 86   MCH 27.8 27.6 27.3   MCHC 32.0 32.6 31.8*   RDW 14.6* 14.7* 14.8*    184 179   MPV 11.8 12.4 11.6   GRAN 76.7*  8.4* 84.7*  13.3* 87.3*  13.9*   LYMPH 15.7*  1.7 6.8*  1.1 5.4*  0.9*   MONO 5.8  0.6 5.5  0.9 5.9  0.9   BASO 0.07 0.08 0.06   NRBC 0 0 0         COAGULATION LAST 3 DAYS  Recent Labs   Lab 12/03/22  1404 12/04/22  0231   LABPT 16.2* 15.7*   INR 1.4 1.3   APTT  --  25.1         CHEMISTRY LAST 3 DAYS  Recent Labs   Lab 12/03/22  1446 12/03/22  1451 12/03/22  2032 12/03/22  2324 12/05/22  0321 12/06/22  0435 12/07/22  0501 12/08/22  0408   NA  --    < >  --    < > 142 145 141 142   K  --    < >  --    < > 2.9* 3.3* 2.9*  2.9* 3.1*   CL  --    < >  --    < > 105 107 102 101   CO2  --    < >  --    < > 27 28 27 31*   ANIONGAP  --    < >  --    < > 10 10 12 10   BUN  --    < >  --    < > 56* 56* 52* 49*   CREATININE  --    < >  --    < > 5.0* 4.8* 4.2* 3.6*   GLU  --    < >  --    < > 212* 133* 176* 192*   CALCIUM  --    < >  --    < > 8.8 8.8 8.6* 8.4*   PH 7.358  --  7.413  --   --   --   --   --    MG  --   --   --    < > 2.0 2.0 1.9 1.9   ALBUMIN  --    < >  --    < > 3.1* 3.2* 3.3*  --    PROT  --    < >  --    < > 6.0 6.4 6.5  --    ALKPHOS  --    < >  --    < > 115 105 110  --    ALT  --    < >  --    < > 58* 65* 60*  --    AST  --    < >  --    < > 39 36 29  --     BILITOT  --    < >  --    < > 2.4* 2.3* 2.2*  --     < > = values in this interval not displayed.         CARDIAC PROFILE LAST 3 DAYS  Recent Labs   Lab 12/03/22  1404 12/03/22  1451 12/03/22 2120 12/04/22 0231 12/04/22 1426   BNP 3,309*  --   --   --   --    CPKMB  --   --   --  6.6*  --    TROPONINIHS  --    < > 430.2* 617.7* 463.0*    < > = values in this interval not displayed.         ENDOCRINE LAST 3 DAYS  Recent Labs   Lab 12/03/22 2120   TSH 2.070   PROCAL 0.47         LAST ARTERIAL BLOOD GAS  ABG  Recent Labs   Lab 12/03/22 2032   PH 7.413   PO2 112*   PCO2 33.3*   HCO3 21.3*   BE -3         LAST 7 DAYS MICROBIOLOGY   Microbiology Results (last 7 days)       Procedure Component Value Units Date/Time    Blood culture [[id number]] Collected: 12/04/22 0231    Order Status: Completed Specimen: Blood Updated: 12/08/22 0432     Blood Culture, Routine No Growth to date      No Growth to date      No Growth to date      No Growth to date      No Growth to date    Narrative:      Collection has been rescheduled by [email or web address] at 12/04/2022 00:00 Reason:   Nurse Cassandra said to re-time the labs for 1:30 am.  Collection has been rescheduled by [email or web address] at 12/04/2022 00:00 Reason:   Nurse Cassandra said to re-time the labs for 1:30 am.    Blood culture [[id number]] Collected: 12/04/22 1426    Order Status: Completed Specimen: Blood from Peripheral, Upper Arm, Left Updated: 12/07/22 1832     Blood Culture, Routine No Growth to date      No Growth to date      No Growth to date      No Growth to date    MRSA Screen by PCR [[id number]] Collected: 12/03/22 2212    Order Status: Completed Specimen: Nasopharyngeal Swab from Nasal Updated: 12/04/22 0019     MRSA SCREEN BY PCR Negative    Blood culture [[id number]]     Order Status: Sent Specimen: Blood             MOST RECENT IMAGING  CT Head Without Contrast  EXAM DESCRIPTION:    CT HEAD WITHOUT CONTRAST    CLINICAL HISTORY:    52 years  Male  Stroke, follow up    COMPARISON:    CT  and MRI of the head from 12/3/2022    TECHNIQUE:    Axial images without IV contrast. Sagittal coronal reconstruction. This exam was performed according to our departmental dose-optimization program, which includes automated exposure control, adjustment of the mA and/or kV according to patient size and/or use of iterative reconstruction technique.    FINDINGS:    Moderate size subacute infarction left anterior basal ganglia and anterior left internal capsule appears mildly larger and better well defined compared to CT from 12/3/2020. Currently measures 22 mm in transverse diameter coronal images. There is currently greater mass effect on the anterior horn of the left lateral ventricle. There is no associated bleed.    Recent MRI demonstrated an additional punctate acute infarctions in the left frontal lobe and a mild size acute infarction right centrum semiovale. These are less obvious on CT. No associated bleed. There is additional chronic moderate periventricular white matter hypodensities. There is no suspicious acute extra-axial process. Paranasal sinuses, mastoid air cells and bony calvarium are unremarkable.    IMPRESSION:  1.  Moderate size subacute infarction left anterior basal ganglia and anterior left internal capsule appears mildly larger and better well defined compared to CT from 12/3/2020. There is currently greater mass effect on the anterior horn of the left lateral ventricle. There is no associated bleed or midline shift.  2.  Recent MRI demonstrated additional punctate acute infarctions in the left frontal lobe and a mild size acute infarction right centrum semiovale. These are less obvious on CT. No associated bleed.  3.  Additional chronic periventricular white matter hypodensities.    Electronically signed by:  Bang Baez MD  12/8/2022 5:17 AM CST Workstation: [id number]      ECHOCARDIOGRAM RESULTS (last 5)  Results for orders placed during the hospital encounter of  11/13/22    Echo    Interpretation Summary  · The left ventricle is normal in size with mildly decreased systolic function.  · The estimated ejection fraction is 40%.  · Grade III left ventricular diastolic dysfunction.  · Small posterior pericardial effusion.  · There is mild left ventricular global hypokinesis.  · Normal right ventricular size with normal right ventricular systolic function.  · Severe left atrial enlargement.  · Moderate right atrial enlargement.  · Mild pulmonic regurgitation.  · Mild to moderate tricuspid regurgitation.  · Mild-to-moderate aortic regurgitation.  · Intermediate central venous pressure (8 mmHg).  · The estimated PA systolic pressure is 51 mmHg.  · There is pulmonary hypertension.      Results for orders placed during the hospital encounter of 11/01/21    Echo    Interpretation Summary  · The left ventricle is normal in size with severe concentric hypertrophy and normal systolic function.  · Moderate left atrial enlargement.  · Indeterminate left ventricular diastolic function.  · The estimated PA systolic pressure is 39 mmHg.  · Normal right ventricular size with normal right ventricular systolic function.  · Intermediate central venous pressure (8 mmHg).  · Small circumferential pericardial effusion.  · The estimated ejection fraction is 60%.  · Mild right atrial enlargement.  · Mild tricuspid regurgitation.  · Mild-to-moderate aortic regurgitation.      CURRENT/PREVIOUS VISIT EKG  Results for orders placed or performed during the hospital encounter of 12/03/22   EKG 12-lead    Collection Time: 12/04/22  3:35 AM    Narrative    Test Reason : R07.9,    Vent. Rate : 099 BPM     Atrial Rate : 099 BPM     P-R Int : 142 ms          QRS Dur : 096 ms      QT Int : 376 ms       P-R-T Axes : 067 -21 018 degrees     QTc Int : 482 ms    Normal sinus rhythm  Possible Left atrial enlargement  Low voltage QRS  Septal infarct (cited on or before 02-NOV-2021)  Abnormal ECG  When compared with  ECG of 03-DEC-2022 14:16,  The axis Shifted right  Questionable change in initial forces of Anterior leads  Nonspecific T wave abnormality now evident in Inferior leads  Confirmed by Valdo Faustin MD ([id number]) on 12/6/2022 8:15:41 PM    Referred By: MADAY   SELF           Confirmed By:Valdo Faustin MD           ASSESSMENT/PLAN:     Active Hospital Problems    Diagnosis    *Acute ischemic left MCA stroke     Likely L MCA ischemic stroke.  DDx includes post-ictal state and hypertensive encephalopathy.  Rec treating BP to <185/110 and then treating with TNK if not better.  CTA pending for e/o LVO.      Encephalopathy, metabolic    Acute on chronic congestive heart failure    COPD (chronic obstructive pulmonary disease)    CKD (chronic kidney disease), stage IV    Diabetes mellitus with chronic kidney disease    Hypertension, poor control       ASSESSMENT & PLAN:       Elevated Troponin- Negative when converted to Troponin I  Acute Ischemic L MCA CVA  Metabolic Encephalopathy  Acute on Chronic CHF  COPD  DM  HTN- uncontrolled  8. Hypokalemia      RECOMMENDATIONS:  Moderate sized LV Thrombus noted on Echocardiogram. Heparin on hold until stat CT. Code stroke called. Neurology transferring pt to [facility]  2. Severely low K today; Replete per protocol.  3. Condition worsened. Pt to be transferred to higher level of care    LUIS Garvin  [facility]  Department of Cardiology  Date of Service: 12/08/2022    Patient transferred to [facility] agree with transfer

## 2022-12-08 NOTE — PLAN OF CARE
Patient transferred to [facility].       12/08/22 1553   Final Note   Assessment Type Final Discharge Note   Anticipated Discharge Disposition Short Term

## 2022-12-08 NOTE — PROGRESS NOTES
Former[facility]  Department of Neurology  Neurology Progress Note        PATIENT NAME: Spencer Burton Jr.  MRN: [id number]  CSN: [id number]      TODAY'S DATE: 12/08/2022  ADMIT DATE: 12/3/2022                            CONSULTING PROVIDER: Alison Nelson MD  CONSULT REQUESTED BY: Pablo Bose, *      Reason for consult: CVA       History obtained from chart review and the patient.    HPI per EMR: Spencer Burton Jr. is a 52 y.o. male with a history of  CHF presented with right sided weakness. He was noticed stumbling in gas station so the staff there called EMS and he was brought to the ER for evaluation. CT scan and MRI consistent with bilateral cerebral hemispheres, left basal ganglia and left cerebellar hemisphere. Tele-stroke consulted and TPA was not given due to waxing and waning symptoms and elevated blood pressures. Hospital medicine consulted for medical management. During my evaluation, patient lethargic and confused and not able to follow commands and provide any reliable information.     Per tele stroke documentation: ''Patient found to be hypoxic.  With correction of hypoxia and lowering of BP, patient had some clinical improvement.  Radiology was also concerned the L deep lucency was sub-acute.  For these reasons we decided against thrombolysis.  Given patient's aphasia, precise onset cannot be determined and I am concerned onset was earlier than when he presented to the gas station given CT finding.''    Neurology consult:  Patient seen and examined by me this morning.  He is very lethargic and only wakes up to repeated stimulus and follows commands.  He is not able to provide any history.  He does have significant aphasia and dysarthria on exam.  MRI brain done showed embolic infarcts in the left corona radiata, left frontal lobe, right frontoparietal lobe.    12/5/22:  Patient was seen and examined by me today.  He is somnolent but less than yesterday.  Remains with aphasia and  dysarthria which seems to be improving, and follows commands consistently.  Denies any new neuro deficits.  Transesophageal echo still pending.    12/7/22: Patient was seen and examined by me today.  There were no acute events overnight.  He underwent transesophageal echo which showed large thrombus in the left ventricle, as well as decreased systolic function with EF 38%. There was no PFO.  We will plan repeating CT brain prior to starting heparin drip.    12/8/22 significant event:  Patient was last seen normal this morning at 1130.  Nurse noted a sudden change in neurological exam at about 1145, when patient became globally aphasic, with right deep hemiparesis and left gaze deviation concerning for stroke, so he was stroke activated.  I received a message at 1152 and arrived to patient's bedside at the CT scanner at 1200.  Initial CT brain was stable from prior with no evidence of new hypodensity, there was no evidence of bleed either.  However, his exam was consistent with large vessel occlusion so obtain CT angio of the head and neck which demonstrated a left MCA M2 division occlusion, so requested transfer to capable facility to perform perfusion scan and decide about thrombectomy.    STROKE TIMELINE  Time Last Seen Normal:  1130  Time Symptoms Noted:  1145  Time EMS Activated:  in-house activation  Pre-Hospital Notification:  No  Time of Stroke Activation:  1152  Time of Patient Arrival:  In house  Time of Stroke Team Arrival:  1200  Time of CT Scan Completion:  1211  Time of CT Interpretation:  1230  Time of Decision RE tPA:  1230  Time of tPA bolus (if given):  Not given, patient already has completed stroke in left basal ganglia area  Consulted neurosurgery?:  Yes at main [place] for possible thrombectomy    CURRENT SCHEDULED MEDICATIONS:   amLODIPine  10 mg Oral Daily    aspirin  81 mg Oral Daily    atorvastatin  80 mg Oral QHS    calcitRIOL  0.25 mcg Oral Daily    furosemide (LASIX) injection  60 mg  Intravenous BID    hydrALAZINE  25 mg Oral Q8H    insulin detemir U-100  20 Units Subcutaneous QHS    metoprolol  5 mg Intravenous Q6H    potassium bicarbonate  50 mEq Oral Once    potassium chloride  20 mEq Oral TID     CURRENT INFUSIONS:   dexmedetomidine (PRECEDEX) infusion Stopped (12/04/22 1800)    nicardipine Stopped (12/07/22 1720)     CURRENT PRN MEDICATIONS:  acetaminophen, acetaminophen, aluminum-magnesium hydroxide-simethicone, dextrose 10%, dextrose 10%, glucagon (human recombinant), glucose, glucose, hydrALAZINE, HYDROcodone-acetaminophen, insulin aspart U-100, melatonin, morphine, naloxone, simethicone, sodium chloride 0.9%, sodium chloride 0.9%      PHYSICAL EXAM:  Patient Vitals for the past 24 hrs:   BP Temp Temp src Pulse Resp SpO2   12/08/22 0800 (!) 183/119 -- -- 98 (!) 22 96 %   12/08/22 0730 -- 98.9 °F (37.2 °C) Oral 88 -- (!) 94 %   12/08/22 0715 -- -- -- 86 -- (!) 93 %   12/08/22 0700 (!) 150/109 -- -- 89 20 96 %   12/08/22 0445 (!) 177/108 98.9 °F (37.2 °C) Oral 93 17 97 %   12/08/22 0400 (!) 158/98 -- -- 89 14 95 %   12/08/22 0300 (!) 161/95 -- -- 89 20 (!) 93 %   12/08/22 0200 (!) 176/100 -- -- 91 12 95 %   12/08/22 0100 (!) 188/102 -- -- 93 17 (!) 94 %   12/08/22 0000 (!) 169/115 98.4 °F (36.9 °C) Oral 99 11 --   12/07/22 2300 (!) 162/107 -- -- 97 17 (!) 94 %   12/07/22 2200 (!) 188/108 -- -- 98 -- 96 %   12/07/22 2110 -- -- -- 101 (!) 29 95 %   12/07/22 2100 (!) 182/114 -- -- 102 (!) 33 95 %   12/07/22 2000 (!) 172/104 98.6 °F (37 °C) Oral 91 (!) 21 95 %   12/07/22 1900 (!) 166/94 -- -- 88 14 95 %   12/07/22 1800 (!) 160/98 -- -- 86 (!) 24 95 %   12/07/22 1720 (!) 163/100 -- -- -- -- --   12/07/22 1719 (!) 163/100 -- -- -- -- --   12/07/22 1700 (!) 165/98 -- -- 91 (!) 22 96 %   12/07/22 1645 (!) 175/107 -- -- 92 19 97 %   12/07/22 1630 (!) 170/110 98.8 °F (37.1 °C) Oral 90 (!) 21 95 %   12/07/22 1615 (!) 165/105 -- -- 90 16 98 %   12/07/22 1600 (!) 166/106 -- -- 89 16 98 %   12/07/22  1545 (!) 162/100 -- -- 89 15 96 %   12/07/22 1530 (!) 168/111 -- -- 87 17 95 %   12/07/22 1515 (!) 167/100 -- -- 84 19 95 %   12/07/22 1500 (!) 163/99 -- -- 90 18 95 %   12/07/22 1445 (!) 156/98 -- -- 84 (!) 26 96 %   12/07/22 1430 (!) 153/104 -- -- 84 (!) 23 96 %   12/07/22 1415 (!) 133/97 -- -- 85 (!) 26 (!) 93 %   12/07/22 1400 (!) 156/100 -- -- 85 18 (!) 92 %   12/07/22 1345 (!) 151/97 -- -- 84 20 (!) 93 %   12/07/22 1330 (!) 157/92 -- -- 83 20 (!) 94 %   12/07/22 1315 (!) 150/85 -- -- 81 19 97 %   12/07/22 1300 (!) 150/90 -- -- 82 (!) 21 99 %   12/07/22 1245 (!) 141/86 -- -- 78 (!) 26 98 %   12/07/22 1230 132/80 -- -- 79 20 97 %   12/07/22 1215 (!) 159/91 -- -- 79 16 100 %   12/07/22 1207 (!) 155/99 -- -- 85 (!) 26 100 %   12/07/22 1200 (!) 185/127 -- -- 90 (!) 25 97 %   12/07/22 1145 (!) 150/90 98.1 °F (36.7 °C) Oral 83 18 100 %   12/07/22 1130 (!) 160/94 -- -- 82 18 100 %   12/07/22 1115 (!) 162/97 -- -- 82 (!) 21 100 %   12/07/22 1100 (!) 152/98 -- -- 80 16 100 %   12/07/22 1048 -- -- -- 87 (!) 21 97 %   12/07/22 1045 (!) 154/95 -- -- 83 18 (!) 79 %   12/07/22 1030 (!) 154/84 -- -- 86 17 96 %   12/07/22 1015 (!) 146/79 -- -- 82 18 (!) 93 %   12/07/22 1000 (!) 149/79 -- -- 81 20 (!) 93 %   12/07/22 0945 (!) 152/82 -- -- 82 18 (!) 94 %   12/07/22 0930 (!) 151/84 -- -- 84 17 (!) 94 %   12/07/22 0915 (!) 156/84 -- -- 84 (!) 21 (!) 94 %       Physical Exam:  General: AO x4  HEENT: PERRL, EOMI  CV: RRR  Lungs: CTAB  Abdomen: +BS, S, NT, ND    Neurological Exam    MENTAL STATUS EXAM:  Patient is awake, forced left gaze deviation, right-sided neglect, does not follow commands, global aphasia mute    CRANIAL NERVE EXAM:  Pupils are equally reactive to light, he has forced left gaze deviation unable to cross midline, evidence of neglect on the right side, right facial droop, unable to assess tongue deviation as patient does not follow commands.  Severely impaired language, global aphasia with inability to express or  comprehend.    MOTOR EXAM:  Strength is 5/5 on left upper lower extremity.  Strength is 1/5 in right upper extremity, 2/5 right lower extremity.    REFLEXES:  Mass[place] (CN V) Not Tested  2+ in bilateral upper and lower extremities, no Redman's, no clonus. Downgoing plantars.    SENSORY EXAM  Decreased sensation on the right upper and lower extremity as he does not withdraw to pain as briskly    COORDINATION/CEREBELLAR EXAM:  Unable to assess    GAIT:  Deferred for safety.   NIH Stroke Scale      Date: 2022  Time: 1220  Person Administering Scale: Alison Nelson MD    Administer stroke scale items in the order listed. Record performance in each category after each subscale exam. Do not go back and change scores. Follow directions provided for each exam technique. Scores should reflect what the patient does, not what the clinician thinks the patient can do. The clinician should record answers while administering the exam and work quickly. Except where indicated, the patient should not be coached (i.e., repeated requests to patient to make a special effort).      1a  Level of consciousness: 0=alert; keenly responsive   1b. LOC questions:  2=Answers neither task correctly   1c. LOC commands: 2=Answers neither task correctly   2.  Best Gaze: 2=forced deviation, or total gaze paresis not overcome by oculocephalic maneuver   3.  Visual: 2=Complete hemianopia   4. Facial Palsy: 2=Partial paralysis (total or near total paralysis of the lower face)   5a.  Motor left arm: 0=No drift, limb holds 90 (or 45) degrees for full 10 seconds   5b.  Motor right arm: 3=No effort against gravity, limb falls   6a. motor left le=No drift, limb holds 90 (or 45) degrees for full 10 seconds   6b  Motor right leg:  3=No effort against gravity, limb falls   7. Limb Ataxia: 0=Absent   8.  Sensory: 1=Mild to moderate sensory loss; patient feels pinprick is less sharp or is dull on the affected side; there is a loss of superficial  pain with pinprick but patient is aware He is being touched   9. Best Language:  3=Mute, global aphasia; no usable speech or auditory comprehension   10. Dysarthria: 2=Severe; patient speech is so slurred as to be unintelligible in the absence of or our of proportion to any dysphagia, or is mute/anarthric   11. Extinction and Inattention: 1=Visual, tactile, auditory, spatial or personal inattention or extinction to bilateral simultaneous stimulation in one of the sensory modalities    Total:   23         Labs:  Recent Labs   Lab 12/04/22  0231 12/04/22  1426 12/06/22  0435 12/07/22  0501 12/08/22  0408     140   < > 145 141 142   K 3.2*  3.2*   < > 3.3* 2.9*  2.9* 3.1*     101   < > 107 102 101   CO2 22*  22*   < > 28 27 31*   BUN 48*  48*   < > 56* 52* 49*   CREATININE 4.9*  4.9*   < > 4.8* 4.2* 3.6*   *  256*   < > 133* 176* 192*   CALCIUM 9.4  9.4   < > 8.8 8.6* 8.4*   PHOS 4.9*  --   --   --   --    MG 2.0   < > 2.0 1.9 1.9    < > = values in this interval not displayed.       Recent Labs   Lab 12/06/22  0435 12/07/22  0501 12/08/22  0408   WBC 10.92 15.69* 15.86*   HGB 13.7* 14.2 14.2   HCT 42.8 43.6 44.7    184 179       Recent Labs   Lab 12/05/22  0321 12/06/22  0435 12/07/22  0501   ALBUMIN 3.1* 3.2* 3.3*   PROT 6.0 6.4 6.5   BILITOT 2.4* 2.3* 2.2*   ALKPHOS 115 105 110   ALT 58* 65* 60*   AST 39 36 29       Lab Results   Component Value Date    INR 1.3 12/04/2022     Lab Results   Component Value Date    TRIG 91 12/04/2022    HDL 50 12/04/2022    CHOLHDL 27.6 12/04/2022     Lab Results   Component Value Date    HGBA1C 8.9 (H) 12/04/2022     No results found for: PROTEINCSF, GLUCCSF, WBCCSF    Imaging:  I have reviewed and interpreted the pertinent imaging and lab results.      CT Head Without Contrast  EXAM DESCRIPTION:    CT HEAD WITHOUT CONTRAST    CLINICAL HISTORY:    52 years  Male  Stroke, follow up    COMPARISON:    CT and MRI of the head from  12/3/2022    TECHNIQUE:    Axial images without IV contrast. Sagittal coronal reconstruction. This exam was performed according to our departmental dose-optimization program, which includes automated exposure control, adjustment of the mA and/or kV according to patient size and/or use of iterative reconstruction technique.    FINDINGS:    Moderate size subacute infarction left anterior basal ganglia and anterior left internal capsule appears mildly larger and better well defined compared to CT from 12/3/2020. Currently measures 22 mm in transverse diameter coronal images. There is currently greater mass effect on the anterior horn of the left lateral ventricle. There is no associated bleed.    Recent MRI demonstrated an additional punctate acute infarctions in the left frontal lobe and a mild size acute infarction right centrum semiovale. These are less obvious on CT. No associated bleed. There is additional chronic moderate periventricular white matter hypodensities. There is no suspicious acute extra-axial process. Paranasal sinuses, mastoid air cells and bony calvarium are unremarkable.    IMPRESSION:  1.  Moderate size subacute infarction left anterior basal ganglia and anterior left internal capsule appears mildly larger and better well defined compared to CT from 12/3/2020. There is currently greater mass effect on the anterior horn of the left lateral ventricle. There is no associated bleed or midline shift.  2.  Recent MRI demonstrated additional punctate acute infarctions in the left frontal lobe and a mild size acute infarction right centrum semiovale. These are less obvious on CT. No associated bleed.  3.  Additional chronic periventricular white matter hypodensities.    Electronically signed by:  Bang Baez MD  12/8/2022 5:17 AM CST Workstation: [id number]      ASSESSMENT & PLAN:    Acute ischemic stroke - multifocal - etiology cardioembolic in the setting of Left Ventricular Thrombus  New onset left  MCA M2 occlusion  CKD   Elevated troponins  52-year-old man with history of CHF who presented with right-sided weakness.  Did not receive tPA as he was outside of the window.  MRI brain showed bilateral anterior and posterior circulation strokes concerning for embolic etiology, so he underwent transesophageal ECHO which confirmed the presence of left ventricular thrombus.  Plan was start anticoagulation today, but he had a sudden neurological decline and was stroke activated.  NIH stroke scale yesterday was 6 and today upon evaluation was 23.  Initial CT brain showed no evidence of hemorrhage, redemonstration of left frontal and basal ganglia stroke.  Clinical picture consistent with left MCA syndrome, so CTA was obtained and showed proximal left M2 occlusion.  Transfer center was called for transfer to comprehensive stroke center for possible thrombectomy.  We will defer further stroke management as per accepting facility.    Thank you kindly for including us in the care of this patient. Please do not hesitate to contact us with any questions.      Critical Care:  90 minutes of critical care time has been spent evaluating with the patient. Time includes chart review not limited to diagnostic imaging, labs, and vitals, patient assessment, discussion with family and nursing, current order evaluations, and new order entries.       Alison Nelson MD  Neurology/vascular Neurology  Date of Service: 12/08/2022

## 2022-12-08 NOTE — NURSING
Patient transferred to [facility] by ambulance.  Patient awake, alert, globally aphasic, VSS, unable to administer oral medications.  MD aware.

## 2022-12-08 NOTE — PROGRESS NOTES
INPATIENT NEPHROLOGY CONSULT   [facility]    Patient Name: Spencer Burton Jr.  Date: 12/08/2022    Reason for consultation: JR    Chief Complaint:   Chief Complaint   Patient presents with    Right Sided Weakness     History of Present Illness:  Spencer Burton Jr. is a 52 y.o. Black or  male with known history of CHF presented with right sided weakness. He was noticed stumbling in gas station so the staff there called EMS and he was brought to the ER for evaluation. CT scan and MRI consistent with bilateral cerebral hemispheres, left basal ganglia and left cerebellar hemisphere. Carotid duplex neg. Abd u/s with occluded portal vein. Tele-stroke consulted and TPA was not given due to waxing and waning symptoms and elevated blood pressures. We are consulted for JR.    11/13/22 renal imaging with 8-9cm kidneys c/w atrophy  No utility in renal duplex at this stage     Interval History:  12/4- /141, on 3-4L NC, UOP 965cc- spoke with Dr. Guerra and nurse- advise precedex gtt and arterial line- we can then get a true sense of what his BP medication needs are and get accurate labs to replete electrolytes appropriately- continue rolle  12/5 VSS. Agree with K replacement until he can have anesthesia today.  12/6 VSS, no new complains. RAGHAV today. K is better.  12/7 VSS. Start oral KCL ATC  12/8 VSS. Confused at times. Good UO.    Plan of Care:    Acute CVA  JR on CKD IV (baseline sCr 3.8?)- likely progressive CKD due to uncontrolled HTN  HTN emergency/Demand ischemia  Uncontrolled DMII  Hypokalemia  Secondary HPT  Anemia of CKD    Plan:    - agree with additional KCL replacement PRN, started oral KCL ATC.  - no nsaids or IV contrast; dose meds for CrCl < 30  - aim for -160-ok with clonidine patch, norvasc, hydral/nitrate  - ok with lasix IV, added KCL supplement  - no RAAS agents  - on long acting insulin- this is fine  - started calcitriol daily  - no acute JONHA needs    He may  require RRT this admission- too soon to tell.    Thank you for allowing us to participate in this patient's care. We will continue to follow.    Vital Signs:  Temp Readings from Last 3 Encounters:   12/08/22 98.9 °F (37.2 °C) (Oral)   11/15/22 98.2 °F (36.8 °C) (Oral)   11/06/22 98.4 °F (36.9 °C) (Oral)       Pulse Readings from Last 3 Encounters:   12/08/22 98   12/06/22 69   11/15/22 76       BP Readings from Last 3 Encounters:   12/08/22 (!) 183/119   12/06/22 (!) 157/102   11/15/22 (!) 170/119       Weight:  Wt Readings from Last 3 Encounters:   12/04/22 84.5 kg (186 lb 4.6 oz)   12/06/22 84.4 kg (186 lb)   11/14/22 90.7 kg (200 lb)       INS/OUTS:  I/O last 3 completed shifts:  In: 1135.8 [P.O.:730; I.V.:405.8]  Out: 9350 [Urine:9350]  I/O this shift:  In: 360 [P.O.:360]  Out: 425 [Urine:425]    Past Medical & Surgical History:  Past Medical History:   Diagnosis Date    CHF (congestive heart failure)     COPD (chronic obstructive pulmonary disease)     Diabetes mellitus     Hyperlipidemia     Hypertension        Past Surgical History:   Procedure Laterality Date    APPENDECTOMY      CHOLECYSTECTOMY         Past Social History:  Social History     Socioeconomic History    Marital status: Single   Tobacco Use    Smoking status: Never    Smokeless tobacco: Never   Substance and Sexual Activity    Alcohol use: No     Comment: socially    Drug use: No    Sexual activity: Yes     Partners: Female     Social Determinants of Health     Financial Resource Strain: Unknown    Difficulty of Paying Living Expenses: Patient refused   Food Insecurity: Unknown    Worried About Running Out of Food in the Last Year: Patient refused    Ran Out of Food in the Last Year: Patient refused   Transportation Needs: Unknown    Lack of Transportation (Medical): Patient refused    Lack of Transportation (Non-Medical): Patient refused   Physical Activity: Unknown    Days of Exercise per Week: Patient refused    Minutes of Exercise per  Session: Patient refused   Stress: Unknown    Feeling of Stress : Patient refused   Social Connections: Unknown    Frequency of Communication with Friends and Family: Patient refused    Frequency of Social Gatherings with Friends and Family: Patient refused    Attends Muslim Services: Patient refused    Active Member of Clubs or Organizations: Patient refused    Attends Club or Organization Meetings: Patient refused    Marital Status: Patient refused   Housing Stability: Unknown    Unable to Pay for Housing in the Last Year: Patient refused    Unstable Housing in the Last Year: Patient refused       Medications:  Scheduled Meds:   amLODIPine  10 mg Oral Daily    aspirin  81 mg Oral Daily    atorvastatin  80 mg Oral QHS    calcitRIOL  0.25 mcg Oral Daily    furosemide (LASIX) injection  60 mg Intravenous BID    hydrALAZINE  25 mg Oral Q8H    insulin detemir U-100  20 Units Subcutaneous QHS    metoprolol  5 mg Intravenous Q6H    potassium bicarbonate  50 mEq Oral Once    potassium chloride  20 mEq Oral TID     Continuous Infusions:   dexmedetomidine (PRECEDEX) infusion Stopped (12/04/22 1800)    nicardipine Stopped (12/07/22 1720)     PRN Meds:.acetaminophen, acetaminophen, aluminum-magnesium hydroxide-simethicone, dextrose 10%, dextrose 10%, glucagon (human recombinant), glucose, glucose, hydrALAZINE, HYDROcodone-acetaminophen, insulin aspart U-100, melatonin, morphine, naloxone, simethicone, sodium chloride 0.9%, sodium chloride 0.9%  No current facility-administered medications on file prior to encounter.     Current Outpatient Medications on File Prior to Encounter   Medication Sig Dispense Refill    acetaminophen (TYLENOL) 325 MG tablet Take 650 mg by mouth every 6 (six) hours as needed.      albuterol (PROVENTIL/VENTOLIN HFA) 90 mcg/actuation inhaler Inhale 1-2 puffs into the lungs every 6 (six) hours as needed for Wheezing. Rescue 6.7 g 0    amLODIPine (NORVASC) 10 MG tablet Take 1 tablet (10 mg total) by  "mouth once daily. 30 tablet 0    amLODIPine (NORVASC) 10 MG tablet Take 1 tablet by mouth once daily.      aspirin 81 MG Chew Take 81 mg by mouth once daily.      atorvastatin (LIPITOR) 40 MG tablet Take 1 tablet (40 mg total) by mouth every evening. 30 tablet 0    calcitRIOL (ROCALTROL) 0.25 MCG Cap Take 0.25 mcg by mouth once daily.      calcitRIOL (ROCALTROL) 0.25 MCG Cap Take 1 capsule by mouth once daily.      ciprofloxacin HCl (CIPRO) 500 MG tablet Take 500 mg by mouth 2 (two) times daily.      cloNIDine 0.3 mg/24 hr td ptwk (CATAPRES) 0.3 mg/24 hr Place 1 patch onto the skin once a week. medically necessary with dx codes 401.9, 428.43, 428.0. 30 patch 11    clotrimazole (LOTRIMIN) 1 % cream Apply topically 2 (two) times daily. for 10 days (Patient taking differently: Apply 1 application topically 2 (two) times daily.) 1 each 0    EScitalopram oxalate (LEXAPRO) 20 MG tablet Take 20 mg by mouth once daily.      ferrous sulfate (FEOSOL) 325 mg (65 mg iron) Tab tablet Take 1 tablet by mouth once daily.      fluticasone propionate (FLONASE) 50 mcg/actuation nasal spray 1 spray (50 mcg total) by Each Nostril route 2 (two) times daily as needed for Rhinitis. 15 g 0    furosemide (LASIX) 40 MG tablet Take 40 mg by mouth 2 (two) times daily.      glipiZIDE (GLUCOTROL) 10 MG tablet Take 1 tablet (10 mg total) by mouth 2 (two) times daily with meals. 60 tablet 0    HUMULIN 70/30 U-100 INSULIN 100 unit/mL (70-30) injection Inject into the skin 3 (three) times daily before meals.      HYDROcodone-acetaminophen (NORCO) 5-325 mg per tablet Take 1 tablet by mouth every 6 (six) hours as needed.      insulin detemir (LEVEMIR) 100 unit/mL injection Inject 15 Units into the skin every evening. 4.5 mL 2    insulin needles, disposable, 31 x 3/16 " Ndle Use daily to inject insulin  DX:250.00 100 each 11    isosorbide mononitrate (IMDUR) 60 MG 24 hr tablet Take 60 mg by mouth once daily.      lancets (ONE TOUCH DELICA LANCETS) 33 " "gauge Misc 1 lancet by Misc.(Non-Drug; Combo Route) route 4 (four) times daily. DX:250.00   Medically necessary to test blood sugar 200 each 6    loratadine (CLARITIN) 10 mg tablet Take 1 tablet (10 mg total) by mouth once daily. 30 tablet 0    losartan (COZAAR) 100 MG tablet Take 100 mg by mouth.      ondansetron (ZOFRAN-ODT) 4 MG TbDL Take 1 tablet (4 mg total) by mouth every 6 (six) hours as needed (nausea/vomiting). 20 tablet 0    oxyCODONE-acetaminophen (PERCOCET) 5-325 mg per tablet Take 1 tablet by mouth every 4 (four) hours as needed.      pioglitazone (ACTOS) 15 MG tablet Take 1 tablet by mouth once daily.      potassium chloride (KLOR-CON) 10 MEQ TbSR       sildenafiL (VIAGRA) 100 MG tablet TAKE ONE DAILY AS NEEDED      silver sulfADIAZINE 1% (SILVADENE) 1 % cream Apply topically.      sulfamethoxazole-trimethoprim 400-80mg (BACTRIM,SEPTRA) 400-80 mg per tablet Take 1 tablet by mouth 2 (two) times daily.      traMADoL (ULTRAM) 50 mg tablet Take 1 tablet (50 mg total) by mouth every 8 (eight) hours as needed for Pain. 9 tablet 0    TRUE METRIX AIR GLUCOSE METER Misc USE TO CHECK GLUCOSE TWICE DAILY AS DIRECTED      TRUE METRIX GLUCOSE TEST STRIP Strp 1 strip 2 (two) times daily.      TRUEPLUS LANCETS 30 gauge Misc USE 1 TO CHECK GLUCOSE TWICE DAILY         Allergies:  Patient has no known allergies.    Past Family History:  Reviewed; refer to Hospitalist Admission Note    Review of Systems:  Limited    Physical Exam:  BP (!) 183/119 (BP Location: Right arm, Patient Position: Lying)   Pulse 98   Temp 98.9 °F (37.2 °C) (Oral)   Resp (!) 22   Ht 5' 9" (1.753 m)   Wt 84.5 kg (186 lb 4.6 oz)   SpO2 96%   BMI 27.51 kg/m²     General Appearance:    Anxious at times, then somnolent, cannot hold conversation, doesn't demonstrate understanding or follows commands or verbalize anything, appears stated age   Head:    Normocephalic, atraumatic   Eyes:    PER, EOMI, and conjunctiva/sclera clear bilaterally      "   Mouth:   Moist mucus membranes   Back:     No CVA tenderness   Lungs:     Coarse   Heart:    Regular rate and rhythm, S1 and S2 normal, no murmur, rub   or gallop   Abdomen:     Soft, non-tender, non-distended, bowel sounds active all four   quadrants, no RT or guarding, no masses, no organomegaly   Extremities:   Warm and well perfused, distal pulses intact, no cyanosis or    peripheral edema   MSK:   No joint or muscle swelling, tenderness or deformity   Skin:   Skin color, texture, turgor normal, no rashes or lesions   Neurologic/Psychiatric:   Moves all extremities     Results:  Recent Labs   Lab 12/06/22  0435 12/07/22  0501 12/08/22  0408    141 142   K 3.3* 2.9*  2.9* 3.1*    102 101   CO2 28 27 31*   BUN 56* 52* 49*   CREATININE 4.8* 4.2* 3.6*   * 176* 192*         Recent Labs   Lab 12/04/22  0231 12/04/22  1426 12/05/22  0321 12/06/22  0435 12/07/22  0501 12/08/22  0408   CALCIUM 9.4  9.4   < > 8.8 8.8 8.6* 8.4*   ALBUMIN 4.0   < > 3.1* 3.2* 3.3*  --    PHOS 4.9*  --   --   --   --   --    MG 2.0   < > 2.0 2.0 1.9 1.9    < > = values in this interval not displayed.         Recent Labs   Lab 11/15/22  0453 12/04/22  0231   PTH, Intact 353.5 H 496.8 H         No results for input(s): POCTGLUCOSE in the last 168 hours.    Recent Labs   Lab 04/20/22  0917 11/14/22  0043 12/04/22  0231   Hemoglobin A1C 8.9 H 8.5 H 8.9 H         Recent Labs   Lab 12/06/22  0435 12/07/22  0501 12/08/22  0408   WBC 10.92 15.69* 15.86*   HGB 13.7* 14.2 14.2   HCT 42.8 43.6 44.7    184 179   MCV 87 85 86   MCHC 32.0 32.6 31.8*   MONO 5.8  0.6 5.5  0.9 5.9  0.9         Recent Labs   Lab 12/05/22  0321 12/06/22  0435 12/07/22  0501   BILITOT 2.4* 2.3* 2.2*   PROT 6.0 6.4 6.5   ALBUMIN 3.1* 3.2* 3.3*   ALKPHOS 115 105 110   ALT 58* 65* 60*   AST 39 36 29         Recent Labs   Lab 11/14/22  1705 12/04/22  0021   Color, UA Yellow Colorless A  Colorless A   Appearance, UA Clear Clear  Clear   pH, UA 7.0  6.0  6.0   Specific Gravity, UA 1.015 1.010  1.010   Protein, UA 1+ A 1+ A  1+ A   Glucose, UA 3+ A 2+ A  2+ A   Ketones, UA Negative Negative  Negative   Urobilinogen, UA Negative Negative  Negative   Bilirubin (UA) Negative Negative  Negative   Occult Blood UA Trace A 1+ A  1+ A   Nitrite, UA Negative Negative  Negative   RBC, UA 2 1   WBC, UA 0 0   Bacteria None Negative   Hyaline Casts, UA 0 1         Recent Labs   Lab 12/03/22  1352 12/03/22  1446 12/03/22 2032   POC PH  --  7.358 7.413   POC PCO2  --  38.9 33.3 L   POC HCO3  --  21.8 L 21.3 L   POC PO2  --  87 112 H   POC SATURATED O2  --  96 99   POC BE  --  -4 -3   Sample VENOUS ARTERIAL ARTERIAL         Microbiology Results (last 7 days)       Procedure Component Value Units Date/Time    Blood culture [[id number]] Collected: 12/04/22 0231    Order Status: Completed Specimen: Blood Updated: 12/08/22 0432     Blood Culture, Routine No Growth to date      No Growth to date      No Growth to date      No Growth to date      No Growth to date    Narrative:      Collection has been rescheduled by JSDARSHAN at 12/04/2022 00:00 Reason:   Nurse Cassandra said to re-time the labs for 1:30 am.  Collection has been rescheduled by JSDARSHAN at 12/04/2022 00:00 Reason:   Nurse Cassandra said to re-time the labs for 1:30 am.    Blood culture [[id number]] Collected: 12/04/22 1426    Order Status: Completed Specimen: Blood from Peripheral, Upper Arm, Left Updated: 12/07/22 1832     Blood Culture, Routine No Growth to date      No Growth to date      No Growth to date      No Growth to date    MRSA Screen by PCR [[id number]] Collected: 12/03/22 2212    Order Status: Completed Specimen: Nasopharyngeal Swab from Nasal Updated: 12/04/22 0019     MRSA SCREEN BY PCR Negative    Blood culture [[id number]]     Order Status: Sent Specimen: Blood           I have spent >  minutes providing care for this patient for the above diagnoses. These services have included chart/data/imaging  review, evaluation, exam, formulation of plan, , note preparation, and discussions with staff involved in this patient's care.    Joseph Osorio MD  [facility] [address] [address]  [phone] (p)  [phone] (f)

## 2022-12-08 NOTE — PROGRESS NOTES
On license of [facility] Medicine  Progress Note    Patient name: Spencer Burton Jr.  MRN: [id number]  Admit Date: 12/3/2022   LOS: 3 days     SUBJECTIVE:     Principal problem: Acute ischemic left MCA stroke    Interval History:      12/6-  resting quietly,started cardene today.  RAGHAV + for thrombus.      12/7- more drowsy, otherwise neuro exam no significant change, still on cardene, will add PO bp med.  Plan for anticoagulation tomorrow    Hospital Course:    52 year old male admitted with acute ischemic CVA with MRI brain showing multiple areas of ischemia involving bilateral cerebral hemispheres, left basal ganglia and left cerebellar hemisphere. CT head revealed loss of gray-white matter distinction in the left basal ganglia concerning for acute infarct.  Given infarct was already evident on CT scan of the brain, it was concerning that some of these lesions might be subacute and thus time of last normal was not accurate.  Given this, tpa was decided against.  Patient has olman admitted to the ICU.  He is agitated and encephalopathic.  Blood pressures are severely elevated. Discussed with Nephrology, Neurology, RN and will have arterial line placed for accurate blood pressure measurements and start Precedex gtt as concerned about his agitation and driving up his blood pressure worse- risk/benefit favors starting despite possibly skewing neuro exam. Will still allow for permissive HTN for another day and shoot for dropping blood pressure no lower than  if he does require prn treatment. On asa and statin for secondary prevention.  On IV lasix for acute on chronic HF exacerbation.  Cardiology consulted for the same and elevated troponin. Troponin was initially trending up but has peaked and now trending down.  History is not reliable to assess for chest pain. Cardiology also consulted for RAGHAV as it is felt this is likely an embolic event. Carotid US with no significant stenosis. CTA head and neck not  performed due to renal function.  Nephrology consulted given RJ on CKD.  Cr initially 5.1.  Baseline appears to be around 3.8.  Labs also revealing of new transaminitis. Due to hepatic congestion from HF?  Last echo with EF 40% and Grade III diastolic dysfunction. Lactic acid elevated at 3.8 on admission.  No indication of infection at this time.      Scheduled Meds:   amLODIPine  10 mg Oral Daily    aspirin  81 mg Oral Daily    atorvastatin  80 mg Oral QHS    calcitRIOL  0.25 mcg Oral Daily    furosemide (LASIX) injection  60 mg Intravenous BID    hydrALAZINE  25 mg Oral Q8H    insulin detemir U-100  18 Units Subcutaneous QHS    metoprolol  5 mg Intravenous Q6H    potassium bicarbonate  50 mEq Oral Once    potassium chloride  20 mEq Oral TID     Continuous Infusions:   dexmedetomidine (PRECEDEX) infusion Stopped (12/04/22 1800)    nicardipine Stopped (12/07/22 1720)     PRN Meds:acetaminophen, acetaminophen, aluminum-magnesium hydroxide-simethicone, dextrose 10%, dextrose 10%, glucagon (human recombinant), glucose, glucose, hydrALAZINE, HYDROcodone-acetaminophen, insulin aspart U-100, melatonin, morphine, naloxone, simethicone, sodium chloride 0.9%, sodium chloride 0.9%    Review of patient's allergies indicates:  No Known Allergies    Review of Systems: As per interval history    OBJECTIVE:     Vital Signs (Most Recent)  Temp: 98.6 °F (37 °C) (12/07/22 2000)  Pulse: 102 (12/07/22 2100)  Resp: (!) 33 (12/07/22 2100)  BP: (!) 182/114 (12/07/22 2100)  SpO2: 95 % (12/07/22 2100)    Vital Signs Range (Last 24H):  Temp:  [97.8 °F (36.6 °C)-98.8 °F (37.1 °C)]   Pulse:  []   Resp:  [12-33]   BP: (132-185)/()   SpO2:  [79 %-100 %]     I & O (Last 24H):  Intake/Output Summary (Last 24 hours) at 12/7/2022 2148  Last data filed at 12/7/2022 1707  Gross per 24 hour   Intake 730 ml   Output 5550 ml   Net -4820 ml         Physical Exam:    Vitals and nursing note reviewed.     Constitutional:       General: Not in  acute distress.     Appearance: Well-developed.   HENT:      Head: Normocephalic and atraumatic.   Eyes:      Pupils: Pupils are equal, round, and reactive to light.   Cardiovascular:      Rate and Rhythm: Regular rhythm.   Pulmonary:      Effort: Pulmonary effort is normal.      Breath sounds: Normal breath sounds. No wheezing.   Abdominal:      General: There is no distension.      Palpations: Abdomen is soft.      Tenderness: There is no abdominal tenderness. There is no guarding or rebound.   Musculoskeletal:         General: Normal range of motion.      Cervical back: Normal range of motion.   Skin:     Findings: No rash.   Neurological:      Mental Status: drowsy     Motor:  LUE/LLE 4/5,  RUE and RLE 5/5         Laboratory:  I have reviewed all pertinent lab results within the past 24 hours.  CBC:   Recent Labs   Lab 12/07/22  0501   WBC 15.69*   RBC 5.15   HGB 14.2   HCT 43.6      MCV 85   MCH 27.6   MCHC 32.6       CMP:   Recent Labs   Lab 12/07/22  0501   *   CALCIUM 8.6*   ALBUMIN 3.3*   PROT 6.5      K 2.9*  2.9*   CO2 27      BUN 52*   CREATININE 4.2*   ALKPHOS 110   ALT 60*   AST 29   BILITOT 2.2*       Cardiac markers:   Recent Labs   Lab 12/04/22  0231   CPKMB 6.6*       Microbiology Results (last 7 days)       Procedure Component Value Units Date/Time    Blood culture [[id number]] Collected: 12/04/22 1426    Order Status: Completed Specimen: Blood from Peripheral, Upper Arm, Left Updated: 12/07/22 1832     Blood Culture, Routine No Growth to date      No Growth to date      No Growth to date      No Growth to date    Blood culture [[id number]] Collected: 12/04/22 0231    Order Status: Completed Specimen: Blood Updated: 12/07/22 0432     Blood Culture, Routine No Growth to date      No Growth to date      No Growth to date      No Growth to date    Narrative:      Collection has been rescheduled by BRUNILDA at 12/04/2022 00:00 Reason:   Nurse Cassandra said to re-time the labs for  1:30 am.  Collection has been rescheduled by BRUNILDA at 12/04/2022 00:00 Reason:   Nurse Cassandra said to re-time the labs for 1:30 am.    MRSA Screen by PCR [[id number]] Collected: 12/03/22 2212    Order Status: Completed Specimen: Nasopharyngeal Swab from Nasal Updated: 12/04/22 0019     MRSA SCREEN BY PCR Negative    Blood culture [[id number]]     Order Status: Sent Specimen: Blood             Diagnostic Results:      ASSESSMENT/PLAN:         Active Hospital Problems    Diagnosis  POA    *Acute ischemic left MCA stroke [I63.512]  Yes     Likely L MCA ischemic stroke.  DDx includes post-ictal state and hypertensive encephalopathy.  Rec treating BP to <185/110 and then treating with TNK if not better.  CTA pending for e/o LVO.      Encephalopathy, metabolic [G93.41]  Yes    Acute on chronic congestive heart failure [I50.9]  Yes    COPD (chronic obstructive pulmonary disease) [J44.9]  Yes     Chronic    CKD (chronic kidney disease), stage IV [N18.4]  Yes    Diabetes mellitus with chronic kidney disease [E11.22]  Yes    Hypertension, poor control [I10]  Yes     Chronic      Resolved Hospital Problems   No resolved problems to display.         Plan:      -Suspect embolic event   -RAGHAV with large thrombus  -Carotid US with no significant stenosis.  CTA contraindicated in setting of present renal function.  -ASA and statin for secondary prevention  -Neurology following; appreciate recs  -Neuro checks  -PT/OT/ST  -Nephrology consulted for Jacquie on CKD.  Last Cr was 3.8.  -Cardiology consulted for acute on chronic SHF and elevated troponin a little beyond his baseline elevation.  IV lasix.  Watching renal tolerance.   -ISS.  Accuchecks.  -Trending transaminases and bilirubin.  We will see if they improve with diuresis which would suggest hepatic congestion from HF.   -started cardene infusion      VTE Risk Mitigation (From admission, onward)           Ordered     IP VTE HIGH RISK PATIENT  Once         12/03/22 2003     Place  sequential compression device  Until discontinued         12/03/22 1907                      Department Hospital Medicine  [facility]  Pablo Bose MD  Date of service: 12/07/2022

## 2022-12-08 NOTE — PT/OT/SLP PROGRESS
"Physical Therapy Treatment    Patient Name:  Spencer Burton Jr.   MRN:  [id number]    Recommendations:     Discharge Recommendations: rehabilitation facility  Discharge Equipment Recommendations:  (TBD)  Barriers to discharge: Decreased caregiver support    Assessment:     Spencer Burton Jr. is a 52 y.o. male admitted with a medical diagnosis of Acute ischemic left MCA stroke.  He presents with the following impairments/functional limitations: weakness, impaired endurance, impaired self care skills, impaired functional mobility, gait instability, impaired balance, decreased safety awareness, impaired cardiopulmonary response to activity.    Pt found HOB elevated with eyes closed, but pt opened eyes when PT knocked/called pt's name. Pt responded "uh huh" when PT asked if pt wanted to sit up EOB; otherwise, pt was non-verbal during session. Pt tolerated session fairly and required moderate A to sit up toward his R side with R UE noted to have mildly increased tone during transitions. Pt sat EOB with minimal A approximately 7-8 and returned to supine/assisted with scooting up in bed prior to end of session.     Rehab Prognosis: Fair; patient would benefit from acute skilled PT services to address these deficits and reach maximum level of function.    Recent Surgery: * No surgery found *      Plan:     During this hospitalization, patient to be seen 6 x/week to address the identified rehab impairments via gait training, therapeutic activities, therapeutic exercises, neuromuscular re-education and progress toward the following goals:    Plan of Care Expires:  01/09/23    Subjective     Chief Complaint: Pt was non-verbal during session.  Patient/Family Comments/goals: Rehab  Pain/Comfort:  Pain Rating 1:  (pt did not report pain)      Objective:     Communicated with SERGIO Dyer prior to session.  Patient found HOB elevated with bed alarm, blood pressure cuff, rolle catheter, peripheral IV, pulse ox (continuous), telemetry upon PT " entry to room.     General Precautions: Standard, aphasia, fall, diabetic  Orthopedic Precautions: N/A  Braces: N/A  Respiratory Status: Room air     Functional Mobility:  Bed Mobility:     Scooting: minimum assistance and of 2 persons  Supine to Sit: moderate assistance  Sit to Supine: minimum assistance and moderate assistance      AM-PAC 6 CLICK MOBILITY          Treatment & Education:  Pt was educated on the following: call light use, importance of OOB activity and functional mobility to negate the negative effects of prolonged bed rest during this hospitalization, safe transfers/ambulation and discharge planning recommendations/options.      Patient left HOB elevated with all lines intact, call button in reach, and bed alarm on..    GOALS:   Multidisciplinary Problems       Physical Therapy Goals          Problem: Physical Therapy    Goal Priority Disciplines Outcome Goal Variances Interventions   Physical Therapy Goal     PT, PT/OT Ongoing, Progressing     Description: Goals to be met by: 23     Patient will increase functional independence with mobility by performin. Supine to sit with Supervision  2. Sit to stand transfer with Supervision  3. Bed to chair transfer with Supervision using Rolling Walker  4. Gait  x 150 feet with Supervision using Rolling Walker.                              Time Tracking:     PT Received On: 22  PT Start Time: 1122     PT Stop Time: 1134  PT Total Time (min): 12 min     Billable Minutes: Neuromuscular Re-education 12    Treatment Type: Treatment  PT/PTA: PT     PTA Visit Number: 1     2022

## 2022-12-08 NOTE — HPI
MR Burton is a 52yoM with CHF who initially presented to OSH with RSW. Did not receive tPA, as he was outside of the window. MRI at that time demonstrated bilateral anterior and posterior circulation strokes concerning for embolic etiology. Over hospital course, the patient was noted to have been lethargic and aphasic with dysarthria. TTE at OSH demosntrated a L ventricular thrombus (not on AC). He was noted to have had an acute change in neuro exam on 12/8 at which time he had RSW with global aphasia. NIH had been 6 prior to the event and was noted to have been 23 following. CTA demonstrated a L M2 occlusion for which the patient was transferred to [facility] for possible intervention and higher level of care.     On arrival to ED, the patient was globally aphasic with L gaze and RSW. Exam limited due to mentation and aphasia.

## 2022-12-08 NOTE — HPI
Pt is a 52 y.o. male with PMHx of CHF who presents as a transfer for acute LMCA stroke. Patient initially presented to OSH on 12/3 with RSW. At that time he was not a candidate for TPA and MRI revealed bilateral cardio embolic strokes. Patient had a TTE and RAGHAV which revealed LV thrombus. He was not started AC yet in setting of acute strokes, plan was to start AC today 12/8. Today, patient was noted to have acute change in neuro exam. NIH went from 6 to 23 and CTA revealed L M2 occlusion. Patient was transferred to [facility] for possible intervention but ASPECTS score was poor so he was not a candidate. Patient will be admitted to [facility] for higher level care and neuro monitoring.

## 2022-12-08 NOTE — NURSING
Patient confused and climbing out of bed during bedside report.  Stating he needed to use the restroom.  Assisted to the restroom with minimal help.  Patient was reoriented to the fact that he had a rolle catheter.  Able to state full name, and month and day of birth but not year of birth or where he was.  Continue to reorient and monitor neuro status.

## 2022-12-08 NOTE — NURSING
1134- Patient placed back in bed by PT.    1147- Walked in patient's room to check blood glucose prior to lunch tray, and patient had right side facial droop, right side hemiparesis, and global aphasia.    1152- Dr Smyth and Dr Scott notified of changes.   1158- Patient taken to CT   1200- Dr Scott at patient bedside in CT   1211- CT head completed  1219- CTA head completed  1223- Patient brought back to room and family notified that per Dr Scott patient would need to be transferred to [facility].

## 2022-12-09 LAB
ALBUMIN SERPL BCP-MCNC: 2.8 G/DL (ref 3.5–5.2)
ALP SERPL-CCNC: 97 U/L (ref 55–135)
ALT SERPL W/O P-5'-P-CCNC: 36 U/L (ref 10–44)
ANION GAP SERPL CALC-SCNC: 14 MMOL/L (ref 8–16)
AST SERPL-CCNC: 21 U/L (ref 10–40)
BACTERIA BLD CULT: NORMAL
BACTERIA BLD CULT: NORMAL
BASOPHILS # BLD AUTO: 0.04 K/UL (ref 0–0.2)
BASOPHILS NFR BLD: 0.2 % (ref 0–1.9)
BILIRUB SERPL-MCNC: 1.9 MG/DL (ref 0.1–1)
BNP SERPL-MCNC: 1729 PG/ML (ref 0–99)
BUN SERPL-MCNC: 37 MG/DL (ref 6–20)
CALCIUM SERPL-MCNC: 9.3 MG/DL (ref 8.7–10.5)
CHLORIDE SERPL-SCNC: 100 MMOL/L (ref 95–110)
CO2 SERPL-SCNC: 26 MMOL/L (ref 23–29)
CREAT SERPL-MCNC: 3.5 MG/DL (ref 0.5–1.4)
DIFFERENTIAL METHOD: ABNORMAL
EOSINOPHIL # BLD AUTO: 0 K/UL (ref 0–0.5)
EOSINOPHIL NFR BLD: 0.1 % (ref 0–8)
ERYTHROCYTE [DISTWIDTH] IN BLOOD BY AUTOMATED COUNT: 14.9 % (ref 11.5–14.5)
EST. GFR  (NO RACE VARIABLE): 20.4 ML/MIN/1.73 M^2
GLUCOSE SERPL-MCNC: 210 MG/DL (ref 70–110)
HCT VFR BLD AUTO: 42.6 % (ref 40–54)
HGB BLD-MCNC: 13.5 G/DL (ref 14–18)
IMM GRANULOCYTES # BLD AUTO: 0.11 K/UL (ref 0–0.04)
IMM GRANULOCYTES NFR BLD AUTO: 0.5 % (ref 0–0.5)
LYMPHOCYTES # BLD AUTO: 1.1 K/UL (ref 1–4.8)
LYMPHOCYTES NFR BLD: 5.1 % (ref 18–48)
MAGNESIUM SERPL-MCNC: 1.6 MG/DL (ref 1.6–2.6)
MCH RBC QN AUTO: 27.2 PG (ref 27–31)
MCHC RBC AUTO-ENTMCNC: 31.7 G/DL (ref 32–36)
MCV RBC AUTO: 86 FL (ref 82–98)
MONOCYTES # BLD AUTO: 1 K/UL (ref 0.3–1)
MONOCYTES NFR BLD: 4.6 % (ref 4–15)
NEUTROPHILS # BLD AUTO: 19.1 K/UL (ref 1.8–7.7)
NEUTROPHILS NFR BLD: 89.5 % (ref 38–73)
NRBC BLD-RTO: 0 /100 WBC
PHOSPHATE SERPL-MCNC: 2.8 MG/DL (ref 2.7–4.5)
PLATELET # BLD AUTO: 190 K/UL (ref 150–450)
PMV BLD AUTO: 12.5 FL (ref 9.2–12.9)
POCT GLUCOSE: 127 MG/DL (ref 70–110)
POCT GLUCOSE: 174 MG/DL (ref 70–110)
POCT GLUCOSE: 177 MG/DL (ref 70–110)
POCT GLUCOSE: 182 MG/DL (ref 70–110)
POCT GLUCOSE: 195 MG/DL (ref 70–110)
POCT GLUCOSE: 205 MG/DL (ref 70–110)
POTASSIUM SERPL-SCNC: 3.4 MMOL/L (ref 3.5–5.1)
PROT SERPL-MCNC: 6.7 G/DL (ref 6–8.4)
RBC # BLD AUTO: 4.97 M/UL (ref 4.6–6.2)
SODIUM SERPL-SCNC: 140 MMOL/L (ref 136–145)
WBC # BLD AUTO: 21.3 K/UL (ref 3.9–12.7)

## 2022-12-09 PROCEDURE — 94761 N-INVAS EAR/PLS OXIMETRY MLT: CPT

## 2022-12-09 PROCEDURE — 63600175 PHARM REV CODE 636 W HCPCS: Performed by: PHYSICIAN ASSISTANT

## 2022-12-09 PROCEDURE — 99291 PR CRITICAL CARE, E/M 30-74 MINUTES: ICD-10-PCS | Mod: ,,, | Performed by: NURSE PRACTITIONER

## 2022-12-09 PROCEDURE — 63600175 PHARM REV CODE 636 W HCPCS: Performed by: NURSE PRACTITIONER

## 2022-12-09 PROCEDURE — 99233 PR SUBSEQUENT HOSPITAL CARE,LEVL III: ICD-10-PCS | Mod: ,,, | Performed by: PSYCHIATRY & NEUROLOGY

## 2022-12-09 PROCEDURE — 99223 1ST HOSP IP/OBS HIGH 75: CPT | Mod: FS,,, | Performed by: STUDENT IN AN ORGANIZED HEALTH CARE EDUCATION/TRAINING PROGRAM

## 2022-12-09 PROCEDURE — 99223 PR INITIAL HOSPITAL CARE,LEVL III: ICD-10-PCS | Mod: FS,,, | Performed by: STUDENT IN AN ORGANIZED HEALTH CARE EDUCATION/TRAINING PROGRAM

## 2022-12-09 PROCEDURE — 20000000 HC ICU ROOM

## 2022-12-09 PROCEDURE — 83880 ASSAY OF NATRIURETIC PEPTIDE: CPT | Performed by: NURSE PRACTITIONER

## 2022-12-09 PROCEDURE — 25000003 PHARM REV CODE 250: Performed by: PHYSICIAN ASSISTANT

## 2022-12-09 PROCEDURE — 83735 ASSAY OF MAGNESIUM: CPT | Performed by: PHYSICIAN ASSISTANT

## 2022-12-09 PROCEDURE — 25000003 PHARM REV CODE 250: Performed by: NURSE PRACTITIONER

## 2022-12-09 PROCEDURE — 80053 COMPREHEN METABOLIC PANEL: CPT | Performed by: PHYSICIAN ASSISTANT

## 2022-12-09 PROCEDURE — 85025 COMPLETE CBC W/AUTO DIFF WBC: CPT | Performed by: PHYSICIAN ASSISTANT

## 2022-12-09 PROCEDURE — 99233 SBSQ HOSP IP/OBS HIGH 50: CPT | Mod: ,,, | Performed by: PSYCHIATRY & NEUROLOGY

## 2022-12-09 PROCEDURE — 99291 CRITICAL CARE FIRST HOUR: CPT | Mod: ,,, | Performed by: NURSE PRACTITIONER

## 2022-12-09 PROCEDURE — 97165 OT EVAL LOW COMPLEX 30 MIN: CPT

## 2022-12-09 PROCEDURE — 84100 ASSAY OF PHOSPHORUS: CPT | Performed by: PHYSICIAN ASSISTANT

## 2022-12-09 PROCEDURE — 92610 EVALUATE SWALLOWING FUNCTION: CPT

## 2022-12-09 RX ORDER — LANOLIN ALCOHOL/MO/W.PET/CERES
800 CREAM (GRAM) TOPICAL
Status: DISCONTINUED | OUTPATIENT
Start: 2022-12-09 | End: 2022-12-09

## 2022-12-09 RX ORDER — SODIUM,POTASSIUM PHOSPHATES 280-250MG
2 POWDER IN PACKET (EA) ORAL
Status: DISCONTINUED | OUTPATIENT
Start: 2022-12-09 | End: 2022-12-09

## 2022-12-09 RX ORDER — MUPIROCIN 20 MG/G
OINTMENT TOPICAL 2 TIMES DAILY
Status: COMPLETED | OUTPATIENT
Start: 2022-12-09 | End: 2022-12-13

## 2022-12-09 RX ORDER — METOPROLOL TARTRATE 25 MG/1
25 TABLET, FILM COATED ORAL 2 TIMES DAILY
Status: DISCONTINUED | OUTPATIENT
Start: 2022-12-09 | End: 2022-12-17

## 2022-12-09 RX ORDER — NAPROXEN SODIUM 220 MG/1
81 TABLET, FILM COATED ORAL DAILY
Status: DISCONTINUED | OUTPATIENT
Start: 2022-12-09 | End: 2022-12-10

## 2022-12-09 RX ORDER — ACETAMINOPHEN 500 MG
1000 TABLET ORAL EVERY 8 HOURS
Status: COMPLETED | OUTPATIENT
Start: 2022-12-09 | End: 2022-12-10

## 2022-12-09 RX ORDER — HEPARIN SODIUM 5000 [USP'U]/ML
5000 INJECTION, SOLUTION INTRAVENOUS; SUBCUTANEOUS EVERY 8 HOURS
Status: DISCONTINUED | OUTPATIENT
Start: 2022-12-09 | End: 2022-12-10

## 2022-12-09 RX ORDER — ESCITALOPRAM OXALATE 20 MG/1
20 TABLET ORAL DAILY
Status: DISCONTINUED | OUTPATIENT
Start: 2022-12-09 | End: 2022-12-20

## 2022-12-09 RX ORDER — INSULIN ASPART 100 [IU]/ML
1-10 INJECTION, SOLUTION INTRAVENOUS; SUBCUTANEOUS EVERY 6 HOURS
Status: DISCONTINUED | OUTPATIENT
Start: 2022-12-09 | End: 2022-12-09

## 2022-12-09 RX ORDER — FUROSEMIDE 10 MG/ML
40 INJECTION INTRAMUSCULAR; INTRAVENOUS ONCE
Status: COMPLETED | OUTPATIENT
Start: 2022-12-09 | End: 2022-12-09

## 2022-12-09 RX ORDER — INSULIN ASPART 100 [IU]/ML
1-10 INJECTION, SOLUTION INTRAVENOUS; SUBCUTANEOUS EVERY 6 HOURS PRN
Status: DISCONTINUED | OUTPATIENT
Start: 2022-12-09 | End: 2022-12-10

## 2022-12-09 RX ADMIN — INSULIN ASPART 2 UNITS: 100 INJECTION, SOLUTION INTRAVENOUS; SUBCUTANEOUS at 08:12

## 2022-12-09 RX ADMIN — ASPIRIN 81 MG: 81 TABLET, CHEWABLE ORAL at 10:12

## 2022-12-09 RX ADMIN — MUPIROCIN: 20 OINTMENT TOPICAL at 09:12

## 2022-12-09 RX ADMIN — METOPROLOL TARTRATE 25 MG: 25 TABLET, FILM COATED ORAL at 09:12

## 2022-12-09 RX ADMIN — PIPERACILLIN SODIUM AND TAZOBACTAM SODIUM 4.5 G: 4; .5 INJECTION, POWDER, LYOPHILIZED, FOR SOLUTION INTRAVENOUS at 05:12

## 2022-12-09 RX ADMIN — POTASSIUM BICARBONATE 25 MEQ: 978 TABLET, EFFERVESCENT ORAL at 12:12

## 2022-12-09 RX ADMIN — MUPIROCIN: 20 OINTMENT TOPICAL at 08:12

## 2022-12-09 RX ADMIN — SENNOSIDES AND DOCUSATE SODIUM 1 TABLET: 50; 8.6 TABLET ORAL at 08:12

## 2022-12-09 RX ADMIN — POTASSIUM BICARBONATE 25 MEQ: 978 TABLET, EFFERVESCENT ORAL at 01:12

## 2022-12-09 RX ADMIN — ESCITALOPRAM OXALATE 20 MG: 20 TABLET ORAL at 10:12

## 2022-12-09 RX ADMIN — HEPARIN SODIUM 5000 UNITS: 5000 INJECTION INTRAVENOUS; SUBCUTANEOUS at 09:12

## 2022-12-09 RX ADMIN — ACETAMINOPHEN 650 MG: 325 TABLET ORAL at 03:12

## 2022-12-09 RX ADMIN — INSULIN ASPART 2 UNITS: 100 INJECTION, SOLUTION INTRAVENOUS; SUBCUTANEOUS at 05:12

## 2022-12-09 RX ADMIN — PIPERACILLIN SODIUM AND TAZOBACTAM SODIUM 4.5 G: 4; .5 INJECTION, POWDER, LYOPHILIZED, FOR SOLUTION INTRAVENOUS at 09:12

## 2022-12-09 RX ADMIN — ACETAMINOPHEN 1000 MG: 500 TABLET ORAL at 01:12

## 2022-12-09 RX ADMIN — LABETALOL HYDROCHLORIDE 10 MG: 5 INJECTION INTRAVENOUS at 03:12

## 2022-12-09 RX ADMIN — SENNOSIDES AND DOCUSATE SODIUM 1 TABLET: 50; 8.6 TABLET ORAL at 09:12

## 2022-12-09 RX ADMIN — PIPERACILLIN SODIUM AND TAZOBACTAM SODIUM 4.5 G: 4; .5 INJECTION, POWDER, LYOPHILIZED, FOR SOLUTION INTRAVENOUS at 01:12

## 2022-12-09 RX ADMIN — ACETAMINOPHEN 1000 MG: 500 TABLET ORAL at 09:12

## 2022-12-09 RX ADMIN — METOPROLOL TARTRATE 25 MG: 25 TABLET, FILM COATED ORAL at 10:12

## 2022-12-09 RX ADMIN — ATORVASTATIN CALCIUM 40 MG: 40 TABLET, FILM COATED ORAL at 08:12

## 2022-12-09 RX ADMIN — FUROSEMIDE 40 MG: 10 INJECTION, SOLUTION INTRAMUSCULAR; INTRAVENOUS at 10:12

## 2022-12-09 RX ADMIN — HEPARIN SODIUM 5000 UNITS: 5000 INJECTION INTRAVENOUS; SUBCUTANEOUS at 01:12

## 2022-12-09 NOTE — HOSPITAL COURSE
12/9/22: Hemicrani watch  12/10/22: start heparin drip  12/11/2022 CTH stable after heparin gtt therapeutic. Recorded UOP not accurate due to difficulty with condom cath; CXR no pleural effusion. CTH ordered for 12/13.  12/12/2022: NAEON. Tube feedings increased today. CTH in AM per NSGY.  12/13/2022: CTH stable with no increasing edema. NSGY has signed off. Stable to step down to stroke team today. Weaning salt tabs to maintain eunatremia.  12/14/2022: NAEON. Stepdwn held due to high BP. Amlodipine added yesterday for better BP control. Will SD to stroke team today.  12/15/2022: EVDs at 5 per NSGY, VPS on Monday

## 2022-12-09 NOTE — PT/OT/SLP EVAL
Occupational Therapy   Evaluation    Name: Spencer Burton Jr.  MRN: [id number]  Admitting Diagnosis: Embolic stroke involving left middle cerebral artery  Recent Surgery: * No surgery found *      Recommendations:     Discharge Recommendations: rehabilitation facility  Discharge Equipment Recommendations:  other (see comments) (TBD)  Barriers to discharge:  Decreased caregiver support (increased skilled assistance required)    Assessment:     Spencer Burton Jr. is a 52 y.o. male with a medical diagnosis of Embolic stroke involving left middle cerebral artery.  Pt had fair tolerance of assessment due to lethargy and inability to follow commands.  He requires significant assistance to perform ADLs and mobility and is performing well below his functional baseline.  Due to his current level of function compared to his PLOF, his living environment, and his diagnosis, inpatient rehab recommended at d/c for maximal pt gains in functional independence and to ensure his safety upon returning home.  Nursing present throughout session.  He presents with the following. Performance deficits affecting function: weakness, impaired endurance, impaired self care skills, impaired functional mobility, gait instability, impaired balance, impaired cognition, decreased coordination, decreased upper extremity function, decreased lower extremity function, decreased safety awareness, impaired fine motor, impaired coordination, decreased ROM, abnormal tone.      Rehab Prognosis: Good; patient would benefit from acute skilled OT services to address these deficits and reach maximum level of function.       Plan:     Patient to be seen 4 x/week to address the above listed problems via self-care/home management, therapeutic activities, therapeutic exercises, neuromuscular re-education  Plan of Care Expires: 01/08/23  Plan of Care Reviewed with: patient, mother    Subjective     Chief Complaint: Pt non-verbal  Patient/Family Comments/goals: to get  stronger, per pt's mother    Occupational Profile:  Living Environment: Pt lives with his mother in a H with ramp access.  He has a tub/shower combo.  Previous level of function: PTA, pt was independent with ADLs and mobility.   Roles and Routines: son  Equipment Used at Home: bedside commode, wheelchair, walker, rolling, crutches (ramp entrance to house)  Assistance upon Discharge: His mother, but she works     Pain/Comfort:  Pain Rating 1: other (see comments) (no indication of pain)  Pain Rating Post-Intervention 1: other (see comments) (unchanged)    Patients cultural, spiritual, Restorationism conflicts given the current situation: no    Objective:     Communicated with: nursing prior to session.  Patient found HOB elevated with Condom Catheter, bed alarm, blood pressure cuff, pulse ox (continuous), telemetry, peripheral IV, NG tube, restraints, pressure relief boots with his mother present upon OT entry to room.    General Precautions: Standard, aphasia, aspiration, fall, NPO  Orthopedic Precautions: N/A  Braces: N/A  Respiratory Status: Room air    Occupational Performance:    Bed Mobility:    Patient completed Rolling/Turning to Left with  total assistance  Patient completed Scooting/Bridging to EOB and later to HOB while supine with total assistance and 2 persons  Patient completed Supine to Sit with total assistance and 2 persons  Patient completed Sit to Supine with total assistance and 2 persons    Functional Mobility/Transfers:  Deferred due to pt's poor command following, inability to keep his eyes open, and his requiring Total Assistance for sitting balance EOB.  Pt pushed posteriorly and to his L.     Activities of Daily Living:  Feeding:  NG tube   Toileting: Condom cath    Cognitive/Visual Perceptual:  Cognitive/Psychosocial Skills:     -       Oriented to: Difficult to assess due to pt's lethargy and poor command following   -       Follows Commands/attention:Inattentive  -       Communication:  aphasic    Physical Exam:  Upper Extremity Range of Motion:     -       Right Upper Extremity: no AROM all planes; PROM shoulder flexion less than full range due to increased tone  -       Left Upper Extremity: no AROM all planes; PROM WFL   Upper Extremity Strength:    -       Right Upper Extremity: 0/5  -       Left Upper Extremity: 0/5    AMPAC 6 Click ADL:  AMPAC Total Score: 6    Treatment & Education:  Pt and his mother edu on role of OT, POC, safety when performing self care tasks, benefit of performing EOB activity, and safety when performing functional transfers and bed mobility.    - Self care tasks completed-- as noted above      Patient left supine with all lines intact, call button in reach, and nursing and his mother present    GOALS:   Multidisciplinary Problems       Occupational Therapy Goals          Problem: Occupational Therapy    Goal Priority Disciplines Outcome Interventions   Occupational Therapy Goal     OT, PT/OT Ongoing, Progressing    Description: Goals to be met by: 12/23/2022     Patient will increase functional independence with ADLs by performing:    UE Dressing with Minimal Assistance.  LE Dressing with Moderate Assistance.  Grooming while EOB with Minimal Assistance.  Toileting from bedside commode with Maximum Assistance for hygiene and clothing management.   Supine to sit with Moderate Assistance.  Stand pivot transfers with Maximum Assistance using LRAD as needed.  Toilet transfer to bedside commode with Maximum Assistance using LRAD as needed.                         History:     Past Medical History:   Diagnosis Date    CHF (congestive heart failure)     COPD (chronic obstructive pulmonary disease)     Diabetes mellitus     Hyperlipidemia     Hypertension          Past Surgical History:   Procedure Laterality Date    APPENDECTOMY      CHOLECYSTECTOMY         Time Tracking:     OT Date of Treatment: 12/09/22  OT Start Time: 1421  OT Stop Time: 1436  OT Total Time (min): 15  min    Billable Minutes:Evaluation 15 min    12/9/2022

## 2022-12-09 NOTE — ASSESSMENT & PLAN NOTE
Patient is a 52yoM with CHF who initially presented to OSH with RSW. Did not receive tPA, as he was outside of the window. MRI at that time demonstrated bilateral anterior and posterior circulation strokes concerning for embolic etiology. Over hospital course at OSH, the patient was noted to have been lethargic and aphasic with dysarthria. RAGHAV at OSH demosntrated a L ventricular thrombus (not on AC). He was noted to have had an acute change in neuro exam on 12/8 at which time he had RSW with global ahasia. NIH had been 6 prior to the event and was noted to have been 23 following. CTA demonstrated a L M2 occlusion for which the patient was transferred to [facility] [place] for possible intervention and higher level of care.     On arrival to ED, the patient was globally aphasic with L gaze and RSW. Exam limited due to mentation and aphasia. Patient was not taken for IR due to poor ASPECTS. He was admitted to ICU for close monitoring and hemicrani watch. Therapy recommending IPR at OSH, updated recs pending. Will need new OT orders.       Antithrombotics for secondary stroke prevention:   Currently on ASA 81; Will need anticoagulants, patient is NPO by SLP recs so consider heparin gtt until swallowing or PEG established (once hep gtt started can d/c ASA)     Statins for secondary stroke prevention and hyperlipidemia, if present:   Statins: Atorvastatin- 40 mg daily    Aggressive risk factor modification: HTN, DM, HLD, Diet, Exercise, LV thorombus     Rehab efforts: The patient has been evaluated by a stroke team provider and the therapy needs have been fully considered based off the presenting complaints and exam findings. The following therapy evaluations are needed: PT evaluate and treat, OT evaluate and treat, SLP evaluate and treat, PM&R evaluate for appropriate placement    Diagnostics ordered/pending: None     VTE prophylaxis: Heparin 5000 units SQ every 8 hours  Mechanical prophylaxis: Place SCDs  (once AC started can d/c VTE  heparin)     BP parameters: Infarct: No intervention, SBP <220

## 2022-12-09 NOTE — HPI
Spencer Burton Jr. is a 52 y.o. male PMHx of CHF and TIA who presents as a transfer for acute LMCA stroke. Patient initially presented to OSH on 12/3 with RSW. At that time he was not a candidate for TPA and MRI revealed bilateral cardio embolic strokes. Patient had a TTE and RAGHAV which revealed LV thrombus. He was not started AC yet in setting of acute strokes, plan was to start AC today 12/8. Yesterday, patient was noted to have acute change in neuro exam. NIH went from 6 to 23 and CTA revealed L M2 occlusion. [place] [facility] [place] and NSGY consulted for hemicraniectomy watch.

## 2022-12-09 NOTE — PROGRESS NOTES
[facility]  Vascular Neurology  [facility]  Progress Note    Assessment/Plan:     * Embolic stroke involving left middle cerebral artery  Patient is a 52yoM with CHF who initially presented to OSH with RSW. Did not receive tPA, as he was outside of the window. MRI at that time demonstrated bilateral anterior and posterior circulation strokes concerning for embolic etiology. Over hospital course at OSH, the patient was noted to have been lethargic and aphasic with dysarthria. RAGHAV at OSH demosntrated a L ventricular thrombus (not on AC). He was noted to have had an acute change in neuro exam on 12/8 at which time he had RSW with global ahasia. NIH had been 6 prior to the event and was noted to have been 23 following. CTA demonstrated a L M2 occlusion for which the patient was transferred to [facility] for possible intervention and higher level of care.     On arrival to ED, the patient was globally aphasic with L gaze and RSW. Exam limited due to mentation and aphasia. Patient was not taken for IR due to poor ASPECTS. He was admitted to ICU for close monitoring and hemicrani watch. Therapy recommending IPR at OSH, updated recs pending. Will need new OT orders.       Antithrombotics for secondary stroke prevention:   Currently on ASA 81; Will need anticoagulants, patient is NPO by SLP recs so consider heparin gtt until swallowing or PEG established (once hep gtt started can d/c ASA)     Statins for secondary stroke prevention and hyperlipidemia, if present:   Statins: Atorvastatin- 40 mg daily    Aggressive risk factor modification: HTN, DM, HLD, Diet, Exercise, LV thorombus     Rehab efforts: The patient has been evaluated by a stroke team provider and the therapy needs have been fully considered based off the presenting complaints and exam findings. The following therapy evaluations are needed: PT evaluate and treat, OT evaluate and treat, SLP evaluate and treat, PM&R evaluate for  appropriate placement    Diagnostics ordered/pending: None     VTE prophylaxis: Heparin 5000 units SQ every 8 hours  Mechanical prophylaxis: Place SCDs  (once AC started can d/c VTE heparin)     BP parameters: Infarct: No intervention, SBP <220        Debility  2/2 stroke   OT and PT to evaluate     Cytotoxic brain edema  Noted on imaging in the area of the L MCA territory. Will continue to monitor q1h while in ICU and repeat CTH PRN for acute neuro exam changes that could indicate worsening/expansion of edema.     LV (left ventricular) mural thrombus  Stroke RF  Not on AC, will need anticoagulation     Type 2 diabetes mellitus, with long-term current use of insulin  Stroke RF  A1C 9.1   Consider nutrition consult and DM education   IP goal 140-180   SSI     Essential hypertension  Stroke RF  SBP < 220        12/09/2022 Patient with LV thrombus, will need anticoagulation. NGT in place would recommend heparin gtt until patient able to swallow or PEG placed prior to DOAC initiation. Patient tachycardic today with SBP < 210, metoprolol started by primary team. Febrile with elevated white count and procal, currently on Zosyn and Vanc while cx pending. NSGY following for hemicrani watch. Patient has been discharged from OT per last note, will need new orders. Continue current ICU care.         STROKE DOCUMENTATION   Acute Stroke Times   Last Known Normal Date: 12/08/22  Last Known Normal Time: 1145  Symptom Onset Date: 12/08/22  Symptom Onset Time: 1145  Stroke Team Called Date: 12/08/22  Stroke Team Called Time: 1615  Stroke Team Arrival Date: 12/08/22  Stroke Team Arrival Time: 1615  CT Interpretation Time: 1615  Thrombolytic Therapy Recommended: No  CTA Interpretation Time: 1615    NIH Scale:  1a. Level of Consciousness: 2-->Not alert, requires repeated stimulation to attend, or is obtunded and requires strong or painful stimulation to make movements (not stereotyped)  1b. LOC Questions: 2-->Answers neither question  correctly  1c. LOC Commands: 2-->Performs neither task correctly  2. Best Gaze: 1-->Partial gaze palsy, gaze is abnormal in one or both eyes, but forced deviation or total gaze paresis is not present  3. Visual: 0-->No visual loss  4. Facial Palsy: 1-->Minor paralysis (flattened nasolabial fold, asymmetry on smiling)  5a. Motor Arm, Left: 0-->No drift, limb holds 90 (or 45) degrees for full 10 secs  5b. Motor Arm, Right: 3-->No effort against gravity, limb falls  6a. Motor Leg, Left: 0-->No drift, leg holds 30 degree position for full 5 secs  6b. Motor Leg, Right: 3-->No effort against gravity, leg falls to bed immediately  7. Limb Ataxia: 0-->Absent  8. Sensory: 0-->Normal, no sensory loss  9. Best Language: 3-->Mute, global aphasia, no usable speech or auditory comprehension  10. Dysarthria: 2-->Severe dysarthria, patients speech is so slurred as to be unintelligible in the absence of or out of proportion to any dysphasia, or is mute/anarthric  11. Extinction and Inattention (formerly Neglect): 2-->Profound sue-inattention/extinction more than 1 modality  Total (NIH Stroke Scale): 21       Modified Milana Score: 4  Thao Coma Scale:    ABCD2 Score:    [id number] Score:   HAS -BLED Score:   ICH Score:   Hunt & Leblanc Classification:      Hemorrhagic change of an Ischemic Stroke: Does this patient have an ischemic stroke with hemorrhagic changes? No     Neurologic Chief Complaint: lethargy, aphasia, dysarthria     Subjective:     Interval History: Patient is seen for follow-up neurological assessment and treatment recommendations:     Patient with LV thrombus, will need anticoagulation. NGT in place would recommend heparin gtt until patient able to swallow or PEG placed prior to DOAC initiation. Patient tachycardic today with SBP < 210, metoprolol started by primary team. Febrile with elevated white count and procal, currently on Zosyn and Vanc while cx pending. NSGY following for hemicrani watch. Patient has  been discharged from OT per last note, will need new orders. Continue current ICU care.     HPI, Past Medical, Family, and Social History remains the same as documented in the initial encounter.     Review of Systems   Unable to perform ROS: Acuity of condition   Scheduled Meds:   acetaminophen  1,000 mg Per NG tube Q8H    aspirin  81 mg Per NG tube Daily    atorvastatin  40 mg Oral Daily    EScitalopram oxalate  20 mg Per NG tube Daily    heparin (porcine)  5,000 Units Subcutaneous Q8H    metoprolol tartrate  25 mg Per NG tube BID    mupirocin   Nasal BID    piperacillin-tazobactam (ZOSYN) IVPB  4.5 g Intravenous Q8H    senna-docusate 8.6-50 mg  1 tablet Oral BID     Continuous Infusions:   dextrose 10 % in water (D10W)       PRN Meds:dextrose 10 % in water (D10W), dextrose 10%, dextrose 10%, glucagon (human recombinant), insulin aspart U-100, labetaloL, ondansetron, sodium chloride 0.9%, Pharmacy to dose Vancomycin consult **AND** vancomycin - pharmacy to dose    Objective:     Vital Signs (Most Recent):  Temp: 100.1 °F (37.8 °C) (12/09/22 1605)  Pulse: 100 (12/09/22 1605)  Resp: (!) 28 (12/09/22 1605)  BP: (!) 176/106 (12/09/22 1605)  SpO2: 97 % (12/09/22 1605)  BP Location: Left arm    Vital Signs Range (Last 24H):  Temp:  [100 °F (37.8 °C)-101.7 °F (38.7 °C)]   Pulse:  []   Resp:  [13-32]   BP: (170-216)/()   SpO2:  [92 %-100 %]   BP Location: Left arm    Physical Exam  Vitals and nursing note reviewed.   Constitutional:       General: He is not in acute distress.  HENT:      Head: Normocephalic and atraumatic.      Nose: No rhinorrhea.      Comments: NGT in place   Eyes:      General: No scleral icterus.  Cardiovascular:      Rate and Rhythm: Tachycardia present.   Pulmonary:      Effort: No respiratory distress.   Skin:     General: Skin is warm and dry.   Neurological:      Motor: Weakness present.      Comments: Withdraws on R side from pain   L side antigravity   Nonverbal during exam    Resist opening eyes      Psychiatric:         Behavior: Behavior is uncooperative.       Neurological Exam:   LOC: drowsy  Attention Span: poor  Language: Nonverbal unable to assess   Articulation: Nonverbal unable to assess   Orientation: Nonverbal unable to assess   EOM (CN III, IV, VI): Tracks   Motor: L side moves spontaneously and antigravity, R side withdraws from pain   Sensation: Withdraws from pain throughout     Laboratory:  CMP:   Recent Labs   Lab 12/09/22  0026   CALCIUM 9.3   ALBUMIN 2.8*   PROT 6.7      K 3.4*   CO2 26      BUN 37*   CREATININE 3.5*   ALKPHOS 97   ALT 36   AST 21   BILITOT 1.9*     BMP:   Recent Labs   Lab 12/09/22  0026      K 3.4*      CO2 26   BUN 37*   CREATININE 3.5*   CALCIUM 9.3     CBC:   Recent Labs   Lab 12/09/22  0026   WBC 21.30*   RBC 4.97   HGB 13.5*   HCT 42.6      MCV 86   MCH 27.2   MCHC 31.7*     Lipid Panel:   Recent Labs   Lab 12/04/22  0231   CHOL 181   LDLCALC 112.8   HDL 50   TRIG 91     Coagulation:   Recent Labs   Lab 12/08/22  0938   INR 1.2   APTT 25.2     Platelet Aggregation Study: No results for input(s): PLTAGG, PLTAGINTERP, PLTAGREGLACO, ADPPLTAGGREG in the last 168 hours.  Hgb A1C:   Recent Labs   Lab 12/08/22  1729   HGBA1C 9.1*     TSH:   Recent Labs   Lab 12/08/22  1729   TSH 1.375       Diagnostic Results     Brain/Vessel Imaging   MRI Brain WO Contrast 12/8/22  Exam limited by patient motion artifact. Evolving left MCA territory infarct.  Additional foci of diffusion restriction in the left cerebral consistent with recent infarct.  Interval increase susceptibility artifact in the areas of diffusion restriction consistent with hemorrhagic conversion of recent infarcts.  No hydrocephalus or significant midline shift.      CTH 12/8/22 16:29   Evolving large left MCA territory infarction similar to recent CT though increased in size compared to prior MRI concerning for evolving recent to subacute and acute infarcts.   Localized mass effect without significant midline shift or hydrocephalus.  There is evolving recent to subacute age right posterior frontal infarction similar to prior MRI allowing for differences in technique     There is subtle hyperdensity along the left basal ganglia posteriorly and along the right posterior frontal cortex which may represent enhancing subacute age components of infarction with petechial hemorrhage felt less likely but cannot be entirely excluded with limitation secondary to recirculation contrast related to recent CTA performed at outside institution.     Evolving mass effect most pronounced associated with the left MCA territory infarction with sulcal effacement without significant midline shift or hydrocephalus.    CTH 12/8/22 12:11 (CTA H/N)   1. Left diencephalic hemisphere demonstrates evidence of an acute infarct of left basal ganglia and left caudate lobe.  2. Occlusion of the posterior division of the left M2 branch at the takeoff of the M1 vessel with patency involving the anterior division of the left M2 segment.    CTH 12/7/22   1.  Moderate size subacute infarction left anterior basal ganglia and anterior left internal capsule appears mildly larger and better well defined compared to CT from 12/3/2020. There is currently greater mass effect on the anterior horn of the left lateral ventricle. There is no associated bleed or midline shift.  2.  Recent MRI demonstrated additional punctate acute infarctions in the left frontal lobe and a mild size acute infarction right centrum semiovale. These are less obvious on CT. No associated bleed.  3.  Additional chronic periventricular white matter hypodensities.    MRI Brain WO contrast 12/3/22   Multifocal areas of restricted diffusion are felt to reflect acute infarcts.    CTH 12/3/22   Loss of gray-white matter distinction in involving the left basal ganglia could reflect changes of chronic small vessel ischemic disease versus cytotoxic  edema from acute infarct.     Carotid US Bilateral 12/3/22   1. Carotid intimal hyperplasia, with no findings of hemodynamically significant carotid arterial stenosis or vascular occlusion.  2. Patent vertebral arteries with antegrade flow bilaterally.      Cardiac Imaging   RAGHAV 12/4/22    The left ventricle is small with moderately decreased systolic function.   The estimated ejection fraction is 38%.   Left ventricular diastolic dysfunction.   There are segmental left ventricular wall motion abnormalities.   No spontaneous echo contrast. A moderate protruding layered left ventricular thrombus is present. The thrombus is fixed and located in the apex.   Normal right ventricular size.   Mild left atrial enlargement.   Mild right atrial enlargement.   Mild aortic regurgitation.   No interatrial septal defect present.   Normal appearing left atrial appendage. No thrombus is present in the appendage. SB occluder is absent. Abnormal appendage velocities.   Mild mitral regurgitation.   Agitated saline contrast study did not show any right to left shunt    TTE 11/14/22    The left ventricle is normal in size with mildly decreased systolic function.   The estimated ejection fraction is 40%.   Grade III left ventricular diastolic dysfunction.   Small posterior pericardial effusion.   There is mild left ventricular global hypokinesis.   Normal right ventricular size with normal right ventricular systolic function.   Severe left atrial enlargement.   Moderate right atrial enlargement.   Mild pulmonic regurgitation.   Mild to moderate tricuspid regurgitation.   Mild-to-moderate aortic regurgitation.   Intermediate central venous pressure (8 mmHg).   The estimated PA systolic pressure is 51 mmHg.   There is pulmonary hypertension.      Beny Isaacs PA-C  [facility]  Department of Vascular Neurology   [facility]

## 2022-12-09 NOTE — ASSESSMENT & PLAN NOTE
Likely etiology of strokes   -high risk for further embolic events   -need to start AC within 48 hours, will hold off tonight given high risk of hemorrhagic conversion

## 2022-12-09 NOTE — SUBJECTIVE & OBJECTIVE
Past Medical History:   Diagnosis Date    CHF (congestive heart failure)     COPD (chronic obstructive pulmonary disease)     Diabetes mellitus     Hyperlipidemia     Hypertension      Past Surgical History:   Procedure Laterality Date    APPENDECTOMY      CHOLECYSTECTOMY        Current Facility-Administered Medications on File Prior to Encounter   Medication Dose Route Frequency Provider Last Rate Last Admin    [COMPLETED] iohexoL (OMNIPAQUE 350) injection 100 mL  100 mL Intravenous ONCE PRN Pablo Bose MD   100 mL at 12/08/22 1214    [DISCONTINUED] acetaminophen tablet 1,000 mg  1,000 mg Oral Q8H PRN Chris Maldonado MD        [DISCONTINUED] acetaminophen tablet 650 mg  650 mg Oral Q4H PRN Chris Maldonado MD        [DISCONTINUED] aluminum-magnesium hydroxide-simethicone 200-200-20 mg/5 mL suspension 30 mL  30 mL Oral QID PRN Chris Maldonado MD        [DISCONTINUED] amLODIPine tablet 10 mg  10 mg Oral Daily Pablo Bose MD   10 mg at 12/08/22 0800    [DISCONTINUED] aspirin chewable tablet 81 mg  81 mg Oral Daily Chris Maldonado MD   81 mg at 12/08/22 0800    [DISCONTINUED] atorvastatin tablet 80 mg  80 mg Oral QHS Timbo Galeano MD   80 mg at 12/07/22 2007    [DISCONTINUED] calcitRIOL capsule 0.25 mcg  0.25 mcg Oral Daily Nicolás Ambriz MD   0.25 mcg at 12/08/22 0800    [DISCONTINUED] dexmedetomidine (PRECEDEX) 400mcg/100mL 0.9% NaCL infusion  0-1.4 mcg/kg/hr Intravenous Continuous Yandy Guerra MD   Paused at 12/04/22 1800    [DISCONTINUED] dextrose 10% bolus 125 mL  12.5 g Intravenous PRN Yandy Guerra MD        [DISCONTINUED] dextrose 10% bolus 250 mL  25 g Intravenous PRN Yandy Guerra MD        [DISCONTINUED] furosemide injection 60 mg  60 mg Intravenous BID Joseph Osorio MD   60 mg at 12/08/22 0800    [DISCONTINUED] glucagon (human recombinant) injection 1 mg  1 mg Intramuscular PRN Yandy Guerra MD        [DISCONTINUED] glucose chewable tablet 16  g  16 g Oral PRN Yandy Guerra MD        [DISCONTINUED] glucose chewable tablet 24 g  24 g Oral PRN Yandy Guerra MD        [DISCONTINUED] heparin 25,000 units in dextrose 5% (100 units/ml) infusion MINIMAL INTENSITY nomogram - [facility]  0-40 Units/kg/hr (Adjusted) Intravenous Continuous Pablo Bose MD        [DISCONTINUED] hydrALAZINE injection 10 mg  10 mg Intravenous Q6H PRN Judy Valdes MD   10 mg at 12/06/22 1410    [DISCONTINUED] hydrALAZINE tablet 25 mg  25 mg Oral Q8H Pablo Bose MD   25 mg at 12/08/22 0512    [DISCONTINUED] hydrALAZINE tablet 50 mg  50 mg Oral Q8H Pablo Bose MD        [DISCONTINUED] HYDROcodone-acetaminophen 5-325 mg per tablet 1 tablet  1 tablet Oral Q6H PRN Chris Maldonado MD        [DISCONTINUED] insulin aspart U-100 pen 1-10 Units  1-10 Units Subcutaneous QID (AC + HS) PRN Pablo Bose MD   2 Units at 12/08/22 0750    [DISCONTINUED] insulin detemir U-100 pen 18 Units  18 Units Subcutaneous QHS Pablo Bose MD   18 Units at 12/07/22 2011    [DISCONTINUED] insulin detemir U-100 pen 20 Units  20 Units Subcutaneous QHS Pablo Bose MD        [DISCONTINUED] melatonin tablet 9 mg  9 mg Oral Nightly PRN Chris Maldonado MD        [DISCONTINUED] metoprolol injection 5 mg  5 mg Intravenous Q6H Valdo Faustin MD   5 mg at 12/08/22 1147    [DISCONTINUED] morphine injection 4 mg  4 mg Intravenous Q4H PRN Chris Maldonado MD        [DISCONTINUED] naloxone 0.4 mg/mL injection 0.02 mg  0.02 mg Intravenous PRN Chris Maldonado MD        [DISCONTINUED] niCARdipine 40 mg/200 mL (0.2 mg/mL) infusion  0-15 mg/hr Intravenous Continuous Pablo Bose MD   Paused at 12/07/22 1720    [DISCONTINUED] potassium bicarbonate disintegrating tablet 50 mEq  50 mEq Oral Once Pablo Bose MD        [DISCONTINUED] potassium chloride SA CR tablet 20 mEq  20 mEq Oral TID Joseph Osorio MD   20 mEq at  12/08/22 0800    [DISCONTINUED] simethicone chewable tablet 80 mg  1 tablet Oral QID PRN Chris Maldonado MD        [DISCONTINUED] sodium chloride 0.9% flush 10 mL  10 mL Intravenous Q6H PRN Chris Maldonado MD        [DISCONTINUED] sodium chloride 0.9% flush 10 mL  10 mL Intravenous PRN Judy Valdes MD         Current Outpatient Medications on File Prior to Encounter   Medication Sig Dispense Refill    acetaminophen (TYLENOL) 325 MG tablet Take 650 mg by mouth every 6 (six) hours as needed.      albuterol (PROVENTIL/VENTOLIN HFA) 90 mcg/actuation inhaler Inhale 1-2 puffs into the lungs every 6 (six) hours as needed for Wheezing. Rescue 6.7 g 0    amLODIPine (NORVASC) 10 MG tablet Take 1 tablet (10 mg total) by mouth once daily. 30 tablet 0    amLODIPine (NORVASC) 10 MG tablet Take 1 tablet by mouth once daily.      aspirin 81 MG Chew Take 81 mg by mouth once daily.      atorvastatin (LIPITOR) 40 MG tablet Take 1 tablet (40 mg total) by mouth every evening. 30 tablet 0    calcitRIOL (ROCALTROL) 0.25 MCG Cap Take 0.25 mcg by mouth once daily.      calcitRIOL (ROCALTROL) 0.25 MCG Cap Take 1 capsule by mouth once daily.      ciprofloxacin HCl (CIPRO) 500 MG tablet Take 500 mg by mouth 2 (two) times daily.      cloNIDine 0.3 mg/24 hr td ptwk (CATAPRES) 0.3 mg/24 hr Place 1 patch onto the skin once a week. medically necessary with dx codes 401.9, 428.43, 428.0. 30 patch 11    clotrimazole (LOTRIMIN) 1 % cream Apply topically 2 (two) times daily. for 10 days (Patient taking differently: Apply 1 application topically 2 (two) times daily.) 1 each 0    EScitalopram oxalate (LEXAPRO) 20 MG tablet Take 20 mg by mouth once daily.      ferrous sulfate (FEOSOL) 325 mg (65 mg iron) Tab tablet Take 1 tablet by mouth once daily.      fluticasone propionate (FLONASE) 50 mcg/actuation nasal spray 1 spray (50 mcg total) by Each Nostril route 2 (two) times daily as needed for Rhinitis. 15 g 0    furosemide (LASIX) 40 MG tablet  "Take 40 mg by mouth 2 (two) times daily.      glipiZIDE (GLUCOTROL) 10 MG tablet Take 1 tablet (10 mg total) by mouth 2 (two) times daily with meals. 60 tablet 0    HUMULIN 70/30 U-100 INSULIN 100 unit/mL (70-30) injection Inject into the skin 3 (three) times daily before meals.      HYDROcodone-acetaminophen (NORCO) 5-325 mg per tablet Take 1 tablet by mouth every 6 (six) hours as needed.      insulin detemir (LEVEMIR) 100 unit/mL injection Inject 15 Units into the skin every evening. 4.5 mL 2    insulin needles, disposable, 31 x 3/16 " Ndle Use daily to inject insulin  DX:250.00 100 each 11    isosorbide mononitrate (IMDUR) 60 MG 24 hr tablet Take 60 mg by mouth once daily.      lancets (ONE TOUCH DELICA LANCETS) 33 gauge Misc 1 lancet by Misc.(Non-Drug; Combo Route) route 4 (four) times daily. DX:250.00   Medically necessary to test blood sugar 200 each 6    loratadine (CLARITIN) 10 mg tablet Take 1 tablet (10 mg total) by mouth once daily. 30 tablet 0    losartan (COZAAR) 100 MG tablet Take 100 mg by mouth.      ondansetron (ZOFRAN-ODT) 4 MG TbDL Take 1 tablet (4 mg total) by mouth every 6 (six) hours as needed (nausea/vomiting). 20 tablet 0    oxyCODONE-acetaminophen (PERCOCET) 5-325 mg per tablet Take 1 tablet by mouth every 4 (four) hours as needed.      pioglitazone (ACTOS) 15 MG tablet Take 1 tablet by mouth once daily.      potassium chloride (KLOR-CON) 10 MEQ TbSR       sildenafiL (VIAGRA) 100 MG tablet TAKE ONE DAILY AS NEEDED      silver sulfADIAZINE 1% (SILVADENE) 1 % cream Apply topically.      sulfamethoxazole-trimethoprim 400-80mg (BACTRIM,SEPTRA) 400-80 mg per tablet Take 1 tablet by mouth 2 (two) times daily.      traMADoL (ULTRAM) 50 mg tablet Take 1 tablet (50 mg total) by mouth every 8 (eight) hours as needed for Pain. 9 tablet 0    TRUE METRIX AIR GLUCOSE METER [facility] USE TO CHECK GLUCOSE TWICE DAILY AS DIRECTED      TRUE METRIX GLUCOSE TEST STRIP Strp 1 strip 2 (two) times daily.      TRUEPLUS " LANCETS 30 gauge Misc USE 1 TO CHECK GLUCOSE TWICE DAILY        Allergies: Patient has no known allergies.    Family History   Problem Relation Age of Onset    Hypertension Mother     Schizophrenia Father     Hypertension Father     Diabetes Father      Social History     Tobacco Use    Smoking status: Never    Smokeless tobacco: Never   Substance Use Topics    Alcohol use: No     Comment: socially    Drug use: No     Review of Systems   Unable to perform ROS: Mental status change (global aphasia)   Objective:     Vitals:    Temp: 98.4 °F (36.9 °C)  Pulse: 110  BP: (!) 170/112  Resp: 16  SpO2: 99 %    Temp  Min: 98.4 °F (36.9 °C)  Max: 98.9 °F (37.2 °C)  Pulse  Min: 86  Max: 110  BP  Min: 150/98  Max: 198/125  MAP (mmHg)  Min: 119  Max: 157  Resp  Min: 10  Max: 33  SpO2  Min: 90 %  Max: 99 %    No intake/output data recorded.           Physical Exam  Vitals and nursing note reviewed.   Constitutional:       General: He is not in acute distress.     Appearance: Normal appearance.   HENT:      Head: Normocephalic and atraumatic.      Mouth/Throat:      Mouth: Mucous membranes are moist.   Eyes:      Conjunctiva/sclera: Conjunctivae normal.      Pupils: Pupils are equal, round, and reactive to light.   Cardiovascular:      Rate and Rhythm: Regular rhythm. Tachycardia present.   Pulmonary:      Effort: Pulmonary effort is normal. No respiratory distress.   Abdominal:      General: Abdomen is flat. There is no distension.      Palpations: Abdomen is soft.      Tenderness: There is no abdominal tenderness.   Musculoskeletal:         General: No swelling or deformity.   Skin:     General: Skin is warm.   Neurological:      Mental Status: He is alert.      Comments: --sedation: none  --GCS:  E4 V2 M5  --Mental Status: Alert, global aphasia only able to moan   --L gaze, unable to cross midline, R facial droop  --PERRL   --Motor: Right side 1/5, left side 5/5  --sensory: intact to pain      Current NIH   1a. Level of  Consciousness 0  1b. LOC Questions 2  1c. LOC Commands 2  2. Best Gaze 2  3. Visual 2  4. Facial Palsy 1  5a. Motor Arm, Left 0  5b. Motor Arm, Right 3  6a. Motor Leg, Left 0  6b. Motor Leg, Right 3  7. Limb Ataxia 0  8. Sensory 0   9. Best Language 3  10. Dysarthria 2  11. Extinction and Inattention (formerly Neglect) 1  NIH 21       Unable to test orientation, language, memory, judgment, insight, fund of knowledge, hearing, shoulder shrug, tongue protrusion, coordination, gait due to level of consciousness.    Today I personally reviewed pertinent medications, lines/drains/airways, imaging, cardiology results, laboratory results, microbiology results, notably:    MRI brain

## 2022-12-09 NOTE — H&P
[facility]  Neurocritical Care  History & Physical    Admit Date: 12/8/2022  Service Date: 12/08/2022  Length of Stay: 0    Subjective:     Chief Complaint: Cerebrovascular accident (CVA) due to embolism of left middle cerebral artery    History of Present Illness: Pt is a 52 y.o. male with PMHx of CHF who presents as a transfer for acute LMCA stroke. Patient initially presented to OSH on 12/3 with RSW. At that time he was not a candidate for TPA and MRI revealed bilateral cardio embolic strokes. Patient had a TTE and RAGHAV which revealed LV thrombus. He was not started AC yet in setting of acute strokes, plan was to start AC today 12/8. Today, patient was noted to have acute change in neuro exam. NIH went from 6 to 23 and CTA revealed L M2 occlusion. Patient was transferred to [facility] for possible intervention but ASPECTS score was poor so he was not a candidate. Patient will be admitted to [facility] for higher level care and neuro monitoring.       Past Medical History:   Diagnosis Date    CHF (congestive heart failure)     COPD (chronic obstructive pulmonary disease)     Diabetes mellitus     Hyperlipidemia     Hypertension      Past Surgical History:   Procedure Laterality Date    APPENDECTOMY      CHOLECYSTECTOMY        Current Facility-Administered Medications on File Prior to Encounter   Medication Dose Route Frequency Provider Last Rate Last Admin    [COMPLETED] iohexoL (OMNIPAQUE 350) injection 100 mL  100 mL Intravenous ONCE PRN Pablo Bose MD   100 mL at 12/08/22 1214    [DISCONTINUED] acetaminophen tablet 1,000 mg  1,000 mg Oral Q8H PRN Chris Maldonado MD        [DISCONTINUED] acetaminophen tablet 650 mg  650 mg Oral Q4H PRN Chris Maldonado MD        [DISCONTINUED] aluminum-magnesium hydroxide-simethicone 200-200-20 mg/5 mL suspension 30 mL  30 mL Oral QID PRN Chris Maldonado MD        [DISCONTINUED] amLODIPine tablet 10 mg  10 mg Oral Daily Pablo KIRKPATRICK  MD Juice   10 mg at 12/08/22 0800    [DISCONTINUED] aspirin chewable tablet 81 mg  81 mg Oral Daily Chris Maldonado MD   81 mg at 12/08/22 0800    [DISCONTINUED] atorvastatin tablet 80 mg  80 mg Oral QHS Timbo Galeano MD   80 mg at 12/07/22 2007    [DISCONTINUED] calcitRIOL capsule 0.25 mcg  0.25 mcg Oral Daily Nicolás Ambriz MD   0.25 mcg at 12/08/22 0800    [DISCONTINUED] dexmedetomidine (PRECEDEX) 400mcg/100mL 0.9% NaCL infusion  0-1.4 mcg/kg/hr Intravenous Continuous Yandy Guerra MD   Paused at 12/04/22 1800    [DISCONTINUED] dextrose 10% bolus 125 mL  12.5 g Intravenous PRN Yandy Guerra MD        [DISCONTINUED] dextrose 10% bolus 250 mL  25 g Intravenous PRN Yandy Guerra MD        [DISCONTINUED] furosemide injection 60 mg  60 mg Intravenous BID Joseph Osorio MD   60 mg at 12/08/22 0800    [DISCONTINUED] glucagon (human recombinant) injection 1 mg  1 mg Intramuscular PRN Yandy Guerra MD        [DISCONTINUED] glucose chewable tablet 16 g  16 g Oral PRN Yandy Guerra MD        [DISCONTINUED] glucose chewable tablet 24 g  24 g Oral PRN Yandy Guerra MD        [DISCONTINUED] heparin 25,000 units in dextrose 5% (100 units/ml) infusion MINIMAL INTENSITY nomogram - [facility]  0-40 Units/kg/hr (Adjusted) Intravenous Continuous Palbo Bose MD        [DISCONTINUED] hydrALAZINE injection 10 mg  10 mg Intravenous Q6H PRN Judy Valdes MD   10 mg at 12/06/22 1410    [DISCONTINUED] hydrALAZINE tablet 25 mg  25 mg Oral Q8H Pablo Bose MD   25 mg at 12/08/22 0512    [DISCONTINUED] hydrALAZINE tablet 50 mg  50 mg Oral Q8H Pablo Bose MD        [DISCONTINUED] HYDROcodone-acetaminophen 5-325 mg per tablet 1 tablet  1 tablet Oral Q6H PRN Chris Maldonado MD        [DISCONTINUED] insulin aspart U-100 pen 1-10 Units  1-10 Units Subcutaneous QID (AC + HS) PRN Pablo Bose MD   2 Units at 12/08/22 0750    [DISCONTINUED] insulin  detemir U-100 pen 18 Units  18 Units Subcutaneous QHS Pablo Bose MD   18 Units at 12/07/22 2011    [DISCONTINUED] insulin detemir U-100 pen 20 Units  20 Units Subcutaneous QHS Pablo Bose MD        [DISCONTINUED] melatonin tablet 9 mg  9 mg Oral Nightly PRN Chris Maldonado MD        [DISCONTINUED] metoprolol injection 5 mg  5 mg Intravenous Q6H Valdo Faustin MD   5 mg at 12/08/22 1147    [DISCONTINUED] morphine injection 4 mg  4 mg Intravenous Q4H PRN Chris Maldonado MD        [DISCONTINUED] naloxone 0.4 mg/mL injection 0.02 mg  0.02 mg Intravenous PRN Chris Maldonado MD        [DISCONTINUED] niCARdipine 40 mg/200 mL (0.2 mg/mL) infusion  0-15 mg/hr Intravenous Continuous Pablo Bose MD   Paused at 12/07/22 1720    [DISCONTINUED] potassium bicarbonate disintegrating tablet 50 mEq  50 mEq Oral Once Pablo Bose MD        [DISCONTINUED] potassium chloride SA CR tablet 20 mEq  20 mEq Oral TID Joseph Osorio MD   20 mEq at 12/08/22 0800    [DISCONTINUED] simethicone chewable tablet 80 mg  1 tablet Oral QID PRN Chris Maldonado MD        [DISCONTINUED] sodium chloride 0.9% flush 10 mL  10 mL Intravenous Q6H PRN Chris Maldonado MD        [DISCONTINUED] sodium chloride 0.9% flush 10 mL  10 mL Intravenous PRN Judy Valdes MD         Current Outpatient Medications on File Prior to Encounter   Medication Sig Dispense Refill    acetaminophen (TYLENOL) 325 MG tablet Take 650 mg by mouth every 6 (six) hours as needed.      albuterol (PROVENTIL/VENTOLIN HFA) 90 mcg/actuation inhaler Inhale 1-2 puffs into the lungs every 6 (six) hours as needed for Wheezing. Rescue 6.7 g 0    amLODIPine (NORVASC) 10 MG tablet Take 1 tablet (10 mg total) by mouth once daily. 30 tablet 0    amLODIPine (NORVASC) 10 MG tablet Take 1 tablet by mouth once daily.      aspirin 81 MG Chew Take 81 mg by mouth once daily.      atorvastatin (LIPITOR) 40 MG  "tablet Take 1 tablet (40 mg total) by mouth every evening. 30 tablet 0    calcitRIOL (ROCALTROL) 0.25 MCG Cap Take 0.25 mcg by mouth once daily.      calcitRIOL (ROCALTROL) 0.25 MCG Cap Take 1 capsule by mouth once daily.      ciprofloxacin HCl (CIPRO) 500 MG tablet Take 500 mg by mouth 2 (two) times daily.      cloNIDine 0.3 mg/24 hr td ptwk (CATAPRES) 0.3 mg/24 hr Place 1 patch onto the skin once a week. medically necessary with dx codes 401.9, 428.43, 428.0. 30 patch 11    clotrimazole (LOTRIMIN) 1 % cream Apply topically 2 (two) times daily. for 10 days (Patient taking differently: Apply 1 application topically 2 (two) times daily.) 1 each 0    EScitalopram oxalate (LEXAPRO) 20 MG tablet Take 20 mg by mouth once daily.      ferrous sulfate (FEOSOL) 325 mg (65 mg iron) Tab tablet Take 1 tablet by mouth once daily.      fluticasone propionate (FLONASE) 50 mcg/actuation nasal spray 1 spray (50 mcg total) by Each Nostril route 2 (two) times daily as needed for Rhinitis. 15 g 0    furosemide (LASIX) 40 MG tablet Take 40 mg by mouth 2 (two) times daily.      glipiZIDE (GLUCOTROL) 10 MG tablet Take 1 tablet (10 mg total) by mouth 2 (two) times daily with meals. 60 tablet 0    HUMULIN 70/30 U-100 INSULIN 100 unit/mL (70-30) injection Inject into the skin 3 (three) times daily before meals.      HYDROcodone-acetaminophen (NORCO) 5-325 mg per tablet Take 1 tablet by mouth every 6 (six) hours as needed.      insulin detemir (LEVEMIR) 100 unit/mL injection Inject 15 Units into the skin every evening. 4.5 mL 2    insulin needles, disposable, 31 x 3/16 " Ndle Use daily to inject insulin  DX:250.00 100 each 11    isosorbide mononitrate (IMDUR) 60 MG 24 hr tablet Take 60 mg by mouth once daily.      lancets (ONE TOUCH DELICA LANCETS) 33 gauge Misc 1 lancet by Misc.(Non-Drug; Combo Route) route 4 (four) times daily. DX:250.00   Medically necessary to test blood sugar 200 each 6    loratadine (CLARITIN) 10 mg " tablet Take 1 tablet (10 mg total) by mouth once daily. 30 tablet 0    losartan (COZAAR) 100 MG tablet Take 100 mg by mouth.      ondansetron (ZOFRAN-ODT) 4 MG TbDL Take 1 tablet (4 mg total) by mouth every 6 (six) hours as needed (nausea/vomiting). 20 tablet 0    oxyCODONE-acetaminophen (PERCOCET) 5-325 mg per tablet Take 1 tablet by mouth every 4 (four) hours as needed.      pioglitazone (ACTOS) 15 MG tablet Take 1 tablet by mouth once daily.      potassium chloride (KLOR-CON) 10 MEQ TbSR       sildenafiL (VIAGRA) 100 MG tablet TAKE ONE DAILY AS NEEDED      silver sulfADIAZINE 1% (SILVADENE) 1 % cream Apply topically.      sulfamethoxazole-trimethoprim 400-80mg (BACTRIM,SEPTRA) 400-80 mg per tablet Take 1 tablet by mouth 2 (two) times daily.      traMADoL (ULTRAM) 50 mg tablet Take 1 tablet (50 mg total) by mouth every 8 (eight) hours as needed for Pain. 9 tablet 0    TRUE METRIX AIR GLUCOSE METER Misc USE TO CHECK GLUCOSE TWICE DAILY AS DIRECTED      TRUE METRIX GLUCOSE TEST STRIP Strp 1 strip 2 (two) times daily.      TRUEPLUS LANCETS 30 gauge Misc USE 1 TO CHECK GLUCOSE TWICE DAILY        Allergies: Patient has no known allergies.    Family History   Problem Relation Age of Onset    Hypertension Mother     Schizophrenia Father     Hypertension Father     Diabetes Father      Social History     Tobacco Use    Smoking status: Never    Smokeless tobacco: Never   Substance Use Topics    Alcohol use: No     Comment: socially    Drug use: No     Review of Systems   Unable to perform ROS: Mental status change (global aphasia)   Objective:     Vitals:    Temp: 98.4 °F (36.9 °C)  Pulse: 110  BP: (!) 170/112  Resp: 16  SpO2: 99 %    Temp  Min: 98.4 °F (36.9 °C)  Max: 98.9 °F (37.2 °C)  Pulse  Min: 86  Max: 110  BP  Min: 150/98  Max: 198/125  MAP (mmHg)  Min: 119  Max: 157  Resp  Min: 10  Max: 33  SpO2  Min: 90 %  Max: 99 %    No intake/output data recorded.           Physical Exam  Vitals and nursing  note reviewed.   Constitutional:       General: He is not in acute distress.     Appearance: Normal appearance.   HENT:      Head: Normocephalic and atraumatic.      Mouth/Throat:      Mouth: Mucous membranes are moist.   Eyes:      Conjunctiva/sclera: Conjunctivae normal.      Pupils: Pupils are equal, round, and reactive to light.   Cardiovascular:      Rate and Rhythm: Regular rhythm. Tachycardia present.   Pulmonary:      Effort: Pulmonary effort is normal. No respiratory distress.   Abdominal:      General: Abdomen is flat. There is no distension.      Palpations: Abdomen is soft.      Tenderness: There is no abdominal tenderness.   Musculoskeletal:         General: No swelling or deformity.   Skin:     General: Skin is warm.   Neurological:      Mental Status: He is alert.      Comments: --sedation: none  --GCS:  E4 V2 M5  --Mental Status: Alert, global aphasia only able to moan   --L gaze, unable to cross midline, R facial droop  --PERRL   --Motor: Right side 1/5, left side 5/5  --sensory: intact to pain      Current NIH   1a. Level of Consciousness 0  1b. LOC Questions 2  1c. LOC Commands 2  2. Best Gaze 2  3. Visual 2  4. Facial Palsy 1  5a. Motor Arm, Left 0  5b. Motor Arm, Right 3  6a. Motor Leg, Left 0  6b. Motor Leg, Right 3  7. Limb Ataxia 0  8. Sensory 0   9. Best Language 3  10. Dysarthria 2  11. Extinction and Inattention (formerly Neglect) 1  NIH 21       Unable to test orientation, language, memory, judgment, insight, fund of knowledge, hearing, shoulder shrug, tongue protrusion, coordination, gait due to level of consciousness.    Today I personally reviewed pertinent medications, lines/drains/airways, imaging, cardiology results, laboratory results, microbiology results, notably:    MRI brain       Assessment/Plan:     Neuro  * Cerebrovascular accident (CVA) due to embolism of left middle cerebral artery  -admit to NCC  -stroke team following   -Q 1 hour neuro checks   -Q 1 hour vital signs    -SBP goal <200  -daily statin   -continue daily ASA  -hold AC at least overnight, high risk for hemorrhagic conversion  - A1c, TSH, EKG  -echo and lipid panel complete  -MRI brain complete showing large LMCA stroke   -PT/OT/SLP        Cytotoxic brain edema  High risk of malignant cerebral edema given age and size of infarct   -see stroke    Cardiac/Vascular  LV (left ventricular) mural thrombus  Likely etiology of strokes   -high risk for further embolic events   -need to start AC within 48 hours, will hold off tonight given high risk of hemorrhagic conversion    Essential hypertension  Permissive HTN in setting of acute stroke   -SBP <200  -prn labetalol, hydralazine    Endocrine  Diabetes mellitus with chronic kidney disease  CKD, with previous JR at OSH but Cr. Down to baseline now   -monitor UOP and CMP     Type 2 diabetes mellitus, with long-term current use of insulin  SSI protocol   NPO     Other  Debility  Secondary to stroke   -consult PT/OT/SLP        The patient is being Prophylaxed for:  Venous Thromboembolism with: Mechanical  Stress Ulcer with: Not Applicable   Ventilator Pneumonia with: not applicable    Activity Orders          Turn patient starting at 12/08 1800    Elevate HOB starting at 12/08 1653    Diet NPO: NPO starting at 12/08 1653        Full Code     Critical condition in that Patient has a condition that poses threat to life and bodily function: acute LMCA stroke with cerebral edema      40 minutes of Critical care time was spent personally by me on the following activities: development of treatment plan with patient or surrogate and bedside caregivers, discussions with consultants, evaluation of patient's response to treatment, examination of patient, ordering and performing treatments and interventions, ordering and review of laboratory studies, ordering and review of radiographic studies, pulse oximetry, antibiotic titration if applicable, vasopressor titration if applicable,  re-evaluation of patient's condition. This critical care time did not overlap with that of any other provider or involve time for any procedures. There is high probability for acute neurological change leading to clinical and possibly life-threatening deterioration requiring highest level of physician preparedness for urgent intervention.         Donna Ojeda PA-C  Neurocritical Care  [facility]

## 2022-12-09 NOTE — SUBJECTIVE & OBJECTIVE
Neurologic Chief Complaint: lethargy, aphasia, dysarthria     Subjective:     Interval History: Patient is seen for follow-up neurological assessment and treatment recommendations:     Patient with LV thrombus, will need anticoagulation. NGT in place would recommend heparin gtt until patient able to swallow or PEG placed prior to DOAC initiation. Patient tachycardic today with SBP < 210, metoprolol started by primary team. Febrile with elevated white count and procal, currently on Zosyn and Vanc while cx pending. NSGY following for hemicrani watch. Patient has been discharged from OT per last note, will need new orders. Continue current ICU care.     HPI, Past Medical, Family, and Social History remains the same as documented in the initial encounter.     Review of Systems   Unable to perform ROS: Acuity of condition   Scheduled Meds:   acetaminophen  1,000 mg Per NG tube Q8H    aspirin  81 mg Per NG tube Daily    atorvastatin  40 mg Oral Daily    EScitalopram oxalate  20 mg Per NG tube Daily    heparin (porcine)  5,000 Units Subcutaneous Q8H    metoprolol tartrate  25 mg Per NG tube BID    mupirocin   Nasal BID    piperacillin-tazobactam (ZOSYN) IVPB  4.5 g Intravenous Q8H    senna-docusate 8.6-50 mg  1 tablet Oral BID     Continuous Infusions:   dextrose 10 % in water (D10W)       PRN Meds:dextrose 10 % in water (D10W), dextrose 10%, dextrose 10%, glucagon (human recombinant), insulin aspart U-100, labetaloL, ondansetron, sodium chloride 0.9%, Pharmacy to dose Vancomycin consult **AND** vancomycin - pharmacy to dose    Objective:     Vital Signs (Most Recent):  Temp: 100.1 °F (37.8 °C) (12/09/22 1605)  Pulse: 100 (12/09/22 1605)  Resp: (!) 28 (12/09/22 1605)  BP: (!) 176/106 (12/09/22 1605)  SpO2: 97 % (12/09/22 1605)  BP Location: Left arm    Vital Signs Range (Last 24H):  Temp:  [100 °F (37.8 °C)-101.7 °F (38.7 °C)]   Pulse:  []   Resp:  [13-32]   BP: (170-216)/()   SpO2:  [92 %-100 %]   BP Location:  Left arm    Physical Exam  Vitals and nursing note reviewed.   Constitutional:       General: He is not in acute distress.  HENT:      Head: Normocephalic and atraumatic.      Nose: No rhinorrhea.      Comments: NGT in place   Eyes:      General: No scleral icterus.  Cardiovascular:      Rate and Rhythm: Tachycardia present.   Pulmonary:      Effort: No respiratory distress.   Skin:     General: Skin is warm and dry.   Neurological:      Motor: Weakness present.      Comments: Withdraws on R side from pain   L side antigravity   Nonverbal during exam   Resist opening eyes      Psychiatric:         Behavior: Behavior is uncooperative.       Neurological Exam:   LOC: drowsy  Attention Span: poor  Language: Nonverbal unable to assess   Articulation: Nonverbal unable to assess   Orientation: Nonverbal unable to assess   EOM (CN III, IV, VI): Tracks   Motor: L side moves spontaneously and antigravity, R side withdraws from pain   Sensation: Withdraws from pain throughout     Laboratory:  CMP:   Recent Labs   Lab 12/09/22  0026   CALCIUM 9.3   ALBUMIN 2.8*   PROT 6.7      K 3.4*   CO2 26      BUN 37*   CREATININE 3.5*   ALKPHOS 97   ALT 36   AST 21   BILITOT 1.9*     BMP:   Recent Labs   Lab 12/09/22  0026      K 3.4*      CO2 26   BUN 37*   CREATININE 3.5*   CALCIUM 9.3     CBC:   Recent Labs   Lab 12/09/22  0026   WBC 21.30*   RBC 4.97   HGB 13.5*   HCT 42.6      MCV 86   MCH 27.2   MCHC 31.7*     Lipid Panel:   Recent Labs   Lab 12/04/22  0231   CHOL 181   LDLCALC 112.8   HDL 50   TRIG 91     Coagulation:   Recent Labs   Lab 12/08/22  0938   INR 1.2   APTT 25.2     Platelet Aggregation Study: No results for input(s): PLTAGG, PLTAGINTERP, PLTAGREGLACO, ADPPLTAGGREG in the last 168 hours.  Hgb A1C:   Recent Labs   Lab 12/08/22  1729   HGBA1C 9.1*     TSH:   Recent Labs   Lab 12/08/22  1729   TSH 1.375       Diagnostic Results     Brain/Vessel Imaging   MRI Brain WO Contrast 12/8/22  Exam  limited by patient motion artifact. Evolving left MCA territory infarct.  Additional foci of diffusion restriction in the left cerebral consistent with recent infarct.  Interval increase susceptibility artifact in the areas of diffusion restriction consistent with hemorrhagic conversion of recent infarcts.  No hydrocephalus or significant midline shift.      CTH 12/8/22 16:29   Evolving large left MCA territory infarction similar to recent CT though increased in size compared to prior MRI concerning for evolving recent to subacute and acute infarcts.  Localized mass effect without significant midline shift or hydrocephalus.  There is evolving recent to subacute age right posterior frontal infarction similar to prior MRI allowing for differences in technique     There is subtle hyperdensity along the left basal ganglia posteriorly and along the right posterior frontal cortex which may represent enhancing subacute age components of infarction with petechial hemorrhage felt less likely but cannot be entirely excluded with limitation secondary to recirculation contrast related to recent CTA performed at outside institution.     Evolving mass effect most pronounced associated with the left MCA territory infarction with sulcal effacement without significant midline shift or hydrocephalus.    CTH 12/8/22 12:11 (CTA H/N)   1. Left diencephalic hemisphere demonstrates evidence of an acute infarct of left basal ganglia and left caudate lobe.  2. Occlusion of the posterior division of the left M2 branch at the takeoff of the M1 vessel with patency involving the anterior division of the left M2 segment.    CTH 12/7/22   1.  Moderate size subacute infarction left anterior basal ganglia and anterior left internal capsule appears mildly larger and better well defined compared to CT from 12/3/2020. There is currently greater mass effect on the anterior horn of the left lateral ventricle. There is no associated bleed or midline  shift.  2.  Recent MRI demonstrated additional punctate acute infarctions in the left frontal lobe and a mild size acute infarction right centrum semiovale. These are less obvious on CT. No associated bleed.  3.  Additional chronic periventricular white matter hypodensities.    MRI Brain WO contrast 12/3/22   Multifocal areas of restricted diffusion are felt to reflect acute infarcts.    CTH 12/3/22   Loss of gray-white matter distinction in involving the left basal ganglia could reflect changes of chronic small vessel ischemic disease versus cytotoxic edema from acute infarct.     Carotid US Bilateral 12/3/22   1. Carotid intimal hyperplasia, with no findings of hemodynamically significant carotid arterial stenosis or vascular occlusion.  2. Patent vertebral arteries with antegrade flow bilaterally.      Cardiac Imaging   RAGHAV 12/4/22   The left ventricle is small with moderately decreased systolic function.  The estimated ejection fraction is 38%.  Left ventricular diastolic dysfunction.  There are segmental left ventricular wall motion abnormalities.  No spontaneous echo contrast. A moderate protruding layered left ventricular thrombus is present. The thrombus is fixed and located in the apex.  Normal right ventricular size.  Mild left atrial enlargement.  Mild right atrial enlargement.  Mild aortic regurgitation.  No interatrial septal defect present.  Normal appearing left atrial appendage. No thrombus is present in the appendage. SB occluder is absent. Abnormal appendage velocities.  Mild mitral regurgitation.  Agitated saline contrast study did not show any right to left shunt    TTE 11/14/22   The left ventricle is normal in size with mildly decreased systolic function.  The estimated ejection fraction is 40%.  Grade III left ventricular diastolic dysfunction.  Small posterior pericardial effusion.  There is mild left ventricular global hypokinesis.  Normal right ventricular size with normal right  ventricular systolic function.  Severe left atrial enlargement.  Moderate right atrial enlargement.  Mild pulmonic regurgitation.  Mild to moderate tricuspid regurgitation.  Mild-to-moderate aortic regurgitation.  Intermediate central venous pressure (8 mmHg).  The estimated PA systolic pressure is 51 mmHg.  There is pulmonary hypertension.

## 2022-12-09 NOTE — ED PROVIDER NOTES
"Source of History:   Patient, patient's family, and Medical Record, without language barrier or      Chief complaint:  Cerebrovascular Accident (Pt transferred from [facility] for stroke team consult and possible intervention. )    HPI:  Spencer Burton Jr. is a 52 y.o. male with history of CHF, COPD, diabetes, hypertension, hyperlipidemia presenting with chief complaint of transfer for left MCA stroke.  Patient was at a gas station this morning when he was noted to be stumbling and had some confusion.  He was noted to be weak on the right side and had right-sided facial droop.  Patient is unable to provide further history    This is the extent to the patients complaints today here in the emergency department.    ROS: As per HPI and below:  Review of Systems   Unable to perform ROS: Acuity of condition     Review of patient's allergies indicates:  No Known Allergies  PMH:  As per HPI and below:  Past Medical History:   Diagnosis Date    CHF (congestive heart failure)     COPD (chronic obstructive pulmonary disease)     Diabetes mellitus     Hyperlipidemia     Hypertension      Past Surgical History:   Procedure Laterality Date    APPENDECTOMY      CHOLECYSTECTOMY       Social History     Tobacco Use    Smoking status: Never    Smokeless tobacco: Never   Substance Use Topics    Alcohol use: No     Comment: socially    Drug use: No     Vitals:    BP (!) 138/92 (BP Location: Left arm, Patient Position: Lying)   Pulse 81   Temp 98.6 °F (37 °C) (Core Rectal)   Resp (!) 21   Ht 5' 9" (1.753 m)   Wt 84.8 kg (187 lb)   SpO2 98%   BMI 27.62 kg/m²     Physical Exam  Vitals and nursing note reviewed.   Constitutional:       General: He is not in acute distress.     Appearance: He is not toxic-appearing.   HENT:      Head: Normocephalic and atraumatic.      Mouth/Throat:      Mouth: Mucous membranes are moist.   Eyes:      General: No scleral icterus.     Conjunctiva/sclera: Conjunctivae normal. "   Cardiovascular:      Rate and Rhythm: Normal rate and regular rhythm.      Pulses: Normal pulses.      Heart sounds: Normal heart sounds. No murmur heard.    No friction rub. No gallop.   Pulmonary:      Effort: Pulmonary effort is normal. No respiratory distress.      Breath sounds: Normal breath sounds. No stridor. No wheezing, rhonchi or rales.   Abdominal:      General: Abdomen is flat. There is no distension.      Palpations: Abdomen is soft.      Tenderness: There is no abdominal tenderness. There is no guarding.   Musculoskeletal:         General: No swelling or deformity.      Cervical back: Normal range of motion and neck supple.   Skin:     General: Skin is warm and dry.      Capillary Refill: Capillary refill takes less than 2 seconds.      Coloration: Skin is not jaundiced.      Findings: No rash.   Neurological:      Mental Status: He is alert and oriented to person, place, and time. Mental status is at baseline.      Comments:   -left gaze deviation  -global aphasia, dysarthric  -not moving right-sided extremities, normal strength left upper extremity  -II: PERRL; III/IV/VI: EOMI w/out evidence of nystagmus or pain;  -V: no deficits appreciated to light touch bilateral face; VII: no facial weakness, no facial asymmetry. Eyebrow raise symmetric. Smile symmetric;   -IX/X: palate midline, and raises symmetrically; XI: shoulder shrug 5/5 bilaterally; XII: tongue is midline w/out asymmetry.   -Sensation intact to light touch to bilateral upper and lower extremities       Critical Care    Date/Time: 12/8/2022 10:00 PM  Performed by: Norma Lind MD  Authorized by: Norma Lind MD   Direct patient critical care time: 10 minutes  Additional history critical care time: 10 minutes  Ordering / reviewing critical care time: 10 minutes  Documentation critical care time: 10 minutes  Consulting other physicians critical care time: 10 minutes  Total critical care time (exclusive of procedural time) : 50  minutes  Critical care time was exclusive of separately billable procedures and treating other patients and teaching time.  Critical care was necessary to treat or prevent imminent or life-threatening deterioration of the following conditions: CNS failure or compromise.  Critical care was time spent personally by me on the following activities: development of treatment plan with patient or surrogate, discussions with consultants, interpretation of cardiac output measurements, evaluation of patient's response to treatment, examination of patient, obtaining history from patient or surrogate, ordering and performing treatments and interventions, ordering and review of laboratory studies, ordering and review of radiographic studies, pulse oximetry, re-evaluation of patient's condition and review of old charts.  Subsequent provider of critical care: I assumed direction of critical care for this patient from another provider of my specialty.        Laboratory Studies:  Labs that have been ordered have been independently reviewed and interpreted by myself.  Labs Reviewed   HEMOGLOBIN A1C - Abnormal; Notable for the following components:       Result Value    Hemoglobin A1C 9.1 (*)     Estimated Avg Glucose 214 (*)     All other components within normal limits   POCT GLUCOSE MONITORING CONTINUOUS   POCT GLUCOSE MONITORING CONTINUOUS   POCT GLUCOSE MONITORING CONTINUOUS   POCT GLUCOSE MONITORING CONTINUOUS     Imaging Results               CT Head Without Contrast (Final result)  Result time 12/08/22 16:53:12      Final result by Aime Christensen DO (12/08/22 16:53:12)                   Impression:      Evolving large left MCA territory infarction similar to recent CT though increased in size compared to prior MRI concerning for evolving recent to subacute and acute infarcts.  Localized mass effect without significant midline shift or hydrocephalus.  There is evolving recent to subacute age right posterior frontal infarction  similar to prior MRI allowing for differences in technique    There is subtle hyperdensity along the left basal ganglia posteriorly and along the right posterior frontal cortex which may represent enhancing subacute age components of infarction with petechial hemorrhage felt less likely but cannot be entirely excluded with limitation secondary to recirculation contrast related to recent CTA performed at outside institution.    Evolving mass effect most pronounced associated with the left MCA territory infarction with sulcal effacement without significant midline shift or hydrocephalus.    See above for additional details.    Clinical correlation and close follow-up recommended.    Electronically signed by resident: Wilmer Acevedo  Date:    12/08/2022  Time:    16:34    Electronically signed by: Aime Christensen DO  Date:    12/08/2022  Time:    16:53               Narrative:    EXAMINATION:  CT HEAD WITHOUT CONTRAST    CLINICAL HISTORY:  Stroke, follow up;    TECHNIQUE:  Low dose axial CT images obtained throughout the head without the use of intravenous contrast.  Axial, sagittal and coronal reconstructions were performed.    Examination degraded by patient motion artifact.    COMPARISON:  CTA head neck 12/08/2022, MRI brain 12/03/2022    FINDINGS:  Large region of parenchymal hypoattenuation in the left MCA territory similar to CT earlier today and increased from prior MRI and consistent with evolving recent and superimposed acute infarcts.    In addition there is hypoattenuation and fullness along the right posterior frontal/parietal lobe compatible with additional area of infarction seen on MRI.    Please note there is hyperdensity the throughout the intracranial vasculature compatible with recirculation contrast from recent CTA at outside institution.  This limits evaluation for intracranial hemorrhage    There is intermediate density along the posterior aspect left basal ganglia as well as diffuse along the right  posterior frontal cortex suggestive for parenchymal enhancement however small components of petechial hemorrhage cannot be entirely excluded.    Slight mass effect related to the left MCA territory infarction with partial effacement of the frontal horn of the left lateral ventricle. No significant midline shift.    No definite extra-axial hemorrhage allowing for high density within the vasculature.    Hypoattenuation within the central alexus corresponds to signal abnormality seen on prior MRI    Ventricles are stable in size without evidence of hydrocephalus.    This report was flagged in Epic as abnormal.                                    EKG (independently interpreted by me):  Normal sinus rhythm, rate 84 no ST elevations or depressions,     I decided to obtain the patient's medical records.  Summary of Medical Records:    Medications   sodium chloride 0.9% flush 10 mL (has no administration in time range)   senna-docusate 8.6-50 mg per tablet 1 tablet (1 tablet Oral Given 12/9/22 2115)   ondansetron injection 4 mg (has no administration in time range)   atorvastatin tablet 40 mg (40 mg Oral Given 12/9/22 0832)   dextrose 10 % infusion (has no administration in time range)   glucagon (human recombinant) injection 1 mg (has no administration in time range)   dextrose 10% bolus 125 mL (has no administration in time range)   dextrose 10% bolus 250 mL (has no administration in time range)   labetaloL injection 10 mg (10 mg Intravenous Given 12/9/22 0324)   vancomycin - pharmacy to dose (has no administration in time range)   piperacillin-tazobactam (ZOSYN) 4.5 g in dextrose 5 % in water (D5W) 5 % 100 mL IVPB (MB+) (0 g Intravenous Stopped 12/10/22 0121)   mupirocin 2 % ointment ( Nasal Given 12/9/22 2116)   EScitalopram oxalate tablet 20 mg (20 mg Per NG tube Given 12/9/22 1044)   metoprolol tartrate (LOPRESSOR) tablet 25 mg (25 mg Per NG tube Given 12/9/22 2115)   aspirin chewable tablet 81 mg (81 mg Per NG  tube Given 12/9/22 1043)   heparin (porcine) injection 5,000 Units (5,000 Units Subcutaneous Given 12/9/22 2115)   insulin aspart U-100 pen 1-10 Units (2 Units Subcutaneous Given 12/10/22 0020)   acetaminophen tablet 1,000 mg (1,000 mg Per NG tube Given 12/9/22 2115)   vancomycin 1.75 g in 5 % dextrose 500 mL IVPB (0 mg Intravenous Stopped 12/8/22 2353)   potassium bicarbonate disintegrating tablet 25 mEq (25 mEq Per NG tube Given 12/9/22 1316)   furosemide injection 40 mg (40 mg Intravenous Given 12/9/22 1042)     MDM:    52 y.o. male with transferred for MCA stroke    Differential Dx:  Differential includes but is not limited to ICH, ischemic stroke, doubt TIA    ED Management:  CT head repeated upon arrival.  Upon review with vascular neurology and interventional radiology no interventions were recommended at this time.  Recommended interval CT head for stability monitoring and admission to neurocritical care for intensive monitoring.  Patient admitted to neurocritical care.    Discussed with:  Vascular neurology, neurocritical care        Diagnostic Impression:    Final diagnoses:  [I63.512] Acute ischemic left MCA stroke     ED Disposition Condition    Admit                  Attending Attestation:   Physician Attestation Statement for Resident:  As the supervising MD   Physician Attestation Statement: I have personally seen and examined this patient.   I agree with the above history.  -: Patient has been in rehab facility following recent ischemic stroke with right-sided weakness, however today nurse noticed patient to become aphasic and with gaze deviation and increased weakness on right side.  Patient was seen at [facility] where imaging showed left M2 occlusion.   As the supervising MD I agree with the above PE.   -: Patient is drowsy, left gaze deviation, aphasic, no motor in right upper or lower extremities   As the supervising MD I agree with the above treatment, course, plan, and disposition.   -: Discussed  with vascular Neurology who note patient is not candidate for thrombectomy.  Discussed with neuro critical Care who will admit patient to their service.                  Candido Vaz MD  Resident  12/08/22 [id number]       Norma Lind MD  12/10/22 [id number]

## 2022-12-09 NOTE — HOSPITAL COURSE
53 yo M with CHF, COPD, HTN, HLD, DM admitted for acute LMCA stroke. He initially presented to OSH with RSW, OOW for thrombolytics. MRI at that time demonstrated bilateral anterior and posterior circulation strokes concerning for embolic etiology. He was admitted to OSH and RAGHAV revealed LV thrombus, he was not started on AC. During hospital stay noted to be lethargic and aphasic/dysarthric, then on 12/18 had acute neuro change with RSW and global aphasia. NIHSS increased from 6 to 23. CTA demonstrated a L M2 occlusion and patient was transferred to [facility] for possible intervention and higher level of care. He was not taken to IR due to poor ASPECTS and was admitted to ICU for close monitoring and hemicrani watch. Suspect etiology likely cardioembolic given LV thrombus.   During his hospitalization, he was treated for acute cystitis with ceftriaxone. PEG was placed without complication and hypernatremia was effectively controlled with increasing FWFs. Patient discharged to [facility] per PT/OT recommendations.

## 2022-12-09 NOTE — PT/OT/SLP DISCHARGE
Occupational Therapy Discharge Summary    Spencer Burton Jr.  MRN: [id number]   Principal Problem: Acute ischemic left MCA stroke      Patient Discharged from acute Occupational Therapy on 12/8/22.  Please refer to prior OT note dated 12/8/22 for functional status.    Assessment:      Patient was discharged unexpectedly.  Information required to complete an accurate discharge summary is unknown.  Refer to therapy initial evaluation and last progress note for initial and most recent functional status and goal achievement.  Recommendations made may be found in medical record.     Objective:     GOALS:   Multidisciplinary Problems       Occupational Therapy Goals          Problem: Occupational Therapy    Goal Priority Disciplines Outcome Interventions   Occupational Therapy Goal     OT, PT/OT Ongoing, Progressing    Description: Goals to be met by: 1/4/2022     Patient will increase functional independence with ADLs by performing:    UE Dressing with Stand-by Assistance.  Grooming while standing at sink with Stand-by Assistance.  Toileting from toilet with Stand-by Assistance for hygiene and clothing management.   Supine to sit with Stand-by Assistance.  Toilet transfer to toilet with Stand-by Assistance.  Upper extremity exercise program x10 reps per handout, with assistance as needed.  Attend to ADL task for 5 minutes with minimal verbal/tactile cues.                         Reasons for Discontinuation of Therapy Services  Transfer to alternate level of care.      Plan:     Patient Discharged to:  Acute hospital facility    12/9/2022

## 2022-12-09 NOTE — SUBJECTIVE & OBJECTIVE
Facility-Administered Medications Prior to Admission   Medication Dose Route Frequency Provider Last Rate Last Admin    nitroGLYCERIN 0.4 MG/DOSE TL SPRY 400 mcg/spray spray 2 spray  2 spray Sublingual Q5 Min PRN Surjit Rivas MD   2 spray at 09/15/15 0917     Medications Prior to Admission   Medication Sig Dispense Refill Last Dose    acetaminophen (TYLENOL) 325 MG tablet Take 650 mg by mouth every 6 (six) hours as needed.       albuterol (PROVENTIL/VENTOLIN HFA) 90 mcg/actuation inhaler Inhale 1-2 puffs into the lungs every 6 (six) hours as needed for Wheezing. Rescue 6.7 g 0     amLODIPine (NORVASC) 10 MG tablet Take 1 tablet (10 mg total) by mouth once daily. 30 tablet 0     amLODIPine (NORVASC) 10 MG tablet Take 1 tablet by mouth once daily.       aspirin 81 MG Chew Take 81 mg by mouth once daily.       atorvastatin (LIPITOR) 40 MG tablet Take 1 tablet (40 mg total) by mouth every evening. 30 tablet 0     calcitRIOL (ROCALTROL) 0.25 MCG Cap Take 0.25 mcg by mouth once daily.       calcitRIOL (ROCALTROL) 0.25 MCG Cap Take 1 capsule by mouth once daily.       ciprofloxacin HCl (CIPRO) 500 MG tablet Take 500 mg by mouth 2 (two) times daily.       cloNIDine 0.3 mg/24 hr td ptwk (CATAPRES) 0.3 mg/24 hr Place 1 patch onto the skin once a week. medically necessary with dx codes 401.9, 428.43, 428.0. 30 patch 11     clotrimazole (LOTRIMIN) 1 % cream Apply topically 2 (two) times daily. for 10 days (Patient taking differently: Apply 1 application topically 2 (two) times daily.) 1 each 0     EScitalopram oxalate (LEXAPRO) 20 MG tablet Take 20 mg by mouth once daily.       ferrous sulfate (FEOSOL) 325 mg (65 mg iron) Tab tablet Take 1 tablet by mouth once daily.       fluticasone propionate (FLONASE) 50 mcg/actuation nasal spray 1 spray (50 mcg total) by Each Nostril route 2 (two) times daily as needed for Rhinitis. 15 g 0     furosemide (LASIX) 40 MG tablet Take 40 mg by mouth 2 (two) times daily.       glipiZIDE  "(GLUCOTROL) 10 MG tablet Take 1 tablet (10 mg total) by mouth 2 (two) times daily with meals. 60 tablet 0     HUMULIN 70/30 U-100 INSULIN 100 unit/mL (70-30) injection Inject into the skin 3 (three) times daily before meals.       HYDROcodone-acetaminophen (NORCO) 5-325 mg per tablet Take 1 tablet by mouth every 6 (six) hours as needed.       insulin detemir (LEVEMIR) 100 unit/mL injection Inject 15 Units into the skin every evening. 4.5 mL 2     insulin needles, disposable, 31 x 3/16 " Ndle Use daily to inject insulin  DX:250.00 100 each 11     isosorbide mononitrate (IMDUR) 60 MG 24 hr tablet Take 60 mg by mouth once daily.       lancets (ONE TOUCH DELICA LANCETS) 33 gauge Misc 1 lancet by Misc.(Non-Drug; Combo Route) route 4 (four) times daily. DX:250.00   Medically necessary to test blood sugar 200 each 6     loratadine (CLARITIN) 10 mg tablet Take 1 tablet (10 mg total) by mouth once daily. 30 tablet 0     losartan (COZAAR) 100 MG tablet Take 100 mg by mouth.       ondansetron (ZOFRAN-ODT) 4 MG TbDL Take 1 tablet (4 mg total) by mouth every 6 (six) hours as needed (nausea/vomiting). 20 tablet 0     oxyCODONE-acetaminophen (PERCOCET) 5-325 mg per tablet Take 1 tablet by mouth every 4 (four) hours as needed.       pioglitazone (ACTOS) 15 MG tablet Take 1 tablet by mouth once daily.       potassium chloride (KLOR-CON) 10 MEQ TbSR        sildenafiL (VIAGRA) 100 MG tablet TAKE ONE DAILY AS NEEDED       silver sulfADIAZINE 1% (SILVADENE) 1 % cream Apply topically.       sulfamethoxazole-trimethoprim 400-80mg (BACTRIM,SEPTRA) 400-80 mg per tablet Take 1 tablet by mouth 2 (two) times daily.       traMADoL (ULTRAM) 50 mg tablet Take 1 tablet (50 mg total) by mouth every 8 (eight) hours as needed for Pain. 9 tablet 0     TRUE METRIX AIR GLUCOSE METER Misc USE TO CHECK GLUCOSE TWICE DAILY AS DIRECTED       TRUE METRIX GLUCOSE TEST STRIP Strp 1 strip 2 (two) times daily.       TRUEPLUS LANCETS 30 gauge Misc USE 1 TO CHECK " GLUCOSE TWICE DAILY          Review of patient's allergies indicates:  No Known Allergies    Past Medical History:   Diagnosis Date    CHF (congestive heart failure)     COPD (chronic obstructive pulmonary disease)     Diabetes mellitus     Hyperlipidemia     Hypertension      Past Surgical History:   Procedure Laterality Date    APPENDECTOMY      CHOLECYSTECTOMY       Family History       Problem Relation (Age of Onset)    Diabetes Father    Hypertension Mother, Father    Schizophrenia Father          Tobacco Use    Smoking status: Never    Smokeless tobacco: Never   Substance and Sexual Activity    Alcohol use: No     Comment: socially    Drug use: No    Sexual activity: Yes     Partners: Female     Review of Systems   Unable to perform ROS: Patient nonverbal   Objective:     Weight: 84.8 kg (187 lb)  Body mass index is 27.62 kg/m².  Vital Signs (Most Recent):  Temp: 100 °F (37.8 °C) (12/09/22 1105)  Pulse: 91 (12/09/22 1305)  Resp: (!) 23 (12/09/22 1305)  BP: (!) 175/106 (12/09/22 1305)  SpO2: 98 % (12/09/22 1305)   Vital Signs (24h Range):  Temp:  [98.4 °F (36.9 °C)-101.7 °F (38.7 °C)] 100 °F (37.8 °C)  Pulse:  [] 91  Resp:  [13-32] 23  SpO2:  [92 %-100 %] 98 %  BP: (170-216)/(106-143) 175/106     Date 12/09/22 0700 - 12/10/22 0659   Shift 7819-2733 7825-9785 1989-1383 24 Hour Total   INTAKE   NG/   150   IV Piggyback 82.7   82.7   Shift Total(mL/kg) 232.7(2.7)   232.7(2.7)   OUTPUT   Shift Total(mL/kg)       Weight (kg) 84.8 84.8 84.8 84.8                        NG/OG Tube Right nostril (Active)   Placement Check placement verified by x-ray 12/09/22 1105   Tolerance no signs/symptoms of discomfort 12/09/22 1105   Securement secured to nostril center w/ adhesive device 12/09/22 1105   Clamp Status/Tolerance clamped 12/09/22 1105   Suction Setting/Drainage Method suction at the bedside 12/09/22 1105   Insertion Site Appearance no redness, warmth, tenderness, skin breakdown, drainage 12/09/22 1105    Drainage None 12/09/22 1105   Flush/Irrigation flushed w/;water;no resistance met 12/09/22 1105   Intake (mL) 50 mL 12/09/22 1005   Water Bolus (mL) 100 mL 12/09/22 1005   Residual Amount (ml) 0 ml 12/09/22 0705       Male External Urinary Catheter 12/08/22 1830 Small (Active)   Collection Container Urimeter 12/09/22 1105   Securement Method secured to top of thigh w/ adhesive device 12/09/22 1105   Skin no redness;no breakdown 12/09/22 1105   Tolerance no signs/symptoms of discomfort 12/09/22 1105   Output (mL) 75 mL 12/09/22 0605   Catheter Change Date 12/08/22 12/09/22 1105   Catheter Change Time 1905 12/09/22 1105       Neurosurgery Physical Exam  General: well developed, well nourished, NAD  Head: normocephalic, atraumatic   GCS: Motor: 6/Verbal: 3/Eyes: 4 GCS Total: 13  Speech: aphasic and dysarthric   Cranial nerves: right facial droop, otherwise; pupils equal, round, reactive to light with accommodation. Left gaze pref  Motor Strength: right hemiparesis; FC on left side.   Skin: Skin is warm, dry and intact.    Significant Labs:  Recent Labs   Lab 12/08/22  0408 12/08/22  1833 12/09/22  0026   *  --  210*     --  140   K 3.1* 3.5 3.4*     --  100   CO2 31*  --  26   BUN 49*  --  37*   CREATININE 3.6*  --  3.5*   CALCIUM 8.4*  --  9.3   MG 1.9  --  1.6     Recent Labs   Lab 12/08/22  0408 12/09/22  0026   WBC 15.86* 21.30*   HGB 14.2 13.5*   HCT 44.7 42.6    190     Recent Labs   Lab 12/08/22  0938   LABPT 14.2*   INR 1.2   APTT 25.2     Microbiology Results (last 7 days)       Procedure Component Value Units Date/Time    Blood culture [[id number]] Collected: 12/08/22 2052    Order Status: Completed Specimen: Blood from Peripheral, Antecubital, Right Updated: 12/09/22 0515     Blood Culture, Routine No Growth to date    Narrative:      Blood cultures from 2 different sites. 4 bottles total.  Please draw before starting antibiotics.    Blood culture [[id number]] Collected: 12/08/22  2052    Order Status: Completed Specimen: Blood from Peripheral, Antecubital, Right Updated: 12/09/22 0515     Blood Culture, Routine No Growth to date    Narrative:      Blood cultures x 2 different sites. 4 bottles total. Please  draw cultures before administering antibiotics.    Urine culture [[id number]] Collected: 12/08/22 2001    Order Status: No result Specimen: Urine Updated: 12/08/22 2132          All pertinent labs from the last 24 hours have been reviewed.    Significant Diagnostics:  CT: CT Head Without Contrast    Result Date: 12/8/2022  Evolving large left MCA territory infarction similar to recent CT though increased in size compared to prior MRI concerning for evolving recent to subacute and acute infarcts.  Localized mass effect without significant midline shift or hydrocephalus.  There is evolving recent to subacute age right posterior frontal infarction similar to prior MRI allowing for differences in technique There is subtle hyperdensity along the left basal ganglia posteriorly and along the right posterior frontal cortex which may represent enhancing subacute age components of infarction with petechial hemorrhage felt less likely but cannot be entirely excluded with limitation secondary to recirculation contrast related to recent CTA performed at outside institution. Evolving mass effect most pronounced associated with the left MCA territory infarction with sulcal effacement without significant midline shift or hydrocephalus. See above for additional details. Clinical correlation and close follow-up recommended. Electronically signed by resident: Wilmer Acevedo Date:    12/08/2022 Time:    16:34 Electronically signed by: Aime Christensen DO Date:    12/08/2022 Time:    16:53

## 2022-12-09 NOTE — ASSESSMENT & PLAN NOTE
Noted on imaging in the area of the L MCA territory. Will continue to monitor q1h while in ICU and repeat CTH PRN for acute neuro exam changes that could indicate worsening/expansion of edema.

## 2022-12-09 NOTE — PLAN OF CARE
12/09/22 1654   Post-Acute Status   Post-Acute Authorization Placement   Post-Acute Placement Status Patient List Provided  (rehab)     SW attempted to meet with the Pt family at bedside, but no one was present. Left rehab list in room for family to review when they arrive.     Puja Lora LCSW  Neurocritical Care   [facility]  [id number]

## 2022-12-09 NOTE — ASSESSMENT & PLAN NOTE
52M w/ PMH of CHF, TIA, LV thrombus with acute left MCA infarct, NSGY consulted for hemicrani watch    --Patient admitted to ICU on telemetry; NCC/stroke teams are primary   -q1h neurochecks in ICU  --All labs and diagnostics reviewed  --CTH tomorrow to assess swelling and if patient can start on hep gtt for LV thrombus  --SBP reccs and management per primary team  --Na 145-155 strict, reccomend hypertonic saline PRN per primary team to reach goal  --HOB >30  --NPO at this time for possible operative intervention  --Continue maximal medical therapy and stroke work-up per primary teams  --Continue to monitor clinically, notify NSGY immediately with any changes in neuro status

## 2022-12-09 NOTE — PROGRESS NOTES
Pharmacist Renal Dose Adjustment Note    Spencer Burton Jr. is a 52 y.o. male being treated with the medication Zosyn.    Patient Data:    Vital Signs (Most Recent):  Temp: (!) (P) 100.6 °F (38.1 °C) (12/08/22 1830)  Pulse: (!) 122 (12/08/22 1900)  Resp: (!) 25 (12/08/22 1900)  BP: (!) 193/123 (12/08/22 1955)  SpO2: 97 % (12/08/22 1900)   Vital Signs (72h Range):  Temp:  [97.4 °F (36.3 °C)-100.6 °F (38.1 °C)]   Pulse:  []   Resp:  [9-37]   BP: (132-216)/()   SpO2:  [74 %-100 %]      Recent Labs   Lab 12/06/22  0435 12/07/22  0501 12/08/22  0408   CREATININE 4.8* 4.2* 3.6*     Serum creatinine: 3.6 mg/dL (H) 12/08/22 0408  Estimated creatinine clearance: 24.5 mL/min (A)    Zosyn 4.5 g Q12H will be changed to Zosyn 4.5 g Q8H.    Pharmacist's Name: Shannan Parham  Pharmacist's Extension: [phone]

## 2022-12-09 NOTE — ASSESSMENT & PLAN NOTE
Likely etiology of strokes   -high risk for further embolic events   -need to start AC within 48 hours, will hold off tonight given high risk of hemorrhagic conversion  - CTH pending 12/9/22

## 2022-12-09 NOTE — ASSESSMENT & PLAN NOTE
-admit to NCC  -stroke team following   -Q 1 hour neuro checks   -Q 1 hour vital signs   -SBP goal <200  -daily statin   -continue daily ASA  -hold AC at least overnight, high risk for hemorrhagic conversion  - A1c, TSH, EKG  -echo and lipid panel complete  -MRI brain complete showing large LMCA stroke   -PT/OT/SLP

## 2022-12-09 NOTE — PLAN OF CARE
[facility] Neuro Critical [facility]  Initial Discharge Assessment       Primary Care Provider: Primary Doctor No    Admission Diagnosis: Acute ischemic left MCA stroke [I63.512]    Admission Date: 12/8/2022  Expected Discharge Date:     Discharge Barriers Identified: (P) Underinsured    Payor: MEDICAID / Plan: [place] [employer] / Product Type: Managed Medicaid /     Extended Emergency Contact Information  Primary Emergency Contact: joy webster  Mobile Phone: [phone]  Relation: Mother  Preferred language: English   needed? No    Discharge Plan A: (P) Home Health  Discharge Plan B: (P) Home with family      [facility] [place], [place] - [address] [address]  [address] [place] [address]  Phone: [phone] Fax: [phone]      Initial Assessment (most recent)       Adult Discharge Assessment - 12/09/22 1336          Discharge Assessment    Assessment Type Discharge Planning Assessment (P)      Confirmed/corrected address, phone number and insurance Yes (P)      Confirmed Demographics Correct on Facesheet (P)      Source of Information family (P)      Communicated ADELINA with patient/caregiver No (P)      Reason For Admission Acute ischemic left MCA (P)      People in Home parent(s) (P)      Do you expect to return to your current living situation? Yes (P)      Do you have help at home or someone to help you manage your care at home? Yes (P)      Who are your caregiver(s) and their phone number(s)? Joy Webster, mom, [phone] (P)      Prior to hospitilization cognitive status: Alert/Oriented (P)      Current cognitive status: Unable to Assess (P)      Home Accessibility wheelchair accessible (P)      Home Layout Able to live on 1st floor (P)      Do you currently have service(s) that help you manage your care at home? No (P)      Coverage Medicaid/[place] [employer] (P)      Who is going to help you get home at discharge? Joy Webster, mom, [phone] (P)      Are you on  dialysis? No (P)      Do you take coumadin? No (P)      Discharge Plan A Home Health (P)      Discharge Plan B Home with family (P)      DME Needed Upon Discharge  other (see comments) (P)    tbd    Discharge Plan discussed with: Parent(s) (P)      Discharge Barriers Identified Underinsured (P)         OTHER    Name(s) of People in Home Mitzimarilynn Sawyer, mom, [phone] (P)                    Spoke with Mitzimarilynn Sawyer, mom, [phone], who will be able to assist patient upon d/c.    Renetta Torres, BSN, BS, RN, CCM

## 2022-12-09 NOTE — ASSESSMENT & PLAN NOTE
-admit to NCC  -stroke team following   -Q 1 hour neuro checks   -Q 1 hour vital signs   -SBP goal <200  -daily statin   -continue daily ASA  -hold AC at least overnight, high risk for hemorrhagic conversion  - A1c, TSH, EKG  -echo and lipid panel complete  -MRI brain complete showing large LMCA stroke   -PT/OT/SLP  - [facility] 12/10/22 at 12 pm

## 2022-12-09 NOTE — PROGRESS NOTES
Patient arrived to [facility] from ED    Report received from: ED RN,      Type of stroke/diagnosis: L MCA ischemic stroke    Current symptoms: L gaze, R weakness, aphasia    Skin assessment done: Y  Wounds noted: no wounds. Grafting present on the RLE    Jones Completed? deferred    Patient Belongings on Admit: none    [facility] notified: Donna Beaulieu

## 2022-12-09 NOTE — PROGRESS NOTES
No Nutritional Needs at this time     Lopez Adame is a 62 year old male here for  Chief Complaint   Patient presents with   • Follow-up     3 weeks, Colorectal Cancer   • Labs Only     CBC, CMP, CEA   • Other     Oral Chemo Check     Denies latex allergy or sensitivity.    Medication verified, no changes.  PCP and Pharmacy verified.    Social History     Tobacco Use   Smoking Status Former Smoker   • Last attempt to quit: 7/10/2005   • Years since quittin.3   Smokeless Tobacco Never Used     Advance Directives Filed: Yes    ECOG:      Height: Yes, shoes on.  Ht Readings from Last 1 Encounters:   10/16/19 5' 10\" (1.778 m)     Weight:Yes, shoes on.  Wt Readings from Last 3 Encounters:   10/16/19 78.2 kg   10/09/19 77.6 kg   10/02/19 76.7 kg       BMI: Body mass index is 25.12 kg/m².    REVIEW OF SYSTEMS  GENERAL:  Patient denies headache, fevers, chills, night sweats, excessive fatigue, change in appetite, weight loss, dizziness  ALLERGIC/IMMUNOLOGIC: Verified allergies: Yes  EYES:  Patient denies significant visual difficulties, double vision, blurred vision  ENT/MOUTH: Patient denies but complains of: Pt states pt is coming up with cold  ENDOCRINE:  Patient denies diabetes, thyroid disease, hormone replacement, hot flashes  HEMATOLOGIC/LYMPHATIC: Patient denies easy bruising, bleeding, tender lymph nodes, swollen lymph nodes  BREASTS: Patient denies abnormal masses of breast, nipple discharge, pain  RESPIRATORY:  Patient denies lung pain with breathing, cough, coughing up blood, shortness of breath  CARDIOVASCULAR:  Patient denies anginal chest pain, palpitations, shortness of breath when lying flat, peripheral edema  GASTROINTESTINAL: Patient denies abdominal pain , nausea, vomiting, diarrhea, GI bleeding, constipation, change in bowel habits, heartburn, sensation of feeling full, difficulty swallowing  : Patient denies blood in the urine, burning with urination, frequency, urgency, hesitancy,  [facility] - Neuro Critical Care  Neurocritical Care  Progress Note    Admit Date: 12/8/2022  Service Date: 12/09/2022  Length of Stay: 1    Subjective:     Chief Complaint: Cerebrovascular accident (CVA) due to embolism of left middle cerebral artery    History of Present Illness: Pt is a 52 y.o. male with PMHx of CHF who presents as a transfer for acute LMCA stroke. Patient initially presented to OSH on 12/3 with RSW. At that time he was not a candidate for TPA and MRI revealed bilateral cardio embolic strokes. Patient had a TTE and RAGHAV which revealed LV thrombus. He was not started AC yet in setting of acute strokes, plan was to start AC today 12/8. Today, patient was noted to have acute change in neuro exam. NIH went from 6 to 23 and CTA revealed L M2 occlusion. [place] [facility] [place] ASPECTS score was poor so he was not a candidate. Patient will be admitted to [facility] for higher level care and neuro monitoring.       Hospital Course: 12/9/22: Hemicrani watch      Interval History:  NAEON. Consult NSGY hemicrani, blood Cx pending,     Review of Systems   Reason unable to perform ROS: decrease LOC.   Unable to obtain a complete ROS due to level of consciousness.  Objective:     Vitals:  Temp: 100 °F (37.8 °C)  Pulse: 91  Rhythm: sinus tachycardia  BP: (!) 175/106  MAP (mmHg): 135  Resp: (!) 23  SpO2: 98 %  (RETIRED) O2 Device (Oxygen Therapy): room air    Temp  Min: 98.4 °F (36.9 °C)  Max: 101.7 °F (38.7 °C)  Pulse  Min: 90  Max: 128  BP  Min: 162/103  Max: 216/116  MAP (mmHg)  Min: 127  Max: 159  Resp  Min: 10  Max: 32  SpO2  Min: 90 %  Max: 100 %    12/08 0701 - 12/09 0700  In: 609.3   Out: 1200 [Urine:1200]   Unmeasured Output  Urine Occurrence: 1  Stool Occurrence: 0       Physical Exam  Vitals and nursing note reviewed.   Constitutional:       Appearance: He is ill-appearing.   HENT:      Head: Normocephalic.      Nose: Nose normal.      Mouth/Throat:      Mouth: Mucous  incontinence  MUSCULOSKELETAL:  Patient denies joint pain, bone pain, joint swelling, redness, decreased range of motion  SKIN:  Patient denies chronic rashes, inflammation, ulcerations, skin changes, itching  NEUROLOGIC:  Patient denies loss of balance, areas of focal weakness, abnormal gait, sensory problems, numbness, tingling  PSYCHIATRIC: Patient denies insomnia, depression, anxiety       membranes are moist.      Pharynx: Oropharynx is clear.   Cardiovascular:      Rate and Rhythm: Normal rate and regular rhythm.      Pulses: Normal pulses.      Heart sounds: Normal heart sounds.   Pulmonary:      Effort: Pulmonary effort is normal.      Breath sounds: Normal breath sounds.   Abdominal:      General: Bowel sounds are normal.      Palpations: Abdomen is soft.   Musculoskeletal:         General: Normal range of motion.   Skin:     General: Skin is warm and dry.      Capillary Refill: Capillary refill takes less than 2 seconds.   Neurological:      Comments: --sedation: none  --GCS:  E4 V2 M5  --Mental Status: Alert, global aphasia only able to moan   --L gaze, unable to cross midline, R facial droop  --PERRL   --Motor: Right side 1/5, left side 5/5  --sensory: intact to pain     Unable to test orientation, language, memory, judgment, insight, fund of knowledge, hearing, shoulder shrug, tongue protrusion, coordination, gait due to level of consciousness.    Medications:  Continuousdextrose 10 % in water (D10W)    Scheduledacetaminophen, 1,000 mg, Q8H  aspirin, 81 mg, Daily  atorvastatin, 40 mg, Daily  EScitalopram oxalate, 20 mg, Daily  heparin (porcine), 5,000 Units, Q8H  metoprolol tartrate, 25 mg, BID  mupirocin, , BID  piperacillin-tazobactam (ZOSYN) IVPB, 4.5 g, Q8H  senna-docusate 8.6-50 mg, 1 tablet, BID    PRNdextrose 10 % in water (D10W), , Continuous PRN  dextrose 10%, 12.5 g, PRN  dextrose 10%, 25 g, PRN  glucagon (human recombinant), 1 mg, PRN  insulin aspart U-100, 1-10 Units, Q6H PRN  labetaloL, 10 mg, Q6H PRN  ondansetron, 4 mg, Q8H PRN  sodium chloride 0.9%, 10 mL, PRN  vancomycin - pharmacy to dose, , pharmacy to manage frequency      Today I personally reviewed pertinent medications, lines/drains/airways, imaging, cardiology results, laboratory results, microbiology results, notably:    Diet  Diet NPO  Diet NPO          Assessment/Plan:     Neuro  * Cerebrovascular accident (CVA) due  to embolism of left middle cerebral artery  -admit to NCC  -stroke team following   -Q 1 hour neuro checks   -Q 1 hour vital signs   -SBP goal <200  -daily statin   -continue daily ASA  -hold AC at least overnight, high risk for hemorrhagic conversion  - A1c, TSH, EKG  -echo and lipid panel complete  -MRI brain complete showing large LMCA stroke   -PT/OT/SLP  - CTH 12/10/22 at 12 pm        Cytotoxic brain edema  High risk of malignant cerebral edema given age and size of infarct   -see stroke    Cardiac/Vascular  LV (left ventricular) mural thrombus  Likely etiology of strokes   -high risk for further embolic events   -need to start AC within 48 hours, will hold off tonight given high risk of hemorrhagic conversion  - CTH pending 12/9/22    Essential hypertension  Permissive HTN in setting of acute stroke   -SBP <200  -prn labetalol, hydralazine    Renal/  Hypokalemia  - 50 meq potassium    Endocrine  Diabetes mellitus with chronic kidney disease  CKD, with previous JR at OSH but Cr. Down to baseline now   -monitor UOP and CMP     Type 2 diabetes mellitus, with long-term current use of insulin  SSI protocol   NPO     Other  Debility  Secondary to stroke   -consult PT/OT/SLP          The patient is being Prophylaxed for:  Venous Thromboembolism with: Mechanical or Chemical  Stress Ulcer with: Not Applicable   Ventilator Pneumonia with: not applicable    Activity Orders          Turn patient starting at 12/08 1800    Elevate HOB starting at 12/08 1653    Diet NPO: NPO starting at 12/08 1653        Full Code  Critical care time 40 mins  Letty Barnes NP  Neurocritical Care  [facility] - Neuro Critical Care

## 2022-12-09 NOTE — PROGRESS NOTES
Pharmacokinetic Initial Assessment: IV Vancomycin    Assessment/Plan:    Initiate intravenous vancomycin with loading dose of 1750 mg once with subsequent doses when random concentrations are less than 20 mcg/mL.  - Mr. Burton had JR on CKD4. Nephro at OSH concerned for worsening progression of CKD. Baseline ~3.8 per nephro.   - OSH nephro said he may require RRT soon, but not sure yet.   - Will pulse dose at least once given recent JR and slight uncertainty over baseline level. May be able to schedule if he's clearing appropriately.   - Desired empiric serum trough concentration is 10 to 20 mcg/mL.  - Draw vancomycin random level on 12/9 at 2000.    Pharmacy will continue to follow and monitor vancomycin.      Please contact pharmacy at extension [phone] with any questions regarding this assessment.     Thank you for the consult,   Shannan Parham       Patient brief summary:  Spencer Burton Jr. is a 52 y.o. male initiated on antimicrobial therapy with IV Vancomycin for treatment of suspected bacteremia    Actual Body Weight:   84.8 kg    Renal Function:   Estimated Creatinine Clearance: 24.5 mL/min (A) (based on SCr of 3.6 mg/dL (H)).    Dialysis Method (if applicable):  N/A

## 2022-12-09 NOTE — PLAN OF CARE
[facility] Care Plan    POC reviewed with Spencer Burton Jr. and family at 0300. Pt unable to verbalize understanding. Family present and able to verbalize understanding. Questions and concerns addressed. No acute events overnight. Pt progressing toward goals. Will continue to monitor. See below and flowsheets for full assessment and VS info.     -Remained febrile overnight. Tylenol given 2x. Fans and cooling blankets applied. Rectal temp probe inserted.  -Zosyn and vancomycin initiated.  -Urine and blood clxs collected. CXR done.  -Labetalol given 2x for DBP >100.  -NGT placement verified by KUB.  -Bath given and linens changed.    Is this a stroke patient? yes- Stroke booklet reviewed with patient and family, risk factors identified for patient and stroke booklet remains at bedside for ongoing education.     Neuro:  Thao Coma Scale  Best Eye Response: 4-->(E4) spontaneous  Best Motor Response: 4-->(M4) withdraws from pain  Best Verbal Response: 1-->(V1) none  [place] Coma Scale Score: 9  Assessment Qualifiers: patient not sedated/intubated, no eye obstruction present  Pupil PERRLA: yes     24hr Temp:  [98.4 °F (36.9 °C)-101.7 °F (38.7 °C)]     CV:   Rhythm: sinus tachycardia  BP goals:   SBP <  200   MAP > 65    Resp:   O2 Device (Oxygen Therapy): room air       Plan: N/A    GI/:     Diet/Nutrition Received: NPO  Last Bowel Movement: 12/06/22  Voiding Characteristics: external catheter    Intake/Output Summary (Last 24 hours) at 12/9/2022 0431  Last data filed at 12/9/2022 0405  Gross per 24 hour   Intake 597.62 ml   Output 1125 ml   Net -527.38 ml     Unmeasured Output  Stool Occurrence: 0    Labs/Accuchecks:  Recent Labs   Lab 12/09/22  0026   WBC 21.30*   RBC 4.97   HGB 13.5*   HCT 42.6         Recent Labs   Lab 12/09/22  0026      K 3.4*   CO2 26      BUN 37*   CREATININE 3.5*   ALKPHOS 97   ALT 36   AST 21   BILITOT 1.9*      Recent Labs   Lab 12/08/22  0938   INR 1.2   APTT 25.2      Recent  Labs   Lab 12/04/22  0231   CPKMB 6.6*       Electrolytes: Contraindicated  Accuchecks: Q6H    Gtts:   dextrose 10 % in water (D10W)         LDA/Wounds:  Lines/Drains/Airways       Drain  Duration                  NG/OG Tube Right nostril -- days    Male External Urinary Catheter 12/08/22 1830 Small <1 day              Peripheral Intravenous Line  Duration                  Peripheral IV - Single Lumen 12/08/22 1800 20 G Anterior;Proximal;Right Forearm <1 day         Peripheral IV - Single Lumen 12/09/22 0107 20 G Anterior;Left;Proximal Forearm <1 day                  Wounds: No  Wound care consulted: No

## 2022-12-09 NOTE — CONSULTS
Inpatient consult to Physical Medicine Rehab  Consult performed by: Renetta Lovell NP  Consult ordered by: Donna Ojeda PA-C  Reason for consult: Assess rehab needs    Reviewed patient history and current admission.  Rehab team following.  Full consult to follow.    MARK Headley, FNP-C  Physical Medicine & Rehabilitation   12/09/2022

## 2022-12-09 NOTE — PT/OT/SLP EVAL
Speech Language Pathology Evaluation  Bedside Swallow    Patient Name:  Spencer Burton Jr.   MRN:  [id number]  Admitting Diagnosis: Cerebrovascular accident (CVA) due to embolism of left middle cerebral artery    Recommendations:                 General Recommendations:  Speech language evaluation and ongoing swallowing assessment  Diet recommendations:  NPO, NPO   Aspiration Precautions: Continue alternate means of nutrition, Frequent oral care, and Strict aspiration precautions   General Precautions: Standard, aphasia, aspiration, fall, NPO  Communication strategies:  go to room if call light pushed ; pt is currently non-communicative    History:     Past Medical History:   Diagnosis Date    CHF (congestive heart failure)     COPD (chronic obstructive pulmonary disease)     Diabetes mellitus     Hyperlipidemia     Hypertension        Past Surgical History:   Procedure Laterality Date    APPENDECTOMY      CHOLECYSTECTOMY       History of Present Illness: Pt is a 52 y.o. male with PMHx of CHF who presents as a transfer for acute LMCA stroke. Patient initially presented to OSH on 12/3 with RSW. At that time he was not a candidate for TPA and MRI revealed bilateral cardio embolic strokes. Patient had a TTE and RAGHAV which revealed LV thrombus. He was not started AC yet in setting of acute strokes, plan was to start AC today 12/8. Today, patient was noted to have acute change in neuro exam. NIH went from 6 to 23 and CTA revealed L M2 occlusion. [place] [facility] [place] but ASPECTS score was poor so he was not a candidate. Patient will be admitted to [facility] for higher level care and neuro monitoring.       Prior Intubation HX:  none during this admission    Modified Barium Swallow: none on file    Chest X-Rays: 12/8/22: COMPARISON:12/03/2022.     FINDINGS:  Cardiac wires overlie the chest.  Cardiomediastinal silhouette is stable in size, likely mildly enlarged.  There is central vascular  congestion and coarsened perihilar interstitial lung markings.  Possible small left pleural effusion versus subsegmental atelectasis similar to prior study.  No detrimental change in lung aeration.  No sizable right pleural effusion.  No distinct pneumothorax.     Impression:   No detrimental change when compared with 12/03/2022.    Prior diet: currently NPO; NG tube in place, but TF not running at this time    Subjective     Pt with poor alertness, awareness, and attention. Pt was noon-communicative on this service date.     Pain/Comfort:  Pain Rating 1:  (no indications of pain)    Respiratory Status: Room air    Objective:     Oral Musculature Evaluation  Oral Musculature: unable to assess due to poor participation/comprehension    Bedside Swallow Eval:   Consistencies Assessed:  Thin liquids ice chip x 1, 1/3 tsp presented but not accepted      Oral Phase:   Poor oral acceptance    Pharyngeal Phase:   Swallow elicited for ice chip, but oral cavity suctioned in case entire bolus of melted ice was not cleared      Treatment:  Pt opening his eyes to voice and sternal rub, but no attention to right side or poor awareness in general.  Pt did not respond to ice traced to lips, but did open mouth, accept, and crunch on and orally manipulate and ice chip.  Swallow response was palpated.  Pt unable to follow commands to open mouth or vocalize, therefore, SLP suctioned oral cavity to ensure complete oral clearance.  Pt did not respond to spoon placed against lips for 1/3 tsp thin water offering.  No PO presentations attempted. Pt is inappropriate for any PO intake at this time. Should remain strictly NPO with alternative means of nutrition/hydration/medication.  Regular oral care should be performed.     Assessment:     Spencer Burton Jr. is a 52 y.o. male with an SLP diagnosis of Dysphagia.  He also presents with cognitive-communicative deficits, but unable to formally assess at this time.     Goals:   Multidisciplinary  Problems       SLP Goals          Problem: SLP    Goal Priority Disciplines Outcome   SLP Goal     SLP    Description: Speech Language Pathology Goals  Goals expected to be met by 12/16:  1. Pt will participate in ongoing swallowing assessment to determine if/when safe for PO intake.   2. Pt will participate in speech/language/cognitive evaluation when feasible.                                Plan:     Patient to be seen:  4 x/week   Plan of Care expires:  01/09/23  Plan of Care reviewed with:  patient   SLP Follow-Up:  Yes       Discharge recommendations:   (tbd pending progress)     Time Tracking:     SLP Treatment Date:   12/09/22  Speech Start Time:  0901  Speech Stop Time:  0912     Speech Total Time (min):  11 min    Billable Minutes: Eval Swallow and Oral Function 11    12/09/2022

## 2022-12-09 NOTE — PLAN OF CARE
Problem: Occupational Therapy  Goal: Occupational Therapy Goal  Description: Goals to be met by: 12/23/2022     Patient will increase functional independence with ADLs by performing:    UE Dressing with Minimal Assistance.  LE Dressing with Moderate Assistance.  Grooming while EOB with Minimal Assistance.  Toileting from bedside commode with Maximum Assistance for hygiene and clothing management.   Supine to sit with Moderate Assistance.  Stand pivot transfers with Maximum Assistance using LRAD as needed.  Toilet transfer to bedside commode with Maximum Assistance using LRAD as needed.    Outcome: Ongoing, Progressing     Pt evaluated and OT goals established.

## 2022-12-09 NOTE — CONSULTS
[facility]  Neurosurgery  Consult Note    Inpatient consult to Neurosurgery  Consult performed by: Nette Tran PA-C  Consult ordered by: Letty Barnes NP        Subjective:     Chief Complaint/Reason for Admission: acute L MCA infarct     History of Present Illness: Spencer Burton Jr. is a 52 y.o. male PMHx of CHF and TIA who presents as a transfer for acute LMCA stroke. Patient initially presented to OSH on 12/3 with RSW. At that time he was not a candidate for TPA and MRI revealed bilateral cardio embolic strokes. Patient had a TTE and RAGHAV which revealed LV thrombus. He was not started AC yet in setting of acute strokes, plan was to start AC today 12/8. Yesterday, patient was noted to have acute change in neuro exam. NIH went from 6 to 23 and CTA revealed L M2 occlusion. [place] [facility] [place] and NSGY consulted for hemicraniectomy watch.       Facility-Administered Medications Prior to Admission   Medication Dose Route Frequency Provider Last Rate Last Admin    nitroGLYCERIN 0.4 MG/DOSE TL SPRY 400 mcg/spray spray 2 spray  2 spray Sublingual Q5 Min PRN Surjit Rivas MD   2 spray at 09/15/15 0917     Medications Prior to Admission   Medication Sig Dispense Refill Last Dose    acetaminophen (TYLENOL) 325 MG tablet Take 650 mg by mouth every 6 (six) hours as needed.       albuterol (PROVENTIL/VENTOLIN HFA) 90 mcg/actuation inhaler Inhale 1-2 puffs into the lungs every 6 (six) hours as needed for Wheezing. Rescue 6.7 g 0     amLODIPine (NORVASC) 10 MG tablet Take 1 tablet (10 mg total) by mouth once daily. 30 tablet 0     amLODIPine (NORVASC) 10 MG tablet Take 1 tablet by mouth once daily.       aspirin 81 MG Chew Take 81 mg by mouth once daily.       atorvastatin (LIPITOR) 40 MG tablet Take 1 tablet (40 mg total) by mouth every evening. 30 tablet 0     calcitRIOL (ROCALTROL) 0.25 MCG Cap Take 0.25 mcg by mouth once daily.       calcitRIOL  "(ROCALTROL) 0.25 MCG Cap Take 1 capsule by mouth once daily.       ciprofloxacin HCl (CIPRO) 500 MG tablet Take 500 mg by mouth 2 (two) times daily.       cloNIDine 0.3 mg/24 hr td ptwk (CATAPRES) 0.3 mg/24 hr Place 1 patch onto the skin once a week. medically necessary with dx codes 401.9, 428.43, 428.0. 30 patch 11     clotrimazole (LOTRIMIN) 1 % cream Apply topically 2 (two) times daily. for 10 days (Patient taking differently: Apply 1 application topically 2 (two) times daily.) 1 each 0     EScitalopram oxalate (LEXAPRO) 20 MG tablet Take 20 mg by mouth once daily.       ferrous sulfate (FEOSOL) 325 mg (65 mg iron) Tab tablet Take 1 tablet by mouth once daily.       fluticasone propionate (FLONASE) 50 mcg/actuation nasal spray 1 spray (50 mcg total) by Each Nostril route 2 (two) times daily as needed for Rhinitis. 15 g 0     furosemide (LASIX) 40 MG tablet Take 40 mg by mouth 2 (two) times daily.       glipiZIDE (GLUCOTROL) 10 MG tablet Take 1 tablet (10 mg total) by mouth 2 (two) times daily with meals. 60 tablet 0     HUMULIN 70/30 U-100 INSULIN 100 unit/mL (70-30) injection Inject into the skin 3 (three) times daily before meals.       HYDROcodone-acetaminophen (NORCO) 5-325 mg per tablet Take 1 tablet by mouth every 6 (six) hours as needed.       insulin detemir (LEVEMIR) 100 unit/mL injection Inject 15 Units into the skin every evening. 4.5 mL 2     insulin needles, disposable, 31 x 3/16 " Ndle Use daily to inject insulin  DX:250.00 100 each 11     isosorbide mononitrate (IMDUR) 60 MG 24 hr tablet Take 60 mg by mouth once daily.       lancets (ONE TOUCH DELICA LANCETS) 33 gauge Misc 1 lancet by Misc.(Non-Drug; Combo Route) route 4 (four) times daily. DX:250.00   Medically necessary to test blood sugar 200 each 6     loratadine (CLARITIN) 10 mg tablet Take 1 tablet (10 mg total) by mouth once daily. 30 tablet 0     losartan (COZAAR) 100 MG tablet Take 100 mg by mouth.       ondansetron " (ZOFRAN-ODT) 4 MG TbDL Take 1 tablet (4 mg total) by mouth every 6 (six) hours as needed (nausea/vomiting). 20 tablet 0     oxyCODONE-acetaminophen (PERCOCET) 5-325 mg per tablet Take 1 tablet by mouth every 4 (four) hours as needed.       pioglitazone (ACTOS) 15 MG tablet Take 1 tablet by mouth once daily.       potassium chloride (KLOR-CON) 10 MEQ TbSR        sildenafiL (VIAGRA) 100 MG tablet TAKE ONE DAILY AS NEEDED       silver sulfADIAZINE 1% (SILVADENE) 1 % cream Apply topically.       sulfamethoxazole-trimethoprim 400-80mg (BACTRIM,SEPTRA) 400-80 mg per tablet Take 1 tablet by mouth 2 (two) times daily.       traMADoL (ULTRAM) 50 mg tablet Take 1 tablet (50 mg total) by mouth every 8 (eight) hours as needed for Pain. 9 tablet 0     TRUE METRIX AIR GLUCOSE METER Misc USE TO CHECK GLUCOSE TWICE DAILY AS DIRECTED       TRUE METRIX GLUCOSE TEST STRIP Strp 1 strip 2 (two) times daily.       TRUEPLUS LANCETS 30 gauge Misc USE 1 TO CHECK GLUCOSE TWICE DAILY          Review of patient's allergies indicates:  No Known Allergies    Past Medical History:   Diagnosis Date    CHF (congestive heart failure)     COPD (chronic obstructive pulmonary disease)     Diabetes mellitus     Hyperlipidemia     Hypertension      Past Surgical History:   Procedure Laterality Date    APPENDECTOMY      CHOLECYSTECTOMY       Family History       Problem Relation (Age of Onset)    Diabetes Father    Hypertension Mother, Father    Schizophrenia Father          Tobacco Use    Smoking status: Never    Smokeless tobacco: Never   Substance and Sexual Activity    Alcohol use: No     Comment: socially    Drug use: No    Sexual activity: Yes     Partners: Female     Review of Systems   Unable to perform ROS: Patient nonverbal   Objective:     Weight: 84.8 kg (187 lb)  Body mass index is 27.62 kg/m².  Vital Signs (Most Recent):  Temp: 100 °F (37.8 °C) (12/09/22 1105)  Pulse: 91 (12/09/22 1305)  Resp: (!) 23 (12/09/22  1305)  BP: (!) 175/106 (12/09/22 1305)  SpO2: 98 % (12/09/22 1305)   Vital Signs (24h Range):  Temp:  [98.4 °F (36.9 °C)-101.7 °F (38.7 °C)] 100 °F (37.8 °C)  Pulse:  [] 91  Resp:  [13-32] 23  SpO2:  [92 %-100 %] 98 %  BP: (170-216)/(106-143) 175/106     Date 12/09/22 0700 - 12/10/22 0659   Shift 8086-1090 3751-6049 6991-5225 24 Hour Total   INTAKE   NG/   150   IV Piggyback 82.7   82.7   Shift Total(mL/kg) 232.7(2.7)   232.7(2.7)   OUTPUT   Shift Total(mL/kg)       Weight (kg) 84.8 84.8 84.8 84.8                        NG/OG Tube Right nostril (Active)   Placement Check placement verified by x-ray 12/09/22 1105   Tolerance no signs/symptoms of discomfort 12/09/22 1105   Securement secured to nostril center w/ adhesive device 12/09/22 1105   Clamp Status/Tolerance clamped 12/09/22 1105   Suction Setting/Drainage Method suction at the bedside 12/09/22 1105   Insertion Site Appearance no redness, warmth, tenderness, skin breakdown, drainage 12/09/22 1105   Drainage None 12/09/22 1105   Flush/Irrigation flushed w/;water;no resistance met 12/09/22 1105   Intake (mL) 50 mL 12/09/22 1005   Water Bolus (mL) 100 mL 12/09/22 1005   Residual Amount (ml) 0 ml 12/09/22 0705       Male External Urinary Catheter 12/08/22 1830 Small (Active)   Collection Container Urimeter 12/09/22 1105   Securement Method secured to top of thigh w/ adhesive device 12/09/22 1105   Skin no redness;no breakdown 12/09/22 1105   Tolerance no signs/symptoms of discomfort 12/09/22 1105   Output (mL) 75 mL 12/09/22 0605   Catheter Change Date 12/08/22 12/09/22 1105   Catheter Change Time 1905 12/09/22 1105       Neurosurgery Physical Exam  General: well developed, well nourished, NAD  Head: normocephalic, atraumatic   GCS: Motor: 6/Verbal: 3/Eyes: 4 GCS Total: 13  Speech: aphasic and dysarthric   Cranial nerves: right facial droop, otherwise; pupils equal, round, reactive to light with accommodation. Left gaze pref  Motor Strength: right  hemiparesis; FC on left side.   Skin: Skin is warm, dry and intact.    Significant Labs:  Recent Labs   Lab 12/08/22  0408 12/08/22  1833 12/09/22  0026   *  --  210*     --  140   K 3.1* 3.5 3.4*     --  100   CO2 31*  --  26   BUN 49*  --  37*   CREATININE 3.6*  --  3.5*   CALCIUM 8.4*  --  9.3   MG 1.9  --  1.6     Recent Labs   Lab 12/08/22  0408 12/09/22  0026   WBC 15.86* 21.30*   HGB 14.2 13.5*   HCT 44.7 42.6    190     Recent Labs   Lab 12/08/22 0938   LABPT 14.2*   INR 1.2   APTT 25.2     Microbiology Results (last 7 days)       Procedure Component Value Units Date/Time    Blood culture [[id number]] Collected: 12/08/22 2052    Order Status: Completed Specimen: Blood from Peripheral, Antecubital, Right Updated: 12/09/22 0515     Blood Culture, Routine No Growth to date    Narrative:      Blood cultures from 2 different sites. 4 bottles total.  Please draw before starting antibiotics.    Blood culture [[id number]] Collected: 12/08/22 2052    Order Status: Completed Specimen: Blood from Peripheral, Antecubital, Right Updated: 12/09/22 0515     Blood Culture, Routine No Growth to date    Narrative:      Blood cultures x 2 different sites. 4 bottles total. Please  draw cultures before administering antibiotics.    Urine culture [[id number]] Collected: 12/08/22 2001    Order Status: No result Specimen: Urine Updated: 12/08/22 2132          All pertinent labs from the last 24 hours have been reviewed.    Significant Diagnostics:  CT: CT Head Without Contrast    Result Date: 12/8/2022  Evolving large left MCA territory infarction similar to recent CT though increased in size compared to prior MRI concerning for evolving recent to subacute and acute infarcts.  Localized mass effect without significant midline shift or hydrocephalus.  There is evolving recent to subacute age right posterior frontal infarction similar to prior MRI allowing for differences in technique There is subtle  hyperdensity along the left basal ganglia posteriorly and along the right posterior frontal cortex which may represent enhancing subacute age components of infarction with petechial hemorrhage felt less likely but cannot be entirely excluded with limitation secondary to recirculation contrast related to recent CTA performed at outside institution. Evolving mass effect most pronounced associated with the left MCA territory infarction with sulcal effacement without significant midline shift or hydrocephalus. See above for additional details. Clinical correlation and close follow-up recommended. Electronically signed by resident: Wilmer Acevedo Date:    12/08/2022 Time:    16:34 Electronically signed by: Aime Christensen DO Date:    12/08/2022 Time:    16:53     Assessment/Plan:     * Cerebrovascular accident (CVA) due to embolism of left middle cerebral artery  52M w/ PMH of CHF, TIA, LV thrombus with acute left MCA infarct, NSGY consulted for hemicrani watch    --Patient admitted to ICU on telemetry; NCC/stroke teams are primary   -q1h neurochecks in ICU  --All labs and diagnostics reviewed  --CTH tomorrow to assess swelling and if patient can start on hep gtt for LV thrombus  --SBP reccs and management per primary team  --Na 145-155 strict, reccomend hypertonic saline PRN per primary team to reach goal  --HOB >30  --NPO at this time for possible operative intervention  --Continue maximal medical therapy and stroke work-up per primary teams  --Continue to monitor clinically, notify NSGY immediately with any changes in neuro status        Thank you for your consult. I will follow-up with patient. Please contact us if you have any additional questions.    Nette Tran PA-C  Neurosurgery  [facility]

## 2022-12-09 NOTE — SUBJECTIVE & OBJECTIVE
Interval History:  NAEON. Consult NSGY hemicrani, blood Cx pending,     Review of Systems   Reason unable to perform ROS: decrease LOC.   Unable to obtain a complete ROS due to level of consciousness.  Objective:     Vitals:  Temp: 100 °F (37.8 °C)  Pulse: 91  Rhythm: sinus tachycardia  BP: (!) 175/106  MAP (mmHg): 135  Resp: (!) 23  SpO2: 98 %  (RETIRED) O2 Device (Oxygen Therapy): room air    Temp  Min: 98.4 °F (36.9 °C)  Max: 101.7 °F (38.7 °C)  Pulse  Min: 90  Max: 128  BP  Min: 162/103  Max: 216/116  MAP (mmHg)  Min: 127  Max: 159  Resp  Min: 10  Max: 32  SpO2  Min: 90 %  Max: 100 %    12/08 0701 - 12/09 0700  In: 609.3   Out: 1200 [Urine:1200]   Unmeasured Output  Urine Occurrence: 1  Stool Occurrence: 0       Physical Exam  Vitals and nursing note reviewed.   Constitutional:       Appearance: He is ill-appearing.   HENT:      Head: Normocephalic.      Nose: Nose normal.      Mouth/Throat:      Mouth: Mucous membranes are moist.      Pharynx: Oropharynx is clear.   Cardiovascular:      Rate and Rhythm: Normal rate and regular rhythm.      Pulses: Normal pulses.      Heart sounds: Normal heart sounds.   Pulmonary:      Effort: Pulmonary effort is normal.      Breath sounds: Normal breath sounds.   Abdominal:      General: Bowel sounds are normal.      Palpations: Abdomen is soft.   Musculoskeletal:         General: Normal range of motion.   Skin:     General: Skin is warm and dry.      Capillary Refill: Capillary refill takes less than 2 seconds.   Neurological:      Comments: --sedation: none  --GCS:  E4 V2 M5  --Mental Status: Alert, global aphasia only able to moan   --L gaze, unable to cross midline, R facial droop  --PERRL   --Motor: Right side 1/5, left side 5/5  --sensory: intact to pain     Unable to test orientation, language, memory, judgment, insight, fund of knowledge, hearing, shoulder shrug, tongue protrusion, coordination, gait due to level of consciousness.    Medications:  Continuousdextrose 10  % in water (D10W)    Scheduledacetaminophen, 1,000 mg, Q8H  aspirin, 81 mg, Daily  atorvastatin, 40 mg, Daily  EScitalopram oxalate, 20 mg, Daily  heparin (porcine), 5,000 Units, Q8H  metoprolol tartrate, 25 mg, BID  mupirocin, , BID  piperacillin-tazobactam (ZOSYN) IVPB, 4.5 g, Q8H  senna-docusate 8.6-50 mg, 1 tablet, BID    PRNdextrose 10 % in water (D10W), , Continuous PRN  dextrose 10%, 12.5 g, PRN  dextrose 10%, 25 g, PRN  glucagon (human recombinant), 1 mg, PRN  insulin aspart U-100, 1-10 Units, Q6H PRN  labetaloL, 10 mg, Q6H PRN  ondansetron, 4 mg, Q8H PRN  sodium chloride 0.9%, 10 mL, PRN  vancomycin - pharmacy to dose, , pharmacy to manage frequency      Today I personally reviewed pertinent medications, lines/drains/airways, imaging, cardiology results, laboratory results, microbiology results, notably:    Diet  Diet NPO  Diet NPO

## 2022-12-10 PROBLEM — R94.30 EJECTION FRACTION < 50%: Status: ACTIVE | Noted: 2022-12-10

## 2022-12-10 PROBLEM — I51.7 ENLARGED LA (LEFT ATRIUM): Status: ACTIVE | Noted: 2022-12-10

## 2022-12-10 PROBLEM — N30.00 ACUTE CYSTITIS WITHOUT HEMATURIA: Status: ACTIVE | Noted: 2022-12-10

## 2022-12-10 LAB
ALBUMIN SERPL BCP-MCNC: 2.3 G/DL (ref 3.5–5.2)
ALP SERPL-CCNC: 88 U/L (ref 55–135)
ALT SERPL W/O P-5'-P-CCNC: 29 U/L (ref 10–44)
ANION GAP SERPL CALC-SCNC: 19 MMOL/L (ref 8–16)
ANISOCYTOSIS BLD QL SMEAR: SLIGHT
APTT BLDCRRT: 23.3 SEC (ref 21–32)
APTT BLDCRRT: 40.3 SEC (ref 21–32)
AST SERPL-CCNC: 34 U/L (ref 10–40)
BASOPHILS # BLD AUTO: 0.06 K/UL (ref 0–0.2)
BASOPHILS # BLD AUTO: 0.09 K/UL (ref 0–0.2)
BASOPHILS NFR BLD: 0.3 % (ref 0–1.9)
BASOPHILS NFR BLD: 0.4 % (ref 0–1.9)
BILIRUB SERPL-MCNC: 1.3 MG/DL (ref 0.1–1)
BUN SERPL-MCNC: 54 MG/DL (ref 6–20)
BURR CELLS BLD QL SMEAR: ABNORMAL
CALCIUM SERPL-MCNC: 8.6 MG/DL (ref 8.7–10.5)
CHLORIDE SERPL-SCNC: 100 MMOL/L (ref 95–110)
CO2 SERPL-SCNC: 23 MMOL/L (ref 23–29)
CREAT SERPL-MCNC: 4.8 MG/DL (ref 0.5–1.4)
DIFFERENTIAL METHOD: ABNORMAL
DIFFERENTIAL METHOD: ABNORMAL
EOSINOPHIL # BLD AUTO: 0 K/UL (ref 0–0.5)
EOSINOPHIL # BLD AUTO: 0 K/UL (ref 0–0.5)
EOSINOPHIL NFR BLD: 0 % (ref 0–8)
EOSINOPHIL NFR BLD: 0.1 % (ref 0–8)
ERYTHROCYTE [DISTWIDTH] IN BLOOD BY AUTOMATED COUNT: 14.9 % (ref 11.5–14.5)
ERYTHROCYTE [DISTWIDTH] IN BLOOD BY AUTOMATED COUNT: 14.9 % (ref 11.5–14.5)
EST. GFR  (NO RACE VARIABLE): 14 ML/MIN/1.73 M^2
GLUCOSE SERPL-MCNC: 274 MG/DL (ref 70–110)
HCT VFR BLD AUTO: 45.1 % (ref 40–54)
HCT VFR BLD AUTO: 46.4 % (ref 40–54)
HGB BLD-MCNC: 14.6 G/DL (ref 14–18)
HGB BLD-MCNC: 15.1 G/DL (ref 14–18)
IMM GRANULOCYTES # BLD AUTO: 0.11 K/UL (ref 0–0.04)
IMM GRANULOCYTES # BLD AUTO: 0.14 K/UL (ref 0–0.04)
IMM GRANULOCYTES NFR BLD AUTO: 0.5 % (ref 0–0.5)
IMM GRANULOCYTES NFR BLD AUTO: 0.6 % (ref 0–0.5)
INR PPP: 1.1 (ref 0.8–1.2)
LYMPHOCYTES # BLD AUTO: 1 K/UL (ref 1–4.8)
LYMPHOCYTES # BLD AUTO: 1.2 K/UL (ref 1–4.8)
LYMPHOCYTES NFR BLD: 4.4 % (ref 18–48)
LYMPHOCYTES NFR BLD: 5.2 % (ref 18–48)
MAGNESIUM SERPL-MCNC: 1.8 MG/DL (ref 1.6–2.6)
MCH RBC QN AUTO: 27.4 PG (ref 27–31)
MCH RBC QN AUTO: 27.4 PG (ref 27–31)
MCHC RBC AUTO-ENTMCNC: 32.4 G/DL (ref 32–36)
MCHC RBC AUTO-ENTMCNC: 32.5 G/DL (ref 32–36)
MCV RBC AUTO: 84 FL (ref 82–98)
MCV RBC AUTO: 85 FL (ref 82–98)
MONOCYTES # BLD AUTO: 0.8 K/UL (ref 0.3–1)
MONOCYTES # BLD AUTO: 0.9 K/UL (ref 0.3–1)
MONOCYTES NFR BLD: 3.4 % (ref 4–15)
MONOCYTES NFR BLD: 4.1 % (ref 4–15)
NEUTROPHILS # BLD AUTO: 20.2 K/UL (ref 1.8–7.7)
NEUTROPHILS # BLD AUTO: 20.4 K/UL (ref 1.8–7.7)
NEUTROPHILS NFR BLD: 89.6 % (ref 38–73)
NEUTROPHILS NFR BLD: 91.4 % (ref 38–73)
NRBC BLD-RTO: 0 /100 WBC
NRBC BLD-RTO: 0 /100 WBC
OVALOCYTES BLD QL SMEAR: ABNORMAL
PHOSPHATE SERPL-MCNC: 5.3 MG/DL (ref 2.7–4.5)
PLATELET # BLD AUTO: 179 K/UL (ref 150–450)
PLATELET # BLD AUTO: 209 K/UL (ref 150–450)
PLATELET BLD QL SMEAR: ABNORMAL
PMV BLD AUTO: 12.4 FL (ref 9.2–12.9)
PMV BLD AUTO: 13 FL (ref 9.2–12.9)
POCT GLUCOSE: 181 MG/DL (ref 70–110)
POCT GLUCOSE: 204 MG/DL (ref 70–110)
POCT GLUCOSE: 245 MG/DL (ref 70–110)
POCT GLUCOSE: 253 MG/DL (ref 70–110)
POIKILOCYTOSIS BLD QL SMEAR: ABNORMAL
POLYCHROMASIA BLD QL SMEAR: ABNORMAL
POTASSIUM SERPL-SCNC: 4.1 MMOL/L (ref 3.5–5.1)
PROT SERPL-MCNC: 6.1 G/DL (ref 6–8.4)
PROTHROMBIN TIME: 11.4 SEC (ref 9–12.5)
RBC # BLD AUTO: 5.32 M/UL (ref 4.6–6.2)
RBC # BLD AUTO: 5.52 M/UL (ref 4.6–6.2)
SCHISTOCYTES BLD QL SMEAR: ABNORMAL
SODIUM SERPL-SCNC: 142 MMOL/L (ref 136–145)
VANCOMYCIN SERPL-MCNC: 18.9 UG/ML
WBC # BLD AUTO: 22.28 K/UL (ref 3.9–12.7)
WBC # BLD AUTO: 22.59 K/UL (ref 3.9–12.7)

## 2022-12-10 PROCEDURE — 85025 COMPLETE CBC W/AUTO DIFF WBC: CPT | Mod: 91 | Performed by: NURSE PRACTITIONER

## 2022-12-10 PROCEDURE — 85610 PROTHROMBIN TIME: CPT | Performed by: NURSE PRACTITIONER

## 2022-12-10 PROCEDURE — 99233 SBSQ HOSP IP/OBS HIGH 50: CPT | Mod: ,,, | Performed by: STUDENT IN AN ORGANIZED HEALTH CARE EDUCATION/TRAINING PROGRAM

## 2022-12-10 PROCEDURE — 80202 ASSAY OF VANCOMYCIN: CPT | Performed by: PSYCHIATRY & NEUROLOGY

## 2022-12-10 PROCEDURE — 99291 CRITICAL CARE FIRST HOUR: CPT | Mod: ,,, | Performed by: NURSE PRACTITIONER

## 2022-12-10 PROCEDURE — 63600175 PHARM REV CODE 636 W HCPCS: Performed by: NURSE PRACTITIONER

## 2022-12-10 PROCEDURE — 99291 PR CRITICAL CARE, E/M 30-74 MINUTES: ICD-10-PCS | Mod: ,,, | Performed by: NURSE PRACTITIONER

## 2022-12-10 PROCEDURE — 83735 ASSAY OF MAGNESIUM: CPT | Performed by: PHYSICIAN ASSISTANT

## 2022-12-10 PROCEDURE — 99233 SBSQ HOSP IP/OBS HIGH 50: CPT | Mod: ,,, | Performed by: PSYCHIATRY & NEUROLOGY

## 2022-12-10 PROCEDURE — 20000000 HC ICU ROOM

## 2022-12-10 PROCEDURE — 51798 US URINE CAPACITY MEASURE: CPT

## 2022-12-10 PROCEDURE — 85025 COMPLETE CBC W/AUTO DIFF WBC: CPT | Performed by: PHYSICIAN ASSISTANT

## 2022-12-10 PROCEDURE — 97112 NEUROMUSCULAR REEDUCATION: CPT

## 2022-12-10 PROCEDURE — 85730 THROMBOPLASTIN TIME PARTIAL: CPT | Mod: 91 | Performed by: PSYCHIATRY & NEUROLOGY

## 2022-12-10 PROCEDURE — 84100 ASSAY OF PHOSPHORUS: CPT | Performed by: PHYSICIAN ASSISTANT

## 2022-12-10 PROCEDURE — 25000003 PHARM REV CODE 250: Performed by: PHYSICIAN ASSISTANT

## 2022-12-10 PROCEDURE — 25000003 PHARM REV CODE 250: Performed by: NURSE PRACTITIONER

## 2022-12-10 PROCEDURE — 94761 N-INVAS EAR/PLS OXIMETRY MLT: CPT

## 2022-12-10 PROCEDURE — 99233 PR SUBSEQUENT HOSPITAL CARE,LEVL III: ICD-10-PCS | Mod: ,,, | Performed by: STUDENT IN AN ORGANIZED HEALTH CARE EDUCATION/TRAINING PROGRAM

## 2022-12-10 PROCEDURE — 80053 COMPREHEN METABOLIC PANEL: CPT | Performed by: PHYSICIAN ASSISTANT

## 2022-12-10 PROCEDURE — 99233 PR SUBSEQUENT HOSPITAL CARE,LEVL III: ICD-10-PCS | Mod: ,,, | Performed by: PSYCHIATRY & NEUROLOGY

## 2022-12-10 PROCEDURE — 85730 THROMBOPLASTIN TIME PARTIAL: CPT | Performed by: NURSE PRACTITIONER

## 2022-12-10 PROCEDURE — 97163 PT EVAL HIGH COMPLEX 45 MIN: CPT

## 2022-12-10 RX ORDER — GLUCAGON 1 MG
1 KIT INJECTION
Status: DISCONTINUED | OUTPATIENT
Start: 2022-12-10 | End: 2022-12-28 | Stop reason: HOSPADM

## 2022-12-10 RX ORDER — DEXTROSE MONOHYDRATE 100 MG/ML
INJECTION, SOLUTION INTRAVENOUS CONTINUOUS PRN
Status: DISCONTINUED | OUTPATIENT
Start: 2022-12-10 | End: 2022-12-28 | Stop reason: HOSPADM

## 2022-12-10 RX ORDER — HEPARIN SODIUM,PORCINE/D5W 25000/250
0-40 INTRAVENOUS SOLUTION INTRAVENOUS CONTINUOUS
Status: DISCONTINUED | OUTPATIENT
Start: 2022-12-10 | End: 2022-12-21

## 2022-12-10 RX ORDER — INSULIN ASPART 100 [IU]/ML
1-10 INJECTION, SOLUTION INTRAVENOUS; SUBCUTANEOUS EVERY 4 HOURS PRN
Status: DISCONTINUED | OUTPATIENT
Start: 2022-12-10 | End: 2022-12-28 | Stop reason: HOSPADM

## 2022-12-10 RX ORDER — FUROSEMIDE 40 MG/1
40 TABLET ORAL DAILY
Status: DISCONTINUED | OUTPATIENT
Start: 2022-12-10 | End: 2022-12-20

## 2022-12-10 RX ORDER — ONDANSETRON 2 MG/ML
8 INJECTION INTRAMUSCULAR; INTRAVENOUS EVERY 8 HOURS PRN
Status: DISCONTINUED | OUTPATIENT
Start: 2022-12-10 | End: 2022-12-28 | Stop reason: HOSPADM

## 2022-12-10 RX ADMIN — SODIUM CHLORIDE 2 G: 1 TABLET ORAL at 10:12

## 2022-12-10 RX ADMIN — CEFTRIAXONE 1 G: 1 INJECTION, POWDER, FOR SOLUTION INTRAMUSCULAR; INTRAVENOUS at 01:12

## 2022-12-10 RX ADMIN — SENNOSIDES AND DOCUSATE SODIUM 1 TABLET: 50; 8.6 TABLET ORAL at 09:12

## 2022-12-10 RX ADMIN — ASPIRIN 81 MG: 81 TABLET, CHEWABLE ORAL at 08:12

## 2022-12-10 RX ADMIN — ESCITALOPRAM OXALATE 20 MG: 20 TABLET ORAL at 08:12

## 2022-12-10 RX ADMIN — HEPARIN SODIUM 12 UNITS/KG/HR: 10000 INJECTION, SOLUTION INTRAVENOUS at 02:12

## 2022-12-10 RX ADMIN — INSULIN ASPART 6 UNITS: 100 INJECTION, SOLUTION INTRAVENOUS; SUBCUTANEOUS at 11:12

## 2022-12-10 RX ADMIN — PIPERACILLIN SODIUM AND TAZOBACTAM SODIUM 4.5 G: 4; .5 INJECTION, POWDER, LYOPHILIZED, FOR SOLUTION INTRAVENOUS at 05:12

## 2022-12-10 RX ADMIN — METOPROLOL TARTRATE 25 MG: 25 TABLET, FILM COATED ORAL at 08:12

## 2022-12-10 RX ADMIN — INSULIN ASPART 2 UNITS: 100 INJECTION, SOLUTION INTRAVENOUS; SUBCUTANEOUS at 06:12

## 2022-12-10 RX ADMIN — INSULIN ASPART 2 UNITS: 100 INJECTION, SOLUTION INTRAVENOUS; SUBCUTANEOUS at 12:12

## 2022-12-10 RX ADMIN — ACETAMINOPHEN 1000 MG: 500 TABLET ORAL at 05:12

## 2022-12-10 RX ADMIN — SODIUM CHLORIDE 2 G: 1 TABLET ORAL at 06:12

## 2022-12-10 RX ADMIN — SODIUM CHLORIDE 2 G: 1 TABLET ORAL at 12:12

## 2022-12-10 RX ADMIN — SENNOSIDES AND DOCUSATE SODIUM 1 TABLET: 50; 8.6 TABLET ORAL at 08:12

## 2022-12-10 RX ADMIN — INSULIN ASPART 4 UNITS: 100 INJECTION, SOLUTION INTRAVENOUS; SUBCUTANEOUS at 05:12

## 2022-12-10 RX ADMIN — METOPROLOL TARTRATE 25 MG: 25 TABLET, FILM COATED ORAL at 09:12

## 2022-12-10 RX ADMIN — FUROSEMIDE 40 MG: 40 TABLET ORAL at 12:12

## 2022-12-10 RX ADMIN — MUPIROCIN: 20 OINTMENT TOPICAL at 08:12

## 2022-12-10 RX ADMIN — ATORVASTATIN CALCIUM 40 MG: 40 TABLET, FILM COATED ORAL at 08:12

## 2022-12-10 RX ADMIN — HEPARIN SODIUM 5000 UNITS: 5000 INJECTION INTRAVENOUS; SUBCUTANEOUS at 05:12

## 2022-12-10 RX ADMIN — MUPIROCIN: 20 OINTMENT TOPICAL at 09:12

## 2022-12-10 NOTE — PLAN OF CARE
[facility] Care Plan    POC reviewed with Spencer Burton Jr. and family at 0300. Pt unable to verbalize understanding. Mother present and able to verbalize understanding. Questions and concerns addressed. No acute events overnight. Pt progressing toward goals. Will continue to monitor. See below and flowsheets for full assessment and VS info.    -Remained afebrile overnight  -20G PIC placed in Rt AC  -linens changed     Is this a stroke patient? yes- Stroke booklet reviewed with patient and family, risk factors identified for patient and stroke booklet remains at bedside for ongoing education.     Neuro:  [place] Coma Scale  Best Eye Response: 4-->(E4) spontaneous  Best Motor Response: 4-->(M4) withdraws from pain  Best Verbal Response: 2-->(V2) incomprehensible speech  [place] Coma Scale Score: 10  Assessment Qualifiers: patient not sedated/intubated, no eye obstruction present  Pupil PERRLA: yes     24hr Temp:  [98.5 °F (36.9 °C)-100.5 °F (38.1 °C)]     CV:   Rhythm: sinus tachycardia  BP goals:   SBP <  200  MAP > 65    Resp:   (RETIRED) O2 Device (Oxygen Therapy): room air       Plan: N/A    GI/:     Diet/Nutrition Received: NPO  Last Bowel Movement: 12/06/22  Voiding Characteristics: external catheter    Intake/Output Summary (Last 24 hours) at 12/10/2022 0504  Last data filed at 12/10/2022 0405  Gross per 24 hour   Intake 442.95 ml   Output 425 ml   Net 17.95 ml     Unmeasured Output  Urine Occurrence: 1  Stool Occurrence: 0  Pad Count: 1    Labs/Accuchecks:  Recent Labs   Lab 12/10/22  0218   WBC 22.28*   RBC 5.32   HGB 14.6   HCT 45.1         Recent Labs   Lab 12/10/22  0218      K 4.1   CO2 23      BUN 54*   CREATININE 4.8*   ALKPHOS 88   ALT 29   AST 34   BILITOT 1.3*      Recent Labs   Lab 12/08/22  0938   INR 1.2   APTT 25.2      Recent Labs   Lab 12/04/22  0231   CPKMB 6.6*       Electrolytes: Contraindicated  Accuchecks: Q6H    Gtts:   dextrose 10 % in water (D10W)          LDA/Wounds:  Lines/Drains/Airways       Drain  Duration                  NG/OG Tube Right nostril -- days    Male External Urinary Catheter 12/08/22 1830 Small 1 day              Peripheral Intravenous Line  Duration                  Peripheral IV - Single Lumen 12/08/22 1800 20 G Anterior;Proximal;Right Forearm 1 day         Peripheral IV - Single Lumen 12/09/22 0107 20 G Anterior;Left;Proximal Forearm 1 day         Peripheral IV - Single Lumen 12/10/22 0219 20 G Right Antecubital <1 day                  Wounds: No  Wound care consulted: No

## 2022-12-10 NOTE — PROGRESS NOTES
[facility] Neuro Critical Care  Neurocritical Care  Progress Note    Admit Date: 12/8/2022  Service Date: 12/10/2022  Length of Stay: 2    Subjective:     Chief Complaint: Embolic stroke involving left middle cerebral artery    History of Present Illness: Pt is a 52 y.o. male with PMHx of CHF who presents as a transfer for acute LMCA stroke. Patient initially presented to OSH on 12/3 with RSW. At that time he was not a candidate for TPA and MRI revealed bilateral cardio embolic strokes. Patient had a TTE and RAGHAV which revealed LV thrombus. He was not started AC yet in setting of acute strokes, plan was to start AC today 12/8. Today, patient was noted to have acute change in neuro exam. NIH went from 6 to 23 and CTA revealed L M2 occlusion. Patient was transferred to [facility] for possible intervention but ASPECTS score was poor so he was not a candidate. Patient will be admitted to [facility] for higher level care and neuro monitoring.       Hospital Course: 12/9/22: Hemicrani watch  12/10/22:start heparin drip      Interval History:  NAEON. Consult NSGY hemicrani, UA (+) gram negative    Review of Systems   Reason unable to perform ROS: decrease LOC.   Unable to obtain a complete ROS due to level of consciousness.  Objective:     Vitals:  Temp: 98.8 °F (37.1 °C)  Pulse: 79  Rhythm: sinus tachycardia  BP: (!) 172/106  MAP (mmHg): 133  Resp: (!) 22  SpO2: 98 %    Temp  Min: 98.5 °F (36.9 °C)  Max: 100.5 °F (38.1 °C)  Pulse  Min: 79  Max: 102  BP  Min: 138/92  Max: 204/123  MAP (mmHg)  Min: 109  Max: 155  Resp  Min: 13  Max: 28  SpO2  Min: 96 %  Max: 100 %    12/09 0701 - 12/10 0700  In: 431.3   Out: 350 [Urine:350]   Unmeasured Output  Urine Occurrence: 1  Stool Occurrence: 0  Pad Count: 1       Physical Exam  Vitals and nursing note reviewed.   Constitutional:       Appearance: He is ill-appearing.   HENT:      Head: Normocephalic.      Nose: Nose normal.      Mouth/Throat:      Mouth: Mucous membranes are moist.      Pharynx:  Oropharynx is clear.   Cardiovascular:      Rate and Rhythm: Normal rate and regular rhythm.      Pulses: Normal pulses.      Heart sounds: Normal heart sounds.   Pulmonary:      Effort: Pulmonary effort is normal.      Breath sounds: Normal breath sounds.   Abdominal:      General: Bowel sounds are normal.      Palpations: Abdomen is soft.   Musculoskeletal:         General: Normal range of motion.   Skin:     General: Skin is warm and dry.      Capillary Refill: Capillary refill takes less than 2 seconds.   Neurological:      Comments: --sedation: none  --GCS:  E4 V2 M5  --Mental Status: Alert, global aphasia only able to moan   --L gaze, unable to cross midline, R facial droop  --PERRL   --Motor: Right side 1/5, left side 5/5  - WD bilateral lower extremities  --sensory: intact to pain     Unable to test orientation, language, memory, judgment, insight, fund of knowledge, hearing, shoulder shrug, tongue protrusion, coordination, gait due to level of consciousness.    Medications:  Continuousheparin (porcine) in D5W  Scheduledatorvastatin, 40 mg, Daily  cefTRIAXone (ROCEPHIN) IVPB, 1 g, Q24H  EScitalopram oxalate, 20 mg, Daily  furosemide, 40 mg, Daily  metoprolol tartrate, 25 mg, BID  mupirocin, , BID  senna-docusate 8.6-50 mg, 1 tablet, BID  sodium chloride, 2 g, TID  PRNdextrose 10%, 12.5 g, PRN  dextrose 10%, 25 g, PRN  labetaloL, 10 mg, Q6H PRN  ondansetron, 8 mg, Q8H PRN  sodium chloride 0.9%, 10 mL, PRN    Today I personally reviewed pertinent medications, lines/drains/airways, imaging, cardiology results, laboratory results, microbiology results, notably:    Diet  Diet NPO  Diet NPO          Assessment/Plan:     Neuro  * Embolic stroke involving left middle cerebral artery  -admit to [facility]CC  -stroke team following   -Q 1 hour neuro checks   -Q 1 hour vital signs   -SBP goal <200  -daily statin   -continue daily ASA  -hold AC at least overnight, high risk for hemorrhagic conversion  - A1c, TSH, EKG  -echo and  lipid panel complete  -MRI brain complete showing large LMCA stroke   -PT/OT/SLP  - CTH 12/10/22 at 12 pm        Cytotoxic brain edema  High risk of malignant cerebral edema given age and size of infarct   -see stroke    Cardiac/Vascular  Ejection fraction < 50%  - daily lasix 40 mg PO resumed  -       LV (left ventricular) mural thrombus  Likely etiology of strokes   -high risk for further embolic events   -need to start AC within 48 hours, will hold off tonight given high risk of hemorrhagic conversion  - CTH stable  - start minimal intensity heparin drip  - CTH when therapeutic on heparin drip    Essential hypertension  Permissive HTN in setting of acute stroke   -SBP <200  -prn labetalol, hydralazine    Renal/  Acute cystitis without hematuria  - Rocephin X5 days    Endocrine  Type 2 diabetes mellitus without complication, without long-term current use of insulin  - SSI    Type 2 diabetes mellitus, with long-term current use of insulin  SSI protocol   NPO     Other  Debility  Secondary to stroke   -consult PT/OT/SLP          The patient is being Prophylaxed for:  Venous Thromboembolism with: Mechanical or Chemical  Stress Ulcer with: Not Applicable   Ventilator Pneumonia with: not applicable    Activity Orders          Turn patient starting at 12/08 1800    Elevate HOB starting at 12/08 1653    Diet NPO: NPO starting at 12/08 1653        Full Code  Critical care time 45 mins  Letty Barnes NP  Neurocritical Care  [facility] - Neuro Critical Care

## 2022-12-10 NOTE — ASSESSMENT & PLAN NOTE
Patient is a 52yoM with CHF who initially presented to OSH with RSW. Did not receive tPA, as he was outside of the window. MRI at that time demonstrated bilateral anterior and posterior circulation strokes concerning for embolic etiology. Over hospital course at OSH, the patient was noted to have been lethargic and aphasic with dysarthria. RAGHAV at OSH demosntrated a L ventricular thrombus (not on AC). He was noted to have had an acute change in neuro exam on 12/8 at which time he had RSW with global aphasia. NIHSS had been 6 prior to the event and was noted to have been 23 following. CTA demonstrated a L M2 occlusion for which the patient was [place] [facility] [place] and higher level of care.     On arrival to ED, the patient was globally aphasic with L gaze and RSW. Exam limited due to mentation and aphasia. Patient was not taken for IR due to poor ASPECTS. He was admitted to ICU for close monitoring and hemicrani watch. Currently patient still in ICU for hemicrani watch, planning to be started on heparin gtt after reviewing stability scan.       Antithrombotics for secondary stroke prevention:   Currently on ASA 81; Will need anticoagulants, patient is NPO by SLP recs so consider heparin gtt until swallowing or PEG established (once hep gtt started can d/c ASA)     Statins for secondary stroke prevention and hyperlipidemia, if present:   Statins: Atorvastatin- 40 mg daily    Aggressive risk factor modification: HTN, DM, HLD, Diet, Exercise, LV thorombus     Rehab efforts: The patient has been evaluated by a stroke team provider and the therapy needs have been fully considered based off the presenting complaints and exam findings. The following therapy evaluations are needed: PT evaluate and treat, OT evaluate and treat, SLP evaluate and treat, PM&R evaluate for appropriate placement    Diagnostics ordered/pending: None     VTE prophylaxis: Heparin 5000 units SQ every 8 hours  Mechanical  prophylaxis: Place SCDs  (once AC started can d/c VTE heparin)     BP parameters: Infarct: No intervention, SBP <220

## 2022-12-10 NOTE — SUBJECTIVE & OBJECTIVE
Neurologic Chief Complaint: lethargy, aphasia, dysarthria     Subjective:     Interval History: Patient is seen for follow-up neurological assessment and treatment recommendations:     Patient with LV thrombus, will need anticoagulation. NGT in place would recommend heparin gtt until patient able to swallow or PEG placed prior to DOAC initiation. Patient tachycardic today with SBP < 210, metoprolol started by primary team. Febrile with elevated white count and procal, currently on Zosyn and Vanc while cx pending. NSGY following for hemicrani watch. Patient has been discharged from OT per last note, will need new orders. Continue current ICU care.     HPI, Past Medical, Family, and Social History remains the same as documented in the initial encounter.     Review of Systems   Unable to perform ROS: Acuity of condition   Scheduled Meds:   aspirin  81 mg Per NG tube Daily    atorvastatin  40 mg Oral Daily    EScitalopram oxalate  20 mg Per NG tube Daily    heparin (porcine)  5,000 Units Subcutaneous Q8H    metoprolol tartrate  25 mg Per NG tube BID    mupirocin   Nasal BID    piperacillin-tazobactam (ZOSYN) IVPB  4.5 g Intravenous Q8H    senna-docusate 8.6-50 mg  1 tablet Oral BID     Continuous Infusions:      PRN Meds:dextrose 10%, dextrose 10%, labetaloL, ondansetron, sodium chloride 0.9%, Pharmacy to dose Vancomycin consult **AND** vancomycin - pharmacy to dose    Objective:     Vital Signs (Most Recent):  Temp: 98.8 °F (37.1 °C) (12/10/22 0705)  Pulse: 79 (12/10/22 0853)  Resp: (!) 22 (12/10/22 0853)  BP: (!) 172/106 (12/10/22 0705)  SpO2: 98 % (12/10/22 0853)  BP Location: Left arm    Vital Signs Range (Last 24H):  Temp:  [98.5 °F (36.9 °C)-100.5 °F (38.1 °C)]   Pulse:  []   Resp:  [13-28]   BP: (138-206)/()   SpO2:  [94 %-100 %]   BP Location: Left arm    Physical Exam  Vitals and nursing note reviewed.   Constitutional:       General: He is not in acute distress.  HENT:      Head: Normocephalic and  atraumatic.      Nose: No rhinorrhea.      Comments: NGT in place   Eyes:      General: No scleral icterus.  Cardiovascular:      Rate and Rhythm: Tachycardia present.   Pulmonary:      Effort: No respiratory distress.   Skin:     General: Skin is warm and dry.   Neurological:      Motor: Weakness present.      Comments: Withdraws on R side from pain   L side antigravity   Nonverbal during exam   Resist opening eyes      Psychiatric:         Behavior: Behavior is uncooperative.       Neurological Exam:   LOC: drowsy  Attention Span: poor  Language: Nonverbal unable to assess   Articulation: Nonverbal unable to assess   Orientation: Nonverbal unable to assess   EOM (CN III, IV, VI): Tracks   Motor: L side moves spontaneously and antigravity, R side withdraws from pain   Sensation: Withdraws from pain throughout     Laboratory:  CMP:   Recent Labs   Lab 12/10/22  0218   CALCIUM 8.6*   ALBUMIN 2.3*   PROT 6.1      K 4.1   CO2 23      BUN 54*   CREATININE 4.8*   ALKPHOS 88   ALT 29   AST 34   BILITOT 1.3*       BMP:   Recent Labs   Lab 12/10/22  0218      K 4.1      CO2 23   BUN 54*   CREATININE 4.8*   CALCIUM 8.6*       CBC:   Recent Labs   Lab 12/10/22  0218   WBC 22.28*   RBC 5.32   HGB 14.6   HCT 45.1      MCV 85   MCH 27.4   MCHC 32.4       Lipid Panel:   Recent Labs   Lab 12/04/22  0231   CHOL 181   LDLCALC 112.8   HDL 50   TRIG 91       Coagulation:   Recent Labs   Lab 12/08/22  0938   INR 1.2   APTT 25.2       Platelet Aggregation Study: No results for input(s): PLTAGG, PLTAGINTERP, PLTAGREGLACO, ADPPLTAGGREG in the last 168 hours.  Hgb A1C:   Recent Labs   Lab 12/08/22  1729   HGBA1C 9.1*       TSH:   Recent Labs   Lab 12/08/22  1729   TSH 1.375         Diagnostic Results     Brain/Vessel Imaging   MRI Brain WO Contrast 12/8/22  Exam limited by patient motion artifact. Evolving left MCA territory infarct.  Additional foci of diffusion restriction in the left cerebral consistent  with recent infarct.  Interval increase susceptibility artifact in the areas of diffusion restriction consistent with hemorrhagic conversion of recent infarcts.  No hydrocephalus or significant midline shift.      CTH 12/8/22 16:29   Evolving large left MCA territory infarction similar to recent CT though increased in size compared to prior MRI concerning for evolving recent to subacute and acute infarcts.  Localized mass effect without significant midline shift or hydrocephalus.  There is evolving recent to subacute age right posterior frontal infarction similar to prior MRI allowing for differences in technique     There is subtle hyperdensity along the left basal ganglia posteriorly and along the right posterior frontal cortex which may represent enhancing subacute age components of infarction with petechial hemorrhage felt less likely but cannot be entirely excluded with limitation secondary to recirculation contrast related to recent CTA performed at outside institution.     Evolving mass effect most pronounced associated with the left MCA territory infarction with sulcal effacement without significant midline shift or hydrocephalus.    CTH 12/8/22 12:11 (CTA H/N)   1. Left diencephalic hemisphere demonstrates evidence of an acute infarct of left basal ganglia and left caudate lobe.  2. Occlusion of the posterior division of the left M2 branch at the takeoff of the M1 vessel with patency involving the anterior division of the left M2 segment.    CTH 12/7/22   1.  Moderate size subacute infarction left anterior basal ganglia and anterior left internal capsule appears mildly larger and better well defined compared to CT from 12/3/2020. There is currently greater mass effect on the anterior horn of the left lateral ventricle. There is no associated bleed or midline shift.  2.  Recent MRI demonstrated additional punctate acute infarctions in the left frontal lobe and a mild size acute infarction right centrum  semiovale. These are less obvious on CT. No associated bleed.  3.  Additional chronic periventricular white matter hypodensities.    MRI Brain WO contrast 12/3/22   Multifocal areas of restricted diffusion are felt to reflect acute infarcts.    CTH 12/3/22   Loss of gray-white matter distinction in involving the left basal ganglia could reflect changes of chronic small vessel ischemic disease versus cytotoxic edema from acute infarct.     Carotid US Bilateral 12/3/22   1. Carotid intimal hyperplasia, with no findings of hemodynamically significant carotid arterial stenosis or vascular occlusion.  2. Patent vertebral arteries with antegrade flow bilaterally.      Cardiac Imaging   RAGHAV 12/4/22   The left ventricle is small with moderately decreased systolic function.  The estimated ejection fraction is 38%.  Left ventricular diastolic dysfunction.  There are segmental left ventricular wall motion abnormalities.  No spontaneous echo contrast. A moderate protruding layered left ventricular thrombus is present. The thrombus is fixed and located in the apex.  Normal right ventricular size.  Mild left atrial enlargement.  Mild right atrial enlargement.  Mild aortic regurgitation.  No interatrial septal defect present.  Normal appearing left atrial appendage. No thrombus is present in the appendage. SB occluder is absent. Abnormal appendage velocities.  Mild mitral regurgitation.  Agitated saline contrast study did not show any right to left shunt    TTE 11/14/22   The left ventricle is normal in size with mildly decreased systolic function.  The estimated ejection fraction is 40%.  Grade III left ventricular diastolic dysfunction.  Small posterior pericardial effusion.  There is mild left ventricular global hypokinesis.  Normal right ventricular size with normal right ventricular systolic function.  Severe left atrial enlargement.  Moderate right atrial enlargement.  Mild pulmonic regurgitation.  Mild to moderate  tricuspid regurgitation.  Mild-to-moderate aortic regurgitation.  Intermediate central venous pressure (8 mmHg).  The estimated PA systolic pressure is 51 mmHg.  There is pulmonary hypertension.

## 2022-12-10 NOTE — ASSESSMENT & PLAN NOTE
Likely etiology of strokes   -high risk for further embolic events   -need to start AC within 48 hours, will hold off tonight given high risk of hemorrhagic conversion  - CTH stable  - start minimal intensity heparin drip  - CTH when therapeutic on heparin drip

## 2022-12-10 NOTE — SUBJECTIVE & OBJECTIVE
Interval History:  NAEON. Consult NSGY hemicrani, UA (+) gram negative    Review of Systems   Reason unable to perform ROS: decrease LOC.   Unable to obtain a complete ROS due to level of consciousness.  Objective:     Vitals:  Temp: 98.8 °F (37.1 °C)  Pulse: 79  Rhythm: sinus tachycardia  BP: (!) 172/106  MAP (mmHg): 133  Resp: (!) 22  SpO2: 98 %    Temp  Min: 98.5 °F (36.9 °C)  Max: 100.5 °F (38.1 °C)  Pulse  Min: 79  Max: 102  BP  Min: 138/92  Max: 204/123  MAP (mmHg)  Min: 109  Max: 155  Resp  Min: 13  Max: 28  SpO2  Min: 96 %  Max: 100 %    12/09 0701 - 12/10 0700  In: 431.3   Out: 350 [Urine:350]   Unmeasured Output  Urine Occurrence: 1  Stool Occurrence: 0  Pad Count: 1       Physical Exam  Vitals and nursing note reviewed.   Constitutional:       Appearance: He is ill-appearing.   HENT:      Head: Normocephalic.      Nose: Nose normal.      Mouth/Throat:      Mouth: Mucous membranes are moist.      Pharynx: Oropharynx is clear.   Cardiovascular:      Rate and Rhythm: Normal rate and regular rhythm.      Pulses: Normal pulses.      Heart sounds: Normal heart sounds.   Pulmonary:      Effort: Pulmonary effort is normal.      Breath sounds: Normal breath sounds.   Abdominal:      General: Bowel sounds are normal.      Palpations: Abdomen is soft.   Musculoskeletal:         General: Normal range of motion.   Skin:     General: Skin is warm and dry.      Capillary Refill: Capillary refill takes less than 2 seconds.   Neurological:      Comments: --sedation: none  --GCS:  E4 V2 M5  --Mental Status: Alert, global aphasia only able to moan   --L gaze, unable to cross midline, R facial droop  --PERRL   --Motor: Right side 1/5, left side 5/5  - WD bilateral lower extremities  --sensory: intact to pain     Unable to test orientation, language, memory, judgment, insight, fund of knowledge, hearing, shoulder shrug, tongue protrusion, coordination, gait due to level of consciousness.    Medications:  Continuousheparin  (porcine) in D5W  Scheduledatorvastatin, 40 mg, Daily  cefTRIAXone (ROCEPHIN) IVPB, 1 g, Q24H  EScitalopram oxalate, 20 mg, Daily  furosemide, 40 mg, Daily  metoprolol tartrate, 25 mg, BID  mupirocin, , BID  senna-docusate 8.6-50 mg, 1 tablet, BID  sodium chloride, 2 g, TID  PRNdextrose 10%, 12.5 g, PRN  dextrose 10%, 25 g, PRN  labetaloL, 10 mg, Q6H PRN  ondansetron, 8 mg, Q8H PRN  sodium chloride 0.9%, 10 mL, PRN    Today I personally reviewed pertinent medications, lines/drains/airways, imaging, cardiology results, laboratory results, microbiology results, notably:    Diet  Diet NPO  Diet NPO

## 2022-12-10 NOTE — ASSESSMENT & PLAN NOTE
52M w/ PMH of CHF, TIA, LV thrombus with acute left MCA infarct, NSGY consulted for hemicrani watch    --Patient admitted to ICU on telemetry; NCC/stroke teams are primary   -q1h neurochecks in ICU  --All labs and diagnostics reviewed   --CTA 12/9: acute infarct of left basal ganglia and left caudate lobe. Occlusion of the posterior division of the left M2 branch at the takeoff of the M1 vessel with patency involving the anterior division of the left M2 segment   MRI brain 12/9: Evolving left MCA territory infarct.  Additional foci of diffusion restriction in the left cerebral consistent with recent infarct.  Interval increase susceptibility artifact in the areas of diffusion restriction consistent with hemorrhagic conversion of recent infarcts   CTH 12/10: evolving infarct, hemorrhagic conversion L BG, trace 1mm MLS   UCx 12/9: GNRs  --Not good operative candidate; in need of AC for LV thrombus  --SBP reccs and management per primary team  --Na 145-155 strict, reccomend hypertonic saline PRN per primary team to reach goal  --HOB >30  --Remain NPO at this time for possible operative intervention  --Continue maximal medical therapy and stroke work-up per primary teams  --Continue to monitor clinically, notify NSGY immediately with any changes in neuro status    Dispo: ongoing

## 2022-12-10 NOTE — ASSESSMENT & PLAN NOTE
2/2 stroke   OT and PT to evaluate   Therapy recommending IPR at OSH ([facility]), updated recs pending. Will need new OT orders.
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
## 2022-12-10 NOTE — PROGRESS NOTES
Therapy with vancomycin complete and/or consult discontinued by provider.  Pharmacy will sign off, please re-consult as needed.      Agatha Villalobos, PharmD, [facility]  Clinical Pharmacist  Extension: [phone]

## 2022-12-10 NOTE — PROGRESS NOTES
[facility] - Neuro Critical Care  Neurosurgery  Progress Note    Subjective:     History of Present Illness: Spencer Burton Jr. is a 52 y.o. male PMHx of CHF and TIA who presents as a transfer for acute LMCA stroke. Patient initially presented to OSH on 12/3 with RSW. At that time he was not a candidate for TPA and MRI revealed bilateral cardio embolic strokes. Patient had a TTE and RAGHAV which revealed LV thrombus. He was not started AC yet in setting of acute strokes, plan was to start AC today 12/8. Yesterday, patient was noted to have acute change in neuro exam. NIH went from 6 to 23 and CTA revealed L M2 occlusion. Patient was transferred to [facility] for higher level of care and NSGY consulted for hemicraniectomy watch.       Post-Op Info:  * No surgery found *         Interval History: 12/10: admitted ICU. Hemicrani watch. LV thrombus, in need of AC. Exam stable. Scans w evolving L MCA infarct, trace MLS. Tm 100.5, 138-206 SBP.    Medications:  Continuous Infusions:   heparin (porcine) in D5W       Scheduled Meds:   atorvastatin  40 mg Oral Daily    cefTRIAXone (ROCEPHIN) IVPB  1 g Intravenous Q24H    EScitalopram oxalate  20 mg Per NG tube Daily    furosemide  40 mg Per NG tube Daily    metoprolol tartrate  25 mg Per NG tube BID    mupirocin   Nasal BID    senna-docusate 8.6-50 mg  1 tablet Oral BID    sodium chloride  2 g Per NG tube TID     PRN Meds:dextrose 10%, dextrose 10%, labetaloL, ondansetron, sodium chloride 0.9%     Review of Systems  Objective:     Weight: 84.8 kg (187 lb)  Body mass index is 27.62 kg/m².  Vital Signs (Most Recent):  Temp: 98.8 °F (37.1 °C) (12/10/22 0705)  Pulse: 79 (12/10/22 0853)  Resp: (!) 22 (12/10/22 0853)  BP: (!) 172/106 (12/10/22 0705)  SpO2: 98 % (12/10/22 0853)   Vital Signs (24h Range):  Temp:  [98.5 °F (36.9 °C)-100.5 °F (38.1 °C)] 98.8 °F (37.1 °C)  Pulse:  [] 79  Resp:  [13-28] 22  SpO2:  [96 %-100 %] 98 %  BP: (138-204)/() 172/106     Date 12/10/22 0700  - 12/11/22 0659   Shift 9430-5419 6282-3766 8418-8336 24 Hour Total   INTAKE   IV Piggyback 43.7   43.7   Shift Total(mL/kg) 43.7(0.5)   43.7(0.5)   OUTPUT   Shift Total(mL/kg)       Weight (kg) 84.8 84.8 84.8 84.8                        NG/OG Tube Right nostril (Active)   Placement Check placement verified by x-ray 12/10/22 0305   Tolerance no signs/symptoms of discomfort 12/10/22 0305   Securement secured to nostril center w/ adhesive device 12/10/22 0305   Clamp Status/Tolerance clamped 12/10/22 0305   Suction Setting/Drainage Method suction at the bedside 12/10/22 0305   Insertion Site Appearance no redness, warmth, tenderness, skin breakdown, drainage 12/10/22 0305   Drainage None 12/10/22 0305   Flush/Irrigation flushed w/;water;no resistance met 12/10/22 0305   Intake (mL) 50 mL 12/09/22 1005   Water Bolus (mL) 100 mL 12/09/22 1005   Residual Amount (ml) 0 ml 12/09/22 0705       Male External Urinary Catheter 12/08/22 1830 Small (Active)   Collection Container Urimeter 12/10/22 0305   Securement Method secured to top of thigh w/ adhesive device 12/10/22 0305   Skin no redness;no breakdown 12/10/22 0305   Tolerance no signs/symptoms of discomfort 12/10/22 0305   Output (mL) 125 mL 12/10/22 0405   Catheter Change Date 12/08/22 12/10/22 0305   Catheter Change Time 1905 12/10/22 0305       Physical Exam    Neurosurgery Physical Exam  [id number]  R hemiplegia  FC on L  L gaze preference  R FD  Dysarthric,aphasic  O/w CN intact    Significant Labs:  Recent Labs   Lab 12/08/22  1833 12/09/22  0026 12/10/22  0218   GLU  --  210* 274*   NA  --  140 142   K 3.5 3.4* 4.1   CL  --  100 100   CO2  --  26 23   BUN  --  37* 54*   CREATININE  --  3.5* 4.8*   CALCIUM  --  9.3 8.6*   MG  --  1.6 1.8     Recent Labs   Lab 12/09/22  0026 12/10/22  0218   WBC 21.30* 22.28*   HGB 13.5* 14.6   HCT 42.6 45.1    179     No results for input(s): LABPT, INR, APTT in the last 48 hours.  Microbiology Results (last 7 days)        Procedure Component Value Units Date/Time    Blood culture [[id number]] Collected: 12/08/22 2052    Order Status: Completed Specimen: Blood from Peripheral, Antecubital, Right Updated: 12/09/22 2222     Blood Culture, Routine No Growth to date      No Growth to date    Narrative:      Blood cultures from 2 different sites. 4 bottles total.  Please draw before starting antibiotics.    Blood culture [[id number]] Collected: 12/08/22 2052    Order Status: Completed Specimen: Blood from Peripheral, Antecubital, Right Updated: 12/09/22 2222     Blood Culture, Routine No Growth to date      No Growth to date    Narrative:      Blood cultures x 2 different sites. 4 bottles total. Please  draw cultures before administering antibiotics.    Urine culture [[id number]]  (Abnormal) Collected: 12/08/22 2001    Order Status: Completed Specimen: Urine Updated: 12/09/22 2151     Urine Culture, Routine GRAM NEGATIVE [place]  > 100,000 cfu/ml  Identification and susceptibility pending      Narrative:      Specimen Source->Urine          All pertinent labs from the last 24 hours have been reviewed.    Significant Diagnostics:  I have reviewed and interpreted all pertinent imaging results/findings within the past 24 hours.  X-Ray Abdomen AP 1 View    Result Date: 12/10/2022  As above. Electronically signed by: Yrn Smart Date:    12/10/2022 Time:    08:27    CT Head Without Contrast    Result Date: 12/10/2022  Evolving no enlarged left MCA territory infarction with hypoattenuation and sulcal effacement.  Intermediate density along the left basal ganglia compatible with known hemorrhagic conversion.  Smaller area of infarction in the right cerebral hemisphere not well seen. Evolving mass effect and edema associated with the left cerebral hemispheric infarction with slight compression of the left lateral ventricle and trace 1 mm of rightward midline shift No evidence for hydrocephalus Clinical correlation and continued follow-up advised  Electronically signed by: Aime Christensen DO Date:    12/10/2022 Time:    10:49       Assessment/Plan:     * Embolic stroke involving left middle cerebral artery  52M w/ PMH of CHF, TIA, LV thrombus with acute left MCA infarct, NSGY consulted for hemicrani watch    --Patient admitted to ICU on telemetry; NCC/stroke teams are primary   -q1h neurochecks in ICU  --All labs and diagnostics reviewed   --CTA 12/9: acute infarct of left basal ganglia and left caudate lobe. Occlusion of the posterior division of the left M2 branch at the takeoff of the M1 vessel with patency involving the anterior division of the left M2 segment   MRI brain 12/9: Evolving left MCA territory infarct.  Additional foci of diffusion restriction in the left cerebral consistent with recent infarct.  Interval increase susceptibility artifact in the areas of diffusion restriction consistent with hemorrhagic conversion of recent infarcts   CTH 12/10: evolving infarct, hemorrhagic conversion L BG, trace 1mm MLS   UCx 12/9: GNRs  --Not good operative candidate; in need of AC for LV thrombus  --SBP reccs and management per primary team  --Na 145-155 strict, reccomend hypertonic saline PRN per primary team to reach goal  --HOB >30  --Remain NPO at this time for possible operative intervention  --Continue maximal medical therapy and stroke work-up per primary teams  --Continue to monitor clinically, notify NSGY immediately with any changes in neuro status    Dispo: ongoing        Kam Guzman MD  Neurosurgery  [facility] - [facility]

## 2022-12-10 NOTE — SUBJECTIVE & OBJECTIVE
Interval History: 12/10: admitted ICU. Hemicrani watch. LV thrombus, in need of AC. Exam stable. Scans w evolving L MCA infarct, trace MLS. Tm 100.5, 138-206 SBP.    Medications:  Continuous Infusions:   heparin (porcine) in D5W       Scheduled Meds:   atorvastatin  40 mg Oral Daily    cefTRIAXone (ROCEPHIN) IVPB  1 g Intravenous Q24H    EScitalopram oxalate  20 mg Per NG tube Daily    furosemide  40 mg Per NG tube Daily    metoprolol tartrate  25 mg Per NG tube BID    mupirocin   Nasal BID    senna-docusate 8.6-50 mg  1 tablet Oral BID    sodium chloride  2 g Per NG tube TID     PRN Meds:dextrose 10%, dextrose 10%, labetaloL, ondansetron, sodium chloride 0.9%     Review of Systems  Objective:     Weight: 84.8 kg (187 lb)  Body mass index is 27.62 kg/m².  Vital Signs (Most Recent):  Temp: 98.8 °F (37.1 °C) (12/10/22 0705)  Pulse: 79 (12/10/22 0853)  Resp: (!) 22 (12/10/22 0853)  BP: (!) 172/106 (12/10/22 0705)  SpO2: 98 % (12/10/22 0853)   Vital Signs (24h Range):  Temp:  [98.5 °F (36.9 °C)-100.5 °F (38.1 °C)] 98.8 °F (37.1 °C)  Pulse:  [] 79  Resp:  [13-28] 22  SpO2:  [96 %-100 %] 98 %  BP: (138-204)/() 172/106     Date 12/10/22 0700 - 12/11/22 0659   Shift 6269-6917 5219-0408 8917-4443 24 Hour Total   INTAKE   IV Piggyback 43.7   43.7   Shift Total(mL/kg) 43.7(0.5)   43.7(0.5)   OUTPUT   Shift Total(mL/kg)       Weight (kg) 84.8 84.8 84.8 84.8                        NG/OG Tube Right nostril (Active)   Placement Check placement verified by x-ray 12/10/22 0305   Tolerance no signs/symptoms of discomfort 12/10/22 0305   Securement secured to nostril center w/ adhesive device 12/10/22 0305   Clamp Status/Tolerance clamped 12/10/22 0305   Suction Setting/Drainage Method suction at the bedside 12/10/22 0305   Insertion Site Appearance no redness, warmth, tenderness, skin breakdown, drainage 12/10/22 0305   Drainage None 12/10/22 0305   Flush/Irrigation flushed w/;water;no resistance met 12/10/22 0305    Intake (mL) 50 mL 12/09/22 1005   Water Bolus (mL) 100 mL 12/09/22 1005   Residual Amount (ml) 0 ml 12/09/22 0705       Male External Urinary Catheter 12/08/22 1830 Small (Active)   Collection Container Urimeter 12/10/22 0305   Securement Method secured to top of thigh w/ adhesive device 12/10/22 0305   Skin no redness;no breakdown 12/10/22 0305   Tolerance no signs/symptoms of discomfort 12/10/22 0305   Output (mL) 125 mL 12/10/22 0405   Catheter Change Date 12/08/22 12/10/22 0305   Catheter Change Time 1905 12/10/22 0305       Physical Exam    Neurosurgery Physical Exam  E4V3M6  R hemiplegia  FC on L  L gaze preference  R FD  Dysarthric,aphasic  O/w CN intact    Significant Labs:  Recent Labs   Lab 12/08/22  1833 12/09/22  0026 12/10/22  0218   GLU  --  210* 274*   NA  --  140 142   K 3.5 3.4* 4.1   CL  --  100 100   CO2  --  26 23   BUN  --  37* 54*   CREATININE  --  3.5* 4.8*   CALCIUM  --  9.3 8.6*   MG  --  1.6 1.8     Recent Labs   Lab 12/09/22  0026 12/10/22  0218   WBC 21.30* 22.28*   HGB 13.5* 14.6   HCT 42.6 45.1    179     No results for input(s): LABPT, INR, APTT in the last 48 hours.  Microbiology Results (last 7 days)       Procedure Component Value Units Date/Time    Blood culture [[id number]] Collected: 12/08/22 2052    Order Status: Completed Specimen: Blood from Peripheral, Antecubital, Right Updated: 12/09/22 2222     Blood Culture, Routine No Growth to date      No Growth to date    Narrative:      Blood cultures from 2 different sites. 4 bottles total.  Please draw before starting antibiotics.    Blood culture [[id number]] Collected: 12/08/22 2052    Order Status: Completed Specimen: Blood from Peripheral, Antecubital, Right Updated: 12/09/22 2222     Blood Culture, Routine No Growth to date      No Growth to date    Narrative:      Blood cultures x 2 different sites. 4 bottles total. Please  draw cultures before administering antibiotics.    Urine culture [[id number]]  (Abnormal)  Collected: 12/08/22 2001    Order Status: Completed Specimen: Urine Updated: 12/09/22 2151     Urine Culture, Routine GRAM NEGATIVE [place]  > 100,000 cfu/ml  Identification and susceptibility pending      Narrative:      Specimen Source->Urine          All pertinent labs from the last 24 hours have been reviewed.    Significant Diagnostics:  I have reviewed and interpreted all pertinent imaging results/findings within the past 24 hours.  X-Ray Abdomen AP 1 View    Result Date: 12/10/2022  As above. Electronically signed by: Yrn Smart Date:    12/10/2022 Time:    08:27    CT Head Without Contrast    Result Date: 12/10/2022  Evolving no enlarged left MCA territory infarction with hypoattenuation and sulcal effacement.  Intermediate density along the left basal ganglia compatible with known hemorrhagic conversion.  Smaller area of infarction in the right cerebral hemisphere not well seen. Evolving mass effect and edema associated with the left cerebral hemispheric infarction with slight compression of the left lateral ventricle and trace 1 mm of rightward midline shift No evidence for hydrocephalus Clinical correlation and continued follow-up advised Electronically signed by: Aime Christensen DO Date:    12/10/2022 Time:    10:49

## 2022-12-10 NOTE — HOSPITAL COURSE
12/10: admitted ICU. Hemicrani watch. LV thrombus, in need of AC. Exam stable. Scans w evolving L MCA infarct, trace MLS. Tm 100.5, 138-206 SBP.  12/11: NAEON. -175 SBP. CTH this AM stable. Spont on L, FC centrally. PSD 3. Remains on hemicrani watch. Na 146. On hep gtt for LV thrombus  12/12: NAEON. AF, 131-189 SBP. PSD 4. CTH tomorrow. Repeat CTHs stable, exam stable. If CTH and exam remain stable tomorrow then NSGY will sign off. Na 148  12/13: NAEON. AF, 160-190 SBP. PSD 5 CTH today stable. Exam stable. NSGY signing off today. Na 148

## 2022-12-10 NOTE — PROGRESS NOTES
[facility] - Neuro Critical Care  Vascular Neurology  Comprehensive Stroke Center  Progress Note    Assessment/Plan:     * Embolic stroke involving left middle cerebral artery  Patient is a 52yoM with CHF who initially presented to OSH with RSW. Did not receive tPA, as he was outside of the window. MRI at that time demonstrated bilateral anterior and posterior circulation strokes concerning for embolic etiology. Over hospital course at OSH, the patient was noted to have been lethargic and aphasic with dysarthria. RAGHAV at OSH demosntrated a L ventricular thrombus (not on AC). He was noted to have had an acute change in neuro exam on 12/8 at which time he had RSW with global aphasia. NIHSS had been 6 prior to the event and was noted to have been 23 following. CTA demonstrated a L M2 occlusion for which [place] [facility] [place] of [place].     On arrival to ED, the patient was globally aphasic with L gaze and RSW. Exam limited due to mentation and aphasia. Patient was not taken for IR due to poor ASPECTS. He was admitted to ICU for close monitoring and hemicrani watch. Currently patient still in ICU for hemicrani watch, planning to be started on heparin gtt after reviewing stability scan.       Antithrombotics for secondary stroke prevention:   Currently on ASA 81; Will need anticoagulants, patient is NPO by SLP recs so consider heparin gtt until swallowing or PEG established (once hep gtt started can d/c ASA)     Statins for secondary stroke prevention and hyperlipidemia, if present:   Statins: Atorvastatin- 40 mg daily    Aggressive risk factor modification: HTN, DM, HLD, Diet, Exercise, LV thorombus     Rehab efforts: The patient has been evaluated by a stroke team provider and the therapy needs have been fully considered based off the presenting complaints and exam findings. The following therapy evaluations are needed: PT evaluate and treat, OT evaluate and treat, SLP  evaluate and treat, PM&R evaluate for appropriate placement    Diagnostics ordered/pending: None     VTE prophylaxis: Heparin 5000 units SQ every 8 hours  Mechanical prophylaxis: Place SCDs  (once AC started can d/c VTE heparin)     BP parameters: Infarct: No intervention, SBP <220        Cytotoxic brain edema  Noted on imaging in the area of the L MCA territory. Will continue to monitor q1h while in ICU and repeat CTH PRN for acute neuro exam changes that could indicate worsening/expansion of edema.   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     Debility  2/2 stroke   OT and PT to evaluate   Therapy recommending IPR at OSH ([facility]), updated recs pending. Will need new OT orders.     LV (left ventricular) mural thrombus  Stroke RF  Not on AC, will need anticoagulation   Pending stability scan today , NCC planning to put patient on heparin gtt    Dyslipidemia associated with type 2 diabetes mellitus  LDL:112.8  Atorvastatin 40mg daily  Stroke RF    Hypokalemia  2.9-->3.1-->3.4--> 4.1 (corrected)    Type 2 diabetes mellitus, with long-term current use of insulin  Stroke RF  A1C 9.1   Consider nutrition consult and DM education   IP goal 140-180   SSI     Essential hypertension  Stroke RF  SBP < 220        12/09/2022 Patient with LV thrombus, will need anticoagulation. NGT in place would recommend heparin gtt until patient able to swallow or PEG placed prior to DOAC initiation. Patient tachycardic today with SBP < 210, metoprolol started by primary team. Febrile with elevated white count and procal, currently on Zosyn and Vanc while cx pending. NSGY following for hemicrani watch. Patient has been discharged from OT per last note, will need new orders. Continue current ICU care.   12/10/2022: Pending stability scan patient is expected to be started on heparin gtt. Continue hemicrani watch in  ICU. Family at bedside.         STROKE DOCUMENTATION   Acute Stroke Times   Last Known Normal Date:  12/08/22  Last Known Normal Time: 1145  Symptom Onset Date: 12/08/22  Symptom Onset Time: 1145  Stroke Team Called Date: 12/08/22  Stroke Team Called Time: 1615  Stroke Team Arrival Date: 12/08/22  Stroke Team Arrival Time: 1615  CT Interpretation Time: 1615  Thrombolytic Therapy Recommended: No  CTA Interpretation Time: 1615    NIH Scale:  Interval: baseline  1a. Level of Consciousness: 1-->Not alert, but arousable by minor stimulation to obey, answer, or respond  1b. LOC Questions: 2-->Answers neither question correctly  1c. LOC Commands: 2-->Performs neither task correctly  2. Best Gaze: 1-->Partial gaze palsy, gaze is abnormal in one or both eyes, but forced deviation or total gaze paresis is not present  3. Visual: 0-->No visual loss  4. Facial Palsy: 1-->Minor paralysis (flattened nasolabial fold, asymmetry on smiling)  5a. Motor Arm, Left: 0-->No drift, limb holds 90 (or 45) degrees for full 10 secs  5b. Motor Arm, Right: 3-->No effort against gravity, limb falls  6a. Motor Leg, Left: 0-->No drift, leg holds 30 degree position for full 5 secs  6b. Motor Leg, Right: 3-->No effort against gravity, leg falls to bed immediately  7. Limb Ataxia: 0-->Absent  8. Sensory: 1-->Mild-to-moderate sensory loss, patient feels pinprick is less sharp or is dull on the affected side, or there is a loss of superficial pain with pinprick, but patient is aware of being touched  9. Best Language: 3-->Mute, global aphasia, no usable speech or auditory comprehension  10. Dysarthria: 2-->Severe dysarthria, patients speech is so slurred as to be unintelligible in the absence of or out of proportion to any dysphasia, or is mute/anarthric  11. Extinction and Inattention (formerly Neglect): 2-->Profound sue-inattention/extinction more than 1 modality  Total (NIH Stroke Scale): 21       Modified [place] Score: 1  [place] Coma Scale:11       Hemorrhagic change of an Ischemic Stroke: Does this patient have an ischemic stroke with  hemorrhagic changes? No     Neurologic Chief Complaint: lethargy, aphasia, dysarthria     Subjective:     Interval History: Patient is seen for follow-up neurological assessment and treatment recommendations:     Patient with LV thrombus, will need anticoagulation. NGT in place would recommend heparin gtt until patient able to swallow or PEG placed prior to DOAC initiation. Patient tachycardic today with SBP < 210, metoprolol started by primary team. Febrile with elevated white count and procal, currently on Zosyn and Vanc while cx pending. NSGY following for hemicrani watch. Patient has been discharged from OT per last note, will need new orders. Continue current ICU care.     HPI, Past Medical, Family, and Social History remains the same as documented in the initial encounter.     Review of Systems   Unable to perform ROS: Acuity of condition   Scheduled Meds:   aspirin  81 mg Per NG tube Daily    atorvastatin  40 mg Oral Daily    EScitalopram oxalate  20 mg Per NG tube Daily    heparin (porcine)  5,000 Units Subcutaneous Q8H    metoprolol tartrate  25 mg Per NG tube BID    mupirocin   Nasal BID    piperacillin-tazobactam (ZOSYN) IVPB  4.5 g Intravenous Q8H    senna-docusate 8.6-50 mg  1 tablet Oral BID     Continuous Infusions:      PRN Meds:dextrose 10%, dextrose 10%, labetaloL, ondansetron, sodium chloride 0.9%, Pharmacy to dose Vancomycin consult **AND** vancomycin - pharmacy to dose    Objective:     Vital Signs (Most Recent):  Temp: 98.8 °F (37.1 °C) (12/10/22 0705)  Pulse: 79 (12/10/22 0853)  Resp: (!) 22 (12/10/22 0853)  BP: (!) 172/106 (12/10/22 0705)  SpO2: 98 % (12/10/22 0853)  BP Location: Left arm    Vital Signs Range (Last 24H):  Temp:  [98.5 °F (36.9 °C)-100.5 °F (38.1 °C)]   Pulse:  []   Resp:  [13-28]   BP: (138-206)/()   SpO2:  [94 %-100 %]   BP Location: Left arm    Physical Exam  Vitals and nursing note reviewed.   Constitutional:       General: He is not in acute  distress.  HENT:      Head: Normocephalic and atraumatic.      Nose: No rhinorrhea.      Comments: NGT in place   Eyes:      General: No scleral icterus.  Cardiovascular:      Rate and Rhythm: Tachycardia present.   Pulmonary:      Effort: No respiratory distress.   Skin:     General: Skin is warm and dry.   Neurological:      Motor: Weakness present.      Comments: Withdraws on R side from pain   L side antigravity   Nonverbal during exam   Resist opening eyes      Psychiatric:         Behavior: Behavior is uncooperative.       Neurological Exam:   LOC: drowsy  Attention Span: poor  Language: Nonverbal unable to assess   Articulation: Nonverbal unable to assess   Orientation: Nonverbal unable to assess   EOM (CN III, IV, VI): Tracks   Motor: L side moves spontaneously and antigravity, R side withdraws from pain   Sensation: Withdraws from pain throughout     Laboratory:  CMP:   Recent Labs   Lab 12/10/22  0218   CALCIUM 8.6*   ALBUMIN 2.3*   PROT 6.1      K 4.1   CO2 23      BUN 54*   CREATININE 4.8*   ALKPHOS 88   ALT 29   AST 34   BILITOT 1.3*       BMP:   Recent Labs   Lab 12/10/22  0218      K 4.1      CO2 23   BUN 54*   CREATININE 4.8*   CALCIUM 8.6*       CBC:   Recent Labs   Lab 12/10/22  0218   WBC 22.28*   RBC 5.32   HGB 14.6   HCT 45.1      MCV 85   MCH 27.4   MCHC 32.4       Lipid Panel:   Recent Labs   Lab 12/04/22  0231   CHOL 181   LDLCALC 112.8   HDL 50   TRIG 91       Coagulation:   Recent Labs   Lab 12/08/22  0938   INR 1.2   APTT 25.2       Platelet Aggregation Study: No results for input(s): PLTAGG, PLTAGINTERP, PLTAGREGLACO, ADPPLTAGGREG in the last 168 hours.  Hgb A1C:   Recent Labs   Lab 12/08/22  1729   HGBA1C 9.1*       TSH:   Recent Labs   Lab 12/08/22  1729   TSH 1.375         Diagnostic Results     Brain/Vessel Imaging   MRI Brain WO Contrast 12/8/22  Exam limited by patient motion artifact. Evolving left MCA territory infarct.  Additional foci of diffusion  restriction in the left cerebral consistent with recent infarct.  Interval increase susceptibility artifact in the areas of diffusion restriction consistent with hemorrhagic conversion of recent infarcts.  No hydrocephalus or significant midline shift.      CTH 12/8/22 16:29   Evolving large left MCA territory infarction similar to recent CT though increased in size compared to prior MRI concerning for evolving recent to subacute and acute infarcts.  Localized mass effect without significant midline shift or hydrocephalus.  There is evolving recent to subacute age right posterior frontal infarction similar to prior MRI allowing for differences in technique     There is subtle hyperdensity along the left basal ganglia posteriorly and along the right posterior frontal cortex which may represent enhancing subacute age components of infarction with petechial hemorrhage felt less likely but cannot be entirely excluded with limitation secondary to recirculation contrast related to recent CTA performed at outside institution.     Evolving mass effect most pronounced associated with the left MCA territory infarction with sulcal effacement without significant midline shift or hydrocephalus.    CTH 12/8/22 12:11 (CTA H/N)   1. Left diencephalic hemisphere demonstrates evidence of an acute infarct of left basal ganglia and left caudate lobe.  2. Occlusion of the posterior division of the left M2 branch at the takeoff of the M1 vessel with patency involving the anterior division of the left M2 segment.    CTH 12/7/22   1.  Moderate size subacute infarction left anterior basal ganglia and anterior left internal capsule appears mildly larger and better well defined compared to CT from 12/3/2020. There is currently greater mass effect on the anterior horn of the left lateral ventricle. There is no associated bleed or midline shift.  2.  Recent MRI demonstrated additional punctate acute infarctions in the left frontal lobe and a  mild size acute infarction right centrum semiovale. These are less obvious on CT. No associated bleed.  3.  Additional chronic periventricular white matter hypodensities.    MRI Brain WO contrast 12/3/22   Multifocal areas of restricted diffusion are felt to reflect acute infarcts.    CTH 12/3/22   Loss of gray-white matter distinction in involving the left basal ganglia could reflect changes of chronic small vessel ischemic disease versus cytotoxic edema from acute infarct.     Carotid US Bilateral 12/3/22   1. Carotid intimal hyperplasia, with no findings of hemodynamically significant carotid arterial stenosis or vascular occlusion.  2. Patent vertebral arteries with antegrade flow bilaterally.      Cardiac Imaging   ARGHAV 12/4/22    The left ventricle is small with moderately decreased systolic function.   The estimated ejection fraction is 38%.   Left ventricular diastolic dysfunction.   There are segmental left ventricular wall motion abnormalities.   No spontaneous echo contrast. A moderate protruding layered left ventricular thrombus is present. The thrombus is fixed and located in the apex.   Normal right ventricular size.   Mild left atrial enlargement.   Mild right atrial enlargement.   Mild aortic regurgitation.   No interatrial septal defect present.   Normal appearing left atrial appendage. No thrombus is present in the appendage. SB occluder is absent. Abnormal appendage velocities.   Mild mitral regurgitation.   Agitated saline contrast study did not show any right to left shunt    TTE 11/14/22    The left ventricle is normal in size with mildly decreased systolic function.   The estimated ejection fraction is 40%.   Grade III left ventricular diastolic dysfunction.   Small posterior pericardial effusion.   There is mild left ventricular global hypokinesis.   Normal right ventricular size with normal right ventricular systolic function.   Severe left atrial enlargement.   Moderate  right atrial enlargement.   Mild pulmonic regurgitation.   Mild to moderate tricuspid regurgitation.   Mild-to-moderate aortic regurgitation.   Intermediate central venous pressure (8 mmHg).   The estimated PA systolic pressure is 51 mmHg.   There is pulmonary hypertension.      Lory Willett MD  [facility]  Department of Vascular Neurology   [facility]

## 2022-12-10 NOTE — ASSESSMENT & PLAN NOTE
Noted on imaging in the area of the L MCA territory. Will continue to monitor q1h while in ICU and repeat CTH PRN for acute neuro exam changes that could indicate worsening/expansion of edema.   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

## 2022-12-10 NOTE — PLAN OF CARE
Problem: Physical Therapy  Goal: Physical Therapy Goal  Description: Goals to be met by:      Patient will increase functional independence with mobility by performin. Supine to sit with Maximum Assistance  2. Sit to supine with Maximum Assistance  3. Sit to stand transfer with Maximum Assistance  4. Bed to chair transfer with Maximum Assistance using squat pivot technique.  5. Gait  x 5 feet with Moderate Assistance using LRAD.   6. Sitting at edge of bed x8 minutes with Contact Guard Assistance with DYLAN UE support.    Outcome: Ongoing, Progressing   Evaluation completed, initiated plan of care.   Liberty Colunga, PT  12/10/2022

## 2022-12-10 NOTE — ASSESSMENT & PLAN NOTE
Stroke RF  Not on AC, will need anticoagulation   Pending stability scan today , NCC planning to put patient on heparin gtt

## 2022-12-10 NOTE — ASSESSMENT & PLAN NOTE
-admit to NCC  -stroke team following   -Q 1 hour neuro checks   -Q 1 hour vital signs   -SBP goal <200  -daily statin   -continue daily ASA  -hold AC at least overnight, high risk for hemorrhagic conversion  - A1c, TSH, EKG  -echo and lipid panel complete  -MRI brain complete showing large LMCA stroke   -PT/OT/SLP  - [facility] 12/10/22 at 12 pm

## 2022-12-10 NOTE — PLAN OF CARE
[facility] Care Plan    POC reviewed with Spencer Burton  and family at 1400. Pt verbalized understanding. Questions and concerns addressed. No acute events today. Pt progressing toward goals. Will continue to monitor. See below and flowsheets for full assessment and VS info.       K replaced  Linens changed  Lasix given IVP x1  Metoprolol and aspirin started  Plan to start heparin gtt and obtain CT tomorrow      Is this a stroke patient? yes- Stroke booklet reviewed with patient and family, risk factors identified for patient and stroke booklet remains at bedside for ongoing education.     Neuro:  [place] Coma Scale  Best Eye Response: 4-->(E4) spontaneous  Best Motor Response: 5-->(M5) localizes pain  Best Verbal Response: 2-->(V2) incomprehensible speech  Thao Coma Scale Score: 11  Assessment Qualifiers: patient not sedated/intubated  Pupil PERRLA: yes     24 hr Temp:  [99.3 °F (37.4 °C)-101.7 °F (38.7 °C)]     CV:   Rhythm: sinus tachycardia  BP goals:   SBP <  200  MAP > 65    Resp:   (RETIRED) O2 Device (Oxygen Therapy): room air       Plan: N/A    GI/:     Diet/Nutrition Received: NPO  Last Bowel Movement: 12/06/22  Voiding Characteristics: external catheter    Intake/Output Summary (Last 24 hours) at 12/9/2022 1832  Last data filed at 12/9/2022 1705  Gross per 24 hour   Intake 936.54 ml   Output 1200 ml   Net -263.46 ml     Unmeasured Output  Urine Occurrence: 1  Stool Occurrence: 0  Pad Count: 1    Labs/Accuchecks:  Recent Labs   Lab 12/09/22  0026   WBC 21.30*   RBC 4.97   HGB 13.5*   HCT 42.6         Recent Labs   Lab 12/09/22  0026      K 3.4*   CO2 26      BUN 37*   CREATININE 3.5*   ALKPHOS 97   ALT 36   AST 21   BILITOT 1.9*      Recent Labs   Lab 12/08/22  0938   INR 1.2   APTT 25.2      Recent Labs   Lab 12/04/22  0231   CPKMB 6.6*       Electrolytes: Electrolytes replaced  Accuchecks: Q6H    Gtts:   dextrose 10 % in water (D10W)         LDA/Wounds:  Lines/Drains/Airways        Drain  Duration                  NG/OG Tube Right nostril -- days    Male External Urinary Catheter 12/08/22 1830 Small 1 day              Peripheral Intravenous Line  Duration                  Peripheral IV - Single Lumen 12/08/22 1800 20 G Anterior;Proximal;Right Forearm 1 day         Peripheral IV - Single Lumen 12/09/22 0107 20 G Anterior;Left;Proximal Forearm <1 day                  Wounds: Yes  Wound care consulted: Yes

## 2022-12-11 PROBLEM — E78.5 HYPERLIPIDEMIA ASSOCIATED WITH TYPE 2 DIABETES MELLITUS: Status: ACTIVE | Noted: 2022-12-11

## 2022-12-11 PROBLEM — R94.30 EJECTION FRACTION < 50%: Status: RESOLVED | Noted: 2022-12-10 | Resolved: 2022-12-11

## 2022-12-11 PROBLEM — N17.9 AKI (ACUTE KIDNEY INJURY): Status: ACTIVE | Noted: 2022-12-11

## 2022-12-11 PROBLEM — I50.9 CHF (CONGESTIVE HEART FAILURE): Chronic | Status: ACTIVE | Noted: 2022-12-11

## 2022-12-11 PROBLEM — E11.69 HYPERLIPIDEMIA ASSOCIATED WITH TYPE 2 DIABETES MELLITUS: Chronic | Status: ACTIVE | Noted: 2022-12-11

## 2022-12-11 PROBLEM — E11.69 HYPERLIPIDEMIA ASSOCIATED WITH TYPE 2 DIABETES MELLITUS: Status: ACTIVE | Noted: 2022-12-11

## 2022-12-11 PROBLEM — I50.9 CHF (CONGESTIVE HEART FAILURE): Status: ACTIVE | Noted: 2022-12-11

## 2022-12-11 PROBLEM — E78.5 HYPERLIPIDEMIA ASSOCIATED WITH TYPE 2 DIABETES MELLITUS: Chronic | Status: ACTIVE | Noted: 2022-12-11

## 2022-12-11 PROBLEM — I51.7 ENLARGED LA (LEFT ATRIUM): Status: RESOLVED | Noted: 2022-12-10 | Resolved: 2022-12-11

## 2022-12-11 LAB
ALBUMIN SERPL BCP-MCNC: 2.4 G/DL (ref 3.5–5.2)
ALP SERPL-CCNC: 91 U/L (ref 55–135)
ALT SERPL W/O P-5'-P-CCNC: 28 U/L (ref 10–44)
ANION GAP SERPL CALC-SCNC: 19 MMOL/L (ref 8–16)
APTT BLDCRRT: 46.2 SEC (ref 21–32)
AST SERPL-CCNC: 28 U/L (ref 10–40)
BACTERIA UR CULT: ABNORMAL
BASOPHILS # BLD AUTO: 0.1 K/UL (ref 0–0.2)
BASOPHILS NFR BLD: 0.5 % (ref 0–1.9)
BILIRUB SERPL-MCNC: 0.7 MG/DL (ref 0.1–1)
BUN SERPL-MCNC: 68 MG/DL (ref 6–20)
CALCIUM SERPL-MCNC: 8.8 MG/DL (ref 8.7–10.5)
CHLORIDE SERPL-SCNC: 104 MMOL/L (ref 95–110)
CO2 SERPL-SCNC: 23 MMOL/L (ref 23–29)
CREAT SERPL-MCNC: 5.7 MG/DL (ref 0.5–1.4)
DIFFERENTIAL METHOD: ABNORMAL
EOSINOPHIL # BLD AUTO: 0.1 K/UL (ref 0–0.5)
EOSINOPHIL NFR BLD: 0.3 % (ref 0–8)
ERYTHROCYTE [DISTWIDTH] IN BLOOD BY AUTOMATED COUNT: 14.7 % (ref 11.5–14.5)
EST. GFR  (NO RACE VARIABLE): 11.4 ML/MIN/1.73 M^2
GLUCOSE SERPL-MCNC: 241 MG/DL (ref 70–110)
HCT VFR BLD AUTO: 43.1 % (ref 40–54)
HGB BLD-MCNC: 13.8 G/DL (ref 14–18)
IMM GRANULOCYTES # BLD AUTO: 0.14 K/UL (ref 0–0.04)
IMM GRANULOCYTES NFR BLD AUTO: 0.6 % (ref 0–0.5)
LYMPHOCYTES # BLD AUTO: 1 K/UL (ref 1–4.8)
LYMPHOCYTES NFR BLD: 4.7 % (ref 18–48)
MAGNESIUM SERPL-MCNC: 2.1 MG/DL (ref 1.6–2.6)
MCH RBC QN AUTO: 26.7 PG (ref 27–31)
MCHC RBC AUTO-ENTMCNC: 32 G/DL (ref 32–36)
MCV RBC AUTO: 84 FL (ref 82–98)
MONOCYTES # BLD AUTO: 0.9 K/UL (ref 0.3–1)
MONOCYTES NFR BLD: 4 % (ref 4–15)
NEUTROPHILS # BLD AUTO: 19.6 K/UL (ref 1.8–7.7)
NEUTROPHILS NFR BLD: 89.9 % (ref 38–73)
NRBC BLD-RTO: 0 /100 WBC
PHOSPHATE SERPL-MCNC: 6.6 MG/DL (ref 2.7–4.5)
PLATELET # BLD AUTO: 201 K/UL (ref 150–450)
PMV BLD AUTO: 12.9 FL (ref 9.2–12.9)
POCT GLUCOSE: 136 MG/DL (ref 70–110)
POCT GLUCOSE: 156 MG/DL (ref 70–110)
POCT GLUCOSE: 182 MG/DL (ref 70–110)
POCT GLUCOSE: 188 MG/DL (ref 70–110)
POCT GLUCOSE: 270 MG/DL (ref 70–110)
POTASSIUM SERPL-SCNC: 4.1 MMOL/L (ref 3.5–5.1)
PROT SERPL-MCNC: 6.3 G/DL (ref 6–8.4)
RBC # BLD AUTO: 5.16 M/UL (ref 4.6–6.2)
SODIUM SERPL-SCNC: 142 MMOL/L (ref 136–145)
SODIUM SERPL-SCNC: 145 MMOL/L (ref 136–145)
SODIUM SERPL-SCNC: 146 MMOL/L (ref 136–145)
WBC # BLD AUTO: 21.74 K/UL (ref 3.9–12.7)

## 2022-12-11 PROCEDURE — 94761 N-INVAS EAR/PLS OXIMETRY MLT: CPT

## 2022-12-11 PROCEDURE — 99291 PR CRITICAL CARE, E/M 30-74 MINUTES: ICD-10-PCS | Mod: ,,, | Performed by: PSYCHIATRY & NEUROLOGY

## 2022-12-11 PROCEDURE — 25000003 PHARM REV CODE 250: Performed by: PHYSICIAN ASSISTANT

## 2022-12-11 PROCEDURE — 25000003 PHARM REV CODE 250: Performed by: PSYCHIATRY & NEUROLOGY

## 2022-12-11 PROCEDURE — 25000242 PHARM REV CODE 250 ALT 637 W/ HCPCS: Performed by: NURSE PRACTITIONER

## 2022-12-11 PROCEDURE — 80053 COMPREHEN METABOLIC PANEL: CPT | Performed by: PHYSICIAN ASSISTANT

## 2022-12-11 PROCEDURE — 63600175 PHARM REV CODE 636 W HCPCS: Performed by: NURSE PRACTITIONER

## 2022-12-11 PROCEDURE — 83735 ASSAY OF MAGNESIUM: CPT | Performed by: PHYSICIAN ASSISTANT

## 2022-12-11 PROCEDURE — 84295 ASSAY OF SERUM SODIUM: CPT | Performed by: NURSE PRACTITIONER

## 2022-12-11 PROCEDURE — 99233 SBSQ HOSP IP/OBS HIGH 50: CPT | Mod: ,,, | Performed by: PSYCHIATRY & NEUROLOGY

## 2022-12-11 PROCEDURE — 25000003 PHARM REV CODE 250: Performed by: NURSE PRACTITIONER

## 2022-12-11 PROCEDURE — 84100 ASSAY OF PHOSPHORUS: CPT | Performed by: PHYSICIAN ASSISTANT

## 2022-12-11 PROCEDURE — 99233 PR SUBSEQUENT HOSPITAL CARE,LEVL III: ICD-10-PCS | Mod: ,,, | Performed by: PSYCHIATRY & NEUROLOGY

## 2022-12-11 PROCEDURE — 85730 THROMBOPLASTIN TIME PARTIAL: CPT | Performed by: NURSE PRACTITIONER

## 2022-12-11 PROCEDURE — 85025 COMPLETE CBC W/AUTO DIFF WBC: CPT | Performed by: NURSE PRACTITIONER

## 2022-12-11 PROCEDURE — 99291 CRITICAL CARE FIRST HOUR: CPT | Mod: ,,, | Performed by: PSYCHIATRY & NEUROLOGY

## 2022-12-11 PROCEDURE — 20000000 HC ICU ROOM

## 2022-12-11 RX ORDER — INSULIN ASPART 100 [IU]/ML
3 INJECTION, SOLUTION INTRAVENOUS; SUBCUTANEOUS EVERY 4 HOURS
Status: DISCONTINUED | OUTPATIENT
Start: 2022-12-11 | End: 2022-12-13

## 2022-12-11 RX ORDER — SODIUM CHLORIDE 1 G/1
2000 TABLET ORAL 3 TIMES DAILY
Status: DISCONTINUED | OUTPATIENT
Start: 2022-12-11 | End: 2022-12-13

## 2022-12-11 RX ADMIN — INSULIN ASPART 2 UNITS: 100 INJECTION, SOLUTION INTRAVENOUS; SUBCUTANEOUS at 05:12

## 2022-12-11 RX ADMIN — PSYLLIUM HUSK 2 PACKET: 3.4 POWDER ORAL at 02:12

## 2022-12-11 RX ADMIN — MUPIROCIN: 20 OINTMENT TOPICAL at 08:12

## 2022-12-11 RX ADMIN — FUROSEMIDE 40 MG: 40 TABLET ORAL at 08:12

## 2022-12-11 RX ADMIN — INSULIN ASPART 6 UNITS: 100 INJECTION, SOLUTION INTRAVENOUS; SUBCUTANEOUS at 04:12

## 2022-12-11 RX ADMIN — INSULIN ASPART 2 UNITS: 100 INJECTION, SOLUTION INTRAVENOUS; SUBCUTANEOUS at 02:12

## 2022-12-11 RX ADMIN — INSULIN ASPART 3 UNITS: 100 INJECTION, SOLUTION INTRAVENOUS; SUBCUTANEOUS at 02:12

## 2022-12-11 RX ADMIN — MUPIROCIN: 20 OINTMENT TOPICAL at 09:12

## 2022-12-11 RX ADMIN — PSYLLIUM HUSK 2 PACKET: 3.4 POWDER ORAL at 09:12

## 2022-12-11 RX ADMIN — HEPARIN SODIUM 12 UNITS/KG/HR: 10000 INJECTION, SOLUTION INTRAVENOUS at 05:12

## 2022-12-11 RX ADMIN — INSULIN ASPART 3 UNITS: 100 INJECTION, SOLUTION INTRAVENOUS; SUBCUTANEOUS at 05:12

## 2022-12-11 RX ADMIN — SODIUM CHLORIDE 2000 MG: 1 TABLET ORAL at 09:12

## 2022-12-11 RX ADMIN — METOPROLOL TARTRATE 25 MG: 25 TABLET, FILM COATED ORAL at 09:12

## 2022-12-11 RX ADMIN — SODIUM CHLORIDE 2000 MG: 1 TABLET ORAL at 02:12

## 2022-12-11 RX ADMIN — ATORVASTATIN CALCIUM 40 MG: 40 TABLET, FILM COATED ORAL at 08:12

## 2022-12-11 RX ADMIN — INSULIN ASPART 3 UNITS: 100 INJECTION, SOLUTION INTRAVENOUS; SUBCUTANEOUS at 09:12

## 2022-12-11 RX ADMIN — CEFTRIAXONE 1 G: 1 INJECTION, POWDER, FOR SOLUTION INTRAMUSCULAR; INTRAVENOUS at 12:12

## 2022-12-11 RX ADMIN — ESCITALOPRAM OXALATE 20 MG: 20 TABLET ORAL at 08:12

## 2022-12-11 RX ADMIN — INSULIN ASPART 1 UNITS: 100 INJECTION, SOLUTION INTRAVENOUS; SUBCUTANEOUS at 09:12

## 2022-12-11 RX ADMIN — METOPROLOL TARTRATE 25 MG: 25 TABLET, FILM COATED ORAL at 08:12

## 2022-12-11 NOTE — CONSULTS
"[facility] - Neuro Critical Care  Adult Nutrition  Consult Note    SUMMARY     Recommendations    1. Advance TF as tolerated of Glucerna 1.5 to 45 mL/hr- provides 1620 kcals, 89 g pro, and 820 mL free water.      - If alternative formula warranted 2/2 altered lab values (Creatinine, Phosphorus, GFR, and BUN), modify TF to Novasource @ 35 mL/hr.     2. If able to tolerate PO intake, ADAT to diabetic- texture per SLP.   - Add renal diet restrictions if necessary.     3. RD following.    Goals: Will meet % EEN/EPN by next RD f/u.  Nutrition Goal Status: new  Communication of RD Recs:  (POC)    Assessment and Plan    Nutrition Problem  Inadequate oral intake     Related to (etiology):   Inability to consume sufficient needs     Signs and Symptoms (as evidenced by):   NPO status      Interventions/Recommendations (treatment strategy):  Collaboration of nutrition care with other providers   EN     Nutrition Diagnosis Status:   New    Reason for Assessment    Reason For Assessment: consult  Diagnosis: stroke/CVA  Relevant Medical History: HTN, T2DM, CKD 4, COPD  Interdisciplinary Rounds: did not attend  General Information Comments: RD consulted for TF. Unable to speak with pt 2/2 being aphasic/disoriented. Unsure intake PTA. Noted pt failed kearns. Noted TF regimen of Glucerna 1.5 @ 20 mL/hr currently running. UBW fluctuates between 186-200#. No s/s of malnutrition- appears well-nourished. Noted A1C of 9.1%- RD to provide DM diet handout if/when pt diet advanced. LBM 12/10.  Nutrition Discharge Planning: Pending clinical course    Nutrition Risk Screen    Nutrition Risk Screen: difficulty chewing/swallowing, tube feeding or parenteral nutrition    Nutrition/Diet History    Spiritual, Cultural Beliefs, Restorationist Practices, Values that Affect Care: no  Food Allergies: NKFA  Factors Affecting Nutritional Intake: NPO, difficulty/impaired swallowing    Anthropometrics    Temp: 98.4 °F (36.9 °C)  Height: 5' 9" (175.3 " cm)  Height (inches): 69 in  Weight Method: Bed Scale  Weight: 84.8 kg (186 lb 15.2 oz)  Weight (lb): 186.95 lb  Ideal Body Weight (IBW), Male: 160 lb  % Ideal Body Weight, Male (lb): 116.84 %  BMI (Calculated): 27.6  BMI Grade: 25 - 29.9 - overweight     Lab/Procedures/Meds    Pertinent Labs Reviewed: reviewed  Pertinent Labs Comments: Glucose 241, A1C 9.1, Albumin 2.4, Sodium 146, Creatinine 5.7, Phos 6.6, GFR 23, BUN 68, BNP 1729  Pertinent Medications Reviewed: reviewed  Pertinent Medications Comments: atorvastatin, heparin, furosemide, metoprolol tartrate, senna-docusate    Estimated/Assessed Needs    Weight Used For Calorie Calculations: 72.6 kg (160 lb)  Energy Calorie Requirements (kcal): 1814 kcals  Energy Need Method: Kcal/kg (25 kcal/kg IBW)  Protein Requirements: 85 g (1.0 g/kg)  Weight Used For Protein Calculations: 84.8 kg (186 lb 15.2 oz)  Fluid Requirements (mL): 1 ml or fluid per MD  Estimated Fluid Requirement Method: RDA Method  RDA Method (mL): 1814  CHO Requirement: 227 g    Nutrition Prescription Ordered    Current Diet Order: NPO  Current Nutrition Support Formula Ordered: Glucerna 1.5  Current Nutrition Support Rate Ordered: 20 (ml)  Current Nutrition Support Frequency Ordered: mL/hr    Evaluation of Received Nutrient/Fluid Intake    Enteral Calories (kcal): 720  Enteral Protein (gm): 40  Enteral (Free Water) Fluid (mL): 364  % Kcal Needs: 40%  % Protein Needs: 55%  I/O: -70.1 ml since admit  Energy Calories Required: not meeting needs  Protein Required: not meeting needs  Fluid Required: not meeting needs  Tolerance: tolerating  % Intake of Estimated Energy Needs: 40%  % Meal Intake: NPO    Nutrition Risk    Level of Risk/Frequency of Follow-up:  (1 time/week)     Monitor and Evaluation    Food and Nutrient Intake: enteral nutrition intake  Food and Nutrient Adminstration: enteral and parenteral nutrition administration  Knowledge/Beliefs/Attitudes: beliefs and attitudes, food and nutrition  knowledge/skill  Physical Activity and Function: nutrition-related ADLs and IADLs  Anthropometric Measurements: height/length, weight, weight change, body mass index  Biochemical Data, Medical Tests and Procedures: electrolyte and renal panel, gastrointestinal profile, inflammatory profile, glucose/endocrine profile, lipid profile  Nutrition-Focused Physical Findings: overall appearance     Nutrition Follow-Up    RD Follow-up?: Yes    Keri Flores, Registration Eligible, Provisional LDN

## 2022-12-11 NOTE — PLAN OF CARE
Recommendations     1. Advance TF as tolerated of Glucerna 1.5 to 45 mL/hr- provides 1620 kcals, 89 g pro, and 820 mL free water.                  - If alternative formula warranted 2/2 altered lab values (Creatinine, Phosphorus, GFR, and BUN), modify TF to Novasource @ 35 mL/hr.      2. If able to tolerate PO intake, ADAT to diabetic- texture per SLP.   - Add renal diet restrictions if necessary.      3. RD following.     Goals: Will meet % EEN/EPN by next RD f/u.  Nutrition Goal Status: new  Communication of RD Recs:  (POC)    Keri Flores, Registration Eligible, Provisional LDN

## 2022-12-11 NOTE — SUBJECTIVE & OBJECTIVE
Interval History:  No acute events overnight. CTH stable after therapeutic on heparin gtt.    Review of Systems   Reason unable to perform ROS: decrease LOC.     Objective:     Vitals:  Temp: 98.4 °F (36.9 °C)  Pulse: 74  Rhythm: normal sinus rhythm  BP: (!) 151/94  MAP (mmHg): 118  Resp: 16  SpO2: 98 %    Temp  Min: 97.6 °F (36.4 °C)  Max: 99.6 °F (37.6 °C)  Pulse  Min: 74  Max: 91  BP  Min: 130/81  Max: 175/107  MAP (mmHg)  Min: 100  Max: 134  Resp  Min: 16  Max: 20  SpO2  Min: 98 %  Max: 100 %    12/10 0701 - 12/11 0700  In: 539.3 [I.V.:61.5]  Out: 100 [Urine:100]   Unmeasured Output  Urine Occurrence: 1  Stool Occurrence: 0  Pad Count: 2       Physical Exam  Vitals and nursing note reviewed.   Constitutional:       Appearance: He is ill-appearing.   HENT:      Head: Normocephalic.      Nose: No rhinorrhea.      Comments: NGT in place     Mouth/Throat:      Mouth: Mucous membranes are moist.      Pharynx: Oropharynx is clear.   Eyes:      Pupils: Pupils are equal, round, and reactive to light.   Cardiovascular:      Rate and Rhythm: Normal rate and regular rhythm.      Pulses: Normal pulses.      Heart sounds: Normal heart sounds.   Pulmonary:      Effort: Pulmonary effort is normal.      Breath sounds: Normal breath sounds.   Abdominal:      Palpations: Abdomen is soft.      Tenderness: There is no abdominal tenderness.   Musculoskeletal:         General: Normal range of motion.   Skin:     General: Skin is warm and dry.      Capillary Refill: Capillary refill takes less than 2 seconds.   Neurological:      Mental Status: He is alert.      Comments: GCS E4V2M5  Alert, dysarthria with some understandable speech  L gaze, unable to cross midline, R facial droop  Motor: RUE 1/5, LLE 5/5, BLE withdraw from noxious stimulus       Medications:  Continuous  dextrose 10 % in water (D10W)  heparin (porcine) in D5W, Last Rate: 12 Units/kg/hr (12/11/22 1105)    Scheduled  atorvastatin, 40 mg, Daily  cefTRIAXone (ROCEPHIN)  IVPB, 1 g, Q24H  EScitalopram oxalate, 20 mg, Daily  furosemide, 40 mg, Daily  insulin aspart U-100, 3 Units, Q4H  metoprolol tartrate, 25 mg, BID  mupirocin, , BID  psyllium husk (aspartame), 2 packet, TID  sodium chloride, 2,000 mg, TID    PRN  dextrose 10 % in water (D10W), , Continuous PRN  dextrose 10%, 12.5 g, PRN  dextrose 10%, 25 g, PRN  glucagon (human recombinant), 1 mg, PRN  insulin aspart U-100, 1-10 Units, Q4H PRN  labetaloL, 10 mg, Q6H PRN  ondansetron, 8 mg, Q8H PRN  sodium chloride 0.9%, 10 mL, PRN      Today I personally reviewed pertinent medications, lines/drains/airways, imaging, cardiology results, laboratory results, microbiology results,     Diet  Diet NPO - receiving tube feeds

## 2022-12-11 NOTE — ASSESSMENT & PLAN NOTE
Patient is a 52yoM with CHF who initially presented to OSH with RSW. Did not receive tPA, as he was outside of the window. MRI at that time demonstrated bilateral anterior and posterior circulation strokes concerning for embolic etiology. Over hospital course at OSH, the patient was noted to have been lethargic and aphasic with dysarthria. RAGHAV at OSH demosntrated a L ventricular thrombus (not on AC). He was noted to have had an acute change in neuro exam on 12/8 at which time he had RSW with global aphasia. NIHSS had been 6 prior to the event and was noted to have been 23 following. CTA demonstrated a L M2 occlusion for which the patient was transferred to [facility] for possible intervention and higher level of care.     On arrival to ED, the patient was globally aphasic with L gaze and RSW. Exam limited due to mentation and aphasia. Patient was not taken for IR due to poor ASPECTS. He was admitted to ICU for close monitoring and hemicrani watch. Currently patient still in ICU for hemicrani watch, started on heparin gtt after reviewing stability scan. Clinical improvement noticed      Antithrombotics for secondary stroke prevention:   on heparin gtt until swallowing or PEG established, can be switched to DOAC/ warfarin later at discharge/post PEG    Statins for secondary stroke prevention and hyperlipidemia, if present:   Statins: Atorvastatin- 40 mg daily    Aggressive risk factor modification: HTN, DM, HLD, Diet, Exercise, LV thorombus     Rehab efforts: The patient has been evaluated by a stroke team provider and the therapy needs have been fully considered based off the presenting complaints and exam findings. The following therapy evaluations are needed: PT evaluate and treat, OT evaluate and treat, SLP evaluate and treat, PM&R evaluate for appropriate placement    Diagnostics ordered/pending: None     VTE prophylaxis: Heparin 5000 units SQ every 8 hours  Mechanical prophylaxis: Place SCDs / heparin gtt    BP  parameters: Infarct: No intervention, SBP <220

## 2022-12-11 NOTE — PLAN OF CARE
[facility] Care Plan    POC reviewed with Spencer Burton  and family at 1400. Pt verbalized understanding. Questions and concerns addressed. No acute events today. Pt progressing toward goals. Will continue to monitor. See below and flowsheets for full assessment and VS info.     CT head completed  Heparin gtt initiated at 12 units/kg/hr  TF started. At goal of 20 mL/hr  BM. Bath completed      Is this a stroke patient? yes- Stroke booklet reviewed with patient, risk factors identified for patient and stroke booklet remains at bedside for ongoing education.     Neuro:  [place] Coma Scale  Best Eye Response: 4-->(E4) spontaneous  Best Motor Response: 5-->(M5) localizes pain  Best Verbal Response: 2-->(V2) incomprehensible speech  [place]ao Coma Scale Score: 11  Assessment Qualifiers: patient not sedated/intubated  Pupil PERRLA: yes     24 hr Temp:  [97.6 °F (36.4 °C)-98.9 °F (37.2 °C)]     CV:   Rhythm: sinus tachycardia  BP goals:   SBP <  200  MAP > 65    Resp:   (RETIRED) O2 Device (Oxygen Therapy): room air       Plan: N/A    GI/:     Diet/Nutrition Received: NPO  Last Bowel Movement: 12/10/22  Voiding Characteristics: external catheter    Intake/Output Summary (Last 24 hours) at 12/10/2022 1820  Last data filed at 12/10/2022 1805  Gross per 24 hour   Intake 498.96 ml   Output 350 ml   Net 148.96 ml     Unmeasured Output  Urine Occurrence: 1  Stool Occurrence: 1  Pad Count: 2    Labs/Accuchecks:  Recent Labs   Lab 12/10/22  1309   WBC 22.59*   RBC 5.52   HGB 15.1   HCT 46.4         Recent Labs   Lab 12/10/22  0218      K 4.1   CO2 23      BUN 54*   CREATININE 4.8*   ALKPHOS 88   ALT 29   AST 34   BILITOT 1.3*      Recent Labs   Lab 12/10/22  1309   INR 1.1   APTT 23.3      Recent Labs   Lab 12/04/22  0231   CPKMB 6.6*       Electrolytes: Contraindicated  Accuchecks: Q6H    Gtts:   dextrose 10 % in water (D10W)      heparin (porcine) in D5W 12 Units/kg/hr (12/10/22 1605)        LDA/Wounds:  Lines/Drains/Airways       Drain  Duration                  NG/OG Tube Right nostril -- days    Male External Urinary Catheter 12/08/22 1830 Small 1 day              Peripheral Intravenous Line  Duration                  Peripheral IV - Single Lumen 12/08/22 1800 20 G Anterior;Proximal;Right Forearm 2 days         Peripheral IV - Single Lumen 12/10/22 0219 20 G Right Antecubital <1 day         Peripheral IV - Single Lumen 12/10/22 1505 Anterior;Left;Proximal Forearm <1 day                  Wounds: Yes  Wound care consulted: No

## 2022-12-11 NOTE — ASSESSMENT & PLAN NOTE
History of CKD, baseline Cr ~3.5-4.     - continue home Lasix 40 mg PO QD  - monitor Cr and I&Os  - avoid nephrotoxic agents and renally adjust medications

## 2022-12-11 NOTE — ASSESSMENT & PLAN NOTE
Permissive HTN in setting of acute stroke. No home medications on med list.  - goal SBP <200  - PRN labetalol, hydralazine

## 2022-12-11 NOTE — ASSESSMENT & PLAN NOTE
52 year old man with HTN, HLD, T2DM, CKD, CHF presented to OSH 12/3 with RSW. MRI with bilateral infarcts concerning for cardioembolic etiology. LV thrombus seen on RAGHAV. Neuro exam changed 12/8 while not on anticoagulation, found to have L M2 occlusion. [facility], no intervention as poor ASPECTS score. Admitted to [facility] for hemicrani watch.    - Vascular Neurology following   - SBP goal <200  - q1hr neuro checks   - atorvastatin 40 mg QD   - heparin gtt started 12/10  - PT/OT/SLP consulted

## 2022-12-11 NOTE — PROGRESS NOTES
[facility] - Neuro Critical Care  Neurocritical Care  Progress Note    Admit Date: 12/8/2022  Service Date: 12/11/2022  Length of Stay: 3    Subjective:     Chief Complaint: Embolic stroke involving left middle cerebral artery    History of Present Illness: Pt is a 52 y.o. male with PMHx of CHF who presents as a transfer for acute LMCA stroke. Patient initially presented to OSH on 12/3 with RSW. At that time he was not a candidate for TPA and MRI revealed bilateral cardio embolic strokes. Patient had a TTE and RAGHAV which revealed LV thrombus. He was not started AC yet in setting of acute strokes, plan was to start AC today 12/8. Today, patient was noted to have acute change in neuro exam. NIH went from 6 to 23 and CTA revealed L M2 occlusion. Patient was transferred to [facility] for possible intervention but ASPECTS score was poor so he was not a candidate. Patient will be admitted to [facility] for higher level care and neuro monitoring.       Hospital Course: 12/9/22: Hemicrani watch  12/10/22: start heparin drip  12/11/2022 CTH stable after heparin gtt therapeutic. Recorded UOP not accurate due to difficulty with condom cath; CXR no pleural effusion. CTH ordered for 12/13.      Interval History:  No acute events overnight. CTH stable after therapeutic on heparin gtt.    Review of Systems   Reason unable to perform ROS: decrease LOC.     Objective:     Vitals:  Temp: 98.4 °F (36.9 °C)  Pulse: 74  Rhythm: normal sinus rhythm  BP: (!) 151/94  MAP (mmHg): 118  Resp: 16  SpO2: 98 %    Temp  Min: 97.6 °F (36.4 °C)  Max: 99.6 °F (37.6 °C)  Pulse  Min: 74  Max: 91  BP  Min: 130/81  Max: 175/107  MAP (mmHg)  Min: 100  Max: 134  Resp  Min: 16  Max: 20  SpO2  Min: 98 %  Max: 100 %    12/10 0701 - 12/11 0700  In: 539.3 [I.V.:61.5]  Out: 100 [Urine:100]   Unmeasured Output  Urine Occurrence: 1  Stool Occurrence: 0  Pad Count: 2       Physical Exam  Vitals and nursing note reviewed.   Constitutional:       Appearance: He is ill-appearing.    HENT:      Head: Normocephalic.      Nose: No rhinorrhea.      Comments: NGT in place     Mouth/Throat:      Mouth: Mucous membranes are moist.      Pharynx: Oropharynx is clear.   Eyes:      Pupils: Pupils are equal, round, and reactive to light.   Cardiovascular:      Rate and Rhythm: Normal rate and regular rhythm.      Pulses: Normal pulses.      Heart sounds: Normal heart sounds.   Pulmonary:      Effort: Pulmonary effort is normal.      Breath sounds: Normal breath sounds.   Abdominal:      Palpations: Abdomen is soft.      Tenderness: There is no abdominal tenderness.   Musculoskeletal:         General: Normal range of motion.   Skin:     General: Skin is warm and dry.      Capillary Refill: Capillary refill takes less than 2 seconds.   Neurological:      Mental Status: He is alert.      Comments: GCS E4V2M5  Alert, dysarthria with some understandable speech  L gaze, unable to cross midline, R facial droop  Motor: RUE 1/5, LLE 5/5, BLE withdraw from noxious stimulus       Medications:  Continuous  dextrose 10 % in water (D10W)  heparin (porcine) in D5W, Last Rate: 12 Units/kg/hr (12/11/22 1105)    Scheduled  atorvastatin, 40 mg, Daily  cefTRIAXone (ROCEPHIN) IVPB, 1 g, Q24H  EScitalopram oxalate, 20 mg, Daily  furosemide, 40 mg, Daily  insulin aspart U-100, 3 Units, Q4H  metoprolol tartrate, 25 mg, BID  mupirocin, , BID  psyllium husk (aspartame), 2 packet, TID  sodium chloride, 2,000 mg, TID    PRN  dextrose 10 % in water (D10W), , Continuous PRN  dextrose 10%, 12.5 g, PRN  dextrose 10%, 25 g, PRN  glucagon (human recombinant), 1 mg, PRN  insulin aspart U-100, 1-10 Units, Q4H PRN  labetaloL, 10 mg, Q6H PRN  ondansetron, 8 mg, Q8H PRN  sodium chloride 0.9%, 10 mL, PRN      Today I personally reviewed pertinent medications, lines/drains/airways, imaging, cardiology results, laboratory results, microbiology results,     Diet  Diet NPO - receiving tube feeds    Assessment/Plan:     Neuro  * Embolic stroke  involving left middle cerebral artery  52 year old man with HTN, HLD, T2DM, CKD, CHF presented to OSH 12/3 with RSW. MRI with bilateral infarcts concerning for cardioembolic etiology. LV thrombus seen on RAGHAV. Neuro exam changed 12/8 while not on anticoagulation, found to have L M2 occlusion. Transferred to [facility] for IR evaluation, no intervention as poor ASPECTS score. Admitted to [facility] for hemicrani watch.    - Vascular Neurology following   - SBP goal <200  - q1hr neuro checks   - atorvastatin 40 mg QD   - heparin gtt started 12/10  - PT/OT/SLP consulted    Cytotoxic brain edema  Due to stroke. High risk of malignant cerebral edema given age and size of infarct     Cardiac/Vascular  Hyperlipidemia associated with type 2 diabetes mellitus    - atorvastatin 40 mg     CHF (congestive heart failure)  Chronic. Taking PO Lasix 40 mg QD prior to admission. 12/6 RAGHAV with EF 38%.    - strict I&Os  - continue home Lasix   - 12/11 CXR no pleural effusion    Essential hypertension  Permissive HTN in setting of acute stroke. No home medications on med list.  - goal SBP <200  - PRN labetalol, hydralazine    LV (left ventricular) mural thrombus  Seen on RAGHAV. Likely etiology of strokes.  - heparin gtt     Renal/  Stage 4 chronic kidney disease  See JR on CKD    JR on CKD  History of CKD, baseline Cr ~3.5-4.     - continue home Lasix 40 mg PO QD  - monitor Cr and I&Os  - avoid nephrotoxic agents and renally adjust medications     Acute cystitis without hematuria  12/8 UCx growing pan-sensitive Klebsiella.  - ceftriaxone x 5 days (12/10 - 12/14)    Hypokalemia  - goal K >4, Mg >2, replace PRN    Endocrine  Type 2 diabetes mellitus, with long-term current use of insulin  A1c 9.1. Prior to admission taking 70/30 insulin.    - aspart 3U q4h  - MDSSI  - tube feeds    Other  Debility  Due to stroke   - PT/OT recommending IPR  - SLP recommending minced and moist diet, thin liquids          The patient is being Prophylaxed  for:  Venous Thromboembolism with: Chemical  Stress Ulcer with: Not Applicable   Ventilator Pneumonia with: not applicable    Activity Orders          Turn patient starting at 12/08 1800    Elevate HOB starting at 12/08 1653    Diet NPO: NPO starting at 12/08 1653        Full Code    Nava Kraft MD  Neurocritical Care  [facility] - Neuro Critical Care

## 2022-12-11 NOTE — ASSESSMENT & PLAN NOTE
Afib Risk Factor    TTE/RAGHAV on 12/06:     The estimated ejection fraction is 38%.   There are segmental left ventricular wall motion abnormalities.   No spontaneous echo contrast. A moderate protruding layered left ventricular thrombus is present. The thrombus is fixed and located in the apex.   Mild left atrial enlargement.   Mild right atrial enlargement.   Mild aortic regurgitation.   Agitated saline contrast study did not show any right to left shunt

## 2022-12-11 NOTE — SUBJECTIVE & OBJECTIVE
Neurologic Chief Complaint: lethargy, aphasia, dysarthria     Subjective:     Interval History: Patient is seen for follow-up neurological assessment and treatment recommendations:     Patient with LV thrombus, will need anticoagulation. NGT in place would recommend heparin gtt until patient able to swallow or PEG placed prior to DOAC initiation. Patient tachycardic today with SBP < 210, metoprolol started by primary team. Febrile with elevated white count and procal, currently on Zosyn and Vanc while cx pending. NSGY following for hemicrani watch. Patient has been discharged from OT per last note, will need new orders. Continue current ICU care.     HPI, Past Medical, Family, and Social History remains the same as documented in the initial encounter.     Review of Systems   Unable to perform ROS: Acuity of condition   Scheduled Meds:   atorvastatin  40 mg Oral Daily    cefTRIAXone (ROCEPHIN) IVPB  1 g Intravenous Q24H    EScitalopram oxalate  20 mg Per NG tube Daily    furosemide  40 mg Per NG tube Daily    metoprolol tartrate  25 mg Per NG tube BID    mupirocin   Nasal BID    sodium chloride  2 g Per NG tube TID     Continuous Infusions:   dextrose 10 % in water (D10W)      heparin (porcine) in D5W 12 Units/kg/hr (12/11/22 1105)       PRN Meds:dextrose 10 % in water (D10W), dextrose 10%, dextrose 10%, glucagon (human recombinant), insulin aspart U-100, labetaloL, ondansetron, sodium chloride 0.9%    Objective:     Vital Signs (Most Recent):  Temp: 98.4 °F (36.9 °C) (12/11/22 1105)  Pulse: 74 (12/11/22 1105)  Resp: 16 (12/11/22 1105)  BP: (!) 151/94 (12/11/22 1105)  SpO2: 98 % (12/11/22 1105)  BP Location: Left arm    Vital Signs Range (Last 24H):  Temp:  [97.6 °F (36.4 °C)-99.6 °F (37.6 °C)]   Pulse:  [74-91]   Resp:  [16-22]   BP: (130-175)/()   SpO2:  [96 %-100 %]   BP Location: Left arm    General: NAD, but gets lethargic, NG in place, left hand in mittens  Head: Normocephalic, atraumatic  Eyes, ears,  nose and throat: normal.  Neck: Supple, no JVD, no thyromegaly.  Cardiovascular: No carotid bruits. Regular rate and rhythm.   Pulmonary: Lungs are clear to auscultation BL.  GI: Abdomen soft, not tender, not distended.  Skin: no rashes  Extremities: No clubbing, cyanosis, edema.  NEUROLOGIC EXAM:  MENTAL: The patient is awake, alert and oriented to himself, could tell he is in JEWEL, could tell his name, mild dysarthric speech.    CRANIAL NERVES:   CN II: Pupils are equal, round and reactive to light and accommodation. Difficult to assess VF given patients' mental status.   III, IV and VI: R gaze preferance but tracks past midline.  V: responses to touch on his face VII: R facial assymetry  VIII: Hearing acuity is intact to finger rub.  IX, X: XI: deferred   XII: Tongue protrudes midline without atrophy or fasciculations.  MOTOR EXAM: withdraws on pain on the R hemibody, can wiggle toes on R LE, Left hemibody antigravity, spontaneous movement noticed.  SENSORY EXAM: responds to touch L hemibody > R hemibody  CEREBELLAR EXAM: limited participation, however no nystagmus, no abnormal movement identified.   DEEP TENDON REFLEXES: deffered  GAIT/STANCE: deferred    Laboratory:  CMP:   Recent Labs   Lab 12/11/22  0153   CALCIUM 8.8   ALBUMIN 2.4*   PROT 6.3   *   K 4.1   CO2 23      BUN 68*   CREATININE 5.7*   ALKPHOS 91   ALT 28   AST 28   BILITOT 0.7     BMP:   Recent Labs   Lab 12/11/22  0153   *   K 4.1      CO2 23   BUN 68*   CREATININE 5.7*   CALCIUM 8.8     CBC:   Recent Labs   Lab 12/11/22  0153   WBC 21.74*   RBC 5.16   HGB 13.8*   HCT 43.1      MCV 84   MCH 26.7*   MCHC 32.0     Lipid Panel:   No results for input(s): CHOL, LDLCALC, HDL, TRIG in the last 168 hours.    Coagulation:   Recent Labs   Lab 12/10/22  1309 12/10/22  1956 12/11/22  0147   INR 1.1  --   --    APTT 23.3   < > 46.2*    < > = values in this interval not displayed.     Platelet Aggregation Study: No results for  input(s): PLTAGG, PLTAGINTERP, PLTAGREGLACO, ADPPLTAGGREG in the last 168 hours.  Hgb A1C:   Recent Labs   Lab 12/08/22  1729   HGBA1C 9.1*     TSH:   Recent Labs   Lab 12/08/22  1729   TSH 1.375       Diagnostic Results     Brain/Vessel Imaging   MRI Brain WO Contrast 12/8/22  Exam limited by patient motion artifact. Evolving left MCA territory infarct.  Additional foci of diffusion restriction in the left cerebral consistent with recent infarct.  Interval increase susceptibility artifact in the areas of diffusion restriction consistent with hemorrhagic conversion of recent infarcts.  No hydrocephalus or significant midline shift.      CTH 12/8/22 16:29   Evolving large left MCA territory infarction similar to recent CT though increased in size compared to prior MRI concerning for evolving recent to subacute and acute infarcts.  Localized mass effect without significant midline shift or hydrocephalus.  There is evolving recent to subacute age right posterior frontal infarction similar to prior MRI allowing for differences in technique     There is subtle hyperdensity along the left basal ganglia posteriorly and along the right posterior frontal cortex which may represent enhancing subacute age components of infarction with petechial hemorrhage felt less likely but cannot be entirely excluded with limitation secondary to recirculation contrast related to recent CTA performed at outside institution.     Evolving mass effect most pronounced associated with the left MCA territory infarction with sulcal effacement without significant midline shift or hydrocephalus.    CTH 12/8/22 12:11 (CTA H/N)   1. Left diencephalic hemisphere demonstrates evidence of an acute infarct of left basal ganglia and left caudate lobe.  2. Occlusion of the posterior division of the left M2 branch at the takeoff of the M1 vessel with patency involving the anterior division of the left M2 segment.    CTH 12/7/22   1.  Moderate size subacute  infarction left anterior basal ganglia and anterior left internal capsule appears mildly larger and better well defined compared to CT from 12/3/2020. There is currently greater mass effect on the anterior horn of the left lateral ventricle. There is no associated bleed or midline shift.  2.  Recent MRI demonstrated additional punctate acute infarctions in the left frontal lobe and a mild size acute infarction right centrum semiovale. These are less obvious on CT. No associated bleed.  3.  Additional chronic periventricular white matter hypodensities.    MRI Brain WO contrast 12/3/22   Multifocal areas of restricted diffusion are felt to reflect acute infarcts.    CTH 12/3/22   Loss of gray-white matter distinction in involving the left basal ganglia could reflect changes of chronic small vessel ischemic disease versus cytotoxic edema from acute infarct.     Carotid US Bilateral 12/3/22   1. Carotid intimal hyperplasia, with no findings of hemodynamically significant carotid arterial stenosis or vascular occlusion.  2. Patent vertebral arteries with antegrade flow bilaterally.      Cardiac Imaging   RAGHAV 12/4/22   The left ventricle is small with moderately decreased systolic function.  The estimated ejection fraction is 38%.  Left ventricular diastolic dysfunction.  There are segmental left ventricular wall motion abnormalities.  No spontaneous echo contrast. A moderate protruding layered left ventricular thrombus is present. The thrombus is fixed and located in the apex.  Normal right ventricular size.  Mild left atrial enlargement.  Mild right atrial enlargement.  Mild aortic regurgitation.  No interatrial septal defect present.  Normal appearing left atrial appendage. No thrombus is present in the appendage. SB occluder is absent. Abnormal appendage velocities.  Mild mitral regurgitation.  Agitated saline contrast study did not show any right to left shunt    TTE 11/14/22   The left ventricle is normal in size  with mildly decreased systolic function.  The estimated ejection fraction is 40%.  Grade III left ventricular diastolic dysfunction.  Small posterior pericardial effusion.  There is mild left ventricular global hypokinesis.  Normal right ventricular size with normal right ventricular systolic function.  Severe left atrial enlargement.  Moderate right atrial enlargement.  Mild pulmonic regurgitation.  Mild to moderate tricuspid regurgitation.  Mild-to-moderate aortic regurgitation.  Intermediate central venous pressure (8 mmHg).  The estimated PA systolic pressure is 51 mmHg.  There is pulmonary hypertension.

## 2022-12-11 NOTE — ASSESSMENT & PLAN NOTE
2/2 stroke   OT and PT to evaluate   Therapy recommending IPR at OSH ([facility]), updated recs pending. Will need new OT orders.

## 2022-12-11 NOTE — PROGRESS NOTES
[facility]  Neurosurgery  Progress Note    Subjective:     History of Present Illness: Spencer Burton Jr. is a 52 y.o. male PMHx of CHF and TIA who presents as a transfer for acute LMCA stroke. Patient initially presented to OSH on 12/3 with RSW. At that time he was not a candidate for TPA and MRI revealed bilateral cardio embolic strokes. Patient had a TTE and RAGHAV which revealed LV thrombus. He was not started AC yet in setting of acute strokes, plan was to start AC today 12/8. Yesterday, patient was noted to have acute change in neuro exam. NIH went from 6 to 23 and CTA revealed L M2 occlusion. Patient was [place] to [facility] [place] of care and NSGY consulted for hemicraniectomy watch.       Post-Op Info:  * No surgery found *         Interval History: 12/11: NAEON. -175 SBP. CTH this AM stable. Spont on L, FC centrally. PSD 3. Remains on hemicrani watch. Na 146.    Medications:  Continuous Infusions:   dextrose 10 % in water (D10W)      heparin (porcine) in D5W 12 Units/kg/hr (12/10/22 2102)     Scheduled Meds:   atorvastatin  40 mg Oral Daily    cefTRIAXone (ROCEPHIN) IVPB  1 g Intravenous Q24H    EScitalopram oxalate  20 mg Per NG tube Daily    furosemide  40 mg Per NG tube Daily    metoprolol tartrate  25 mg Per NG tube BID    mupirocin   Nasal BID    sodium chloride  2 g Per NG tube TID     PRN Meds:dextrose 10 % in water (D10W), dextrose 10%, dextrose 10%, glucagon (human recombinant), insulin aspart U-100, labetaloL, ondansetron, sodium chloride 0.9%     Review of Systems  Objective:     Weight: 84.8 kg (187 lb)  Body mass index is 27.62 kg/m².  Vital Signs (Most Recent):  Temp: 97.7 °F (36.5 °C) (12/11/22 0330)  Pulse: 81 (12/11/22 0330)  Resp: 17 (12/11/22 0330)  BP: (!) 165/107 (12/11/22 0330)  SpO2: 99 % (12/11/22 0330)   Vital Signs (24h Range):  Temp:  [97.6 °F (36.4 °C)-97.9 °F (36.6 °C)] 97.7 °F (36.5 °C)  Pulse:  [74-87] 81  Resp:  [15-22] 17  SpO2:  [96  %-100 %] 99 %  BP: (130-175)/() 165/107                          NG/OG Tube Right nostril (Active)   Placement Check placement verified by x-ray 12/10/22 2302   Tolerance no signs/symptoms of discomfort 12/10/22 2302   Securement secured to nostril center w/ adhesive device 12/10/22 2302   Clamp Status/Tolerance no abdominal discomfort;no abdominal distention 12/10/22 2302   Suction Setting/Drainage Method suction at the bedside 12/10/22 2302   Insertion Site Appearance no redness, warmth, tenderness, skin breakdown, drainage 12/10/22 2302   Drainage None 12/10/22 2302   Flush/Irrigation flushed w/;water;no resistance met 12/10/22 2302   Feeding Type continuous;by pump 12/10/22 2302   Feeding Action feeding continued 12/10/22 2302   Current Rate (mL/hr) 20 mL/hr 12/10/22 1902   Goal Rate (mL/hr) 20 mL/hr 12/10/22 1902   Intake (mL) 30 mL 12/10/22 1805   Water Bolus (mL) 40 mL 12/10/22 1805   Formula Name Glucerna 12/10/22 2302   Intake (mL) - Formula Tube Feeding 20 12/10/22 2302   Residual Amount (ml) 0 ml 12/10/22 2302            Rectal Tube 12/10/22 1002 rectal tube w/ balloon (indicate number of mLs) (Active)       Male External Urinary Catheter 12/08/22 1830 Small (Active)   Collection Container Urimeter 12/10/22 2302   Securement Method secured to top of thigh w/ adhesive device 12/10/22 2302   Skin no redness;no breakdown 12/10/22 2302   Tolerance no signs/symptoms of discomfort 12/10/22 2302   Output (mL) 125 mL 12/10/22 0405   Catheter Change Date 12/10/22 12/10/22 1805   Catheter Change Time 1805 12/10/22 1805       Physical Exam    Neurosurgery Physical Exam  [id number]  R hemiplegia  FC centrally  Purposeful on L  L gaze preference  R FD  Dysarthric,aphasic  O/w CN intact  Significant Labs:  Recent Labs   Lab 12/10/22  0218 12/11/22  0153   * 241*    146*   K 4.1 4.1    104   CO2 23 23   BUN 54* 68*   CREATININE 4.8* 5.7*   CALCIUM 8.6* 8.8   MG 1.8 2.1     Recent Labs   Lab  12/10/22  0218 12/10/22  1309 12/11/22  0153   WBC 22.28* 22.59* 21.74*   HGB 14.6 15.1 13.8*   HCT 45.1 46.4 43.1    209 201     Recent Labs   Lab 12/10/22  1309 12/10/22  1956 12/11/22  0147   INR 1.1  --   --    APTT 23.3 40.3* 46.2*     Microbiology Results (last 7 days)       Procedure Component Value Units Date/Time    Urine culture [[id number]]  (Abnormal)  (Susceptibility) Collected: 12/08/22 2001    Order Status: Completed Specimen: Urine Updated: 12/11/22 0328     Urine Culture, Routine KLEBSIELLA PNEUMONIAE  > 100,000 cfu/ml      Narrative:      Specimen Source->Urine    Blood culture [[id number]] Collected: 12/08/22 2052    Order Status: Completed Specimen: Blood from Peripheral, Antecubital, Right Updated: 12/10/22 2222     Blood Culture, Routine No Growth to date      No Growth to date      No Growth to date    Narrative:      Blood cultures from 2 different sites. 4 bottles total.  Please draw before starting antibiotics.    Blood culture [[id number]] Collected: 12/08/22 2052    Order Status: Completed Specimen: Blood from Peripheral, Antecubital, Right Updated: 12/10/22 2222     Blood Culture, Routine No Growth to date      No Growth to date      No Growth to date    Narrative:      Blood cultures x 2 different sites. 4 bottles total. Please  draw cultures before administering antibiotics.          All pertinent labs from the last 24 hours have been reviewed.    Significant Diagnostics:  I have reviewed and interpreted all pertinent imaging results/findings within the past 24 hours.  CT Head Without Contrast    Result Date: 12/11/2022  Grossly stable evolving large left MCA distribution infarct and smaller infarct in the right frontal lobe with possible trace hemorrhagic conversion.  Persistent mass effect with sulcal effacement and slight mass effect on the left lateral ventricle.  No new or worsening intracranial hemorrhage and no worsening of minimal rightward midline shift. Electronically  signed by: Akshat Carrington MD Date:    12/11/2022 Time:    03:53    CT Head Without Contrast    Result Date: 12/10/2022  Evolving no enlarged left MCA territory infarction with hypoattenuation and sulcal effacement.  Intermediate density along the left basal ganglia compatible with known hemorrhagic conversion.  Smaller area of infarction in the right cerebral hemisphere not well seen. Evolving mass effect and edema associated with the left cerebral hemispheric infarction with slight compression of the left lateral ventricle and trace 1 mm of rightward midline shift No evidence for hydrocephalus Clinical correlation and continued follow-up advised Electronically signed by: Aime Christensen DO Date:    12/10/2022 Time:    10:49       Assessment/Plan:     * Embolic stroke involving left middle cerebral artery  52M w/ PMH of CHF, TIA, LV thrombus with acute left MCA infarct, NSGY consulted for hemicrani watch    --Patient admitted to ICU on telemetry; NCC/stroke teams are primary   -q1h neurochecks in ICU  --All labs and diagnostics reviewed   --CTA 12/9: acute infarct of left basal ganglia and left caudate lobe. Occlusion of the posterior division of the left M2 branch at the takeoff of the M1 vessel with patency involving the anterior division of the left M2 segment   MRI brain 12/9: Evolving left MCA territory infarct.  Additional foci of diffusion restriction in the left cerebral consistent with recent infarct.  Interval increase susceptibility artifact in the areas of diffusion restriction consistent with hemorrhagic conversion of recent infarcts   CTH 12/10: evolving infarct, hemorrhagic conversion L BG, trace 1mm MLS   CTH 12/11: stable   UCx 12/9: GNRs, on Rocephin  --Not good operative candidate; on hep gtt for LV thrombus  --SBP reccs and management per primary team  --Na 145-155 strict, reccomend hypertonic saline PRN per primary team to reach goal  --HOB >30  --Remain NPO at this time for possible operative  intervention  --Continue maximal medical therapy and stroke work-up per primary teams  --Continue to monitor clinically, notify NSGY immediately with any changes in neuro status    Dispo: ongoing        Kam Guzman MD  Neurosurgery  [facility]

## 2022-12-11 NOTE — PROGRESS NOTES
[facility]  Vascular Neurology  Comprehensive Stroke Center  Progress Note    Assessment/Plan:     * Embolic stroke involving left middle cerebral artery  Patient is a 52yoM with CHF who initially presented to OSH with RSW. Did not receive tPA, as he was outside of the window. MRI at that time demonstrated bilateral anterior and posterior circulation strokes concerning for embolic etiology. Over hospital course at OSH, the patient was noted to have been lethargic and aphasic with dysarthria. RAGHAV at OSH demosntrated a L ventricular thrombus (not on AC). He was noted to have had an acute change in neuro exam on 12/8 at which time he had RSW with global aphasia. NIHSS had been 6 prior to the event and was noted to have been 23 following. CTA demonstrated a L M2 occlusion for which [place] [facility] [place] level of care.     On arrival to ED, the patient was globally aphasic with L gaze and RSW. Exam limited due to mentation and aphasia. Patient was not taken for IR due to poor ASPECTS. He was admitted to ICU for close monitoring and hemicrani watch. Currently patient still in ICU for hemicrani watch, started on heparin gtt after reviewing stability scan. Clinical improvement noticed      Antithrombotics for secondary stroke prevention:   on heparin gtt until swallowing or PEG established, can be switched to DOAC/ warfarin later at discharge/post PEG    Statins for secondary stroke prevention and hyperlipidemia, if present:   Statins: Atorvastatin- 40 mg daily    Aggressive risk factor modification: HTN, DM, HLD, Diet, Exercise, LV thorombus     Rehab efforts: The patient has been evaluated by a stroke team provider and the therapy needs have been fully considered based off the presenting complaints and exam findings. The following therapy evaluations are needed: PT evaluate and treat, OT evaluate and treat, SLP evaluate and treat, PM&R evaluate for  appropriate placement    Diagnostics ordered/pending: None     VTE prophylaxis: Heparin 5000 units SQ every 8 hours  Mechanical prophylaxis: Place SCDs / heparin gtt    BP parameters: Infarct: No intervention, SBP <220        Cytotoxic brain edema  Noted on imaging in the area of the L MCA territory. Will continue to monitor q1h while in ICU and repeat CTH PRN for acute neuro exam changes that could indicate worsening/expansion of edema.   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     Enlarged LA (left atrium)  Afib Risk Factor    TTE/RAGHAV on 12/06:     The estimated ejection fraction is 38%.   There are segmental left ventricular wall motion abnormalities.   No spontaneous echo contrast. A moderate protruding layered left ventricular thrombus is present. The thrombus is fixed and located in the apex.   Mild left atrial enlargement.   Mild right atrial enlargement.   Mild aortic regurgitation.   Agitated saline contrast study did not show any right to left shunt      Debility  2/2 stroke   OT and PT to evaluate   Therapy recommending IPR at OSH ([facility]), updated recs pending. Will need new OT orders.     LV (left ventricular) mural thrombus  Stroke RF  Heparin gtt on going      Type 2 diabetes mellitus without complication, without long-term current use of insulin  LDL:112.8  Atorvastatin 40mg daily  Stroke RF    Hypokalemia  2.9-->3.1-->3.4--> 4.1 (corrected)    Type 2 diabetes mellitus, with long-term current use of insulin  Stroke RF  A1C 9.1   Consider nutrition consult and DM education   IP goal 140-180   SSI     Essential hypertension  Stroke RF  SBP < 220          12/09/2022 Patient with LV thrombus, will need anticoagulation. NGT in place would recommend heparin gtt until patient able to swallow or PEG placed prior to DOAC initiation. Patient tachycardic today with SBP < 210, metoprolol started by primary team. Febrile with elevated white count and procal, currently on Zosyn and Vanc  while cx pending. NSGY following for hemicrani watch. Patient has been discharged from OT per last note, will need new orders. Continue current ICU care.   12/10/2022: Pending stability scan patient is expected to be started on heparin gtt. Continue hemicrani watch in  ICU. Family at bedside.   12/11/2022: Patient started on heparin gtt overnight, he demonstrated great clinical improvement today, he was able to tell me his name, knew he is in JEWEL. Followed single step commands. No family at bedside today.       STROKE DOCUMENTATION   Acute Stroke Times   Last Known Normal Date: 12/08/22  Last Known Normal Time: 1145  Symptom Onset Date: 12/08/22  Symptom Onset Time: 1145  Stroke Team Called Date: 12/08/22  Stroke Team Called Time: 1615  Stroke Team Arrival Date: 12/08/22  Stroke Team Arrival Time: 1615  CT Interpretation Time: 1615  Thrombolytic Therapy Recommended: No  CTA Interpretation Time: 1615    NIH Scale:  Interval: baseline  1a. Level of Consciousness: 0-->Alert, keenly responsive  1b. LOC Questions: 1-->Answers one question correctly  1c. LOC Commands: 1-->Performs one task correctly  2. Best Gaze: 1-->Partial gaze palsy, gaze is abnormal in one or both eyes, but forced deviation or total gaze paresis is not present  3. Visual: 0-->No visual loss  4. Facial Palsy: 1-->Minor paralysis (flattened nasolabial fold, asymmetry on smiling)  5a. Motor Arm, Left: 2-->Some effort against gravity, limb cannot get to or maintain (if cued) 90 (or 45) degrees, drifts down to bed, but has some effort against gravity  5b. Motor Arm, Right: 4-->No movement  6a. Motor Leg, Left: 2-->Some effort against gravity, leg falls to bed by 5 secs, but has some effort against gravity  6b. Motor Leg, Right: 3-->No effort against gravity, leg falls to bed immediately  7. Limb Ataxia: 0-->Absent  8. Sensory: 1-->Mild-to-moderate sensory loss, patient feels pinprick is less sharp or is dull on the affected side, or there is a loss of  superficial pain with pinprick, but patient is aware of being touched  9. Best Language: 2-->Severe aphasia, all communication is through fragmentary expression, great need for inference, questioning, and guessing by the listener. Range of information that can be exchanged is limited, listener carries burden of. . . (see row details)  10. Dysarthria: 1-->Mild-to-moderate dysarthria, patient slurs at least some words and, at worst, can be understood with some difficulty  11. Extinction and Inattention (formerly Neglect): 0-->No abnormality  Total (NIH Stroke Scale): 19       Modified [place] Score: 1  Thao Coma Scale:14     Hemorrhagic change of an Ischemic Stroke: Does this patient have an ischemic stroke with hemorrhagic changes? No     Neurologic Chief Complaint: lethargy, aphasia, dysarthria     Subjective:     Interval History: Patient is seen for follow-up neurological assessment and treatment recommendations:     Patient with LV thrombus, will need anticoagulation. NGT in place would recommend heparin gtt until patient able to swallow or PEG placed prior to DOAC initiation. Patient tachycardic today with SBP < 210, metoprolol started by primary team. Febrile with elevated white count and procal, currently on Zosyn and Vanc while cx pending. NSGY following for hemicrani watch. Patient has been discharged from OT per last note, will need new orders. Continue current ICU care.     HPI, Past Medical, Family, and Social History remains the same as documented in the initial encounter.     Review of Systems   Unable to perform ROS: Acuity of condition   Scheduled Meds:   atorvastatin  40 mg Oral Daily    cefTRIAXone (ROCEPHIN) IVPB  1 g Intravenous Q24H    EScitalopram oxalate  20 mg Per NG tube Daily    furosemide  40 mg Per NG tube Daily    metoprolol tartrate  25 mg Per NG tube BID    mupirocin   Nasal BID    sodium chloride  2 g Per NG tube TID     Continuous Infusions:   dextrose 10 % in water (D10W)       heparin (porcine) in D5W 12 Units/kg/hr (12/11/22 1105)       PRN Meds:dextrose 10 % in water (D10W), dextrose 10%, dextrose 10%, glucagon (human recombinant), insulin aspart U-100, labetaloL, ondansetron, sodium chloride 0.9%    Objective:     Vital Signs (Most Recent):  Temp: 98.4 °F (36.9 °C) (12/11/22 1105)  Pulse: 74 (12/11/22 1105)  Resp: 16 (12/11/22 1105)  BP: (!) 151/94 (12/11/22 1105)  SpO2: 98 % (12/11/22 1105)  BP Location: Left arm    Vital Signs Range (Last 24H):  Temp:  [97.6 °F (36.4 °C)-99.6 °F (37.6 °C)]   Pulse:  [74-91]   Resp:  [16-22]   BP: (130-175)/()   SpO2:  [96 %-100 %]   BP Location: Left arm    General: NAD, but gets lethargic, NG in place, left hand in mittens  Head: Normocephalic, atraumatic  Eyes, ears, nose and throat: normal.  Neck: Supple, no JVD, no thyromegaly.  Cardiovascular: No carotid bruits. Regular rate and rhythm.   Pulmonary: Lungs are clear to auscultation BL.  GI: Abdomen soft, not tender, not distended.  Skin: no rashes  Extremities: No clubbing, cyanosis, edema.  NEUROLOGIC EXAM:  MENTAL: The patient is awake, alert and oriented to himself, could tell he is in JEWEL, could tell his name, mild dysarthric speech.    CRANIAL NERVES:   CN II: Pupils are equal, round and reactive to light and accommodation. Difficult to assess VF given patients' mental status.   III, IV and VI: R gaze preferance but tracks past midline.  V: responses to touch on his face VII: R facial assymetry  VIII: Hearing acuity is intact to finger rub.  IX, X: XI: deferred   XII: Tongue protrudes midline without atrophy or fasciculations.  MOTOR EXAM: withdraws on pain on the R hemibody, can wiggle toes on R LE, Left hemibody antigravity, spontaneous movement noticed.  SENSORY EXAM: responds to touch L hemibody > R hemibody  CEREBELLAR EXAM: limited participation, however no nystagmus, no abnormal movement identified.   DEEP TENDON REFLEXES: deffered  GAIT/STANCE:  deferred    Laboratory:  CMP:   Recent Labs   Lab 12/11/22  0153   CALCIUM 8.8   ALBUMIN 2.4*   PROT 6.3   *   K 4.1   CO2 23      BUN 68*   CREATININE 5.7*   ALKPHOS 91   ALT 28   AST 28   BILITOT 0.7     BMP:   Recent Labs   Lab 12/11/22  0153   *   K 4.1      CO2 23   BUN 68*   CREATININE 5.7*   CALCIUM 8.8     CBC:   Recent Labs   Lab 12/11/22  0153   WBC 21.74*   RBC 5.16   HGB 13.8*   HCT 43.1      MCV 84   MCH 26.7*   MCHC 32.0     Lipid Panel:   No results for input(s): CHOL, LDLCALC, HDL, TRIG in the last 168 hours.    Coagulation:   Recent Labs   Lab 12/10/22  1309 12/10/22  1956 12/11/22  0147   INR 1.1  --   --    APTT 23.3   < > 46.2*    < > = values in this interval not displayed.     Platelet Aggregation Study: No results for input(s): PLTAGG, PLTAGINTERP, PLTAGREGLACO, ADPPLTAGGREG in the last 168 hours.  Hgb A1C:   Recent Labs   Lab 12/08/22  1729   HGBA1C 9.1*     TSH:   Recent Labs   Lab 12/08/22  1729   TSH 1.375       Diagnostic Results     Brain/Vessel Imaging   MRI Brain WO Contrast 12/8/22  Exam limited by patient motion artifact. Evolving left MCA territory infarct.  Additional foci of diffusion restriction in the left cerebral consistent with recent infarct.  Interval increase susceptibility artifact in the areas of diffusion restriction consistent with hemorrhagic conversion of recent infarcts.  No hydrocephalus or significant midline shift.      CTH 12/8/22 16:29   Evolving large left MCA territory infarction similar to recent CT though increased in size compared to prior MRI concerning for evolving recent to subacute and acute infarcts.  Localized mass effect without significant midline shift or hydrocephalus.  There is evolving recent to subacute age right posterior frontal infarction similar to prior MRI allowing for differences in technique     There is subtle hyperdensity along the left basal ganglia posteriorly and along the right posterior frontal  cortex which may represent enhancing subacute age components of infarction with petechial hemorrhage felt less likely but cannot be entirely excluded with limitation secondary to recirculation contrast related to recent CTA performed at outside institution.     Evolving mass effect most pronounced associated with the left MCA territory infarction with sulcal effacement without significant midline shift or hydrocephalus.    CTH 12/8/22 12:11 (CTA H/N)   1. Left diencephalic hemisphere demonstrates evidence of an acute infarct of left basal ganglia and left caudate lobe.  2. Occlusion of the posterior division of the left M2 branch at the takeoff of the M1 vessel with patency involving the anterior division of the left M2 segment.    CTH 12/7/22   1.  Moderate size subacute infarction left anterior basal ganglia and anterior left internal capsule appears mildly larger and better well defined compared to CT from 12/3/2020. There is currently greater mass effect on the anterior horn of the left lateral ventricle. There is no associated bleed or midline shift.  2.  Recent MRI demonstrated additional punctate acute infarctions in the left frontal lobe and a mild size acute infarction right centrum semiovale. These are less obvious on CT. No associated bleed.  3.  Additional chronic periventricular white matter hypodensities.    MRI Brain WO contrast 12/3/22   Multifocal areas of restricted diffusion are felt to reflect acute infarcts.    CTH 12/3/22   Loss of gray-white matter distinction in involving the left basal ganglia could reflect changes of chronic small vessel ischemic disease versus cytotoxic edema from acute infarct.     Carotid US Bilateral 12/3/22   1. Carotid intimal hyperplasia, with no findings of hemodynamically significant carotid arterial stenosis or vascular occlusion.  2. Patent vertebral arteries with antegrade flow bilaterally.      Cardiac Imaging   RAGHAV 12/4/22    The left ventricle is small with  moderately decreased systolic function.   The estimated ejection fraction is 38%.   Left ventricular diastolic dysfunction.   There are segmental left ventricular wall motion abnormalities.   No spontaneous echo contrast. A moderate protruding layered left ventricular thrombus is present. The thrombus is fixed and located in the apex.   Normal right ventricular size.   Mild left atrial enlargement.   Mild right atrial enlargement.   Mild aortic regurgitation.   No interatrial septal defect present.   Normal appearing left atrial appendage. No thrombus is present in the appendage. SB occluder is absent. Abnormal appendage velocities.   Mild mitral regurgitation.   Agitated saline contrast study did not show any right to left shunt    TTE 11/14/22    The left ventricle is normal in size with mildly decreased systolic function.   The estimated ejection fraction is 40%.   Grade III left ventricular diastolic dysfunction.   Small posterior pericardial effusion.   There is mild left ventricular global hypokinesis.   Normal right ventricular size with normal right ventricular systolic function.   Severe left atrial enlargement.   Moderate right atrial enlargement.   Mild pulmonic regurgitation.   Mild to moderate tricuspid regurgitation.   Mild-to-moderate aortic regurgitation.   Intermediate central venous pressure (8 mmHg).   The estimated PA systolic pressure is 51 mmHg.   There is pulmonary hypertension.      Lory Willett MD  Com[facility] Stroke Center  Department of Vascular Neurology   [facility]

## 2022-12-11 NOTE — SUBJECTIVE & OBJECTIVE
Interval History: 12/11: NAEON. -175 SBP. CTH this AM stable. Spont on L, FC centrally. PSD 3. Remains on hemicrani watch. Na 146.    Medications:  Continuous Infusions:   dextrose 10 % in water (D10W)      heparin (porcine) in D5W 12 Units/kg/hr (12/10/22 2102)     Scheduled Meds:   atorvastatin  40 mg Oral Daily    cefTRIAXone (ROCEPHIN) IVPB  1 g Intravenous Q24H    EScitalopram oxalate  20 mg Per NG tube Daily    furosemide  40 mg Per NG tube Daily    metoprolol tartrate  25 mg Per NG tube BID    mupirocin   Nasal BID    sodium chloride  2 g Per NG tube TID     PRN Meds:dextrose 10 % in water (D10W), dextrose 10%, dextrose 10%, glucagon (human recombinant), insulin aspart U-100, labetaloL, ondansetron, sodium chloride 0.9%     Review of Systems  Objective:     Weight: 84.8 kg (187 lb)  Body mass index is 27.62 kg/m².  Vital Signs (Most Recent):  Temp: 97.7 °F (36.5 °C) (12/11/22 0330)  Pulse: 81 (12/11/22 0330)  Resp: 17 (12/11/22 0330)  BP: (!) 165/107 (12/11/22 0330)  SpO2: 99 % (12/11/22 0330)   Vital Signs (24h Range):  Temp:  [97.6 °F (36.4 °C)-97.9 °F (36.6 °C)] 97.7 °F (36.5 °C)  Pulse:  [74-87] 81  Resp:  [15-22] 17  SpO2:  [96 %-100 %] 99 %  BP: (130-175)/() 165/107                          NG/OG Tube Right nostril (Active)   Placement Check placement verified by x-ray 12/10/22 2302   Tolerance no signs/symptoms of discomfort 12/10/22 2302   Securement secured to nostril center w/ adhesive device 12/10/22 2302   Clamp Status/Tolerance no abdominal discomfort;no abdominal distention 12/10/22 2302   Suction Setting/Drainage Method suction at the bedside 12/10/22 2302   Insertion Site Appearance no redness, warmth, tenderness, skin breakdown, drainage 12/10/22 2302   Drainage None 12/10/22 2302   Flush/Irrigation flushed w/;water;no resistance met 12/10/22 2302   Feeding Type continuous;by pump 12/10/22 2302   Feeding Action feeding continued 12/10/22 2302   Current Rate (mL/hr) 20 mL/hr  12/10/22 1902   Goal Rate (mL/hr) 20 mL/hr 12/10/22 1902   Intake (mL) 30 mL 12/10/22 1805   Water Bolus (mL) 40 mL 12/10/22 1805   Formula Name Glucerna 12/10/22 2302   Intake (mL) - Formula Tube Feeding 20 12/10/22 2302   Residual Amount (ml) 0 ml 12/10/22 2302            Rectal Tube 12/10/22 1002 rectal tube w/ balloon (indicate number of mLs) (Active)       Male External Urinary Catheter 12/08/22 1830 Small (Active)   Collection Container Urimeter 12/10/22 2302   Securement Method secured to top of thigh w/ adhesive device 12/10/22 2302   Skin no redness;no breakdown 12/10/22 2302   Tolerance no signs/symptoms of discomfort 12/10/22 2302   Output (mL) 125 mL 12/10/22 0405   Catheter Change Date 12/10/22 12/10/22 1805   Catheter Change Time 1805 12/10/22 1805       Physical Exam    Neurosurgery Physical Exam  [id number]  R hemiplegia  FC centrally  Purposeful on L  L gaze preference  R FD  Dysarthric,aphasic  O/w CN intact  Significant Labs:  Recent Labs   Lab 12/10/22  0218 12/11/22  0153   * 241*    146*   K 4.1 4.1    104   CO2 23 23   BUN 54* 68*   CREATININE 4.8* 5.7*   CALCIUM 8.6* 8.8   MG 1.8 2.1     Recent Labs   Lab 12/10/22  0218 12/10/22  1309 12/11/22  0153   WBC 22.28* 22.59* 21.74*   HGB 14.6 15.1 13.8*   HCT 45.1 46.4 43.1    209 201     Recent Labs   Lab 12/10/22  1309 12/10/22  1956 12/11/22  0147   INR 1.1  --   --    APTT 23.3 40.3* 46.2*     Microbiology Results (last 7 days)       Procedure Component Value Units Date/Time    Urine culture [[id number]]  (Abnormal)  (Susceptibility) Collected: 12/08/22 2001    Order Status: Completed Specimen: Urine Updated: 12/11/22 0328     Urine Culture, Routine KLEBSIELLA PNEUMONIAE  > 100,000 cfu/ml      Narrative:      Specimen Source->Urine    Blood culture [[id number]] Collected: 12/08/22 2052    Order Status: Completed Specimen: Blood from Peripheral, Antecubital, Right Updated: 12/10/22 2222     Blood Culture, Routine No Growth to  date      No Growth to date      No Growth to date    Narrative:      Blood cultures from 2 different sites. 4 bottles total.  Please draw before starting antibiotics.    Blood culture [[id number]] Collected: 12/08/22 2052    Order Status: Completed Specimen: Blood from Peripheral, Antecubital, Right Updated: 12/10/22 2222     Blood Culture, Routine No Growth to date      No Growth to date      No Growth to date    Narrative:      Blood cultures x 2 different sites. 4 bottles total. Please  draw cultures before administering antibiotics.          All pertinent labs from the last 24 hours have been reviewed.    Significant Diagnostics:  I have reviewed and interpreted all pertinent imaging results/findings within the past 24 hours.  CT Head Without Contrast    Result Date: 12/11/2022  Grossly stable evolving large left MCA distribution infarct and smaller infarct in the right frontal lobe with possible trace hemorrhagic conversion.  Persistent mass effect with sulcal effacement and slight mass effect on the left lateral ventricle.  No new or worsening intracranial hemorrhage and no worsening of minimal rightward midline shift. Electronically signed by: Akshat Carrington MD Date:    12/11/2022 Time:    03:53    CT Head Without Contrast    Result Date: 12/10/2022  Evolving no enlarged left MCA territory infarction with hypoattenuation and sulcal effacement.  Intermediate density along the left basal ganglia compatible with known hemorrhagic conversion.  Smaller area of infarction in the right cerebral hemisphere not well seen. Evolving mass effect and edema associated with the left cerebral hemispheric infarction with slight compression of the left lateral ventricle and trace 1 mm of rightward midline shift No evidence for hydrocephalus Clinical correlation and continued follow-up advised Electronically signed by: Aime Christensen DO Date:    12/10/2022 Time:    10:49

## 2022-12-11 NOTE — ASSESSMENT & PLAN NOTE
Chronic. Taking PO Lasix 40 mg QD prior to admission. 12/6 RAGHAV with EF 38%.    - strict I&Os  - continue home Lasix   - 12/11 CXR no pleural effusion

## 2022-12-11 NOTE — ASSESSMENT & PLAN NOTE
52M w/ PMH of CHF, TIA, LV thrombus with acute left MCA infarct, NSGY consulted for hemicrani watch    --Patient admitted to ICU on telemetry; NCC/stroke teams are primary   -q1h neurochecks in ICU  --All labs and diagnostics reviewed   --CTA 12/9: acute infarct of left basal ganglia and left caudate lobe. Occlusion of the posterior division of the left M2 branch at the takeoff of the M1 vessel with patency involving the anterior division of the left M2 segment   MRI brain 12/9: Evolving left MCA territory infarct.  Additional foci of diffusion restriction in the left cerebral consistent with recent infarct.  Interval increase susceptibility artifact in the areas of diffusion restriction consistent with hemorrhagic conversion of recent infarcts   CTH 12/10: evolving infarct, hemorrhagic conversion L BG, trace 1mm MLS   CTH 12/11: stable   UCx 12/9: GNRs, on Rocephin  --Not good operative candidate; on hep gtt for LV thrombus  --SBP reccs and management per primary team  --Na 145-155 strict, reccomend hypertonic saline PRN per primary team to reach goal  --HOB >30  --Remain NPO at this time for possible operative intervention  --Continue maximal medical therapy and stroke work-up per primary teams  --Continue to monitor clinically, notify NSGY immediately with any changes in neuro status    Dispo: ongoing

## 2022-12-12 PROBLEM — R13.12 OROPHARYNGEAL DYSPHAGIA: Status: ACTIVE | Noted: 2022-12-12

## 2022-12-12 LAB
ALBUMIN SERPL BCP-MCNC: 2.4 G/DL (ref 3.5–5.2)
ALP SERPL-CCNC: 120 U/L (ref 55–135)
ALT SERPL W/O P-5'-P-CCNC: 34 U/L (ref 10–44)
ANION GAP SERPL CALC-SCNC: 15 MMOL/L (ref 8–16)
APTT BLDCRRT: 42.9 SEC (ref 21–32)
AST SERPL-CCNC: 55 U/L (ref 10–40)
BASOPHILS # BLD AUTO: 0.05 K/UL (ref 0–0.2)
BASOPHILS NFR BLD: 0.3 % (ref 0–1.9)
BILIRUB SERPL-MCNC: 0.5 MG/DL (ref 0.1–1)
BUN SERPL-MCNC: 64 MG/DL (ref 6–20)
CALCIUM SERPL-MCNC: 8.9 MG/DL (ref 8.7–10.5)
CHLORIDE SERPL-SCNC: 109 MMOL/L (ref 95–110)
CO2 SERPL-SCNC: 24 MMOL/L (ref 23–29)
CREAT SERPL-MCNC: 5.2 MG/DL (ref 0.5–1.4)
DIFFERENTIAL METHOD: ABNORMAL
EOSINOPHIL # BLD AUTO: 0.1 K/UL (ref 0–0.5)
EOSINOPHIL NFR BLD: 0.5 % (ref 0–8)
ERYTHROCYTE [DISTWIDTH] IN BLOOD BY AUTOMATED COUNT: 14.7 % (ref 11.5–14.5)
EST. GFR  (NO RACE VARIABLE): 12.7 ML/MIN/1.73 M^2
GLUCOSE SERPL-MCNC: 171 MG/DL (ref 70–110)
HCT VFR BLD AUTO: 43.5 % (ref 40–54)
HGB BLD-MCNC: 13.5 G/DL (ref 14–18)
IMM GRANULOCYTES # BLD AUTO: 0.1 K/UL (ref 0–0.04)
IMM GRANULOCYTES NFR BLD AUTO: 0.6 % (ref 0–0.5)
LYMPHOCYTES # BLD AUTO: 1.2 K/UL (ref 1–4.8)
LYMPHOCYTES NFR BLD: 6.9 % (ref 18–48)
MAGNESIUM SERPL-MCNC: 2.4 MG/DL (ref 1.6–2.6)
MCH RBC QN AUTO: 26.5 PG (ref 27–31)
MCHC RBC AUTO-ENTMCNC: 31 G/DL (ref 32–36)
MCV RBC AUTO: 85 FL (ref 82–98)
MONOCYTES # BLD AUTO: 0.9 K/UL (ref 0.3–1)
MONOCYTES NFR BLD: 5.4 % (ref 4–15)
NEUTROPHILS # BLD AUTO: 14.8 K/UL (ref 1.8–7.7)
NEUTROPHILS NFR BLD: 86.3 % (ref 38–73)
NRBC BLD-RTO: 0 /100 WBC
PHOSPHATE SERPL-MCNC: 3.8 MG/DL (ref 2.7–4.5)
PLATELET # BLD AUTO: 239 K/UL (ref 150–450)
PMV BLD AUTO: 12.8 FL (ref 9.2–12.9)
POCT GLUCOSE: 144 MG/DL (ref 70–110)
POCT GLUCOSE: 149 MG/DL (ref 70–110)
POCT GLUCOSE: 175 MG/DL (ref 70–110)
POCT GLUCOSE: 176 MG/DL (ref 70–110)
POCT GLUCOSE: 183 MG/DL (ref 70–110)
POCT GLUCOSE: 214 MG/DL (ref 70–110)
POCT GLUCOSE: 217 MG/DL (ref 70–110)
POTASSIUM SERPL-SCNC: 3.7 MMOL/L (ref 3.5–5.1)
PROT SERPL-MCNC: 6.7 G/DL (ref 6–8.4)
RBC # BLD AUTO: 5.1 M/UL (ref 4.6–6.2)
SODIUM SERPL-SCNC: 148 MMOL/L (ref 136–145)
SODIUM SERPL-SCNC: 148 MMOL/L (ref 136–145)
SODIUM SERPL-SCNC: 149 MMOL/L (ref 136–145)
WBC # BLD AUTO: 17.12 K/UL (ref 3.9–12.7)

## 2022-12-12 PROCEDURE — 25000242 PHARM REV CODE 250 ALT 637 W/ HCPCS: Performed by: NURSE PRACTITIONER

## 2022-12-12 PROCEDURE — 94761 N-INVAS EAR/PLS OXIMETRY MLT: CPT

## 2022-12-12 PROCEDURE — 20000000 HC ICU ROOM

## 2022-12-12 PROCEDURE — 25000003 PHARM REV CODE 250: Performed by: NURSE PRACTITIONER

## 2022-12-12 PROCEDURE — 25000003 PHARM REV CODE 250: Performed by: PSYCHIATRY & NEUROLOGY

## 2022-12-12 PROCEDURE — 85730 THROMBOPLASTIN TIME PARTIAL: CPT | Performed by: NURSE PRACTITIONER

## 2022-12-12 PROCEDURE — 99233 PR SUBSEQUENT HOSPITAL CARE,LEVL III: ICD-10-PCS | Mod: FS,,, | Performed by: NURSE PRACTITIONER

## 2022-12-12 PROCEDURE — 97535 SELF CARE MNGMENT TRAINING: CPT

## 2022-12-12 PROCEDURE — 99233 SBSQ HOSP IP/OBS HIGH 50: CPT | Mod: ,,, | Performed by: PSYCHIATRY & NEUROLOGY

## 2022-12-12 PROCEDURE — 99233 PR SUBSEQUENT HOSPITAL CARE,LEVL III: ICD-10-PCS | Mod: ,,, | Performed by: PSYCHIATRY & NEUROLOGY

## 2022-12-12 PROCEDURE — 99233 PR SUBSEQUENT HOSPITAL CARE,LEVL III: ICD-10-PCS | Mod: ,,, | Performed by: STUDENT IN AN ORGANIZED HEALTH CARE EDUCATION/TRAINING PROGRAM

## 2022-12-12 PROCEDURE — 25000003 PHARM REV CODE 250: Performed by: PHYSICIAN ASSISTANT

## 2022-12-12 PROCEDURE — 63600175 PHARM REV CODE 636 W HCPCS: Performed by: NURSE PRACTITIONER

## 2022-12-12 PROCEDURE — 83735 ASSAY OF MAGNESIUM: CPT | Performed by: PHYSICIAN ASSISTANT

## 2022-12-12 PROCEDURE — 80053 COMPREHEN METABOLIC PANEL: CPT | Performed by: PHYSICIAN ASSISTANT

## 2022-12-12 PROCEDURE — 99233 SBSQ HOSP IP/OBS HIGH 50: CPT | Mod: FS,,, | Performed by: NURSE PRACTITIONER

## 2022-12-12 PROCEDURE — 92526 ORAL FUNCTION THERAPY: CPT

## 2022-12-12 PROCEDURE — 84295 ASSAY OF SERUM SODIUM: CPT | Mod: 91 | Performed by: NURSE PRACTITIONER

## 2022-12-12 PROCEDURE — 84100 ASSAY OF PHOSPHORUS: CPT | Performed by: PHYSICIAN ASSISTANT

## 2022-12-12 PROCEDURE — 85025 COMPLETE CBC W/AUTO DIFF WBC: CPT | Performed by: NURSE PRACTITIONER

## 2022-12-12 PROCEDURE — 99233 SBSQ HOSP IP/OBS HIGH 50: CPT | Mod: ,,, | Performed by: STUDENT IN AN ORGANIZED HEALTH CARE EDUCATION/TRAINING PROGRAM

## 2022-12-12 RX ORDER — ATORVASTATIN CALCIUM 40 MG/1
40 TABLET, FILM COATED ORAL DAILY
Status: DISCONTINUED | OUTPATIENT
Start: 2022-12-13 | End: 2022-12-20

## 2022-12-12 RX ADMIN — INSULIN ASPART 2 UNITS: 100 INJECTION, SOLUTION INTRAVENOUS; SUBCUTANEOUS at 02:12

## 2022-12-12 RX ADMIN — CEFTRIAXONE 1 G: 1 INJECTION, POWDER, FOR SOLUTION INTRAMUSCULAR; INTRAVENOUS at 11:12

## 2022-12-12 RX ADMIN — SODIUM CHLORIDE 2000 MG: 1 TABLET ORAL at 08:12

## 2022-12-12 RX ADMIN — METOPROLOL TARTRATE 25 MG: 25 TABLET, FILM COATED ORAL at 08:12

## 2022-12-12 RX ADMIN — INSULIN ASPART 1 UNITS: 100 INJECTION, SOLUTION INTRAVENOUS; SUBCUTANEOUS at 09:12

## 2022-12-12 RX ADMIN — INSULIN ASPART 3 UNITS: 100 INJECTION, SOLUTION INTRAVENOUS; SUBCUTANEOUS at 09:12

## 2022-12-12 RX ADMIN — ESCITALOPRAM OXALATE 20 MG: 20 TABLET ORAL at 08:12

## 2022-12-12 RX ADMIN — INSULIN ASPART 3 UNITS: 100 INJECTION, SOLUTION INTRAVENOUS; SUBCUTANEOUS at 01:12

## 2022-12-12 RX ADMIN — PSYLLIUM HUSK 2 PACKET: 3.4 POWDER ORAL at 08:12

## 2022-12-12 RX ADMIN — MUPIROCIN: 20 OINTMENT TOPICAL at 08:12

## 2022-12-12 RX ADMIN — INSULIN ASPART 3 UNITS: 100 INJECTION, SOLUTION INTRAVENOUS; SUBCUTANEOUS at 06:12

## 2022-12-12 RX ADMIN — INSULIN ASPART 3 UNITS: 100 INJECTION, SOLUTION INTRAVENOUS; SUBCUTANEOUS at 05:12

## 2022-12-12 RX ADMIN — ATORVASTATIN CALCIUM 40 MG: 40 TABLET, FILM COATED ORAL at 08:12

## 2022-12-12 RX ADMIN — INSULIN ASPART 4 UNITS: 100 INJECTION, SOLUTION INTRAVENOUS; SUBCUTANEOUS at 06:12

## 2022-12-12 RX ADMIN — INSULIN ASPART 3 UNITS: 100 INJECTION, SOLUTION INTRAVENOUS; SUBCUTANEOUS at 10:12

## 2022-12-12 RX ADMIN — INSULIN ASPART 4 UNITS: 100 INJECTION, SOLUTION INTRAVENOUS; SUBCUTANEOUS at 01:12

## 2022-12-12 RX ADMIN — HEPARIN SODIUM 12 UNITS/KG/HR: 10000 INJECTION, SOLUTION INTRAVENOUS at 10:12

## 2022-12-12 RX ADMIN — PSYLLIUM HUSK 2 PACKET: 3.4 POWDER ORAL at 03:12

## 2022-12-12 RX ADMIN — FUROSEMIDE 40 MG: 40 TABLET ORAL at 08:12

## 2022-12-12 RX ADMIN — SODIUM CHLORIDE 2000 MG: 1 TABLET ORAL at 03:12

## 2022-12-12 RX ADMIN — INSULIN ASPART 3 UNITS: 100 INJECTION, SOLUTION INTRAVENOUS; SUBCUTANEOUS at 02:12

## 2022-12-12 NOTE — PROGRESS NOTES
[facility] - Neuro Critical Care  Neurocritical Care  Progress Note    Admit Date: 12/8/2022  Service Date: 12/12/2022  Length of Stay: 4    Subjective:     Chief Complaint: Embolic stroke involving left middle cerebral artery    History of Present Illness: Pt is a 52 y.o. male with PMHx of CHF who presents as a transfer for acute LMCA stroke. Patient initially presented to OSH on 12/3 with RSW. At that time he was not a candidate for TPA and MRI revealed bilateral cardio embolic strokes. Patient had a TTE and RAGHAV which revealed LV thrombus. He was not started AC yet in setting of acute strokes, plan was to start AC today 12/8. Today, patient was noted to have acute change in neuro exam. NIH went from 6 to 23 and CTA revealed L M2 occlusion. Patient was transferred to [facility] [place] for possible intervention but ASPECTS score was poor so he was not a candidate. Patient will be admitted to [facility] for higher level care and neuro monitoring.     Hospital Course: 12/9/22: Hemicrani watch  12/10/22: start heparin drip  12/11/2022 CTH stable after heparin gtt therapeutic. Recorded UOP not accurate due to difficulty with condom cath; CXR no pleural effusion. CTH ordered for 12/13.  12/12/2022: NAEON. Tube feedings increased today. CTH in AM per NSGY.    Review of Systems: Unable to obtain a complete ROS due to level of consciousness.     Vitals:   Temp: 98.3 °F (36.8 °C)  Pulse: 93  Rhythm: normal sinus rhythm  BP: (!) 176/122  MAP (mmHg): 145  Resp: 18  SpO2: 97 %    Temp  Min: 97.5 °F (36.4 °C)  Max: 99.1 °F (37.3 °C)  Pulse  Min: 79  Max: 101  BP  Min: 131/85  Max: 191/118  MAP (mmHg)  Min: 103  Max: 148  Resp  Min: 15  Max: 21  SpO2  Min: 94 %  Max: 100 %    12/11 0701 - 12/12 0700  In: 1294.8 [I.V.:295.5]  Out: 1480 [Urine:1205]   Unmeasured Output  Urine Occurrence: 1  Stool Occurrence: 0  Pad Count: 2     Examination:   Constitutional: Well-nourished and -developed. No apparent distress.   Eyes: Conjunctiva clear, anicteric. Lids  no lesions.  Head/Ears/Nose/Mouth/Throat/Neck: Moist mucous membranes. External ears, nose atraumatic.   Cardiovascular: Regular rhythm. No leg edema.  Respiratory: Comfortable respirations. Clear to auscultation.  Gastrointestinal: Soft, nondistended, nontender. + bowel sounds.    Neurologic:  -GCS E 4 V 2 M 6  -Poor participant in exam. Drowsy. Dysarthric, garbled speech. Word finding difficulty. Follows some simple commands.  -Cranial nerves: L Gaze, PERRL, R facial droop, + cough  -Motor: R hemiparesis, LUE/LLE antigravity/spontaneous movement  -Sensation: Intact to light touch  Unable to test orientation, language, memory, judgment, insight, fund of knowledge, coordination, gait due to level of consciousness.    Medications:   Continuousdextrose 10 % in water (D10W)  heparin (porcine) in D5W, Last Rate: 12 Units/kg/hr (12/12/22 1505)    Scheduled[START ON 12/13/2022] atorvastatin, 40 mg, Daily  cefTRIAXone (ROCEPHIN) IVPB, 1 g, Q24H  EScitalopram oxalate, 20 mg, Daily  furosemide, 40 mg, Daily  insulin aspart U-100, 3 Units, Q4H  metoprolol tartrate, 25 mg, BID  mupirocin, , BID  psyllium husk (aspartame), 2 packet, TID  sodium chloride, 2,000 mg, TID    PRNdextrose 10 % in water (D10W), , Continuous PRN  dextrose 10%, 12.5 g, PRN  dextrose 10%, 25 g, PRN  glucagon (human recombinant), 1 mg, PRN  insulin aspart U-100, 1-10 Units, Q4H PRN  labetaloL, 10 mg, Q6H PRN  ondansetron, 8 mg, Q8H PRN  sodium chloride 0.9%, 10 mL, PRN       Today I independently reviewed pertinent medications, lines/drains/airways, imaging, cardiology results, laboratory results, microbiology results, notably:     ISTAT: No results for input(s): PH, PCO2, PO2, POCSATURATED, HCO3, BE, POCNA, POCK, POCTCO2, POCGLU, POCICA, POCLAC, SAMPLE in the last 24 hours.   Chem:   Recent Labs   Lab 12/12/22  7619 12/12/22  0803   * 149*   K 3.7  --      --    CO2 24  --    *  --    BUN 64*  --    CREATININE 5.2*  --    CALCIUM 8.9   --    MG 2.4  --    PHOS 3.8  --    ANIONGAP 15  --    PROT 6.7  --    ALBUMIN 2.4*  --    BILITOT 0.5  --    ALKPHOS 120  --    AST 55*  --    ALT 34  --      Heme:   Recent Labs   Lab 12/12/22  0239   WBC 17.12*   HGB 13.5*   HCT 43.5        Endo:   Recent Labs   Lab 12/12/22  0550 12/12/22  0806 12/12/22  1311   POCTGLUCOSE 144* 149* 214*     Assessment/Plan:     Neuro  * Embolic stroke involving left middle cerebral artery  52 year old man with HTN, HLD, T2DM, CKD, CHF presented to OSH 12/3 with RSW. MRI with bilateral infarcts concerning for cardioembolic etiology. LV thrombus seen on RAGHAV. Neuro exam changed 12/8 while not on anticoagulation, found to have L M2 occlusion. Transferred to [facility] [place] for IR evaluation, no intervention as poor ASPECTS score. Admitted to [facility] for hemicrani watch.    - Vascular Neurology following   - SBP goal <200  - q1hr neuro checks   - atorvastatin 40 mg QD   - heparin gtt started 12/10  - PT/OT/SLP consulted  - Day 4, CTH in AM per NSGY team    Cytotoxic brain edema  Due to stroke. High risk of malignant cerebral edema given age and size of infarct   Na level q12hrs  Salt tabs    Cardiac/Vascular  Hyperlipidemia associated with type 2 diabetes mellitus    - atorvastatin 40 mg     CHF (congestive heart failure)  Chronic. Taking PO Lasix 40 mg QD prior to admission. 12/6 RAGHAV with EF 38%.    - strict I&Os  - continue home Lasix     LV (left ventricular) mural thrombus  Seen on RAGHAV. Likely etiology of strokes.  - heparin gtt     Essential hypertension  Permissive HTN in setting of acute stroke. No home medications on med list.  - goal SBP <200  - PRN labetalol, hydralazine    Renal/  JR on CKD  History of CKD, baseline Cr ~3.5-4.     - continue home Lasix 40 mg PO QD  - monitor Cr and I&Os  - avoid nephrotoxic agents and renally adjust medications     Acute cystitis without hematuria  12/8 UCx growing pan-sensitive Klebsiella.  - ceftriaxone x 5 days (12/10 -  12/14)    Endocrine  Type 2 diabetes mellitus, with long-term current use of insulin  A1c 9.1. Prior to admission taking 70/30 insulin.    - aspart 3U q4h  - MDSSI  - tube feeds    GI  Oropharyngeal dysphagia  NGT in place for nutrition and meds  SLP following    Other  Debility  Due to stroke   - PT/OT recommending IPR    The patient is being Prophylaxed for:  Venous Thromboembolism with: Mechanical or Chemical  Stress Ulcer with: Not Applicable   Ventilator Pneumonia with: not applicable    Activity Orders            Turn patient starting at 12/08 1800    Elevate HOB starting at 12/08 1653    Diet NPO: NPO starting at 12/08 1653          Full Code    Level III    Laura Askew NP  Neurocritical Care  [facility] - Neuro Critical Care      Patient seen and discussed with the ARNP during rounds and I agree with the above assessments and clinical plan.

## 2022-12-12 NOTE — PROGRESS NOTES
[facility]  Vascular Neurology  Comprehensive Stroke Center  Progress Note    Assessment/Plan:     * Embolic stroke involving left middle cerebral artery  Patient is a 52yoM with CHF who initially presented to OSH with RSW. Did not receive tPA, as he was outside of the window. MRI at that time demonstrated bilateral anterior and posterior circulation strokes concerning for embolic etiology. Over hospital course at OSH, the patient was noted to have been lethargic and aphasic with dysarthria. RAGHAV at OSH demosntrated a L ventricular thrombus (not on AC). He was noted to have had an acute change in neuro exam on 12/8 at which time he had RSW with global aphasia. NIHSS had been 6 prior to the event and was noted to have been 23 following. CTA demonstrated a L M2 occlusion for which the patient was transferred to [facility] for possible intervention and higher level of care.     On arrival to ED, the patient was globally aphasic with L gaze and RSW. Exam limited due to mentation and aphasia. Patient was not taken for IR due to poor ASPECTS. He was admitted to ICU for close monitoring and hemicrani watch. Currently patient still in ICU for hemicrani watch, started on heparin gtt after reviewing stability scan. Will continue to follow.     Etiology: likely cardioembolic given LV thrombus      Antithrombotics for secondary stroke prevention:   on heparin gtt until swallowing or PEG established, can be switched to DOAC/ warfarin later at discharge/post PEG    Statins for secondary stroke prevention and hyperlipidemia, if present:   Statins: Atorvastatin- 40 mg daily    Aggressive risk factor modification: HTN, DM, HLD, Diet, Exercise, LV thorombus     Rehab efforts: Rehab; Minced & Moist Diet with Thin liquids     Diagnostics ordered/pending: None     VTE prophylaxis: Mechanical prophylaxis: Place SCDs  None: Reason for No Pharmacological VTE Prophylaxis: Currently on anticoagulation     BP parameters: Infarct:  No intervention, SBP <220        JR on CKD  See CKD    Hyperlipidemia associated with type 2 diabetes mellitus  Stroke RF   . 8   Atorvastatin 40mg , continue     Acute cystitis without hematuria  Currently on ceftriaxone   Klebsiella pneumoniae on cx   Recommend narrowing spectrum when appropriate     Debility  2/2 stroke   OT and PT to evaluate   Therapy recommending IPR     Cytotoxic brain edema  Noted on imaging in the area of the L MCA territory. Will continue to monitor q1h while in ICU and repeat CTH PRN for acute neuro exam changes that could indicate worsening/expansion of edema.   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     Has remained stable on repeat imaging . NSGY following     LV (left ventricular) mural thrombus  Stroke RF  Heparin gtt on going      Stage 4 chronic kidney disease  Cr 5.2   GFR 12.7   Avoid nephrotoxic medications and renally dose adjust when appropriate     Type 2 diabetes mellitus, with long-term current use of insulin  Stroke RF  A1C 9.1   Consider nutrition consult and DM education   IP goal 140-180    SSI   Aspart 3 units q4h     Essential hypertension  Stroke RF  SBP < 220, currently at goal   Consider slowly bringing down SBP goal from <220 to < 180 over the next 48h        12/09/2022 Patient with LV thrombus, will need anticoagulation. NGT in place would recommend heparin gtt until patient able to swallow or PEG placed prior to DOAC initiation. Patient tachycardic today with SBP < 210, metoprolol started by primary team. Febrile with elevated white count and procal, currently on Zosyn and Vanc while cx pending. NSGY following for hemicrani watch. Patient has been discharged from OT per last note, will need new orders. Continue current ICU care.   12/10/2022: Pending stability scan patient is expected to be started on heparin gtt. Continue hemicrani watch in  ICU. Family at bedside.   12/11/2022: Patient started on heparin gtt overnight, he demonstrated great clinical  improvement today, he was able to tell me his name, knew he is in [facility]. Followed single step commands. No family at bedside today.   12/12/2022 Patient remains in ICU for sue-crani watch. NSGY following. On salt tabs for goal of hypernatremia.Will remain in ICU another night. VN to re-evaluate for stepdown tomorrow. Patient is on ceftriaxone for acute cystitis. aPTT today 42.9. CTH following therapeutic heparin levels stable. More alert today. IPR recommendations; SLP with minced and moist diet with thin liquids.       STROKE DOCUMENTATION   Acute Stroke Times   Last Known Normal Date: 12/08/22  Last Known Normal Time: 1145  Symptom Onset Date: 12/08/22  Symptom Onset Time: 1145  Stroke Team Called Date: 12/08/22  Stroke Team Called Time: 1615  Stroke Team Arrival Date: 12/08/22  Stroke Team Arrival Time: 1615  CT Interpretation Time: 1615  Thrombolytic Therapy Recommended: No  CTA Interpretation Time: 1615    NIH Scale:  1a. Level of Consciousness: 0-->Alert, keenly responsive  1b. LOC Questions: 1-->Answers one question correctly  1c. LOC Commands: 2-->Performs neither task correctly  2. Best Gaze: 1-->Partial gaze palsy, gaze is abnormal in one or both eyes, but forced deviation or total gaze paresis is not present  3. Visual: 0-->No visual loss  4. Facial Palsy: 1-->Minor paralysis (flattened nasolabial fold, asymmetry on smiling)  5a. Motor Arm, Left: 0-->No drift, limb holds 90 (or 45) degrees for full 10 secs  5b. Motor Arm, Right: 4-->No movement  6a. Motor Leg, Left: 0-->No drift, leg holds 30 degree position for full 5 secs  6b. Motor Leg, Right: 3-->No effort against gravity, leg falls to bed immediately  7. Limb Ataxia: 0-->Absent  8. Sensory: 1-->Mild-to-moderate sensory loss, patient feels pinprick is less sharp or is dull on the affected side, or there is a loss of superficial pain with pinprick, but patient is aware of being touched  9. Best Language: 2-->Severe aphasia, all communication is through  fragmentary expression, great need for inference, questioning, and guessing by the listener. Range of information that can be exchanged is limited, listener carries burden of. . . (see row details)  10. Dysarthria: 2-->Severe dysarthria, patients speech is so slurred as to be unintelligible in the absence of or out of proportion to any dysphasia, or is mute/anarthric  11. Extinction and Inattention (formerly Neglect): 0-->No abnormality  Total (NIH Stroke Scale): 17       Modified [place] Score: 1  Thao Coma Scale:    ABCD2 Score:    [id number] Score:   HAS -BLED Score:   ICH Score:   Hunt & Leblanc Classification:      Hemorrhagic change of an Ischemic Stroke: Does this patient have an ischemic stroke with hemorrhagic changes? No     Neurologic Chief Complaint: lethargy, aphasia, dysarthria     Subjective:     Interval History: Patient is seen for follow-up neurological assessment and treatment recommendations:     Patient remains in ICU for sue-crani watch. NSGY following. On salt tabs for goal of hypernatremia.Will remain in ICU another night. VN to re-evaluate for stepdown tomorrow. Patient is on ceftriaxone for acute cystitis. aPTT today 42.9. CTH following therapeutic heparin levels stable. More alert today. IPR recommendations; SLP with minced and moist diet with thin liquids.    HPI, Past Medical, Family, and Social History remains the same as documented in the initial encounter.     Review of Systems   Unable to perform ROS: Other (Aphasia)   Scheduled Meds:   [START ON 12/13/2022] atorvastatin  40 mg Per NG tube Daily    cefTRIAXone (ROCEPHIN) IVPB  1 g Intravenous Q24H    EScitalopram oxalate  20 mg Per NG tube Daily    furosemide  40 mg Per NG tube Daily    insulin aspart U-100  3 Units Subcutaneous Q4H    metoprolol tartrate  25 mg Per NG tube BID    mupirocin   Nasal BID    psyllium husk (aspartame)  2 packet Per NG tube TID    sodium chloride  2,000 mg Per NG tube TID     Continuous  Infusions:   dextrose 10 % in water (D10W)      heparin (porcine) in D5W 12 Units/kg/hr (12/12/22 1005)     PRN Meds:dextrose 10 % in water (D10W), dextrose 10%, dextrose 10%, glucagon (human recombinant), insulin aspart U-100, labetaloL, ondansetron, sodium chloride 0.9%    Objective:     Vital Signs (Most Recent):  Temp: 97.6 °F (36.4 °C) (12/12/22 1105)  Pulse: 87 (12/12/22 1205)  Resp: 20 (12/12/22 1205)  BP: (!) 187/118 (12/12/22 1205)  SpO2: 99 % (12/12/22 1205)  BP Location: Left arm    Vital Signs Range (Last 24H):  Temp:  [97.5 °F (36.4 °C)-99.1 °F (37.3 °C)]   Pulse:  []   Resp:  [15-20]   BP: (131-191)/()   SpO2:  [94 %-100 %]   BP Location: Left arm    Physical Exam  Vitals and nursing note reviewed.   Constitutional:       General: He is not in acute distress.  HENT:      Head: Normocephalic and atraumatic.      Nose: No rhinorrhea.   Eyes:      General: No scleral icterus.  Cardiovascular:      Rate and Rhythm: Normal rate.   Pulmonary:      Effort: No respiratory distress.   Skin:     General: Skin is warm and dry.   Neurological:      Mental Status: He is alert.      Motor: Weakness present.      Comments: Withdraws on R side from pain   L side antigravity   Severe aphasia            Neurological Exam:   LOC: alert   Attention Span: poor  Language: Global aphasia   Articulation: Nonverbal unable to assess   Orientation: Nonverbal unable to assess   EOM (CN III, IV, VI): Tracks   Motor: L side moves spontaneously and antigravity, R side withdraws from pain   Sensation: Withdraws from pain throughout     Laboratory:  CMP:   Recent Labs   Lab 12/12/22  0239 12/12/22  0803   CALCIUM 8.9  --    ALBUMIN 2.4*  --    PROT 6.7  --    * 149*   K 3.7  --    CO2 24  --      --    BUN 64*  --    CREATININE 5.2*  --    ALKPHOS 120  --    ALT 34  --    AST 55*  --    BILITOT 0.5  --        BMP:   Recent Labs   Lab 12/12/22  0239 12/12/22  0803   * 149*   K 3.7  --      --     CO2 24  --    BUN 64*  --    CREATININE 5.2*  --    CALCIUM 8.9  --        CBC:   Recent Labs   Lab 12/12/22  0239   WBC 17.12*   RBC 5.10   HGB 13.5*   HCT 43.5      MCV 85   MCH 26.5*   MCHC 31.0*       Lipid Panel:   No results for input(s): CHOL, LDLCALC, HDL, TRIG in the last 168 hours.    Coagulation:   Recent Labs   Lab 12/10/22  1309 12/10/22  1956 12/12/22  0239   INR 1.1  --   --    APTT 23.3   < > 42.9*    < > = values in this interval not displayed.       Platelet Aggregation Study: No results for input(s): PLTAGG, PLTAGINTERP, PLTAGREGLACO, ADPPLTAGGREG in the last 168 hours.  Hgb A1C:   Recent Labs   Lab 12/08/22  1729   HGBA1C 9.1*       TSH:   Recent Labs   Lab 12/08/22  1729   TSH 1.375         Diagnostic Results     Brain/Vessel Imaging   CTH 12/11/22   Grossly stable evolving large left MCA distribution infarct and smaller infarct in the right frontal lobe with possible trace hemorrhagic conversion. Persistent mass effect with sulcal effacement and slight mass effect on the left lateral ventricle. No new or worsening intracranial hemorrhage and no worsening of minimal rightward midline shift.    CTH 12/9/22   Evolving no enlarged left MCA territory infarction with hypoattenuation and sulcal effacement. Intermediate density along the left basal ganglia compatible with known hemorrhagic conversion. Smaller area of infarction in the right cerebral hemisphere not well seen. Evolving mass effect and edema associated with the left cerebral hemispheric infarction with slight compression of the left lateral ventricle and trace 1 mm of rightward midline shift. No evidence for hydrocephalus    MRI Brain WO Contrast 12/8/22    Exam limited by patient motion artifact. Evolving left MCA territory infarct.  Additional foci of diffusion restriction in the left cerebral consistent with recent infarct.  Interval increase susceptibility artifact in the areas of diffusion restriction consistent with  hemorrhagic conversion of recent infarcts.  No hydrocephalus or significant midline shift.      CTH 12/8/22 16:29   Evolving large left MCA territory infarction similar to recent CT though increased in size compared to prior MRI concerning for evolving recent to subacute and acute infarcts.  Localized mass effect without significant midline shift or hydrocephalus.  There is evolving recent to subacute age right posterior frontal infarction similar to prior MRI allowing for differences in technique     There is subtle hyperdensity along the left basal ganglia posteriorly and along the right posterior frontal cortex which may represent enhancing subacute age components of infarction with petechial hemorrhage felt less likely but cannot be entirely excluded with limitation secondary to recirculation contrast related to recent CTA performed at outside institution.     Evolving mass effect most pronounced associated with the left MCA territory infarction with sulcal effacement without significant midline shift or hydrocephalus.    CTH 12/8/22 12:11 (CTA H/N)   1. Left diencephalic hemisphere demonstrates evidence of an acute infarct of left basal ganglia and left caudate lobe.  2. Occlusion of the posterior division of the left M2 branch at the takeoff of the M1 vessel with patency involving the anterior division of the left M2 segment.    CTH 12/7/22   1.  Moderate size subacute infarction left anterior basal ganglia and anterior left internal capsule appears mildly larger and better well defined compared to CT from 12/3/2020. There is currently greater mass effect on the anterior horn of the left lateral ventricle. There is no associated bleed or midline shift.  2.  Recent MRI demonstrated additional punctate acute infarctions in the left frontal lobe and a mild size acute infarction right centrum semiovale. These are less obvious on CT. No associated bleed.  3.  Additional chronic periventricular white matter  hypodensities.    MRI Brain WO contrast 12/3/22   Multifocal areas of restricted diffusion are felt to reflect acute infarcts.    CTH 12/3/22   Loss of gray-white matter distinction in involving the left basal ganglia could reflect changes of chronic small vessel ischemic disease versus cytotoxic edema from acute infarct.     Carotid US Bilateral 12/3/22   1. Carotid intimal hyperplasia, with no findings of hemodynamically significant carotid arterial stenosis or vascular occlusion.  2. Patent vertebral arteries with antegrade flow bilaterally.      Cardiac Imaging   RAGHAV 12/4/22    The left ventricle is small with moderately decreased systolic function.   The estimated ejection fraction is 38%.   Left ventricular diastolic dysfunction.   There are segmental left ventricular wall motion abnormalities.   No spontaneous echo contrast. A moderate protruding layered left ventricular thrombus is present. The thrombus is fixed and located in the apex.   Normal right ventricular size.   Mild left atrial enlargement.   Mild right atrial enlargement.   Mild aortic regurgitation.   No interatrial septal defect present.   Normal appearing left atrial appendage. No thrombus is present in the appendage. SB occluder is absent. Abnormal appendage velocities.   Mild mitral regurgitation.   Agitated saline contrast study did not show any right to left shunt    TTE 11/14/22    The left ventricle is normal in size with mildly decreased systolic function.   The estimated ejection fraction is 40%.   Grade III left ventricular diastolic dysfunction.   Small posterior pericardial effusion.   There is mild left ventricular global hypokinesis.   Normal right ventricular size with normal right ventricular systolic function.   Severe left atrial enlargement.   Moderate right atrial enlargement.   Mild pulmonic regurgitation.   Mild to moderate tricuspid regurgitation.   Mild-to-moderate aortic  regurgitation.   Intermediate central venous pressure (8 mmHg).   The estimated PA systolic pressure is 51 mmHg.   There is pulmonary hypertension.      Beny Isaacs PA-C  [facility]  Department of Vascular Neurology   [facility]

## 2022-12-12 NOTE — SUBJECTIVE & OBJECTIVE
Neurologic Chief Complaint: lethargy, aphasia, dysarthria     Subjective:     Interval History: Patient is seen for follow-up neurological assessment and treatment recommendations:     Patient remains in ICU for sue-crani watch. NSGY following. On salt tabs for goal of hypernatremia.Will remain in ICU another night. VN to re-evaluate for stepdown tomorrow. Patient is on ceftriaxone for acute cystitis. aPTT today 42.9. CTH following therapeutic heparin levels stable. More alert today. IPR recommendations; SLP with minced and moist diet with thin liquids.    HPI, Past Medical, Family, and Social History remains the same as documented in the initial encounter.     Review of Systems   Unable to perform ROS: Other (Aphasia)   Scheduled Meds:   [START ON 12/13/2022] atorvastatin  40 mg Per NG tube Daily    cefTRIAXone (ROCEPHIN) IVPB  1 g Intravenous Q24H    EScitalopram oxalate  20 mg Per NG tube Daily    furosemide  40 mg Per NG tube Daily    insulin aspart U-100  3 Units Subcutaneous Q4H    metoprolol tartrate  25 mg Per NG tube BID    mupirocin   Nasal BID    psyllium husk (aspartame)  2 packet Per NG tube TID    sodium chloride  2,000 mg Per NG tube TID     Continuous Infusions:   dextrose 10 % in water (D10W)      heparin (porcine) in D5W 12 Units/kg/hr (12/12/22 1005)     PRN Meds:dextrose 10 % in water (D10W), dextrose 10%, dextrose 10%, glucagon (human recombinant), insulin aspart U-100, labetaloL, ondansetron, sodium chloride 0.9%    Objective:     Vital Signs (Most Recent):  Temp: 97.6 °F (36.4 °C) (12/12/22 1105)  Pulse: 87 (12/12/22 1205)  Resp: 20 (12/12/22 1205)  BP: (!) 187/118 (12/12/22 1205)  SpO2: 99 % (12/12/22 1205)  BP Location: Left arm    Vital Signs Range (Last 24H):  Temp:  [97.5 °F (36.4 °C)-99.1 °F (37.3 °C)]   Pulse:  []   Resp:  [15-20]   BP: (131-191)/()   SpO2:  [94 %-100 %]   BP Location: Left arm    Physical Exam  Vitals and nursing note reviewed.   Constitutional:        General: He is not in acute distress.  HENT:      Head: Normocephalic and atraumatic.      Nose: No rhinorrhea.   Eyes:      General: No scleral icterus.  Cardiovascular:      Rate and Rhythm: Normal rate.   Pulmonary:      Effort: No respiratory distress.   Skin:     General: Skin is warm and dry.   Neurological:      Mental Status: He is alert.      Motor: Weakness present.      Comments: Withdraws on R side from pain   L side antigravity   Severe aphasia            Neurological Exam:   LOC: alert   Attention Span: poor  Language: Global aphasia   Articulation: Nonverbal unable to assess   Orientation: Nonverbal unable to assess   EOM (CN III, IV, VI): Tracks   Motor: L side moves spontaneously and antigravity, R side withdraws from pain   Sensation: Withdraws from pain throughout     Laboratory:  CMP:   Recent Labs   Lab 12/12/22 0239 12/12/22  0803   CALCIUM 8.9  --    ALBUMIN 2.4*  --    PROT 6.7  --    * 149*   K 3.7  --    CO2 24  --      --    BUN 64*  --    CREATININE 5.2*  --    ALKPHOS 120  --    ALT 34  --    AST 55*  --    BILITOT 0.5  --        BMP:   Recent Labs   Lab 12/12/22 0239 12/12/22  0803   * 149*   K 3.7  --      --    CO2 24  --    BUN 64*  --    CREATININE 5.2*  --    CALCIUM 8.9  --        CBC:   Recent Labs   Lab 12/12/22 0239   WBC 17.12*   RBC 5.10   HGB 13.5*   HCT 43.5      MCV 85   MCH 26.5*   MCHC 31.0*       Lipid Panel:   No results for input(s): CHOL, LDLCALC, HDL, TRIG in the last 168 hours.    Coagulation:   Recent Labs   Lab 12/10/22  1309 12/10/22  1956 12/12/22 0239   INR 1.1  --   --    APTT 23.3   < > 42.9*    < > = values in this interval not displayed.       Platelet Aggregation Study: No results for input(s): PLTAGG, PLTAGINTERP, PLTAGREGLACO, ADPPLTAGGREG in the last 168 hours.  Hgb A1C:   Recent Labs   Lab 12/08/22 1729   HGBA1C 9.1*       TSH:   Recent Labs   Lab 12/08/22  1729   TSH 1.375         Diagnostic Results      Brain/Vessel Imaging   CTH 12/11/22   Grossly stable evolving large left MCA distribution infarct and smaller infarct in the right frontal lobe with possible trace hemorrhagic conversion. Persistent mass effect with sulcal effacement and slight mass effect on the left lateral ventricle. No new or worsening intracranial hemorrhage and no worsening of minimal rightward midline shift.    CTH 12/9/22   Evolving no enlarged left MCA territory infarction with hypoattenuation and sulcal effacement. Intermediate density along the left basal ganglia compatible with known hemorrhagic conversion. Smaller area of infarction in the right cerebral hemisphere not well seen. Evolving mass effect and edema associated with the left cerebral hemispheric infarction with slight compression of the left lateral ventricle and trace 1 mm of rightward midline shift. No evidence for hydrocephalus    MRI Brain WO Contrast 12/8/22    Exam limited by patient motion artifact. Evolving left MCA territory infarct.  Additional foci of diffusion restriction in the left cerebral consistent with recent infarct.  Interval increase susceptibility artifact in the areas of diffusion restriction consistent with hemorrhagic conversion of recent infarcts.  No hydrocephalus or significant midline shift.      CTH 12/8/22 16:29   Evolving large left MCA territory infarction similar to recent CT though increased in size compared to prior MRI concerning for evolving recent to subacute and acute infarcts.  Localized mass effect without significant midline shift or hydrocephalus.  There is evolving recent to subacute age right posterior frontal infarction similar to prior MRI allowing for differences in technique     There is subtle hyperdensity along the left basal ganglia posteriorly and along the right posterior frontal cortex which may represent enhancing subacute age components of infarction with petechial hemorrhage felt less likely but cannot be entirely  excluded with limitation secondary to recirculation contrast related to recent CTA performed at outside institution.     Evolving mass effect most pronounced associated with the left MCA territory infarction with sulcal effacement without significant midline shift or hydrocephalus.    CTH 12/8/22 12:11 (CTA H/N)   1. Left diencephalic hemisphere demonstrates evidence of an acute infarct of left basal ganglia and left caudate lobe.  2. Occlusion of the posterior division of the left M2 branch at the takeoff of the M1 vessel with patency involving the anterior division of the left M2 segment.    CTH 12/7/22   1.  Moderate size subacute infarction left anterior basal ganglia and anterior left internal capsule appears mildly larger and better well defined compared to CT from 12/3/2020. There is currently greater mass effect on the anterior horn of the left lateral ventricle. There is no associated bleed or midline shift.  2.  Recent MRI demonstrated additional punctate acute infarctions in the left frontal lobe and a mild size acute infarction right centrum semiovale. These are less obvious on CT. No associated bleed.  3.  Additional chronic periventricular white matter hypodensities.    MRI Brain WO contrast 12/3/22   Multifocal areas of restricted diffusion are felt to reflect acute infarcts.    CTH 12/3/22   Loss of gray-white matter distinction in involving the left basal ganglia could reflect changes of chronic small vessel ischemic disease versus cytotoxic edema from acute infarct.     Carotid US Bilateral 12/3/22   1. Carotid intimal hyperplasia, with no findings of hemodynamically significant carotid arterial stenosis or vascular occlusion.  2. Patent vertebral arteries with antegrade flow bilaterally.      Cardiac Imaging   RAGHAV 12/4/22   The left ventricle is small with moderately decreased systolic function.  The estimated ejection fraction is 38%.  Left ventricular diastolic dysfunction.  There are segmental  left ventricular wall motion abnormalities.  No spontaneous echo contrast. A moderate protruding layered left ventricular thrombus is present. The thrombus is fixed and located in the apex.  Normal right ventricular size.  Mild left atrial enlargement.  Mild right atrial enlargement.  Mild aortic regurgitation.  No interatrial septal defect present.  Normal appearing left atrial appendage. No thrombus is present in the appendage. SB occluder is absent. Abnormal appendage velocities.  Mild mitral regurgitation.  Agitated saline contrast study did not show any right to left shunt    TTE 11/14/22   The left ventricle is normal in size with mildly decreased systolic function.  The estimated ejection fraction is 40%.  Grade III left ventricular diastolic dysfunction.  Small posterior pericardial effusion.  There is mild left ventricular global hypokinesis.  Normal right ventricular size with normal right ventricular systolic function.  Severe left atrial enlargement.  Moderate right atrial enlargement.  Mild pulmonic regurgitation.  Mild to moderate tricuspid regurgitation.  Mild-to-moderate aortic regurgitation.  Intermediate central venous pressure (8 mmHg).  The estimated PA systolic pressure is 51 mmHg.  There is pulmonary hypertension.

## 2022-12-12 NOTE — PT/OT/SLP PROGRESS
"Speech Language Pathology Treatment    Patient Name:  Spencer Burton Jr.   MRN:  [id number]  Admitting Diagnosis: Embolic stroke involving left middle cerebral artery    Recommendations:                 General Recommendations:  Speech language evaluation, Cognitive-linguistic evaluation, and ongoing swallowing assessment  Diet recommendations:  NPO, Liquid Diet Level: NPO   Aspiration Precautions: Ice chips and small sips of water sparingly; Continue alternate means of nutrition, Frequent oral care, HOB to 90 degrees, , Monitor for s/s of aspiration, and Strict aspiration precautions   General Precautions: Standard, aphasia, aspiration, fall, NPO, vision impaired  Communication strategies:  go to room if call light pushed    Subjective     "Five." Pt produced given max cues during automatic counting trial.     Pain/Comfort:  Pain Rating 1:  (no indications of pain)    Respiratory Status: Room air spo2 99%    Objective:     Has the patient been evaluated by SLP for swallowing?   Yes  Keep patient NPO? Yes   Current Respiratory Status:        Pt seen for ongoing swallowing assessment.  RN and SLP repositioned pt to upright position to prepare for PO trials. Pt more awake/alert and exhibiting some awareness of environmental stimuli.  Pt continues with left gaze preference.  Pt did not follow commands to open mouth before or after PO trials, but shook his head for "no" instead at times.  Pt did, however, accept the following PO presentations: ice chip x ; thin water via 1/2 tsp x 1, full tsp x 1, cup sip x 1, straw sips x 3; 1/2 tsp puree x 1 and full tsp x 2.  Pt exhibited cough after initial full tsp thin water, but not again with subsequent trials of thin water or puree.  Anterior loss present x 1 out of 3 straw sips and mild anterior loss on right side for full tsp puree trial x 1.  Education provided to pt regarding role of SLP, ongoing swallowing assessment, concerns for pocketing and aspiration with PO intake, " recommendations for ice chips and small sips of water for comfort, and SLP treatment plan and POC.  Pt was unable to demonstrate understanding due to aphasia.      Assessment:     Spencer Burton Jr. is a 52 y.o. male with an SLP diagnosis of Dysphagia.  He also presents with right oral motor weakness, aphasia, and right neglect, though not yet formally assessed.     Goals:   Multidisciplinary Problems       SLP Goals          Problem: SLP    Goal Priority Disciplines Outcome   SLP Goal     SLP    Description: Speech Language Pathology Goals  Goals expected to be met by 12/16:  1. Pt will participate in ongoing swallowing assessment to determine if/when safe for PO intake.   2. Pt will participate in speech/language/cognitive evaluation when feasible.                                Plan:     Patient to be seen:  4 x/week   Plan of Care expires:  01/09/23  Plan of Care reviewed with:  patient   SLP Follow-Up:  Yes       Discharge recommendations:  rehabilitation facility     Time Tracking:     SLP Treatment Date:   12/12/22  Speech Start Time:  0935  Speech Stop Time:  0953     Speech Total Time (min):  18 min    Billable Minutes: Treatment Swallowing Dysfunction 10 and Self Care/Home Management Training 8    12/12/2022

## 2022-12-12 NOTE — ASSESSMENT & PLAN NOTE
52 year old man with HTN, HLD, T2DM, CKD, CHF presented to OSH 12/3 with RSW. MRI with bilateral infarcts concerning for cardioembolic etiology. LV thrombus seen on RAGHAV. Neuro exam changed 12/8 while not on anticoagulation, found to have L M2 occlusion. [place] [facility] [place], no intervention as poor ASPECTS score. Admitted to [facility] for hemicrani watch.    - Vascular Neurology following   - SBP goal <200  - q1hr neuro checks   - atorvastatin 40 mg QD   - heparin gtt started 12/10  - PT/OT/SLP consulted  - Day 4, CTH in AM per NSGY team

## 2022-12-12 NOTE — ASSESSMENT & PLAN NOTE
Noted on imaging in the area of the L MCA territory. Will continue to monitor q1h while in ICU and repeat CTH PRN for acute neuro exam changes that could indicate worsening/expansion of edema.   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     Has remained stable on repeat imaging . NSGY following

## 2022-12-12 NOTE — ASSESSMENT & PLAN NOTE
Chronic. Taking PO Lasix 40 mg QD prior to admission. 12/6 RAGHAV with EF 38%.    - strict I&Os  - continue home Lasix

## 2022-12-12 NOTE — ASSESSMENT & PLAN NOTE
Patient is a 52yoM with CHF who initially presented to OSH with RSW. Did not receive tPA, as he was outside of the window. MRI at that time demonstrated bilateral anterior and posterior circulation strokes concerning for embolic etiology. Over hospital course at OSH, the patient was noted to have been lethargic and aphasic with dysarthria. RAGHAV at OSH demosntrated a L ventricular thrombus (not on AC). He was noted to have had an acute change in neuro exam on 12/8 at which time he had RSW with global aphasia. NIHSS had been 6 prior to the event and was noted to have been 23 following. CTA demonstrated a L M2 occlusion for which the patient was transferred to [facility] [place] for possible intervention and higher level of care.     On arrival to ED, the patient was globally aphasic with L gaze and RSW. Exam limited due to mentation and aphasia. Patient was not taken for IR due to poor ASPECTS. He was admitted to ICU for close monitoring and hemicrani watch. Currently patient still in ICU for hemicrani watch, started on heparin gtt after reviewing stability scan. Will continue to follow.     Etiology: likely cardioembolic given LV thrombus      Antithrombotics for secondary stroke prevention:   on heparin gtt until swallowing or PEG established, can be switched to DOAC/ warfarin later at discharge/post PEG    Statins for secondary stroke prevention and hyperlipidemia, if present:   Statins: Atorvastatin- 40 mg daily    Aggressive risk factor modification: HTN, DM, HLD, Diet, Exercise, LV thorombus     Rehab efforts: Rehab; Minced & Moist Diet with Thin liquids     Diagnostics ordered/pending: None     VTE prophylaxis: Mechanical prophylaxis: Place SCDs  None: Reason for No Pharmacological VTE Prophylaxis: Currently on anticoagulation     BP parameters: Infarct: No intervention, SBP <220

## 2022-12-12 NOTE — PLAN OF CARE
"[facility] Care Plan    POC reviewed with Spencer Burton Jr.  at 0300. Pt unable to verbalize understanding.     - Heparin at 12 units/hr. Am aptt therapeutic 42.9    - Glucerna at goal rate of 20 with 0 residuals to R nare NGT    - Condom cath and flexi seal in place. Pt kicked off condom cath. Incontinent episode of urine x1. Complete bed bath and linen change performed.    - Goal for SBP<200 maintained without prn meds.    - Blood sugar elevated 180, 175. Q4 checks with scheduled 3 units of insulin + sliding scale.    - Pt intermittently participates in neuro exam. Will not respond to orientation questions but said " my foot" while attempting to kick off left heel boot. Voice is low and garbled.     -Left wrist restraint and left mitten intact. No sign of injury. Good capillary refill and pulses to the extremities.     - Updated sister on plan of care via phone    - Afebrile.      Questions and concerns addressed. No acute events overnight. Pt progressing toward goals. Will continue to monitor. See below and flowsheets for full assessment and VS info.     Is this a stroke patient? yes- Stroke booklet reviewed with patient, risk factors identified for patient and stroke booklet remains at bedside for ongoing education.     Neuro:  [place] Coma Scale  Best Eye Response: 4-->(E4) spontaneous  Best Motor Response: 6-->(M6) obeys commands  Best Verbal Response: 2-->(V2) incomprehensible speech  [place] Coma Scale Score: 12  Assessment Qualifiers: patient not sedated/intubated, no eye obstruction present  Pupil PERRLA: yes     24hr Temp:  [97.5 °F (36.4 °C)-99.6 °F (37.6 °C)]     CV:   Rhythm: normal sinus rhythm  BP goals:   SBP <  200  MAP > 65    Resp:   (RETIRED) O2 Device (Oxygen Therapy): room air       Plan: N/A    GI/:     Diet/Nutrition Received: tube feeding  Last Bowel Movement: 12/11/22  Voiding Characteristics: external catheter    Intake/Output Summary (Last 24 hours) at 12/12/2022 0442  Last data filed at " 12/12/2022 0405  Gross per 24 hour   Intake 1203.09 ml   Output 1235 ml   Net -31.91 ml     Unmeasured Output  Urine Occurrence: 1  Stool Occurrence: 0  Pad Count: 2    Labs/Accuchecks:  Recent Labs   Lab 12/12/22 0239   WBC 17.12*   RBC 5.10   HGB 13.5*   HCT 43.5         Recent Labs   Lab 12/12/22 0239   *   K 3.7   CO2 24      BUN 64*   CREATININE 5.2*   ALKPHOS 120   ALT 34   AST 55*   BILITOT 0.5      Recent Labs   Lab 12/10/22  1309 12/10/22  1956 12/12/22 0239   INR 1.1  --   --    APTT 23.3   < > 42.9*    < > = values in this interval not displayed.    No results for input(s): CPK, CPKMB, TROPONINI, MB in the last 168 hours.    Electrolytes: No replacement orders  Accuchecks: Q4H    Gtts:   dextrose 10 % in water (D10W)      heparin (porcine) in D5W 12 Units/kg/hr (12/12/22 0005)       LDA/Wounds:  Lines/Drains/Airways       Drain  Duration                  NG/OG Tube Right nostril -- days    Male External Urinary Catheter 12/08/22 1830 Small 3 days         Rectal Tube 12/10/22 1002 rectal tube w/ balloon (indicate number of mLs) 1 day              Peripheral Intravenous Line  Duration                  Peripheral IV - Single Lumen 12/08/22 1800 20 G Anterior;Proximal;Right Forearm 3 days         Peripheral IV - Single Lumen 12/10/22 0219 20 G Right Antecubital 2 days                  Wounds: No  Wound care consulted: No

## 2022-12-12 NOTE — ASSESSMENT & PLAN NOTE
Due to stroke. High risk of malignant cerebral edema given age and size of infarct   Na level q12hrs  Salt tabs

## 2022-12-12 NOTE — ASSESSMENT & PLAN NOTE
Currently on ceftriaxone   Klebsiella pneumoniae on cx   Recommend narrowing spectrum when appropriate

## 2022-12-12 NOTE — SUBJECTIVE & OBJECTIVE
Interval History: 12/12: NALEE ANN. AF, 131-189 SBP. PSD 4. CTH tomorrow. Repeat CTHs stable, exam stable. If CTH and exam remain stable tomorrow then NSGY will sign off. Na 148    Medications:  Continuous Infusions:   dextrose 10 % in water (D10W)      heparin (porcine) in D5W 12 Units/kg/hr (12/12/22 1005)     Scheduled Meds:   [START ON 12/13/2022] atorvastatin  40 mg Per NG tube Daily    cefTRIAXone (ROCEPHIN) IVPB  1 g Intravenous Q24H    EScitalopram oxalate  20 mg Per NG tube Daily    furosemide  40 mg Per NG tube Daily    insulin aspart U-100  3 Units Subcutaneous Q4H    metoprolol tartrate  25 mg Per NG tube BID    mupirocin   Nasal BID    psyllium husk (aspartame)  2 packet Per NG tube TID    sodium chloride  2,000 mg Per NG tube TID     PRN Meds:dextrose 10 % in water (D10W), dextrose 10%, dextrose 10%, glucagon (human recombinant), insulin aspart U-100, labetaloL, ondansetron, sodium chloride 0.9%     Review of Systems  Objective:     Weight: 84.8 kg (186 lb 15.2 oz)  Body mass index is 27.61 kg/m².  Vital Signs (Most Recent):  Temp: 97.6 °F (36.4 °C) (12/12/22 1105)  Pulse: 87 (12/12/22 1205)  Resp: 20 (12/12/22 1205)  BP: (!) 187/118 (12/12/22 1205)  SpO2: 99 % (12/12/22 1205)   Vital Signs (24h Range):  Temp:  [97.5 °F (36.4 °C)-99.1 °F (37.3 °C)] 97.6 °F (36.4 °C)  Pulse:  [] 87  Resp:  [15-20] 20  SpO2:  [94 %-100 %] 99 %  BP: (131-191)/() 187/118     Date 12/12/22 0700 - 12/13/22 0659   Shift 5432-1752 5862-8972 9233-0469 24 Hour Total   INTAKE   I.V.(mL/kg) 36.1(0.4)   36.1(0.4)   NG/   540   Shift Total(mL/kg) 576.1(6.8)   576.1(6.8)   OUTPUT   Urine(mL/kg/hr) 250   250   Shift Total(mL/kg) 250(2.9)   250(2.9)   Weight (kg) 84.8 84.8 84.8 84.8                        NG/OG Tube Right nostril (Active)   Placement Check placement verified by x-ray 12/12/22 1105   Tolerance no signs/symptoms of discomfort 12/12/22 1105   Securement secured to nostril center w/ adhesive device 12/12/22  1105   Clamp Status/Tolerance no abdominal discomfort;no abdominal distention;no emesis;no nausea;no residual 12/12/22 1105   Suction Setting/Drainage Method suction at the bedside 12/12/22 1105   Insertion Site Appearance no redness, warmth, tenderness, skin breakdown, drainage 12/12/22 1105   Drainage None 12/12/22 1105   Flush/Irrigation flushed w/;water;no resistance met 12/12/22 1105   Feeding Type continuous;by pump 12/12/22 1105   Feeding Action feeding continued 12/12/22 1105   Current Rate (mL/hr) 30 mL/hr 12/12/22 1105   Goal Rate (mL/hr) 40 mL/hr 12/12/22 1105   Intake (mL) 300 mL 12/12/22 1205   Water Bolus (mL) 50 mL 12/12/22 0905   Formula Name Glucerna 12/12/22 1105   Intake (mL) - Formula Tube Feeding 30 12/12/22 1205   Residual Amount (ml) 0 ml 12/11/22 1905       Male External Urinary Catheter 12/08/22 1830 Small (Active)   Collection Container Urimeter 12/12/22 1105   Securement Method secured to top of thigh w/ adhesive device 12/12/22 1105   Skin no redness;no breakdown 12/12/22 1105   Tolerance no signs/symptoms of discomfort 12/12/22 1105   Output (mL) 150 mL 12/12/22 1105   Catheter Change Date 12/12/22 12/12/22 1105   Catheter Change Time 1105 12/12/22 1105       Physical Exam    Neurosurgery Physical Exam  E3V4M6  AOx2  FC except RUE   L gaze preference  R FD  Dysarthric,aphasic  O/w CN intact  Significant Labs:  Recent Labs   Lab 12/11/22  0153 12/11/22  1213 12/11/22  1758 12/12/22  0239 12/12/22  0803   *  --   --  171*  --    *   < > 142 148* 149*   K 4.1  --   --  3.7  --      --   --  109  --    CO2 23  --   --  24  --    BUN 68*  --   --  64*  --    CREATININE 5.7*  --   --  5.2*  --    CALCIUM 8.8  --   --  8.9  --    MG 2.1  --   --  2.4  --     < > = values in this interval not displayed.     Recent Labs   Lab 12/11/22  0153 12/12/22  0239   WBC 21.74* 17.12*   HGB 13.8* 13.5*   HCT 43.1 43.5    239     Recent Labs   Lab 12/10/22  1956 12/11/22  0147  12/12/22  0239   APTT 40.3* 46.2* 42.9*     Microbiology Results (last 7 days)       Procedure Component Value Units Date/Time    Blood culture [[id number]] Collected: 12/08/22 2052    Order Status: Completed Specimen: Blood from Peripheral, Antecubital, Right Updated: 12/11/22 2222     Blood Culture, Routine No Growth to date      No Growth to date      No Growth to date      No Growth to date    Narrative:      Blood cultures from 2 different sites. 4 bottles total.  Please draw before starting antibiotics.    Blood culture [[id number]] Collected: 12/08/22 2052    Order Status: Completed Specimen: Blood from Peripheral, Antecubital, Right Updated: 12/11/22 2222     Blood Culture, Routine No Growth to date      No Growth to date      No Growth to date      No Growth to date    Narrative:      Blood cultures x 2 different sites. 4 bottles total. Please  draw cultures before administering antibiotics.    Urine culture [[id number]]  (Abnormal)  (Susceptibility) Collected: 12/08/22 2001    Order Status: Completed Specimen: Urine Updated: 12/11/22 0328     Urine Culture, Routine KLEBSIELLA PNEUMONIAE  > 100,000 cfu/ml      Narrative:      Specimen Source->Urine          All pertinent labs from the last 24 hours have been reviewed.    Significant Diagnostics:  I have reviewed and interpreted all pertinent imaging results/findings within the past 24 hours.  No results found in the last 24 hours.

## 2022-12-12 NOTE — ASSESSMENT & PLAN NOTE
Stroke RF  A1C 9.1   Consider nutrition consult and DM education   IP goal 140-180    SSI   Aspart 3 units q4h

## 2022-12-12 NOTE — PLAN OF CARE
[facility] Care Plan    POC reviewed with Spencer Burton Jr. at 1400. Pt unable to verbalize understanding. Questions and concerns addressed. No acute events today. Pt progressing toward goals. Will continue to monitor. See below and flowsheets for full assessment and VS info.       Heparin gtt cont. APTT therapeutic  TF @ goal  Rectal tube output ~225 mL  Urine output ~700 mL  Neuro exam improving  Plan for CT head 12/13      Is this a stroke patient? yes- Stroke booklet reviewed with patient, risk factors identified for patient and stroke booklet remains at bedside for ongoing education.     Neuro:  Orion Coma Scale  Best Eye Response: 4-->(E4) spontaneous  Best Motor Response: 5-->(M5) localizes pain  Best Verbal Response: 2-->(V2) incomprehensible speech  Thao Coma Scale Score: 11  Assessment Qualifiers: patient not sedated/intubated  Pupil PERRLA: yes     24 hr Temp:  [97.7 °F (36.5 °C)-99.6 °F (37.6 °C)]     CV:   Rhythm: normal sinus rhythm  BP goals:   SBP <  200  MAP > 65    Resp:   (RETIRED) O2 Device (Oxygen Therapy): room air       Plan: N/A    GI/:     Diet/Nutrition Received: tube feeding  Last Bowel Movement: 12/11/22  Voiding Characteristics: external catheter    Intake/Output Summary (Last 24 hours) at 12/11/2022 1827  Last data filed at 12/11/2022 1805  Gross per 24 hour   Intake 692.9 ml   Output 1050 ml   Net -357.1 ml     Unmeasured Output  Urine Occurrence: 1  Stool Occurrence: 0  Pad Count: 2    Labs/Accuchecks:  Recent Labs   Lab 12/11/22  0153   WBC 21.74*   RBC 5.16   HGB 13.8*   HCT 43.1         Recent Labs   Lab 12/11/22  0153 12/11/22  1213 12/11/22  1758   *   < > 142   K 4.1  --   --    CO2 23  --   --      --   --    BUN 68*  --   --    CREATININE 5.7*  --   --    ALKPHOS 91  --   --    ALT 28  --   --    AST 28  --   --    BILITOT 0.7  --   --     < > = values in this interval not displayed.      Recent Labs   Lab 12/10/22  1309 12/10/22  1956 12/11/22  0147   INR  1.1  --   --    APTT 23.3   < > 46.2*    < > = values in this interval not displayed.    No results for input(s): CPK, CPKMB, TROPONINI, MB in the last 168 hours.    Electrolytes: Contraindicated  Accuchecks: Q4H    Gtts:   dextrose 10 % in water (D10W)      heparin (porcine) in D5W 12 Units/kg/hr (12/11/22 1805)       LDA/Wounds:  Lines/Drains/Airways       Drain  Duration                  NG/OG Tube Right nostril -- days    Male External Urinary Catheter 12/08/22 1830 Small 2 days         Rectal Tube 12/10/22 1002 rectal tube w/ balloon (indicate number of mLs) 1 day              Peripheral Intravenous Line  Duration                  Peripheral IV - Single Lumen 12/08/22 1800 20 G Anterior;Proximal;Right Forearm 3 days         Peripheral IV - Single Lumen 12/10/22 0219 20 G Right Antecubital 1 day                  Wounds: No  Wound care consulted: No

## 2022-12-12 NOTE — PROGRESS NOTES
[facility]  Neurosurgery  Progress Note    Subjective:     History of Present Illness: Spencer Bruton Jr. is a 52 y.o. male PMHx of CHF and TIA who presents as a transfer for acute LMCA stroke. Patient initially presented to OSH on 12/3 with RSW. At that time he was not a candidate for TPA and MRI revealed bilateral cardio embolic strokes. Patient had a TTE and RAGHAV which revealed LV thrombus. He was not started AC yet in setting of acute strokes, plan was to start AC today 12/8. Yesterday, patient was noted to have acute change in neuro exam. NIH went from 6 to 23 and CTA revealed L M2 occlusion. Patient was transferred to [facility] for higher level of care and NSGY consulted for hemicraniectomy watch.       Post-Op Info:  * No surgery found *         Interval History: 12/12: NAEON. AF, 131-189 SBP. PSD 4. CTH tomorrow. Repeat CTHs stable, exam stable. If CTH and exam remain stable tomorrow then NSGY will sign off. Na 148    Medications:  Continuous Infusions:   dextrose 10 % in water (D10W)      heparin (porcine) in D5W 12 Units/kg/hr (12/12/22 1005)     Scheduled Meds:   [START ON 12/13/2022] atorvastatin  40 mg Per NG tube Daily    cefTRIAXone (ROCEPHIN) IVPB  1 g Intravenous Q24H    EScitalopram oxalate  20 mg Per NG tube Daily    furosemide  40 mg Per NG tube Daily    insulin aspart U-100  3 Units Subcutaneous Q4H    metoprolol tartrate  25 mg Per NG tube BID    mupirocin   Nasal BID    psyllium husk (aspartame)  2 packet Per NG tube TID    sodium chloride  2,000 mg Per NG tube TID     PRN Meds:dextrose 10 % in water (D10W), dextrose 10%, dextrose 10%, glucagon (human recombinant), insulin aspart U-100, labetaloL, ondansetron, sodium chloride 0.9%     Review of Systems  Objective:     Weight: 84.8 kg (186 lb 15.2 oz)  Body mass index is 27.61 kg/m².  Vital Signs (Most Recent):  Temp: 97.6 °F (36.4 °C) (12/12/22 1105)  Pulse: 87 (12/12/22 1205)  Resp: 20 (12/12/22 1205)  BP: (!) 187/118  (12/12/22 1205)  SpO2: 99 % (12/12/22 1205)   Vital Signs (24h Range):  Temp:  [97.5 °F (36.4 °C)-99.1 °F (37.3 °C)] 97.6 °F (36.4 °C)  Pulse:  [] 87  Resp:  [15-20] 20  SpO2:  [94 %-100 %] 99 %  BP: (131-191)/() 187/118     Date 12/12/22 0700 - 12/13/22 0659   Shift 4326-8423 8284-8140 0317-1655 24 Hour Total   INTAKE   I.V.(mL/kg) 36.1(0.4)   36.1(0.4)   NG/   540   Shift Total(mL/kg) 576.1(6.8)   576.1(6.8)   OUTPUT   Urine(mL/kg/hr) 250   250   Shift Total(mL/kg) 250(2.9)   250(2.9)   Weight (kg) 84.8 84.8 84.8 84.8                        NG/OG Tube Right nostril (Active)   Placement Check placement verified by x-ray 12/12/22 1105   Tolerance no signs/symptoms of discomfort 12/12/22 1105   Securement secured to nostril center w/ adhesive device 12/12/22 1105   Clamp Status/Tolerance no abdominal discomfort;no abdominal distention;no emesis;no nausea;no residual 12/12/22 1105   Suction Setting/Drainage Method suction at the bedside 12/12/22 1105   Insertion Site Appearance no redness, warmth, tenderness, skin breakdown, drainage 12/12/22 1105   Drainage None 12/12/22 1105   Flush/Irrigation flushed w/;water;no resistance met 12/12/22 1105   Feeding Type continuous;by pump 12/12/22 1105   Feeding Action feeding continued 12/12/22 1105   Current Rate (mL/hr) 30 mL/hr 12/12/22 1105   Goal Rate (mL/hr) 40 mL/hr 12/12/22 1105   Intake (mL) 300 mL 12/12/22 1205   Water Bolus (mL) 50 mL 12/12/22 0905   Formula Name Glucerna 12/12/22 1105   Intake (mL) - Formula Tube Feeding 30 12/12/22 1205   Residual Amount (ml) 0 ml 12/11/22 1905       Male External Urinary Catheter 12/08/22 1830 Small (Active)   Collection Container Urimeter 12/12/22 1105   Securement Method secured to top of thigh w/ adhesive device 12/12/22 1105   Skin no redness;no breakdown 12/12/22 1105   Tolerance no signs/symptoms of discomfort 12/12/22 1105   Output (mL) 150 mL 12/12/22 1105   Catheter Change Date 12/12/22 12/12/22 1105    Catheter Change Time 1105 12/12/22 1105       Physical Exam    Neurosurgery Physical Exam  E3V4M6  AOx2  FC except RUE   L gaze preference  R FD  Dysarthric,aphasic  O/w CN intact  Significant Labs:  Recent Labs   Lab 12/11/22  0153 12/11/22  1213 12/11/22  1758 12/12/22  0239 12/12/22  0803   *  --   --  171*  --    *   < > 142 148* 149*   K 4.1  --   --  3.7  --      --   --  109  --    CO2 23  --   --  24  --    BUN 68*  --   --  64*  --    CREATININE 5.7*  --   --  5.2*  --    CALCIUM 8.8  --   --  8.9  --    MG 2.1  --   --  2.4  --     < > = values in this interval not displayed.     Recent Labs   Lab 12/11/22  0153 12/12/22  0239   WBC 21.74* 17.12*   HGB 13.8* 13.5*   HCT 43.1 43.5    239     Recent Labs   Lab 12/10/22  1956 12/11/22  0147 12/12/22  0239   APTT 40.3* 46.2* 42.9*     Microbiology Results (last 7 days)       Procedure Component Value Units Date/Time    Blood culture [[id number]] Collected: 12/08/22 2052    Order Status: Completed Specimen: Blood from Peripheral, Antecubital, Right Updated: 12/11/22 2222     Blood Culture, Routine No Growth to date      No Growth to date      No Growth to date      No Growth to date    Narrative:      Blood cultures from 2 different sites. 4 bottles total.  Please draw before starting antibiotics.    Blood culture [[id number]] Collected: 12/08/22 2052    Order Status: Completed Specimen: Blood from Peripheral, Antecubital, Right Updated: 12/11/22 2222     Blood Culture, Routine No Growth to date      No Growth to date      No Growth to date      No Growth to date    Narrative:      Blood cultures x 2 different sites. 4 bottles total. Please  draw cultures before administering antibiotics.    Urine culture [[id number]]  (Abnormal)  (Susceptibility) Collected: 12/08/22 2001    Order Status: Completed Specimen: Urine Updated: 12/11/22 0328     Urine Culture, Routine KLEBSIELLA PNEUMONIAE  > 100,000 cfu/ml      Narrative:      Specimen  Source->Urine          All pertinent labs from the last 24 hours have been reviewed.    Significant Diagnostics:  I have reviewed and interpreted all pertinent imaging results/findings within the past 24 hours.  No results found in the last 24 hours.      Assessment/Plan:     * Embolic stroke involving left middle cerebral artery  52M w/ PMH of CHF, TIA, LV thrombus with acute left MCA infarct, NSGY consulted for hemicrani watch    PSD 4     --Patient admitted to ICU on telemetry; NCC/stroke teams are primary   -q1h neurochecks in ICU  --All labs and diagnostics reviewed   --CTA 12/9: acute infarct of left basal ganglia and left caudate lobe. Occlusion of the posterior division of the left M2 branch at the takeoff of the M1 vessel with patency involving the anterior division of the left M2 segment   MRI brain 12/9: Evolving left MCA territory infarct.  Additional foci of diffusion restriction in the left cerebral consistent with recent infarct.  Interval increase susceptibility artifact in the areas of diffusion restriction consistent with hemorrhagic conversion of recent infarcts   CTH 12/10: evolving infarct, hemorrhagic conversion L BG, trace 1mm MLS   CTH 12/11: stable   UCx 12/9: GNRs, on Rocephin   CTH 12/13: ordered  --Not good operative candidate; on hep gtt for LV thrombus  --SBP reccs and management per primary team  --Na 145-155 strict, reccomend hypertonic saline PRN per primary team to reach goal  --HOB >30  --Remain NPO at this time for possible operative intervention  --Continue maximal medical therapy and stroke work-up per primary teams  --Continue to monitor clinically, notify NSGY immediately with any changes in neuro status    Dispo: ongoing        Kam Guzman MD  Neurosurgery  [facility]

## 2022-12-12 NOTE — SUBJECTIVE & OBJECTIVE
Review of Systems: Unable to obtain a complete ROS due to level of consciousness.     Vitals:   Temp: 98.3 °F (36.8 °C)  Pulse: 93  Rhythm: normal sinus rhythm  BP: (!) 176/122  MAP (mmHg): 145  Resp: 18  SpO2: 97 %    Temp  Min: 97.5 °F (36.4 °C)  Max: 99.1 °F (37.3 °C)  Pulse  Min: 79  Max: 101  BP  Min: 131/85  Max: 191/118  MAP (mmHg)  Min: 103  Max: 148  Resp  Min: 15  Max: 21  SpO2  Min: 94 %  Max: 100 %    12/11 0701 - 12/12 0700  In: 1294.8 [I.V.:295.5]  Out: 1480 [Urine:1205]   Unmeasured Output  Urine Occurrence: 1  Stool Occurrence: 0  Pad Count: 2     Examination:   Constitutional: Well-nourished and -developed. No apparent distress.   Eyes: Conjunctiva clear, anicteric. Lids no lesions.  Head/Ears/Nose/Mouth/Throat/Neck: Moist mucous membranes. External ears, nose atraumatic.   Cardiovascular: Regular rhythm. No leg edema.  Respiratory: Comfortable respirations. Clear to auscultation.  Gastrointestinal: Soft, nondistended, nontender. + bowel sounds.    Neurologic:  -GCS E 4 V 2 M 6  -Poor participant in exam. Drowsy. Dysarthric, garbled speech. Word finding difficulty. Follows some simple commands.  -Cranial nerves: L Gaze, PERRL, R facial droop, + cough  -Motor: R hemiparesis, LUE/LLE antigravity/spontaneous movement  -Sensation: Intact to light touch  Unable to test orientation, language, memory, judgment, insight, fund of knowledge, coordination, gait due to level of consciousness.    Medications:   Continuousdextrose 10 % in water (D10W)  heparin (porcine) in D5W, Last Rate: 12 Units/kg/hr (12/12/22 1505)    Scheduled[START ON 12/13/2022] atorvastatin, 40 mg, Daily  cefTRIAXone (ROCEPHIN) IVPB, 1 g, Q24H  EScitalopram oxalate, 20 mg, Daily  furosemide, 40 mg, Daily  insulin aspart U-100, 3 Units, Q4H  metoprolol tartrate, 25 mg, BID  mupirocin, , BID  psyllium husk (aspartame), 2 packet, TID  sodium chloride, 2,000 mg, TID    PRNdextrose 10 % in water (D10W), , Continuous PRN  dextrose 10%, 12.5 g,  PRN  dextrose 10%, 25 g, PRN  glucagon (human recombinant), 1 mg, PRN  insulin aspart U-100, 1-10 Units, Q4H PRN  labetaloL, 10 mg, Q6H PRN  ondansetron, 8 mg, Q8H PRN  sodium chloride 0.9%, 10 mL, PRN       Today I independently reviewed pertinent medications, lines/drains/airways, imaging, cardiology results, laboratory results, microbiology results, notably:     ISTAT: No results for input(s): PH, PCO2, PO2, POCSATURATED, HCO3, BE, POCNA, POCK, POCTCO2, POCGLU, POCICA, POCLAC, SAMPLE in the last 24 hours.   Chem:   Recent Labs   Lab 12/12/22 0239 12/12/22  0803   * 149*   K 3.7  --      --    CO2 24  --    *  --    BUN 64*  --    CREATININE 5.2*  --    CALCIUM 8.9  --    MG 2.4  --    PHOS 3.8  --    ANIONGAP 15  --    PROT 6.7  --    ALBUMIN 2.4*  --    BILITOT 0.5  --    ALKPHOS 120  --    AST 55*  --    ALT 34  --      Heme:   Recent Labs   Lab 12/12/22 0239   WBC 17.12*   HGB 13.5*   HCT 43.5        Endo:   Recent Labs   Lab 12/12/22  0550 12/12/22  0806 12/12/22  1311   POCTGLUCOSE 144* 149* 214*

## 2022-12-12 NOTE — ASSESSMENT & PLAN NOTE
52M w/ PMH of CHF, TIA, LV thrombus with acute left MCA infarct, NSGY consulted for hemicrani watch    PSD 4     --Patient admitted to ICU on telemetry; NCC/stroke teams are primary   -q1h neurochecks in ICU  --All labs and diagnostics reviewed   --CTA 12/9: acute infarct of left basal ganglia and left caudate lobe. Occlusion of the posterior division of the left M2 branch at the takeoff of the M1 vessel with patency involving the anterior division of the left M2 segment   MRI brain 12/9: Evolving left MCA territory infarct.  Additional foci of diffusion restriction in the left cerebral consistent with recent infarct.  Interval increase susceptibility artifact in the areas of diffusion restriction consistent with hemorrhagic conversion of recent infarcts   CTH 12/10: evolving infarct, hemorrhagic conversion L BG, trace 1mm MLS   CTH 12/11: stable   UCx 12/9: GNRs, on Rocephin   CTH 12/13: ordered  --Not good operative candidate; on hep gtt for LV thrombus  --SBP reccs and management per primary team  --Na 145-155 strict, reccomend hypertonic saline PRN per primary team to reach goal  --HOB >30  --Remain NPO at this time for possible operative intervention  --Continue maximal medical therapy and stroke work-up per primary teams  --Continue to monitor clinically, notify NSGY immediately with any changes in neuro status    Dispo: ongoing

## 2022-12-12 NOTE — PT/OT/SLP PROGRESS
Physical Therapy      Patient Name:  Spencer Burton Jr.   MRN:  [id number]    Patient not seen today secondary to pt in nursing care in AM. I was unable to return for PM attempt. Pt on track to meet POC. Will follow-up 12/13.

## 2022-12-13 LAB
ALBUMIN SERPL BCP-MCNC: 2.2 G/DL (ref 3.5–5.2)
ALP SERPL-CCNC: 106 U/L (ref 55–135)
ALT SERPL W/O P-5'-P-CCNC: 38 U/L (ref 10–44)
ANION GAP SERPL CALC-SCNC: 10 MMOL/L (ref 8–16)
APTT BLDCRRT: 39.3 SEC (ref 21–32)
AST SERPL-CCNC: 51 U/L (ref 10–40)
BACTERIA BLD CULT: NORMAL
BACTERIA BLD CULT: NORMAL
BASOPHILS # BLD AUTO: 0.09 K/UL (ref 0–0.2)
BASOPHILS NFR BLD: 0.8 % (ref 0–1.9)
BILIRUB SERPL-MCNC: 0.4 MG/DL (ref 0.1–1)
BUN SERPL-MCNC: 55 MG/DL (ref 6–20)
CALCIUM SERPL-MCNC: 9.2 MG/DL (ref 8.7–10.5)
CHLORIDE SERPL-SCNC: 112 MMOL/L (ref 95–110)
CO2 SERPL-SCNC: 26 MMOL/L (ref 23–29)
CREAT SERPL-MCNC: 4.6 MG/DL (ref 0.5–1.4)
DIFFERENTIAL METHOD: ABNORMAL
EOSINOPHIL # BLD AUTO: 0.3 K/UL (ref 0–0.5)
EOSINOPHIL NFR BLD: 2.3 % (ref 0–8)
ERYTHROCYTE [DISTWIDTH] IN BLOOD BY AUTOMATED COUNT: 14.8 % (ref 11.5–14.5)
EST. GFR  (NO RACE VARIABLE): 14.7 ML/MIN/1.73 M^2
GLUCOSE SERPL-MCNC: 186 MG/DL (ref 70–110)
HCT VFR BLD AUTO: 43.2 % (ref 40–54)
HGB BLD-MCNC: 13.2 G/DL (ref 14–18)
IMM GRANULOCYTES # BLD AUTO: 0.08 K/UL (ref 0–0.04)
IMM GRANULOCYTES NFR BLD AUTO: 0.7 % (ref 0–0.5)
LYMPHOCYTES # BLD AUTO: 1.1 K/UL (ref 1–4.8)
LYMPHOCYTES NFR BLD: 9 % (ref 18–48)
MAGNESIUM SERPL-MCNC: 2.4 MG/DL (ref 1.6–2.6)
MCH RBC QN AUTO: 26.5 PG (ref 27–31)
MCHC RBC AUTO-ENTMCNC: 30.6 G/DL (ref 32–36)
MCV RBC AUTO: 87 FL (ref 82–98)
MONOCYTES # BLD AUTO: 0.7 K/UL (ref 0.3–1)
MONOCYTES NFR BLD: 6.3 % (ref 4–15)
NEUTROPHILS # BLD AUTO: 9.5 K/UL (ref 1.8–7.7)
NEUTROPHILS NFR BLD: 80.9 % (ref 38–73)
NRBC BLD-RTO: 0 /100 WBC
PHOSPHATE SERPL-MCNC: 2.8 MG/DL (ref 2.7–4.5)
PLATELET # BLD AUTO: 224 K/UL (ref 150–450)
PMV BLD AUTO: 12.7 FL (ref 9.2–12.9)
POCT GLUCOSE: 191 MG/DL (ref 70–110)
POCT GLUCOSE: 210 MG/DL (ref 70–110)
POCT GLUCOSE: 215 MG/DL (ref 70–110)
POCT GLUCOSE: 234 MG/DL (ref 70–110)
POTASSIUM SERPL-SCNC: 3.3 MMOL/L (ref 3.5–5.1)
PROT SERPL-MCNC: 6.4 G/DL (ref 6–8.4)
RBC # BLD AUTO: 4.99 M/UL (ref 4.6–6.2)
SODIUM SERPL-SCNC: 148 MMOL/L (ref 136–145)
WBC # BLD AUTO: 11.68 K/UL (ref 3.9–12.7)

## 2022-12-13 PROCEDURE — 99233 SBSQ HOSP IP/OBS HIGH 50: CPT | Mod: ,,, | Performed by: PSYCHIATRY & NEUROLOGY

## 2022-12-13 PROCEDURE — 92526 ORAL FUNCTION THERAPY: CPT

## 2022-12-13 PROCEDURE — 20000000 HC ICU ROOM

## 2022-12-13 PROCEDURE — 63600175 PHARM REV CODE 636 W HCPCS: Performed by: NURSE PRACTITIONER

## 2022-12-13 PROCEDURE — 94761 N-INVAS EAR/PLS OXIMETRY MLT: CPT

## 2022-12-13 PROCEDURE — 97112 NEUROMUSCULAR REEDUCATION: CPT

## 2022-12-13 PROCEDURE — 92523 SPEECH SOUND LANG COMPREHEN: CPT

## 2022-12-13 PROCEDURE — 25000003 PHARM REV CODE 250: Performed by: NURSE PRACTITIONER

## 2022-12-13 PROCEDURE — 84100 ASSAY OF PHOSPHORUS: CPT | Performed by: PHYSICIAN ASSISTANT

## 2022-12-13 PROCEDURE — 85730 THROMBOPLASTIN TIME PARTIAL: CPT | Performed by: NURSE PRACTITIONER

## 2022-12-13 PROCEDURE — 80053 COMPREHEN METABOLIC PANEL: CPT | Performed by: PHYSICIAN ASSISTANT

## 2022-12-13 PROCEDURE — 97530 THERAPEUTIC ACTIVITIES: CPT

## 2022-12-13 PROCEDURE — 99233 PR SUBSEQUENT HOSPITAL CARE,LEVL III: ICD-10-PCS | Mod: FS,,, | Performed by: NURSE PRACTITIONER

## 2022-12-13 PROCEDURE — 83735 ASSAY OF MAGNESIUM: CPT | Performed by: PHYSICIAN ASSISTANT

## 2022-12-13 PROCEDURE — 25000003 PHARM REV CODE 250: Performed by: PSYCHIATRY & NEUROLOGY

## 2022-12-13 PROCEDURE — 97110 THERAPEUTIC EXERCISES: CPT

## 2022-12-13 PROCEDURE — 99233 SBSQ HOSP IP/OBS HIGH 50: CPT | Mod: FS,,, | Performed by: NURSE PRACTITIONER

## 2022-12-13 PROCEDURE — 99233 PR SUBSEQUENT HOSPITAL CARE,LEVL III: ICD-10-PCS | Mod: ,,, | Performed by: PSYCHIATRY & NEUROLOGY

## 2022-12-13 PROCEDURE — 85025 COMPLETE CBC W/AUTO DIFF WBC: CPT | Performed by: NURSE PRACTITIONER

## 2022-12-13 RX ORDER — SODIUM CHLORIDE 1 G/1
1000 TABLET ORAL 3 TIMES DAILY
Status: DISCONTINUED | OUTPATIENT
Start: 2022-12-13 | End: 2022-12-14

## 2022-12-13 RX ORDER — INSULIN ASPART 100 [IU]/ML
6 INJECTION, SOLUTION INTRAVENOUS; SUBCUTANEOUS
Status: DISCONTINUED | OUTPATIENT
Start: 2022-12-13 | End: 2022-12-14

## 2022-12-13 RX ORDER — AMLODIPINE BESYLATE 10 MG/1
10 TABLET ORAL DAILY
Status: DISCONTINUED | OUTPATIENT
Start: 2022-12-13 | End: 2022-12-20

## 2022-12-13 RX ADMIN — INSULIN ASPART 4 UNITS: 100 INJECTION, SOLUTION INTRAVENOUS; SUBCUTANEOUS at 11:12

## 2022-12-13 RX ADMIN — ATORVASTATIN CALCIUM 40 MG: 40 TABLET, FILM COATED ORAL at 08:12

## 2022-12-13 RX ADMIN — INSULIN ASPART 2 UNITS: 100 INJECTION, SOLUTION INTRAVENOUS; SUBCUTANEOUS at 05:12

## 2022-12-13 RX ADMIN — INSULIN ASPART 6 UNITS: 100 INJECTION, SOLUTION INTRAVENOUS; SUBCUTANEOUS at 11:12

## 2022-12-13 RX ADMIN — INSULIN ASPART 2 UNITS: 100 INJECTION, SOLUTION INTRAVENOUS; SUBCUTANEOUS at 02:12

## 2022-12-13 RX ADMIN — SODIUM CHLORIDE 1000 MG: 1 TABLET ORAL at 08:12

## 2022-12-13 RX ADMIN — MUPIROCIN: 20 OINTMENT TOPICAL at 08:12

## 2022-12-13 RX ADMIN — CEFTRIAXONE 1 G: 1 INJECTION, POWDER, FOR SOLUTION INTRAMUSCULAR; INTRAVENOUS at 11:12

## 2022-12-13 RX ADMIN — FUROSEMIDE 40 MG: 40 TABLET ORAL at 08:12

## 2022-12-13 RX ADMIN — MUPIROCIN: 20 OINTMENT TOPICAL at 10:12

## 2022-12-13 RX ADMIN — METOPROLOL TARTRATE 25 MG: 25 TABLET, FILM COATED ORAL at 08:12

## 2022-12-13 RX ADMIN — ESCITALOPRAM OXALATE 20 MG: 20 TABLET ORAL at 08:12

## 2022-12-13 RX ADMIN — INSULIN ASPART 6 UNITS: 100 INJECTION, SOLUTION INTRAVENOUS; SUBCUTANEOUS at 03:12

## 2022-12-13 RX ADMIN — INSULIN ASPART 4 UNITS: 100 INJECTION, SOLUTION INTRAVENOUS; SUBCUTANEOUS at 03:12

## 2022-12-13 RX ADMIN — INSULIN ASPART 1 UNITS: 100 INJECTION, SOLUTION INTRAVENOUS; SUBCUTANEOUS at 08:12

## 2022-12-13 RX ADMIN — SODIUM CHLORIDE 1000 MG: 1 TABLET ORAL at 03:12

## 2022-12-13 RX ADMIN — AMLODIPINE BESYLATE 10 MG: 10 TABLET ORAL at 06:12

## 2022-12-13 RX ADMIN — INSULIN ASPART 6 UNITS: 100 INJECTION, SOLUTION INTRAVENOUS; SUBCUTANEOUS at 08:12

## 2022-12-13 RX ADMIN — INSULIN ASPART 3 UNITS: 100 INJECTION, SOLUTION INTRAVENOUS; SUBCUTANEOUS at 02:12

## 2022-12-13 RX ADMIN — POTASSIUM BICARBONATE 20 MEQ: 391 TABLET, EFFERVESCENT ORAL at 07:12

## 2022-12-13 RX ADMIN — INSULIN ASPART 3 UNITS: 100 INJECTION, SOLUTION INTRAVENOUS; SUBCUTANEOUS at 05:12

## 2022-12-13 NOTE — PT/OT/SLP PROGRESS
Physical Therapy Treatment/partial co treat with OT    Patient Name:  Spencer Burton Jr.   MRN:  [id number]    Recommendations:     Discharge Recommendations: rehabilitation facility  Discharge Equipment Recommendations:  (TBD as pt progresses)  Barriers to discharge: Inaccessible home and Decreased caregiver support    Assessment:     Spencer Burton Jr. is a 52 y.o. male admitted with a medical diagnosis of Embolic stroke involving left middle cerebral artery.  He presents with the following impairments/functional limitations: weakness, impaired functional mobility, impaired self care skills, impaired sensation, impaired balance, decreased lower extremity function, decreased upper extremity function, decreased safety awareness . Pt is unsafe with functional mobility at this time due to pt requires total assist for bed mobility, total assist for transfers, and max assist for sitting balance due to R sided and posterior instability. pt has a L gaze preference and R neglect. Pt appears motivated to progress with functional mobility.     Rehab Prognosis: Good; patient would benefit from acute skilled PT services to address these deficits and reach maximum level of function.    Recent Surgery: * No surgery found *      Plan:     During this hospitalization, patient to be seen 4 x/week to address the identified rehab impairments via gait training, therapeutic activities, therapeutic exercises, neuromuscular re-education and progress toward the following goals:    Plan of Care Expires:  01/09/23    Subjective   Pt restless upon sitting and noted to have had a BM. Pt nonverbal during treatment    Pain/Comfort:  Pain Rating 1:  (pt non verbal during treatment, did not appear to be in pain)  Pain Rating Post-Intervention 1: 0/10      Objective:     Communicated with nurse prior to session.  Patient found HOB elevated with bed alarm, blood pressure cuff, telemetry, pulse ox (continuous), peripheral IV, NG tube, restraints, Condom  Catheter (L hand mitten restraint) upon PT entry to room.     General Precautions: Standard, aphasia, aspiration, fall, NPO, vision impaired  Orthopedic Precautions: N/A  Braces: N/A  Respiratory Status: Room air     Functional Mobility:  Bed Mobility:     Rolling Left:  total assistance  Supine to Sit: total assistance  Sit to Supine: total assistance and of 2 persons  Transfers:     Sit to Stand:  total assistance with hand-held assist Pt with difficulty coordinating weight bearing on his L LE and required manual cues for facilitating R hip/knee ext during stance  Bed to Chair: total assistance with  hand-held assist  using  Squat Pivot; bedside chair to bed with total assist of 2 with squat pivot.     AM-PAC 6 CLICK MOBILITY  Turning over in bed (including adjusting bedclothes, sheets and blankets)?: 2  Sitting down on and standing up from a chair with arms (e.g., wheelchair, bedside commode, etc.): 2  Moving from lying on back to sitting on the side of the bed?: 2  Moving to and from a bed to a chair (including a wheelchair)?: 2  Need to walk in hospital room?: 1  Climbing 3-5 steps with a railing?: 1  Basic Mobility Total Score: 10     Treatment & Education:   pt sat on the EOB ~ 6 min with total assist  due to pt pushing posterior and to the R/pt noted to be soiled with BM. Pt stood x 3  trials with total assist with HHA in L UE for ~ 20-30 sec each trial to be cleaned and linen replaced. Nurse present and assisted with cleaning.  Pt received verbal and manual cues for midline orientation and upright posture in sitting and standing. Pt given verbal and manual cues to look to the R with his eyes due to pt has a L gaze preference and R neglect. Pt able to cross midline with his eyes x 1 time. Pt received PROM to his R LE x 5 reps x 2 sets with verbal and tactile cues to facilitate active movement.  Partial Co-treat with OT due to medical complexity of pt and need for skilled hands for safe intervention.    Patient left HOB elevated with all lines intact, call button in reach, bed alarm on, restraints reapplied at end of session, nurse notified, and mother present..    GOALS:   Multidisciplinary Problems       Physical Therapy Goals          Problem: Physical Therapy    Goal Priority Disciplines Outcome Goal Variances Interventions   Physical Therapy Goal     PT, PT/OT Ongoing, Progressing     Description: Goals to be met by: 22    Patient will increase functional independence with mobility by performin. Supine to sit with Maximum Assistance  2. Sit to supine with Maximum Assistance  3. Sit to stand transfer with Maximum Assistance  4. Bed to chair transfer with Maximum Assistance using squat pivot technique.  5. Gait  x 5 feet with Moderate Assistance using LRAD.   6. Sitting at edge of bed x8 minutes with Contact Guard Assistance with DYLAN UE support.                         Time Tracking:     PT Received On: 22  PT Start Time: 948     PT Stop Time: 1026  PT Total Time (min): 38 min     Billable Minutes: Therapeutic Activity 25 and Therapeutic Exercise 13    Treatment Type: Treatment  PT/PTA: PT     PTA Visit Number: 0     2022

## 2022-12-13 NOTE — ASSESSMENT & PLAN NOTE
-Noted on imaging in the area of the L MCA territory. Will continue to monitor q1h while in ICU and repeat CTH PRN for acute neuro exam changes that could indicate worsening/expansion of edema.   -NSGY consulted for hemicrani watch due to malignant MCA, no acute intervention recommended as serial imaging remains stable

## 2022-12-13 NOTE — PT/OT/SLP PROGRESS
Occupational Therapy  Co Treatment    Name: Spencer Burton Jr.  MRN: [id number]  Admitting Diagnosis:  Embolic stroke involving left middle cerebral artery       Recommendations:     Discharge Recommendations: rehabilitation facility  Discharge Equipment Recommendations:  hospital bed  Barriers to discharge:  Decreased caregiver support  Co-evaluation/treatment performed due to patient's multiple deficits requiring two skilled therapists to appropriately and safely assess patient's strength and endurance while facilitating functional tasks in addition to accommodating for patient's activity tolerance.    Assessment:     Spencer Burton Jr. is a 52 y.o. male with a medical diagnosis of Embolic stroke involving left middle cerebral artery.  He presents with non verbal communication, mother present. Performance deficits affecting function are weakness, gait instability, decreased upper extremity function, impaired endurance, impaired balance, decreased lower extremity function, decreased safety awareness, impaired self care skills, impaired functional mobility, decreased coordination. Patient in chair manual assist with head to midline and eyes open. After several manual assist with eyes open he held them open himself with extensive cues he could scan to right grossly 25 degrees for a second or two, prefers left gaze and head turn. He could hold head at midline for up to 30 seconds. Noted right 1.5 finger sublux with no active movement with hand brought into visual field with sensory stimulation. Total of two to transfer from chair to EOB. Sit to supine with max (A), position with HOB elevated to encourage alertness. RUE shoulder supported and hand elevated at midline, mitten reapplied.     Rehab Prognosis:  Good; patient would benefit from acute skilled OT services to address these deficits and reach maximum level of function.       Plan:     Patient to be seen 4 x/week to address the above listed problems via  self-care/home management, therapeutic activities, therapeutic exercises, neuromuscular re-education  Plan of Care Expires: 01/08/23  Plan of Care Reviewed with: patient, mother    Subjective     Pain/Comfort:  Pain Rating 1:  (no c/o, little vocalization)    Objective:     Communicated with: RN prior to session.  Patient found up in chair with bed alarm, blood pressure cuff, pulse ox (continuous), SCD, telemetry, restraints, NG tube, Condom Catheter upon OT entry to room.    General Precautions: Standard, aphasia, aspiration, fall, NPO    Orthopedic Precautions:N/A  Braces: N/A  Respiratory Status: Room air     Occupational Performance:     Bed Mobility:    Patient completed Sit to Supine with maximal assistance     Functional Mobility/Transfers:  Patient completed Bed <> Chair Transfer using Step Transfer technique with total assistance and of 2 persons with hand-held assist      Activities of Daily Living:  Grooming: maximal assistance    Upper Body Dressing: total assistance to change gown      [facility] 6 Click ADL: 6    Treatment & Education:  Educated mother on rehab process and stroke recovery.    Patient left HOB elevated with all lines intact, call button in reach, bed alarm on, restraints reapplied at end of session, Rn notified, and mother present    GOALS:   Multidisciplinary Problems       Occupational Therapy Goals          Problem: Occupational Therapy    Goal Priority Disciplines Outcome Interventions   Occupational Therapy Goal     OT, PT/OT Ongoing, Progressing    Description: Goals to be met by: 12/23/2022     Patient will increase functional independence with ADLs by performing:    UE Dressing with Minimal Assistance.  LE Dressing with Moderate Assistance.  Grooming while EOB with Minimal Assistance.  Toileting from bedside commode with Maximum Assistance for hygiene and clothing management.   Supine to sit with Moderate Assistance.  Stand pivot transfers with Maximum Assistance using LRAD as  needed.  Toilet transfer to bedside commode with Maximum Assistance using LRAD as needed.                         Time Tracking:     OT Date of Treatment: 12/13/22  OT Start Time: 1008  OT Stop Time: 1026  OT Total Time (min): 18 min    Billable Minutes:Neuromuscular Re-education 18    OT/PRINCESS: OT          12/13/2022

## 2022-12-13 NOTE — PROGRESS NOTES
[facility]  Neurosurgery  Progress Note    Subjective:     History of Present Illness: Spencer Burton Jr. is a 52 y.o. male PMHx of CHF and TIA who presents as a transfer for acute LMCA stroke. Patient initially presented to OSH on 12/3 with RSW. At that time he was not a candidate for TPA and MRI revealed bilateral cardio embolic strokes. Patient had a TTE and RAGHAV which revealed LV thrombus. He was not started AC yet in setting of acute strokes, plan was to start AC today 12/8. Yesterday, patient was noted to have acute change in neuro exam. NIH went from 6 to 23 and CTA revealed L M2 occlusion. [place] [facility] [place] and NSGY consulted for hemicraniectomy watch.       Post-Op Info:  * No surgery found *         Interval History: 12/13: NAEON. AF, 160-190 SBP. PSD 5 CTH today stable. Exam stable. NSGY signing off today. Na 148    Medications:  Continuous Infusions:   dextrose 10 % in water (D10W)      heparin (porcine) in D5W 12 Units/kg/hr (12/13/22 0245)     Scheduled Meds:   atorvastatin  40 mg Per NG tube Daily    cefTRIAXone (ROCEPHIN) IVPB  1 g Intravenous Q24H    EScitalopram oxalate  20 mg Per NG tube Daily    furosemide  40 mg Per NG tube Daily    insulin aspart U-100  6 Units Subcutaneous 6 times per day    metoprolol tartrate  25 mg Per NG tube BID    mupirocin   Nasal BID    sodium chloride  1,000 mg Per NG tube TID     PRN Meds:dextrose 10 % in water (D10W), dextrose 10%, dextrose 10%, glucagon (human recombinant), insulin aspart U-100, labetaloL, ondansetron, sodium chloride 0.9%     Review of Systems  Objective:     Weight: 84.8 kg (186 lb 15.2 oz)  Body mass index is 27.61 kg/m².  Vital Signs (Most Recent):  Temp: 98.7 °F (37.1 °C) (12/13/22 0302)  Pulse: 93 (12/13/22 0833)  Resp: 16 (12/13/22 0833)  BP: (!) 188/116 (12/13/22 0602)  SpO2: 99 % (12/13/22 0833)   Vital Signs (24h Range):  Temp:  [97.6 °F (36.4 °C)-98.9 °F (37.2 °C)] 98.7 °F  (37.1 °C)  Pulse:  [78-96] 93  Resp:  [10-21] 16  SpO2:  [95 %-99 %] 99 %  BP: (160-190)/() 188/116                          NG/OG Tube Right nostril (Active)   Placement Check placement verified by x-ray;placement verified by distal tube length measurement 12/13/22 0302   Tolerance no signs/symptoms of discomfort 12/13/22 0302   Securement secured to nostril center w/ adhesive device 12/13/22 0302   Clamp Status/Tolerance unclamped;no restlessness;no emesis;no abdominal distention 12/13/22 0302   Suction Setting/Drainage Method suction at the bedside 12/13/22 0302   Insertion Site Appearance no redness, warmth, tenderness, skin breakdown, drainage 12/13/22 0302   Drainage None 12/13/22 0302   Flush/Irrigation flushed w/;water;no resistance met 12/13/22 0302   Feeding Type continuous;by pump 12/13/22 0302   Feeding Action feeding continued 12/13/22 0302   Current Rate (mL/hr) 40 mL/hr 12/12/22 1902   Goal Rate (mL/hr) 40 mL/hr 12/12/22 1902   Intake (mL) 400 mL 12/12/22 2102   Water Bolus (mL) 100 mL 12/12/22 1505   Formula Name Novasource Renal 12/13/22 0302   Intake (mL) - Formula Tube Feeding 40 12/13/22 0602   Residual Amount (ml) 0 ml 12/11/22 1905       Male External Urinary Catheter 12/08/22 1830 Small (Active)   Collection Container Urimeter 12/13/22 0302   Securement Method secured to top of thigh w/ adhesive device 12/13/22 0302   Skin no redness;no breakdown 12/13/22 0302   Tolerance no signs/symptoms of discomfort 12/13/22 0302   Output (mL) 200 mL 12/13/22 0502   Catheter Change Date 12/12/22 12/13/22 0302   Catheter Change Time 1505 12/13/22 0302       Physical Exam    Neurosurgery Physical Exam  [id number]  AOx2  FC except RUE, mind WD RUE  L gaze preference  R FD  Dysarthric,aphasic  O/w CN intact  Significant Labs:  Recent Labs   Lab 12/12/22  0239 12/12/22  0803 12/12/22  1811 12/13/22  0006   *  --   --  186*   * 149* 148* 148*   K 3.7  --   --  3.3*     --   --  112*   CO2 24   --   --  26   BUN 64*  --   --  55*   CREATININE 5.2*  --   --  4.6*   CALCIUM 8.9  --   --  9.2   MG 2.4  --   --  2.4     Recent Labs   Lab 12/12/22 0239 12/13/22  0006   WBC 17.12* 11.68   HGB 13.5* 13.2*   HCT 43.5 43.2    224     Recent Labs   Lab 12/12/22 0239 12/13/22  0006   APTT 42.9* 39.3*     Microbiology Results (last 7 days)       Procedure Component Value Units Date/Time    Blood culture [[id number]] Collected: 12/08/22 2052    Order Status: Completed Specimen: Blood from Peripheral, Antecubital, Right Updated: 12/12/22 2222     Blood Culture, Routine No Growth to date      No Growth to date      No Growth to date      No Growth to date      No Growth to date    Narrative:      Blood cultures from 2 different sites. 4 bottles total.  Please draw before starting antibiotics.    Blood culture [[id number]] Collected: 12/08/22 2052    Order Status: Completed Specimen: Blood from Peripheral, Antecubital, Right Updated: 12/12/22 2222     Blood Culture, Routine No Growth to date      No Growth to date      No Growth to date      No Growth to date      No Growth to date    Narrative:      Blood cultures x 2 different sites. 4 bottles total. Please  draw cultures before administering antibiotics.    Urine culture [[id number]]  (Abnormal)  (Susceptibility) Collected: 12/08/22 2001    Order Status: Completed Specimen: Urine Updated: 12/11/22 0328     Urine Culture, Routine KLEBSIELLA PNEUMONIAE  > 100,000 cfu/ml      Narrative:      Specimen Source->Urine          All pertinent labs from the last 24 hours have been reviewed.    Significant Diagnostics:  I have reviewed and interpreted all pertinent imaging results/findings within the past 24 hours.  No results found in the last 24 hours.      Assessment/Plan:     * Embolic stroke involving left middle cerebral artery  52M w/ PMH of CHF, TIA, LV thrombus with acute left MCA infarct, NSGY consulted for hemicrani watch    PSD 5    --Patient admitted to ICU on  telemetry; NCC/stroke teams are primary   -q1h neurochecks in ICU  --All labs and diagnostics reviewed   --CTA 12/9: acute infarct of left basal ganglia and left caudate lobe. Occlusion of the posterior division of the left M2 branch at the takeoff of the M1 vessel with patency involving the anterior division of the left M2 segment   MRI brain 12/9: Evolving left MCA territory infarct.  Additional foci of diffusion restriction in the left cerebral consistent with recent infarct.  Interval increase susceptibility artifact in the areas of diffusion restriction consistent with hemorrhagic conversion of recent infarcts   CTH 12/10: evolving infarct, hemorrhagic conversion L BG, trace 1mm MLS   CTH 12/11: stable   UCx 12/9: GNRs, on Rocephin   CTH 12/13: stable  --Not good operative candidate; on hep gtt for LV thrombus  --SBP reccs and management per primary team  --Na 145-155 strict, reccomend hypertonic saline PRN per primary team to reach goal  --HOB >30  --Remain NPO at this time for possible operative intervention  --Continue maximal medical therapy and stroke work-up per primary teams  --NSGY signing off; PSD 5, stable exam and CTH's  --Continue to monitor clinically, notify NSGY immediately with any changes in neuro status    Dispo: per primary        Kam Guzman MD  Neurosurgery  [facility] - [facility]

## 2022-12-13 NOTE — PLAN OF CARE
[facility] Care Plan    POC reviewed with Spencer Burton Jr. at 0300. Pt nodded to understanding. Questions and concerns addressed. No acute events overnight. Pt progressing toward goals. Will continue to monitor. See below and flowsheets for full assessment and VS info.     No acute neuro changes overnight.  Head CT completed with RN x 1, PCT x 1. Patient tolerated well.  Heparin continued @ 12 units/hr - APTT 39.3 (therapeutic)  Short run of ventricular rhythm, no interventions needed see previous note.   Possible transfer today.   Linen changed after CT scan.  PIV placed    Is this a stroke patient? yes- Stroke booklet reviewed with patient, risk factors identified for patient and stroke booklet remains at bedside for ongoing education.     Neuro:  Thao Coma Scale  Best Eye Response: 4-->(E4) spontaneous  Best Motor Response: 6-->(M6) obeys commands  Best Verbal Response: 2-->(V2) incomprehensible speech  Thao Coma Scale Score: 12  Assessment Qualifiers: patient not sedated/intubated  Pupil PERRLA: yes     24hr Temp:  [97.6 °F (36.4 °C)-98.9 °F (37.2 °C)]     CV:   Rhythm: normal sinus rhythm  BP goals:   SBP <  200  MAP > 65    Resp:   (RETIRED) O2 Device (Oxygen Therapy): room air       Plan: N/A    GI/:     Diet/Nutrition Received: tube feeding  Last Bowel Movement: 12/12/22  Voiding Characteristics: external catheter    Intake/Output Summary (Last 24 hours) at 12/13/2022 0517  Last data filed at 12/13/2022 0513  Gross per 24 hour   Intake 1977.55 ml   Output 1670 ml   Net 307.55 ml     Unmeasured Output  Urine Occurrence: 1  Stool Occurrence: 0  Pad Count: 2    Labs/Accuchecks:  Recent Labs   Lab 12/13/22  0006   WBC 11.68   RBC 4.99   HGB 13.2*   HCT 43.2         Recent Labs   Lab 12/13/22  0006   *   K 3.3*   CO2 26   *   BUN 55*   CREATININE 4.6*   ALKPHOS 106   ALT 38   AST 51*   BILITOT 0.4      Recent Labs   Lab 12/10/22  1309 12/10/22  1956 12/13/22  0006   INR 1.1  --   --    APTT  23.3   < > 39.3*    < > = values in this interval not displayed.    No results for input(s): CPK, CPKMB, TROPONINI, MB in the last 168 hours.    Electrolytes: Contraindicated  Accuchecks: Q4H    Gtts:   dextrose 10 % in water (D10W)      heparin (porcine) in D5W 12 Units/kg/hr (12/13/22 0245)       LDA/Wounds:  Lines/Drains/Airways       Drain  Duration                  NG/OG Tube Right nostril -- days    Male External Urinary Catheter 12/08/22 1830 Small 4 days              Peripheral Intravenous Line  Duration                  Peripheral IV - Single Lumen 12/08/22 1800 20 G Anterior;Proximal;Right Forearm 4 days         Peripheral IV - Single Lumen 12/10/22 0219 20 G Right Antecubital 3 days                  Wounds: No  Wound care consulted: No

## 2022-12-13 NOTE — NURSING
Patient had 10 beat run of vtach/SVT like rhythm. Self-limiting, patient back to baseline rhythm, no blood pressure changes. BHAVYA Adhikari notified. Will continue to monitor.

## 2022-12-13 NOTE — ASSESSMENT & PLAN NOTE
52yoM with CHF who initially presented to OSH with RSW. Did not receive tPA, as he was outside of the window. MRI at that time demonstrated bilateral anterior and posterior circulation strokes concerning for embolic etiology. Over hospital course at OSH, the patient was noted to have been lethargic and aphasic with dysarthria. RAGHAV at OSH demosntrated a L ventricular thrombus (not on AC). He was noted to have had an acute change in neuro exam on 12/8 at which time he had RSW with global aphasia. NIHSS had been 6 prior to the event and was noted to have been 23 following. CTA demonstrated a L M2 occlusion for which [place] [facility] [place] level of care.     On arrival to ED, the patient was globally aphasic with L gaze and RSW. Exam limited due to mentation and aphasia. Patient was not taken for IR due to poor ASPECTS. He was admitted to ICU for close monitoring and hemicrani watch. Etiology: likely cardioembolic given LV thrombus      Remains in NCC, neuro exam unchanged and limited by drowsiness. Overnight had short run of VT/SVT that resolved without any interventions. BPs not consistently at goal of <200, had max BP today of 214/140. Currently NPO with NGT in place and anticipate patient will need PEG placement, SLP recommending ice chips sparingly for pleasure. Anticipate step down tomorrow once BP is consistently at goal      Antithrombotics for secondary stroke prevention:   on heparin gtt until swallowing or PEG established, can be switched to DOAC/ warfarin later at discharge/post PEG    Statins for secondary stroke prevention and hyperlipidemia, if present:   Statins: Atorvastatin- 40 mg daily    Aggressive risk factor modification: HTN, DM, HLD, Diet, Exercise, LV thrombus     Rehab efforts: Rehab; NPO, tube feeds via NGT    Diagnostics ordered/pending: None     VTE prophylaxis: Mechanical prophylaxis: Place SCDs  None: Reason for No Pharmacological VTE  Prophylaxis: Currently on anticoagulation     BP parameters: SBP <200

## 2022-12-13 NOTE — PLAN OF CARE
Problem: Physical Therapy  Goal: Physical Therapy Goal  Description: Goals to be met by: 22    Patient will increase functional independence with mobility by performin. Supine to sit with Maximum Assistance  2. Sit to supine with Maximum Assistance  3. Sit to stand transfer with Maximum Assistance  4. Bed to chair transfer with Maximum Assistance using squat pivot technique.  5. Gait  x 5 feet with Moderate Assistance using LRAD.   6. Sitting at edge of bed x8 minutes with Contact Guard Assistance with DYLAN UE support.    Outcome: Ongoing, Progressing   Pt's goals remain appropriate and pt will continue to benefit from skilled PT services to work towards improved functional mobility including: bed mobility, transfers, and gait.   2022

## 2022-12-13 NOTE — ASSESSMENT & PLAN NOTE
Stroke RF  A1c 9.1, consider nutrition consult and DM education  BG goal while inpatient 140-180  Currently on Aspart 6units q4h + BREANNA SSI q4h PRN

## 2022-12-13 NOTE — PLAN OF CARE
[facility] Care Plan    POC reviewed with Spencer Burton Jr. at 1400. Questions and concerns addressed with sister over phone. No acute events today. Pt progressing toward goals. Will continue to monitor. See below and flowsheets for full assessment and VS info.     Bath completed  TF @ goal  Heparin gtt cont @ 12 u/kg/hr. APTT therapuetic.  Na @ goal of 145-155  Plan for CT head @ 0300 on 12/13      Is this a stroke patient? yes- Stroke booklet reviewed with patient, risk factors identified for patient and stroke booklet remains at bedside for ongoing education.     Neuro:  Thao Coma Scale  Best Eye Response: 4-->(E4) spontaneous  Best Motor Response: 5-->(M5) localizes pain  Best Verbal Response: 2-->(V2) incomprehensible speech  Thao Coma Scale Score: 11  Assessment Qualifiers: patient not sedated/intubated  Pupil PERRLA: yes     24 hr Temp:  [97.5 °F (36.4 °C)-99.1 °F (37.3 °C)]     CV:   Rhythm: normal sinus rhythm  BP goals:   SBP <  200  MAP > 65    Resp:   (RETIRED) O2 Device (Oxygen Therapy): room air       Plan: N/A    GI/:     Diet/Nutrition Received: tube feeding  Last Bowel Movement: 12/11/22  Voiding Characteristics: external catheter    Intake/Output Summary (Last 24 hours) at 12/12/2022 1832  Last data filed at 12/12/2022 1805  Gross per 24 hour   Intake 1746 ml   Output 1530 ml   Net 216 ml     Unmeasured Output  Urine Occurrence: 1  Stool Occurrence: 0  Pad Count: 2    Labs/Accuchecks:  Recent Labs   Lab 12/12/22 0239   WBC 17.12*   RBC 5.10   HGB 13.5*   HCT 43.5         Recent Labs   Lab 12/12/22  0239 12/12/22  0803   * 149*   K 3.7  --    CO2 24  --      --    BUN 64*  --    CREATININE 5.2*  --    ALKPHOS 120  --    ALT 34  --    AST 55*  --    BILITOT 0.5  --       Recent Labs   Lab 12/10/22  1309 12/10/22  1956 12/12/22  0239   INR 1.1  --   --    APTT 23.3   < > 42.9*    < > = values in this interval not displayed.    No results for input(s): CPK, CPKMB, TROPONINI, MB in  the last 168 hours.    Electrolytes: Contraindicated  Accuchecks: Q4H    Gtts:   dextrose 10 % in water (D10W)      heparin (porcine) in D5W 12 Units/kg/hr (12/12/22 1705)       LDA/Wounds:  Lines/Drains/Airways       Drain  Duration                  NG/OG Tube Right nostril -- days    Male External Urinary Catheter 12/08/22 1830 Small 4 days              Peripheral Intravenous Line  Duration                  Peripheral IV - Single Lumen 12/08/22 1800 20 G Anterior;Proximal;Right Forearm 4 days         Peripheral IV - Single Lumen 12/10/22 0219 20 G Right Antecubital 2 days                  Wounds: Yes  Wound care consulted: No

## 2022-12-13 NOTE — ASSESSMENT & PLAN NOTE
Stroke risk factor  RAGHAV with EF 38% and segmental LV wall motion abnormalities  Strict I/Os  Continue lasix

## 2022-12-13 NOTE — ASSESSMENT & PLAN NOTE
Stroke RF  SBP < 220, MAP >65  Currently at goal, consider slowly lowering SBP goal to <180 over the next 24-48hrs  PRN Labetalol

## 2022-12-13 NOTE — PT/OT/SLP EVAL
"Speech Language Pathology Evaluation  Cognitive Communication + Dysphagia Treatment    Patient Name:  Spencer Burton Jr.   MRN:  [id number]  Admitting Diagnosis: Embolic stroke involving left middle cerebral artery    Recommendations:     Recommendations:                General Recommendations:  Dysphagia therapy and Speech/language therapy  Diet recommendations:  NPO, NPO   Aspiration Precautions: Continue alternate means of nutrition, Frequent oral care, Ice chips sparingly, and Strict aspiration precautions   General Precautions: Standard, aphasia, aspiration, fall, NPO, vision impaired  Communication strategies:  go to room if call light pushed    History:     Past Medical History:   Diagnosis Date    CHF (congestive heart failure)     COPD (chronic obstructive pulmonary disease)     Debility 12/8/2022    Diabetes mellitus     Embolic stroke involving left middle cerebral artery 12/8/2022    Enlarged LA (left atrium) 12/10/2022    Essential hypertension 5/12/2012    Hyperlipidemia     Hypertension     LV (left ventricular) mural thrombus 12/8/2022    Type 2 diabetes mellitus without complication, without long-term current use of insulin 9/4/2015    Type 2 diabetes mellitus, with long-term current use of insulin 11/6/2012       Past Surgical History:   Procedure Laterality Date    APPENDECTOMY      CHOLECYSTECTOMY         Subjective     "April" pt was not oriented to current month.      Pain/Comfort:  Pain Rating 1:  (no indications of pain)    Respiratory Status: Room air    Objective:   Cognitive Status:    Pt stated his name upon request.  He stated "April" when asked to recall current month.  Pt was unable to orient to month, year, or place despite  max cues.       Receptive Language:   Comprehension:      Pt answered simple yes/no q's via head nod/shake. He was unable to answer complex yes/no q's.  Pt was unable to follow or model 1-step commands despite max cues.     Expressive Language:  Verbal:    Pt stated " ""3-4-5" during automatic counting task with cues.  Pt named objects on 1 out of 2 trials given max cues.       Motor Speech:  Dysarthria moderate to severe    Voice:   Low volume and tone    Visual-Spatial:  Right Neglect pt did not visually scan passed midline    Reading:   tba      Written Expression:   tba    Treatment: Pt seen for ongoing swallowing assessment.  Pt more awake/alert and aware today, but displaying aphasia and cognitive deficits.  Pt accepted the following PO trials: ice chip x 1, thin water via 1/2 tsp x 1, full tsp x 1, and straw sips x 6; puree via 1/2 tsp x 1 and full tsp x 4.  Pt exhibited cough after 2 out of 6 straw sips of thin water and mild anterior loss as well.  SLP recommends pt remains NPO except for ice chips sparingly due to risks of aspiration and decreased ability to maintain nutrition/hydration given cognitive-communicative deficits.     Assessment:   Spencer Burton Jr. is a 52 y.o. male with an SLP diagnosis of Aphasia, Dysphagia, Dysarthria, Cognitive-Linguistic Impairment, and Visio-Spatial Impairment.      Goals:   Multidisciplinary Problems       SLP Goals          Problem: SLP    Goal Priority Disciplines Outcome   SLP Goal     SLP    Description: Speech Language Pathology Goals  Updated goals expected to be met by 12/20:  1. Pt will participate in ongoing swallowing assessment to determine if/when safe for PO intake.   2. Pt will respond to complex yes/no q's with 50% accuracy given max cues.   3. Pt will model 1-step commands on 1 out of 5 trials given max cues.   4. Pt will complete automatic speech tasks with 60% accuracy given max cues.   5. Pt will name objects with 2 out of 5 trials given max cues.     Goals expected to be met by 12/16:  1. Pt will participate in ongoing swallowing assessment to determine if/when safe for PO intake.   2. Pt will participate in speech/language/cognitive evaluation when feasible. Initiated 12/13                               Plan: "   Patient to be seen:  4 x/week   Plan of Care expires:  01/09/23  Plan of Care reviewed with:  patient   SLP Follow-Up:  Yes       Discharge recommendations:  Discharge Facility/Level of Care Needs: rehabilitation facility     Time Tracking:   SLP Treatment Date:   12/13/22  Speech Start Time:  0846  Speech Stop Time:  0906     Speech Total Time (min):  20 min    Billable Minutes: Eval 10  and Treatment Swallowing Dysfunction 10    12/13/2022

## 2022-12-13 NOTE — ASSESSMENT & PLAN NOTE
52 year old man with HTN, HLD, T2DM, CKD, CHF presented to OSH 12/3 with RSW. MRI with bilateral infarcts concerning for cardioembolic etiology. LV thrombus seen on RAGHAV. Neuro exam changed 12/8 while not on anticoagulation, found to have L M2 occlusion. Transferred to [facility] for IR evaluation, no intervention as poor ASPECTS score. Admitted to [facility] for hemicrani watch.    - Vascular Neurology following   - SBP goal <200  - q1hr neuro checks   - atorvastatin 40 mg QD   - heparin gtt started 12/10  - PT/OT/SLP consulted  - Day 5, CTH stable, no midline shift, NSGY has signed off, stable to SD to stroke team

## 2022-12-13 NOTE — PLAN OF CARE
12/13/22 1644   Post-Acute Status   Post-Acute Authorization Placement   Post-Acute Placement Status Referrals Sent  (rehab)     SW attempted to meet with the Pt family at bedside yesterday, but no one was present and Pt not able to discuss rehab. SW contacted Pt mother via phone to discuss rehab but she was not available. Left message.    Sent to [facility], [facility], and [facility] via [employer].     Pjua Lora LCSW  Neurocritical Care   [facility]  [id number]

## 2022-12-13 NOTE — SUBJECTIVE & OBJECTIVE
Interval History: 12/13: NAEON. AF, 160-190 SBP. PSD 5 CTH today stable. Exam stable. NSGY signing off today. Na 148    Medications:  Continuous Infusions:   dextrose 10 % in water (D10W)      heparin (porcine) in D5W 12 Units/kg/hr (12/13/22 0245)     Scheduled Meds:   atorvastatin  40 mg Per NG tube Daily    cefTRIAXone (ROCEPHIN) IVPB  1 g Intravenous Q24H    EScitalopram oxalate  20 mg Per NG tube Daily    furosemide  40 mg Per NG tube Daily    insulin aspart U-100  6 Units Subcutaneous 6 times per day    metoprolol tartrate  25 mg Per NG tube BID    mupirocin   Nasal BID    sodium chloride  1,000 mg Per NG tube TID     PRN Meds:dextrose 10 % in water (D10W), dextrose 10%, dextrose 10%, glucagon (human recombinant), insulin aspart U-100, labetaloL, ondansetron, sodium chloride 0.9%     Review of Systems  Objective:     Weight: 84.8 kg (186 lb 15.2 oz)  Body mass index is 27.61 kg/m².  Vital Signs (Most Recent):  Temp: 98.7 °F (37.1 °C) (12/13/22 0302)  Pulse: 93 (12/13/22 0833)  Resp: 16 (12/13/22 0833)  BP: (!) 188/116 (12/13/22 0602)  SpO2: 99 % (12/13/22 0833)   Vital Signs (24h Range):  Temp:  [97.6 °F (36.4 °C)-98.9 °F (37.2 °C)] 98.7 °F (37.1 °C)  Pulse:  [78-96] 93  Resp:  [10-21] 16  SpO2:  [95 %-99 %] 99 %  BP: (160-190)/() 188/116                          NG/OG Tube Right nostril (Active)   Placement Check placement verified by x-ray;placement verified by distal tube length measurement 12/13/22 0302   Tolerance no signs/symptoms of discomfort 12/13/22 0302   Securement secured to nostril center w/ adhesive device 12/13/22 0302   Clamp Status/Tolerance unclamped;no restlessness;no emesis;no abdominal distention 12/13/22 0302   Suction Setting/Drainage Method suction at the bedside 12/13/22 0302   Insertion Site Appearance no redness, warmth, tenderness, skin breakdown, drainage 12/13/22 0302   Drainage None 12/13/22 0302   Flush/Irrigation flushed w/;water;no resistance met 12/13/22 0302   Feeding  Type continuous;by pump 12/13/22 0302   Feeding Action feeding continued 12/13/22 0302   Current Rate (mL/hr) 40 mL/hr 12/12/22 1902   Goal Rate (mL/hr) 40 mL/hr 12/12/22 1902   Intake (mL) 400 mL 12/12/22 2102   Water Bolus (mL) 100 mL 12/12/22 1505   Formula Name Novasource Renal 12/13/22 0302   Intake (mL) - Formula Tube Feeding 40 12/13/22 0602   Residual Amount (ml) 0 ml 12/11/22 1905       Male External Urinary Catheter 12/08/22 1830 Small (Active)   Collection Container Urimeter 12/13/22 0302   Securement Method secured to top of thigh w/ adhesive device 12/13/22 0302   Skin no redness;no breakdown 12/13/22 0302   Tolerance no signs/symptoms of discomfort 12/13/22 0302   Output (mL) 200 mL 12/13/22 0502   Catheter Change Date 12/12/22 12/13/22 0302   Catheter Change Time 1505 12/13/22 0302       Physical Exam    Neurosurgery Physical Exam  [id number]  AOx2  FC except RUE, mind WD RUE  L gaze preference  R FD  Dysarthric,aphasic  O/w CN intact  Significant Labs:  Recent Labs   Lab 12/12/22 0239 12/12/22  0803 12/12/22  1811 12/13/22  0006   *  --   --  186*   * 149* 148* 148*   K 3.7  --   --  3.3*     --   --  112*   CO2 24  --   --  26   BUN 64*  --   --  55*   CREATININE 5.2*  --   --  4.6*   CALCIUM 8.9  --   --  9.2   MG 2.4  --   --  2.4     Recent Labs   Lab 12/12/22 0239 12/13/22  0006   WBC 17.12* 11.68   HGB 13.5* 13.2*   HCT 43.5 43.2    224     Recent Labs   Lab 12/12/22 0239 12/13/22  0006   APTT 42.9* 39.3*     Microbiology Results (last 7 days)       Procedure Component Value Units Date/Time    Blood culture [[id number]] Collected: 12/08/22 2052    Order Status: Completed Specimen: Blood from Peripheral, Antecubital, Right Updated: 12/12/22 2222     Blood Culture, Routine No Growth to date      No Growth to date      No Growth to date      No Growth to date      No Growth to date    Narrative:      Blood cultures from 2 different sites. 4 bottles total.  Please draw  before starting antibiotics.    Blood culture [[id number]] Collected: 12/08/22 2052    Order Status: Completed Specimen: Blood from Peripheral, Antecubital, Right Updated: 12/12/22 2222     Blood Culture, Routine No Growth to date      No Growth to date      No Growth to date      No Growth to date      No Growth to date    Narrative:      Blood cultures x 2 different sites. 4 bottles total. Please  draw cultures before administering antibiotics.    Urine culture [[id number]]  (Abnormal)  (Susceptibility) Collected: 12/08/22 2001    Order Status: Completed Specimen: Urine Updated: 12/11/22 0328     Urine Culture, Routine KLEBSIELLA PNEUMONIAE  > 100,000 cfu/ml      Narrative:      Specimen Source->Urine          All pertinent labs from the last 24 hours have been reviewed.    Significant Diagnostics:  I have reviewed and interpreted all pertinent imaging results/findings within the past 24 hours.  No results found in the last 24 hours.

## 2022-12-13 NOTE — ASSESSMENT & PLAN NOTE
52M w/ PMH of CHF, TIA, LV thrombus with acute left MCA infarct, NSGY consulted for hemicrani watch    PSD 5    --Patient admitted to ICU on telemetry; NCC/stroke teams are primary   -q1h neurochecks in ICU  --All labs and diagnostics reviewed   --CTA 12/9: acute infarct of left basal ganglia and left caudate lobe. Occlusion of the posterior division of the left M2 branch at the takeoff of the M1 vessel with patency involving the anterior division of the left M2 segment   MRI brain 12/9: Evolving left MCA territory infarct.  Additional foci of diffusion restriction in the left cerebral consistent with recent infarct.  Interval increase susceptibility artifact in the areas of diffusion restriction consistent with hemorrhagic conversion of recent infarcts   CTH 12/10: evolving infarct, hemorrhagic conversion L BG, trace 1mm MLS   CTH 12/11: stable   UCx 12/9: GNRs, on Rocephin   CTH 12/13: stable  --Not good operative candidate; on hep gtt for LV thrombus  --SBP reccs and management per primary team  --Na 145-155 strict, reccomend hypertonic saline PRN per primary team to reach goal  --HOB >30  --Remain NPO at this time for possible operative intervention  --Continue maximal medical therapy and stroke work-up per primary teams  --NSGY signing off; PSD 5, stable exam and CTH's  --Continue to monitor clinically, notify NSGY immediately with any changes in neuro status    Dispo: per primary

## 2022-12-13 NOTE — ASSESSMENT & PLAN NOTE
Cr 4.6 and GFR 14.7 today   Avoid nephrotoxic medications and renally dose adjust when appropriate   Monitor I/Os and daily kidney function labs

## 2022-12-13 NOTE — PROGRESS NOTES
[facility] - Neuro Critical Care  Neurocritical Care  Progress Note    Admit Date: 12/8/2022  Service Date: 12/13/2022  Length of Stay: 5    Subjective:     Chief Complaint: Embolic stroke involving left middle cerebral artery    History of Present Illness: Pt is a 52 y.o. male with PMHx of CHF who presents as a transfer for acute LMCA stroke. Patient initially presented to OSH on 12/3 with RSW. At that time he was not a candidate for TPA and MRI revealed bilateral cardio embolic strokes. Patient had a TTE and RAGHAV which revealed LV thrombus. He was not started AC yet in setting of acute strokes, plan was to start AC today 12/8. Today, patient was noted to have acute change in neuro exam. NIH went from 6 to 23 and CTA revealed L M2 occlusion. [place] [facility] [place] but ASPECTS score was poor so he was not a candidate. Patient will be admitted to [facility] for higher level care and neuro monitoring.     Hospital Course: 12/9/22: Hemicrani watch  12/10/22: start heparin drip  12/11/2022 CTH stable after heparin gtt therapeutic. Recorded UOP not accurate due to difficulty with condom cath; CXR no pleural effusion. CTH ordered for 12/13.  12/12/2022: NAEON. Tube feedings increased today. CTH in AM per NSGY.  12/13/2022: CTH stable with no increasing edema. NSGY has signed off. Stable to step down to stroke team today. Weaning salt tabs to maintain eunatremia.    Review of Systems: Unable to obtain a complete ROS due to level of consciousness.     Vitals:   Temp: 98.7 °F (37.1 °C)  Pulse: 93  Rhythm: normal sinus rhythm  BP: (!) 188/116  MAP (mmHg): 146  Resp: 16  SpO2: 99 %    Temp  Min: 97.6 °F (36.4 °C)  Max: 98.9 °F (37.2 °C)  Pulse  Min: 78  Max: 96  BP  Min: 160/98  Max: 190/123  MAP (mmHg)  Min: 122  Max: 151  Resp  Min: 10  Max: 21  SpO2  Min: 95 %  Max: 99 %    12/12 0701 - 12/13 0700  In: 2000.5 [I.V.:221]  Out: 1670 [Urine:1670]   Unmeasured Output  Urine Occurrence: 1  Stool  Occurrence: 0  Pad Count: 2     Examination:   Constitutional: Well-nourished and -developed. No apparent distress.   Eyes: Conjunctiva clear, anicteric. Lids no lesions.  Head/Ears/Nose/Mouth/Throat/Neck: Moist mucous membranes. External ears, nose atraumatic.   Cardiovascular: Regular rhythm. No leg edema.  Respiratory: Comfortable respirations. Clear to auscultation.  Gastrointestinal: Soft, nondistended, nontender. + bowel sounds.    Neurologic:  -GCS E 4 V 2 M 6  -Poor participant in exam. Drowsy. Dysarthric, garbled speech. Word finding difficulty. Follows some simple commands.  -Cranial nerves: L Gaze, PERRL, R facial droop, + cough  -Motor: R hemiparesis, LUE/LLE antigravity/spontaneous movement  -Sensation: Intact to light touch  Unable to test orientation, language, memory, judgment, insight, fund of knowledge, coordination, gait due to level of consciousness.    Medications:   Continuousdextrose 10 % in water (D10W)  heparin (porcine) in D5W, Last Rate: 12 Units/kg/hr (12/13/22 0245)    Scheduledatorvastatin, 40 mg, Daily  cefTRIAXone (ROCEPHIN) IVPB, 1 g, Q24H  EScitalopram oxalate, 20 mg, Daily  furosemide, 40 mg, Daily  insulin aspart U-100, 6 Units, 6 times per day  metoprolol tartrate, 25 mg, BID  mupirocin, , BID  sodium chloride, 1,000 mg, TID    PRNdextrose 10 % in water (D10W), , Continuous PRN  dextrose 10%, 12.5 g, PRN  dextrose 10%, 25 g, PRN  glucagon (human recombinant), 1 mg, PRN  insulin aspart U-100, 1-10 Units, Q4H PRN  labetaloL, 10 mg, Q6H PRN  ondansetron, 8 mg, Q8H PRN  sodium chloride 0.9%, 10 mL, PRN       Today I independently reviewed pertinent medications, lines/drains/airways, imaging, cardiology results, laboratory results, microbiology results, notably:     ISTAT: No results for input(s): PH, PCO2, PO2, POCSATURATED, HCO3, BE, POCNA, POCK, POCTCO2, POCGLU, POCICA, POCLAC, SAMPLE in the last 24 hours.   Chem:   Recent Labs   Lab 12/13/22  0006   *   K 3.3*   *    CO2 26   *   BUN 55*   CREATININE 4.6*   CALCIUM 9.2   MG 2.4   PHOS 2.8   ANIONGAP 10   PROT 6.4   ALBUMIN 2.2*   BILITOT 0.4   ALKPHOS 106   AST 51*   ALT 38     Heme:   Recent Labs   Lab 12/13/22  0006   WBC 11.68   HGB 13.2*   HCT 43.2        Endo:   Recent Labs   Lab 12/12/22  2147 12/13/22  0200 12/13/22  0557   POCTGLUCOSE 176* 210* 191*     Assessment/Plan:     Neuro  * Embolic stroke involving left middle cerebral artery  52 year old man with HTN, HLD, T2DM, CKD, CHF presented to OSH 12/3 with RSW. MRI with bilateral infarcts concerning for cardioembolic etiology. LV thrombus seen on RAGHAV. Neuro exam changed 12/8 while not on anticoagulation, found to have L M2 occlusion. Transferred to [facility] for IR evaluation, no intervention as poor ASPECTS score. Admitted to [facility] for hemicrani watch.    - Vascular Neurology following   - SBP goal <200  - q1hr neuro checks   - atorvastatin 40 mg QD   - heparin gtt started 12/10  - PT/OT/SLP consulted  - Day 5, CTH stable, no midline shift, NSGY has signed off, stable to SD to stroke team    Cytotoxic brain edema  Due to stroke. High risk of malignant cerebral edema given age and size of infarct   Day 5 of swelling window, CTH stable without midline shift, NSGY has signed off  Weaning salt tabs to maintain eunatremia    Cardiac/Vascular  Hyperlipidemia associated with type 2 diabetes mellitus    - atorvastatin 40 mg     CHF (congestive heart failure)  Chronic. Taking PO Lasix 40 mg QD prior to admission. 12/6 RAGHAV with EF 38%.    - strict I&Os  - continue home Lasix     LV (left ventricular) mural thrombus  Seen on RAGHAV. Likely etiology of strokes.  - heparin gtt     Essential hypertension  Permissive HTN in setting of acute stroke. No home medications on med list.  - goal SBP <200  - PRN labetalol, hydralazine    Renal/  JR on CKD  History of CKD, baseline Cr ~3.5-4.     - continue home Lasix 40 mg PO QD  - monitor Cr and I&Os  - avoid nephrotoxic  agents and renally adjust medications     Acute cystitis without hematuria  12/8 UCx growing pan-sensitive Klebsiella.  - ceftriaxone x 5 days (12/10 - 12/14)    Endocrine  Type 2 diabetes mellitus, with long-term current use of insulin  A1c 9.1. Prior to admission taking 70/30 insulin.    - aspart 3U q4h  - MDSSI  - tube feeds    GI  Oropharyngeal dysphagia  NGT in place for nutrition and meds, will likely need G tube  SLP following    Other  Debility  Due to stroke   - PT/OT recommending IPR    The patient is being Prophylaxed for:  Venous Thromboembolism with: Mechanical or Chemical  Stress Ulcer with: Not Applicable   Ventilator Pneumonia with: not applicable    Activity Orders          Turn patient starting at 12/08 1800    Elevate HOB starting at 12/08 1653    Diet NPO: NPO starting at 12/08 1653        Full Code    Level III    Laura Askew NP  Neurocritical Care  [facility] - Neuro Critical Care

## 2022-12-13 NOTE — SUBJECTIVE & OBJECTIVE
Neurologic Chief Complaint: lethargy, aphasia, dysarthria     Subjective:     Interval History: Patient is seen for follow-up neurological assessment and treatment recommendations:   Remains in [facility], neuro exam unchanged and limited by drowsiness. Overnight had short run of VT/SVT that resolved without any interventions. BPs not consistently at goal of <200, had max BP today of 214/140. Currently NPO with NGT in place and anticipate patient will need PEG placement, SLP recommending ice chips sparingly for pleasure. Anticipate step down tomorrow once BP is consistently at goal    HPI, Past Medical, Family, and Social History remains the same as documented in the initial encounter.     Review of Systems   Unable to perform ROS: Other (Aphasia)   Scheduled Meds:   atorvastatin  40 mg Per NG tube Daily    cefTRIAXone (ROCEPHIN) IVPB  1 g Intravenous Q24H    EScitalopram oxalate  20 mg Per NG tube Daily    furosemide  40 mg Per NG tube Daily    insulin aspart U-100  6 Units Subcutaneous 6 times per day    metoprolol tartrate  25 mg Per NG tube BID    mupirocin   Nasal BID    sodium chloride  1,000 mg Per NG tube TID     Continuous Infusions:   dextrose 10 % in water (D10W)      heparin (porcine) in D5W 12 Units/kg/hr (12/13/22 0245)     PRN Meds:dextrose 10 % in water (D10W), dextrose 10%, dextrose 10%, glucagon (human recombinant), insulin aspart U-100, labetaloL, ondansetron, sodium chloride 0.9%    Objective:     Vital Signs (Most Recent):  Temp: 98.5 °F (36.9 °C) (12/13/22 1101)  Pulse: 85 (12/13/22 1101)  Resp: 15 (12/13/22 1101)  BP: (!) 190/121 (12/13/22 1101)  SpO2: 96 % (12/13/22 1101)  BP Location: Left arm    Vital Signs Range (Last 24H):  Temp:  [97.8 °F (36.6 °C)-98.9 °F (37.2 °C)]   Pulse:  [78-96]   Resp:  [10-22]   BP: (160-214)/()   SpO2:  [95 %-99 %]   BP Location: Left arm    Physical Exam  Vitals and nursing note reviewed.   Constitutional:       General: He is not in acute distress.  HENT:       Head: Normocephalic.      Nose: No rhinorrhea.      Comments: NGT in place  Eyes:      General: No scleral icterus.     Conjunctiva/sclera: Conjunctivae normal.   Cardiovascular:      Rate and Rhythm: Normal rate.   Pulmonary:      Effort: No respiratory distress.   Skin:     General: Skin is warm and dry.   Neurological:      Mental Status: He is lethargic.      Motor: Weakness present.      Comments: Withdraws on R side from pain   L side antigravity   Severe aphasia            Neurological Exam:   LOC: lethargic  Attention Span: poor  Language: Global aphasia   Articulation: Nonverbal unable to assess   Orientation: Nonverbal unable to assess   EOM (CN III, IV, VI): Tracks   Motor: L side moves spontaneously and antigravity, R side withdraws from pain   Sensation: Withdraws from pain throughout     Laboratory:  CMP:   Recent Labs   Lab 12/13/22 0006   CALCIUM 9.2   ALBUMIN 2.2*   PROT 6.4   *   K 3.3*   CO2 26   *   BUN 55*   CREATININE 4.6*   ALKPHOS 106   ALT 38   AST 51*   BILITOT 0.4       BMP:   Recent Labs   Lab 12/13/22 0006   *   K 3.3*   *   CO2 26   BUN 55*   CREATININE 4.6*   CALCIUM 9.2       CBC:   Recent Labs   Lab 12/13/22 0006   WBC 11.68   RBC 4.99   HGB 13.2*   HCT 43.2      MCV 87   MCH 26.5*   MCHC 30.6*       Lipid Panel:   No results for input(s): CHOL, LDLCALC, HDL, TRIG in the last 168 hours.    Coagulation:   Recent Labs   Lab 12/10/22  1309 12/10/22  1956 12/13/22  0006   INR 1.1  --   --    APTT 23.3   < > 39.3*    < > = values in this interval not displayed.       Platelet Aggregation Study: No results for input(s): PLTAGG, PLTAGINTERP, PLTAGREGLACO, ADPPLTAGGREG in the last 168 hours.  Hgb A1C:   Recent Labs   Lab 12/08/22 1729   HGBA1C 9.1*       TSH:   Recent Labs   Lab 12/08/22 1729   TSH 1.375         Diagnostic Results     Brain/Vessel Imaging   [facility] 12/13/22  -Evolving large left MCA infarct with stable mass effect and petechial hemorrhagic  conversion.   -No definite new hemorrhage or significant new abnormal parenchymal attenuation   -Separate areas of infarction involving the right parietal lobe and left cerebellum not well seen with CT technique.   -Clinical correlation and continued follow-up advised     [facility] 12/11/22   Grossly stable evolving large left MCA distribution infarct and smaller infarct in the right frontal lobe with possible trace hemorrhagic conversion. Persistent mass effect with sulcal effacement and slight mass effect on the left lateral ventricle. No new or worsening intracranial hemorrhage and no worsening of minimal rightward midline shift.    [facility] 12/9/22   Evolving no enlarged left MCA territory infarction with hypoattenuation and sulcal effacement. Intermediate density along the left basal ganglia compatible with known hemorrhagic conversion. Smaller area of infarction in the right cerebral hemisphere not well seen. Evolving mass effect and edema associated with the left cerebral hemispheric infarction with slight compression of the left lateral ventricle and trace 1 mm of rightward midline shift. No evidence for hydrocephalus    MRI Brain WO Contrast 12/8/22    Exam limited by patient motion artifact. Evolving left MCA territory infarct.  Additional foci of diffusion restriction in the left cerebral consistent with recent infarct.  Interval increase susceptibility artifact in the areas of diffusion restriction consistent with hemorrhagic conversion of recent infarcts.  No hydrocephalus or significant midline shift.      [facility] 12/8/22 16:29   -Evolving large left MCA territory infarction similar to recent CT though increased in size compared to prior MRI concerning for evolving recent to subacute and acute infarcts.  Localized mass effect without significant midline shift or hydrocephalus.  There is evolving recent to subacute age right posterior frontal infarction similar to prior MRI allowing for differences in  technique  -There is subtle hyperdensity along the left basal ganglia posteriorly and along the right posterior frontal cortex which may represent enhancing subacute age components of infarction with petechial hemorrhage felt less likely but cannot be entirely excluded with limitation secondary to recirculation contrast related to recent CTA performed at outside institution.  -Evolving mass effect most pronounced associated with the left MCA territory infarction with sulcal effacement without significant midline shift or hydrocephalus.    [facility] 12/8/22 12:11 (CTA H/N)   1. Left diencephalic hemisphere demonstrates evidence of an acute infarct of left basal ganglia and left caudate lobe.  2. Occlusion of the posterior division of the left M2 branch at the takeoff of the M1 vessel with patency involving the anterior division of the left M2 segment.    [facility] 12/7/22   1.  Moderate size subacute infarction left anterior basal ganglia and anterior left internal capsule appears mildly larger and better well defined compared to CT from 12/3/2020. There is currently greater mass effect on the anterior horn of the left lateral ventricle. There is no associated bleed or midline shift.  2.  Recent MRI demonstrated additional punctate acute infarctions in the left frontal lobe and a mild size acute infarction right centrum semiovale. These are less obvious on CT. No associated bleed.  3.  Additional chronic periventricular white matter hypodensities.    MRI Brain WO contrast 12/3/22   Multifocal areas of restricted diffusion are felt to reflect acute infarcts.    [facility] 12/3/22   Loss of gray-white matter distinction in involving the left basal ganglia could reflect changes of chronic small vessel ischemic disease versus cytotoxic edema from acute infarct.     Carotid US Bilateral 12/3/22   1. Carotid intimal hyperplasia, with no findings of hemodynamically significant carotid arterial stenosis or vascular occlusion.  2. Patent  vertebral arteries with antegrade flow bilaterally.      Cardiac Imaging   [facility] 12/4/22   The left ventricle is small with moderately decreased systolic function.  The estimated ejection fraction is 38%.  Left ventricular diastolic dysfunction.  There are segmental left ventricular wall motion abnormalities.  No spontaneous echo contrast. A moderate protruding layered left ventricular thrombus is present. The thrombus is fixed and located in the apex.  Normal right ventricular size.  Mild left atrial enlargement.  Mild right atrial enlargement.  Mild aortic regurgitation.  No interatrial septal defect present.  Normal appearing left atrial appendage. No thrombus is present in the appendage. SB occluder is absent. Abnormal appendage velocities.  Mild mitral regurgitation.  Agitated saline contrast study did not show any right to left shunt    TTE 11/14/22   The left ventricle is normal in size with mildly decreased systolic function.  The estimated ejection fraction is 40%.  Grade III left ventricular diastolic dysfunction.  Small posterior pericardial effusion.  There is mild left ventricular global hypokinesis.  Normal right ventricular size with normal right ventricular systolic function.  Severe left atrial enlargement.  Moderate right atrial enlargement.  Mild pulmonic regurgitation.  Mild to moderate tricuspid regurgitation.  Mild-to-moderate aortic regurgitation.  Intermediate central venous pressure (8 mmHg).  The estimated PA systolic pressure is 51 mmHg.  There is pulmonary hypertension.

## 2022-12-13 NOTE — ASSESSMENT & PLAN NOTE
Due to stroke  SLP following  Currently NPO with NGT in place for tube feeds, likely needs PEG placement

## 2022-12-13 NOTE — ASSESSMENT & PLAN NOTE
Due to stroke. High risk of malignant cerebral edema given age and size of infarct   Day 5 of swelling window, CTH stable without midline shift, NSGY has signed off  Weaning salt tabs to maintain eunatremia

## 2022-12-13 NOTE — PROGRESS NOTES
[facility]  Vascular Neurology  [facility]  Progress Note    Assessment/Plan:     * Embolic stroke involving left middle cerebral artery  52yoM with CHF who initially presented to OSH with RSW. Did not receive tPA, as he was outside of the window. MRI at that time demonstrated bilateral anterior and posterior circulation strokes concerning for embolic etiology. Over hospital course at OSH, the patient was noted to have been lethargic and aphasic with dysarthria. RAGHAV at OSH demosntrated a L ventricular thrombus (not on AC). He was noted to have had an acute change in neuro exam on 12/8 at which time he had RSW with global aphasia. NIHSS had been 6 prior to the event and was noted to have been 23 following. CTA demonstrated a L M2 occlusion for which the patient was transferred to [facility] [place] for possible intervention and higher level of care.     On arrival to ED, the patient was globally aphasic with L gaze and RSW. Exam limited due to mentation and aphasia. Patient was not taken for IR due to poor ASPECTS. He was admitted to ICU for close monitoring and hemicrani watch. Etiology: likely cardioembolic given LV thrombus      Remains in NCC, neuro exam unchanged and limited by drowsiness. Overnight had short run of VT/SVT that resolved without any interventions. BPs not consistently at goal of <200, had max BP today of 214/140. Currently NPO with NGT in place and anticipate patient will need PEG placement, SLP recommending ice chips sparingly for pleasure. Anticipate step down tomorrow once BP is consistently at goal      Antithrombotics for secondary stroke prevention:   on heparin gtt until swallowing or PEG established, can be switched to DOAC/ warfarin later at discharge/post PEG    Statins for secondary stroke prevention and hyperlipidemia, if present:   Statins: Atorvastatin- 40 mg daily    Aggressive risk factor modification: HTN, DM, HLD, Diet, Exercise, LV thrombus     Rehab  efforts: Rehab; NPO, tube feeds via NGT    Diagnostics ordered/pending: None     VTE prophylaxis: Mechanical prophylaxis: Place SCDs  None: Reason for No Pharmacological VTE Prophylaxis: Currently on anticoagulation     BP parameters: SBP <200      Oropharyngeal dysphagia  Due to stroke  SLP following  Currently NPO with NGT in place for tube feeds, likely needs PEG placement    JR on CKD  See CKD    Hyperlipidemia associated with type 2 diabetes mellitus  Stroke RF   . 8, goal <70  Continue atorvastatin 40mg daily    CHF (congestive heart failure)  Stroke risk factor  RAGHAV with EF 38% and segmental LV wall motion abnormalities  Strict I/Os  Continue lasix    Acute cystitis without hematuria  Currently on ceftriaxone   Klebsiella pneumoniae on cx   Recommend narrowing spectrum when appropriate     Debility  2/2 stroke   OT and PT to evaluate   Therapy recommending IPR     Cytotoxic brain edema  -Noted on imaging in the area of the L MCA territory. Will continue to monitor q1h while in ICU and repeat CTH PRN for acute neuro exam changes that could indicate worsening/expansion of edema.   -NSGY consulted for hemicrani watch due to malignant MCA, no acute intervention recommended as serial imaging remains stable    LV (left ventricular) mural thrombus  Stroke RF  Continue heparin gtt at this time as likely will need PEG placement  Will switch to DOAC/warfarin once enteral route established    Stage 4 chronic kidney disease  Cr 4.6 and GFR 14.7 today   Avoid nephrotoxic medications and renally dose adjust when appropriate   Monitor I/Os and daily kidney function labs    Type 2 diabetes mellitus, with long-term current use of insulin  Stroke RF  A1c 9.1, consider nutrition consult and DM education  BG goal while inpatient 140-180  Currently on Aspart 6units q4h + BREANNA SSI q4h PRN    Essential hypertension  Stroke RF  SBP < 220, MAP >65  Currently at goal, consider slowly lowering SBP goal to <180 over the next  24-48hrs  PRN Labetalol         12/09/2022 Patient with LV thrombus, will need anticoagulation. NGT in place would recommend heparin gtt until patient able to swallow or PEG placed prior to DOAC initiation. Patient tachycardic today with SBP < 210, metoprolol started by primary team. Febrile with elevated white count and procal, currently on Zosyn and Vanc while cx pending. NSGY following for hemicrani watch. Patient has been discharged from OT per last note, will need new orders. Continue current ICU care.   12/10/2022: Pending stability scan patient is expected to be started on heparin gtt. Continue hemicrani watch in  ICU. Family at bedside.   12/11/2022: Patient started on heparin gtt overnight, he demonstrated great clinical improvement today, he was able to tell me his name, knew he is in [facility]. Followed single step commands. No family at bedside today.   12/12/2022 Patient remains in ICU for sue-crani watch. NSGY following. On salt tabs for goal of hypernatremia.Will remain in ICU another night. VN to re-evaluate for stepdown tomorrow. Patient is on ceftriaxone for acute cystitis. aPTT today 42.9. CTH following therapeutic heparin levels stable. More alert today. IPR recommendations; SLP with minced and moist diet with thin liquids.   12/13/2022 Remains in NCC, neuro exam unchanged and limited by drowsiness. Overnight had short run of VT/SVT that resolved without any interventions. BPs not consistently at goal of <200, had max BP today of 214/140. Currently NPO with NGT in place and anticipate patient will need PEG placement, SLP recommending ice chips sparingly for pleasure. Anticipate step down tomorrow once BP is consistently at goal      STROKE DOCUMENTATION   Acute Stroke Times   Last Known Normal Date: 12/08/22  Last Known Normal Time: 1145  Symptom Onset Date: 12/08/22  Symptom Onset Time: 1145  Stroke Team Called Date: 12/08/22  Stroke Team Called Time: 1615  Stroke Team Arrival Date: 12/08/22  Stroke  Team Arrival Time: 1615  CT Interpretation Time: 1615  Thrombolytic Therapy Recommended: No  CTA Interpretation Time: 1615    NIH Scale:  1a. Level of Consciousness: 1-->Not alert, but arousable by minor stimulation to obey, answer, or respond  1b. LOC Questions: 1-->Answers one question correctly  1c. LOC Commands: 1-->Performs one task correctly  2. Best Gaze: 2-->Forced deviation, or total gaze paresis not overcome by the oculocephalic maneuver  3. Visual: 0-->No visual loss  4. Facial Palsy: 2-->Partial paralysis (total or near-total paralysis of lower face)  5a. Motor Arm, Left: 0-->No drift, limb holds 90 (or 45) degrees for full 10 secs  5b. Motor Arm, Right: 3-->No effort against gravity, limb falls  6a. Motor Leg, Left: 0-->No drift, leg holds 30 degree position for full 5 secs  6b. Motor Leg, Right: 3-->No effort against gravity, leg falls to bed immediately  7. Limb Ataxia: 0-->Absent  8. Sensory: 1-->Mild-to-moderate sensory loss, patient feels pinprick is less sharp or is dull on the affected side, or there is a loss of superficial pain with pinprick, but patient is aware of being touched  9. Best Language: 2-->Severe aphasia, all communication is through fragmentary expression, great need for inference, questioning, and guessing by the listener. Range of information that can be exchanged is limited, listener carries burden of. . . (see row details)  10. Dysarthria: 2-->Severe dysarthria, patients speech is so slurred as to be unintelligible in the absence of or out of proportion to any dysphasia, or is mute/anarthric  11. Extinction and Inattention (formerly Neglect): 1-->Visual, tactile, auditory, spatial, or personal inattention or extinction to bilateral simultaneous stimulation in one of the sensory modalities  Total (NIH Stroke Scale): 19       Modified Milana Score: 1  Thao Coma Scale:    ABCD2 Score:    [id number] Score:   HAS -BLED Score:   ICH Score:   Hunt & Leblanc Classification:       Hemorrhagic change of an Ischemic Stroke: Does this patient have an ischemic stroke with hemorrhagic changes? No     Neurologic Chief Complaint: lethargy, aphasia, dysarthria     Subjective:     Interval History: Patient is seen for follow-up neurological assessment and treatment recommendations:   Remains in NCC, neuro exam unchanged and limited by drowsiness. Overnight had short run of VT/SVT that resolved without any interventions. BPs not consistently at goal of <200, had max BP today of 214/140. Currently NPO with NGT in place and anticipate patient will need PEG placement, SLP recommending ice chips sparingly for pleasure. Anticipate step down tomorrow once BP is consistently at goal    HPI, Past Medical, Family, and Social History remains the same as documented in the initial encounter.     Review of Systems   Unable to perform ROS: Other (Aphasia)   Scheduled Meds:   atorvastatin  40 mg Per NG tube Daily    cefTRIAXone (ROCEPHIN) IVPB  1 g Intravenous Q24H    EScitalopram oxalate  20 mg Per NG tube Daily    furosemide  40 mg Per NG tube Daily    insulin aspart U-100  6 Units Subcutaneous 6 times per day    metoprolol tartrate  25 mg Per NG tube BID    mupirocin   Nasal BID    sodium chloride  1,000 mg Per NG tube TID     Continuous Infusions:   dextrose 10 % in water (D10W)      heparin (porcine) in D5W 12 Units/kg/hr (12/13/22 0245)     PRN Meds:dextrose 10 % in water (D10W), dextrose 10%, dextrose 10%, glucagon (human recombinant), insulin aspart U-100, labetaloL, ondansetron, sodium chloride 0.9%    Objective:     Vital Signs (Most Recent):  Temp: 98.5 °F (36.9 °C) (12/13/22 1101)  Pulse: 85 (12/13/22 1101)  Resp: 15 (12/13/22 1101)  BP: (!) 190/121 (12/13/22 1101)  SpO2: 96 % (12/13/22 1101)  BP Location: Left arm    Vital Signs Range (Last 24H):  Temp:  [97.8 °F (36.6 °C)-98.9 °F (37.2 °C)]   Pulse:  [78-96]   Resp:  [10-22]   BP: (160-214)/()   SpO2:  [95 %-99 %]   BP Location: Left  arm    Physical Exam  Vitals and nursing note reviewed.   Constitutional:       General: He is not in acute distress.  HENT:      Head: Normocephalic.      Nose: No rhinorrhea.      Comments: NGT in place  Eyes:      General: No scleral icterus.     Conjunctiva/sclera: Conjunctivae normal.   Cardiovascular:      Rate and Rhythm: Normal rate.   Pulmonary:      Effort: No respiratory distress.   Skin:     General: Skin is warm and dry.   Neurological:      Mental Status: He is lethargic.      Motor: Weakness present.      Comments: Withdraws on R side from pain   L side antigravity   Severe aphasia            Neurological Exam:   LOC: lethargic  Attention Span: poor  Language: Global aphasia   Articulation: Nonverbal unable to assess   Orientation: Nonverbal unable to assess   EOM (CN III, IV, VI): Tracks   Motor: L side moves spontaneously and antigravity, R side withdraws from pain   Sensation: Withdraws from pain throughout     Laboratory:  CMP:   Recent Labs   Lab 12/13/22  0006   CALCIUM 9.2   ALBUMIN 2.2*   PROT 6.4   *   K 3.3*   CO2 26   *   BUN 55*   CREATININE 4.6*   ALKPHOS 106   ALT 38   AST 51*   BILITOT 0.4       BMP:   Recent Labs   Lab 12/13/22  0006   *   K 3.3*   *   CO2 26   BUN 55*   CREATININE 4.6*   CALCIUM 9.2       CBC:   Recent Labs   Lab 12/13/22  0006   WBC 11.68   RBC 4.99   HGB 13.2*   HCT 43.2      MCV 87   MCH 26.5*   MCHC 30.6*       Lipid Panel:   No results for input(s): CHOL, LDLCALC, HDL, TRIG in the last 168 hours.    Coagulation:   Recent Labs   Lab 12/10/22  1309 12/10/22  1956 12/13/22  0006   INR 1.1  --   --    APTT 23.3   < > 39.3*    < > = values in this interval not displayed.       Platelet Aggregation Study: No results for input(s): PLTAGG, PLTAGINTERP, PLTAGREGLACO, ADPPLTAGGREG in the last 168 hours.  Hgb A1C:   Recent Labs   Lab 12/08/22 1729   HGBA1C 9.1*       TSH:   Recent Labs   Lab 12/08/22  1729   TSH 1.375         Diagnostic  Results     Brain/Vessel Imaging   CTH 12/13/22  -Evolving large left MCA infarct with stable mass effect and petechial hemorrhagic conversion.   -No definite new hemorrhage or significant new abnormal parenchymal attenuation   -Separate areas of infarction involving the right parietal lobe and left cerebellum not well seen with CT technique.   -Clinical correlation and continued follow-up advised     CTH 12/11/22   Grossly stable evolving large left MCA distribution infarct and smaller infarct in the right frontal lobe with possible trace hemorrhagic conversion. Persistent mass effect with sulcal effacement and slight mass effect on the left lateral ventricle. No new or worsening intracranial hemorrhage and no worsening of minimal rightward midline shift.    CTH 12/9/22   Evolving no enlarged left MCA territory infarction with hypoattenuation and sulcal effacement. Intermediate density along the left basal ganglia compatible with known hemorrhagic conversion. Smaller area of infarction in the right cerebral hemisphere not well seen. Evolving mass effect and edema associated with the left cerebral hemispheric infarction with slight compression of the left lateral ventricle and trace 1 mm of rightward midline shift. No evidence for hydrocephalus    MRI Brain WO Contrast 12/8/22    Exam limited by patient motion artifact. Evolving left MCA territory infarct.  Additional foci of diffusion restriction in the left cerebral consistent with recent infarct.  Interval increase susceptibility artifact in the areas of diffusion restriction consistent with hemorrhagic conversion of recent infarcts.  No hydrocephalus or significant midline shift.      CTH 12/8/22 16:29   -Evolving large left MCA territory infarction similar to recent CT though increased in size compared to prior MRI concerning for evolving recent to subacute and acute infarcts.  Localized mass effect without significant midline shift or hydrocephalus.  There is  evolving recent to subacute age right posterior frontal infarction similar to prior MRI allowing for differences in technique  -There is subtle hyperdensity along the left basal ganglia posteriorly and along the right posterior frontal cortex which may represent enhancing subacute age components of infarction with petechial hemorrhage felt less likely but cannot be entirely excluded with limitation secondary to recirculation contrast related to recent CTA performed at outside institution.  -Evolving mass effect most pronounced associated with the left MCA territory infarction with sulcal effacement without significant midline shift or hydrocephalus.    CTH 12/8/22 12:11 (CTA H/N)   1. Left diencephalic hemisphere demonstrates evidence of an acute infarct of left basal ganglia and left caudate lobe.  2. Occlusion of the posterior division of the left M2 branch at the takeoff of the M1 vessel with patency involving the anterior division of the left M2 segment.    CTH 12/7/22   1.  Moderate size subacute infarction left anterior basal ganglia and anterior left internal capsule appears mildly larger and better well defined compared to CT from 12/3/2020. There is currently greater mass effect on the anterior horn of the left lateral ventricle. There is no associated bleed or midline shift.  2.  Recent MRI demonstrated additional punctate acute infarctions in the left frontal lobe and a mild size acute infarction right centrum semiovale. These are less obvious on CT. No associated bleed.  3.  Additional chronic periventricular white matter hypodensities.    MRI Brain WO contrast 12/3/22   Multifocal areas of restricted diffusion are felt to reflect acute infarcts.    CTH 12/3/22   Loss of gray-white matter distinction in involving the left basal ganglia could reflect changes of chronic small vessel ischemic disease versus cytotoxic edema from acute infarct.     Carotid US Bilateral 12/3/22   1. Carotid intimal  hyperplasia, with no findings of hemodynamically significant carotid arterial stenosis or vascular occlusion.  2. Patent vertebral arteries with antegrade flow bilaterally.      Cardiac Imaging   RAGHAV 12/4/22    The left ventricle is small with moderately decreased systolic function.   The estimated ejection fraction is 38%.   Left ventricular diastolic dysfunction.   There are segmental left ventricular wall motion abnormalities.   No spontaneous echo contrast. A moderate protruding layered left ventricular thrombus is present. The thrombus is fixed and located in the apex.   Normal right ventricular size.   Mild left atrial enlargement.   Mild right atrial enlargement.   Mild aortic regurgitation.   No interatrial septal defect present.   Normal appearing left atrial appendage. No thrombus is present in the appendage. SB occluder is absent. Abnormal appendage velocities.   Mild mitral regurgitation.   Agitated saline contrast study did not show any right to left shunt    TTE 11/14/22    The left ventricle is normal in size with mildly decreased systolic function.   The estimated ejection fraction is 40%.   Grade III left ventricular diastolic dysfunction.   Small posterior pericardial effusion.   There is mild left ventricular global hypokinesis.   Normal right ventricular size with normal right ventricular systolic function.   Severe left atrial enlargement.   Moderate right atrial enlargement.   Mild pulmonic regurgitation.   Mild to moderate tricuspid regurgitation.   Mild-to-moderate aortic regurgitation.   Intermediate central venous pressure (8 mmHg).   The estimated PA systolic pressure is 51 mmHg.   There is pulmonary hypertension.      BHAVYA Daugherty  [facility]  Department of Vascular Neurology   [facility]

## 2022-12-13 NOTE — ASSESSMENT & PLAN NOTE
Stroke RF  Continue heparin gtt at this time as likely will need PEG placement  Will switch to DOAC/warfarin once enteral route established

## 2022-12-13 NOTE — SUBJECTIVE & OBJECTIVE
Review of Systems: Unable to obtain a complete ROS due to level of consciousness.     Vitals:   Temp: 98.7 °F (37.1 °C)  Pulse: 93  Rhythm: normal sinus rhythm  BP: (!) 188/116  MAP (mmHg): 146  Resp: 16  SpO2: 99 %    Temp  Min: 97.6 °F (36.4 °C)  Max: 98.9 °F (37.2 °C)  Pulse  Min: 78  Max: 96  BP  Min: 160/98  Max: 190/123  MAP (mmHg)  Min: 122  Max: 151  Resp  Min: 10  Max: 21  SpO2  Min: 95 %  Max: 99 %    12/12 0701 - 12/13 0700  In: 2000.5 [I.V.:221]  Out: 1670 [Urine:1670]   Unmeasured Output  Urine Occurrence: 1  Stool Occurrence: 0  Pad Count: 2     Examination:   Constitutional: Well-nourished and -developed. No apparent distress.   Eyes: Conjunctiva clear, anicteric. Lids no lesions.  Head/Ears/Nose/Mouth/Throat/Neck: Moist mucous membranes. External ears, nose atraumatic.   Cardiovascular: Regular rhythm. No leg edema.  Respiratory: Comfortable respirations. Clear to auscultation.  Gastrointestinal: Soft, nondistended, nontender. + bowel sounds.    Neurologic:  -GCS E 4 V 2 M 6  -Poor participant in exam. Drowsy. Dysarthric, garbled speech. Word finding difficulty. Follows some simple commands.  -Cranial nerves: L Gaze, PERRL, R facial droop, + cough  -Motor: R hemiparesis, LUE/LLE antigravity/spontaneous movement  -Sensation: Intact to light touch  Unable to test orientation, language, memory, judgment, insight, fund of knowledge, coordination, gait due to level of consciousness.    Medications:   Continuousdextrose 10 % in water (D10W)  heparin (porcine) in D5W, Last Rate: 12 Units/kg/hr (12/13/22 0245)    Scheduledatorvastatin, 40 mg, Daily  cefTRIAXone (ROCEPHIN) IVPB, 1 g, Q24H  EScitalopram oxalate, 20 mg, Daily  furosemide, 40 mg, Daily  insulin aspart U-100, 6 Units, 6 times per day  metoprolol tartrate, 25 mg, BID  mupirocin, , BID  sodium chloride, 1,000 mg, TID    PRNdextrose 10 % in water (D10W), , Continuous PRN  dextrose 10%, 12.5 g, PRN  dextrose 10%, 25 g, PRN  glucagon (human  recombinant), 1 mg, PRN  insulin aspart U-100, 1-10 Units, Q4H PRN  labetaloL, 10 mg, Q6H PRN  ondansetron, 8 mg, Q8H PRN  sodium chloride 0.9%, 10 mL, PRN       Today I independently reviewed pertinent medications, lines/drains/airways, imaging, cardiology results, laboratory results, microbiology results, notably:     ISTAT: No results for input(s): PH, PCO2, PO2, POCSATURATED, HCO3, BE, POCNA, POCK, POCTCO2, POCGLU, POCICA, POCLAC, SAMPLE in the last 24 hours.   Chem:   Recent Labs   Lab 12/13/22  0006   *   K 3.3*   *   CO2 26   *   BUN 55*   CREATININE 4.6*   CALCIUM 9.2   MG 2.4   PHOS 2.8   ANIONGAP 10   PROT 6.4   ALBUMIN 2.2*   BILITOT 0.4   ALKPHOS 106   AST 51*   ALT 38     Heme:   Recent Labs   Lab 12/13/22  0006   WBC 11.68   HGB 13.2*   HCT 43.2        Endo:   Recent Labs   Lab 12/12/22  2147 12/13/22  0200 12/13/22  0557   POCTGLUCOSE 176* 210* 191*

## 2022-12-14 LAB
ALBUMIN SERPL BCP-MCNC: 2.2 G/DL (ref 3.5–5.2)
ALP SERPL-CCNC: 128 U/L (ref 55–135)
ALT SERPL W/O P-5'-P-CCNC: 47 U/L (ref 10–44)
ANION GAP SERPL CALC-SCNC: 10 MMOL/L (ref 8–16)
APTT BLDCRRT: 38 SEC (ref 21–32)
APTT BLDCRRT: 39.2 SEC (ref 21–32)
APTT BLDCRRT: 40.2 SEC (ref 21–32)
AST SERPL-CCNC: 68 U/L (ref 10–40)
BASOPHILS # BLD AUTO: 0.09 K/UL (ref 0–0.2)
BASOPHILS NFR BLD: 0.8 % (ref 0–1.9)
BILIRUB SERPL-MCNC: 0.4 MG/DL (ref 0.1–1)
BUN SERPL-MCNC: 52 MG/DL (ref 6–20)
CALCIUM SERPL-MCNC: 9 MG/DL (ref 8.7–10.5)
CHLORIDE SERPL-SCNC: 113 MMOL/L (ref 95–110)
CO2 SERPL-SCNC: 25 MMOL/L (ref 23–29)
CREAT SERPL-MCNC: 4.2 MG/DL (ref 0.5–1.4)
DIFFERENTIAL METHOD: ABNORMAL
EOSINOPHIL # BLD AUTO: 0.3 K/UL (ref 0–0.5)
EOSINOPHIL NFR BLD: 3 % (ref 0–8)
ERYTHROCYTE [DISTWIDTH] IN BLOOD BY AUTOMATED COUNT: 14.8 % (ref 11.5–14.5)
EST. GFR  (NO RACE VARIABLE): 16.4 ML/MIN/1.73 M^2
GLUCOSE SERPL-MCNC: 172 MG/DL (ref 70–110)
HCT VFR BLD AUTO: 41.9 % (ref 40–54)
HGB BLD-MCNC: 12.9 G/DL (ref 14–18)
IMM GRANULOCYTES # BLD AUTO: 0.1 K/UL (ref 0–0.04)
IMM GRANULOCYTES NFR BLD AUTO: 0.9 % (ref 0–0.5)
LYMPHOCYTES # BLD AUTO: 1.4 K/UL (ref 1–4.8)
LYMPHOCYTES NFR BLD: 12.5 % (ref 18–48)
MAGNESIUM SERPL-MCNC: 2.4 MG/DL (ref 1.6–2.6)
MCH RBC QN AUTO: 27 PG (ref 27–31)
MCHC RBC AUTO-ENTMCNC: 30.8 G/DL (ref 32–36)
MCV RBC AUTO: 88 FL (ref 82–98)
MONOCYTES # BLD AUTO: 0.8 K/UL (ref 0.3–1)
MONOCYTES NFR BLD: 7.5 % (ref 4–15)
NEUTROPHILS # BLD AUTO: 8.4 K/UL (ref 1.8–7.7)
NEUTROPHILS NFR BLD: 75.3 % (ref 38–73)
NRBC BLD-RTO: 0 /100 WBC
PHOSPHATE SERPL-MCNC: 2.3 MG/DL (ref 2.7–4.5)
PLATELET # BLD AUTO: 236 K/UL (ref 150–450)
PMV BLD AUTO: 13.2 FL (ref 9.2–12.9)
POCT GLUCOSE: 150 MG/DL (ref 70–110)
POCT GLUCOSE: 164 MG/DL (ref 70–110)
POCT GLUCOSE: 212 MG/DL (ref 70–110)
POCT GLUCOSE: 238 MG/DL (ref 70–110)
POTASSIUM SERPL-SCNC: 4.6 MMOL/L (ref 3.5–5.1)
PROT SERPL-MCNC: 6.7 G/DL (ref 6–8.4)
RBC # BLD AUTO: 4.77 M/UL (ref 4.6–6.2)
SODIUM SERPL-SCNC: 148 MMOL/L (ref 136–145)
WBC # BLD AUTO: 11.16 K/UL (ref 3.9–12.7)

## 2022-12-14 PROCEDURE — 25000003 PHARM REV CODE 250: Performed by: NURSE PRACTITIONER

## 2022-12-14 PROCEDURE — 85730 THROMBOPLASTIN TIME PARTIAL: CPT | Mod: 91 | Performed by: INTERNAL MEDICINE

## 2022-12-14 PROCEDURE — 20000000 HC ICU ROOM

## 2022-12-14 PROCEDURE — 85025 COMPLETE CBC W/AUTO DIFF WBC: CPT | Performed by: NURSE PRACTITIONER

## 2022-12-14 PROCEDURE — 85730 THROMBOPLASTIN TIME PARTIAL: CPT | Mod: 91 | Performed by: PHYSICIAN ASSISTANT

## 2022-12-14 PROCEDURE — 92526 ORAL FUNCTION THERAPY: CPT

## 2022-12-14 PROCEDURE — 25000003 PHARM REV CODE 250: Performed by: PHYSICIAN ASSISTANT

## 2022-12-14 PROCEDURE — 80053 COMPREHEN METABOLIC PANEL: CPT | Performed by: PHYSICIAN ASSISTANT

## 2022-12-14 PROCEDURE — 99233 PR SUBSEQUENT HOSPITAL CARE,LEVL III: ICD-10-PCS | Mod: ,,, | Performed by: PSYCHIATRY & NEUROLOGY

## 2022-12-14 PROCEDURE — 85730 THROMBOPLASTIN TIME PARTIAL: CPT | Performed by: NURSE PRACTITIONER

## 2022-12-14 PROCEDURE — 99233 SBSQ HOSP IP/OBS HIGH 50: CPT | Mod: ,,, | Performed by: PSYCHIATRY & NEUROLOGY

## 2022-12-14 PROCEDURE — 25000003 PHARM REV CODE 250: Performed by: PSYCHIATRY & NEUROLOGY

## 2022-12-14 PROCEDURE — 92507 TX SP LANG VOICE COMM INDIV: CPT

## 2022-12-14 PROCEDURE — 84100 ASSAY OF PHOSPHORUS: CPT | Performed by: PHYSICIAN ASSISTANT

## 2022-12-14 PROCEDURE — 63600175 PHARM REV CODE 636 W HCPCS: Performed by: NURSE PRACTITIONER

## 2022-12-14 PROCEDURE — 94761 N-INVAS EAR/PLS OXIMETRY MLT: CPT

## 2022-12-14 PROCEDURE — 99233 PR SUBSEQUENT HOSPITAL CARE,LEVL III: ICD-10-PCS | Mod: FS,,, | Performed by: NURSE PRACTITIONER

## 2022-12-14 PROCEDURE — 83735 ASSAY OF MAGNESIUM: CPT | Performed by: PHYSICIAN ASSISTANT

## 2022-12-14 PROCEDURE — 99233 SBSQ HOSP IP/OBS HIGH 50: CPT | Mod: FS,,, | Performed by: NURSE PRACTITIONER

## 2022-12-14 RX ORDER — INSULIN ASPART 100 [IU]/ML
8 INJECTION, SOLUTION INTRAVENOUS; SUBCUTANEOUS
Status: DISCONTINUED | OUTPATIENT
Start: 2022-12-14 | End: 2022-12-17

## 2022-12-14 RX ORDER — SODIUM,POTASSIUM PHOSPHATES 280-250MG
1 POWDER IN PACKET (EA) ORAL ONCE
Status: COMPLETED | OUTPATIENT
Start: 2022-12-14 | End: 2022-12-14

## 2022-12-14 RX ADMIN — FUROSEMIDE 40 MG: 40 TABLET ORAL at 08:12

## 2022-12-14 RX ADMIN — METOPROLOL TARTRATE 25 MG: 25 TABLET, FILM COATED ORAL at 08:12

## 2022-12-14 RX ADMIN — INSULIN ASPART 2 UNITS: 100 INJECTION, SOLUTION INTRAVENOUS; SUBCUTANEOUS at 04:12

## 2022-12-14 RX ADMIN — INSULIN ASPART 4 UNITS: 100 INJECTION, SOLUTION INTRAVENOUS; SUBCUTANEOUS at 08:12

## 2022-12-14 RX ADMIN — INSULIN ASPART 4 UNITS: 100 INJECTION, SOLUTION INTRAVENOUS; SUBCUTANEOUS at 12:12

## 2022-12-14 RX ADMIN — CEFTRIAXONE 1 G: 1 INJECTION, POWDER, FOR SOLUTION INTRAMUSCULAR; INTRAVENOUS at 11:12

## 2022-12-14 RX ADMIN — ESCITALOPRAM OXALATE 20 MG: 20 TABLET ORAL at 08:12

## 2022-12-14 RX ADMIN — INSULIN ASPART 8 UNITS: 100 INJECTION, SOLUTION INTRAVENOUS; SUBCUTANEOUS at 04:12

## 2022-12-14 RX ADMIN — POTASSIUM & SODIUM PHOSPHATES POWDER PACK 280-160-250 MG 1 PACKET: 280-160-250 PACK at 06:12

## 2022-12-14 RX ADMIN — INSULIN ASPART 1 UNITS: 100 INJECTION, SOLUTION INTRAVENOUS; SUBCUTANEOUS at 12:12

## 2022-12-14 RX ADMIN — INSULIN ASPART 6 UNITS: 100 INJECTION, SOLUTION INTRAVENOUS; SUBCUTANEOUS at 08:12

## 2022-12-14 RX ADMIN — INSULIN ASPART 8 UNITS: 100 INJECTION, SOLUTION INTRAVENOUS; SUBCUTANEOUS at 12:12

## 2022-12-14 RX ADMIN — INSULIN ASPART 6 UNITS: 100 INJECTION, SOLUTION INTRAVENOUS; SUBCUTANEOUS at 12:12

## 2022-12-14 RX ADMIN — HEPARIN SODIUM 13 UNITS/KG/HR: 10000 INJECTION, SOLUTION INTRAVENOUS at 02:12

## 2022-12-14 RX ADMIN — AMLODIPINE BESYLATE 10 MG: 10 TABLET ORAL at 08:12

## 2022-12-14 RX ADMIN — ATORVASTATIN CALCIUM 40 MG: 40 TABLET, FILM COATED ORAL at 08:12

## 2022-12-14 RX ADMIN — INSULIN ASPART 8 UNITS: 100 INJECTION, SOLUTION INTRAVENOUS; SUBCUTANEOUS at 08:12

## 2022-12-14 NOTE — SUBJECTIVE & OBJECTIVE
Neurologic Chief Complaint: lethargy, aphasia, dysarthria     Subjective:     Interval History: Patient is seen for follow-up neurological assessment and treatment recommendations:     Pending SD to NPU. Continue to watch BP closely, SBP<200. Patient will likely need a PEG.    HPI, Past Medical, Family, and Social History remains the same as documented in the initial encounter.     Review of Systems   Unable to perform ROS: Other (Aphasia)   Scheduled Meds:   amLODIPine  10 mg Per NG tube Daily    atorvastatin  40 mg Per NG tube Daily    EScitalopram oxalate  20 mg Per NG tube Daily    furosemide  40 mg Per NG tube Daily    insulin aspart U-100  8 Units Subcutaneous 6 times per day    metoprolol tartrate  25 mg Per NG tube BID     Continuous Infusions:   dextrose 10 % in water (D10W)      heparin (porcine) in D5W 13 Units/kg/hr (12/14/22 1200)     PRN Meds:dextrose 10 % in water (D10W), dextrose 10%, dextrose 10%, glucagon (human recombinant), insulin aspart U-100, labetaloL, ondansetron, sodium chloride 0.9%    Objective:     Vital Signs (Most Recent):  Temp: 98.5 °F (36.9 °C) (12/14/22 1102)  Pulse: 84 (12/14/22 1202)  Resp: 15 (12/14/22 1202)  BP: (!) 154/94 (12/14/22 1202)  SpO2: 95 % (12/14/22 1202)  BP Location: Left arm    Vital Signs Range (Last 24H):  Temp:  [97.8 °F (36.6 °C)-98.7 °F (37.1 °C)]   Pulse:  []   Resp:  [10-19]   BP: (146-209)/()   SpO2:  [94 %-100 %]   BP Location: Left arm    Physical Exam  Vitals and nursing note reviewed.   Constitutional:       General: He is not in acute distress.  HENT:      Head: Normocephalic.      Nose: No rhinorrhea.      Comments: NGT in place  Eyes:      General: No scleral icterus.     Conjunctiva/sclera: Conjunctivae normal.   Cardiovascular:      Rate and Rhythm: Normal rate.   Pulmonary:      Effort: No respiratory distress.   Abdominal:      General: There is no distension.   Musculoskeletal:         General: No deformity.   Skin:     General:  Skin is warm and dry.   Neurological:      Mental Status: He is lethargic.      Motor: Weakness present.      Comments: Withdraws on R side from pain   L side antigravity   Severe aphasia        Psychiatric:         Behavior: Behavior is cooperative.       Neurological Exam:   LOC: lethargic  Attention Span: poor  Language: Global aphasia   Articulation: Nonverbal unable to assess   Orientation: Nonverbal unable to assess   EOM (CN III, IV, VI): Tracks   Motor: L side moves spontaneously and antigravity, R side withdraws from pain   Sensation: Withdraws right side to noxious stimuli      Laboratory:  CMP:   Recent Labs   Lab 12/14/22  0012   CALCIUM 9.0   ALBUMIN 2.2*   PROT 6.7   *   K 4.6   CO2 25   *   BUN 52*   CREATININE 4.2*   ALKPHOS 128   ALT 47*   AST 68*   BILITOT 0.4       BMP:   Recent Labs   Lab 12/14/22  0012   *   K 4.6   *   CO2 25   BUN 52*   CREATININE 4.2*   CALCIUM 9.0       CBC:   Recent Labs   Lab 12/14/22  0012   WBC 11.16   RBC 4.77   HGB 12.9*   HCT 41.9      MCV 88   MCH 27.0   MCHC 30.8*       Lipid Panel:   No results for input(s): CHOL, LDLCALC, HDL, TRIG in the last 168 hours.    Coagulation:   Recent Labs   Lab 12/10/22  1309 12/10/22  1956 12/14/22  0813   INR 1.1  --   --    APTT 23.3   < > 39.2*    < > = values in this interval not displayed.       Platelet Aggregation Study: No results for input(s): PLTAGG, PLTAGINTERP, PLTAGREGLACO, ADPPLTAGGREG in the last 168 hours.  Hgb A1C:   Recent Labs   Lab 12/08/22  1729   HGBA1C 9.1*       TSH:   Recent Labs   Lab 12/08/22  1729   TSH 1.375         Diagnostic Results     Brain/Vessel Imaging   CTH 12/13/22  -Evolving large left MCA infarct with stable mass effect and petechial hemorrhagic conversion.   -No definite new hemorrhage or significant new abnormal parenchymal attenuation   -Separate areas of infarction involving the right parietal lobe and left cerebellum not well seen with CT technique.    -Clinical correlation and continued follow-up advised     CTH 12/11/22   Grossly stable evolving large left MCA distribution infarct and smaller infarct in the right frontal lobe with possible trace hemorrhagic conversion. Persistent mass effect with sulcal effacement and slight mass effect on the left lateral ventricle. No new or worsening intracranial hemorrhage and no worsening of minimal rightward midline shift.    CTH 12/9/22   Evolving no enlarged left MCA territory infarction with hypoattenuation and sulcal effacement. Intermediate density along the left basal ganglia compatible with known hemorrhagic conversion. Smaller area of infarction in the right cerebral hemisphere not well seen. Evolving mass effect and edema associated with the left cerebral hemispheric infarction with slight compression of the left lateral ventricle and trace 1 mm of rightward midline shift. No evidence for hydrocephalus    MRI Brain WO Contrast 12/8/22    Exam limited by patient motion artifact. Evolving left MCA territory infarct.  Additional foci of diffusion restriction in the left cerebral consistent with recent infarct.  Interval increase susceptibility artifact in the areas of diffusion restriction consistent with hemorrhagic conversion of recent infarcts.  No hydrocephalus or significant midline shift.      CTH 12/8/22 16:29   -Evolving large left MCA territory infarction similar to recent CT though increased in size compared to prior MRI concerning for evolving recent to subacute and acute infarcts.  Localized mass effect without significant midline shift or hydrocephalus.  There is evolving recent to subacute age right posterior frontal infarction similar to prior MRI allowing for differences in technique  -There is subtle hyperdensity along the left basal ganglia posteriorly and along the right posterior frontal cortex which may represent enhancing subacute age components of infarction with petechial hemorrhage felt  less likely but cannot be entirely excluded with limitation secondary to recirculation contrast related to recent CTA performed at outside institution.  -Evolving mass effect most pronounced associated with the left MCA territory infarction with sulcal effacement without significant midline shift or hydrocephalus.    CTH 12/8/22 12:11 (CTA H/N)   1. Left diencephalic hemisphere demonstrates evidence of an acute infarct of left basal ganglia and left caudate lobe.  2. Occlusion of the posterior division of the left M2 branch at the takeoff of the M1 vessel with patency involving the anterior division of the left M2 segment.    CTH 12/7/22   1.  Moderate size subacute infarction left anterior basal ganglia and anterior left internal capsule appears mildly larger and better well defined compared to CT from 12/3/2020. There is currently greater mass effect on the anterior horn of the left lateral ventricle. There is no associated bleed or midline shift.  2.  Recent MRI demonstrated additional punctate acute infarctions in the left frontal lobe and a mild size acute infarction right centrum semiovale. These are less obvious on CT. No associated bleed.  3.  Additional chronic periventricular white matter hypodensities.    MRI Brain WO contrast 12/3/22   Multifocal areas of restricted diffusion are felt to reflect acute infarcts.    CTH 12/3/22   Loss of gray-white matter distinction in involving the left basal ganglia could reflect changes of chronic small vessel ischemic disease versus cytotoxic edema from acute infarct.     Carotid US Bilateral 12/3/22   1. Carotid intimal hyperplasia, with no findings of hemodynamically significant carotid arterial stenosis or vascular occlusion.  2. Patent vertebral arteries with antegrade flow bilaterally.      Cardiac Imaging   RAGHAV 12/4/22   The left ventricle is small with moderately decreased systolic function.  The estimated ejection fraction is 38%.  Left ventricular diastolic  dysfunction.  There are segmental left ventricular wall motion abnormalities.  No spontaneous echo contrast. A moderate protruding layered left ventricular thrombus is present. The thrombus is fixed and located in the apex.  Normal right ventricular size.  Mild left atrial enlargement.  Mild right atrial enlargement.  Mild aortic regurgitation.  No interatrial septal defect present.  Normal appearing left atrial appendage. No thrombus is present in the appendage. SB occluder is absent. Abnormal appendage velocities.  Mild mitral regurgitation.  Agitated saline contrast study did not show any right to left shunt    TTE 11/14/22   The left ventricle is normal in size with mildly decreased systolic function.  The estimated ejection fraction is 40%.  Grade III left ventricular diastolic dysfunction.  Small posterior pericardial effusion.  There is mild left ventricular global hypokinesis.  Normal right ventricular size with normal right ventricular systolic function.  Severe left atrial enlargement.  Moderate right atrial enlargement.  Mild pulmonic regurgitation.  Mild to moderate tricuspid regurgitation.  Mild-to-moderate aortic regurgitation.  Intermediate central venous pressure (8 mmHg).  The estimated PA systolic pressure is 51 mmHg.  There is pulmonary hypertension.

## 2022-12-14 NOTE — PROGRESS NOTES
[facility] - Neuro Critical Care  Neurocritical Care  Progress Note    Admit Date: 12/8/2022  Service Date: 12/14/2022  Length of Stay: 6    Subjective:     Chief Complaint: Embolic stroke involving left middle cerebral artery    History of Present Illness: Pt is a 52 y.o. male with PMHx of CHF who presents as a transfer for acute LMCA stroke. Patient initially presented to OSH on 12/3 with RSW. At that time he was not a candidate for TPA and MRI revealed bilateral cardio embolic strokes. Patient had a TTE and RAGHAV which revealed LV thrombus. He was not started AC yet in setting of acute strokes, plan was to start AC today 12/8. Today, patient was noted to have acute change in neuro exam. NIH went from 6 to 23 and CTA revealed L M2 occlusion. Patient was transferred to [facility] for possible intervention but ASPECTS score was poor so he was not a candidate. Patient will be admitted to [facility] for higher level care and neuro monitoring.     Hospital Course: 12/9/22: Hemicrani watch  12/10/22: start heparin drip  12/11/2022 CTH stable after heparin gtt therapeutic. Recorded UOP not accurate due to difficulty with condom cath; CXR no pleural effusion. CTH ordered for 12/13.  12/12/2022: NAEON. Tube feedings increased today. CTH in AM per NSGY.  12/13/2022: CTH stable with no increasing edema. NSGY has signed off. Stable to step down to stroke team today. Weaning salt tabs to maintain eunatremia.  12/14/2022: NAEON. Stepdwn held due to high BP. Amlodipine added yesterday for better BP control. Will SD to stroke team today.    Review of Systems: Unable to obtain a complete ROS due to level of consciousness.     Vitals:   Temp: 98.5 °F (36.9 °C)  Pulse: 84  Rhythm: normal sinus rhythm  BP: (!) 154/94  MAP (mmHg): 118  Resp: 15  SpO2: 95 %    Temp  Min: 97.8 °F (36.6 °C)  Max: 98.7 °F (37.1 °C)  Pulse  Min: 81  Max: 101  BP  Min: 146/94  Max: 209/123  MAP (mmHg)  Min: 114  Max: 160  Resp  Min: 10  Max: 19  SpO2  Min: 94 %  Max: 100  %    12/13 0701 - 12/14 0700  In: 1557.5 [I.V.:287.5]  Out: 1990 [Urine:1990]   Unmeasured Output  Urine Occurrence: 1  Stool Occurrence: 1  Pad Count: 1     Examination:   Constitutional: Well-nourished and -developed. No apparent distress.   Eyes: Conjunctiva clear, anicteric. Lids no lesions.  Head/Ears/Nose/Mouth/Throat/Neck: Moist mucous membranes. External ears, nose atraumatic.   Cardiovascular: Regular rhythm. No leg edema.  Respiratory: Comfortable respirations. Clear to auscultation.  Gastrointestinal: Soft, nondistended, nontender. + bowel sounds.    Neurologic:  -GCS E 4 V 2 M 6  -Poor participant in exam. Alert. Dysarthric, garbled speech. Word finding difficulty. Follows some simple commands.  -Cranial nerves: L Gaze, PERRL, R facial droop, + cough  -Motor: R hemiparesis, LUE/LLE antigravity/spontaneous movement  -Sensation: Intact to light touch  Unable to test orientation, language, memory, judgment, insight, fund of knowledge, coordination, gait due to level of consciousness.    Medications:   Continuousdextrose 10 % in water (D10W)  heparin (porcine) in D5W, Last Rate: 13 Units/kg/hr (12/14/22 1200)    ScheduledamLODIPine, 10 mg, Daily  atorvastatin, 40 mg, Daily  EScitalopram oxalate, 20 mg, Daily  furosemide, 40 mg, Daily  insulin aspart U-100, 8 Units, 6 times per day  metoprolol tartrate, 25 mg, BID    PRNdextrose 10 % in water (D10W), , Continuous PRN  dextrose 10%, 12.5 g, PRN  dextrose 10%, 25 g, PRN  glucagon (human recombinant), 1 mg, PRN  insulin aspart U-100, 1-10 Units, Q4H PRN  labetaloL, 10 mg, Q6H PRN  ondansetron, 8 mg, Q8H PRN  sodium chloride 0.9%, 10 mL, PRN       Today I independently reviewed pertinent medications, lines/drains/airways, imaging, cardiology results, laboratory results, microbiology results, notably:     ISTAT: No results for input(s): PH, PCO2, PO2, POCSATURATED, HCO3, BE, POCNA, POCK, POCTCO2, POCGLU, POCICA, POCLAC, SAMPLE in the last 24 hours.   Chem:    Recent Labs   Lab 12/14/22  0012   *   K 4.6   *   CO2 25   *   BUN 52*   CREATININE 4.2*   CALCIUM 9.0   MG 2.4   PHOS 2.3*   ANIONGAP 10   PROT 6.7   ALBUMIN 2.2*   BILITOT 0.4   ALKPHOS 128   AST 68*   ALT 47*     Heme:   Recent Labs   Lab 12/14/22  0012   WBC 11.16   HGB 12.9*   HCT 41.9        Endo:   Recent Labs   Lab 12/13/22  1516 12/14/22  0840 12/14/22  1200   POCTGLUCOSE 234* 238* 212*     Assessment/Plan:     Neuro  * Embolic stroke involving left middle cerebral artery  52 year old man with HTN, HLD, T2DM, CKD, CHF presented to OSH 12/3 with RSW. MRI with bilateral infarcts concerning for cardioembolic etiology. LV thrombus seen on RAGHAV. Neuro exam changed 12/8 while not on anticoagulation, found to have L M2 occlusion. Transferred to [facility] for IR evaluation, no intervention as poor ASPECTS score. Admitted to [facility] for hemicrani watch.    - Vascular Neurology following   - SBP goal <200  - q1hr neuro checks   - atorvastatin 40 mg QD   - heparin gtt started 12/10  - PT/OT/SLP consulted  -CTH stable, no midline shift, NSGY has signed off, stable to SD to stroke team now that BP is better controlled    Cytotoxic brain edema  Outside swelling window  Salt tabs stopped    Cardiac/Vascular  Hyperlipidemia associated with type 2 diabetes mellitus    - atorvastatin 40 mg     CHF (congestive heart failure)  Chronic. Taking PO Lasix 40 mg QD prior to admission. 12/6 RAGHAV with EF 38%.    - strict I&Os  - continue home Lasix     LV (left ventricular) mural thrombus  Seen on RAGHAV. Likely etiology of strokes.  - heparin gtt     Essential hypertension  Permissive HTN in setting of acute stroke  - goal SBP <200  - PRN labetalol, hydralazine  - amlodipine started 12/13    Renal/  JR on CKD  History of CKD, baseline Cr ~3.5-4.     - continue home Lasix 40 mg PO QD  - monitor Cr and I&Os  - avoid nephrotoxic agents and renally adjust medications     Acute cystitis without hematuria  12/8 UCx  growing pan-sensitive Klebsiella.  - ceftriaxone x 5 days (12/10 - 12/14)    Endocrine  Type 2 diabetes mellitus, with long-term current use of insulin  A1c 9.1. Prior to admission taking 70/30 insulin.    - aspart 8U q4h  - MDSSI  - tube feeds    GI  Oropharyngeal dysphagia  NGT in place for nutrition and meds, will likely need G tube  SLP following    Other  Debility  Due to stroke   - PT/OT recommending IPR    The patient is being Prophylaxed for:  Venous Thromboembolism with: Mechanical or Chemical  Stress Ulcer with: Not Applicable   Ventilator Pneumonia with: not applicable    Activity Orders            Turn patient starting at 12/08 1800    Elevate HOB starting at 12/08 1653    Diet NPO: NPO starting at 12/08 1653          Full Code     Level III    Laura Askew NP  Neurocritical Care  [facility] - Neuro Critical Care    Patient seen and discussed with the ARNP during rounds and I agree with the above assessments and clinical plan.    Richy Burt MD

## 2022-12-14 NOTE — SUBJECTIVE & OBJECTIVE
Review of Systems: Unable to obtain a complete ROS due to level of consciousness.     Vitals:   Temp: 98.5 °F (36.9 °C)  Pulse: 84  Rhythm: normal sinus rhythm  BP: (!) 154/94  MAP (mmHg): 118  Resp: 15  SpO2: 95 %    Temp  Min: 97.8 °F (36.6 °C)  Max: 98.7 °F (37.1 °C)  Pulse  Min: 81  Max: 101  BP  Min: 146/94  Max: 209/123  MAP (mmHg)  Min: 114  Max: 160  Resp  Min: 10  Max: 19  SpO2  Min: 94 %  Max: 100 %    12/13 0701 - 12/14 0700  In: 1557.5 [I.V.:287.5]  Out: 1990 [Urine:1990]   Unmeasured Output  Urine Occurrence: 1  Stool Occurrence: 1  Pad Count: 1     Examination:   Constitutional: Well-nourished and -developed. No apparent distress.   Eyes: Conjunctiva clear, anicteric. Lids no lesions.  Head/Ears/Nose/Mouth/Throat/Neck: Moist mucous membranes. External ears, nose atraumatic.   Cardiovascular: Regular rhythm. No leg edema.  Respiratory: Comfortable respirations. Clear to auscultation.  Gastrointestinal: Soft, nondistended, nontender. + bowel sounds.    Neurologic:  -GCS E 4 V 2 M 6  -Poor participant in exam. Alert. Dysarthric, garbled speech. Word finding difficulty. Follows some simple commands.  -Cranial nerves: L Gaze, PERRL, R facial droop, + cough  -Motor: R hemiparesis, LUE/LLE antigravity/spontaneous movement  -Sensation: Intact to light touch  Unable to test orientation, language, memory, judgment, insight, fund of knowledge, coordination, gait due to level of consciousness.    Medications:   Continuousdextrose 10 % in water (D10W)  heparin (porcine) in D5W, Last Rate: 13 Units/kg/hr (12/14/22 1200)    ScheduledamLODIPine, 10 mg, Daily  atorvastatin, 40 mg, Daily  EScitalopram oxalate, 20 mg, Daily  furosemide, 40 mg, Daily  insulin aspart U-100, 8 Units, 6 times per day  metoprolol tartrate, 25 mg, BID    PRNdextrose 10 % in water (D10W), , Continuous PRN  dextrose 10%, 12.5 g, PRN  dextrose 10%, 25 g, PRN  glucagon (human recombinant), 1 mg, PRN  insulin aspart U-100, 1-10 Units, Q4H  PRN  labetaloL, 10 mg, Q6H PRN  ondansetron, 8 mg, Q8H PRN  sodium chloride 0.9%, 10 mL, PRN       Today I independently reviewed pertinent medications, lines/drains/airways, imaging, cardiology results, laboratory results, microbiology results, notably:     ISTAT: No results for input(s): PH, PCO2, PO2, POCSATURATED, HCO3, BE, POCNA, POCK, POCTCO2, POCGLU, POCICA, POCLAC, SAMPLE in the last 24 hours.   Chem:   Recent Labs   Lab 12/14/22  0012   *   K 4.6   *   CO2 25   *   BUN 52*   CREATININE 4.2*   CALCIUM 9.0   MG 2.4   PHOS 2.3*   ANIONGAP 10   PROT 6.7   ALBUMIN 2.2*   BILITOT 0.4   ALKPHOS 128   AST 68*   ALT 47*     Heme:   Recent Labs   Lab 12/14/22  0012   WBC 11.16   HGB 12.9*   HCT 41.9        Endo:   Recent Labs   Lab 12/13/22  1516 12/14/22  0840 12/14/22  1200   POCTGLUCOSE 234* 238* 212*

## 2022-12-14 NOTE — ASSESSMENT & PLAN NOTE
Stroke RF  SBP < 200, MAP >65  Consider slowly lowering SBP goal to <180 slowly  On [id number]  PRN Labetalol

## 2022-12-14 NOTE — ASSESSMENT & PLAN NOTE
52 year old man with HTN, HLD, T2DM, CKD, CHF presented to OSH 12/3 with RSW. MRI with bilateral infarcts concerning for cardioembolic etiology. LV thrombus seen on RAGHAV. Neuro exam changed 12/8 while not on anticoagulation, found to have L M2 occlusion. Transferred to [facility] [place] for IR evaluation, no intervention as poor ASPECTS score. Admitted to [facility] for hemicrani watch.    - Vascular Neurology following   - SBP goal <200  - q1hr neuro checks   - atorvastatin 40 mg QD   - heparin gtt started 12/10  - PT/OT/SLP consulted  -CTH stable, no midline shift, NSGY has signed off, stable to SD to stroke team now that BP is better controlled

## 2022-12-14 NOTE — PT/OT/SLP PROGRESS
"Speech Language Pathology Treatment    Patient Name:  Spencer Burton Jr.   MRN:  [id number]  Admitting Diagnosis: Embolic stroke involving left middle cerebral artery    Recommendations:                 General Recommendations:  Dysphagia therapy and Speech/language therapy  Diet recommendations:  NPO, Liquid Diet Level: NPO (ice chips sparingly)   Aspiration Precautions: Frequent oral care, HOB to 90 degrees, Ice chips sparingly, Meds crushed in puree, Puree for pleasure, and Strict aspiration precautions   General Precautions: Standard, aphasia, aspiration, fall, NPO, vision impaired  Communication strategies:  yes/no questions only and go to room if call light pushed    Subjective     Session cleared with RN. Pt awake/alert, reclined in bed upon entry to room. NG tube and hand mitts in place, pt agreeable to ST.     "I'm tried."    Pain/Comfort:  Pain Rating 1:  (pt unable to report, no idications of pain)  Pain Addressed 1: Reposition    Respiratory Status: Room air    Objective:     Has the patient been evaluated by SLP for swallowing?   Yes  Keep patient NPO? Yes     Pt seen for ongoing swallow assessment and speech/language therapy. Repositioned pt to be sitting upright in bed prior to PO trials. Pt unable to follow simple 1 step commands despite max prompting and modeling and only able to orient to self. Pt answered simple Y/N questions with 20% acc ans name 1/5 common items with max cuing. He  continues to demonstrate reduce speech intelligibility.     Pt tolerated ice chips x5, tsp thin liquids x5 and tsp puree x7 without any overt s/sx of airway compromise. He demonstrated prolonged AP transit with puree and reduced mastication and clearance with regular textured solids. He demonstrated significant  anterior spillage with sips of thin liquids from cup rim and immediate cough response following sips of thi liquids from straw. Recommend he remain NPO at this time given reduced levels of alertness and difficulty " following commands. Allow ice chips sparingly and meds crushed in puree when awake/alert and sitting upright. Education provided re: role of SLP, diet recs, swallow precs, s/s aspiration and POC.  Pt requires reinforcement. RN updated post session.     Assessment:     Spencer Burton Jr. is a 52 y.o. male with an SLP diagnosis of Aphasia, Dysphagia, Dysarthria, Cognitive-Linguistic Impairment, and Visio-Spatial Impairment. ST to continue to follow.     Goals:   Multidisciplinary Problems       SLP Goals          Problem: SLP    Goal Priority Disciplines Outcome   SLP Goal     SLP    Description: Speech Language Pathology Goals  Updated goals expected to be met by 12/20:  1. Pt will participate in ongoing swallowing assessment to determine if/when safe for PO intake.   2. Pt will respond to complex yes/no q's with 50% accuracy given max cues.   3. Pt will model 1-step commands on 1 out of 5 trials given max cues.   4. Pt will complete automatic speech tasks with 60% accuracy given max cues.   5. Pt will name objects with 2 out of 5 trials given max cues.     Goals expected to be met by 12/16:  1. Pt will participate in ongoing swallowing assessment to determine if/when safe for PO intake.   2. Pt will participate in speech/language/cognitive evaluation when feasible. Initiated 12/13                               Plan:     Patient to be seen:  4 x/week   Plan of Care expires:  01/09/23  Plan of Care reviewed with:  patient   SLP Follow-Up:  Yes       Discharge recommendations:  rehabilitation facility   Barriers to Discharge:  Level of Skilled Assistance Needed      Time Tracking:     SLP Treatment Date:   12/14/22  Speech Start Time:  0950  Speech Stop Time:  1006     Speech Total Time (min):  16 min    Billable Minutes: Speech Therapy Individual 8 and Treatment Swallowing Dysfunction 8    12/14/2022

## 2022-12-14 NOTE — ASSESSMENT & PLAN NOTE
Permissive HTN in setting of acute stroke  - goal SBP <200  - PRN labetalol, hydralazine  - amlodipine started 12/13

## 2022-12-14 NOTE — PROGRESS NOTES
[facility] - Neuro Critical Care  Vascular Neurology  [facility]  Progress Note    Assessment/Plan:     * Embolic stroke involving left middle cerebral artery  51 yo M with CHF who initially presented to OSH with RSW. Did not receive tPA, as he was outside of the window. MRI at that time demonstrated bilateral anterior and posterior circulation strokes concerning for embolic etiology. Over hospital course at OSH, the patient was noted to have been lethargic and aphasic with dysarthria. RAGHAV at OSH with L ventricular thrombus (not on AC). He was noted to have had an acute change in neuro exam on 12/8 at which time he had RSW with global aphasia. NIHSS had been 6 prior to the event and was noted to have been 23 following. CTA demonstrated a L M2 occlusion for which the patient was transferred to [facility] for possible intervention and higher level of care. He was not taken to IR due to poor ASPECTS and was admitted to ICU for close monitoring and hemicrani watch.   -Etiology: likely cardioembolic given LV thrombus      -Pending SD to NPU. Continue to watch BP closely, SBP<200. Patient will likely need a PEG.      Antithrombotics for secondary stroke prevention:  on heparin gtt until swallowing or PEG established, switch to DOAC when able    Statins for secondary stroke prevention and hyperlipidemia, if present:   Statins: Atorvastatin- 40 mg daily    Aggressive risk factor modification: HTN, DM, HLD, Diet, Exercise, LV thrombus     Rehab efforts: Rehab; NPO, tube feeds via NGT    Diagnostics ordered/pending: None     VTE prophylaxis: Mechanical prophylaxis: Place SCDs  None: Reason for No Pharmacological VTE Prophylaxis: Currently on anticoagulation     BP parameters: SBP <200      Oropharyngeal dysphagia  Due to stroke  SLP following  Currently NPO with NGT in place for tube feeds, will likely need PEG    JR on CKD  See CKD    Hyperlipidemia associated with type 2 diabetes mellitus  Stroke RF   . 8,  goal <70  Continue atorvastatin 40mg daily    CHF (congestive heart failure)  Stroke risk factor  RAGHAV with EF 38% and segmental LV wall motion abnormalities  Strict I/Os  On lasix 40    Acute cystitis without hematuria  Currently on ceftriaxone   Klebsiella pneumoniae on urine cx   Recommend narrowing spectrum when appropriate     Debility  2/2 stroke   OT and PT to evaluate   Therapy recommending IPR     Cytotoxic brain edema  -Noted on imaging in the area of the L MCA territory. Repeat CTH PRN for acute neuro exam changes that could indicate worsening/expansion of edema.   -NSGY consulted for hemicrani watch due to malignant MCA, no acute intervention recommended as serial imaging remains stable    LV (left ventricular) mural thrombus  Stroke RF  Continue heparin gtt as patient NPO and will likely PEG placement, switch to DOAC when able    Stage 4 chronic kidney disease  Cr 4.2 and GFR 16.4 today   Avoid nephrotoxic medications and renally dose adjust when appropriate   Monitor I/Os and daily kidney function labs    Type 2 diabetes mellitus, with long-term current use of insulin  Stroke RF  A1c 9.1, consider nutrition consult and DM education  BG goal while inpatient 140-180  Currently on Aspart 8units q4h + aspart 1-10U q4h PRN    Essential hypertension  Stroke RF  SBP < 200, MAP >65  Consider slowly lowering SBP goal to <180 slowly  On [id number]  PRN Labetalol       12/09/2022 Patient with LV thrombus, will need anticoagulation. NGT in place would recommend heparin gtt until patient able to swallow or PEG placed prior to DOAC initiation. Patient tachycardic today with SBP < 210, metoprolol started by primary team. Febrile with elevated white count and procal, currently on Zosyn and Vanc while cx pending. NSGY following for hemicrani watch. Patient has been discharged from OT per last note, will need new orders. Continue current ICU care.   12/10/2022: Pending stability scan patient is expected to be started  on heparin gtt. Continue hemicrani watch in  ICU. Family at bedside.   12/11/2022: Patient started on heparin gtt overnight, he demonstrated great clinical improvement today, he was able to tell me his name, knew he is in [facility]. Followed single step commands. No family at bedside today.   12/12/2022 Patient remains in ICU for sue-crani watch. NSGY following. On salt tabs for goal of hypernatremia.Will remain in ICU another night. VN to re-evaluate for stepdown tomorrow. Patient is on ceftriaxone for acute cystitis. aPTT today 42.9. CTH following therapeutic heparin levels stable. More alert today. IPR recommendations; SLP with minced and moist diet with thin liquids.   12/13/2022 Remains in NCC, neuro exam unchanged and limited by drowsiness. Overnight had short run of VT/SVT that resolved without any interventions. BPs not consistently at goal of <200, had max BP today of 214/140. Currently NPO with NGT in place and anticipate patient will need PEG placement, SLP recommending ice chips sparingly for pleasure. Anticipate step down tomorrow once BP is consistently at goal  12/14/2022 Pending SD to NPU. Continue to watch BP closely, SBP<200. Patient will likely need a PEG.      STROKE DOCUMENTATION   Acute Stroke Times   Last Known Normal Date: 12/08/22  Last Known Normal Time: 1145  Symptom Onset Date: 12/08/22  Symptom Onset Time: 1145  Stroke Team Called Date: 12/08/22  Stroke Team Called Time: 1615  Stroke Team Arrival Date: 12/08/22  Stroke Team Arrival Time: 1615  CT Interpretation Time: 1615  Thrombolytic Therapy Recommended: No  CTA Interpretation Time: 1615    NIH Scale:  1a. Level of Consciousness: 1-->Not alert, but arousable by minor stimulation to obey, answer, or respond  1b. LOC Questions: 2-->Answers neither question correctly  1c. LOC Commands: 1-->Performs one task correctly  2. Best Gaze: 2-->Forced deviation, or total gaze paresis not overcome by the oculocephalic maneuver  3. Visual: 0-->No visual  loss  4. Facial Palsy: 1-->Minor paralysis (flattened nasolabial fold, asymmetry on smiling)  5a. Motor Arm, Left: 0-->No drift, limb holds 90 (or 45) degrees for full 10 secs  5b. Motor Arm, Right: 3-->No effort against gravity, limb falls  6a. Motor Leg, Left: 0-->No drift, leg holds 30 degree position for full 5 secs  6b. Motor Leg, Right: 3-->No effort against gravity, leg falls to bed immediately  7. Limb Ataxia: 0-->Absent  8. Sensory: 1-->Mild-to-moderate sensory loss, patient feels pinprick is less sharp or is dull on the affected side, or there is a loss of superficial pain with pinprick, but patient is aware of being touched  9. Best Language: 2-->Severe aphasia, all communication is through fragmentary expression, great need for inference, questioning, and guessing by the listener. Range of information that can be exchanged is limited, listener carries burden of. . . (see row details)  10. Dysarthria: 2-->Severe dysarthria, patients speech is so slurred as to be unintelligible in the absence of or out of proportion to any dysphasia, or is mute/anarthric  11. Extinction and Inattention (formerly Neglect): 1-->Visual, tactile, auditory, spatial, or personal inattention or extinction to bilateral simultaneous stimulation in one of the sensory modalities  Total (NIH Stroke Scale): 19       Modified Milana Score: 1  [place] Coma Scale:    ABCD2 Score:    [id number] Score:   HAS -BLED Score:   ICH Score:   Hunt & Leblanc Classification:      Hemorrhagic change of an Ischemic Stroke: Does this patient have an ischemic stroke with hemorrhagic changes? No     Neurologic Chief Complaint: lethargy, aphasia, dysarthria     Subjective:     Interval History: Patient is seen for follow-up neurological assessment and treatment recommendations:     Pending SD to NPU. Continue to watch BP closely, SBP<200. Patient will likely need a PEG.    HPI, Past Medical, Family, and Social History remains the same as documented in the  initial encounter.     Review of Systems   Unable to perform ROS: Other (Aphasia)   Scheduled Meds:   amLODIPine  10 mg Per NG tube Daily    atorvastatin  40 mg Per NG tube Daily    EScitalopram oxalate  20 mg Per NG tube Daily    furosemide  40 mg Per NG tube Daily    insulin aspart U-100  8 Units Subcutaneous 6 times per day    metoprolol tartrate  25 mg Per NG tube BID     Continuous Infusions:   dextrose 10 % in water (D10W)      heparin (porcine) in D5W 13 Units/kg/hr (12/14/22 1200)     PRN Meds:dextrose 10 % in water (D10W), dextrose 10%, dextrose 10%, glucagon (human recombinant), insulin aspart U-100, labetaloL, ondansetron, sodium chloride 0.9%    Objective:     Vital Signs (Most Recent):  Temp: 98.5 °F (36.9 °C) (12/14/22 1102)  Pulse: 84 (12/14/22 1202)  Resp: 15 (12/14/22 1202)  BP: (!) 154/94 (12/14/22 1202)  SpO2: 95 % (12/14/22 1202)  BP Location: Left arm    Vital Signs Range (Last 24H):  Temp:  [97.8 °F (36.6 °C)-98.7 °F (37.1 °C)]   Pulse:  []   Resp:  [10-19]   BP: (146-209)/()   SpO2:  [94 %-100 %]   BP Location: Left arm    Physical Exam  Vitals and nursing note reviewed.   Constitutional:       General: He is not in acute distress.  HENT:      Head: Normocephalic.      Nose: No rhinorrhea.      Comments: NGT in place  Eyes:      General: No scleral icterus.     Conjunctiva/sclera: Conjunctivae normal.   Cardiovascular:      Rate and Rhythm: Normal rate.   Pulmonary:      Effort: No respiratory distress.   Abdominal:      General: There is no distension.   Musculoskeletal:         General: No deformity.   Skin:     General: Skin is warm and dry.   Neurological:      Mental Status: He is lethargic.      Motor: Weakness present.      Comments: Withdraws on R side from pain   L side antigravity   Severe aphasia        Psychiatric:         Behavior: Behavior is cooperative.       Neurological Exam:   LOC: lethargic  Attention Span: poor  Language: Mixed aphasia,intermittently  follows some commands  Articulation: Nonverbal unable to assess   Orientation: Nonverbal unable to assess   EOM (CN III, IV, VI): Tracks   Motor: L side moves spontaneously and antigravity, R side withdraws from pain   Sensation: Withdraws right side to noxious stimuli      Laboratory:  CMP:   Recent Labs   Lab 12/14/22  0012   CALCIUM 9.0   ALBUMIN 2.2*   PROT 6.7   *   K 4.6   CO2 25   *   BUN 52*   CREATININE 4.2*   ALKPHOS 128   ALT 47*   AST 68*   BILITOT 0.4       BMP:   Recent Labs   Lab 12/14/22  0012   *   K 4.6   *   CO2 25   BUN 52*   CREATININE 4.2*   CALCIUM 9.0       CBC:   Recent Labs   Lab 12/14/22  0012   WBC 11.16   RBC 4.77   HGB 12.9*   HCT 41.9      MCV 88   MCH 27.0   MCHC 30.8*       Lipid Panel:   No results for input(s): CHOL, LDLCALC, HDL, TRIG in the last 168 hours.    Coagulation:   Recent Labs   Lab 12/10/22  1309 12/10/22  1956 12/14/22  0813   INR 1.1  --   --    APTT 23.3   < > 39.2*    < > = values in this interval not displayed.       Platelet Aggregation Study: No results for input(s): PLTAGG, PLTAGINTERP, PLTAGREGLACO, ADPPLTAGGREG in the last 168 hours.  Hgb A1C:   Recent Labs   Lab 12/08/22  1729   HGBA1C 9.1*       TSH:   Recent Labs   Lab 12/08/22  1729   TSH 1.375         Diagnostic Results     Brain/Vessel Imaging   CTH 12/13/22  -Evolving large left MCA infarct with stable mass effect and petechial hemorrhagic conversion.   -No definite new hemorrhage or significant new abnormal parenchymal attenuation   -Separate areas of infarction involving the right parietal lobe and left cerebellum not well seen with CT technique.   -Clinical correlation and continued follow-up advised     CTH 12/11/22   Grossly stable evolving large left MCA distribution infarct and smaller infarct in the right frontal lobe with possible trace hemorrhagic conversion. Persistent mass effect with sulcal effacement and slight mass effect on the left lateral ventricle. No  new or worsening intracranial hemorrhage and no worsening of minimal rightward midline shift.    CTH 12/9/22   Evolving no enlarged left MCA territory infarction with hypoattenuation and sulcal effacement. Intermediate density along the left basal ganglia compatible with known hemorrhagic conversion. Smaller area of infarction in the right cerebral hemisphere not well seen. Evolving mass effect and edema associated with the left cerebral hemispheric infarction with slight compression of the left lateral ventricle and trace 1 mm of rightward midline shift. No evidence for hydrocephalus    MRI Brain WO Contrast 12/8/22    Exam limited by patient motion artifact. Evolving left MCA territory infarct.  Additional foci of diffusion restriction in the left cerebral consistent with recent infarct.  Interval increase susceptibility artifact in the areas of diffusion restriction consistent with hemorrhagic conversion of recent infarcts.  No hydrocephalus or significant midline shift.      CTH 12/8/22 16:29   -Evolving large left MCA territory infarction similar to recent CT though increased in size compared to prior MRI concerning for evolving recent to subacute and acute infarcts.  Localized mass effect without significant midline shift or hydrocephalus.  There is evolving recent to subacute age right posterior frontal infarction similar to prior MRI allowing for differences in technique  -There is subtle hyperdensity along the left basal ganglia posteriorly and along the right posterior frontal cortex which may represent enhancing subacute age components of infarction with petechial hemorrhage felt less likely but cannot be entirely excluded with limitation secondary to recirculation contrast related to recent CTA performed at outside institution.  -Evolving mass effect most pronounced associated with the left MCA territory infarction with sulcal effacement without significant midline shift or hydrocephalus.    CTH 12/8/22  12:11 (CTA H/N)   1. Left diencephalic hemisphere demonstrates evidence of an acute infarct of left basal ganglia and left caudate lobe.  2. Occlusion of the posterior division of the left M2 branch at the takeoff of the M1 vessel with patency involving the anterior division of the left M2 segment.    CTH 12/7/22   1.  Moderate size subacute infarction left anterior basal ganglia and anterior left internal capsule appears mildly larger and better well defined compared to CT from 12/3/2020. There is currently greater mass effect on the anterior horn of the left lateral ventricle. There is no associated bleed or midline shift.  2.  Recent MRI demonstrated additional punctate acute infarctions in the left frontal lobe and a mild size acute infarction right centrum semiovale. These are less obvious on CT. No associated bleed.  3.  Additional chronic periventricular white matter hypodensities.    MRI Brain WO contrast 12/3/22   Multifocal areas of restricted diffusion are felt to reflect acute infarcts.    CTH 12/3/22   Loss of gray-white matter distinction in involving the left basal ganglia could reflect changes of chronic small vessel ischemic disease versus cytotoxic edema from acute infarct.     Carotid US Bilateral 12/3/22   1. Carotid intimal hyperplasia, with no findings of hemodynamically significant carotid arterial stenosis or vascular occlusion.  2. Patent vertebral arteries with antegrade flow bilaterally.      Cardiac Imaging   RAGHAV 12/4/22    The left ventricle is small with moderately decreased systolic function.   The estimated ejection fraction is 38%.   Left ventricular diastolic dysfunction.   There are segmental left ventricular wall motion abnormalities.   No spontaneous echo contrast. A moderate protruding layered left ventricular thrombus is present. The thrombus is fixed and located in the apex.   Normal right ventricular size.   Mild left atrial enlargement.   Mild right atrial  enlargement.   Mild aortic regurgitation.   No interatrial septal defect present.   Normal appearing left atrial appendage. No thrombus is present in the appendage. SB occluder is absent. Abnormal appendage velocities.   Mild mitral regurgitation.   Agitated saline contrast study did not show any right to left shunt    TTE 11/14/22    The left ventricle is normal in size with mildly decreased systolic function.   The estimated ejection fraction is 40%.   Grade III left ventricular diastolic dysfunction.   Small posterior pericardial effusion.   There is mild left ventricular global hypokinesis.   Normal right ventricular size with normal right ventricular systolic function.   Severe left atrial enlargement.   Moderate right atrial enlargement.   Mild pulmonic regurgitation.   Mild to moderate tricuspid regurgitation.   Mild-to-moderate aortic regurgitation.   Intermediate central venous pressure (8 mmHg).   The estimated PA systolic pressure is 51 mmHg.   There is pulmonary hypertension.      Nava Gordon PA-C  Com[facility] St[facility] Center  Department of Vascular Neurology   [facility] - Neuro Critical Care

## 2022-12-14 NOTE — PLAN OF CARE
[facility] Care Plan    POC reviewed with Spencer Burton  and family at 1400. Pt verbalized understanding. Questions and concerns addressed. No acute events today. Pt progressing toward goals. Will continue to monitor. See below and flowsheets for full assessment and VS info.             Is this a stroke patient? yes- Stroke booklet reviewed with patient and family, risk factors identified for patient and stroke booklet remains at bedside for ongoing education.     Neuro:  [place] Coma Scale  Best Eye Response: 4-->(E4) spontaneous  Best Motor Response: 6-->(M6) obeys commands  Best Verbal Response: 2-->(V2) incomprehensible speech  [place] Coma Scale Score: 12  Assessment Qualifiers: patient not sedated/intubated  Pupil PERRLA: yes     24 hr Temp:  [97.8 °F (36.6 °C)-98.9 °F (37.2 °C)]     CV:   Rhythm: normal sinus rhythm  BP goals:   SBP <  200  MAP > 65    Resp:   (RETIRED) O2 Device (Oxygen Therapy): room air       Plan: N/A    GI/:     Diet/Nutrition Received: tube feeding  Last Bowel Movement: 12/13/22  Voiding Characteristics: external catheter    Intake/Output Summary (Last 24 hours) at 12/13/2022 1849  Last data filed at 12/13/2022 1801  Gross per 24 hour   Intake 1897.91 ml   Output 1910 ml   Net -12.09 ml     Unmeasured Output  Urine Occurrence: 1  Stool Occurrence: 1  Pad Count: 2    Labs/Accuchecks:  Recent Labs   Lab 12/13/22  0006   WBC 11.68   RBC 4.99   HGB 13.2*   HCT 43.2         Recent Labs   Lab 12/13/22  0006   *   K 3.3*   CO2 26   *   BUN 55*   CREATININE 4.6*   ALKPHOS 106   ALT 38   AST 51*   BILITOT 0.4      Recent Labs   Lab 12/10/22  1309 12/10/22  1956 12/13/22  0006   INR 1.1  --   --    APTT 23.3   < > 39.3*    < > = values in this interval not displayed.    No results for input(s): CPK, CPKMB, TROPONINI, MB in the last 168 hours.    Electrolytes: Electrolytes replaced  Accuchecks: Q4H    Gtts:   dextrose 10 % in water (D10W)      heparin (porcine) in D5W 12  Units/kg/hr (12/13/22 0245)       LDA/Wounds:  Lines/Drains/Airways       Drain  Duration                  NG/OG Tube Right nostril -- days    Male External Urinary Catheter 12/08/22 1830 Small 5 days              Peripheral Intravenous Line  Duration                  Peripheral IV - Single Lumen 12/08/22 1800 20 G Anterior;Proximal;Right Forearm 5 days         Peripheral IV - Single Lumen 12/10/22 0219 20 G Right Antecubital 3 days         Peripheral IV - Single Lumen 12/13/22 0005 20 G;1 3/4 in Anterior;Left Upper Arm <1 day                  Wounds: No  Wound care consulted: No
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
## 2022-12-14 NOTE — ASSESSMENT & PLAN NOTE
Stroke RF  Continue heparin gtt as patient NPO and will likely PEG placement, switch to DOAC when able

## 2022-12-14 NOTE — PLAN OF CARE
[facility] Care Plan    POC reviewed with Spencer Burton Jr. at 0300. Pt nodded understanding.  No acute events overnight. Pt progressing toward goals. Will continue to monitor. See below and flowsheets for full assessment and VS info.     No acute neuro changes overnight.  SBP <200 maintained with no interventions  Heparin gtt increased to 13 units for APTT of 38, recheck ordered for 0845.  Patient to transfer today.      Is this a stroke patient? yes- Stroke booklet reviewed with patient, risk factors identified for patient and stroke booklet remains at bedside for ongoing education.     Neuro:  [place] Coma Scale  Best Eye Response: 4-->(E4) spontaneous  Best Motor Response: 6-->(M6) obeys commands  Best Verbal Response: 2-->(V2) incomprehensible speech  [place] Coma Scale Score: 12  Assessment Qualifiers: patient not sedated/intubated  Pupil PERRLA: yes     24hr Temp:  [97.8 °F (36.6 °C)-98.7 °F (37.1 °C)]     CV:   Rhythm: normal sinus rhythm  BP goals:   SBP <  200  MAP > 65    Resp:   (RETIRED) O2 Device (Oxygen Therapy): room air       Plan: N/A    GI/:     Diet/Nutrition Received: tube feeding  Last Bowel Movement: 12/13/22  Voiding Characteristics: external catheter    Intake/Output Summary (Last 24 hours) at 12/14/2022 0340  Last data filed at 12/14/2022 0302  Gross per 24 hour   Intake 1496.62 ml   Output 2040 ml   Net -543.38 ml     Unmeasured Output  Urine Occurrence: 1  Stool Occurrence: 1  Pad Count: 2    Labs/Accuchecks:  Recent Labs   Lab 12/14/22  0012   WBC 11.16   RBC 4.77   HGB 12.9*   HCT 41.9         Recent Labs   Lab 12/14/22  0012   *   K 4.6   CO2 25   *   BUN 52*   CREATININE 4.2*   ALKPHOS 128   ALT 47*   AST 68*   BILITOT 0.4      Recent Labs   Lab 12/10/22  1309 12/10/22  1956 12/14/22  0012   INR 1.1  --   --    APTT 23.3   < > 38.0*    < > = values in this interval not displayed.    No results for input(s): CPK, CPKMB, TROPONINI, MB in the last 168  hours.    Electrolytes: Contraindicated  Accuchecks: Q4H    Gtts:   dextrose 10 % in water (D10W)      heparin (porcine) in D5W 13 Units/kg/hr (12/14/22 0241)       LDA/Wounds:  Lines/Drains/Airways       Drain  Duration                  NG/OG Tube Right nostril -- days    Male External Urinary Catheter 12/08/22 1830 Medium 5 days              Peripheral Intravenous Line  Duration                  Peripheral IV - Single Lumen 12/08/22 1800 20 G Anterior;Proximal;Right Forearm 5 days         Peripheral IV - Single Lumen 12/10/22 0219 20 G Right Antecubital 4 days         Peripheral IV - Single Lumen 12/13/22 0005 20 G;1 3/4 in Anterior;Left Upper Arm 1 day                  Wounds: No  Wound care consulted: No

## 2022-12-14 NOTE — PT/OT/SLP PROGRESS
Physical Therapy      Patient Name:  Spencer Burton Jr.   MRN:  [id number]    Patient not seen today secondary to pt unresponsive to sternal rub and auditory stimulus, nurse notified . Will follow-up next schedule PT session.

## 2022-12-14 NOTE — ASSESSMENT & PLAN NOTE
Stroke risk factor  RAGHAV with EF 38% and segmental LV wall motion abnormalities  Strict I/Os  On lasix 40

## 2022-12-14 NOTE — PLAN OF CARE
[facility] Care Plan    POC reviewed with Spencer Burton . and family at 1400. Pt unable to verbalize understanding. Questions and concerns addressed. No acute events today. Will continue to monitor. See below and flowsheets for full assessment and VS info.   - Pt tolerating tube feedings at goal  - APTT monitored and at therapeutic levels.  - TTF orders in place          Is this a stroke patient? yes- Stroke booklet reviewed with patient and family, risk factors identified for patient and stroke booklet remains at bedside for ongoing education.     Neuro:  Thao Coma Scale  Best Eye Response: 4-->(E4) spontaneous  Best Motor Response: 6-->(M6) obeys commands  Best Verbal Response: 2-->(V2) incomprehensible speech  Irving Coma Scale Score: 12  Assessment Qualifiers: patient chemically sedated or paralyzed  Pupil PERRLA: yes     24 hr Temp:  [97.8 °F (36.6 °C)-98.7 °F (37.1 °C)]     CV:   Rhythm: normal sinus rhythm  BP goals:   SBP <  200  MAP > 65    Resp:   (RETIRED) O2 Device (Oxygen Therapy): room air       Plan: N/A    GI/:     Diet/Nutrition Received: tube feeding  Last Bowel Movement: 12/13/22  Voiding Characteristics: external catheter    Intake/Output Summary (Last 24 hours) at 12/14/2022 1716  Last data filed at 12/14/2022 1700  Gross per 24 hour   Intake 1334.34 ml   Output 2000 ml   Net -665.66 ml     Unmeasured Output  Urine Occurrence: 1  Stool Occurrence: 1  Pad Count: 1    Labs/Accuchecks:  Recent Labs   Lab 12/14/22  0012   WBC 11.16   RBC 4.77   HGB 12.9*   HCT 41.9         Recent Labs   Lab 12/14/22  0012   *   K 4.6   CO2 25   *   BUN 52*   CREATININE 4.2*   ALKPHOS 128   ALT 47*   AST 68*   BILITOT 0.4      Recent Labs   Lab 12/10/22  1309 12/10/22  1956 12/14/22  1530   INR 1.1  --   --    APTT 23.3   < > 40.2*    < > = values in this interval not displayed.    No results for input(s): CPK, CPKMB, TROPONINI, MB in the last 168 hours.    Electrolytes: N/A - electrolytes  WDL  Accuchecks: Q4H    Gtts:   dextrose 10 % in water (D10W)      heparin (porcine) in D5W 13 Units/kg/hr (12/14/22 1700)       LDA/Wounds:  Lines/Drains/Airways       Drain  Duration                  NG/OG Tube Right nostril -- days    Male External Urinary Catheter 12/08/22 1830 Medium 5 days              Peripheral Intravenous Line  Duration                  Peripheral IV - Single Lumen 12/08/22 1800 20 G Anterior;Proximal;Right Forearm 5 days         Peripheral IV - Single Lumen 12/10/22 0219 20 G Right Antecubital 4 days         Peripheral IV - Single Lumen 12/13/22 0005 20 G;1 3/4 in Anterior;Left Upper Arm 1 day                  Wounds: No  Wound care consulted: No

## 2022-12-14 NOTE — ASSESSMENT & PLAN NOTE
Cr 4.2 and GFR 16.4 today   Avoid nephrotoxic medications and renally dose adjust when appropriate   Monitor I/Os and daily kidney function labs

## 2022-12-14 NOTE — ASSESSMENT & PLAN NOTE
53 yo M with CHF who initially presented to OSH with RSW. Did not receive tPA, as he was outside of the window. MRI at that time demonstrated bilateral anterior and posterior circulation strokes concerning for embolic etiology. Over hospital course at OSH, the patient was noted to have been lethargic and aphasic with dysarthria. RAGHAV at OSH with L ventricular thrombus (not on AC). He was noted to have had an acute change in neuro exam on 12/8 at which time he had RSW with global aphasia. NIHSS had been 6 prior to the event and was noted to have been 23 following. CTA demonstrated a L M2 occlusion for which the patient was [place] [facility] [place] level of care. He was not taken to IR due to poor ASPECTS and was admitted to ICU for close monitoring and hemicrani watch.   -Etiology: likely cardioembolic given LV thrombus      -Pending SD to NPU. Continue to watch BP closely, SBP<200. Patient will likely need a PEG.      Antithrombotics for secondary stroke prevention:  on heparin gtt until swallowing or PEG established, can be switched to AC     Statins for secondary stroke prevention and hyperlipidemia, if present:   Statins: Atorvastatin- 40 mg daily    Aggressive risk factor modification: HTN, DM, HLD, Diet, Exercise, LV thrombus     Rehab efforts: Rehab; NPO, tube feeds via NGT    Diagnostics ordered/pending: None     VTE prophylaxis: Mechanical prophylaxis: Place SCDs  None: Reason for No Pharmacological VTE Prophylaxis: Currently on anticoagulation     BP parameters: SBP <200

## 2022-12-14 NOTE — ASSESSMENT & PLAN NOTE
Due to stroke  SLP following  Currently NPO with NGT in place for tube feeds, will likely need PEG

## 2022-12-15 LAB
ALBUMIN SERPL BCP-MCNC: 2.2 G/DL (ref 3.5–5.2)
ALP SERPL-CCNC: 120 U/L (ref 55–135)
ALT SERPL W/O P-5'-P-CCNC: 67 U/L (ref 10–44)
ANION GAP SERPL CALC-SCNC: 9 MMOL/L (ref 8–16)
APTT BLDCRRT: 36.6 SEC (ref 21–32)
APTT BLDCRRT: 36.9 SEC (ref 21–32)
APTT BLDCRRT: 37 SEC (ref 21–32)
AST SERPL-CCNC: 72 U/L (ref 10–40)
BASOPHILS # BLD AUTO: 0.08 K/UL (ref 0–0.2)
BASOPHILS NFR BLD: 0.7 % (ref 0–1.9)
BILIRUB SERPL-MCNC: 0.4 MG/DL (ref 0.1–1)
BUN SERPL-MCNC: 50 MG/DL (ref 6–20)
CALCIUM SERPL-MCNC: 9.3 MG/DL (ref 8.7–10.5)
CHLORIDE SERPL-SCNC: 113 MMOL/L (ref 95–110)
CO2 SERPL-SCNC: 27 MMOL/L (ref 23–29)
CREAT SERPL-MCNC: 4 MG/DL (ref 0.5–1.4)
DIFFERENTIAL METHOD: ABNORMAL
EOSINOPHIL # BLD AUTO: 0.2 K/UL (ref 0–0.5)
EOSINOPHIL NFR BLD: 2.2 % (ref 0–8)
ERYTHROCYTE [DISTWIDTH] IN BLOOD BY AUTOMATED COUNT: 14.7 % (ref 11.5–14.5)
EST. GFR  (NO RACE VARIABLE): 17.4 ML/MIN/1.73 M^2
GLUCOSE SERPL-MCNC: 151 MG/DL (ref 70–110)
HCT VFR BLD AUTO: 40.6 % (ref 40–54)
HGB BLD-MCNC: 12.4 G/DL (ref 14–18)
IMM GRANULOCYTES # BLD AUTO: 0.07 K/UL (ref 0–0.04)
IMM GRANULOCYTES NFR BLD AUTO: 0.6 % (ref 0–0.5)
LYMPHOCYTES # BLD AUTO: 1.2 K/UL (ref 1–4.8)
LYMPHOCYTES NFR BLD: 11 % (ref 18–48)
MAGNESIUM SERPL-MCNC: 2.4 MG/DL (ref 1.6–2.6)
MCH RBC QN AUTO: 26.8 PG (ref 27–31)
MCHC RBC AUTO-ENTMCNC: 30.5 G/DL (ref 32–36)
MCV RBC AUTO: 88 FL (ref 82–98)
MONOCYTES # BLD AUTO: 0.7 K/UL (ref 0.3–1)
MONOCYTES NFR BLD: 6.7 % (ref 4–15)
NEUTROPHILS # BLD AUTO: 8.5 K/UL (ref 1.8–7.7)
NEUTROPHILS NFR BLD: 78.8 % (ref 38–73)
NRBC BLD-RTO: 0 /100 WBC
PHOSPHATE SERPL-MCNC: 2.2 MG/DL (ref 2.7–4.5)
PHOSPHATE SERPL-MCNC: 2.9 MG/DL (ref 2.7–4.5)
PLATELET # BLD AUTO: 252 K/UL (ref 150–450)
PMV BLD AUTO: 12.5 FL (ref 9.2–12.9)
POCT GLUCOSE: 133 MG/DL (ref 70–110)
POCT GLUCOSE: 152 MG/DL (ref 70–110)
POCT GLUCOSE: 159 MG/DL (ref 70–110)
POCT GLUCOSE: 171 MG/DL (ref 70–110)
POCT GLUCOSE: 184 MG/DL (ref 70–110)
POCT GLUCOSE: 253 MG/DL (ref 70–110)
POTASSIUM SERPL-SCNC: 3.2 MMOL/L (ref 3.5–5.1)
POTASSIUM SERPL-SCNC: 3.2 MMOL/L (ref 3.5–5.1)
PROT SERPL-MCNC: 6.4 G/DL (ref 6–8.4)
RBC # BLD AUTO: 4.63 M/UL (ref 4.6–6.2)
SODIUM SERPL-SCNC: 149 MMOL/L (ref 136–145)
WBC # BLD AUTO: 10.78 K/UL (ref 3.9–12.7)

## 2022-12-15 PROCEDURE — 94761 N-INVAS EAR/PLS OXIMETRY MLT: CPT

## 2022-12-15 PROCEDURE — 84132 ASSAY OF SERUM POTASSIUM: CPT | Performed by: STUDENT IN AN ORGANIZED HEALTH CARE EDUCATION/TRAINING PROGRAM

## 2022-12-15 PROCEDURE — 99233 SBSQ HOSP IP/OBS HIGH 50: CPT | Mod: ,,, | Performed by: NURSE PRACTITIONER

## 2022-12-15 PROCEDURE — 84100 ASSAY OF PHOSPHORUS: CPT | Mod: 91 | Performed by: STUDENT IN AN ORGANIZED HEALTH CARE EDUCATION/TRAINING PROGRAM

## 2022-12-15 PROCEDURE — 36415 COLL VENOUS BLD VENIPUNCTURE: CPT | Performed by: INTERNAL MEDICINE

## 2022-12-15 PROCEDURE — 99233 PR SUBSEQUENT HOSPITAL CARE,LEVL III: ICD-10-PCS | Mod: ,,, | Performed by: PSYCHIATRY & NEUROLOGY

## 2022-12-15 PROCEDURE — 84100 ASSAY OF PHOSPHORUS: CPT | Performed by: PHYSICIAN ASSISTANT

## 2022-12-15 PROCEDURE — 25000003 PHARM REV CODE 250: Performed by: PSYCHIATRY & NEUROLOGY

## 2022-12-15 PROCEDURE — 25000003 PHARM REV CODE 250: Performed by: NURSE PRACTITIONER

## 2022-12-15 PROCEDURE — 97530 THERAPEUTIC ACTIVITIES: CPT

## 2022-12-15 PROCEDURE — 83735 ASSAY OF MAGNESIUM: CPT | Performed by: PHYSICIAN ASSISTANT

## 2022-12-15 PROCEDURE — 85730 THROMBOPLASTIN TIME PARTIAL: CPT | Mod: 91 | Performed by: STUDENT IN AN ORGANIZED HEALTH CARE EDUCATION/TRAINING PROGRAM

## 2022-12-15 PROCEDURE — 85025 COMPLETE CBC W/AUTO DIFF WBC: CPT | Performed by: NURSE PRACTITIONER

## 2022-12-15 PROCEDURE — 25000003 PHARM REV CODE 250: Performed by: STUDENT IN AN ORGANIZED HEALTH CARE EDUCATION/TRAINING PROGRAM

## 2022-12-15 PROCEDURE — 63600175 PHARM REV CODE 636 W HCPCS: Performed by: NURSE PRACTITIONER

## 2022-12-15 PROCEDURE — 99233 PR SUBSEQUENT HOSPITAL CARE,LEVL III: ICD-10-PCS | Mod: ,,, | Performed by: NURSE PRACTITIONER

## 2022-12-15 PROCEDURE — 80053 COMPREHEN METABOLIC PANEL: CPT | Performed by: PHYSICIAN ASSISTANT

## 2022-12-15 PROCEDURE — 11000001 HC ACUTE MED/SURG PRIVATE ROOM

## 2022-12-15 PROCEDURE — 85730 THROMBOPLASTIN TIME PARTIAL: CPT | Mod: 91 | Performed by: INTERNAL MEDICINE

## 2022-12-15 PROCEDURE — 85730 THROMBOPLASTIN TIME PARTIAL: CPT | Performed by: NURSE PRACTITIONER

## 2022-12-15 PROCEDURE — 99233 SBSQ HOSP IP/OBS HIGH 50: CPT | Mod: ,,, | Performed by: PSYCHIATRY & NEUROLOGY

## 2022-12-15 RX ORDER — POTASSIUM CHLORIDE 7.45 MG/ML
10 INJECTION INTRAVENOUS ONCE
Status: COMPLETED | OUTPATIENT
Start: 2022-12-15 | End: 2022-12-15

## 2022-12-15 RX ORDER — LABETALOL HCL 20 MG/4 ML
10 SYRINGE (ML) INTRAVENOUS EVERY 6 HOURS PRN
Status: DISCONTINUED | OUTPATIENT
Start: 2022-12-15 | End: 2022-12-24

## 2022-12-15 RX ADMIN — HEPARIN SODIUM 14 UNITS/KG/HR: 10000 INJECTION, SOLUTION INTRAVENOUS at 03:12

## 2022-12-15 RX ADMIN — METOPROLOL TARTRATE 25 MG: 25 TABLET, FILM COATED ORAL at 08:12

## 2022-12-15 RX ADMIN — ESCITALOPRAM OXALATE 20 MG: 20 TABLET ORAL at 08:12

## 2022-12-15 RX ADMIN — INSULIN ASPART 8 UNITS: 100 INJECTION, SOLUTION INTRAVENOUS; SUBCUTANEOUS at 01:12

## 2022-12-15 RX ADMIN — POTASSIUM CHLORIDE 10 MEQ: 7.46 INJECTION, SOLUTION INTRAVENOUS at 04:12

## 2022-12-15 RX ADMIN — INSULIN ASPART 8 UNITS: 100 INJECTION, SOLUTION INTRAVENOUS; SUBCUTANEOUS at 09:12

## 2022-12-15 RX ADMIN — INSULIN ASPART 8 UNITS: 100 INJECTION, SOLUTION INTRAVENOUS; SUBCUTANEOUS at 04:12

## 2022-12-15 RX ADMIN — INSULIN ASPART 2 UNITS: 100 INJECTION, SOLUTION INTRAVENOUS; SUBCUTANEOUS at 04:12

## 2022-12-15 RX ADMIN — ATORVASTATIN CALCIUM 40 MG: 40 TABLET, FILM COATED ORAL at 08:12

## 2022-12-15 RX ADMIN — INSULIN ASPART 6 UNITS: 100 INJECTION, SOLUTION INTRAVENOUS; SUBCUTANEOUS at 08:12

## 2022-12-15 RX ADMIN — INSULIN ASPART 8 UNITS: 100 INJECTION, SOLUTION INTRAVENOUS; SUBCUTANEOUS at 08:12

## 2022-12-15 RX ADMIN — INSULIN ASPART 2 UNITS: 100 INJECTION, SOLUTION INTRAVENOUS; SUBCUTANEOUS at 01:12

## 2022-12-15 RX ADMIN — POTASSIUM PHOSPHATE, MONOBASIC AND POTASSIUM PHOSPHATE, DIBASIC 20 MMOL: 224; 236 INJECTION, SOLUTION, CONCENTRATE INTRAVENOUS at 04:12

## 2022-12-15 RX ADMIN — FUROSEMIDE 40 MG: 40 TABLET ORAL at 08:12

## 2022-12-15 RX ADMIN — INSULIN ASPART 8 UNITS: 100 INJECTION, SOLUTION INTRAVENOUS; SUBCUTANEOUS at 12:12

## 2022-12-15 RX ADMIN — AMLODIPINE BESYLATE 10 MG: 10 TABLET ORAL at 08:12

## 2022-12-15 NOTE — SUBJECTIVE & OBJECTIVE
Review of Systems  Unable to obtain a complete ROS due to level of consciousness.  Objective:     Vitals:  Temp: 98.3 °F (36.8 °C)  Pulse: 85  Rhythm: normal sinus rhythm  BP: (!) 167/114  MAP (mmHg): 137  Resp: 10  SpO2: 99 %    Temp  Min: 98.2 °F (36.8 °C)  Max: 98.7 °F (37.1 °C)  Pulse  Min: 84  Max: 97  BP  Min: 147/92  Max: 179/114  MAP (mmHg)  Min: 113  Max: 140  Resp  Min: 6  Max: 22  SpO2  Min: 95 %  Max: 100 %    12/14 0701 - 12/15 0700  In: 1684.3 [I.V.:240.3]  Out: 1200 [Urine:1200]   Unmeasured Output  Urine Occurrence: 1  Stool Occurrence: 1  Pad Count: 1       Physical Exam  GA: Alert, comfortable, no acute distress.   HEENT: No scleral icterus or JVD.   Pulmonary: Clear to auscultation A/L.   Cardiac: RRR S1 & S2 w/o rubs/murmurs/gallops.   Abdominal: Bowel sounds present x 4. No appreciable hepatosplenomegaly.  Skin: No jaundice, rashes, or visible lesions.  Neuro:  --GCS: E3 V4 M6  --Mental Status:  opens eyes to voice, nods appropriate, follows simple commands  --CN II-XII grossly intact.   --Pupils 3mm, PERRL.   --Corneal reflex, gag, cough intact.  --LUE spont  --RUE withdraws  --LLE spont  --RLE withdraws    Medications:  Continuousdextrose 10 % in water (D10W)  heparin (porcine) in D5W, Last Rate: 14 Units/kg/hr (12/15/22 0900)    ScheduledamLODIPine, 10 mg, Daily  atorvastatin, 40 mg, Daily  EScitalopram oxalate, 20 mg, Daily  furosemide, 40 mg, Daily  insulin aspart U-100, 8 Units, 6 times per day  metoprolol tartrate, 25 mg, BID    PRNdextrose 10 % in water (D10W), , Continuous PRN  dextrose 10%, 12.5 g, PRN  dextrose 10%, 25 g, PRN  glucagon (human recombinant), 1 mg, PRN  insulin aspart U-100, 1-10 Units, Q4H PRN  labetaloL, 10 mg, Q6H PRN  ondansetron, 8 mg, Q8H PRN  sodium chloride 0.9%, 10 mL, PRN      Today I personally reviewed pertinent medications, lines/drains/airways, imaging, cardiology results, laboratory results, microbiology results,     Diet  Diet NPO

## 2022-12-15 NOTE — NURSING
Nursing Transfer Note       Transfer To     Transfer via bed    Transfer with cardiac monitoring    Transported by SERGIO Hernandez    Medicines sent: yes    Chart sent with patient: Yes    Belongings sent with patient: (specify belongings)    Notified: sister    Bedside Neuro assessment performed: Yes    Bedside Handoff given to: SERGIO Rodriguez    Upon arrival to floor: cardiac monitor applied, patient oriented to room, call bell in reach, and bed in lowest position

## 2022-12-15 NOTE — PLAN OF CARE
Problem: Physical Therapy  Goal: Physical Therapy Goal  Description: Goals to be met by: 22    Patient will increase functional independence with mobility by performin. Supine to sit with Maximum Assistance  2. Sit to supine with Maximum Assistance  3. Sit to stand transfer with Maximum Assistance  4. Bed to chair transfer with Maximum Assistance using squat pivot technique.  5. Gait  x 5 feet with Moderate Assistance using LRAD.   6. Sitting at edge of bed x8 minutes with Contact Guard Assistance with DYLAN UE support.    Outcome: Ongoing, Progressing   Pt's goals remain appropriate and pt will continue to benefit from skilled PT services to work towards improved functional mobility including: bed mobility, transfers, and gait.   12/15/2022

## 2022-12-15 NOTE — PROGRESS NOTES
[facility] Neuro Critical Care  Neurocritical Care  Progress Note    Admit Date: 12/8/2022  Service Date: 12/15/2022  Length of Stay: 7    Subjective:     Chief Complaint: Embolic stroke involving left middle cerebral artery    History of Present Illness: Pt is a 52 y.o. male with PMHx of CHF who presents as a transfer for acute LMCA stroke. Patient initially presented to OSH on 12/3 with RSW. At that time he was not a candidate for TPA and MRI revealed bilateral cardio embolic strokes. Patient had a TTE and RAGHAV which revealed LV thrombus. He was not started AC yet in setting of acute strokes, plan was to start AC today 12/8. Today, patient was noted to have acute change in neuro exam. NIH went from 6 to 23 and CTA revealed L M2 occlusion. [place] [facility] [place] but [place]S score was poor so he was not a candidate. Patient will be admitted to [facility] for higher level care and neuro monitoring.       Hospital Course: 12/9/22: Hemicrani watch  12/10/22: start heparin drip  12/11/2022 CTH stable after heparin gtt therapeutic. Recorded UOP not accurate due to difficulty with condom cath; CXR no pleural effusion. CTH ordered for 12/13.  12/12/2022: NAEON. Tube feedings increased today. CTH in AM per NSGY.  12/13/2022: CTH stable with no increasing edema. NSGY has signed off. Stable to step down to stroke team today. Weaning salt tabs to maintain eunatremia.  12/14/2022: NAEON. Stepdwn held due to high BP. Amlodipine added yesterday for better BP control. Will SD to stroke team today.  12/15/2022: EVDs at 5 per NSGY, stepdown to stroke team          Review of Systems  Unable to obtain a complete ROS due to level of consciousness.  Objective:     Vitals:  Temp: 98.3 °F (36.8 °C)  Pulse: 85  Rhythm: normal sinus rhythm  BP: (!) 167/114  MAP (mmHg): 137  Resp: 10  SpO2: 99 %    Temp  Min: 98.2 °F (36.8 °C)  Max: 98.7 °F (37.1 °C)  Pulse  Min: 84  Max: 97  BP  Min: 147/92  Max: 179/114  MAP  (mmHg)  Min: 113  Max: 140  Resp  Min: 6  Max: 22  SpO2  Min: 95 %  Max: 100 %    12/14 0701 - 12/15 0700  In: 1684.3 [I.V.:240.3]  Out: 1200 [Urine:1200]   Unmeasured Output  Urine Occurrence: 1  Stool Occurrence: 1  Pad Count: 1       Physical Exam  GA: Alert, comfortable, no acute distress.   HEENT: No scleral icterus or JVD.   Pulmonary: Clear to auscultation A/L.   Cardiac: RRR S1 & S2 w/o rubs/murmurs/gallops.   Abdominal: Bowel sounds present x 4. No appreciable hepatosplenomegaly.  Skin: No jaundice, rashes, or visible lesions.  Neuro:  --GCS: E3 V4 M6  --Mental Status:  opens eyes to voice, nods appropriate, follows simple commands  --CN II-XII grossly intact.   --Pupils 3mm, PERRL.   --Corneal reflex, gag, cough intact.  --LUE spont  --RUE withdraws  --LLE spont  --RLE withdraws    Medications:  Continuousdextrose 10 % in water (D10W)  heparin (porcine) in D5W, Last Rate: 14 Units/kg/hr (12/15/22 0900)    ScheduledamLODIPine, 10 mg, Daily  atorvastatin, 40 mg, Daily  EScitalopram oxalate, 20 mg, Daily  furosemide, 40 mg, Daily  insulin aspart U-100, 8 Units, 6 times per day  metoprolol tartrate, 25 mg, BID    PRNdextrose 10 % in water (D10W), , Continuous PRN  dextrose 10%, 12.5 g, PRN  dextrose 10%, 25 g, PRN  glucagon (human recombinant), 1 mg, PRN  insulin aspart U-100, 1-10 Units, Q4H PRN  labetaloL, 10 mg, Q6H PRN  ondansetron, 8 mg, Q8H PRN  sodium chloride 0.9%, 10 mL, PRN      Today I personally reviewed pertinent medications, lines/drains/airways, imaging, cardiology results, laboratory results, microbiology results,     Diet  Diet NPO        Assessment/Plan:     Neuro  * Embolic stroke involving left middle cerebral artery  52 year old man with HTN, HLD, T2DM, CKD, CHF presented to OSH 12/3 with RSW. MRI with bilateral infarcts concerning for cardioembolic etiology. LV thrombus seen on RAGHAV. Neuro exam changed 12/8 while not on anticoagulation, found to have L M2 occlusion. [place] [facility] [place] no intervention as poor ASPECTS score. Admitted to [facility] for hemicrani watch.    - Vascular Neurology following   - SBP goal <200  - q1hr neuro checks   - atorvastatin 40 mg QD   - heparin gtt started 12/10  - PT/OT/SLP consulted  -stepdown to stroke team        Cytotoxic brain edema  Outside swelling window  Salt tabs stopped    Cardiac/Vascular  Hyperlipidemia associated with type 2 diabetes mellitus    - atorvastatin 40 mg     CHF (congestive heart failure)  Chronic. Taking PO Lasix 40 mg QD prior to admission. 12/6 RAGHAV with EF 38%.    - strict I&Os  - continue home Lasix     LV (left ventricular) mural thrombus  Seen on RAGHAV. Likely etiology of strokes.  - heparin gtt     Essential hypertension  Permissive HTN in setting of acute stroke  - goal SBP <200  - PRN labetalol, hydralazine  - amlodipine started 12/13    Renal/  Stage 4 chronic kidney disease  See JR on CKD    Endocrine  Type 2 diabetes mellitus, with long-term current use of insulin  A1c 9.1. Prior to admission taking 70/30 insulin.    - aspart 8U q4h  - MDSSI  - tube feeds    Other  Debility  Due to stroke   - PT/OT recommending IPR          The patient is being Prophylaxed for:  Venous Thromboembolism with: Chemical  Stress Ulcer with: None  Ventilator Pneumonia with: not applicable    Activity Orders            Turn patient starting at 12/08 1800    Elevate HOB starting at 12/08 1653    Diet NPO: NPO starting at 12/08 1653          Full Code    Tressa Chow NP  Neurocritical Care  [facility] - Neuro Critical Care

## 2022-12-15 NOTE — PROGRESS NOTES
[facility] - Neuro Critical Care  Vascular Neurology  Com[facility] Stroke Center  Progress Note    Assessment/Plan:     * Embolic stroke involving left middle cerebral artery  53 yo M with CHF who initially presented to OSH with RSW. Did not receive tPA, as he was outside of the window. MRI at that time demonstrated bilateral anterior and posterior circulation strokes concerning for embolic etiology. Over hospital course at OSH, the patient was noted to have been lethargic and aphasic with dysarthria. RAGHAV at OSH with L ventricular thrombus (not on AC). He was noted to have had an acute change in neuro exam on 12/8 at which time he had RSW with global aphasia. NIHSS had been 6 prior to the event and was noted to have been 23 following. CTA demonstrated a L M2 occlusion for which the patient was transferred to [facility] [place] for possible intervention and higher level of care. He was not taken to IR due to poor ASPECTS and was admitted to ICU for close monitoring and hemicrani watch.   -Etiology: likely cardioembolic given LV thrombus      -Pending SD to NPU. Poor exam, patient very lethargic, WD on the right.       Antithrombotics for secondary stroke prevention:  on heparin gtt until swallowing or PEG established, can be switched to DOAC when able    Statins for secondary stroke prevention and hyperlipidemia, if present:   Statins: Atorvastatin- 40 mg daily    Aggressive risk factor modification: HTN, DM, HLD, Diet, Exercise, LV thrombus     Rehab efforts: Rehab; NPO, tube feeds via NGT    Diagnostics ordered/pending: None     VTE prophylaxis: Mechanical prophylaxis: Place SCDs  None: Reason for No Pharmacological VTE Prophylaxis: Currently on anticoagulation     BP parameters: SBP <200      Oropharyngeal dysphagia  Due to stroke  SLP following  Currently NPO with NGT in place for tube feeds, will likely need PEG    JR on CKD  See CKD    Hyperlipidemia associated with type 2 diabetes mellitus  Stroke RF   . 8, goal  <70  Continue atorvastatin 40mg daily    CHF (congestive heart failure)  Stroke risk factor  RAGHAV with EF 38% and segmental LV wall motion abnormalities  Strict I/Os  On lasix 40    Acute cystitis without hematuria  Klebsiella pneumoniae on cx, treated with ceftriaxone    Debility  2/2 stroke   OT and PT to evaluate   Therapy recommending IPR     Cytotoxic brain edema  -Noted on imaging in the area of the L MCA territory. Will continue to monitor q1h while in ICU and repeat CTH PRN for acute neuro exam changes that could indicate worsening/expansion of edema.   -NSGY consulted for hemicrani watch due to malignant MCA, no acute intervention recommended as serial imaging remains stable    LV (left ventricular) mural thrombus  Stroke RF  Continue heparin gtt as patient NPO and will likely PEG placement, switch to DOAC when able    Stage 4 chronic kidney disease  Cr 4.2 and GFR 16.4 today   Avoid nephrotoxic medications and renally dose adjust when appropriate   Monitor I/Os and daily kidney function labs    Type 2 diabetes mellitus, with long-term current use of insulin  Stroke RF  A1c 9.1, consider nutrition consult and DM education  BG goal while inpatient 140-180  Currently on Aspart 8units q4h + aspart 1-10U q4h PRN    Essential hypertension  Stroke RF  SBP < 200, MAP >65  Consider slowly lowering SBP goal to <180 slowly  On [id number] and metoprolol 25 BID  PRN Labetalol         12/09/2022 Patient with LV thrombus, will need anticoagulation. NGT in place would recommend heparin gtt until patient able to swallow or PEG placed prior to DOAC initiation. Patient tachycardic today with SBP < 210, metoprolol started by primary team. Febrile with elevated white count and procal, currently on Zosyn and Vanc while cx pending. NSGY following for hemicrani watch. Patient has been discharged from OT per last note, will need new orders. Continue current ICU care.   12/10/2022: Pending stability scan patient is expected to be  started on heparin gtt. Continue hemicrani watch in  ICU. Family at bedside.   12/11/2022: Patient started on heparin gtt overnight, he demonstrated great clinical improvement today, he was able to tell me his name, knew he is in [facility]. Followed single step commands. No family at bedside today.   12/12/2022 Patient remains in ICU for sue-crani watch. NSGY following. On salt tabs for goal of hypernatremia.Will remain in ICU another night. VN to re-evaluate for stepdown tomorrow. Patient is on ceftriaxone for acute cystitis. aPTT today 42.9. CTH following therapeutic heparin levels stable. More alert today. IPR recommendations; SLP with minced and moist diet with thin liquids.   12/13/2022 Remains in NCC, neuro exam unchanged and limited by drowsiness. Overnight had short run of VT/SVT that resolved without any interventions. BPs not consistently at goal of <200, had max BP today of 214/140. Currently NPO with NGT in place and anticipate patient will need PEG placement, SLP recommending ice chips sparingly for pleasure. Anticipate step down tomorrow once BP is consistently at goal  12/14/2022 Pending SD to NPU. Continue to watch BP closely, SBP<200. Patient will likely need a PEG.  12/15/2022. Pending SD to NPU. Poor exam, patient very lethargic, WD on the right.       STROKE DOCUMENTATION   Acute Stroke Times   Last Known Normal Date: 12/08/22  Last Known Normal Time: 1145  Symptom Onset Date: 12/08/22  Symptom Onset Time: 1145  Stroke Team Called Date: 12/08/22  Stroke Team Called Time: 1615  Stroke Team Arrival Date: 12/08/22  Stroke Team Arrival Time: 1615  CT Interpretation Time: 1615  Thrombolytic Therapy Recommended: No  CTA Interpretation Time: 1615    NIH Scale:  1a. Level of Consciousness: 2-->Not alert, requires repeated stimulation to attend, or is obtunded and requires strong or painful stimulation to make movements (not stereotyped)  1b. LOC Questions: 2-->Answers neither question correctly  1c. LOC  Commands: 1-->Performs one task correctly  2. Best Gaze: 2-->Forced deviation, or total gaze paresis not overcome by the oculocephalic maneuver  3. Visual: 0-->No visual loss  4. Facial Palsy: 1-->Minor paralysis (flattened nasolabial fold, asymmetry on smiling)  5a. Motor Arm, Left: 0-->No drift, limb holds 90 (or 45) degrees for full 10 secs  5b. Motor Arm, Right: 3-->No effort against gravity, limb falls  6a. Motor Leg, Left: 0-->No drift, leg holds 30 degree position for full 5 secs  6b. Motor Leg, Right: 3-->No effort against gravity, leg falls to bed immediately  7. Limb Ataxia: 0-->Absent  8. Sensory: 1-->Mild-to-moderate sensory loss, patient feels pinprick is less sharp or is dull on the affected side, or there is a loss of superficial pain with pinprick, but patient is aware of being touched  9. Best Language: 2-->Severe aphasia, all communication is through fragmentary expression, great need for inference, questioning, and guessing by the listener. Range of information that can be exchanged is limited, listener carries burden of. . . (see row details)  10. Dysarthria: 2-->Severe dysarthria, patients speech is so slurred as to be unintelligible in the absence of or out of proportion to any dysphasia, or is mute/anarthric  11. Extinction and Inattention (formerly Neglect): 1-->Visual, tactile, auditory, spatial, or personal inattention or extinction to bilateral simultaneous stimulation in one of the sensory modalities  Total (NIH Stroke Scale): 20       Modified Milana Score: 1  Glen Coma Scale:    ABCD2 Score:    [id number] Score:   HAS -BLED Score:   ICH Score:   Hunt & Leblanc Classification:      Hemorrhagic change of an Ischemic Stroke: Does this patient have an ischemic stroke with hemorrhagic changes? No     Neurologic Chief Complaint: lethargy, aphasia, dysarthria     Subjective:     Interval History: Patient is seen for follow-up neurological assessment and treatment recommendations:     Pending  SD to NPU. Poor exam, patient very lethargic, WD on the right.     HPI, Past Medical, Family, and Social History remains the same as documented in the initial encounter.     Review of Systems   Unable to perform ROS: Other (Aphasia)   Scheduled Meds:   amLODIPine  10 mg Per NG tube Daily    atorvastatin  40 mg Per NG tube Daily    EScitalopram oxalate  20 mg Per NG tube Daily    furosemide  40 mg Per NG tube Daily    insulin aspart U-100  8 Units Subcutaneous 6 times per day    metoprolol tartrate  25 mg Per NG tube BID     Continuous Infusions:   dextrose 10 % in water (D10W)      heparin (porcine) in D5W 15 Units/kg/hr (12/15/22 1500)     PRN Meds:dextrose 10 % in water (D10W), dextrose 10%, dextrose 10%, glucagon (human recombinant), insulin aspart U-100, labetaloL, ondansetron, sodium chloride 0.9%    Objective:     Vital Signs (Most Recent):  Temp: 98.5 °F (36.9 °C) (12/15/22 1502)  Pulse: 89 (12/15/22 1502)  Resp: 15 (12/15/22 1502)  BP: (!) 155/98 (12/15/22 1502)  SpO2: 100 % (12/15/22 1502)  BP Location: Left arm    Vital Signs Range (Last 24H):  Temp:  [98.2 °F (36.8 °C)-98.7 °F (37.1 °C)]   Pulse:  [78-97]   Resp:  [6-22]   BP: (137-179)/()   SpO2:  [97 %-100 %]   BP Location: Left arm    Physical Exam  Vitals and nursing note reviewed.   Constitutional:       General: He is not in acute distress.  HENT:      Head: Normocephalic.      Nose: No rhinorrhea.      Comments: NGT in place  Eyes:      General: No scleral icterus.     Conjunctiva/sclera: Conjunctivae normal.   Cardiovascular:      Rate and Rhythm: Normal rate.   Pulmonary:      Effort: No respiratory distress.   Abdominal:      General: There is no distension.   Musculoskeletal:         General: No deformity.   Skin:     General: Skin is warm and dry.   Neurological:      Mental Status: He is lethargic.      Motor: Weakness present.      Comments: Withdraws on R side to noxious stimuli  Moves left side spontaneously  Severe aphasia         Psychiatric:      Comments: Unable to assess mood and thought content 2/2 level of consciousness       Neurological Exam:   LOC: lethargic  Attention Span: poor  Language: mixed aphasia, intermittently follows some simple commands  Articulation: Nonverbal unable to assess   Orientation: Nonverbal unable to assess   EOM (CN III, IV, VI): Tracks   Motor: L side moves spontaneously and antigravity, R side withdraws from pain   Sensation: Withdraws right side to noxious stimuli      Laboratory:  CMP:   Recent Labs   Lab 12/15/22  0016 12/15/22  0943   CALCIUM 9.3  --    ALBUMIN 2.2*  --    PROT 6.4  --    *  --    K 3.2* 3.2*   CO2 27  --    *  --    BUN 50*  --    CREATININE 4.0*  --    ALKPHOS 120  --    ALT 67*  --    AST 72*  --    BILITOT 0.4  --        BMP:   Recent Labs   Lab 12/15/22  0016 12/15/22  0943   *  --    K 3.2* 3.2*   *  --    CO2 27  --    BUN 50*  --    CREATININE 4.0*  --    CALCIUM 9.3  --        CBC:   Recent Labs   Lab 12/15/22  0016   WBC 10.78   RBC 4.63   HGB 12.4*   HCT 40.6      MCV 88   MCH 26.8*   MCHC 30.5*       Lipid Panel:   No results for input(s): CHOL, LDLCALC, HDL, TRIG in the last 168 hours.    Coagulation:   Recent Labs   Lab 12/10/22  1309 12/10/22  1956 12/15/22  0943   INR 1.1  --   --    APTT 23.3   < > 36.9*    < > = values in this interval not displayed.       Platelet Aggregation Study: No results for input(s): PLTAGG, PLTAGINTERP, PLTAGREGLACO, ADPPLTAGGREG in the last 168 hours.  Hgb A1C:   Recent Labs   Lab 12/08/22  1729   HGBA1C 9.1*       TSH:   Recent Labs   Lab 12/08/22  1729   TSH 1.375         Diagnostic Results     Brain/Vessel Imaging   [facility] 12/13/22  -Evolving large left MCA infarct with stable mass effect and petechial hemorrhagic conversion.   -No definite new hemorrhage or significant new abnormal parenchymal attenuation   -Separate areas of infarction involving the right parietal lobe and left cerebellum not well seen  with CT technique.   -Clinical correlation and continued follow-up advised     [facility] 12/11/22   Grossly stable evolving large left MCA distribution infarct and smaller infarct in the right frontal lobe with possible trace hemorrhagic conversion. Persistent mass effect with sulcal effacement and slight mass effect on the left lateral ventricle. No new or worsening intracranial hemorrhage and no worsening of minimal rightward midline shift.    [facility] 12/9/22   Evolving no enlarged left MCA territory infarction with hypoattenuation and sulcal effacement. Intermediate density along the left basal ganglia compatible with known hemorrhagic conversion. Smaller area of infarction in the right cerebral hemisphere not well seen. Evolving mass effect and edema associated with the left cerebral hemispheric infarction with slight compression of the left lateral ventricle and trace 1 mm of rightward midline shift. No evidence for hydrocephalus    MRI Brain WO Contrast 12/8/22    Exam limited by patient motion artifact. Evolving left MCA territory infarct.  Additional foci of diffusion restriction in the left cerebral consistent with recent infarct.  Interval increase susceptibility artifact in the areas of diffusion restriction consistent with hemorrhagic conversion of recent infarcts.  No hydrocephalus or significant midline shift.      [facility] 12/8/22 16:29   -Evolving large left MCA territory infarction similar to recent CT though increased in size compared to prior MRI concerning for evolving recent to subacute and acute infarcts.  Localized mass effect without significant midline shift or hydrocephalus.  There is evolving recent to subacute age right posterior frontal infarction similar to prior MRI allowing for differences in technique  -There is subtle hyperdensity along the left basal ganglia posteriorly and along the right posterior frontal cortex which may represent enhancing subacute age components of infarction with petechial  hemorrhage felt less likely but cannot be entirely excluded with limitation secondary to recirculation contrast related to recent CTA performed at outside institution.  -Evolving mass effect most pronounced associated with the left MCA territory infarction with sulcal effacement without significant midline shift or hydrocephalus.    [facility] 12/8/22 12:11 (CTA H/N)   1. Left diencephalic hemisphere demonstrates evidence of an acute infarct of left basal ganglia and left caudate lobe.  2. Occlusion of the posterior division of the left M2 branch at the takeoff of the M1 vessel with patency involving the anterior division of the left M2 segment.    [facility] 12/7/22   1.  Moderate size subacute infarction left anterior basal ganglia and anterior left internal capsule appears mildly larger and better well defined compared to CT from 12/3/2020. There is currently greater mass effect on the anterior horn of the left lateral ventricle. There is no associated bleed or midline shift.  2.  Recent MRI demonstrated additional punctate acute infarctions in the left frontal lobe and a mild size acute infarction right centrum semiovale. These are less obvious on CT. No associated bleed.  3.  Additional chronic periventricular white matter hypodensities.    MRI Brain WO contrast 12/3/22   Multifocal areas of restricted diffusion are felt to reflect acute infarcts.    [facility] 12/3/22   Loss of gray-white matter distinction in involving the left basal ganglia could reflect changes of chronic small vessel ischemic disease versus cytotoxic edema from acute infarct.     Carotid US Bilateral 12/3/22   1. Carotid intimal hyperplasia, with no findings of hemodynamically significant carotid arterial stenosis or vascular occlusion.  2. Patent vertebral arteries with antegrade flow bilaterally.      Cardiac Imaging   RAGHAV 12/4/22    The left ventricle is small with moderately decreased systolic function.   The estimated ejection fraction is 38%.   Left  ventricular diastolic dysfunction.   There are segmental left ventricular wall motion abnormalities.   No spontaneous echo contrast. A moderate protruding layered left ventricular thrombus is present. The thrombus is fixed and located in the apex.   Normal right ventricular size.   Mild left atrial enlargement.   Mild right atrial enlargement.   Mild aortic regurgitation.   No interatrial septal defect present.   Normal appearing left atrial appendage. No thrombus is present in the appendage. SB occluder is absent. Abnormal appendage velocities.   Mild mitral regurgitation.   Agitated saline contrast study did not show any right to left shunt    TTE 11/14/22    The left ventricle is normal in size with mildly decreased systolic function.   The estimated ejection fraction is 40%.   Grade III left ventricular diastolic dysfunction.   Small posterior pericardial effusion.   There is mild left ventricular global hypokinesis.   Normal right ventricular size with normal right ventricular systolic function.   Severe left atrial enlargement.   Moderate right atrial enlargement.   Mild pulmonic regurgitation.   Mild to moderate tricuspid regurgitation.   Mild-to-moderate aortic regurgitation.   Intermediate central venous pressure (8 mmHg).   The estimated PA systolic pressure is 51 mmHg.   There is pulmonary hypertension.      Nava Gordon PA-C  Com[facility] Stroke Center  Department of Vascular Neurology   [facility] - Neuro Critical Care

## 2022-12-15 NOTE — ASSESSMENT & PLAN NOTE
Stroke RF  SBP < 200, MAP >65  Consider slowly lowering SBP goal to <180 slowly  On [id number] and metoprolol 25 BID  PRN Labetalol

## 2022-12-15 NOTE — PT/OT/SLP PROGRESS
Physical Therapy Treatment    Patient Name:  Spencer Burton Jr.   MRN:  [id number]    Recommendations:     Discharge Recommendations: rehabilitation facility  Discharge Equipment Recommendations: hospital bed  Barriers to discharge: Inaccessible home and Decreased caregiver support    Assessment:     Spencer Burton Jr. is a 52 y.o. male admitted with a medical diagnosis of Embolic stroke involving left middle cerebral artery.  He presents with the following impairments/functional limitations: weakness, impaired balance, impaired sensation, impaired self care skills, impaired functional mobility, gait instability, decreased upper extremity function, decreased lower extremity function, impaired coordination, impaired fine motor, decreased safety awareness . Pt is unsafe with functional mobility at this time due to pt requires total assist for bed mobility, total assist for transfers, and max assist for sitting balance due to R sided and posterior instability. Pt is motivated to progress with functional mobility.     Rehab Prognosis: Fair; patient would benefit from acute skilled PT services to address these deficits and reach maximum level of function.    Recent Surgery: * No surgery found *      Plan:     During this hospitalization, patient to be seen 4 x/week to address the identified rehab impairments via gait training, therapeutic activities, therapeutic exercises, neuromuscular re-education and progress toward the following goals:    Plan of Care Expires:  01/09/23    Subjective       Pain/Comfort:  Pain Rating 1:  (pt did not appear to be in pain during treatment, unable to verbalize)  Pain Rating Post-Intervention 1: 0/10      Objective:     Communicated with nurse prior to session.  Patient found HOB elevated with bed alarm, blood pressure cuff, peripheral IV, pulse ox (continuous), telemetry, NG tube, Condom Catheter upon PT entry to room.     General Precautions: Standard, aphasia, aspiration, fall,  NPO  Orthopedic Precautions: N/A  Braces: N/A  Respiratory Status: Room air     Functional Mobility:  Bed Mobility:     Rolling Left:  total assistance  Supine to Sit: total assistance  Sit to Supine: total assistance and of 2 persons  Transfers:     Sit to Stand:  total assistance with hand-held assist  Bed to/from bedside Chair: total assistance with  hand-held assist  using  Stand Pivot    AM-PAC 6 CLICK MOBILITY  Turning over in bed (including adjusting bedclothes, sheets and blankets)?: 2  Sitting down on and standing up from a chair with arms (e.g., wheelchair, bedside commode, etc.): 2  Moving from lying on back to sitting on the side of the bed?: 2  Moving to and from a bed to a chair (including a wheelchair)?: 2  Need to walk in hospital room?: 1  Climbing 3-5 steps with a railing?: 1  Basic Mobility Total Score: 10     Treatment & Education:   pt sat on the EOB ~ 6 min with max assist due to posterior and R sided LOB due to pt pushing to the R with his L UE. Pt required manual cues to weight bearing into his R hand. Pt stood x 3 trials from bedside chair with HHA in L UE with total assist for ~ 20-25 sec each trial. Pt required manual cues to facilitate R hip/knee ext and manual cues to lift his head. Pt received verbal and manual cues for midline orientation and upright posture.     Patient left HOB elevated (pt remained up in bedside chair ~ 45 min with chair alarm prior to PT return to transfer pt back to bed as above) with all lines intact, call button in reach, bed alarm on, chair alarm on, and nurse notified..    GOALS:   Multidisciplinary Problems       Physical Therapy Goals          Problem: Physical Therapy    Goal Priority Disciplines Outcome Goal Variances Interventions   Physical Therapy Goal     PT, PT/OT Ongoing, Progressing     Description: Goals to be met by: 22    Patient will increase functional independence with mobility by performin. Supine to sit with Maximum  Assistance  2. Sit to supine with Maximum Assistance  3. Sit to stand transfer with Maximum Assistance  4. Bed to chair transfer with Maximum Assistance using squat pivot technique.  5. Gait  x 5 feet with Moderate Assistance using LRAD.   6. Sitting at edge of bed x8 minutes with Contact Guard Assistance with DYLAN UE support.                         Time Tracking:     PT Received On: 12/15/22  PT Start Time: 0823     PT Stop Time: 0902 (time added due to PT returned to transfer pt back to bed 9:13-9:30)  PT Total Time (min): 39 min     Billable Minutes: Therapeutic Activity 39    Treatment Type: Treatment  PT/PTA: PT     PTA Visit Number: 0     12/15/2022

## 2022-12-15 NOTE — PLAN OF CARE
Is This A New Presentation, Or A Follow-Up?: Evaluation for [facility] Care Plan    POC reviewed with Spencer Burton Jr. at 0300. Pt nodded understanding.  No acute events overnight. Pt progressing toward goals. Will continue to monitor. See below and flowsheets for full assessment and VS info.     No acute neuro changes overnight, q4hr checks in place  Heparin gtt @ 14 units for APTT of 36.6, recheck @ 1000.  BM x 1.  Sheets changed, partial bath completed.   Potassium and phosphorus replaced with IVPB mixture per MD Eloina.   Recheck @ 1000.        Is this a stroke patient? yes- Stroke booklet reviewed with patient, risk factors identified for patient and stroke booklet remains at bedside for ongoing education.     Neuro:  [place] Coma Scale  Best Eye Response: 4-->(E4) spontaneous  Best Motor Response: 6-->(M6) obeys commands  Best Verbal Response: 2-->(V2) incomprehensible speech  [place] Coma Scale Score: 12  Assessment Qualifiers: patient not sedated/intubated  Pupil PERRLA: yes     24hr Temp:  [97.8 °F (36.6 °C)-98.7 °F (37.1 °C)]     CV:   Rhythm: normal sinus rhythm  BP goals:   SBP <  200  MAP > 65    Resp:   (RETIRED) O2 Device (Oxygen Therapy): room air       Plan: N/A    GI/:     Diet/Nutrition Received: tube feeding  Last Bowel Movement: 12/15/22  Voiding Characteristics: external catheter    Intake/Output Summary (Last 24 hours) at 12/15/2022 0422  Last data filed at 12/15/2022 0206  Gross per 24 hour   Intake 1190.64 ml   Output 1100 ml   Net 90.64 ml     Unmeasured Output  Urine Occurrence: 1  Stool Occurrence: 1  Pad Count: 1    Labs/Accuchecks:  Recent Labs   Lab 12/15/22  0016   WBC 10.78   RBC 4.63   HGB 12.4*   HCT 40.6         Recent Labs   Lab 12/15/22  0016   *   K 3.2*   CO2 27   *   BUN 50*   CREATININE 4.0*   ALKPHOS 120   ALT 67*   AST 72*   BILITOT 0.4      Recent Labs   Lab 12/10/22  1309 12/10/22  1956 12/15/22  0016   INR 1.1  --   --    APTT 23.3   < > 36.6*    < > = values in this interval not displayed.    No results for  Additional History: Patient is using Excimer in conjunction with Lightbox input(s): CPK, CPKMB, TROPONINI, MB in the last 168 hours.    Electrolytes: Electrolytes replaced  Accuchecks: Q4H    Gtts:   dextrose 10 % in water (D10W)      heparin (porcine) in D5W 14 Units/kg/hr (12/15/22 0358)       LDA/Wounds:  Lines/Drains/Airways       Drain  Duration                  NG/OG Tube Right nostril -- days    Male External Urinary Catheter 12/08/22 1830 Medium 6 days              Peripheral Intravenous Line  Duration                  Peripheral IV - Single Lumen 12/08/22 1800 20 G Anterior;Proximal;Right Forearm 6 days         Peripheral IV - Single Lumen 12/10/22 0219 20 G Right Antecubital 5 days         Peripheral IV - Single Lumen 12/13/22 0005 20 G;1 3/4 in Anterior;Left Upper Arm 2 days                  Wounds: No  Wound care consulted: No

## 2022-12-15 NOTE — DISCHARGE SUMMARY
[facility]  Discharge Summary  Patient Name: Spencer Burton Jr. MRN: [id number]   Patient Class: IP- Inpatient  Length of Stay: 5   Admission Date: 12/3/2022  1:37 PM Attending Physician: Sebas Bose MD   Primary Care Provider: Primary Doctor No Face-to-Face encounter date: 12/8/22   Chief Complaint: Right Sided Weakness    Date of Discharge: 12/8/22  Discharge Disposition:Short Term Hospital    Condition: Stable       Reason for Hospitalization     Active Hospital Problems    Diagnosis    *Acute ischemic left MCA stroke     Likely L MCA ischemic stroke.  DDx includes post-ictal state and hypertensive encephalopathy.  Rec treating BP to <185/110 and then treating with TNK if not better.  CTA pending for e/o LVO.      Encephalopathy, metabolic    Acute on chronic congestive heart failure    COPD (chronic obstructive pulmonary disease)    Stage 4 chronic kidney disease    Essential hypertension         Brief History of Present Illness     Spencer Burton Jr. is a 52 y.o. Black or  male with known history of CHF presented with right sided weakness. He was noticed stumbling in gas station so the staff there called EMS and he was brought to the ER for evaluation. CT scan and MRI consistent with bilateral cerebral hemispheres, left basal ganglia and left cerebellar hemisphere. Tele-stroke consulted and TPA was not given due to waxing and waning symptoms and elevated blood pressures. Hospital medicine consulted for medical management. During my evaluation, patient lethargic and confused and not able to follow commands and provide any reliable information.      12/8  significant event:  Around 1130 patient had a sudden change in neurological exam. He became globally aphasic, with right deep hemiparesis and left gaze deviation concerning for stroke, so he was stroke activated.   Initial CT brain was stable from prior with no evidence of new hypodensity, there was no evidence of bleed either.  His  exam was consistent with large vessel occlusion and  CT angio of the head and neck demonstrated a left MCA M2 division occlusion, transfer was requested for capable facility to perform perfusion scan and decide about thrombectomy.  [place] [facility] [place]   For the full HPI please refer to the History & Physical from this admission.    Hospital Course By Problem with Pertinent Findings     Acute Ischemic multi territorial cardio-embolic CVA   -Suspect embolic event   -RAGHAV with large thrombus  -Carotid US with no significant stenosis.  CTA contraindicated in setting of present renal function.  -ASA and statin for secondary prevention  -Neurology following; appreciate recs  -Neuro checks  -PT/OT/ST  -Nephrology consulted for Jacquie on CKD.  Last Cr was 3.8.  -Cardiology consulted for acute on chronic SHF and elevated troponin a little beyond his baseline elevation.  IV lasix.     -ISS.  Accuchecks.  -started cardene infusion  -Planned for initiation of heparin infusion to treat LV thrombus on 12/8, however, prior to initiation patient had a significant change in neuro status and patient was transferred to [facility] [place] in hopes of possible thrombectomy      Per Neurology:     Acute ischemic stroke - multifocal - etiology cardioembolic in the setting of Left Ventricular Thrombus  New onset left MCA M2 occlusion  CKD   Elevated troponins  52-year-old man with history of CHF who presented with right-sided weakness.  Did not receive tPA as he was outside of the window.  MRI brain showed bilateral anterior and posterior circulation strokes concerning for embolic etiology, so he underwent transesophageal ECHO which confirmed the presence of left ventricular thrombus.  Plan was start anticoagulation today, but he had a sudden neurological decline and was stroke activated.  NIH stroke scale yesterday was 6 and today upon evaluation was 23.  Initial CT brain showed no evidence of hemorrhage, redemonstration of left frontal and  "basal ganglia stroke.  Clinical picture consistent with left MCA syndrome, so CTA was obtained and showed proximal left M2 occlusion.  Transfer center was called for transfer to comprehensive stroke center for possible thrombectomy.  We will defer further stroke management as per accepting facility.      Patient was seen and examined on the date of discharge and determined to be suitable for discharge.    Physical Exam  BP (!) 166/105   Pulse 96   Temp 98.7 °F (37.1 °C) (Oral)   Resp 14   Ht 5' 9" (1.753 m)   Wt 84.5 kg (186 lb 4.6 oz)   SpO2 (!) 90%   BMI 27.51 kg/m²   Vitals reviewed.    Constitutional: No distress.   HENT: Atraumatic.   Cardiovascular: Normal rate, regular rhythm and normal heart sounds.   Pulmonary/Chest: Effort normal. Clear to auscultation bilaterally. No wheezes.   Abdominal: Soft. Bowel sounds are normal. Exhibits no distension and no mass. No tenderness  Neurological: Right hemiparesis, left gaze, global aphasia  Skin: Skin is warm and dry.     Following labs were Reviewed   Recent Labs   Lab 12/14/22  0012   WBC 11.16   HGB 12.9*   HCT 41.9      CALCIUM 9.0   ALBUMIN 2.2*   PROT 6.7   *   K 4.6   CO2 25   *   BUN 52*   CREATININE 4.2*   ALKPHOS 128   ALT 47*   AST 68*   BILITOT 0.4     POCT Glucose   Date Value Ref Range Status   12/14/2022 164 (H) 70 - 110 mg/dL Final   12/14/2022 212 (H) 70 - 110 mg/dL Final   12/14/2022 238 (H) 70 - 110 mg/dL Final   12/13/2022 234 (H) 70 - 110 mg/dL Final   12/13/2022 215 (H) 70 - 110 mg/dL Final   12/13/2022 191 (H) 70 - 110 mg/dL Final   12/13/2022 210 (H) 70 - 110 mg/dL Final   12/12/2022 176 (H) 70 - 110 mg/dL Final   12/12/2022 183 (H) 70 - 110 mg/dL Final   12/12/2022 217 (H) 70 - 110 mg/dL Final   12/12/2022 214 (H) 70 - 110 mg/dL Final   12/12/2022 149 (H) 70 - 110 mg/dL Final   12/12/2022 144 (H) 70 - 110 mg/dL Final   12/12/2022 175 (H) 70 - 110 mg/dL Final   12/11/2022 182 (H) 70 - 110 mg/dL Final        All labs " within the past 24 hours have been reviewed    Microbiology Results (last 7 days)       Procedure Component Value Units Date/Time    MRSA Screen by PCR [[id number]] Collected: 12/03/22 2212    Order Status: Completed Specimen: Nasopharyngeal Swab from Nasal Updated: 12/04/22 0019     MRSA SCREEN BY PCR Negative    Blood culture [[id number]]     Order Status: Canceled Specimen: Blood           CTA Head and Neck (xpd)   Final Result      CT Head Without Contrast   Final Result      CT Head Without Contrast   Final Result      US Carotid Bilateral   Final Result      X-Ray Chest AP Portable   Final Result      US Abdomen Limited   Final Result      MRI Brain Without Contrast   Final Result      CT HEAD FOR STROKE   Final Result          No results found for this or any previous visit.      Consultants and Procedures   Consultants:  Consults (From admission, onward)          Status Ordering Provider     Inpatient consult to Nephrology  Once        Provider:  Nicolás Ambriz MD    Completed BAYRON LOW     IP consult to case management/social work  Once        Provider:  (Not yet assigned)    Completed BAYRON LOW     Inpatient consult to Neurology  Once        Provider:  Timbo Galeano MD    Completed LORRIE SINGH            Procedures:   * No surgery found *     Dis[place] Information:     [place] [facility] [place]charge Medications:     Medication List        ASK your doctor about these medications      acetaminophen 325 MG tablet  Commonly known as: TYLENOL     albuterol 90 mcg/actuation inhaler  Commonly known as: PROVENTIL/VENTOLIN HFA  Inhale 1-2 puffs into the lungs every 6 (six) hours as needed for Wheezing. Rescue     * amLODIPine 10 MG tablet  Commonly known as: NORVASC  Take 1 tablet (10 mg total) by mouth once daily.     * amLODIPine 10 MG tablet  Commonly known as: NORVASC     aspirin 81 MG Chew     atorvastatin 40 MG tablet  Commonly known as: LIPITOR  Take 1 tablet (40 mg total) by  mouth every evening.     * calcitRIOL 0.25 MCG Cap  Commonly known as: ROCALTROL     * calcitRIOL 0.25 MCG Cap  Commonly known as: ROCALTROL     ciprofloxacin HCl 500 MG tablet  Commonly known as: CIPRO     cloNIDine 0.3 mg/24 hr td ptwk 0.3 mg/24 hr  Commonly known as: CATAPRES  Place 1 patch onto the skin once a week. medically necessary with dx codes 401.9, 428.43, 428.0.     clotrimazole 1 % cream  Commonly known as: LOTRIMIN  Apply topically 2 (two) times daily. for 10 days     EScitalopram oxalate 20 MG tablet  Commonly known as: LEXAPRO     ferrous sulfate 325 mg (65 mg iron) Tab tablet  Commonly known as: FEOSOL     fluticasone propionate 50 mcg/actuation nasal spray  Commonly known as: FLONASE  1 spray (50 mcg total) by Each Nostril route 2 (two) times daily as needed for Rhinitis.     furosemide 40 MG tablet  Commonly known as: LASIX     glipiZIDE 10 MG tablet  Commonly known as: GLUCOTROL  Take 1 tablet (10 mg total) by mouth 2 (two) times daily with meals.     HumuLIN 70/30 U-100 Insulin 100 unit/mL (70-30) injection  Generic drug: insulin NPH-insulin regular (70/30)     HYDROcodone-acetaminophen 5-325 mg per tablet  Commonly known as: NORCO     insulin detemir U-100 100 unit/mL injection  Commonly known as: LEVEMIR U-100 INSULIN  Inject 15 Units into the skin every evening.     isosorbide mononitrate 60 MG 24 hr tablet  Commonly known as: IMDUR     * lancets 33 gauge Misc  Commonly known as: ONETOUCH DELICA LANCETS  1 lancet by Misc.(Non-Drug; Combo Route) route 4 (four) times daily. DX:250.00   Medically necessary to test blood sugar     * TRUEPLUS LANCETS 30 gauge Misc  Generic drug: lancets     loratadine 10 mg tablet  Commonly known as: CLARITIN  Take 1 tablet (10 mg total) by mouth once daily.     losartan 100 MG tablet  Commonly known as: COZAAR     ondansetron 4 MG Tbdl  Commonly known as: ZOFRAN-ODT  Take 1 tablet (4 mg total) by mouth every 6 (six) hours as needed (nausea/vomiting).    "  oxyCODONE-acetaminophen 5-325 mg per tablet  Commonly known as: PERCOCET     pen needle, diabetic 31 gauge x 3/16" Ndle  Use daily to inject insulin  DX:250.00     pioglitazone 15 MG tablet  Commonly known as: ACTOS     potassium chloride 10 MEQ Tbsr  Commonly known as: KLOR-CON     sildenafiL 100 MG tablet  Commonly known as: VIAGRA     silver sulfADIAZINE 1% 1 % cream  Commonly known as: SILVADENE     sulfamethoxazole-trimethoprim 400-80mg 400-80 mg per tablet  Commonly known as: BACTRIM,SEPTRA     traMADoL 50 mg tablet  Commonly known as: ULTRAM  Take 1 tablet (50 mg total) by mouth every 8 (eight) hours as needed for Pain.     TRUE METRIX AIR GLUCOSE METER Misc  Generic drug: blood-glucose meter     TRUE METRIX GLUCOSE TEST STRIP Strp  Generic drug: blood sugar diagnostic           * This list has 6 medication(s) that are the same as other medications prescribed for you. Read the directions carefully, and ask your doctor or other care provider to review them with you.                    I spent 35 minutes preparing the discharge including reviewing records from previous encounters, preparation of discharge summary, assessing and final examination of the patient, discharge medicine reconciliation, discussing plan of care, follow up and education and prescriptions.       Pablo Bose  [facility] Hospitalist  12/8/22      "

## 2022-12-15 NOTE — ASSESSMENT & PLAN NOTE
52 year old man with HTN, HLD, T2DM, CKD, CHF presented to OSH 12/3 with RSW. MRI with bilateral infarcts concerning for cardioembolic etiology. LV thrombus seen on RAGHAV. Neuro exam changed 12/8 while not on anticoagulation, found to have L M2 occlusion. Transferred to [facility] [place] evaluation, no intervention as poor ASPECTS score. Admitted to [facility] for hemicrani watch.    - Vascular Neurology following   - SBP goal <200  - q1hr neuro checks   - atorvastatin 40 mg QD   - heparin gtt started 12/10  - PT/OT/SLP consulted  Plan for VPS on Monday

## 2022-12-15 NOTE — ASSESSMENT & PLAN NOTE
53 yo M with CHF who initially presented to OSH with RSW. Did not receive tPA, as he was outside of the window. MRI at that time demonstrated bilateral anterior and posterior circulation strokes concerning for embolic etiology. Over hospital course at OSH, the patient was noted to have been lethargic and aphasic with dysarthria. RAGHAV at OSH with L ventricular thrombus (not on AC). He was noted to have had an acute change in neuro exam on 12/8 at which time he had RSW with global aphasia. NIHSS had been 6 prior to the event and was noted to have been 23 following. CTA demonstrated a L M2 occlusion for which the patient was transferred to [facility] [place] for possible intervention and higher level of care. He was not taken to IR due to poor ASPECTS and was admitted to ICU for close monitoring and hemicrani watch.   -Etiology: likely cardioembolic given LV thrombus      -Pending SD to NPU. Poor exam, patient very lethargic, WD on the right.       Antithrombotics for secondary stroke prevention:  on heparin gtt until swallowing or PEG established, can be switched to DOAC when able    Statins for secondary stroke prevention and hyperlipidemia, if present:   Statins: Atorvastatin- 40 mg daily    Aggressive risk factor modification: HTN, DM, HLD, Diet, Exercise, LV thrombus     Rehab efforts: Rehab; NPO, tube feeds via NGT    Diagnostics ordered/pending: None     VTE prophylaxis: Mechanical prophylaxis: Place SCDs  None: Reason for No Pharmacological VTE Prophylaxis: Currently on anticoagulation     BP parameters: SBP <200

## 2022-12-15 NOTE — SUBJECTIVE & OBJECTIVE
Neurologic Chief Complaint: lethargy, aphasia, dysarthria     Subjective:     Interval History: Patient is seen for follow-up neurological assessment and treatment recommendations:     Pending SD to NPU. Poor exam, patient very lethargic, WD on the right.     HPI, Past Medical, Family, and Social History remains the same as documented in the initial encounter.     Review of Systems   Unable to perform ROS: Other (Aphasia)   Scheduled Meds:   amLODIPine  10 mg Per NG tube Daily    atorvastatin  40 mg Per NG tube Daily    EScitalopram oxalate  20 mg Per NG tube Daily    furosemide  40 mg Per NG tube Daily    insulin aspart U-100  8 Units Subcutaneous 6 times per day    metoprolol tartrate  25 mg Per NG tube BID     Continuous Infusions:   dextrose 10 % in water (D10W)      heparin (porcine) in D5W 15 Units/kg/hr (12/15/22 1500)     PRN Meds:dextrose 10 % in water (D10W), dextrose 10%, dextrose 10%, glucagon (human recombinant), insulin aspart U-100, labetaloL, ondansetron, sodium chloride 0.9%    Objective:     Vital Signs (Most Recent):  Temp: 98.5 °F (36.9 °C) (12/15/22 1502)  Pulse: 89 (12/15/22 1502)  Resp: 15 (12/15/22 1502)  BP: (!) 155/98 (12/15/22 1502)  SpO2: 100 % (12/15/22 1502)  BP Location: Left arm    Vital Signs Range (Last 24H):  Temp:  [98.2 °F (36.8 °C)-98.7 °F (37.1 °C)]   Pulse:  [78-97]   Resp:  [6-22]   BP: (137-179)/()   SpO2:  [97 %-100 %]   BP Location: Left arm    Physical Exam  Vitals and nursing note reviewed.   Constitutional:       General: He is not in acute distress.  HENT:      Head: Normocephalic.      Nose: No rhinorrhea.      Comments: NGT in place  Eyes:      General: No scleral icterus.     Conjunctiva/sclera: Conjunctivae normal.   Cardiovascular:      Rate and Rhythm: Normal rate.   Pulmonary:      Effort: No respiratory distress.   Abdominal:      General: There is no distension.   Musculoskeletal:         General: No deformity.   Skin:     General: Skin is warm and dry.    Neurological:      Mental Status: He is lethargic.      Motor: Weakness present.      Comments: Withdraws on R side to noxious stimuli  Moves left side spontaneously  Severe aphasia        Psychiatric:      Comments: Unable to assess mood and thought content 2/2 level of consciousness       Neurological Exam:   LOC: lethargic  Attention Span: poor  Language: mixed aphasia, intermittently follows some simple commands  Articulation: Nonverbal unable to assess   Orientation: Nonverbal unable to assess   EOM (CN III, IV, VI): Tracks   Motor: L side moves spontaneously and antigravity, R side withdraws from pain   Sensation: Withdraws right side to noxious stimuli      Laboratory:  CMP:   Recent Labs   Lab 12/15/22  0016 12/15/22  0943   CALCIUM 9.3  --    ALBUMIN 2.2*  --    PROT 6.4  --    *  --    K 3.2* 3.2*   CO2 27  --    *  --    BUN 50*  --    CREATININE 4.0*  --    ALKPHOS 120  --    ALT 67*  --    AST 72*  --    BILITOT 0.4  --        BMP:   Recent Labs   Lab 12/15/22  0016 12/15/22  0943   *  --    K 3.2* 3.2*   *  --    CO2 27  --    BUN 50*  --    CREATININE 4.0*  --    CALCIUM 9.3  --        CBC:   Recent Labs   Lab 12/15/22  0016   WBC 10.78   RBC 4.63   HGB 12.4*   HCT 40.6      MCV 88   MCH 26.8*   MCHC 30.5*       Lipid Panel:   No results for input(s): CHOL, LDLCALC, HDL, TRIG in the last 168 hours.    Coagulation:   Recent Labs   Lab 12/10/22  1309 12/10/22  1956 12/15/22  0943   INR 1.1  --   --    APTT 23.3   < > 36.9*    < > = values in this interval not displayed.       Platelet Aggregation Study: No results for input(s): PLTAGG, PLTAGINTERP, PLTAGREGLACO, ADPPLTAGGREG in the last 168 hours.  Hgb A1C:   Recent Labs   Lab 12/08/22  1729   HGBA1C 9.1*       TSH:   Recent Labs   Lab 12/08/22  1729   TSH 1.375         Diagnostic Results     Brain/Vessel Imaging   [facility] 12/13/22  -Evolving large left MCA infarct with stable mass effect and petechial hemorrhagic  conversion.   -No definite new hemorrhage or significant new abnormal parenchymal attenuation   -Separate areas of infarction involving the right parietal lobe and left cerebellum not well seen with CT technique.   -Clinical correlation and continued follow-up advised     [facility] 12/11/22   Grossly stable evolving large left MCA distribution infarct and smaller infarct in the right frontal lobe with possible trace hemorrhagic conversion. Persistent mass effect with sulcal effacement and slight mass effect on the left lateral ventricle. No new or worsening intracranial hemorrhage and no worsening of minimal rightward midline shift.    [facility] 12/9/22   Evolving no enlarged left MCA territory infarction with hypoattenuation and sulcal effacement. Intermediate density along the left basal ganglia compatible with known hemorrhagic conversion. Smaller area of infarction in the right cerebral hemisphere not well seen. Evolving mass effect and edema associated with the left cerebral hemispheric infarction with slight compression of the left lateral ventricle and trace 1 mm of rightward midline shift. No evidence for hydrocephalus    MRI Brain WO Contrast 12/8/22    Exam limited by patient motion artifact. Evolving left MCA territory infarct.  Additional foci of diffusion restriction in the left cerebral consistent with recent infarct.  Interval increase susceptibility artifact in the areas of diffusion restriction consistent with hemorrhagic conversion of recent infarcts.  No hydrocephalus or significant midline shift.      [facility] 12/8/22 16:29   -Evolving large left MCA territory infarction similar to recent CT though increased in size compared to prior MRI concerning for evolving recent to subacute and acute infarcts.  Localized mass effect without significant midline shift or hydrocephalus.  There is evolving recent to subacute age right posterior frontal infarction similar to prior MRI allowing for differences in  technique  -There is subtle hyperdensity along the left basal ganglia posteriorly and along the right posterior frontal cortex which may represent enhancing subacute age components of infarction with petechial hemorrhage felt less likely but cannot be entirely excluded with limitation secondary to recirculation contrast related to recent CTA performed at outside institution.  -Evolving mass effect most pronounced associated with the left MCA territory infarction with sulcal effacement without significant midline shift or hydrocephalus.    [facility] 12/8/22 12:11 (CTA H/N)   1. Left diencephalic hemisphere demonstrates evidence of an acute infarct of left basal ganglia and left caudate lobe.  2. Occlusion of the posterior division of the left M2 branch at the takeoff of the M1 vessel with patency involving the anterior division of the left M2 segment.    [facility] 12/7/22   1.  Moderate size subacute infarction left anterior basal ganglia and anterior left internal capsule appears mildly larger and better well defined compared to CT from 12/3/2020. There is currently greater mass effect on the anterior horn of the left lateral ventricle. There is no associated bleed or midline shift.  2.  Recent MRI demonstrated additional punctate acute infarctions in the left frontal lobe and a mild size acute infarction right centrum semiovale. These are less obvious on CT. No associated bleed.  3.  Additional chronic periventricular white matter hypodensities.    MRI Brain WO contrast 12/3/22   Multifocal areas of restricted diffusion are felt to reflect acute infarcts.    [facility] 12/3/22   Loss of gray-white matter distinction in involving the left basal ganglia could reflect changes of chronic small vessel ischemic disease versus cytotoxic edema from acute infarct.     Carotid US Bilateral 12/3/22   1. Carotid intimal hyperplasia, with no findings of hemodynamically significant carotid arterial stenosis or vascular occlusion.  2. Patent  vertebral arteries with antegrade flow bilaterally.      Cardiac Imaging   [facility] 12/4/22   The left ventricle is small with moderately decreased systolic function.  The estimated ejection fraction is 38%.  Left ventricular diastolic dysfunction.  There are segmental left ventricular wall motion abnormalities.  No spontaneous echo contrast. A moderate protruding layered left ventricular thrombus is present. The thrombus is fixed and located in the apex.  Normal right ventricular size.  Mild left atrial enlargement.  Mild right atrial enlargement.  Mild aortic regurgitation.  No interatrial septal defect present.  Normal appearing left atrial appendage. No thrombus is present in the appendage. SB occluder is absent. Abnormal appendage velocities.  Mild mitral regurgitation.  Agitated saline contrast study did not show any right to left shunt    TTE 11/14/22   The left ventricle is normal in size with mildly decreased systolic function.  The estimated ejection fraction is 40%.  Grade III left ventricular diastolic dysfunction.  Small posterior pericardial effusion.  There is mild left ventricular global hypokinesis.  Normal right ventricular size with normal right ventricular systolic function.  Severe left atrial enlargement.  Moderate right atrial enlargement.  Mild pulmonic regurgitation.  Mild to moderate tricuspid regurgitation.  Mild-to-moderate aortic regurgitation.  Intermediate central venous pressure (8 mmHg).  The estimated PA systolic pressure is 51 mmHg.  There is pulmonary hypertension.

## 2022-12-15 NOTE — PT/OT/SLP PROGRESS
Occupational Therapy   Treatment    Name: Spencer Burton Jr.  MRN: [id number]  Admitting Diagnosis:  Embolic stroke involving left middle cerebral artery       Recommendations:     Discharge Recommendations: rehabilitation facility  Discharge Equipment Recommendations:  hospital bed, lift device  Barriers to discharge:  Decreased caregiver support    Assessment:     Spencer Burton Jr. is a 52 y.o. male with a medical diagnosis of Embolic stroke involving left middle cerebral artery.  He presents with eyes closed, following no commands. Performance deficits affecting function are weakness, gait instability, decreased upper extremity function, impaired endurance, impaired balance, decreased lower extremity function, impaired self care skills, impaired cognition, impaired functional mobility, decreased coordination, decreased safety awareness. Gentle PROM to BUE with shoulder flexion to 90. Mitten on LUE. Patient opened eyes  and looked to therapist during gentle neck massage and rom. He nodded appropriately to question for several minutes, no right eye gaze noted, no right UE spontaneous movement noted.     Rehab Prognosis:  Good; patient would benefit from acute skilled OT services to address these deficits and reach maximum level of function.       Plan:     Patient to be seen 4 x/week to address the above listed problems via self-care/home management, therapeutic activities, therapeutic exercises, neuromuscular re-education  Plan of Care Expires: 01/08/23  Plan of Care Reviewed with: patient    Subjective     Pain/Comfort:  Pain Rating 1:  (no grimace or withdrawal noted)    Objective:     Communicated with: RN prior to session.  Patient found HOB elevated with bed alarm, NG tube, SCD, telemetry, restraints, pulse ox (continuous), peripheral IV, blood pressure cuff, Condom Catheter upon OT entry to room.    General Precautions: Standard, aphasia, aspiration, fall, NPO    Orthopedic Precautions:N/A  Braces:  N/A  Respiratory Status: Room air     Occupational Performance:     [facility] 6 Click ADL: 6    Treatment & Education:  Educated on progress and POC, oriented to day, month, location.     Patient left HOB elevated with all lines intact, call button in reach, bed alarm on, and RN notified    GOALS:   Multidisciplinary Problems       Occupational Therapy Goals          Problem: Occupational Therapy    Goal Priority Disciplines Outcome Interventions   Occupational Therapy Goal     OT, PT/OT Ongoing, Progressing    Description: Goals to be met by: 12/23/2022     Patient will increase functional independence with ADLs by performing:    UE Dressing with Minimal Assistance.  LE Dressing with Moderate Assistance.  Grooming while EOB with Minimal Assistance.  Toileting from bedside commode with Maximum Assistance for hygiene and clothing management.   Supine to sit with Moderate Assistance.  Stand pivot transfers with Maximum Assistance using LRAD as needed.  Toilet transfer to bedside commode with Maximum Assistance using LRAD as needed.                         Time Tracking:     OT Date of Treatment: 12/15/22  OT Start Time: 1534  OT Stop Time: 1557  OT Total Time (min): 23 min    Billable Minutes:Therapeutic Activity 23    OT/PRINCESS: OT          12/15/2022

## 2022-12-16 LAB
ALBUMIN SERPL BCP-MCNC: 2.3 G/DL (ref 3.5–5.2)
ALP SERPL-CCNC: 137 U/L (ref 55–135)
ALT SERPL W/O P-5'-P-CCNC: 89 U/L (ref 10–44)
ANION GAP SERPL CALC-SCNC: 8 MMOL/L (ref 8–16)
APTT BLDCRRT: 51 SEC (ref 21–32)
APTT BLDCRRT: 57.7 SEC (ref 21–32)
APTT BLDCRRT: 58.1 SEC (ref 21–32)
AST SERPL-CCNC: 85 U/L (ref 10–40)
BASOPHILS # BLD AUTO: 0.07 K/UL (ref 0–0.2)
BASOPHILS NFR BLD: 0.6 % (ref 0–1.9)
BILIRUB SERPL-MCNC: 0.4 MG/DL (ref 0.1–1)
BUN SERPL-MCNC: 47 MG/DL (ref 6–20)
CALCIUM SERPL-MCNC: 9.3 MG/DL (ref 8.7–10.5)
CHLORIDE SERPL-SCNC: 110 MMOL/L (ref 95–110)
CO2 SERPL-SCNC: 29 MMOL/L (ref 23–29)
CREAT SERPL-MCNC: 4.1 MG/DL (ref 0.5–1.4)
DIFFERENTIAL METHOD: ABNORMAL
EOSINOPHIL # BLD AUTO: 0.3 K/UL (ref 0–0.5)
EOSINOPHIL NFR BLD: 2.3 % (ref 0–8)
ERYTHROCYTE [DISTWIDTH] IN BLOOD BY AUTOMATED COUNT: 14.7 % (ref 11.5–14.5)
EST. GFR  (NO RACE VARIABLE): 16.9 ML/MIN/1.73 M^2
GLUCOSE SERPL-MCNC: 202 MG/DL (ref 70–110)
HCT VFR BLD AUTO: 41.5 % (ref 40–54)
HGB BLD-MCNC: 12.8 G/DL (ref 14–18)
IMM GRANULOCYTES # BLD AUTO: 0.08 K/UL (ref 0–0.04)
IMM GRANULOCYTES NFR BLD AUTO: 0.7 % (ref 0–0.5)
LYMPHOCYTES # BLD AUTO: 1.4 K/UL (ref 1–4.8)
LYMPHOCYTES NFR BLD: 12.2 % (ref 18–48)
MAGNESIUM SERPL-MCNC: 2.3 MG/DL (ref 1.6–2.6)
MCH RBC QN AUTO: 27.2 PG (ref 27–31)
MCHC RBC AUTO-ENTMCNC: 30.8 G/DL (ref 32–36)
MCV RBC AUTO: 88 FL (ref 82–98)
MONOCYTES # BLD AUTO: 0.8 K/UL (ref 0.3–1)
MONOCYTES NFR BLD: 6.9 % (ref 4–15)
NEUTROPHILS # BLD AUTO: 8.6 K/UL (ref 1.8–7.7)
NEUTROPHILS NFR BLD: 77.3 % (ref 38–73)
NRBC BLD-RTO: 0 /100 WBC
PHOSPHATE SERPL-MCNC: 2.7 MG/DL (ref 2.7–4.5)
PLATELET # BLD AUTO: 263 K/UL (ref 150–450)
PMV BLD AUTO: 13.8 FL (ref 9.2–12.9)
POCT GLUCOSE: 123 MG/DL (ref 70–110)
POCT GLUCOSE: 146 MG/DL (ref 70–110)
POCT GLUCOSE: 154 MG/DL (ref 70–110)
POCT GLUCOSE: 209 MG/DL (ref 70–110)
POTASSIUM SERPL-SCNC: 3.2 MMOL/L (ref 3.5–5.1)
PROT SERPL-MCNC: 6.5 G/DL (ref 6–8.4)
RBC # BLD AUTO: 4.71 M/UL (ref 4.6–6.2)
SODIUM SERPL-SCNC: 147 MMOL/L (ref 136–145)
WBC # BLD AUTO: 11.07 K/UL (ref 3.9–12.7)

## 2022-12-16 PROCEDURE — 99233 SBSQ HOSP IP/OBS HIGH 50: CPT | Mod: ,,, | Performed by: STUDENT IN AN ORGANIZED HEALTH CARE EDUCATION/TRAINING PROGRAM

## 2022-12-16 PROCEDURE — 63600175 PHARM REV CODE 636 W HCPCS: Performed by: NURSE PRACTITIONER

## 2022-12-16 PROCEDURE — 85730 THROMBOPLASTIN TIME PARTIAL: CPT | Mod: 91 | Performed by: STUDENT IN AN ORGANIZED HEALTH CARE EDUCATION/TRAINING PROGRAM

## 2022-12-16 PROCEDURE — 92526 ORAL FUNCTION THERAPY: CPT

## 2022-12-16 PROCEDURE — 92507 TX SP LANG VOICE COMM INDIV: CPT

## 2022-12-16 PROCEDURE — 80053 COMPREHEN METABOLIC PANEL: CPT | Performed by: PHYSICIAN ASSISTANT

## 2022-12-16 PROCEDURE — 36415 COLL VENOUS BLD VENIPUNCTURE: CPT | Performed by: STUDENT IN AN ORGANIZED HEALTH CARE EDUCATION/TRAINING PROGRAM

## 2022-12-16 PROCEDURE — 25000003 PHARM REV CODE 250: Performed by: PSYCHIATRY & NEUROLOGY

## 2022-12-16 PROCEDURE — 85730 THROMBOPLASTIN TIME PARTIAL: CPT | Performed by: NURSE PRACTITIONER

## 2022-12-16 PROCEDURE — 25000003 PHARM REV CODE 250: Performed by: NURSE PRACTITIONER

## 2022-12-16 PROCEDURE — 11000001 HC ACUTE MED/SURG PRIVATE ROOM

## 2022-12-16 PROCEDURE — 84100 ASSAY OF PHOSPHORUS: CPT | Performed by: PHYSICIAN ASSISTANT

## 2022-12-16 PROCEDURE — 63600175 PHARM REV CODE 636 W HCPCS

## 2022-12-16 PROCEDURE — 85025 COMPLETE CBC W/AUTO DIFF WBC: CPT | Performed by: NURSE PRACTITIONER

## 2022-12-16 PROCEDURE — 36415 COLL VENOUS BLD VENIPUNCTURE: CPT | Performed by: PHYSICIAN ASSISTANT

## 2022-12-16 PROCEDURE — 83735 ASSAY OF MAGNESIUM: CPT | Performed by: PHYSICIAN ASSISTANT

## 2022-12-16 PROCEDURE — 36415 COLL VENOUS BLD VENIPUNCTURE: CPT | Performed by: PSYCHIATRY & NEUROLOGY

## 2022-12-16 PROCEDURE — 99233 PR SUBSEQUENT HOSPITAL CARE,LEVL III: ICD-10-PCS | Mod: ,,, | Performed by: STUDENT IN AN ORGANIZED HEALTH CARE EDUCATION/TRAINING PROGRAM

## 2022-12-16 PROCEDURE — 85730 THROMBOPLASTIN TIME PARTIAL: CPT | Mod: 91 | Performed by: PSYCHIATRY & NEUROLOGY

## 2022-12-16 PROCEDURE — 97530 THERAPEUTIC ACTIVITIES: CPT

## 2022-12-16 RX ORDER — POTASSIUM CHLORIDE 7.45 MG/ML
10 INJECTION INTRAVENOUS ONCE
Status: COMPLETED | OUTPATIENT
Start: 2022-12-16 | End: 2022-12-16

## 2022-12-16 RX ADMIN — FUROSEMIDE 40 MG: 40 TABLET ORAL at 08:12

## 2022-12-16 RX ADMIN — INSULIN ASPART 8 UNITS: 100 INJECTION, SOLUTION INTRAVENOUS; SUBCUTANEOUS at 12:12

## 2022-12-16 RX ADMIN — INSULIN ASPART 8 UNITS: 100 INJECTION, SOLUTION INTRAVENOUS; SUBCUTANEOUS at 07:12

## 2022-12-16 RX ADMIN — POTASSIUM CHLORIDE 10 MEQ: 7.46 INJECTION, SOLUTION INTRAVENOUS at 10:12

## 2022-12-16 RX ADMIN — INSULIN ASPART 8 UNITS: 100 INJECTION, SOLUTION INTRAVENOUS; SUBCUTANEOUS at 04:12

## 2022-12-16 RX ADMIN — INSULIN ASPART 8 UNITS: 100 INJECTION, SOLUTION INTRAVENOUS; SUBCUTANEOUS at 08:12

## 2022-12-16 RX ADMIN — AMLODIPINE BESYLATE 10 MG: 10 TABLET ORAL at 08:12

## 2022-12-16 RX ADMIN — ESCITALOPRAM OXALATE 20 MG: 20 TABLET ORAL at 08:12

## 2022-12-16 RX ADMIN — HEPARIN SODIUM 16 UNITS/KG/HR: 10000 INJECTION, SOLUTION INTRAVENOUS at 04:12

## 2022-12-16 RX ADMIN — INSULIN ASPART 8 UNITS: 100 INJECTION, SOLUTION INTRAVENOUS; SUBCUTANEOUS at 05:12

## 2022-12-16 RX ADMIN — ATORVASTATIN CALCIUM 40 MG: 40 TABLET, FILM COATED ORAL at 08:12

## 2022-12-16 RX ADMIN — METOPROLOL TARTRATE 25 MG: 25 TABLET, FILM COATED ORAL at 08:12

## 2022-12-16 RX ADMIN — INSULIN ASPART 2 UNITS: 100 INJECTION, SOLUTION INTRAVENOUS; SUBCUTANEOUS at 07:12

## 2022-12-16 RX ADMIN — INSULIN ASPART 8 UNITS: 100 INJECTION, SOLUTION INTRAVENOUS; SUBCUTANEOUS at 01:12

## 2022-12-16 NOTE — PHYSICIAN QUERY
"PT Name: Spencer Burton Jr.  MR #: [id number]     Documentation Clarification      CDS/: Samia Roberts RN       Contact information: [email or web address]    This form is a permanent document in the medical record.     Query Date: December 16, 2022    By submitting this query, we are merely seeking further clarification of documentation. Please utilize your independent clinical judgment when addressing the question(s) below.    The Medical Record reflects the following:    Supporting Clinical Findings Location in Medical Record    "slight compression of the left lateral ventricle and trace 1 mm of rightward midline shift" 12/10 CT Head without Contrast                                                                            Provider, please provide diagnosis or diagnoses associated with above clinical findings.    [  1 ] Brain Compression   [2]  No Brain Compression   [  3 ] Other (please specify): ______   [ 4 ] Clinically undetermined                                                                                                                       "

## 2022-12-16 NOTE — ASSESSMENT & PLAN NOTE
51 yo M with CHF who initially presented to OSH with RSW. Did not receive tPA, as he was outside of the window. MRI at that time demonstrated bilateral anterior and posterior circulation strokes concerning for embolic etiology. Over hospital course at OSH, the patient was noted to have been lethargic and aphasic with dysarthria. RAGHAV at OSH with L ventricular thrombus (not on AC). He was noted to have had an acute change in neuro exam on 12/8 at which time he had RSW with global aphasia. NIHSS had been 6 prior to the event and was noted to have been 23 following. CTA demonstrated a L M2 occlusion for which the patient was transferred to [facility] for possible intervention and higher level of care. He was not taken to IR due to poor ASPECTS and was admitted to ICU for close monitoring and hemicrani watch.   -Etiology: likely cardioembolic given LV thrombus      Patient stepped down overnight to NPU. Exam stable. Re-evaluated by speech this afternoon, still recommending NPO. Will need to discuss nutrition going forward (I.e. need for PEG). Recommendations for IPR.     Antithrombotics for secondary stroke prevention:  on heparin gtt until swallowing or PEG established, can be switched to DOAC when able    Statins for secondary stroke prevention and hyperlipidemia, if present:   Statins: Atorvastatin- 40 mg daily    Aggressive risk factor modification: HTN, DM, HLD, Diet, Exercise, LV thrombus     Rehab efforts: Rehab; NPO, tube feeds via NGT    Diagnostics ordered/pending: None     VTE prophylaxis: Mechanical prophylaxis: Place SCDs  None: Reason for No Pharmacological VTE Prophylaxis: Currently on anticoagulation     BP parameters: SBP <200

## 2022-12-16 NOTE — PLAN OF CARE
Problem: Adult Inpatient Plan of Care  Goal: Plan of Care Review  Outcome: Ongoing, Progressing  Flowsheets (Taken 12/16/2022 1651)  Plan of Care Reviewed With: patient  Goal: Patient-Specific Goal (Individualized)  Outcome: Ongoing, Progressing  Flowsheets (Taken 12/16/2022 1651)  Anxieties, Fears or Concerns: MATTHEW  Individualized Care Needs: none  Patient-Specific Goals (Include Timeframe): MATTHEW       POC reviewed with the patient and they verbalized understanding. All comments and concerns addressed. Bed locked in lowest position with bed alarm set, call light within reach. Safety precautions maintained. VSS, see flowsheets. No events this shift.

## 2022-12-16 NOTE — NURSING
Patient is awake and alert.Arrived in room via bed.No signs of distress or discomfort noted.Emergency equipment  at bedside.Bed is in low position, bed rails upX2, bed alarm on.Will continue to monitor.

## 2022-12-16 NOTE — PLAN OF CARE
ABDIEL received [facility] from [facility].  Referral had been sent to them when pateint in ICU.  They have reviewed and will be coming to see the patient today at bedside.  However, they need to see what the plan is for feeding, whether he can be PO or get a peg and they need to see some therapy progress.  ABDIEL staffed in rounds and they are planning on a PEG placement pending swallow eval.  [facility] will continue to follow progress and anticipate d/c early to mid next week.  ABDIEL will continue to follow.     2:48 PM  Sebastian from [facility] came by to complete bedside eval.  She stated that the patient seems pretty weak and that she will need to see an increase in therapy and also addressing his diet (peg tube?).  She spoke with the patient's mother and [facility] is their first choice and patient has been there before.  Patient now yet med cleared.  ABDIEL will continue to follow.     Alba Lai, CIARAN  [facility]  [phone]

## 2022-12-16 NOTE — SUBJECTIVE & OBJECTIVE
Neurologic Chief Complaint: lethargy, aphasia, dysarthria     Subjective:     Interval History: Patient is seen for follow-up neurological assessment and treatment recommendations:     Patient stepped down overnight to NPU. Exam stable. Re-evaluated by speech this afternoon, still recommending NPO. Will need to discuss nutrition going forward (I.e. need for PEG). Recommendations for IPR.     HPI, Past Medical, Family, and Social History remains the same as documented in the initial encounter.     Review of Systems   Unable to perform ROS: Other (Aphasia)   Scheduled Meds:   amLODIPine  10 mg Per NG tube Daily    atorvastatin  40 mg Per NG tube Daily    EScitalopram oxalate  20 mg Per NG tube Daily    furosemide  40 mg Per NG tube Daily    insulin aspart U-100  8 Units Subcutaneous 6 times per day    metoprolol tartrate  25 mg Per NG tube BID     Continuous Infusions:   dextrose 10 % in water (D10W)      heparin (porcine) in D5W 14 Units/kg/hr (12/16/22 0515)     PRN Meds:dextrose 10 % in water (D10W), dextrose 10%, dextrose 10%, glucagon (human recombinant), insulin aspart U-100, labetaloL, ondansetron, sodium chloride 0.9%    Objective:     Vital Signs (Most Recent):  Temp: 97.4 °F (36.3 °C) (12/16/22 1559)  Pulse: 81 (12/16/22 1600)  Resp: 17 (12/16/22 1559)  BP: (!) 179/107 (12/16/22 1559)  SpO2: 95 % (12/16/22 1559)  BP Location: Left arm    Vital Signs Range (Last 24H):  Temp:  [96.3 °F (35.7 °C)-98.7 °F (37.1 °C)]   Pulse:  [81-91]   Resp:  [15-20]   BP: (154-179)/()   SpO2:  [92 %-100 %]   BP Location: Left arm    Physical Exam  Vitals and nursing note reviewed.   Constitutional:       General: He is not in acute distress.  HENT:      Head: Normocephalic.      Nose: No rhinorrhea.      Comments: NGT in place  Eyes:      General: No scleral icterus.     Conjunctiva/sclera: Conjunctivae normal.   Cardiovascular:      Rate and Rhythm: Normal rate.   Pulmonary:      Effort: No respiratory distress.    Abdominal:      General: There is no distension.   Musculoskeletal:         General: No deformity.   Skin:     General: Skin is warm and dry.   Neurological:      Mental Status: He is lethargic.      Motor: Weakness present.      Comments: Withdraws on R side to noxious stimuli  Moves left side spontaneously  Severe aphasia        Psychiatric:      Comments: Unable to assess mood and thought content 2/2 level of consciousness       Neurological Exam:   LOC: lethargic  Attention Span: poor  Language: mixed aphasia, intermittently follows some simple commands  Articulation: Nonverbal unable to assess   Orientation: Nonverbal unable to assess   EOM (CN III, IV, VI): Tracks   Motor: L side moves spontaneously and antigravity, R side withdraws from pain   Sensation: Withdraws right side to noxious stimuli      Laboratory:  CMP:   Recent Labs   Lab 12/16/22  0358   CALCIUM 9.3   ALBUMIN 2.3*   PROT 6.5   *   K 3.2*   CO2 29      BUN 47*   CREATININE 4.1*   ALKPHOS 137*   ALT 89*   AST 85*   BILITOT 0.4       BMP:   Recent Labs   Lab 12/16/22  0358   *   K 3.2*      CO2 29   BUN 47*   CREATININE 4.1*   CALCIUM 9.3       CBC:   Recent Labs   Lab 12/16/22  0358   WBC 11.07   RBC 4.71   HGB 12.8*   HCT 41.5      MCV 88   MCH 27.2   MCHC 30.8*       Lipid Panel:   No results for input(s): CHOL, LDLCALC, HDL, TRIG in the last 168 hours.    Coagulation:   Recent Labs   Lab 12/10/22  1309 12/10/22  1956 12/16/22  1103   INR 1.1  --   --    APTT 23.3   < > 51.0*    < > = values in this interval not displayed.       Platelet Aggregation Study: No results for input(s): PLTAGG, PLTAGINTERP, PLTAGREGLACO, ADPPLTAGGREG in the last 168 hours.  Hgb A1C:   No results for input(s): HGBA1C in the last 168 hours.    TSH:   No results for input(s): TSH in the last 168 hours.      Diagnostic Results     Brain/Vessel Imaging   [facility] 12/13/22  -Evolving large left MCA infarct with stable mass effect and petechial  hemorrhagic conversion.   -No definite new hemorrhage or significant new abnormal parenchymal attenuation   -Separate areas of infarction involving the right parietal lobe and left cerebellum not well seen with CT technique.   -Clinical correlation and continued follow-up advised     [facility] 12/11/22   Grossly stable evolving large left MCA distribution infarct and smaller infarct in the right frontal lobe with possible trace hemorrhagic conversion. Persistent mass effect with sulcal effacement and slight mass effect on the left lateral ventricle. No new or worsening intracranial hemorrhage and no worsening of minimal rightward midline shift.    [facility] 12/9/22   Evolving no enlarged left MCA territory infarction with hypoattenuation and sulcal effacement. Intermediate density along the left basal ganglia compatible with known hemorrhagic conversion. Smaller area of infarction in the right cerebral hemisphere not well seen. Evolving mass effect and edema associated with the left cerebral hemispheric infarction with slight compression of the left lateral ventricle and trace 1 mm of rightward midline shift. No evidence for hydrocephalus    MRI Brain WO Contrast 12/8/22    Exam limited by patient motion artifact. Evolving left MCA territory infarct.  Additional foci of diffusion restriction in the left cerebral consistent with recent infarct.  Interval increase susceptibility artifact in the areas of diffusion restriction consistent with hemorrhagic conversion of recent infarcts.  No hydrocephalus or significant midline shift.      [facility] 12/8/22 16:29   -Evolving large left MCA territory infarction similar to recent CT though increased in size compared to prior MRI concerning for evolving recent to subacute and acute infarcts.  Localized mass effect without significant midline shift or hydrocephalus.  There is evolving recent to subacute age right posterior frontal infarction similar to prior MRI allowing for differences in  technique  -There is subtle hyperdensity along the left basal ganglia posteriorly and along the right posterior frontal cortex which may represent enhancing subacute age components of infarction with petechial hemorrhage felt less likely but cannot be entirely excluded with limitation secondary to recirculation contrast related to recent CTA performed at outside institution.  -Evolving mass effect most pronounced associated with the left MCA territory infarction with sulcal effacement without significant midline shift or hydrocephalus.    [facility] 12/8/22 12:11 (CTA H/N)   1. Left diencephalic hemisphere demonstrates evidence of an acute infarct of left basal ganglia and left caudate lobe.  2. Occlusion of the posterior division of the left M2 branch at the takeoff of the M1 vessel with patency involving the anterior division of the left M2 segment.    [facility] 12/7/22   1.  Moderate size subacute infarction left anterior basal ganglia and anterior left internal capsule appears mildly larger and better well defined compared to CT from 12/3/2020. There is currently greater mass effect on the anterior horn of the left lateral ventricle. There is no associated bleed or midline shift.  2.  Recent MRI demonstrated additional punctate acute infarctions in the left frontal lobe and a mild size acute infarction right centrum semiovale. These are less obvious on CT. No associated bleed.  3.  Additional chronic periventricular white matter hypodensities.    MRI Brain WO contrast 12/3/22   Multifocal areas of restricted diffusion are felt to reflect acute infarcts.    [facility] 12/3/22   Loss of gray-white matter distinction in involving the left basal ganglia could reflect changes of chronic small vessel ischemic disease versus cytotoxic edema from acute infarct.     Carotid US Bilateral 12/3/22   1. Carotid intimal hyperplasia, with no findings of hemodynamically significant carotid arterial stenosis or vascular occlusion.  2. Patent  vertebral arteries with antegrade flow bilaterally.      Cardiac Imaging   [facility] 12/4/22   The left ventricle is small with moderately decreased systolic function.  The estimated ejection fraction is 38%.  Left ventricular diastolic dysfunction.  There are segmental left ventricular wall motion abnormalities.  No spontaneous echo contrast. A moderate protruding layered left ventricular thrombus is present. The thrombus is fixed and located in the apex.  Normal right ventricular size.  Mild left atrial enlargement.  Mild right atrial enlargement.  Mild aortic regurgitation.  No interatrial septal defect present.  Normal appearing left atrial appendage. No thrombus is present in the appendage. SB occluder is absent. Abnormal appendage velocities.  Mild mitral regurgitation.  Agitated saline contrast study did not show any right to left shunt    TTE 11/14/22   The left ventricle is normal in size with mildly decreased systolic function.  The estimated ejection fraction is 40%.  Grade III left ventricular diastolic dysfunction.  Small posterior pericardial effusion.  There is mild left ventricular global hypokinesis.  Normal right ventricular size with normal right ventricular systolic function.  Severe left atrial enlargement.  Moderate right atrial enlargement.  Mild pulmonic regurgitation.  Mild to moderate tricuspid regurgitation.  Mild-to-moderate aortic regurgitation.  Intermediate central venous pressure (8 mmHg).  The estimated PA systolic pressure is 51 mmHg.  There is pulmonary hypertension.

## 2022-12-16 NOTE — PHYSICIAN QUERY
PT Name: Spencer Burton Jr.  MR #: [id number]     DOCUMENTATION CLARIFICATION     CDS/: Samia Roberts RN            Contact information: [email or web address]  This form is a permanent document in the medical record.    Query Date: December 16, 2022    By submitting this query, we are merely seeking further clarification of documentation.  Please utilize your independent clinical judgment when addressing the question(s) below.    The Medical Record contains the following:  Indicators Supporting Clinical Findings Location in Medical Record    Decreased LOC, neurological deficits Lethargy, global aphasia, dysarthria, drowsy Vascular Neurology Consult 12/8    [place] Coma Scale (GCS) E4 V2 M5 H&P 12/8    Traumatic Injury      Acute/Chronic Conditions Hypertension, Acute LMCA stroke, Cytotoxic brain edema,  Vascular Neurology 12/10    Radiology      Treatment (i.e. IV Steroids, Mannitol, Surgery)      Other [facility] - 23, Hemicrani watch   CT - slight compression of the left lateral ventricle and trace 1 mm of rightward midline shift   Q 1hr ICU monitoring for worsening edema  Weaning Salt tabs Vascular Neurology Consult 12/8  Neurology PN 12/10, CT 12/10    Vascular Neurology PN 12/9  Neurocritical care PN 12/13       Provider, please specify diagnosis or diagnoses associated with above clinical findings.   [   x] Non-Traumatic Brain Compression    [   ] Other neurological diagnosis (please specify):________   [  ] Clinically Undetermined       Please document in your progress notes daily for the duration of treatment until resolved and include in your discharge summary.    Form No. [id number]

## 2022-12-16 NOTE — PLAN OF CARE
Problem: Physical Therapy  Goal: Physical Therapy Goal  Description: Goals to be met by: 22    Patient will increase functional independence with mobility by performin. Supine to sit with Maximum Assistance  2. Sit to supine with Maximum Assistance  3. Sit to stand transfer with Maximum Assistance  4. Bed to chair transfer with Maximum Assistance using squat pivot technique.  5. Gait  x 5 feet with Moderate Assistance using LRAD.   6. Sitting at edge of bed x8 minutes with Contact Guard Assistance with DYLAN UE support.    Outcome: Ongoing, Progressing   Pt's goals remain appropriate and pt will continue to benefit from skilled PT services to work towards improved functional mobility including: bed mobility, transfers, sitting balance, and gait.   2022

## 2022-12-16 NOTE — PROGRESS NOTES
[facility]lices [facility] - Neurosurgery ([facility])  Vascular Neurology  Comprehensive Stroke Center  Progress Note    Assessment/Plan:     * Embolic stroke involving left middle cerebral artery  53 yo M with CHF who initially presented to OSH with RSW. Did not receive tPA, as he was outside of the window. MRI at that time demonstrated bilateral anterior and posterior circulation strokes concerning for embolic etiology. Over hospital course at OSH, the patient was noted to have been lethargic and aphasic with dysarthria. RAGHAV at OSH with L ventricular thrombus (not on AC). He was noted to have had an acute change in neuro exam on 12/8 at which time he had RSW with global aphasia. NIHSS had been 6 prior to the event and was noted to have been 23 following. CTA demonstrated a L M2 occlusion for which the patient was transferred to [facility] [place] for possible intervention and higher level of care. He was not taken to IR due to poor ASPECTS and was admitted to ICU for close monitoring and hemicrani watch.   -Etiology: likely cardioembolic given LV thrombus      Patient stepped down overnight to NPU. Exam stable. Re-evaluated by speech this afternoon, still recommending NPO. Will need to discuss nutrition going forward (I.e. need for PEG). Recommendations for IPR.     Antithrombotics for secondary stroke prevention:  on heparin gtt until swallowing or PEG established, can be switched to DOAC when able    Statins for secondary stroke prevention and hyperlipidemia, if present:   Statins: Atorvastatin- 40 mg daily    Aggressive risk factor modification: HTN, DM, HLD, Diet, Exercise, LV thrombus     Rehab efforts: Rehab; NPO, tube feeds via NGT    Diagnostics ordered/pending: None     VTE prophylaxis: Mechanical prophylaxis: Place SCDs  None: Reason for No Pharmacological VTE Prophylaxis: Currently on anticoagulation     BP parameters: SBP <200      Oropharyngeal dysphagia  Due to stroke  SLP following  Currently NPO with NGT in place for tube feeds,  will likely need PEG    JR on CKD  See CKD    Hyperlipidemia associated with type 2 diabetes mellitus  Stroke RF   . 8, goal <70  Continue atorvastatin 40mg daily    CHF (congestive heart failure)  Stroke risk factor  RAGHAV with EF 38% and segmental LV wall motion abnormalities  Strict I/Os  On lasix 40    Acute cystitis without hematuria  Klebsiella pneumoniae on cx, treated with ceftriaxone    Debility  2/2 stroke   OT and PT to evaluate   Therapy recommending IPR     Cytotoxic brain edema  -Noted on imaging in the area of the L MCA territory. Will continue to monitor q1h while in ICU and repeat CTH PRN for acute neuro exam changes that could indicate worsening/expansion of edema.   -NSGY consulted for hemicrani watch due to malignant MCA, no acute intervention recommended as serial imaging remains stable    LV (left ventricular) mural thrombus  Stroke RF  Continue heparin gtt as patient NPO and will likely PEG placement, switch to DOAC when able    Stage 4 chronic kidney disease  Cr 4.2 and GFR 16.4 today   Avoid nephrotoxic medications and renally dose adjust when appropriate   Monitor I/Os and daily kidney function labs    Type 2 diabetes mellitus, with long-term current use of insulin  Stroke RF  A1c 9.1, consider nutrition consult and DM education  BG goal while inpatient 140-180  Currently on Aspart 8units q4h + aspart 1-10U q4h PRN    Essential hypertension  Stroke RF  SBP < 200, MAP >65  Consider slowly lowering SBP goal to <180 slowly  On [id number] and metoprolol 25 BID  PRN Labetalol         12/09/2022 Patient with LV thrombus, will need anticoagulation. NGT in place would recommend heparin gtt until patient able to swallow or PEG placed prior to DOAC initiation. Patient tachycardic today with SBP < 210, metoprolol started by primary team. Febrile with elevated white count and procal, currently on Zosyn and Vanc while cx pending. NSGY following for hemicrani watch. Patient has been discharged  from OT per last note, will need new orders. Continue current ICU care.   12/10/2022: Pending stability scan patient is expected to be started on heparin gtt. Continue hemicrani watch in  ICU. Family at bedside.   12/11/2022: Patient started on heparin gtt overnight, he demonstrated great clinical improvement today, he was able to tell me his name, knew he is in [facility]. Followed single step commands. No family at bedside today.   12/12/2022 Patient remains in ICU for sue-crani watch. NSGY following. On salt tabs for goal of hypernatremia.Will remain in ICU another night. VN to re-evaluate for stepdown tomorrow. Patient is on ceftriaxone for acute cystitis. aPTT today 42.9. CTH following therapeutic heparin levels stable. More alert today. IPR recommendations; SLP with minced and moist diet with thin liquids.   12/13/2022 Remains in NCC, neuro exam unchanged and limited by drowsiness. Overnight had short run of VT/SVT that resolved without any interventions. BPs not consistently at goal of <200, had max BP today of 214/140. Currently NPO with NGT in place and anticipate patient will need PEG placement, SLP recommending ice chips sparingly for pleasure. Anticipate step down tomorrow once BP is consistently at goal  12/14/2022 Pending SD to NPU. Continue to watch BP closely, SBP<200. Patient will likely need a PEG.  12/15/2022. Pending SD to NPU. Poor exam, patient very lethargic, WD on the right.   12/16/2022 Patient stepped down overnight to NPU. Exam stable. Re-evaluated by speech this afternoon, still recommending NPO. Will need to discuss nutrition going forward (I.e. need for PEG). Recommendations for IPR.         STROKE DOCUMENTATION   Acute Stroke Times   Last Known Normal Date: 12/08/22  Last Known Normal Time: 1145  Symptom Onset Date: 12/08/22  Symptom Onset Time: 1145  Stroke Team Called Date: 12/08/22  Stroke Team Called Time: 1615  Stroke Team Arrival Date: 12/08/22  Stroke Team Arrival Time: 1615  CT  Interpretation Time: 1615  Thrombolytic Therapy Recommended: No  CTA Interpretation Time: 1615    NIH Scale:  1a. Level of Consciousness: 2-->Not alert, requires repeated stimulation to attend, or is obtunded and requires strong or painful stimulation to make movements (not stereotyped)  1b. LOC Questions: 2-->Answers neither question correctly  1c. LOC Commands: 1-->Performs one task correctly  2. Best Gaze: 2-->Forced deviation, or total gaze paresis not overcome by the oculocephalic maneuver  3. Visual: 0-->No visual loss  4. Facial Palsy: 1-->Minor paralysis (flattened nasolabial fold, asymmetry on smiling)  5a. Motor Arm, Left: 0-->No drift, limb holds 90 (or 45) degrees for full 10 secs  5b. Motor Arm, Right: 3-->No effort against gravity, limb falls  6a. Motor Leg, Left: 0-->No drift, leg holds 30 degree position for full 5 secs  6b. Motor Leg, Right: 3-->No effort against gravity, leg falls to bed immediately  7. Limb Ataxia: 0-->Absent  8. Sensory: 1-->Mild-to-moderate sensory loss, patient feels pinprick is less sharp or is dull on the affected side, or there is a loss of superficial pain with pinprick, but patient is aware of being touched  9. Best Language: 2-->Severe aphasia, all communication is through fragmentary expression, great need for inference, questioning, and guessing by the listener. Range of information that can be exchanged is limited, listener carries burden of. . . (see row details)  10. Dysarthria: 2-->Severe dysarthria, patients speech is so slurred as to be unintelligible in the absence of or out of proportion to any dysphasia, or is mute/anarthric  11. Extinction and Inattention (formerly Neglect): 1-->Visual, tactile, auditory, spatial, or personal inattention or extinction to bilateral simultaneous stimulation in one of the sensory modalities  Total (NIH Stroke Scale): 20       Modified [place] Score: 1  Thao Coma Scale:    ABCD2 Score:    [id number] Score:   HAS -BLED Score:    ICH Score:   Hunt & Leblanc Classification:      Hemorrhagic change of an Ischemic Stroke: Does this patient have an ischemic stroke with hemorrhagic changes? No     Neurologic Chief Complaint: lethargy, aphasia, dysarthria     Subjective:     Interval History: Patient is seen for follow-up neurological assessment and treatment recommendations:     Patient stepped down overnight to NPU. Exam stable. Re-evaluated by speech this afternoon, still recommending NPO. Will need to discuss nutrition going forward (I.e. need for PEG). Recommendations for IPR.     HPI, Past Medical, Family, and Social History remains the same as documented in the initial encounter.     Review of Systems   Unable to perform ROS: Other (Aphasia)   Scheduled Meds:   amLODIPine  10 mg Per NG tube Daily    atorvastatin  40 mg Per NG tube Daily    EScitalopram oxalate  20 mg Per NG tube Daily    furosemide  40 mg Per NG tube Daily    insulin aspart U-100  8 Units Subcutaneous 6 times per day    metoprolol tartrate  25 mg Per NG tube BID     Continuous Infusions:   dextrose 10 % in water (D10W)      heparin (porcine) in D5W 14 Units/kg/hr (12/16/22 0515)     PRN Meds:dextrose 10 % in water (D10W), dextrose 10%, dextrose 10%, glucagon (human recombinant), insulin aspart U-100, labetaloL, ondansetron, sodium chloride 0.9%    Objective:     Vital Signs (Most Recent):  Temp: 97.4 °F (36.3 °C) (12/16/22 1559)  Pulse: 81 (12/16/22 1600)  Resp: 17 (12/16/22 1559)  BP: (!) 179/107 (12/16/22 1559)  SpO2: 95 % (12/16/22 1559)  BP Location: Left arm    Vital Signs Range (Last 24H):  Temp:  [96.3 °F (35.7 °C)-98.7 °F (37.1 °C)]   Pulse:  [81-91]   Resp:  [15-20]   BP: (154-179)/()   SpO2:  [92 %-100 %]   BP Location: Left arm    Physical Exam  Vitals and nursing note reviewed.   Constitutional:       General: He is not in acute distress.  HENT:      Head: Normocephalic.      Nose: No rhinorrhea.      Comments: NGT in place  Eyes:      General: No  scleral icterus.     Conjunctiva/sclera: Conjunctivae normal.   Cardiovascular:      Rate and Rhythm: Normal rate.   Pulmonary:      Effort: No respiratory distress.   Abdominal:      General: There is no distension.   Musculoskeletal:         General: No deformity.   Skin:     General: Skin is warm and dry.   Neurological:      Mental Status: He is lethargic.      Motor: Weakness present.      Comments: Withdraws on R side to noxious stimuli  Moves left side spontaneously  Severe aphasia        Psychiatric:      Comments: Unable to assess mood and thought content 2/2 level of consciousness       Neurological Exam:   LOC: lethargic  Attention Span: poor  Language: mixed aphasia, intermittently follows some simple commands  Articulation: Nonverbal unable to assess   Orientation: Nonverbal unable to assess   EOM (CN III, IV, VI): Tracks   Motor: L side moves spontaneously and antigravity, R side withdraws from pain   Sensation: Withdraws right side to noxious stimuli      Laboratory:  CMP:   Recent Labs   Lab 12/16/22 0358   CALCIUM 9.3   ALBUMIN 2.3*   PROT 6.5   *   K 3.2*   CO2 29      BUN 47*   CREATININE 4.1*   ALKPHOS 137*   ALT 89*   AST 85*   BILITOT 0.4       BMP:   Recent Labs   Lab 12/16/22 0358   *   K 3.2*      CO2 29   BUN 47*   CREATININE 4.1*   CALCIUM 9.3       CBC:   Recent Labs   Lab 12/16/22 0358   WBC 11.07   RBC 4.71   HGB 12.8*   HCT 41.5      MCV 88   MCH 27.2   MCHC 30.8*       Lipid Panel:   No results for input(s): CHOL, LDLCALC, HDL, TRIG in the last 168 hours.    Coagulation:   Recent Labs   Lab 12/10/22  1309 12/10/22  1956 12/16/22  1103   INR 1.1  --   --    APTT 23.3   < > 51.0*    < > = values in this interval not displayed.       Platelet Aggregation Study: No results for input(s): PLTAGG, PLTAGINTERP, PLTAGREGLACO, ADPPLTAGGREG in the last 168 hours.  Hgb A1C:   No results for input(s): HGBA1C in the last 168 hours.    TSH:   No results for  input(s): TSH in the last 168 hours.      Diagnostic Results     Brain/Vessel Imaging   CTH 12/13/22  -Evolving large left MCA infarct with stable mass effect and petechial hemorrhagic conversion.   -No definite new hemorrhage or significant new abnormal parenchymal attenuation   -Separate areas of infarction involving the right parietal lobe and left cerebellum not well seen with CT technique.   -Clinical correlation and continued follow-up advised     CTH 12/11/22   Grossly stable evolving large left MCA distribution infarct and smaller infarct in the right frontal lobe with possible trace hemorrhagic conversion. Persistent mass effect with sulcal effacement and slight mass effect on the left lateral ventricle. No new or worsening intracranial hemorrhage and no worsening of minimal rightward midline shift.    CTH 12/9/22   Evolving no enlarged left MCA territory infarction with hypoattenuation and sulcal effacement. Intermediate density along the left basal ganglia compatible with known hemorrhagic conversion. Smaller area of infarction in the right cerebral hemisphere not well seen. Evolving mass effect and edema associated with the left cerebral hemispheric infarction with slight compression of the left lateral ventricle and trace 1 mm of rightward midline shift. No evidence for hydrocephalus    MRI Brain WO Contrast 12/8/22    Exam limited by patient motion artifact. Evolving left MCA territory infarct.  Additional foci of diffusion restriction in the left cerebral consistent with recent infarct.  Interval increase susceptibility artifact in the areas of diffusion restriction consistent with hemorrhagic conversion of recent infarcts.  No hydrocephalus or significant midline shift.      CTH 12/8/22 16:29   -Evolving large left MCA territory infarction similar to recent CT though increased in size compared to prior MRI concerning for evolving recent to subacute and acute infarcts.  Localized mass effect without  significant midline shift or hydrocephalus.  There is evolving recent to subacute age right posterior frontal infarction similar to prior MRI allowing for differences in technique  -There is subtle hyperdensity along the left basal ganglia posteriorly and along the right posterior frontal cortex which may represent enhancing subacute age components of infarction with petechial hemorrhage felt less likely but cannot be entirely excluded with limitation secondary to recirculation contrast related to recent CTA performed at outside institution.  -Evolving mass effect most pronounced associated with the left MCA territory infarction with sulcal effacement without significant midline shift or hydrocephalus.    CTH 12/8/22 12:11 (CTA H/N)   1. Left diencephalic hemisphere demonstrates evidence of an acute infarct of left basal ganglia and left caudate lobe.  2. Occlusion of the posterior division of the left M2 branch at the takeoff of the M1 vessel with patency involving the anterior division of the left M2 segment.    CTH 12/7/22   1.  Moderate size subacute infarction left anterior basal ganglia and anterior left internal capsule appears mildly larger and better well defined compared to CT from 12/3/2020. There is currently greater mass effect on the anterior horn of the left lateral ventricle. There is no associated bleed or midline shift.  2.  Recent MRI demonstrated additional punctate acute infarctions in the left frontal lobe and a mild size acute infarction right centrum semiovale. These are less obvious on CT. No associated bleed.  3.  Additional chronic periventricular white matter hypodensities.    MRI Brain WO contrast 12/3/22   Multifocal areas of restricted diffusion are felt to reflect acute infarcts.    CTH 12/3/22   Loss of gray-white matter distinction in involving the left basal ganglia could reflect changes of chronic small vessel ischemic disease versus cytotoxic edema from acute infarct.     Carotid  US Bilateral 12/3/22   1. Carotid intimal hyperplasia, with no findings of hemodynamically significant carotid arterial stenosis or vascular occlusion.  2. Patent vertebral arteries with antegrade flow bilaterally.      Cardiac Imaging   RAGHAV 12/4/22    The left ventricle is small with moderately decreased systolic function.   The estimated ejection fraction is 38%.   Left ventricular diastolic dysfunction.   There are segmental left ventricular wall motion abnormalities.   No spontaneous echo contrast. A moderate protruding layered left ventricular thrombus is present. The thrombus is fixed and located in the apex.   Normal right ventricular size.   Mild left atrial enlargement.   Mild right atrial enlargement.   Mild aortic regurgitation.   No interatrial septal defect present.   Normal appearing left atrial appendage. No thrombus is present in the appendage. SB occluder is absent. Abnormal appendage velocities.   Mild mitral regurgitation.   Agitated saline contrast study did not show any right to left shunt    TTE 11/14/22    The left ventricle is normal in size with mildly decreased systolic function.   The estimated ejection fraction is 40%.   Grade III left ventricular diastolic dysfunction.   Small posterior pericardial effusion.   There is mild left ventricular global hypokinesis.   Normal right ventricular size with normal right ventricular systolic function.   Severe left atrial enlargement.   Moderate right atrial enlargement.   Mild pulmonic regurgitation.   Mild to moderate tricuspid regurgitation.   Mild-to-moderate aortic regurgitation.   Intermediate central venous pressure (8 mmHg).   The estimated PA systolic pressure is 51 mmHg.   There is pulmonary hypertension.      Anat Amado PA-C  Comprehensive Stroke Center  Department of Vascular Neurology   [facility])

## 2022-12-16 NOTE — PT/OT/SLP PROGRESS
Physical Therapy Treatment    Patient Name:  Spencer Burton Jr.   MRN:  [id number]    Recommendations:     Discharge Recommendations: rehabilitation facility  Discharge Equipment Recommendations: hospital bed, lift device  Barriers to discharge: Inaccessible home and Decreased caregiver support    Assessment:     Spencer Burton Jr. is a 52 y.o. male admitted with a medical diagnosis of Embolic stroke involving left middle cerebral artery.  He presents with the following impairments/functional limitations: weakness, impaired balance, impaired sensation, impaired self care skills, impaired functional mobility, gait instability, decreased lower extremity function, decreased upper extremity function, decreased safety awareness . Pt is unsafe with functional mobility at this time due to pt requires total assist for bed mobility, total assist for transfers, and total assist of 2 for gait due to difficulty weight shifting to step with his L LE. Pt appears motivated to progress with functional mobility.     Rehab Prognosis: Fair; patient would benefit from acute skilled PT services to address these deficits and reach maximum level of function.    Recent Surgery: * No surgery found *      Plan:     During this hospitalization, patient to be seen 4 x/week to address the identified rehab impairments via therapeutic activities, therapeutic exercises, gait training, neuromuscular re-education and progress toward the following goals:    Plan of Care Expires:  01/09/23    Subjective   Pt non verbal during treatment    Pain/Comfort:  Pain Rating 1:  (pt non verbal during treatment, did not appear to be in pain)  Pain Rating Post-Intervention 1: 0/10      Objective:     Communicated with nurse prior to session.  Patient found HOB elevated with bed alarm, NG tube, restraints, telemetry, Condom Catheter, SCD, pressure relief boots upon PT entry to room.     General Precautions: Standard, aphasia, aspiration, fall, NPO, vision  impaired  Orthopedic Precautions: N/A  Braces: N/A  Respiratory Status: Room air     Functional Mobility:  Bed Mobility:     Rolling Right: total assistance  Supine to Sit: total assistance  Sit to Supine: total assistance and of 2 persons  Transfers:     Sit to Stand:  total assistance and of 2 persons with hand-held assist  Bed to Chair: total assistance with  hand-held assist  using  Squat Pivot  Gait: pt given manual cues to weight shift over the R LE to step with the L LE ; pt required max manual cues to facilitate R hip/knee ext during stance. Pt able to step with the L LE x 2 times during 2 standing trials.     AM-PAC 6 CLICK MOBILITY  Turning over in bed (including adjusting bedclothes, sheets and blankets)?: 2  Sitting down on and standing up from a chair with arms (e.g., wheelchair, bedside commode, etc.): 2  Moving from lying on back to sitting on the side of the bed?: 2  Moving to and from a bed to a chair (including a wheelchair)?: 2  Need to walk in hospital room?: 1  Climbing 3-5 steps with a railing?: 1  Basic Mobility Total Score: 10     Treatment & Education:   pt sat on the EOB ~ 6 min with max assist  due to pt pushing to the R with his L UE. Pt required manual cues to place his R hand on the bed and for weight bearing into his R hand. Pt stood x 4 trials with total assist of 2 with HHA for ~ 20-30 sec each trial.Pt received verbal and manual cues for midline orientation , to lift his head, for facilitation of R hip/knee ext, and upright posture.     Patient left HOB elevated with all lines intact, call button in reach, bed alarm on, restraints reapplied at end of session, and nurse notified..    GOALS:   Multidisciplinary Problems       Physical Therapy Goals          Problem: Physical Therapy    Goal Priority Disciplines Outcome Goal Variances Interventions   Physical Therapy Goal     PT, PT/OT Ongoing, Progressing     Description: Goals to be met by: 12/30/22    Patient will increase  functional independence with mobility by performin. Supine to sit with Maximum Assistance  2. Sit to supine with Maximum Assistance  3. Sit to stand transfer with Maximum Assistance  4. Bed to chair transfer with Maximum Assistance using squat pivot technique.  5. Gait  x 5 feet with Moderate Assistance using LRAD.   6. Sitting at edge of bed x8 minutes with Contact Guard Assistance with DYLAN UE support.                         Time Tracking:     PT Received On: 22  PT Start Time: 907     PT Stop Time: 940  PT Total Time (min): 33 min     Billable Minutes: Therapeutic Activity 33    Treatment Type: Treatment  PT/PTA: PT     PTA Visit Number: 0     2022

## 2022-12-16 NOTE — PT/OT/SLP PROGRESS
"Speech Language Pathology Treatment    Patient Name:  Spencer Burton Jr.   MRN:  [id number]  Admitting Diagnosis: Embolic stroke involving left middle cerebral artery    Recommendations:                 General Recommendations:  Dysphagia therapy and Speech/language therapy  Diet recommendations:  NPO, Liquid Diet Level: NPO   Aspiration Precautions: Continue alternate means of nutrition, Frequent oral care, Ice chips sparingly, Monitor for s/s of aspiration, and Strict aspiration precautions   General Precautions: Standard, aphasia, aspiration, fall, NPO, vision impaired  Communication strategies:  go to room if call light pushed;  pt continues to present with aphasia and dysarthria    Subjective     "Three" Pt was unable to correctly identify the number of fingers SLP held up at midline of pt's visual field.     Pain/Comfort:  Pain Rating 1:  (no indications of pain)    Respiratory Status: Room air    Objective:     Has the patient been evaluated by SLP for swallowing?   Yes  Keep patient NPO? Yes   Current Respiratory Status:        Pt was seen for ongoing assessment of swallowing abilities and speech/language therapy.  Pt with NG tube in place.  Left gaze preference persists. Pt able to turn head to right, but unable to visually track past midline.  Pt was unable to make eye contact with SLP to identify SLP's eye color. He was unable to identify number of fingers held up at pt's midline.  Pt accepted the following PO trials: ice chip x 1, thin water via 1/2 tsp x 1, full tsp x 4, cup sip x 1, straw sip x 1; puree 1/2 tsp x 1 and full tsp x 3.  Anterior loss on right present for cup and straw sips of water. Swallow responses were consistently delayed. Delayed wet vocal quality present after straw sip and throat clear present after final tsp sip of water.  Pt was unable to follow commands to produce a volitional cough, but instead appeared to say "cough." Pt appearing to become fatigued and PO trials ceased.  SLP " recommending pt remain NPO with consideration of long term alternative means of nutrition due to risks of aspiration associated with dysphagia, reduced alertness, and reduced endurance.  During aphasia therapy, pt was able to count 1-6 given min-mod cues when asked to count from 1-5.  Given min cues, pt listed the days of the week correctly.       Education provided to pt regarding ongoing swallowing assessment, recommendations to remain NPO with alternative means of nutrition, increasing attention to the right, aphasia therapy tasks, and SLP treatment plan and POC.  Pt unable to demonstrate full understanding.    Assessment:     Spencer Burton Jr. is a 52 y.o. male with an SLP diagnosis of Aphasia, Dysphagia, Dysarthria, Cognitive-Linguistic Impairment, and Visio-Spatial Impairment.      Goals:   Multidisciplinary Problems       SLP Goals          Problem: SLP    Goal Priority Disciplines Outcome   SLP Goal     SLP    Description: Speech Language Pathology Goals  Updated goals expected to be met by 12/20:  1. Pt will participate in ongoing swallowing assessment to determine if/when safe for PO intake.   2. Pt will respond to complex yes/no q's with 50% accuracy given max cues.   3. Pt will model 1-step commands on 1 out of 5 trials given max cues.   4. Pt will complete automatic speech tasks with 60% accuracy given max cues.   5. Pt will name objects with 2 out of 5 trials given max cues.     Goals expected to be met by 12/16:  1. Pt will participate in ongoing swallowing assessment to determine if/when safe for PO intake.   2. Pt will participate in speech/language/cognitive evaluation when feasible. Initiated 12/13                               Plan:     Patient to be seen:  4 x/week   Plan of Care expires:  01/09/23  Plan of Care reviewed with:  patient   SLP Follow-Up:  Yes       Discharge recommendations:  rehabilitation facility     Time Tracking:     SLP Treatment Date:   12/16/22  Speech Start Time:   1000  Speech Stop Time:  1017     Speech Total Time (min):  17 min    Billable Minutes: Speech Therapy Individual 8 and Treatment Swallowing Dysfunction 9    12/16/2022

## 2022-12-16 NOTE — PLAN OF CARE
Problem: Adult Inpatient Plan of Care  Goal: Plan of Care Review  Outcome: Ongoing, Progressing  Goal: Optimal Comfort and Wellbeing  Outcome: Ongoing, Progressing     Problem: Sleep Disturbance (Delirium)  Goal: Improved Sleep  Outcome: Ongoing, Progressing     Problem: Functional Ability Impaired (Stroke, Ischemic/Transient Ischemic Attack)  Goal: Optimal Functional Ability  Outcome: Ongoing, Progressing     Problem: Urinary Elimination Impaired (Stroke, Ischemic/Transient Ischemic Attack)  Goal: Effective Urinary Elimination  Outcome: Ongoing, Progressing

## 2022-12-17 LAB
ALBUMIN SERPL BCP-MCNC: 2.3 G/DL (ref 3.5–5.2)
ALP SERPL-CCNC: 136 U/L (ref 55–135)
ALT SERPL W/O P-5'-P-CCNC: 80 U/L (ref 10–44)
ANION GAP SERPL CALC-SCNC: 10 MMOL/L (ref 8–16)
APTT BLDCRRT: 45.9 SEC (ref 21–32)
APTT BLDCRRT: 49.2 SEC (ref 21–32)
AST SERPL-CCNC: 60 U/L (ref 10–40)
BASOPHILS # BLD AUTO: 0.07 K/UL (ref 0–0.2)
BASOPHILS NFR BLD: 0.6 % (ref 0–1.9)
BILIRUB SERPL-MCNC: 0.5 MG/DL (ref 0.1–1)
BUN SERPL-MCNC: 47 MG/DL (ref 6–20)
CALCIUM SERPL-MCNC: 9.4 MG/DL (ref 8.7–10.5)
CHLORIDE SERPL-SCNC: 113 MMOL/L (ref 95–110)
CO2 SERPL-SCNC: 30 MMOL/L (ref 23–29)
CREAT SERPL-MCNC: 3.7 MG/DL (ref 0.5–1.4)
DIFFERENTIAL METHOD: ABNORMAL
EOSINOPHIL # BLD AUTO: 0.2 K/UL (ref 0–0.5)
EOSINOPHIL NFR BLD: 2 % (ref 0–8)
ERYTHROCYTE [DISTWIDTH] IN BLOOD BY AUTOMATED COUNT: 15 % (ref 11.5–14.5)
EST. GFR  (NO RACE VARIABLE): 19.1 ML/MIN/1.73 M^2
GLUCOSE SERPL-MCNC: 207 MG/DL (ref 70–110)
HCT VFR BLD AUTO: 40.8 % (ref 40–54)
HGB BLD-MCNC: 12.1 G/DL (ref 14–18)
IMM GRANULOCYTES # BLD AUTO: 0.08 K/UL (ref 0–0.04)
IMM GRANULOCYTES NFR BLD AUTO: 0.7 % (ref 0–0.5)
LYMPHOCYTES # BLD AUTO: 1.5 K/UL (ref 1–4.8)
LYMPHOCYTES NFR BLD: 13.2 % (ref 18–48)
MAGNESIUM SERPL-MCNC: 2.3 MG/DL (ref 1.6–2.6)
MCH RBC QN AUTO: 26.7 PG (ref 27–31)
MCHC RBC AUTO-ENTMCNC: 29.7 G/DL (ref 32–36)
MCV RBC AUTO: 90 FL (ref 82–98)
MONOCYTES # BLD AUTO: 0.8 K/UL (ref 0.3–1)
MONOCYTES NFR BLD: 7.3 % (ref 4–15)
NEUTROPHILS # BLD AUTO: 8.5 K/UL (ref 1.8–7.7)
NEUTROPHILS NFR BLD: 76.2 % (ref 38–73)
NRBC BLD-RTO: 0 /100 WBC
PHOSPHATE SERPL-MCNC: 2.7 MG/DL (ref 2.7–4.5)
PLATELET # BLD AUTO: 267 K/UL (ref 150–450)
PMV BLD AUTO: 12.8 FL (ref 9.2–12.9)
POCT GLUCOSE: 124 MG/DL (ref 70–110)
POCT GLUCOSE: 151 MG/DL (ref 70–110)
POCT GLUCOSE: 160 MG/DL (ref 70–110)
POCT GLUCOSE: 167 MG/DL (ref 70–110)
POCT GLUCOSE: 178 MG/DL (ref 70–110)
POCT GLUCOSE: 180 MG/DL (ref 70–110)
POCT GLUCOSE: 204 MG/DL (ref 70–110)
POCT GLUCOSE: 255 MG/DL (ref 70–110)
POCT GLUCOSE: 276 MG/DL (ref 70–110)
POTASSIUM SERPL-SCNC: 3.5 MMOL/L (ref 3.5–5.1)
PROT SERPL-MCNC: 6.5 G/DL (ref 6–8.4)
RBC # BLD AUTO: 4.53 M/UL (ref 4.6–6.2)
SODIUM SERPL-SCNC: 153 MMOL/L (ref 136–145)
WBC # BLD AUTO: 11.15 K/UL (ref 3.9–12.7)

## 2022-12-17 PROCEDURE — 85025 COMPLETE CBC W/AUTO DIFF WBC: CPT | Performed by: NURSE PRACTITIONER

## 2022-12-17 PROCEDURE — 85730 THROMBOPLASTIN TIME PARTIAL: CPT | Performed by: STUDENT IN AN ORGANIZED HEALTH CARE EDUCATION/TRAINING PROGRAM

## 2022-12-17 PROCEDURE — 63600175 PHARM REV CODE 636 W HCPCS: Performed by: NURSE PRACTITIONER

## 2022-12-17 PROCEDURE — 99233 PR SUBSEQUENT HOSPITAL CARE,LEVL III: ICD-10-PCS | Mod: ,,, | Performed by: STUDENT IN AN ORGANIZED HEALTH CARE EDUCATION/TRAINING PROGRAM

## 2022-12-17 PROCEDURE — 99233 SBSQ HOSP IP/OBS HIGH 50: CPT | Mod: ,,, | Performed by: STUDENT IN AN ORGANIZED HEALTH CARE EDUCATION/TRAINING PROGRAM

## 2022-12-17 PROCEDURE — 25000003 PHARM REV CODE 250: Performed by: NURSE PRACTITIONER

## 2022-12-17 PROCEDURE — 25000003 PHARM REV CODE 250: Performed by: PSYCHIATRY & NEUROLOGY

## 2022-12-17 PROCEDURE — 11000001 HC ACUTE MED/SURG PRIVATE ROOM

## 2022-12-17 PROCEDURE — 80053 COMPREHEN METABOLIC PANEL: CPT | Performed by: PHYSICIAN ASSISTANT

## 2022-12-17 PROCEDURE — 84100 ASSAY OF PHOSPHORUS: CPT | Performed by: PHYSICIAN ASSISTANT

## 2022-12-17 PROCEDURE — 25000003 PHARM REV CODE 250

## 2022-12-17 PROCEDURE — 83735 ASSAY OF MAGNESIUM: CPT | Performed by: PHYSICIAN ASSISTANT

## 2022-12-17 RX ORDER — INSULIN ASPART 100 [IU]/ML
9 INJECTION, SOLUTION INTRAVENOUS; SUBCUTANEOUS
Status: DISCONTINUED | OUTPATIENT
Start: 2022-12-17 | End: 2022-12-28 | Stop reason: HOSPADM

## 2022-12-17 RX ORDER — INSULIN ASPART 100 [IU]/ML
10 INJECTION, SOLUTION INTRAVENOUS; SUBCUTANEOUS
Status: DISCONTINUED | OUTPATIENT
Start: 2022-12-17 | End: 2022-12-17

## 2022-12-17 RX ORDER — METOPROLOL TARTRATE 25 MG/1
25 TABLET, FILM COATED ORAL ONCE
Status: COMPLETED | OUTPATIENT
Start: 2022-12-17 | End: 2022-12-17

## 2022-12-17 RX ORDER — METOPROLOL TARTRATE 50 MG/1
50 TABLET ORAL 2 TIMES DAILY
Status: DISCONTINUED | OUTPATIENT
Start: 2022-12-17 | End: 2022-12-18

## 2022-12-17 RX ADMIN — INSULIN ASPART 2 UNITS: 100 INJECTION, SOLUTION INTRAVENOUS; SUBCUTANEOUS at 12:12

## 2022-12-17 RX ADMIN — AMLODIPINE BESYLATE 10 MG: 10 TABLET ORAL at 09:12

## 2022-12-17 RX ADMIN — INSULIN ASPART 8 UNITS: 100 INJECTION, SOLUTION INTRAVENOUS; SUBCUTANEOUS at 12:12

## 2022-12-17 RX ADMIN — METOPROLOL TARTRATE 50 MG: 50 TABLET, FILM COATED ORAL at 08:12

## 2022-12-17 RX ADMIN — INSULIN ASPART 6 UNITS: 100 INJECTION, SOLUTION INTRAVENOUS; SUBCUTANEOUS at 09:12

## 2022-12-17 RX ADMIN — ATORVASTATIN CALCIUM 40 MG: 40 TABLET, FILM COATED ORAL at 09:12

## 2022-12-17 RX ADMIN — INSULIN ASPART 8 UNITS: 100 INJECTION, SOLUTION INTRAVENOUS; SUBCUTANEOUS at 04:12

## 2022-12-17 RX ADMIN — METOPROLOL TARTRATE 25 MG: 25 TABLET, FILM COATED ORAL at 09:12

## 2022-12-17 RX ADMIN — INSULIN ASPART 8 UNITS: 100 INJECTION, SOLUTION INTRAVENOUS; SUBCUTANEOUS at 05:12

## 2022-12-17 RX ADMIN — INSULIN ASPART 8 UNITS: 100 INJECTION, SOLUTION INTRAVENOUS; SUBCUTANEOUS at 09:12

## 2022-12-17 RX ADMIN — FUROSEMIDE 40 MG: 40 TABLET ORAL at 09:12

## 2022-12-17 RX ADMIN — INSULIN ASPART 9 UNITS: 100 INJECTION, SOLUTION INTRAVENOUS; SUBCUTANEOUS at 08:12

## 2022-12-17 RX ADMIN — ESCITALOPRAM OXALATE 20 MG: 20 TABLET ORAL at 09:12

## 2022-12-17 RX ADMIN — HEPARIN SODIUM 12 UNITS/KG/HR: 10000 INJECTION, SOLUTION INTRAVENOUS at 05:12

## 2022-12-17 NOTE — ASSESSMENT & PLAN NOTE
Stroke RF  A1c 9.1, consider nutrition consult and DM education  BG goal while inpatient 140-180  Currently on Aspart 9units q4h + aspart 1-10U q4h PRN

## 2022-12-17 NOTE — CONSULTS
Radiology Consult    Spencer Burton Jr. is a 52 y.o. male with a history of CHF who presented to an OSH with right sided weakness and an MRI showing bilateral anterior and posterior circulation strokes concerning for embolic etiology. RAGHAV showed left ventricular thrombus. Transferred to [facility] following new L M2 occlusion for higher level of care. During his admission he was evaluated by speech therapy with recommendation to remain NPO. IR consulted for G-tube placement.     Past Medical History:   Diagnosis Date    CHF (congestive heart failure)     COPD (chronic obstructive pulmonary disease)     Debility 12/8/2022    Diabetes mellitus     Embolic stroke involving left middle cerebral artery 12/8/2022    Enlarged LA (left atrium) 12/10/2022    Essential hypertension 5/12/2012    Hyperlipidemia     Hypertension     LV (left ventricular) mural thrombus 12/8/2022    Type 2 diabetes mellitus without complication, without long-term current use of insulin 9/4/2015    Type 2 diabetes mellitus, with long-term current use of insulin 11/6/2012     Past Surgical History:   Procedure Laterality Date    APPENDECTOMY      CHOLECYSTECTOMY         Discussed with primary team, Dr. Connor Patel.    Imaging reviewed with Radiology staff, Dr. Connor Patel.     Procedure: G-tube placement.     Scheduled Meds:    amLODIPine  10 mg Per NG tube Daily    atorvastatin  40 mg Per NG tube Daily    EScitalopram oxalate  20 mg Per NG tube Daily    furosemide  40 mg Per NG tube Daily    insulin aspart U-100  8 Units Subcutaneous 6 times per day    metoprolol tartrate  50 mg Per NG tube BID     Continuous Infusions:    dextrose 10 % in water (D10W)      heparin (porcine) in D5W 12 Units/kg/hr (12/17/22 0619)     PRN Meds:dextrose 10 % in water (D10W), dextrose 10%, dextrose 10%, glucagon (human recombinant), insulin aspart U-100, labetaloL, ondansetron, sodium chloride 0.9%    Allergies: Review of patient's allergies indicates:  No Known  Allergies    Labs:  No results for input(s): INR in the last 168 hours.    Invalid input(s):  PT,  PTT    Recent Labs   Lab 12/17/22  0500   WBC 11.15   HGB 12.1*   HCT 40.8   MCV 90         Recent Labs   Lab 12/17/22  0501   *   *   K 3.5   *   CO2 30*   BUN 47*   CREATININE 3.7*   CALCIUM 9.4   MG 2.3   ALT 80*   AST 60*   ALBUMIN 2.3*   BILITOT 0.5         Vitals (Most Recent):  Temp: 98 °F (36.7 °C) (12/17/22 1216)  Pulse: 88 (12/17/22 1216)  Resp: 17 (12/17/22 1216)  BP: (!) 150/93 (12/17/22 1216)  SpO2: 98 % (12/17/22 1216)    Plan:   1. Please hold feeds starting on midnight 12/18/22.   2. Ensure NG tube is in place.   3. Hold heparin drip starting in the morning on 12/18/22.   4. Please administer contrast via NG tube at midnight on 12/18/22 (order placed).    5.  G-tube scheduled for 12/19/22.    Arlin Gallegos

## 2022-12-17 NOTE — PROGRESS NOTES
Ulices Stack - Neurosurgery ([facility])  Vascular Neurology  Comprehensive Stroke Center  Progress Note    Assessment/Plan:     * Embolic stroke involving left middle cerebral artery  51 yo M with CHF who initially presented to OSH with RSW. Did not receive tPA, as he was outside of the window. MRI at that time demonstrated bilateral anterior and posterior circulation strokes concerning for embolic etiology. Over hospital course at OSH, the patient was noted to have been lethargic and aphasic with dysarthria. RAGHAV at OSH with L ventricular thrombus (not on AC). He was noted to have had an acute change in neuro exam on 12/8 at which time he had RSW with global aphasia. NIHSS had been 6 prior to the event and was noted to have been 23 following. CTA demonstrated a L M2 occlusion for which [place] [facility] [place] and higher level of care. He was not taken to IR due to poor ASPECTS and was admitted to ICU for close monitoring and hemicrani watch.   -Etiology: likely cardioembolic given LV thrombus      Exam stable, elevated sodium, free water boluses added, metoprolol increased for elevated BP & HR.  IR consult placed for G tube placement after discussing with patient's family via phone.    Antithrombotics for secondary stroke prevention:  on heparin gtt until swallowing or PEG established, can be switched to DOAC when able    Statins for secondary stroke prevention and hyperlipidemia, if present:   Statins: Atorvastatin- 40 mg daily    Aggressive risk factor modification: HTN, DM, HLD, Diet, Exercise, LV thrombus     Rehab efforts: Rehab; NPO, tube feeds via NGT    Diagnostics ordered/pending: None     VTE prophylaxis: Mechanical prophylaxis: Place SCDs  None: Reason for No Pharmacological VTE Prophylaxis: Currently on anticoagulation     BP parameters: SBP <200      Oropharyngeal dysphagia  Due to stroke  SLP following  Currently NPO with NGT in place for tube feeds, will likely need  PEG    JR on CKD  See CKD    Hyperlipidemia associated with type 2 diabetes mellitus  Stroke RF   . 8, goal <70  Continue atorvastatin 40mg daily    CHF (congestive heart failure)  Stroke risk factor  RAGHAV with EF 38% and segmental LV wall motion abnormalities  Strict I/Os  On lasix 40    Acute cystitis without hematuria  Klebsiella pneumoniae on cx, treated with ceftriaxone    Debility  2/2 stroke   OT and PT to evaluate   Therapy recommending IPR     Cytotoxic brain edema  -Noted on imaging in the area of the L MCA territory. Will continue to monitor q1h while in ICU and repeat CTH PRN for acute neuro exam changes that could indicate worsening/expansion of edema.   -NSGY consulted for hemicrani watch due to malignant MCA, no acute intervention recommended as serial imaging remains stable    LV (left ventricular) mural thrombus  Stroke RF  Continue heparin gtt as patient NPO and will likely PEG placement, switch to DOAC when able    Stage 4 chronic kidney disease  Cr 4.2 and GFR 16.4 today   Avoid nephrotoxic medications and renally dose adjust when appropriate   Monitor I/Os and daily kidney function labs    Type 2 diabetes mellitus, with long-term current use of insulin  Stroke RF  A1c 9.1, consider nutrition consult and DM education  BG goal while inpatient 140-180  Currently on Aspart 9units q4h + aspart 1-10U q4h PRN    Essential hypertension  Stroke RF  SBP < 200, MAP >65  Consider slowly lowering SBP goal to <180 slowly  On [id number] and metoprolol increased to 50 BID  PRN Labetalol       12/09/2022 Patient with LV thrombus, will need anticoagulation. NGT in place would recommend heparin gtt until patient able to swallow or PEG placed prior to DOAC initiation. Patient tachycardic today with SBP < 210, metoprolol started by primary team. Febrile with elevated white count and procal, currently on Zosyn and Vanc while cx pending. NSGY following for hemicrani watch. Patient has been discharged from OT  per last note, will need new orders. Continue current ICU care.   12/10/2022: Pending stability scan patient is expected to be started on heparin gtt. Continue hemicrani watch in  ICU. Family at bedside.   12/11/2022: Patient started on heparin gtt overnight, he demonstrated great clinical improvement today, he was able to tell me his name, knew he is in [facility]. Followed single step commands. No family at bedside today.   12/12/2022 Patient remains in ICU for sue-crani watch. NSGY following. On salt tabs for goal of hypernatremia.Will remain in ICU another night. VN to re-evaluate for stepdown tomorrow. Patient is on ceftriaxone for acute cystitis. aPTT today 42.9. CTH following therapeutic heparin levels stable. More alert today. IPR recommendations; SLP with minced and moist diet with thin liquids.   12/13/2022 Remains in NCC, neuro exam unchanged and limited by drowsiness. Overnight had short run of VT/SVT that resolved without any interventions. BPs not consistently at goal of <200, had max BP today of 214/140. Currently NPO with NGT in place and anticipate patient will need PEG placement, SLP recommending ice chips sparingly for pleasure. Anticipate step down tomorrow once BP is consistently at goal  12/14/2022 Pending SD to NPU. Continue to watch BP closely, SBP<200. Patient will likely need a PEG.  12/15/2022. Pending SD to NPU. Poor exam, patient very lethargic, WD on the right.   12/16/2022 Patient stepped down overnight to NPU. Exam stable. Re-evaluated by speech this afternoon, still recommending NPO. Will need to discuss nutrition going forward (I.e. need for PEG). Recommendations for IPR.   12/18/22 Exam stable, elevated sodium, free water boluses added, metoprolol increased for elevated BP & HR.  IR consult placed for G tube placement after discussing with patient's family via phone.      STROKE DOCUMENTATION   Acute Stroke Times   Last Known Normal Date: 12/08/22  Last Known Normal Time: 1145  Symptom  Onset Date: 12/08/22  Symptom Onset Time: 1145  Stroke Team Called Date: 12/08/22  Stroke Team Called Time: 1615  Stroke Team Arrival Date: 12/08/22  Stroke Team Arrival Time: 1615  CT Interpretation Time: 1615  Thrombolytic Therapy Recommended: No  CTA Interpretation Time: 1615    NIH Scale:  1a. Level of Consciousness: 2-->Not alert, requires repeated stimulation to attend, or is obtunded and requires strong or painful stimulation to make movements (not stereotyped)  1b. LOC Questions: 2-->Answers neither question correctly  1c. LOC Commands: 1-->Performs one task correctly  2. Best Gaze: 2-->Forced deviation, or total gaze paresis not overcome by the oculocephalic maneuver  3. Visual: 0-->No visual loss  4. Facial Palsy: 1-->Minor paralysis (flattened nasolabial fold, asymmetry on smiling)  5a. Motor Arm, Left: 0-->No drift, limb holds 90 (or 45) degrees for full 10 secs  5b. Motor Arm, Right: 3-->No effort against gravity, limb falls  6a. Motor Leg, Left: 0-->No drift, leg holds 30 degree position for full 5 secs  6b. Motor Leg, Right: 3-->No effort against gravity, leg falls to bed immediately  7. Limb Ataxia: 0-->Absent  8. Sensory: 1-->Mild-to-moderate sensory loss, patient feels pinprick is less sharp or is dull on the affected side, or there is a loss of superficial pain with pinprick, but patient is aware of being touched  9. Best Language: 2-->Severe aphasia, all communication is through fragmentary expression, great need for inference, questioning, and guessing by the listener. Range of information that can be exchanged is limited, listener carries burden of. . . (see row details)  10. Dysarthria: 2-->Severe dysarthria, patients speech is so slurred as to be unintelligible in the absence of or out of proportion to any dysphasia, or is mute/anarthric  11. Extinction and Inattention (formerly Neglect): 1-->Visual, tactile, auditory, spatial, or personal inattention or extinction to bilateral simultaneous  stimulation in one of the sensory modalities  Total (NIH Stroke Scale): 20       Modified Taney Score: 1  Thao Coma Scale:    ABCD2 Score:    [id number] Score:   HAS -BLED Score:   ICH Score:   Hunt & Leblanc Classification:      Hemorrhagic change of an Ischemic Stroke: Does this patient have an ischemic stroke with hemorrhagic changes? No     Neurologic Chief Complaint: lethargy, aphasia, dysarthria     Subjective:     Interval History: Patient is seen for follow-up neurological assessment and treatment recommendations:     Exam stable, elevated sodium, free water boluses added, metoprolol increased for elevated BP & HR.  IR consult placed for G tube placement after discussing with patient's family via phone.    HPI, Past Medical, Family, and Social History remains the same as documented in the initial encounter.     Review of Systems   Unable to perform ROS: Other (Aphasia)   Scheduled Meds:   amLODIPine  10 mg Per NG tube Daily    atorvastatin  40 mg Per NG tube Daily    barium  450 mL Per NG tube Once    EScitalopram oxalate  20 mg Per NG tube Daily    furosemide  40 mg Per NG tube Daily    insulin aspart U-100  9 Units Subcutaneous 6 times per day    metoprolol tartrate  50 mg Per NG tube BID     Continuous Infusions:   dextrose 10 % in water (D10W)      heparin (porcine) in D5W 12 Units/kg/hr (12/17/22 0619)     PRN Meds:barium, dextrose 10 % in water (D10W), dextrose 10%, dextrose 10%, glucagon (human recombinant), insulin aspart U-100, labetaloL, ondansetron, sodium chloride 0.9%    Objective:     Vital Signs (Most Recent):  Temp: 97.7 °F (36.5 °C) (12/17/22 1630)  Pulse: 93 (12/17/22 1630)  Resp: 16 (12/17/22 1630)  BP: (!) 167/100 (12/17/22 1630)  SpO2: 97 % (12/17/22 1630)  BP Location: Right arm    Vital Signs Range (Last 24H):  Temp:  [97.3 °F (36.3 °C)-98.5 °F (36.9 °C)]   Pulse:  []   Resp:  [16-19]   BP: (150-194)/()   SpO2:  [94 %-98 %]   BP Location: Right arm    Physical  Exam  Vitals and nursing note reviewed.   Constitutional:       General: He is not in acute distress.  HENT:      Head: Normocephalic.      Nose: No rhinorrhea.      Comments: NGT in place  Eyes:      General: No scleral icterus.     Conjunctiva/sclera: Conjunctivae normal.   Cardiovascular:      Rate and Rhythm: Normal rate.   Pulmonary:      Effort: No respiratory distress.   Abdominal:      General: There is no distension.   Musculoskeletal:         General: No deformity.   Skin:     General: Skin is warm and dry.   Neurological:      Mental Status: He is lethargic.      Motor: Weakness present.      Comments: Withdraws on R side to noxious stimuli  Moves left side spontaneously  Severe aphasia        Psychiatric:      Comments: Unable to assess mood and thought content 2/2 level of consciousness       Neurological Exam:   LOC: lethargic  Attention Span: poor  Language: mixed aphasia, intermittently follows some simple commands  Articulation: Nonverbal unable to assess   Orientation: Nonverbal unable to assess   EOM (CN III, IV, VI): Tracks   Motor: L side moves spontaneously and antigravity, R side withdraws from pain   Sensation: Withdraws right side to noxious stimuli      Laboratory:  CMP:   Recent Labs   Lab 12/17/22  0501   CALCIUM 9.4   ALBUMIN 2.3*   PROT 6.5   *   K 3.5   CO2 30*   *   BUN 47*   CREATININE 3.7*   ALKPHOS 136*   ALT 80*   AST 60*   BILITOT 0.5     BMP:   Recent Labs   Lab 12/17/22  0501   *   K 3.5   *   CO2 30*   BUN 47*   CREATININE 3.7*   CALCIUM 9.4     CBC:   Recent Labs   Lab 12/17/22  0500   WBC 11.15   RBC 4.53*   HGB 12.1*   HCT 40.8      MCV 90   MCH 26.7*   MCHC 29.7*     Lipid Panel:   No results for input(s): CHOL, LDLCALC, HDL, TRIG in the last 168 hours.    Coagulation:   Recent Labs   Lab 12/17/22  0500   APTT 45.9*     Platelet Aggregation Study: No results for input(s): PLTAGG, PLTAGINTERP, PLTAGREGLACO, ADPPLTAGGREG in the last 168  hours.  Hgb A1C:   No results for input(s): HGBA1C in the last 168 hours.    TSH:   No results for input(s): TSH in the last 168 hours.      Diagnostic Results     Brain/Vessel Imaging   CTH 12/13/22  -Evolving large left MCA infarct with stable mass effect and petechial hemorrhagic conversion.   -No definite new hemorrhage or significant new abnormal parenchymal attenuation   -Separate areas of infarction involving the right parietal lobe and left cerebellum not well seen with CT technique.   -Clinical correlation and continued follow-up advised     CTH 12/11/22   Grossly stable evolving large left MCA distribution infarct and smaller infarct in the right frontal lobe with possible trace hemorrhagic conversion. Persistent mass effect with sulcal effacement and slight mass effect on the left lateral ventricle. No new or worsening intracranial hemorrhage and no worsening of minimal rightward midline shift.    CTH 12/9/22   Evolving no enlarged left MCA territory infarction with hypoattenuation and sulcal effacement. Intermediate density along the left basal ganglia compatible with known hemorrhagic conversion. Smaller area of infarction in the right cerebral hemisphere not well seen. Evolving mass effect and edema associated with the left cerebral hemispheric infarction with slight compression of the left lateral ventricle and trace 1 mm of rightward midline shift. No evidence for hydrocephalus    MRI Brain WO Contrast 12/8/22    Exam limited by patient motion artifact. Evolving left MCA territory infarct.  Additional foci of diffusion restriction in the left cerebral consistent with recent infarct.  Interval increase susceptibility artifact in the areas of diffusion restriction consistent with hemorrhagic conversion of recent infarcts.  No hydrocephalus or significant midline shift.      CTH 12/8/22 16:29   -Evolving large left MCA territory infarction similar to recent CT though increased in size compared to prior  MRI concerning for evolving recent to subacute and acute infarcts.  Localized mass effect without significant midline shift or hydrocephalus.  There is evolving recent to subacute age right posterior frontal infarction similar to prior MRI allowing for differences in technique  -There is subtle hyperdensity along the left basal ganglia posteriorly and along the right posterior frontal cortex which may represent enhancing subacute age components of infarction with petechial hemorrhage felt less likely but cannot be entirely excluded with limitation secondary to recirculation contrast related to recent CTA performed at outside institution.  -Evolving mass effect most pronounced associated with the left MCA territory infarction with sulcal effacement without significant midline shift or hydrocephalus.    CTH 12/8/22 12:11 (CTA H/N)   1. Left diencephalic hemisphere demonstrates evidence of an acute infarct of left basal ganglia and left caudate lobe.  2. Occlusion of the posterior division of the left M2 branch at the takeoff of the M1 vessel with patency involving the anterior division of the left M2 segment.    CTH 12/7/22   1.  Moderate size subacute infarction left anterior basal ganglia and anterior left internal capsule appears mildly larger and better well defined compared to CT from 12/3/2020. There is currently greater mass effect on the anterior horn of the left lateral ventricle. There is no associated bleed or midline shift.  2.  Recent MRI demonstrated additional punctate acute infarctions in the left frontal lobe and a mild size acute infarction right centrum semiovale. These are less obvious on CT. No associated bleed.  3.  Additional chronic periventricular white matter hypodensities.    MRI Brain WO contrast 12/3/22   Multifocal areas of restricted diffusion are felt to reflect acute infarcts.    CTH 12/3/22   Loss of gray-white matter distinction in involving the left basal ganglia could reflect  changes of chronic small vessel ischemic disease versus cytotoxic edema from acute infarct.     Carotid US Bilateral 12/3/22   1. Carotid intimal hyperplasia, with no findings of hemodynamically significant carotid arterial stenosis or vascular occlusion.  2. Patent vertebral arteries with antegrade flow bilaterally.      Cardiac Imaging   RAGHAV 12/4/22    The left ventricle is small with moderately decreased systolic function.   The estimated ejection fraction is 38%.   Left ventricular diastolic dysfunction.   There are segmental left ventricular wall motion abnormalities.   No spontaneous echo contrast. A moderate protruding layered left ventricular thrombus is present. The thrombus is fixed and located in the apex.   Normal right ventricular size.   Mild left atrial enlargement.   Mild right atrial enlargement.   Mild aortic regurgitation.   No interatrial septal defect present.   Normal appearing left atrial appendage. No thrombus is present in the appendage. SB occluder is absent. Abnormal appendage velocities.   Mild mitral regurgitation.   Agitated saline contrast study did not show any right to left shunt    TTE 11/14/22    The left ventricle is normal in size with mildly decreased systolic function.   The estimated ejection fraction is 40%.   Grade III left ventricular diastolic dysfunction.   Small posterior pericardial effusion.   There is mild left ventricular global hypokinesis.   Normal right ventricular size with normal right ventricular systolic function.   Severe left atrial enlargement.   Moderate right atrial enlargement.   Mild pulmonic regurgitation.   Mild to moderate tricuspid regurgitation.   Mild-to-moderate aortic regurgitation.   Intermediate central venous pressure (8 mmHg).   The estimated PA systolic pressure is 51 mmHg.   There is pulmonary hypertension.      Murphy Mejia MD  Comprehensive Stroke Center  Department of Vascular Neurology   Ulices Sanders  Neurosurgery ([facility])

## 2022-12-17 NOTE — ASSESSMENT & PLAN NOTE
53 yo M with CHF who initially presented to OSH with RSW. Did not receive tPA, as he was outside of the window. MRI at that time demonstrated bilateral anterior and posterior circulation strokes concerning for embolic etiology. Over hospital course at OSH, the patient was noted to have been lethargic and aphasic with dysarthria. RAGHAV at OSH with L ventricular thrombus (not on AC). He was noted to have had an acute change in neuro exam on 12/8 at which time he had RSW with global aphasia. NIHSS had been 6 prior to the event and was noted to have been 23 following. CTA demonstrated a L M2 occlusion for which [place] [facility] [place] level of care. He was not taken to IR due to poor ASPECTS and was admitted to ICU for close monitoring and hemicrani watch.   -Etiology: likely cardioembolic given LV thrombus      Exam stable, elevated sodium, free water boluses added, metoprolol increased for elevated BP & HR.  IR consult placed for G tube placement after discussing with patient's family via phone.    Antithrombotics for secondary stroke prevention:  on heparin gtt until swallowing or PEG established, can be switched to DOAC when able    Statins for secondary stroke prevention and hyperlipidemia, if present:   Statins: Atorvastatin- 40 mg daily    Aggressive risk factor modification: HTN, DM, HLD, Diet, Exercise, LV thrombus     Rehab efforts: Rehab; NPO, tube feeds via NGT    Diagnostics ordered/pending: None     VTE prophylaxis: Mechanical prophylaxis: Place SCDs  None: Reason for No Pharmacological VTE Prophylaxis: Currently on anticoagulation     BP parameters: SBP <200

## 2022-12-17 NOTE — SUBJECTIVE & OBJECTIVE
Neurologic Chief Complaint: lethargy, aphasia, dysarthria     Subjective:     Interval History: Patient is seen for follow-up neurological assessment and treatment recommendations:     Exam stable, elevated sodium, free water boluses added, metoprolol increased for elevated BP & HR.  IR consult placed for G tube placement after discussing with patient's family via phone.    HPI, Past Medical, Family, and Social History remains the same as documented in the initial encounter.     Review of Systems   Unable to perform ROS: Other (Aphasia)   Scheduled Meds:   amLODIPine  10 mg Per NG tube Daily    atorvastatin  40 mg Per NG tube Daily    barium  450 mL Per NG tube Once    EScitalopram oxalate  20 mg Per NG tube Daily    furosemide  40 mg Per NG tube Daily    insulin aspart U-100  9 Units Subcutaneous 6 times per day    metoprolol tartrate  50 mg Per NG tube BID     Continuous Infusions:   dextrose 10 % in water (D10W)      heparin (porcine) in D5W 12 Units/kg/hr (12/17/22 0619)     PRN Meds:barium, dextrose 10 % in water (D10W), dextrose 10%, dextrose 10%, glucagon (human recombinant), insulin aspart U-100, labetaloL, ondansetron, sodium chloride 0.9%    Objective:     Vital Signs (Most Recent):  Temp: 97.7 °F (36.5 °C) (12/17/22 1630)  Pulse: 93 (12/17/22 1630)  Resp: 16 (12/17/22 1630)  BP: (!) 167/100 (12/17/22 1630)  SpO2: 97 % (12/17/22 1630)  BP Location: Right arm    Vital Signs Range (Last 24H):  Temp:  [97.3 °F (36.3 °C)-98.5 °F (36.9 °C)]   Pulse:  []   Resp:  [16-19]   BP: (150-194)/()   SpO2:  [94 %-98 %]   BP Location: Right arm    Physical Exam  Vitals and nursing note reviewed.   Constitutional:       General: He is not in acute distress.  HENT:      Head: Normocephalic.      Nose: No rhinorrhea.      Comments: NGT in place  Eyes:      General: No scleral icterus.     Conjunctiva/sclera: Conjunctivae normal.   Cardiovascular:      Rate and Rhythm: Normal rate.   Pulmonary:      Effort: No  respiratory distress.   Abdominal:      General: There is no distension.   Musculoskeletal:         General: No deformity.   Skin:     General: Skin is warm and dry.   Neurological:      Mental Status: He is lethargic.      Motor: Weakness present.      Comments: Withdraws on R side to noxious stimuli  Moves left side spontaneously  Severe aphasia        Psychiatric:      Comments: Unable to assess mood and thought content 2/2 level of consciousness       Neurological Exam:   LOC: lethargic  Attention Span: poor  Language: mixed aphasia, intermittently follows some simple commands  Articulation: Nonverbal unable to assess   Orientation: Nonverbal unable to assess   EOM (CN III, IV, VI): Tracks   Motor: L side moves spontaneously and antigravity, R side withdraws from pain   Sensation: Withdraws right side to noxious stimuli      Laboratory:  CMP:   Recent Labs   Lab 12/17/22  0501   CALCIUM 9.4   ALBUMIN 2.3*   PROT 6.5   *   K 3.5   CO2 30*   *   BUN 47*   CREATININE 3.7*   ALKPHOS 136*   ALT 80*   AST 60*   BILITOT 0.5     BMP:   Recent Labs   Lab 12/17/22  0501   *   K 3.5   *   CO2 30*   BUN 47*   CREATININE 3.7*   CALCIUM 9.4     CBC:   Recent Labs   Lab 12/17/22  0500   WBC 11.15   RBC 4.53*   HGB 12.1*   HCT 40.8      MCV 90   MCH 26.7*   MCHC 29.7*     Lipid Panel:   No results for input(s): CHOL, LDLCALC, HDL, TRIG in the last 168 hours.    Coagulation:   Recent Labs   Lab 12/17/22  0500   APTT 45.9*     Platelet Aggregation Study: No results for input(s): PLTAGG, PLTAGINTERP, PLTAGREGLACO, ADPPLTAGGREG in the last 168 hours.  Hgb A1C:   No results for input(s): HGBA1C in the last 168 hours.    TSH:   No results for input(s): TSH in the last 168 hours.      Diagnostic Results     Brain/Vessel Imaging   [facility] 12/13/22  -Evolving large left MCA infarct with stable mass effect and petechial hemorrhagic conversion.   -No definite new hemorrhage or significant new abnormal  parenchymal attenuation   -Separate areas of infarction involving the right parietal lobe and left cerebellum not well seen with CT technique.   -Clinical correlation and continued follow-up advised     [facility] 12/11/22   Grossly stable evolving large left MCA distribution infarct and smaller infarct in the right frontal lobe with possible trace hemorrhagic conversion. Persistent mass effect with sulcal effacement and slight mass effect on the left lateral ventricle. No new or worsening intracranial hemorrhage and no worsening of minimal rightward midline shift.    [facility] 12/9/22   Evolving no enlarged left MCA territory infarction with hypoattenuation and sulcal effacement. Intermediate density along the left basal ganglia compatible with known hemorrhagic conversion. Smaller area of infarction in the right cerebral hemisphere not well seen. Evolving mass effect and edema associated with the left cerebral hemispheric infarction with slight compression of the left lateral ventricle and trace 1 mm of rightward midline shift. No evidence for hydrocephalus    MRI Brain WO Contrast 12/8/22    Exam limited by patient motion artifact. Evolving left MCA territory infarct.  Additional foci of diffusion restriction in the left cerebral consistent with recent infarct.  Interval increase susceptibility artifact in the areas of diffusion restriction consistent with hemorrhagic conversion of recent infarcts.  No hydrocephalus or significant midline shift.      [facility] 12/8/22 16:29   -Evolving large left MCA territory infarction similar to recent CT though increased in size compared to prior MRI concerning for evolving recent to subacute and acute infarcts.  Localized mass effect without significant midline shift or hydrocephalus.  There is evolving recent to subacute age right posterior frontal infarction similar to prior MRI allowing for differences in technique  -There is subtle hyperdensity along the left basal ganglia posteriorly and  along the right posterior frontal cortex which may represent enhancing subacute age components of infarction with petechial hemorrhage felt less likely but cannot be entirely excluded with limitation secondary to recirculation contrast related to recent CTA performed at outside institution.  -Evolving mass effect most pronounced associated with the left MCA territory infarction with sulcal effacement without significant midline shift or hydrocephalus.    [facility] 12/8/22 12:11 (CTA H/N)   1. Left diencephalic hemisphere demonstrates evidence of an acute infarct of left basal ganglia and left caudate lobe.  2. Occlusion of the posterior division of the left M2 branch at the takeoff of the M1 vessel with patency involving the anterior division of the left M2 segment.    [facility] 12/7/22   1.  Moderate size subacute infarction left anterior basal ganglia and anterior left internal capsule appears mildly larger and better well defined compared to CT from 12/3/2020. There is currently greater mass effect on the anterior horn of the left lateral ventricle. There is no associated bleed or midline shift.  2.  Recent MRI demonstrated additional punctate acute infarctions in the left frontal lobe and a mild size acute infarction right centrum semiovale. These are less obvious on CT. No associated bleed.  3.  Additional chronic periventricular white matter hypodensities.    MRI Brain WO contrast 12/3/22   Multifocal areas of restricted diffusion are felt to reflect acute infarcts.    [facility] 12/3/22   Loss of gray-white matter distinction in involving the left basal ganglia could reflect changes of chronic small vessel ischemic disease versus cytotoxic edema from acute infarct.     Carotid US Bilateral 12/3/22   1. Carotid intimal hyperplasia, with no findings of hemodynamically significant carotid arterial stenosis or vascular occlusion.  2. Patent vertebral arteries with antegrade flow bilaterally.      Cardiac Imaging   [facility] 12/4/22   The  left ventricle is small with moderately decreased systolic function.  The estimated ejection fraction is 38%.  Left ventricular diastolic dysfunction.  There are segmental left ventricular wall motion abnormalities.  No spontaneous echo contrast. A moderate protruding layered left ventricular thrombus is present. The thrombus is fixed and located in the apex.  Normal right ventricular size.  Mild left atrial enlargement.  Mild right atrial enlargement.  Mild aortic regurgitation.  No interatrial septal defect present.  Normal appearing left atrial appendage. No thrombus is present in the appendage. SB occluder is absent. Abnormal appendage velocities.  Mild mitral regurgitation.  Agitated saline contrast study did not show any right to left shunt    TTE 11/14/22   The left ventricle is normal in size with mildly decreased systolic function.  The estimated ejection fraction is 40%.  Grade III left ventricular diastolic dysfunction.  Small posterior pericardial effusion.  There is mild left ventricular global hypokinesis.  Normal right ventricular size with normal right ventricular systolic function.  Severe left atrial enlargement.  Moderate right atrial enlargement.  Mild pulmonic regurgitation.  Mild to moderate tricuspid regurgitation.  Mild-to-moderate aortic regurgitation.  Intermediate central venous pressure (8 mmHg).  The estimated PA systolic pressure is 51 mmHg.  There is pulmonary hypertension.

## 2022-12-17 NOTE — ASSESSMENT & PLAN NOTE
Stroke RF  SBP < 200, MAP >65  Consider slowly lowering SBP goal to <180 slowly  On [id number] and metoprolol increased to 50 BID  PRN Labetalol

## 2022-12-17 NOTE — PLAN OF CARE
Discussed mitten restraint removal with pt. Pt nodded understanding. Mitten removed during pt's bath. Pt did not attempt to remove NG tube or other equipment. Mitten removed, restraint order d/c'd. Pt assisted with turns for bath and overnight.       Problem: Adult Inpatient Plan of Care  Goal: Plan of Care Review  Outcome: Ongoing, Progressing  Goal: Absence of Hospital-Acquired Illness or Injury  Outcome: Ongoing, Progressing  Goal: Optimal Comfort and Wellbeing  Outcome: Ongoing, Progressing     Problem: Adjustment to Illness (Delirium)  Goal: Optimal Coping  Outcome: Ongoing, Progressing     Problem: Attention and Thought Clarity Impairment (Delirium)  Goal: Improved Attention and Thought Clarity  Outcome: Ongoing, Progressing     Problem: Cognitive Impairment (Stroke, Ischemic/Transient Ischemic Attack)  Goal: Optimal Cognitive Function  Outcome: Ongoing, Progressing     Problem: Communication Impairment (Stroke, Ischemic/Transient Ischemic Attack)  Goal: Improved Communication Skills  Outcome: Ongoing, Progressing     Problem: Fall Injury Risk  Goal: Absence of Fall and Fall-Related Injury  Outcome: Ongoing, Progressing     Problem: Skin Injury Risk Increased  Goal: Skin Health and Integrity  Outcome: Ongoing, Progressing

## 2022-12-18 LAB
ALBUMIN SERPL BCP-MCNC: 2.4 G/DL (ref 3.5–5.2)
ALP SERPL-CCNC: 124 U/L (ref 55–135)
ALT SERPL W/O P-5'-P-CCNC: 82 U/L (ref 10–44)
ANION GAP SERPL CALC-SCNC: 11 MMOL/L (ref 8–16)
APTT BLDCRRT: 41.7 SEC (ref 21–32)
AST SERPL-CCNC: 70 U/L (ref 10–40)
BASOPHILS # BLD AUTO: 0.06 K/UL (ref 0–0.2)
BASOPHILS NFR BLD: 0.5 % (ref 0–1.9)
BILIRUB SERPL-MCNC: 0.4 MG/DL (ref 0.1–1)
BUN SERPL-MCNC: 47 MG/DL (ref 6–20)
CALCIUM SERPL-MCNC: 9.4 MG/DL (ref 8.7–10.5)
CHLORIDE SERPL-SCNC: 111 MMOL/L (ref 95–110)
CO2 SERPL-SCNC: 27 MMOL/L (ref 23–29)
CREAT SERPL-MCNC: 4 MG/DL (ref 0.5–1.4)
DIFFERENTIAL METHOD: ABNORMAL
EOSINOPHIL # BLD AUTO: 0.2 K/UL (ref 0–0.5)
EOSINOPHIL NFR BLD: 1.4 % (ref 0–8)
ERYTHROCYTE [DISTWIDTH] IN BLOOD BY AUTOMATED COUNT: 15.1 % (ref 11.5–14.5)
EST. GFR  (NO RACE VARIABLE): 17.4 ML/MIN/1.73 M^2
GLUCOSE SERPL-MCNC: 259 MG/DL (ref 70–110)
HCT VFR BLD AUTO: 40.5 % (ref 40–54)
HGB BLD-MCNC: 12.3 G/DL (ref 14–18)
IMM GRANULOCYTES # BLD AUTO: 0.06 K/UL (ref 0–0.04)
IMM GRANULOCYTES NFR BLD AUTO: 0.5 % (ref 0–0.5)
LYMPHOCYTES # BLD AUTO: 1.4 K/UL (ref 1–4.8)
LYMPHOCYTES NFR BLD: 12.7 % (ref 18–48)
MAGNESIUM SERPL-MCNC: 2.3 MG/DL (ref 1.6–2.6)
MCH RBC QN AUTO: 26.9 PG (ref 27–31)
MCHC RBC AUTO-ENTMCNC: 30.4 G/DL (ref 32–36)
MCV RBC AUTO: 88 FL (ref 82–98)
MONOCYTES # BLD AUTO: 0.7 K/UL (ref 0.3–1)
MONOCYTES NFR BLD: 6.2 % (ref 4–15)
NEUTROPHILS # BLD AUTO: 8.7 K/UL (ref 1.8–7.7)
NEUTROPHILS NFR BLD: 78.7 % (ref 38–73)
NRBC BLD-RTO: 0 /100 WBC
PHOSPHATE SERPL-MCNC: 2.8 MG/DL (ref 2.7–4.5)
PLATELET # BLD AUTO: 270 K/UL (ref 150–450)
PMV BLD AUTO: 13.5 FL (ref 9.2–12.9)
POCT GLUCOSE: 115 MG/DL (ref 70–110)
POCT GLUCOSE: 127 MG/DL (ref 70–110)
POCT GLUCOSE: 159 MG/DL (ref 70–110)
POCT GLUCOSE: 241 MG/DL (ref 70–110)
POCT GLUCOSE: 97 MG/DL (ref 70–110)
POTASSIUM SERPL-SCNC: 4 MMOL/L (ref 3.5–5.1)
PROT SERPL-MCNC: 6.3 G/DL (ref 6–8.4)
RBC # BLD AUTO: 4.58 M/UL (ref 4.6–6.2)
SODIUM SERPL-SCNC: 149 MMOL/L (ref 136–145)
WBC # BLD AUTO: 11.06 K/UL (ref 3.9–12.7)

## 2022-12-18 PROCEDURE — 25000003 PHARM REV CODE 250: Performed by: NURSE PRACTITIONER

## 2022-12-18 PROCEDURE — 84100 ASSAY OF PHOSPHORUS: CPT | Performed by: PHYSICIAN ASSISTANT

## 2022-12-18 PROCEDURE — 63600175 PHARM REV CODE 636 W HCPCS: Performed by: NURSE PRACTITIONER

## 2022-12-18 PROCEDURE — 99233 SBSQ HOSP IP/OBS HIGH 50: CPT | Mod: ,,, | Performed by: STUDENT IN AN ORGANIZED HEALTH CARE EDUCATION/TRAINING PROGRAM

## 2022-12-18 PROCEDURE — 85730 THROMBOPLASTIN TIME PARTIAL: CPT | Performed by: NURSE PRACTITIONER

## 2022-12-18 PROCEDURE — 83735 ASSAY OF MAGNESIUM: CPT | Performed by: PHYSICIAN ASSISTANT

## 2022-12-18 PROCEDURE — 80053 COMPREHEN METABOLIC PANEL: CPT | Performed by: PHYSICIAN ASSISTANT

## 2022-12-18 PROCEDURE — 99233 PR SUBSEQUENT HOSPITAL CARE,LEVL III: ICD-10-PCS | Mod: ,,, | Performed by: STUDENT IN AN ORGANIZED HEALTH CARE EDUCATION/TRAINING PROGRAM

## 2022-12-18 PROCEDURE — 85025 COMPLETE CBC W/AUTO DIFF WBC: CPT | Performed by: NURSE PRACTITIONER

## 2022-12-18 PROCEDURE — 11000001 HC ACUTE MED/SURG PRIVATE ROOM

## 2022-12-18 PROCEDURE — 25000003 PHARM REV CODE 250

## 2022-12-18 PROCEDURE — 36415 COLL VENOUS BLD VENIPUNCTURE: CPT | Performed by: STUDENT IN AN ORGANIZED HEALTH CARE EDUCATION/TRAINING PROGRAM

## 2022-12-18 PROCEDURE — 25000003 PHARM REV CODE 250: Performed by: PSYCHIATRY & NEUROLOGY

## 2022-12-18 RX ORDER — CARVEDILOL 12.5 MG/1
12.5 TABLET ORAL 2 TIMES DAILY
Status: DISCONTINUED | OUTPATIENT
Start: 2022-12-18 | End: 2022-12-18

## 2022-12-18 RX ORDER — CARVEDILOL 12.5 MG/1
12.5 TABLET ORAL 2 TIMES DAILY
Status: DISCONTINUED | OUTPATIENT
Start: 2022-12-18 | End: 2022-12-20

## 2022-12-18 RX ADMIN — ESCITALOPRAM OXALATE 20 MG: 20 TABLET ORAL at 09:12

## 2022-12-18 RX ADMIN — INSULIN ASPART 9 UNITS: 100 INJECTION, SOLUTION INTRAVENOUS; SUBCUTANEOUS at 12:12

## 2022-12-18 RX ADMIN — INSULIN ASPART 9 UNITS: 100 INJECTION, SOLUTION INTRAVENOUS; SUBCUTANEOUS at 04:12

## 2022-12-18 RX ADMIN — AMLODIPINE BESYLATE 10 MG: 10 TABLET ORAL at 09:12

## 2022-12-18 RX ADMIN — ATORVASTATIN CALCIUM 40 MG: 40 TABLET, FILM COATED ORAL at 09:12

## 2022-12-18 RX ADMIN — INSULIN ASPART 9 UNITS: 100 INJECTION, SOLUTION INTRAVENOUS; SUBCUTANEOUS at 09:12

## 2022-12-18 RX ADMIN — HEPARIN SODIUM 12 UNITS/KG/HR: 10000 INJECTION, SOLUTION INTRAVENOUS at 11:12

## 2022-12-18 RX ADMIN — FUROSEMIDE 40 MG: 40 TABLET ORAL at 09:12

## 2022-12-18 RX ADMIN — CARVEDILOL 12.5 MG: 12.5 TABLET, FILM COATED ORAL at 09:12

## 2022-12-18 RX ADMIN — INSULIN ASPART 2 UNITS: 100 INJECTION, SOLUTION INTRAVENOUS; SUBCUTANEOUS at 09:12

## 2022-12-18 RX ADMIN — METOPROLOL TARTRATE 50 MG: 50 TABLET, FILM COATED ORAL at 09:12

## 2022-12-18 NOTE — PLAN OF CARE
"At 400 rounds RN noted that pt's NGT adhesive device had become dislodged and tube was 2 aprox 2 inches out of place. RN explained to pt that tube needed to be adjusted back into place. Pt nodded. However, when RN pushed tube back into place, pt pushed RN away and removed tube.     Educated pt on need to replace NGT (for feeds and meds). Pt shook head and attempted to push RN away. Spoke to on call practitioner, L wrist restraint placed and new NGT placed pending X-ray confirmation.     Neuro checks stable, pt attempting some verbal communication with encouragement. Severe slurring of speech with potential expressive aphasia, but pt is able to state name and say "yes" and "no"     Problem: Adult Inpatient Plan of Care  Goal: Plan of Care Review  Outcome: Ongoing, Progressing  Goal: Absence of Hospital-Acquired Illness or Injury  Outcome: Ongoing, Progressing     Problem: Adjustment to Illness (Delirium)  Goal: Optimal Coping  Outcome: Ongoing, Progressing     Problem: Altered Behavior (Delirium)  Goal: Improved Behavioral Control  Outcome: Ongoing, Progressing     Problem: Attention and Thought Clarity Impairment (Delirium)  Goal: Improved Attention and Thought Clarity  Outcome: Ongoing, Progressing     Problem: Sleep Disturbance (Delirium)  Goal: Improved Sleep  Outcome: Ongoing, Progressing     Problem: Adjustment to Illness (Stroke, Ischemic/Transient Ischemic Attack)  Goal: Optimal Coping  Outcome: Ongoing, Progressing     Problem: Cognitive Impairment (Stroke, Ischemic/Transient Ischemic Attack)  Goal: Optimal Cognitive Function  Outcome: Ongoing, Progressing     Problem: Communication Impairment (Stroke, Ischemic/Transient Ischemic Attack)  Goal: Improved Communication Skills  Outcome: Ongoing, Progressing     "

## 2022-12-18 NOTE — SUBJECTIVE & OBJECTIVE
Neurologic Chief Complaint: lethargy, aphasia, dysarthria     Subjective:     Interval History: Patient is seen for follow-up neurological assessment and treatment recommendations:     Neuro exam unchanged   NA+ improved with water boluses.  BP meds adjusted. Plan for PEG placement on 12/19    HPI, Past Medical, Family, and Social History remains the same as documented in the initial encounter.     Review of Systems   Unable to perform ROS: Other (Aphasia)   Scheduled Meds:   amLODIPine  10 mg Per NG tube Daily    atorvastatin  40 mg Per NG tube Daily    barium  450 mL Per NG tube Once    EScitalopram oxalate  20 mg Per NG tube Daily    furosemide  40 mg Per NG tube Daily    insulin aspart U-100  9 Units Subcutaneous 6 times per day    metoprolol tartrate  50 mg Per NG tube BID     Continuous Infusions:   dextrose 10 % in water (D10W)      heparin (porcine) in D5W 12 Units/kg/hr (12/17/22 0619)     PRN Meds:barium, dextrose 10 % in water (D10W), dextrose 10%, dextrose 10%, glucagon (human recombinant), insulin aspart U-100, labetaloL, ondansetron, sodium chloride 0.9%    Objective:     Vital Signs (Most Recent):  Temp: 98.6 °F (37 °C) (12/18/22 0730)  Pulse: 88 (12/18/22 0730)  Resp: 17 (12/18/22 0730)  BP: (!) 167/110 (12/18/22 0730)  SpO2: 98 % (12/18/22 0730)  BP Location: Right arm    Vital Signs Range (Last 24H):  Temp:  [97.6 °F (36.4 °C)-98.7 °F (37.1 °C)]   Pulse:  []   Resp:  [16-19]   BP: (150-184)/()   SpO2:  [94 %-98 %]   BP Location: Right arm    Physical Exam  Vitals and nursing note reviewed.   Constitutional:       General: He is not in acute distress.  HENT:      Head: Normocephalic.      Nose: No rhinorrhea.      Comments: NGT in place  Eyes:      General: No scleral icterus.     Conjunctiva/sclera: Conjunctivae normal.   Cardiovascular:      Rate and Rhythm: Normal rate.   Pulmonary:      Effort: No respiratory distress.   Abdominal:      General: There is no distension.    Musculoskeletal:         General: No deformity.   Skin:     General: Skin is warm and dry.   Neurological:      Mental Status: He is lethargic.      Motor: Weakness present.      Comments: Withdraws on R side to noxious stimuli  Moves left side spontaneously  Severe aphasia        Psychiatric:      Comments: Unable to assess mood and thought content 2/2 level of consciousness       Neurological Exam:   LOC: lethargic  Attention Span: poor  Language: mixed aphasia, intermittently follows some simple commands  Articulation: Nonverbal unable to assess   Orientation: Nonverbal unable to assess   EOM (CN III, IV, VI): Tracks   Motor: L side moves spontaneously and antigravity, R side withdraws from pain   Sensation: Withdraws right side to noxious stimuli      Laboratory:  CMP:   Recent Labs   Lab 12/18/22 0238   CALCIUM 9.4   ALBUMIN 2.4*   PROT 6.3   *   K 4.0   CO2 27   *   BUN 47*   CREATININE 4.0*   ALKPHOS 124   ALT 82*   AST 70*   BILITOT 0.4       BMP:   Recent Labs   Lab 12/18/22 0238   *   K 4.0   *   CO2 27   BUN 47*   CREATININE 4.0*   CALCIUM 9.4       CBC:   Recent Labs   Lab 12/18/22 0238   WBC 11.06   RBC 4.58*   HGB 12.3*   HCT 40.5      MCV 88   MCH 26.9*   MCHC 30.4*       Lipid Panel:   No results for input(s): CHOL, LDLCALC, HDL, TRIG in the last 168 hours.    Coagulation:   Recent Labs   Lab 12/18/22 0238   APTT 41.7*       Platelet Aggregation Study: No results for input(s): PLTAGG, PLTAGINTERP, PLTAGREGLACO, ADPPLTAGGREG in the last 168 hours.  Hgb A1C:   No results for input(s): HGBA1C in the last 168 hours.    TSH:   No results for input(s): TSH in the last 168 hours.      Diagnostic Results     Brain/Vessel Imaging   CT 12/13/22  -Evolving large left MCA infarct with stable mass effect and petechial hemorrhagic conversion.   -No definite new hemorrhage or significant new abnormal parenchymal attenuation   -Separate areas of infarction involving the right  parietal lobe and left cerebellum not well seen with CT technique.   -Clinical correlation and continued follow-up advised     [facility] 12/11/22   Grossly stable evolving large left MCA distribution infarct and smaller infarct in the right frontal lobe with possible trace hemorrhagic conversion. Persistent mass effect with sulcal effacement and slight mass effect on the left lateral ventricle. No new or worsening intracranial hemorrhage and no worsening of minimal rightward midline shift.    [facility] 12/9/22   Evolving no enlarged left MCA territory infarction with hypoattenuation and sulcal effacement. Intermediate density along the left basal ganglia compatible with known hemorrhagic conversion. Smaller area of infarction in the right cerebral hemisphere not well seen. Evolving mass effect and edema associated with the left cerebral hemispheric infarction with slight compression of the left lateral ventricle and trace 1 mm of rightward midline shift. No evidence for hydrocephalus    MRI Brain WO Contrast 12/8/22    Exam limited by patient motion artifact. Evolving left MCA territory infarct.  Additional foci of diffusion restriction in the left cerebral consistent with recent infarct.  Interval increase susceptibility artifact in the areas of diffusion restriction consistent with hemorrhagic conversion of recent infarcts.  No hydrocephalus or significant midline shift.      [facility] 12/8/22 16:29   -Evolving large left MCA territory infarction similar to recent CT though increased in size compared to prior MRI concerning for evolving recent to subacute and acute infarcts.  Localized mass effect without significant midline shift or hydrocephalus.  There is evolving recent to subacute age right posterior frontal infarction similar to prior MRI allowing for differences in technique  -There is subtle hyperdensity along the left basal ganglia posteriorly and along the right posterior frontal cortex which may represent enhancing  subacute age components of infarction with petechial hemorrhage felt less likely but cannot be entirely excluded with limitation secondary to recirculation contrast related to recent CTA performed at outside institution.  -Evolving mass effect most pronounced associated with the left MCA territory infarction with sulcal effacement without significant midline shift or hydrocephalus.    [facility] 12/8/22 12:11 (CTA H/N)   1. Left diencephalic hemisphere demonstrates evidence of an acute infarct of left basal ganglia and left caudate lobe.  2. Occlusion of the posterior division of the left M2 branch at the takeoff of the M1 vessel with patency involving the anterior division of the left M2 segment.    [facility] 12/7/22   1.  Moderate size subacute infarction left anterior basal ganglia and anterior left internal capsule appears mildly larger and better well defined compared to CT from 12/3/2020. There is currently greater mass effect on the anterior horn of the left lateral ventricle. There is no associated bleed or midline shift.  2.  Recent MRI demonstrated additional punctate acute infarctions in the left frontal lobe and a mild size acute infarction right centrum semiovale. These are less obvious on CT. No associated bleed.  3.  Additional chronic periventricular white matter hypodensities.    MRI Brain WO contrast 12/3/22   Multifocal areas of restricted diffusion are felt to reflect acute infarcts.    CTH 12/3/22   Loss of gray-white matter distinction in involving the left basal ganglia could reflect changes of chronic small vessel ischemic disease versus cytotoxic edema from acute infarct.     Carotid US Bilateral 12/3/22   1. Carotid intimal hyperplasia, with no findings of hemodynamically significant carotid arterial stenosis or vascular occlusion.  2. Patent vertebral arteries with antegrade flow bilaterally.      Cardiac Imaging   [facility] 12/4/22   The left ventricle is small with moderately decreased systolic  function.  The estimated ejection fraction is 38%.  Left ventricular diastolic dysfunction.  There are segmental left ventricular wall motion abnormalities.  No spontaneous echo contrast. A moderate protruding layered left ventricular thrombus is present. The thrombus is fixed and located in the apex.  Normal right ventricular size.  Mild left atrial enlargement.  Mild right atrial enlargement.  Mild aortic regurgitation.  No interatrial septal defect present.  Normal appearing left atrial appendage. No thrombus is present in the appendage. SB occluder is absent. Abnormal appendage velocities.  Mild mitral regurgitation.  Agitated saline contrast study did not show any right to left shunt    TTE 11/14/22   The left ventricle is normal in size with mildly decreased systolic function.  The estimated ejection fraction is 40%.  Grade III left ventricular diastolic dysfunction.  Small posterior pericardial effusion.  There is mild left ventricular global hypokinesis.  Normal right ventricular size with normal right ventricular systolic function.  Severe left atrial enlargement.  Moderate right atrial enlargement.  Mild pulmonic regurgitation.  Mild to moderate tricuspid regurgitation.  Mild-to-moderate aortic regurgitation.  Intermediate central venous pressure (8 mmHg).  The estimated PA systolic pressure is 51 mmHg.  There is pulmonary hypertension.

## 2022-12-18 NOTE — ASSESSMENT & PLAN NOTE
Cr 4.0 and GFR 17.4 today   - continue to monitor renal function with scheduled labs   - avoid nephrotoxins  - renally dose medications when appropiate   - monitor events that may lead to decreased renal perfusion (hypovolemia, hypotension, sepsis)  - monitor urine output to assure that no obstruction precipitates worsening in GFR

## 2022-12-18 NOTE — NURSING
Spoke with Dr. Ruffin outside patient's room and asked at what time patient's Heparin drip should be stopped tomorrow prior to IR PEG placement.  Dr. Ruffin reports will know tomorrow morning once IR schedules procedure.

## 2022-12-18 NOTE — PROGRESS NOTES
[facility] Neurosurgery ([facility])  Vascular Neurology  Comprehensive Stroke Center  Progress Note    Assessment/Plan:     * Embolic stroke involving left middle cerebral artery  51 yo M with CHF who initially presented to OSH with RSW. Did not receive tPA, as he was outside of the window. MRI at that time demonstrated bilateral anterior and posterior circulation strokes concerning for embolic etiology. Over hospital course at OSH, the patient was noted to have been lethargic and aphasic with dysarthria. RAGHAV at OSH with L ventricular thrombus (not on AC). He was noted to have had an acute change in neuro exam on 12/8 at which time he had RSW with global aphasia. NIHSS had been 6 prior to the event and was noted to have been 23 following. CTA demonstrated a L M2 occlusion for which [place] [facility] [place] level of care. He was not taken to IR due to poor ASPECTS and was admitted to ICU for close monitoring and hemicrani watch.   -Etiology: likely cardioembolic given LV thrombus      Neuro exam unchanged. Na+ improved with water boluses.  Coreg added and Lopressor discontinued for better BP control    Antithrombotics for secondary stroke prevention:  on heparin gtt until swallowing or PEG established, can be switched to DOAC when able    Statins for secondary stroke prevention and hyperlipidemia, if present:   Statins: Atorvastatin- 40 mg daily    Aggressive risk factor modification: HTN, DM, HLD, Diet, Exercise, LV thrombus     Rehab efforts: Rehab; NPO, tube feeds via NGT    Diagnostics ordered/pending: None     VTE prophylaxis: Mechanical prophylaxis: Place SCDs  None: Reason for No Pharmacological VTE Prophylaxis: Currently on anticoagulation     BP parameters: SBP <200      Oropharyngeal dysphagia  Due to stroke  NG tube in place, PEG to be placed on 12/19    JR on CKD  See CKD    Hyperlipidemia associated with type 2 diabetes mellitus  Stroke RF   . 8, goal  <70  Continue atorvastatin 40mg daily    CHF (congestive heart failure)  Stroke risk factor  RAGHAV with EF 38% and segmental LV wall motion abnormalities  Strict I/Os  -continue lasix 40    Acute cystitis without hematuria  Klebsiella pneumoniae on cx, treated with ceftriaxone    Debility  2/2 stroke   OT and PT to evaluate   Therapy recommending IPR     Cytotoxic brain edema  -Noted on imaging in the area of the L MCA territory. Will continue to monitor q1h while in ICU and repeat CTH PRN for acute neuro exam changes that could indicate worsening/expansion of edema.   -NSGY consulted for hemicrani watch due to malignant MCA, no acute intervention recommended as serial imaging remains stable    LV (left ventricular) mural thrombus  Stroke RF  Continue heparin gtt as patient NPO and will likely PEG placement, switch to DOAC when able    Stage 4 chronic kidney disease  Cr 4.0 and GFR 17.4 today   - continue to monitor renal function with scheduled labs   - avoid nephrotoxins  - renally dose medications when appropiate   - monitor events that may lead to decreased renal perfusion (hypovolemia, hypotension, sepsis)  - monitor urine output to assure that no obstruction precipitates worsening in GFR      Type 2 diabetes mellitus, with long-term current use of insulin  Stroke RF  A1c 9.1, consider nutrition consult and DM education  BG goal while inpatient 140-180  Currently on Aspart 9units q4h + aspart 1-10U q4h PRN    Essential hypertension  Stroke RF  SBP < 200, MAP >65  Consider slowly lowering SBP goal to <180 slowly  - continue amlodipine 10 mg   - Lopressor discontinued and Coreg 12.5 mg  added for better BP control          12/09/2022 Patient with LV thrombus, will need anticoagulation. NGT in place would recommend heparin gtt until patient able to swallow or PEG placed prior to DOAC initiation. Patient tachycardic today with SBP < 210, metoprolol started by primary team. Febrile with elevated white count and  procal, currently on Zosyn and Vanc while cx pending. NSGY following for hemicrani watch. Patient has been discharged from OT per last note, will need new orders. Continue current ICU care.   12/10/2022: Pending stability scan patient is expected to be started on heparin gtt. Continue hemicrani watch in  ICU. Family at bedside.   12/11/2022: Patient started on heparin gtt overnight, he demonstrated great clinical improvement today, he was able to tell me his name, knew he is in [facility]. Followed single step commands. No family at bedside today.   12/12/2022 Patient remains in ICU for sue-crani watch. NSGY following. On salt tabs for goal of hypernatremia.Will remain in ICU another night. VN to re-evaluate for stepdown tomorrow. Patient is on ceftriaxone for acute cystitis. aPTT today 42.9. CTH following therapeutic heparin levels stable. More alert today. IPR recommendations; SLP with minced and moist diet with thin liquids.   12/13/2022 Remains in NCC, neuro exam unchanged and limited by drowsiness. Overnight had short run of VT/SVT that resolved without any interventions. BPs not consistently at goal of <200, had max BP today of 214/140. Currently NPO with NGT in place and anticipate patient will need PEG placement, SLP recommending ice chips sparingly for pleasure. Anticipate step down tomorrow once BP is consistently at goal  12/14/2022 Pending SD to NPU. Continue to watch BP closely, SBP<200. Patient will likely need a PEG.  12/15/2022. Pending SD to NPU. Poor exam, patient very lethargic, WD on the right.   12/16/2022 Patient stepped down overnight to NPU. Exam stable. Re-evaluated by speech this afternoon, still recommending NPO. Will need to discuss nutrition going forward (I.e. need for PEG). Recommendations for IPR.   12/17/22 Exam stable, elevated sodium, free water boluses added, metoprolol increased for elevated BP & HR.  IR consult placed for G tube placement after discussing with patient's family via  phone.  12/18/22 patient displaced NG tube overnight, adjusted by nursing. KUB reviewed- BG ok to use. Water boluses added yesterday for hypernatremia, Na+ improved to 149. -184, Lopressor discontinued and Coreg ordered for better BP control. Plan for PEG placement on 12/19       STROKE DOCUMENTATION   Acute Stroke Times   Last Known Normal Date: 12/08/22  Last Known Normal Time: 1145  Symptom Onset Date: 12/08/22  Symptom Onset Time: 1145  Stroke Team Called Date: 12/08/22  Stroke Team Called Time: 1615  Stroke Team Arrival Date: 12/08/22  Stroke Team Arrival Time: 1615  CT Interpretation Time: 1615  Thrombolytic Therapy Recommended: No  CTA Interpretation Time: 1615    NIH Scale:  1a. Level of Consciousness: 1-->Not alert, but arousable by minor stimulation to obey, answer, or respond  1b. LOC Questions: 2-->Answers neither question correctly  1c. LOC Commands: 1-->Performs one task correctly  2. Best Gaze: 2-->Forced deviation, or total gaze paresis not overcome by the oculocephalic maneuver  3. Visual: 0-->No visual loss  4. Facial Palsy: 1-->Minor paralysis (flattened nasolabial fold, asymmetry on smiling)  5a. Motor Arm, Left: 0-->No drift, limb holds 90 (or 45) degrees for full 10 secs  5b. Motor Arm, Right: 3-->No effort against gravity, limb falls  6a. Motor Leg, Left: 0-->No drift, leg holds 30 degree position for full 5 secs  6b. Motor Leg, Right: 3-->No effort against gravity, leg falls to bed immediately  7. Limb Ataxia: 0-->Absent  8. Sensory: 1-->Mild-to-moderate sensory loss, patient feels pinprick is less sharp or is dull on the affected side, or there is a loss of superficial pain with pinprick, but patient is aware of being touched  9. Best Language: 2-->Severe aphasia, all communication is through fragmentary expression, great need for inference, questioning, and guessing by the listener. Range of information that can be exchanged is limited, listener carries burden of. . . (see row  details)  10. Dysarthria: 2-->Severe dysarthria, patients speech is so slurred as to be unintelligible in the absence of or out of proportion to any dysphasia, or is mute/anarthric  11. Extinction and Inattention (formerly Neglect): 1-->Visual, tactile, auditory, spatial, or personal inattention or extinction to bilateral simultaneous stimulation in one of the sensory modalities  Total (NIH Stroke Scale): 19       Modified Milana Score: 5  Walton Coma Scale:    ABCD2 Score:    [id number] Score:   HAS -BLED Score:   ICH Score:   Hunt & Leblanc Classification:      Hemorrhagic change of an Ischemic Stroke: Does this patient have an ischemic stroke with hemorrhagic changes? No     Neurologic Chief Complaint: lethargy, aphasia, dysarthria     Subjective:     Interval History: Patient is seen for follow-up neurological assessment and treatment recommendations:     Neuro exam unchanged   NA+ improved with water boluses.  BP meds adjusted. Plan for PEG placement on 12/19    HPI, Past Medical, Family, and Social History remains the same as documented in the initial encounter.     Review of Systems   Unable to perform ROS: Other (Aphasia)   Scheduled Meds:   amLODIPine  10 mg Per NG tube Daily    atorvastatin  40 mg Per NG tube Daily    barium  450 mL Per NG tube Once    EScitalopram oxalate  20 mg Per NG tube Daily    furosemide  40 mg Per NG tube Daily    insulin aspart U-100  9 Units Subcutaneous 6 times per day    metoprolol tartrate  50 mg Per NG tube BID     Continuous Infusions:   dextrose 10 % in water (D10W)      heparin (porcine) in D5W 12 Units/kg/hr (12/17/22 0619)     PRN Meds:barium, dextrose 10 % in water (D10W), dextrose 10%, dextrose 10%, glucagon (human recombinant), insulin aspart U-100, labetaloL, ondansetron, sodium chloride 0.9%    Objective:     Vital Signs (Most Recent):  Temp: 98.6 °F (37 °C) (12/18/22 0730)  Pulse: 88 (12/18/22 0730)  Resp: 17 (12/18/22 0730)  BP: (!) 167/110 (12/18/22  0730)  SpO2: 98 % (12/18/22 0730)  BP Location: Right arm    Vital Signs Range (Last 24H):  Temp:  [97.6 °F (36.4 °C)-98.7 °F (37.1 °C)]   Pulse:  []   Resp:  [16-19]   BP: (150-184)/()   SpO2:  [94 %-98 %]   BP Location: Right arm    Physical Exam  Vitals and nursing note reviewed.   Constitutional:       General: He is not in acute distress.  HENT:      Head: Normocephalic.      Nose: No rhinorrhea.      Comments: NGT in place  Eyes:      General: No scleral icterus.     Conjunctiva/sclera: Conjunctivae normal.   Cardiovascular:      Rate and Rhythm: Normal rate.   Pulmonary:      Effort: No respiratory distress.   Abdominal:      General: There is no distension.   Musculoskeletal:         General: No deformity.   Skin:     General: Skin is warm and dry.   Neurological:      Mental Status: He is lethargic.      Motor: Weakness present.      Comments: Withdraws on R side to noxious stimuli  Moves left side spontaneously  Severe aphasia        Psychiatric:      Comments: Unable to assess mood and thought content 2/2 level of consciousness       Neurological Exam:   LOC: lethargic  Attention Span: poor  Language: mixed aphasia, intermittently follows some simple commands  Articulation: Nonverbal unable to assess   Orientation: Nonverbal unable to assess   EOM (CN III, IV, VI): Tracks   Motor: L side moves spontaneously and antigravity, R side withdraws from pain   Sensation: Withdraws right side to noxious stimuli      Laboratory:  CMP:   Recent Labs   Lab 12/18/22 0238   CALCIUM 9.4   ALBUMIN 2.4*   PROT 6.3   *   K 4.0   CO2 27   *   BUN 47*   CREATININE 4.0*   ALKPHOS 124   ALT 82*   AST 70*   BILITOT 0.4       BMP:   Recent Labs   Lab 12/18/22 0238   *   K 4.0   *   CO2 27   BUN 47*   CREATININE 4.0*   CALCIUM 9.4       CBC:   Recent Labs   Lab 12/18/22 0238   WBC 11.06   RBC 4.58*   HGB 12.3*   HCT 40.5      MCV 88   MCH 26.9*   MCHC 30.4*       Lipid Panel:   No  results for input(s): CHOL, LDLCALC, HDL, TRIG in the last 168 hours.    Coagulation:   Recent Labs   Lab 12/18/22  0238   APTT 41.7*       Platelet Aggregation Study: No results for input(s): PLTAGG, PLTAGINTERP, PLTAGREGLACO, ADPPLTAGGREG in the last 168 hours.  Hgb A1C:   No results for input(s): HGBA1C in the last 168 hours.    TSH:   No results for input(s): TSH in the last 168 hours.      Diagnostic Results     Brain/Vessel Imaging   CTH 12/13/22  -Evolving large left MCA infarct with stable mass effect and petechial hemorrhagic conversion.   -No definite new hemorrhage or significant new abnormal parenchymal attenuation   -Separate areas of infarction involving the right parietal lobe and left cerebellum not well seen with CT technique.   -Clinical correlation and continued follow-up advised     CTH 12/11/22   Grossly stable evolving large left MCA distribution infarct and smaller infarct in the right frontal lobe with possible trace hemorrhagic conversion. Persistent mass effect with sulcal effacement and slight mass effect on the left lateral ventricle. No new or worsening intracranial hemorrhage and no worsening of minimal rightward midline shift.    CTH 12/9/22   Evolving no enlarged left MCA territory infarction with hypoattenuation and sulcal effacement. Intermediate density along the left basal ganglia compatible with known hemorrhagic conversion. Smaller area of infarction in the right cerebral hemisphere not well seen. Evolving mass effect and edema associated with the left cerebral hemispheric infarction with slight compression of the left lateral ventricle and trace 1 mm of rightward midline shift. No evidence for hydrocephalus    MRI Brain WO Contrast 12/8/22    Exam limited by patient motion artifact. Evolving left MCA territory infarct.  Additional foci of diffusion restriction in the left cerebral consistent with recent infarct.  Interval increase susceptibility artifact in the areas of  diffusion restriction consistent with hemorrhagic conversion of recent infarcts.  No hydrocephalus or significant midline shift.      CTH 12/8/22 16:29   -Evolving large left MCA territory infarction similar to recent CT though increased in size compared to prior MRI concerning for evolving recent to subacute and acute infarcts.  Localized mass effect without significant midline shift or hydrocephalus.  There is evolving recent to subacute age right posterior frontal infarction similar to prior MRI allowing for differences in technique  -There is subtle hyperdensity along the left basal ganglia posteriorly and along the right posterior frontal cortex which may represent enhancing subacute age components of infarction with petechial hemorrhage felt less likely but cannot be entirely excluded with limitation secondary to recirculation contrast related to recent CTA performed at outside institution.  -Evolving mass effect most pronounced associated with the left MCA territory infarction with sulcal effacement without significant midline shift or hydrocephalus.    CTH 12/8/22 12:11 (CTA H/N)   1. Left diencephalic hemisphere demonstrates evidence of an acute infarct of left basal ganglia and left caudate lobe.  2. Occlusion of the posterior division of the left M2 branch at the takeoff of the M1 vessel with patency involving the anterior division of the left M2 segment.    CTH 12/7/22   1.  Moderate size subacute infarction left anterior basal ganglia and anterior left internal capsule appears mildly larger and better well defined compared to CT from 12/3/2020. There is currently greater mass effect on the anterior horn of the left lateral ventricle. There is no associated bleed or midline shift.  2.  Recent MRI demonstrated additional punctate acute infarctions in the left frontal lobe and a mild size acute infarction right centrum semiovale. These are less obvious on CT. No associated bleed.  3.  Additional chronic  periventricular white matter hypodensities.    MRI Brain WO contrast 12/3/22   Multifocal areas of restricted diffusion are felt to reflect acute infarcts.    CTH 12/3/22   Loss of gray-white matter distinction in involving the left basal ganglia could reflect changes of chronic small vessel ischemic disease versus cytotoxic edema from acute infarct.     Carotid US Bilateral 12/3/22   1. Carotid intimal hyperplasia, with no findings of hemodynamically significant carotid arterial stenosis or vascular occlusion.  2. Patent vertebral arteries with antegrade flow bilaterally.      Cardiac Imaging   RAGHAV 12/4/22    The left ventricle is small with moderately decreased systolic function.   The estimated ejection fraction is 38%.   Left ventricular diastolic dysfunction.   There are segmental left ventricular wall motion abnormalities.   No spontaneous echo contrast. A moderate protruding layered left ventricular thrombus is present. The thrombus is fixed and located in the apex.   Normal right ventricular size.   Mild left atrial enlargement.   Mild right atrial enlargement.   Mild aortic regurgitation.   No interatrial septal defect present.   Normal appearing left atrial appendage. No thrombus is present in the appendage. SB occluder is absent. Abnormal appendage velocities.   Mild mitral regurgitation.   Agitated saline contrast study did not show any right to left shunt    TTE 11/14/22    The left ventricle is normal in size with mildly decreased systolic function.   The estimated ejection fraction is 40%.   Grade III left ventricular diastolic dysfunction.   Small posterior pericardial effusion.   There is mild left ventricular global hypokinesis.   Normal right ventricular size with normal right ventricular systolic function.   Severe left atrial enlargement.   Moderate right atrial enlargement.   Mild pulmonic regurgitation.   Mild to moderate tricuspid regurgitation.   Mild-to-moderate  aortic regurgitation.   Intermediate central venous pressure (8 mmHg).   The estimated PA systolic pressure is 51 mmHg.   There is pulmonary hypertension.      Kadeem Mitchell NP  [facility]  Department of Vascular Neurology   [facility] Neurosurgery [facility])

## 2022-12-18 NOTE — NURSING
Call to notify neuro vascular stroke team on call that patient recently pulled NGT out and is scheduled to have PEG placed in IR tomorrow; order received to replace NGT.

## 2022-12-18 NOTE — ASSESSMENT & PLAN NOTE
53 yo M with CHF who initially presented to OSH with RSW. Did not receive tPA, as he was outside of the window. MRI at that time demonstrated bilateral anterior and posterior circulation strokes concerning for embolic etiology. Over hospital course at OSH, the patient was noted to have been lethargic and aphasic with dysarthria. RAGHAV at OSH with L ventricular thrombus (not on AC). He was noted to have had an acute change in neuro exam on 12/8 at which time he had RSW with global aphasia. NIHSS had been 6 prior to the event and was noted to have been 23 following. CTA demonstrated a L M2 occlusion for which the patient was transferred to [facility] [place] for possible intervention and higher level of care. He was not taken to IR due to poor ASPECTS and was admitted to ICU for close monitoring and hemicrani watch.   -Etiology: likely cardioembolic given LV thrombus      Neuro exam unchanged. Na+ improved with water boluses.  Coreg added and Lopressor discontinued for better BP control    Antithrombotics for secondary stroke prevention:  on heparin gtt until swallowing or PEG established, can be switched to DOAC when able    Statins for secondary stroke prevention and hyperlipidemia, if present:   Statins: Atorvastatin- 40 mg daily    Aggressive risk factor modification: HTN, DM, HLD, Diet, Exercise, LV thrombus     Rehab efforts: Rehab; NPO, tube feeds via NGT    Diagnostics ordered/pending: None     VTE prophylaxis: Mechanical prophylaxis: Place SCDs  None: Reason for No Pharmacological VTE Prophylaxis: Currently on anticoagulation     BP parameters: SBP <200

## 2022-12-18 NOTE — ASSESSMENT & PLAN NOTE
Stroke RF  SBP < 200, MAP >65  Consider slowly lowering SBP goal to <180 slowly  - continue amlodipine 10 mg   - Lopressor discontinued and Coreg 12.5 mg  added for better BP control

## 2022-12-18 NOTE — ASSESSMENT & PLAN NOTE
Stroke risk factor  RAGHAV with EF 38% and segmental LV wall motion abnormalities  Strict I/Os  -continue lasix 40

## 2022-12-18 NOTE — H&P
Inpatient Radiology Pre-procedure Note    History of Present Illness:  Spencer Burton Jr. is a 52 y.o. male with a history of CHF who presented to an OSH with right sided weakness and an MRI showing bilateral anterior and posterior circulation strokes concerning for embolic etiology. RAGHAV showed left ventricular thrombus. Transfer[place] to [facility] [place] new L M2 occlusion for higher level of care. During his admission he was evaluated by speech therapy with recommendation to remain NPO. IR consulted for G-tube placement.     Admission H&P reviewed.  Past Medical History:   Diagnosis Date    CHF (congestive heart failure)     COPD (chronic obstructive pulmonary disease)     Debility 12/8/2022    Diabetes mellitus     Embolic stroke involving left middle cerebral artery 12/8/2022    Enlarged LA (left atrium) 12/10/2022    Essential hypertension 5/12/2012    Hyperlipidemia     Hypertension     LV (left ventricular) mural thrombus 12/8/2022    Type 2 diabetes mellitus without complication, without long-term current use of insulin 9/4/2015    Type 2 diabetes mellitus, with long-term current use of insulin 11/6/2012     Past Surgical History:   Procedure Laterality Date    APPENDECTOMY      CHOLECYSTECTOMY         Review of Systems:   As documented in primary team H&P    Home Meds:   Prior to Admission medications    Medication Sig Start Date End Date Taking? Authorizing Provider   acetaminophen (TYLENOL) 325 MG tablet Take 650 mg by mouth every 6 (six) hours as needed. 3/4/22   Historical Provider   albuterol (PROVENTIL/VENTOLIN HFA) 90 mcg/actuation inhaler Inhale 1-2 puffs into the lungs every 6 (six) hours as needed for Wheezing. Rescue 3/17/20 3/17/21  Shivani Bryant PA-C   amLODIPine (NORVASC) 10 MG tablet Take 1 tablet (10 mg total) by mouth once daily. 7/23/20   Rishi Coley MD   amLODIPine (NORVASC) 10 MG tablet Take 1 tablet by mouth once daily. 5/31/21   Historical Provider   aspirin 81 MG Chew Take  81 mg by mouth once daily. 3/5/22   Historical Provider   atorvastatin (LIPITOR) 40 MG tablet Take 1 tablet (40 mg total) by mouth every evening. 7/23/20   Rishi Coley MD   calcitRIOL (ROCALTROL) 0.25 MCG Cap Take 0.25 mcg by mouth once daily. 10/11/21   Historical Provider   calcitRIOL (ROCALTROL) 0.25 MCG Cap Take 1 capsule by mouth once daily. 10/11/21   Historical Provider   ciprofloxacin HCl (CIPRO) 500 MG tablet Take 500 mg by mouth 2 (two) times daily. 6/24/22   Historical Provider   cloNIDine 0.3 mg/24 hr td ptwk (CATAPRES) 0.3 mg/24 hr Place 1 patch onto the skin once a week. medically necessary with dx codes 401.9, 428.43, 428.0. 9/15/15   Surjit Rivas MD   clotrimazole (LOTRIMIN) 1 % cream Apply topically 2 (two) times daily. for 10 days  Patient taking differently: Apply 1 application topically 2 (two) times daily. 8/27/22 9/6/22  Richy Albarran MD   EScitalopram oxalate (LEXAPRO) 20 MG tablet Take 20 mg by mouth once daily. 3/5/22   Historical Provider   ferrous sulfate (FEOSOL) 325 mg (65 mg iron) Tab tablet Take 1 tablet by mouth once daily. 3/5/22   Historical Provider   fluticasone propionate (FLONASE) 50 mcg/actuation nasal spray 1 spray (50 mcg total) by Each Nostril route 2 (two) times daily as needed for Rhinitis. 3/17/20   Shivani Bryant PA-C   furosemide (LASIX) 40 MG tablet Take 40 mg by mouth 2 (two) times daily. 5/31/21   Historical Provider   glipiZIDE (GLUCOTROL) 10 MG tablet Take 1 tablet (10 mg total) by mouth 2 (two) times daily with meals. 7/23/20   Rishi Coley MD   HUMULIN 70/30 U-100 INSULIN 100 unit/mL (70-30) injection Inject into the skin 3 (three) times daily before meals. 5/22/22   Historical Provider   HYDROcodone-acetaminophen (NORCO) 5-325 mg per tablet Take 1 tablet by mouth every 6 (six) hours as needed. 6/15/22   Historical Provider   insulin detemir (LEVEMIR) 100 unit/mL injection Inject 15 Units into the skin every evening. 9/1/15 8/31/16   "Zechariah Loomis PA-C   insulin needles, disposable, 31 x 3/16 " Ndle Use daily to inject insulin  DX:250.00 2/5/15   Luba Julio MD   isosorbide mononitrate (IMDUR) 60 MG 24 hr tablet Take 60 mg by mouth once daily. 3/5/22   Historical Provider   lancets (ONE TOUCH DELICA LANCETS) 33 gauge Misc 1 lancet by Misc.(Non-Drug; Combo Route) route 4 (four) times daily. DX:250.00   Medically necessary to test blood sugar 2/5/15   Luba Julio MD   loratadine (CLARITIN) 10 mg tablet Take 1 tablet (10 mg total) by mouth once daily. 3/17/20 3/17/21  Shivani Bryant PA-C   losartan (COZAAR) 100 MG tablet Take 100 mg by mouth. 3/5/22   Historical Provider   ondansetron (ZOFRAN-ODT) 4 MG TbDL Take 1 tablet (4 mg total) by mouth every 6 (six) hours as needed (nausea/vomiting). 3/17/20   Shivani Bryant PA-C   oxyCODONE-acetaminophen (PERCOCET) 5-325 mg per tablet Take 1 tablet by mouth every 4 (four) hours as needed. 6/24/22   Historical Provider   pioglitazone (ACTOS) 15 MG tablet Take 1 tablet by mouth once daily. 2/22/21   Historical Provider   potassium chloride (KLOR-CON) 10 MEQ TbSR  9/1/15   Historical Provider   sildenafiL (VIAGRA) 100 MG tablet TAKE ONE DAILY AS NEEDED 8/7/21   Historical Provider   silver sulfADIAZINE 1% (SILVADENE) 1 % cream Apply topically. 3/4/22   Historical Provider   sulfamethoxazole-trimethoprim 400-80mg (BACTRIM,SEPTRA) 400-80 mg per tablet Take 1 tablet by mouth 2 (two) times daily. 6/15/22   Historical Provider   traMADoL (ULTRAM) 50 mg tablet Take 1 tablet (50 mg total) by mouth every 8 (eight) hours as needed for Pain. 7/23/20   Rishi Coley MD   TRUE METRIX AIR GLUCOSE METER [facility] USE TO CHECK GLUCOSE TWICE DAILY AS DIRECTED 5/22/22   Historical Provider   TRUE METRIX GLUCOSE TEST STRIP Strp 1 strip 2 (two) times daily. 6/16/22   Historical Provider   TRUEPLUS LANCETS 30 gauge Misc USE 1 TO CHECK GLUCOSE TWICE DAILY 5/22/22   Historical Provider     Scheduled " Meds:    amLODIPine  10 mg Per NG tube Daily    atorvastatin  40 mg Per NG tube Daily    barium  450 mL Per NG tube Once    carvediloL  12.5 mg Per NG tube BID    EScitalopram oxalate  20 mg Per NG tube Daily    furosemide  40 mg Per NG tube Daily    insulin aspart U-100  9 Units Subcutaneous 6 times per day     Continuous Infusions:    dextrose 10 % in water (D10W)      heparin (porcine) in D5W 12 Units/kg/hr (12/18/22 1100)     PRN Meds:barium, dextrose 10 % in water (D10W), dextrose 10%, dextrose 10%, glucagon (human recombinant), insulin aspart U-100, labetaloL, ondansetron, sodium chloride 0.9%  Anticoagulants/Antiplatelets: no anticoagulation    Allergies: Review of patient's allergies indicates:  No Known Allergies  Sedation Hx: have not been any systemic reactions    Labs:  No results for input(s): INR in the last 168 hours.    Invalid input(s):  PT,  PTT    Recent Labs   Lab 12/18/22  0238   WBC 11.06   HGB 12.3*   HCT 40.5   MCV 88         Recent Labs   Lab 12/18/22  0238   *   *   K 4.0   *   CO2 27   BUN 47*   CREATININE 4.0*   CALCIUM 9.4   MG 2.3   ALT 82*   AST 70*   ALBUMIN 2.4*   BILITOT 0.4         Vitals:  Temp: 98.6 °F (37 °C) (12/18/22 0730)  Pulse: 91 (12/18/22 0855)  Resp: 17 (12/18/22 0730)  BP: (!) 167/110 (12/18/22 0730)  SpO2: 98 % (12/18/22 0730)     Physical Exam:  ASA: 3  Mallampati: 2    General: no acute distress  Mental Status: alert and oriented to person, place and time  HEENT: normocephalic, atraumatic  Chest: unlabored breathing  Heart: regular heart rate  Abdomen: nondistended  Extremity: moves all extremities    Plan: G-tube placement planned for 12/19/22.  Sedation Plan: Up to moderate sedation    Arlin Gallegos

## 2022-12-19 LAB
ALBUMIN SERPL BCP-MCNC: 2.4 G/DL (ref 3.5–5.2)
ALP SERPL-CCNC: 107 U/L (ref 55–135)
ALT SERPL W/O P-5'-P-CCNC: 77 U/L (ref 10–44)
ANION GAP SERPL CALC-SCNC: 10 MMOL/L (ref 8–16)
APTT BLDCRRT: 28.8 SEC (ref 21–32)
APTT BLDCRRT: 46 SEC (ref 21–32)
AST SERPL-CCNC: 68 U/L (ref 10–40)
BASOPHILS # BLD AUTO: 0.08 K/UL (ref 0–0.2)
BASOPHILS NFR BLD: 0.7 % (ref 0–1.9)
BILIRUB SERPL-MCNC: 0.4 MG/DL (ref 0.1–1)
BUN SERPL-MCNC: 51 MG/DL (ref 6–20)
CALCIUM SERPL-MCNC: 9.4 MG/DL (ref 8.7–10.5)
CHLORIDE SERPL-SCNC: 110 MMOL/L (ref 95–110)
CO2 SERPL-SCNC: 30 MMOL/L (ref 23–29)
CREAT SERPL-MCNC: 4 MG/DL (ref 0.5–1.4)
DIFFERENTIAL METHOD: ABNORMAL
EOSINOPHIL # BLD AUTO: 0.2 K/UL (ref 0–0.5)
EOSINOPHIL NFR BLD: 1.7 % (ref 0–8)
ERYTHROCYTE [DISTWIDTH] IN BLOOD BY AUTOMATED COUNT: 15.1 % (ref 11.5–14.5)
EST. GFR  (NO RACE VARIABLE): 17.4 ML/MIN/1.73 M^2
GLUCOSE SERPL-MCNC: 205 MG/DL (ref 70–110)
HCT VFR BLD AUTO: 42.3 % (ref 40–54)
HGB BLD-MCNC: 13 G/DL (ref 14–18)
IMM GRANULOCYTES # BLD AUTO: 0.04 K/UL (ref 0–0.04)
IMM GRANULOCYTES NFR BLD AUTO: 0.4 % (ref 0–0.5)
LYMPHOCYTES # BLD AUTO: 1.3 K/UL (ref 1–4.8)
LYMPHOCYTES NFR BLD: 12.3 % (ref 18–48)
MAGNESIUM SERPL-MCNC: 2.3 MG/DL (ref 1.6–2.6)
MCH RBC QN AUTO: 26.9 PG (ref 27–31)
MCHC RBC AUTO-ENTMCNC: 30.7 G/DL (ref 32–36)
MCV RBC AUTO: 87 FL (ref 82–98)
MONOCYTES # BLD AUTO: 0.6 K/UL (ref 0.3–1)
MONOCYTES NFR BLD: 5.7 % (ref 4–15)
NEUTROPHILS # BLD AUTO: 8.5 K/UL (ref 1.8–7.7)
NEUTROPHILS NFR BLD: 79.2 % (ref 38–73)
NRBC BLD-RTO: 0 /100 WBC
PHOSPHATE SERPL-MCNC: 3.1 MG/DL (ref 2.7–4.5)
PLATELET # BLD AUTO: 261 K/UL (ref 150–450)
PMV BLD AUTO: 12.8 FL (ref 9.2–12.9)
POCT GLUCOSE: 126 MG/DL (ref 70–110)
POCT GLUCOSE: 138 MG/DL (ref 70–110)
POCT GLUCOSE: 138 MG/DL (ref 70–110)
POCT GLUCOSE: 186 MG/DL (ref 70–110)
POCT GLUCOSE: 188 MG/DL (ref 70–110)
POTASSIUM SERPL-SCNC: 3.7 MMOL/L (ref 3.5–5.1)
PROT SERPL-MCNC: 6.8 G/DL (ref 6–8.4)
RBC # BLD AUTO: 4.84 M/UL (ref 4.6–6.2)
SODIUM SERPL-SCNC: 150 MMOL/L (ref 136–145)
WBC # BLD AUTO: 10.75 K/UL (ref 3.9–12.7)

## 2022-12-19 PROCEDURE — 99233 SBSQ HOSP IP/OBS HIGH 50: CPT | Mod: ,,, | Performed by: PSYCHIATRY & NEUROLOGY

## 2022-12-19 PROCEDURE — 99233 PR SUBSEQUENT HOSPITAL CARE,LEVL III: ICD-10-PCS | Mod: ,,, | Performed by: PSYCHIATRY & NEUROLOGY

## 2022-12-19 PROCEDURE — 11000001 HC ACUTE MED/SURG PRIVATE ROOM

## 2022-12-19 PROCEDURE — 63600175 PHARM REV CODE 636 W HCPCS: Performed by: NURSE PRACTITIONER

## 2022-12-19 PROCEDURE — 25000003 PHARM REV CODE 250: Performed by: NURSE PRACTITIONER

## 2022-12-19 PROCEDURE — 97530 THERAPEUTIC ACTIVITIES: CPT | Mod: CQ

## 2022-12-19 PROCEDURE — 25000003 PHARM REV CODE 250: Performed by: PSYCHIATRY & NEUROLOGY

## 2022-12-19 PROCEDURE — 85730 THROMBOPLASTIN TIME PARTIAL: CPT | Mod: 91

## 2022-12-19 PROCEDURE — 85730 THROMBOPLASTIN TIME PARTIAL: CPT | Performed by: NURSE PRACTITIONER

## 2022-12-19 PROCEDURE — 85025 COMPLETE CBC W/AUTO DIFF WBC: CPT | Performed by: NURSE PRACTITIONER

## 2022-12-19 PROCEDURE — 94761 N-INVAS EAR/PLS OXIMETRY MLT: CPT

## 2022-12-19 PROCEDURE — 84100 ASSAY OF PHOSPHORUS: CPT | Performed by: PHYSICIAN ASSISTANT

## 2022-12-19 PROCEDURE — 36415 COLL VENOUS BLD VENIPUNCTURE: CPT | Performed by: PHYSICIAN ASSISTANT

## 2022-12-19 PROCEDURE — 97530 THERAPEUTIC ACTIVITIES: CPT | Mod: CO

## 2022-12-19 PROCEDURE — A9698 NON-RAD CONTRAST MATERIALNOC: HCPCS | Performed by: STUDENT IN AN ORGANIZED HEALTH CARE EDUCATION/TRAINING PROGRAM

## 2022-12-19 PROCEDURE — 83735 ASSAY OF MAGNESIUM: CPT | Performed by: PHYSICIAN ASSISTANT

## 2022-12-19 PROCEDURE — 25500020 PHARM REV CODE 255: Performed by: STUDENT IN AN ORGANIZED HEALTH CARE EDUCATION/TRAINING PROGRAM

## 2022-12-19 PROCEDURE — 36415 COLL VENOUS BLD VENIPUNCTURE: CPT

## 2022-12-19 PROCEDURE — 80053 COMPREHEN METABOLIC PANEL: CPT | Performed by: PHYSICIAN ASSISTANT

## 2022-12-19 RX ADMIN — LABETALOL HYDROCHLORIDE 10 MG: 5 INJECTION, SOLUTION INTRAVENOUS at 05:12

## 2022-12-19 RX ADMIN — INSULIN ASPART 9 UNITS: 100 INJECTION, SOLUTION INTRAVENOUS; SUBCUTANEOUS at 12:12

## 2022-12-19 RX ADMIN — BARIUM SULFATE 450 ML: 20 SUSPENSION ORAL at 02:12

## 2022-12-19 RX ADMIN — HEPARIN SODIUM 14 UNITS/KG/HR: 10000 INJECTION, SOLUTION INTRAVENOUS at 07:12

## 2022-12-19 RX ADMIN — INSULIN ASPART 9 UNITS: 100 INJECTION, SOLUTION INTRAVENOUS; SUBCUTANEOUS at 05:12

## 2022-12-19 RX ADMIN — CARVEDILOL 12.5 MG: 12.5 TABLET, FILM COATED ORAL at 08:12

## 2022-12-19 RX ADMIN — Medication 450 ML: at 03:12

## 2022-12-19 RX ADMIN — ESCITALOPRAM OXALATE 20 MG: 20 TABLET ORAL at 08:12

## 2022-12-19 RX ADMIN — FUROSEMIDE 40 MG: 40 TABLET ORAL at 08:12

## 2022-12-19 RX ADMIN — AMLODIPINE BESYLATE 10 MG: 10 TABLET ORAL at 08:12

## 2022-12-19 RX ADMIN — CARVEDILOL 12.5 MG: 12.5 TABLET, FILM COATED ORAL at 09:12

## 2022-12-19 RX ADMIN — INSULIN ASPART 9 UNITS: 100 INJECTION, SOLUTION INTRAVENOUS; SUBCUTANEOUS at 09:12

## 2022-12-19 RX ADMIN — ATORVASTATIN CALCIUM 40 MG: 40 TABLET, FILM COATED ORAL at 08:12

## 2022-12-19 NOTE — PROGRESS NOTES
Ulices Stack - Neurosurgery ([facility])  Vascular Neurology  Comprehensive Stroke Center  Progress Note    Assessment/Plan:     * Embolic stroke involving left middle cerebral artery  53 yo M with CHF who initially presented to OSH with RSW. Did not receive tPA, as he was outside of the window. MRI at that time demonstrated bilateral anterior and posterior circulation strokes concerning for embolic etiology. Over hospital course at OSH, the patient was noted to have been lethargic and aphasic with dysarthria. RAGHAV at OSH with L ventricular thrombus (not on AC). He was noted to have had an acute change in neuro exam on 12/8 at which time he had RSW with global aphasia. NIHSS had been 6 prior to the event and was noted to have been 23 following. CTA demonstrated a L M2 occlusion for which [place] [facility] [place] and higher level of care. He was not taken to IR due to poor ASPECTS and was admitted to ICU for close monitoring and hemicrani watch.   -Etiology: likely cardioembolic given LV thrombus      Patient remains in NPU, neuro exam poor but stable. Going for PEG today. Will resume free water flushes following procedure given Na+ 150 today. SBP improved today. Consider starting ACE/ARB once PEG in place and safe to use for meds. Dispo recommendations for IPR.       Antithrombotics for secondary stroke prevention:  on heparin gtt until swallowing or PEG established, can be switched to DOAC when able    Statins for secondary stroke prevention and hyperlipidemia, if present:   Statins: Atorvastatin- 40 mg daily    Aggressive risk factor modification: HTN, DM, HLD, Diet, Exercise, LV thrombus     Rehab efforts: Rehab; NPO, tube feeds via NGT    Diagnostics ordered/pending: None     VTE prophylaxis: Mechanical prophylaxis: Place SCDs  None: Reason for No Pharmacological VTE Prophylaxis: Currently on anticoagulation     BP parameters: SBP <200      Oropharyngeal dysphagia  Due to stroke  NG  tube in place, PEG to be placed on 12/19    JR on CKD  See CKD    Hyperlipidemia associated with type 2 diabetes mellitus  Stroke RF   . 8, goal <70  Continue atorvastatin 40mg daily    CHF (congestive heart failure)  Stroke risk factor  RAGHAV with EF 38% and segmental LV wall motion abnormalities  Strict I/Os  -continue lasix 40    Acute cystitis without hematuria  Klebsiella pneumoniae on cx, treated with ceftriaxone    Debility  2/2 stroke   OT and PT to evaluate   Therapy recommending IPR     Cytotoxic brain edema  -Noted on imaging in the area of the L MCA territory. Will continue to monitor q1h while in ICU and repeat CTH PRN for acute neuro exam changes that could indicate worsening/expansion of edema.   -NSGY consulted for hemicrani watch due to malignant MCA, no acute intervention recommended as serial imaging remains stable    LV (left ventricular) mural thrombus  Stroke RF  Continue heparin gtt as patient NPO and will likely PEG placement, switch to DOAC when able    Stage 4 chronic kidney disease  Cr 4.0 and GFR 17.4 today   - continue to monitor renal function with scheduled labs   - avoid nephrotoxins  - renally dose medications when appropiate   - monitor events that may lead to decreased renal perfusion (hypovolemia, hypotension, sepsis)  - monitor urine output to assure that no obstruction precipitates worsening in GFR      Type 2 diabetes mellitus, with long-term current use of insulin  Stroke RF  A1c 9.1, consider nutrition consult and DM education  BG goal while inpatient 140-180  Currently on Aspart 9units q4h + aspart 1-10U q4h PRN    Essential hypertension  Stroke RF  SBP < 200, MAP >65  Consider slowly lowering SBP goal to <180 slowly  - continue amlodipine 10 mg   - Lopressor discontinued and Coreg 12.5 mg  added for better BP control          12/09/2022 Patient with LV thrombus, will need anticoagulation. NGT in place would recommend heparin gtt until patient able to swallow or PEG  placed prior to DOAC initiation. Patient tachycardic today with SBP < 210, metoprolol started by primary team. Febrile with elevated white count and procal, currently on Zosyn and Vanc while cx pending. NSGY following for hemicrani watch. Patient has been discharged from OT per last note, will need new orders. Continue current ICU care.   12/10/2022: Pending stability scan patient is expected to be started on heparin gtt. Continue hemicrani watch in  ICU. Family at bedside.   12/11/2022: Patient started on heparin gtt overnight, he demonstrated great clinical improvement today, he was able to tell me his name, knew he is in [facility]. Followed single step commands. No family at bedside today.   12/12/2022 Patient remains in ICU for sue-crani watch. NSGY following. On salt tabs for goal of hypernatremia.Will remain in ICU another night. VN to re-evaluate for stepdown tomorrow. Patient is on ceftriaxone for acute cystitis. aPTT today 42.9. CTH following therapeutic heparin levels stable. More alert today. IPR recommendations; SLP with minced and moist diet with thin liquids.   12/13/2022 Remains in NCC, neuro exam unchanged and limited by drowsiness. Overnight had short run of VT/SVT that resolved without any interventions. BPs not consistently at goal of <200, had max BP today of 214/140. Currently NPO with NGT in place and anticipate patient will need PEG placement, SLP recommending ice chips sparingly for pleasure. Anticipate step down tomorrow once BP is consistently at goal  12/14/2022 Pending SD to NPU. Continue to watch BP closely, SBP<200. Patient will likely need a PEG.  12/15/2022. Pending SD to NPU. Poor exam, patient very lethargic, WD on the right.   12/16/2022 Patient stepped down overnight to NPU. Exam stable. Re-evaluated by speech this afternoon, still recommending NPO. Will need to discuss nutrition going forward (I.e. need for PEG). Recommendations for IPR.   12/17/22 Exam stable, elevated sodium, free  water boluses added, metoprolol increased for elevated BP & HR.  IR consult placed for G tube placement after discussing with patient's family via phone.  12/18/22 patient displaced NG tube overnight, adjusted by nursing. KUB reviewed- BG ok to use. Water boluses added yesterday for hypernatremia, Na+ improved to 149. -184, Lopressor discontinued and Coreg ordered for better BP control. Plan for PEG placement on 12/19 .  12/19/2022 Patient remains in NPU, neuro exam poor but stable. Going for PEG today. Will resume free water flushes following procedure given Na+ 150 today. SBP improved today. Consider starting ACE/ARB once PEG in place and safe to use for meds. Dispo recommendations for IPR.         STROKE DOCUMENTATION   Acute Stroke Times   Last Known Normal Date: 12/08/22  Last Known Normal Time: 1145  Symptom Onset Date: 12/08/22  Symptom Onset Time: 1145  Stroke Team Called Date: 12/08/22  Stroke Team Called Time: 1615  Stroke Team Arrival Date: 12/08/22  Stroke Team Arrival Time: 1615  CT Interpretation Time: 1615  Thrombolytic Therapy Recommended: No  CTA Interpretation Time: 1615    NIH Scale:  1a. Level of Consciousness: 1-->Not alert, but arousable by minor stimulation to obey, answer, or respond  1b. LOC Questions: 2-->Answers neither question correctly  1c. LOC Commands: 1-->Performs one task correctly  2. Best Gaze: 2-->Forced deviation, or total gaze paresis not overcome by the oculocephalic maneuver  3. Visual: 0-->No visual loss  4. Facial Palsy: 1-->Minor paralysis (flattened nasolabial fold, asymmetry on smiling)  5a. Motor Arm, Left: 0-->No drift, limb holds 90 (or 45) degrees for full 10 secs  5b. Motor Arm, Right: 3-->No effort against gravity, limb falls  6a. Motor Leg, Left: 0-->No drift, leg holds 30 degree position for full 5 secs  6b. Motor Leg, Right: 3-->No effort against gravity, leg falls to bed immediately  7. Limb Ataxia: 0-->Absent  8. Sensory: 1-->Mild-to-moderate sensory  loss, patient feels pinprick is less sharp or is dull on the affected side, or there is a loss of superficial pain with pinprick, but patient is aware of being touched  9. Best Language: 2-->Severe aphasia, all communication is through fragmentary expression, great need for inference, questioning, and guessing by the listener. Range of information that can be exchanged is limited, listener carries burden of. . . (see row details)  10. Dysarthria: 2-->Severe dysarthria, patients speech is so slurred as to be unintelligible in the absence of or out of proportion to any dysphasia, or is mute/anarthric  11. Extinction and Inattention (formerly Neglect): 1-->Visual, tactile, auditory, spatial, or personal inattention or extinction to bilateral simultaneous stimulation in one of the sensory modalities  Total (NIH Stroke Scale): 19       Modified Greenfield Score: 5  Thao Coma Scale:    ABCD2 Score:    [id number] Score:   HAS -BLED Score:   ICH Score:   Hunt & Leblanc Classification:      Hemorrhagic change of an Ischemic Stroke: Does this patient have an ischemic stroke with hemorrhagic changes? No     Neurologic Chief Complaint: lethargy, aphasia, dysarthria     Subjective:     Interval History: Patient is seen for follow-up neurological assessment and treatment recommendations:     Patient remains in NPU, neuro exam poor but stable. Going for PEG today. Will resume free water flushes following procedure given Na+ 150 today. SBP improved today. Consider starting ACE/ARB once PEG in place and safe to use for meds. Dispo recommendations for IPR.     HPI, Past Medical, Family, and Social History remains the same as documented in the initial encounter.     Review of Systems   Unable to perform ROS: Other (Aphasia)   Scheduled Meds:   amLODIPine  10 mg Per NG tube Daily    atorvastatin  40 mg Per NG tube Daily    carvediloL  12.5 mg Per NG tube BID    EScitalopram oxalate  20 mg Per NG tube Daily    furosemide  40 mg Per  NG tube Daily    insulin aspart U-100  9 Units Subcutaneous 6 times per day     Continuous Infusions:   dextrose 10 % in water (D10W)      heparin (porcine) in D5W 12 Units/kg/hr (12/18/22 1100)     PRN Meds:dextrose 10 % in water (D10W), dextrose 10%, dextrose 10%, glucagon (human recombinant), insulin aspart U-100, labetaloL, ondansetron, sodium chloride 0.9%    Objective:     Vital Signs (Most Recent):  Temp: 98 °F (36.7 °C) (12/19/22 1134)  Pulse: 85 (12/19/22 1134)  Resp: 18 (12/19/22 1134)  BP: (!) 136/91 (12/19/22 1134)  SpO2: 98 % (12/19/22 1134)  BP Location: Right arm    Vital Signs Range (Last 24H):  Temp:  [97.4 °F (36.3 °C)-98.9 °F (37.2 °C)]   Pulse:  [85-97]   Resp:  [15-20]   BP: (136-167)/()   SpO2:  [96 %-100 %]   BP Location: Right arm    Physical Exam  Vitals and nursing note reviewed.   Constitutional:       General: He is not in acute distress.  HENT:      Head: Normocephalic.      Nose: No rhinorrhea.      Comments: NGT in place  Eyes:      General: No scleral icterus.     Conjunctiva/sclera: Conjunctivae normal.   Cardiovascular:      Rate and Rhythm: Normal rate.   Pulmonary:      Effort: No respiratory distress.   Abdominal:      General: There is no distension.   Musculoskeletal:         General: No deformity.   Skin:     General: Skin is warm and dry.   Neurological:      Mental Status: He is lethargic.      Motor: Weakness present.      Comments: Withdraws on R side to noxious stimuli  Moves left side spontaneously  Severe aphasia   Psychiatric:      Comments: Unable to assess mood, thought, insight or judgment 2/2 severe aphasia/level of consciousness       Neurological Exam:   LOC: lethargic  Attention Span: poor  Language: mixed aphasia, intermittently follows some simple commands  Articulation: Nonverbal unable to assess   Orientation: Nonverbal unable to assess   EOM (CN III, IV, VI): Tracks   Motor: L side moves spontaneously and antigravity, R side withdraws from pain    Sensation: Withdraws right side to noxious stimuli      Laboratory:  CMP:   Recent Labs   Lab 12/19/22 0423   CALCIUM 9.4   ALBUMIN 2.4*   PROT 6.8   *   K 3.7   CO2 30*      BUN 51*   CREATININE 4.0*   ALKPHOS 107   ALT 77*   AST 68*   BILITOT 0.4       BMP:   Recent Labs   Lab 12/19/22 0423   *   K 3.7      CO2 30*   BUN 51*   CREATININE 4.0*   CALCIUM 9.4       CBC:   Recent Labs   Lab 12/19/22 0423   WBC 10.75   RBC 4.84   HGB 13.0*   HCT 42.3      MCV 87   MCH 26.9*   MCHC 30.7*       Lipid Panel:   No results for input(s): CHOL, LDLCALC, HDL, TRIG in the last 168 hours.    Coagulation:   Recent Labs   Lab 12/19/22 0423   APTT 46.0*       Platelet Aggregation Study: No results for input(s): PLTAGG, PLTAGINTERP, PLTAGREGLACO, ADPPLTAGGREG in the last 168 hours.  Hgb A1C:   No results for input(s): HGBA1C in the last 168 hours.    TSH:   No results for input(s): TSH in the last 168 hours.      Diagnostic Results     Brain/Vessel Imaging   CTH 12/13/22  -Evolving large left MCA infarct with stable mass effect and petechial hemorrhagic conversion.   -No definite new hemorrhage or significant new abnormal parenchymal attenuation   -Separate areas of infarction involving the right parietal lobe and left cerebellum not well seen with CT technique.   -Clinical correlation and continued follow-up advised     CTH 12/11/22   Grossly stable evolving large left MCA distribution infarct and smaller infarct in the right frontal lobe with possible trace hemorrhagic conversion. Persistent mass effect with sulcal effacement and slight mass effect on the left lateral ventricle. No new or worsening intracranial hemorrhage and no worsening of minimal rightward midline shift.    CTH 12/9/22   Evolving no enlarged left MCA territory infarction with hypoattenuation and sulcal effacement. Intermediate density along the left basal ganglia compatible with known hemorrhagic conversion. Smaller area of  infarction in the right cerebral hemisphere not well seen. Evolving mass effect and edema associated with the left cerebral hemispheric infarction with slight compression of the left lateral ventricle and trace 1 mm of rightward midline shift. No evidence for hydrocephalus    MRI Brain WO Contrast 12/8/22    Exam limited by patient motion artifact. Evolving left MCA territory infarct.  Additional foci of diffusion restriction in the left cerebral consistent with recent infarct.  Interval increase susceptibility artifact in the areas of diffusion restriction consistent with hemorrhagic conversion of recent infarcts.  No hydrocephalus or significant midline shift.      CTH 12/8/22 16:29   -Evolving large left MCA territory infarction similar to recent CT though increased in size compared to prior MRI concerning for evolving recent to subacute and acute infarcts.  Localized mass effect without significant midline shift or hydrocephalus.  There is evolving recent to subacute age right posterior frontal infarction similar to prior MRI allowing for differences in technique  -There is subtle hyperdensity along the left basal ganglia posteriorly and along the right posterior frontal cortex which may represent enhancing subacute age components of infarction with petechial hemorrhage felt less likely but cannot be entirely excluded with limitation secondary to recirculation contrast related to recent CTA performed at outside institution.  -Evolving mass effect most pronounced associated with the left MCA territory infarction with sulcal effacement without significant midline shift or hydrocephalus.    CTH 12/8/22 12:11 (CTA H/N)   1. Left diencephalic hemisphere demonstrates evidence of an acute infarct of left basal ganglia and left caudate lobe.  2. Occlusion of the posterior division of the left M2 branch at the takeoff of the M1 vessel with patency involving the anterior division of the left M2 segment.    CTH 12/7/22    1.  Moderate size subacute infarction left anterior basal ganglia and anterior left internal capsule appears mildly larger and better well defined compared to CT from 12/3/2020. There is currently greater mass effect on the anterior horn of the left lateral ventricle. There is no associated bleed or midline shift.  2.  Recent MRI demonstrated additional punctate acute infarctions in the left frontal lobe and a mild size acute infarction right centrum semiovale. These are less obvious on CT. No associated bleed.  3.  Additional chronic periventricular white matter hypodensities.    MRI Brain WO contrast 12/3/22   Multifocal areas of restricted diffusion are felt to reflect acute infarcts.    CTH 12/3/22   Loss of gray-white matter distinction in involving the left basal ganglia could reflect changes of chronic small vessel ischemic disease versus cytotoxic edema from acute infarct.     Carotid US Bilateral 12/3/22   1. Carotid intimal hyperplasia, with no findings of hemodynamically significant carotid arterial stenosis or vascular occlusion.  2. Patent vertebral arteries with antegrade flow bilaterally.      Cardiac Imaging   RAGHAV 12/4/22    The left ventricle is small with moderately decreased systolic function.   The estimated ejection fraction is 38%.   Left ventricular diastolic dysfunction.   There are segmental left ventricular wall motion abnormalities.   No spontaneous echo contrast. A moderate protruding layered left ventricular thrombus is present. The thrombus is fixed and located in the apex.   Normal right ventricular size.   Mild left atrial enlargement.   Mild right atrial enlargement.   Mild aortic regurgitation.   No interatrial septal defect present.   Normal appearing left atrial appendage. No thrombus is present in the appendage. SB occluder is absent. Abnormal appendage velocities.   Mild mitral regurgitation.   Agitated saline contrast study did not show any right to left  shunt    TTE 11/14/22    The left ventricle is normal in size with mildly decreased systolic function.   The estimated ejection fraction is 40%.   Grade III left ventricular diastolic dysfunction.   Small posterior pericardial effusion.   There is mild left ventricular global hypokinesis.   Normal right ventricular size with normal right ventricular systolic function.   Severe left atrial enlargement.   Moderate right atrial enlargement.   Mild pulmonic regurgitation.   Mild to moderate tricuspid regurgitation.   Mild-to-moderate aortic regurgitation.   Intermediate central venous pressure (8 mmHg).   The estimated PA systolic pressure is 51 mmHg.   There is pulmonary hypertension.      Anat Amado PA-C  Comprehensive Stroke Center  Department of Vascular Neurology   [facility] Neurosurgery [facility]

## 2022-12-19 NOTE — PT/OT/SLP PROGRESS
Speech Language Pathology      Emilechula Burton Jr.  MRN: [id number]    Patient not seen today secondary to  (pt working with PT/OT of 1st attempt; pt asleep and not easily rousng to verbal stim on 2nd attempt. Pt going to IR for PEG placement today and NPO.). Will follow-up 12/20.

## 2022-12-19 NOTE — PLAN OF CARE
Recommendations     1. Modify TF to Glucerna 1.5 to 45 mL/hr- provides 1620 kcals, 89 g pro, and 820 mL free water.   2. If able to tolerate PO intake, ADAT to diabetic- texture per SLP.   3. RD following.     Goals: Will meet % EEN/EPN by next RD f/u.  Nutrition Goal Status: goal met  Communication of RD Recs:  (POC)    Keri Flores, Registration Eligible, Provisional LDN

## 2022-12-19 NOTE — ASSESSMENT & PLAN NOTE
53 yo M with CHF who initially presented to OSH with RSW. Did not receive tPA, as he was outside of the window. MRI at that time demonstrated bilateral anterior and posterior circulation strokes concerning for embolic etiology. Over hospital course at OSH, the patient was noted to have been lethargic and aphasic with dysarthria. RAGHAV at OSH with L ventricular thrombus (not on AC). He was noted to have had an acute change in neuro exam on 12/8 at which time he had RSW with global aphasia. NIHSS had been 6 prior to the event and was noted to have been 23 following. CTA demonstrated a L M2 occlusion for which the patient was transferred to [facility] for possible intervention and higher level of care. He was not taken to IR due to poor ASPECTS and was admitted to ICU for close monitoring and hemicrani watch.   -Etiology: likely cardioembolic given LV thrombus      Patient remains in NPU, neuro exam poor but stable. Going for PEG today. Will resume free water flushes following procedure given Na+ 150 today. SBP improved today. Consider starting ACE/ARB once PEG in place and safe to use for meds. Dispo recommendations for IPR.       Antithrombotics for secondary stroke prevention:  on heparin gtt until swallowing or PEG established, can be switched to DOAC when able    Statins for secondary stroke prevention and hyperlipidemia, if present:   Statins: Atorvastatin- 40 mg daily    Aggressive risk factor modification: HTN, DM, HLD, Diet, Exercise, LV thrombus     Rehab efforts: Rehab; NPO, tube feeds via NGT    Diagnostics ordered/pending: None     VTE prophylaxis: Mechanical prophylaxis: Place SCDs  None: Reason for No Pharmacological VTE Prophylaxis: Currently on anticoagulation     BP parameters: SBP <200

## 2022-12-19 NOTE — PT/OT/SLP PROGRESS
Physical Therapy Treatment                Co-Treatment     Patient Name:  Spencer Burton Jr.   MRN:  [id number]    Recommendations:     Discharge Recommendations: rehabilitation facility  Discharge Equipment Recommendations: hospital bed, lift device  Barriers to discharge: Inaccessible home and Decreased caregiver support    Assessment:     Spencer Burton Jr. is a 52 y.o. male admitted with a medical diagnosis of Embolic stroke involving left middle cerebral artery.  He presents with the following impairments/functional limitations: weakness, impaired balance, impaired sensation, impaired self care skills, impaired functional mobility, gait instability, decreased lower extremity function, decreased upper extremity function, decreased safety awareness. Pt continues to require significant assistance at this time, and will continue to benefit from skilled PT services to improve all deficits noted above. Resume PT POC as indicated.     Rehab Prognosis: Fair; patient would benefit from acute skilled PT services to address these deficits and reach maximum level of function.    Recent Surgery: * No surgery found *      Plan:     During this hospitalization, patient to be seen 4 x/week to address the identified rehab impairments via gait training, therapeutic activities, therapeutic exercises, neuromuscular re-education and progress toward the following goals:    Plan of Care Expires:  01/09/23    Subjective     Pt non-verbal during treatment, did not appear to be in pain.     Pain/Comfort:  Pain Rating 1: 0/10  Pain Rating Post-Intervention 1: 0/10      Objective:     Communicated with nursing prior to session.  Patient found HOB elevated with  (all lines intact) upon PT entry to room.     General Precautions: Standard, aphasia, aspiration, fall, NPO, vision impaired  Orthopedic Precautions: N/A  Braces: N/A  Respiratory Status: Room air     Functional Mobility:  Bed Mobility:  Scooting: total assistance and of 2  persons  Supine to Sit: total assistance and of 2 persons  Sit to Supine: total assistance and of 2 persons  Transfers:  Not performed on this date  pt safety and fatigue.   Balance: Pt sat EOB ~8 minutes with Max-Total Assistance       AM-PAC 6 CLICK MOBILITY  Turning over in bed (including adjusting bedclothes, sheets and blankets)?: 2  Sitting down on and standing up from a chair with arms (e.g., wheelchair, bedside commode, etc.): 2  Moving from lying on back to sitting on the side of the bed?: 2  Moving to and from a bed to a chair (including a wheelchair)?: 2  Need to walk in hospital room?: 1  Climbing 3-5 steps with a railing?: 1  Basic Mobility Total Score: 10       Treatment & Education:  -BLE therex x10 reps with assistance in all available planes of motion.   -Pt repositioned in bed for pt comfort with total assistance x2.     Patient left HOB elevated with all lines intact, call button in reach, (L) soft wrist restraints reapplied at end of session, and nursing notified..    GOALS:   Multidisciplinary Problems       Physical Therapy Goals          Problem: Physical Therapy    Goal Priority Disciplines Outcome Goal Variances Interventions   Physical Therapy Goal     PT, PT/OT Ongoing, Progressing     Description: Goals to be met by: 22    Patient will increase functional independence with mobility by performin. Supine to sit with Maximum Assistance  2. Sit to supine with Maximum Assistance  3. Sit to stand transfer with Maximum Assistance  4. Bed to chair transfer with Maximum Assistance using squat pivot technique.  5. Gait  x 5 feet with Moderate Assistance using LRAD.   6. Sitting at edge of bed x8 minutes with Contact Guard Assistance with DYLAN UE support.                         Time Tracking:     PT Received On: 22  PT Start Time: 1309     PT Stop Time: 1325  PT Total Time (min): 16 min     Billable Minutes: Therapeutic Activity 16    Treatment Type: Treatment  PT/PTA: PTA      PTA Visit Number: 1     12/19/2022

## 2022-12-19 NOTE — H&P
Inpatient Radiology Pre-procedure Note    History of Present Illness:  Spencer Burton Jr. is a 52 y.o. male presented to an OSH with right sided weakness and an MRI showing bilateral anterior and posterior circulation strokes concerning for embolic etiology. RAGHAV showed left ventricular thrombus. Transferred to [facility] following new L M2 occlusion for higher level of care. During his admission he was evaluated by speech therapy with recommendation to remain NPO. IR consulted for G-tube placement.     Admission H&P reviewed.    Past Medical History:   Diagnosis Date    CHF (congestive heart failure)     COPD (chronic obstructive pulmonary disease)     Debility 12/8/2022    Diabetes mellitus     Embolic stroke involving left middle cerebral artery 12/8/2022    Enlarged LA (left atrium) 12/10/2022    Essential hypertension 5/12/2012    Hyperlipidemia     Hypertension     LV (left ventricular) mural thrombus 12/8/2022    Type 2 diabetes mellitus without complication, without long-term current use of insulin 9/4/2015    Type 2 diabetes mellitus, with long-term current use of insulin 11/6/2012     Past Surgical History:   Procedure Laterality Date    APPENDECTOMY      CHOLECYSTECTOMY         Review of Systems:   As documented in primary team H&P    Home Meds:   Prior to Admission medications    Medication Sig Start Date End Date Taking? Authorizing Provider   acetaminophen (TYLENOL) 325 MG tablet Take 650 mg by mouth every 6 (six) hours as needed. 3/4/22   Historical Provider   albuterol (PROVENTIL/VENTOLIN HFA) 90 mcg/actuation inhaler Inhale 1-2 puffs into the lungs every 6 (six) hours as needed for Wheezing. Rescue 3/17/20 3/17/21  Shivani Bryant PA-C   amLODIPine (NORVASC) 10 MG tablet Take 1 tablet (10 mg total) by mouth once daily. 7/23/20   Rishi Coley MD   amLODIPine (NORVASC) 10 MG tablet Take 1 tablet by mouth once daily. 5/31/21   Historical Provider   aspirin 81 MG Chew Take 81 mg by mouth once  daily. 3/5/22   Historical Provider   atorvastatin (LIPITOR) 40 MG tablet Take 1 tablet (40 mg total) by mouth every evening. 7/23/20   Rishi Coley MD   calcitRIOL (ROCALTROL) 0.25 MCG Cap Take 0.25 mcg by mouth once daily. 10/11/21   Historical Provider   calcitRIOL (ROCALTROL) 0.25 MCG Cap Take 1 capsule by mouth once daily. 10/11/21   Historical Provider   ciprofloxacin HCl (CIPRO) 500 MG tablet Take 500 mg by mouth 2 (two) times daily. 6/24/22   Historical Provider   cloNIDine 0.3 mg/24 hr td ptwk (CATAPRES) 0.3 mg/24 hr Place 1 patch onto the skin once a week. medically necessary with dx codes 401.9, 428.43, 428.0. 9/15/15   Surjit Rivas MD   clotrimazole (LOTRIMIN) 1 % cream Apply topically 2 (two) times daily. for 10 days  Patient taking differently: Apply 1 application topically 2 (two) times daily. 8/27/22 9/6/22  Richy Albarran MD   EScitalopram oxalate (LEXAPRO) 20 MG tablet Take 20 mg by mouth once daily. 3/5/22   Historical Provider   ferrous sulfate (FEOSOL) 325 mg (65 mg iron) Tab tablet Take 1 tablet by mouth once daily. 3/5/22   Historical Provider   fluticasone propionate (FLONASE) 50 mcg/actuation nasal spray 1 spray (50 mcg total) by Each Nostril route 2 (two) times daily as needed for Rhinitis. 3/17/20   Shivani Bryant PA-C   furosemide (LASIX) 40 MG tablet Take 40 mg by mouth 2 (two) times daily. 5/31/21   Historical Provider   glipiZIDE (GLUCOTROL) 10 MG tablet Take 1 tablet (10 mg total) by mouth 2 (two) times daily with meals. 7/23/20   Rishi Coley MD   HUMULIN 70/30 U-100 INSULIN 100 unit/mL (70-30) injection Inject into the skin 3 (three) times daily before meals. 5/22/22   Historical Provider   HYDROcodone-acetaminophen (NORCO) 5-325 mg per tablet Take 1 tablet by mouth every 6 (six) hours as needed. 6/15/22   Historical Provider   insulin detemir (LEVEMIR) 100 unit/mL injection Inject 15 Units into the skin every evening. 9/1/15 8/31/16  Zechariah Loomis PA-C  "  insulin needles, disposable, 31 x 3/16 " Ndle Use daily to inject insulin  DX:250.00 2/5/15   Luba Julio MD   isosorbide mononitrate (IMDUR) 60 MG 24 hr tablet Take 60 mg by mouth once daily. 3/5/22   Historical Provider   lancets (ONE TOUCH DELICA LANCETS) 33 gauge Misc 1 lancet by Misc.(Non-Drug; Combo Route) route 4 (four) times daily. DX:250.00   Medically necessary to test blood sugar 2/5/15   Luba Julio MD   loratadine (CLARITIN) 10 mg tablet Take 1 tablet (10 mg total) by mouth once daily. 3/17/20 3/17/21  Shivani Bryant PA-C   losartan (COZAAR) 100 MG tablet Take 100 mg by mouth. 3/5/22   Historical Provider   ondansetron (ZOFRAN-ODT) 4 MG TbDL Take 1 tablet (4 mg total) by mouth every 6 (six) hours as needed (nausea/vomiting). 3/17/20   Shivani Bryant PA-C   oxyCODONE-acetaminophen (PERCOCET) 5-325 mg per tablet Take 1 tablet by mouth every 4 (four) hours as needed. 6/24/22   Historical Provider   pioglitazone (ACTOS) 15 MG tablet Take 1 tablet by mouth once daily. 2/22/21   Historical Provider   potassium chloride (KLOR-CON) 10 MEQ TbSR  9/1/15   Historical Provider   sildenafiL (VIAGRA) 100 MG tablet TAKE ONE DAILY AS NEEDED 8/7/21   Historical Provider   silver sulfADIAZINE 1% (SILVADENE) 1 % cream Apply topically. 3/4/22   Historical Provider   sulfamethoxazole-trimethoprim 400-80mg (BACTRIM,SEPTRA) 400-80 mg per tablet Take 1 tablet by mouth 2 (two) times daily. 6/15/22   Historical Provider   traMADoL (ULTRAM) 50 mg tablet Take 1 tablet (50 mg total) by mouth every 8 (eight) hours as needed for Pain. 7/23/20   Rishi Coley MD   TRUE METRIX AIR GLUCOSE METER [facility] USE TO CHECK GLUCOSE TWICE DAILY AS DIRECTED 5/22/22   Historical Provider   TRUE METRIX GLUCOSE TEST STRIP Strp 1 strip 2 (two) times daily. 6/16/22   Historical Provider   TRUEPLUS LANCETS 30 gauge Misc USE 1 TO CHECK GLUCOSE TWICE DAILY 5/22/22   Historical Provider     Scheduled Meds:    amLODIPine  " 10 mg Per NG tube Daily    atorvastatin  40 mg Per NG tube Daily    carvediloL  12.5 mg Per NG tube BID    EScitalopram oxalate  20 mg Per NG tube Daily    furosemide  40 mg Per NG tube Daily    insulin aspart U-100  9 Units Subcutaneous 6 times per day     Continuous Infusions:    dextrose 10 % in water (D10W)      heparin (porcine) in D5W 12 Units/kg/hr (12/18/22 1100)     PRN Meds:dextrose 10 % in water (D10W), dextrose 10%, dextrose 10%, glucagon (human recombinant), insulin aspart U-100, labetaloL, ondansetron, sodium chloride 0.9%  Anticoagulants/Antiplatelets: Heparin gtt    Allergies: Review of patient's allergies indicates:  No Known Allergies  Sedation Hx: have not been any systemic reactions    Labs:  No results for input(s): INR in the last 168 hours.    Invalid input(s):  PT,  PTT    Recent Labs   Lab 12/19/22 0423   WBC 10.75   HGB 13.0*   HCT 42.3   MCV 87         Recent Labs   Lab 12/19/22 0423   *   *   K 3.7      CO2 30*   BUN 51*   CREATININE 4.0*   CALCIUM 9.4   MG 2.3   ALT 77*   AST 68*   ALBUMIN 2.4*   BILITOT 0.4         Vitals:  Temp: 97.9 °F (36.6 °C) (12/19/22 0819)  Pulse: 94 (12/19/22 0819)  Resp: 20 (12/19/22 0819)  BP: (!) 159/103 (12/19/22 0819)  SpO2: 99 % (12/19/22 0819)     Physical Exam:  ASA: III  Mallampati: II    General: no acute distress  Mental Status: alert and oriented to person, place and time  HEENT: normocephalic, atraumatic  Chest: unlabored breathing  Heart: regular heart rate  Abdomen: nondistended  Extremity: moves all extremities      Plan:  Sedation Plan: up to moderate.  Patient will undergo G tube placement .          Winsome Tapia MD  Radiology Resident PGY- 2  [facility]

## 2022-12-19 NOTE — PROGRESS NOTES
"[facility] - Neurosurgery ([facility])  Adult Nutrition  Progress Note    SUMMARY       Recommendations    1. Modify TF to Glucerna 1.5 to 45 mL/hr- provides 1620 kcals, 89 g pro, and 820 mL free water.   2. If able to tolerate PO intake, ADAT to diabetic- texture per SLP.   3. RD following.    Goals: Will meet % EEN/EPN by next RD f/u.  Nutrition Goal Status: goal met  Communication of RD Recs:  (POC)    Assessment and Plan    Nutrition Problem  Inadequate oral intake     Related to (etiology):   Inability to consume sufficient needs     Signs and Symptoms (as evidenced by):   NPO status      Interventions/Recommendations (treatment strategy):  Collaboration of nutrition care with other providers   EN     Nutrition Diagnosis Status:   Continues    Reason for Assessment    Reason For Assessment: RD follow-up  Diagnosis: stroke/CVA  Relevant Medical History: HTN, T2DM, CKD 4, COPD  Interdisciplinary Rounds: did not attend  General Information Comments: Unable to speak with pt 2/2 remaining aphasic/disoriented. Noted TF regimen of Novasource @ 40 mL/hr currently running. UBW fluctuates between 186-200#. Noted # and wt on 12/11 186# - assuming wt 2/2 weighing error. RD to continue using 186# to calculate needs. No s/s of malnutrition- appears well-nourished. Noted A1C of 9.1%- RD to provide DM diet handout if/when pt diet advanced. LBM 12/17.  Nutrition Discharge Planning: Pending clinical course    Nutrition Risk Screen    Nutrition Risk Screen: tube feeding or parenteral nutrition, difficulty chewing/swallowing    Nutrition/Diet History    Spiritual, Cultural Beliefs, [employer]al Practices, Values that Affect Care: no  Food Allergies: NKFA  Factors Affecting Nutritional Intake: NPO, difficulty/impaired swallowing    Anthropometrics    Temp: 98 °F (36.7 °C)  Height: 5' 9" (175.3 cm)  Height (inches): 69 in  Weight Method: Bed Scale  Weight: 74 kg (163 lb 2.3 oz)  Weight (lb): 163.14 lb  Ideal Body Weight (IBW), " Male: 160 lb  % Ideal Body Weight, Male (lb): 116.84 %  BMI (Calculated): 24.1  BMI Grade: 25 - 29.9 - overweight    Lab/Procedures/Meds    Pertinent Labs Reviewed: reviewed  Pertinent Labs Comments: Glucose 205, A1C 9.1, Albumin 2.4, Sodium 150, AST 68, ALT 77, GFR 23, BUN 51, BNP 1729  Pertinent Medications Reviewed: reviewed  Pertinent Medications Comments: amlodipine, atorvastatin, furosemide, insulin    Estimated/Assessed Needs    Weight Used For Calorie Calculations: 72.6 kg (160 lb)  Energy Calorie Requirements (kcal): 1814 kcals  Energy Need Method: Kcal/kg (25 kcal/kg IBW)  Protein Requirements: 85 g (1.0 g/kg)  Weight Used For Protein Calculations: 84.8 kg (186 lb 15.2 oz)  Fluid Requirements (mL): 1 ml or fluid per MD  Estimated Fluid Requirement Method: RDA Method  RDA Method (mL): 1814  CHO Requirement: 227 g    Nutrition Prescription Ordered    Current Diet Order: NPO  Current Nutrition Support Formula Ordered: [facility]  Current Nutrition Support Rate Ordered: 40 (ml)  Current Nutrition Support Frequency Ordered: mL/hr    Evaluation of Received Nutrient/Fluid Intake    Enteral Calories (kcal): 1920  Enteral Protein (gm): 87  Enteral (Free Water) Fluid (mL): 688  % Kcal Needs: 106%  % Protein Needs: 102%  I/O: +5.4 L since admit  Energy Calories Required: meeting needs  Protein Required: meeting needs  Fluid Required: meeting needs  Tolerance: tolerating  % Intake of Estimated Energy Needs: 106%  % Meal Intake: NPO    Nutrition Risk    Level of Risk/Frequency of Follow-up:  (1 time/week)     Monitor and Evaluation    Food and Nutrient Intake: enteral nutrition intake  Food and Nutrient Adminstration: enteral and parenteral nutrition administration  Knowledge/Beliefs/Attitudes: beliefs and attitudes, food and nutrition knowledge/skill  Physical Activity and Function: nutrition-related ADLs and IADLs  Anthropometric Measurements: height/length, weight, weight change, body mass index  Biochemical  Data, Medical Tests and Procedures: electrolyte and renal panel, gastrointestinal profile, inflammatory profile, glucose/endocrine profile, lipid profile  Nutrition-Focused Physical Findings: overall appearance     Nutrition Follow-Up    RD Follow-up?: Yes    Keri Flores, Registration Eligible, Provisional LDN

## 2022-12-19 NOTE — SUBJECTIVE & OBJECTIVE
Neurologic Chief Complaint: lethargy, aphasia, dysarthria     Subjective:     Interval History: Patient is seen for follow-up neurological assessment and treatment recommendations:     Patient remains in NPU, neuro exam poor but stable. Going for PEG today. Will resume free water flushes following procedure given Na+ 150 today. SBP improved today. Consider starting ACE/ARB once PEG in place and safe to use for meds. Dispo recommendations for IPR.     HPI, Past Medical, Family, and Social History remains the same as documented in the initial encounter.     Review of Systems   Unable to perform ROS: Other (Aphasia)   Scheduled Meds:   amLODIPine  10 mg Per NG tube Daily    atorvastatin  40 mg Per NG tube Daily    carvediloL  12.5 mg Per NG tube BID    EScitalopram oxalate  20 mg Per NG tube Daily    furosemide  40 mg Per NG tube Daily    insulin aspart U-100  9 Units Subcutaneous 6 times per day     Continuous Infusions:   dextrose 10 % in water (D10W)      heparin (porcine) in D5W 12 Units/kg/hr (12/18/22 1100)     PRN Meds:dextrose 10 % in water (D10W), dextrose 10%, dextrose 10%, glucagon (human recombinant), insulin aspart U-100, labetaloL, ondansetron, sodium chloride 0.9%    Objective:     Vital Signs (Most Recent):  Temp: 98 °F (36.7 °C) (12/19/22 1134)  Pulse: 85 (12/19/22 1134)  Resp: 18 (12/19/22 1134)  BP: (!) 136/91 (12/19/22 1134)  SpO2: 98 % (12/19/22 1134)  BP Location: Right arm    Vital Signs Range (Last 24H):  Temp:  [97.4 °F (36.3 °C)-98.9 °F (37.2 °C)]   Pulse:  [85-97]   Resp:  [15-20]   BP: (136-167)/()   SpO2:  [96 %-100 %]   BP Location: Right arm    Physical Exam  Vitals and nursing note reviewed.   Constitutional:       General: He is not in acute distress.  HENT:      Head: Normocephalic.      Nose: No rhinorrhea.      Comments: NGT in place  Eyes:      General: No scleral icterus.     Conjunctiva/sclera: Conjunctivae normal.   Cardiovascular:      Rate and Rhythm: Normal rate.    Pulmonary:      Effort: No respiratory distress.   Abdominal:      General: There is no distension.   Musculoskeletal:         General: No deformity.   Skin:     General: Skin is warm and dry.   Neurological:      Mental Status: He is lethargic.      Motor: Weakness present.      Comments: Withdraws on R side to noxious stimuli  Moves left side spontaneously  Severe aphasia   Psychiatric:      Comments: Unable to assess mood, thought, insight or judgment 2/2 severe aphasia/level of consciousness       Neurological Exam:   LOC: lethargic  Attention Span: poor  Language: mixed aphasia, intermittently follows some simple commands  Articulation: Nonverbal unable to assess   Orientation: Nonverbal unable to assess   EOM (CN III, IV, VI): Tracks   Motor: L side moves spontaneously and antigravity, R side withdraws from pain   Sensation: Withdraws right side to noxious stimuli      Laboratory:  CMP:   Recent Labs   Lab 12/19/22 0423   CALCIUM 9.4   ALBUMIN 2.4*   PROT 6.8   *   K 3.7   CO2 30*      BUN 51*   CREATININE 4.0*   ALKPHOS 107   ALT 77*   AST 68*   BILITOT 0.4       BMP:   Recent Labs   Lab 12/19/22 0423   *   K 3.7      CO2 30*   BUN 51*   CREATININE 4.0*   CALCIUM 9.4       CBC:   Recent Labs   Lab 12/19/22 0423   WBC 10.75   RBC 4.84   HGB 13.0*   HCT 42.3      MCV 87   MCH 26.9*   MCHC 30.7*       Lipid Panel:   No results for input(s): CHOL, LDLCALC, HDL, TRIG in the last 168 hours.    Coagulation:   Recent Labs   Lab 12/19/22 0423   APTT 46.0*       Platelet Aggregation Study: No results for input(s): PLTAGG, PLTAGINTERP, PLTAGREGLACO, ADPPLTAGGREG in the last 168 hours.  Hgb A1C:   No results for input(s): HGBA1C in the last 168 hours.    TSH:   No results for input(s): TSH in the last 168 hours.      Diagnostic Results     Brain/Vessel Imaging   [facility] 12/13/22  -Evolving large left MCA infarct with stable mass effect and petechial hemorrhagic conversion.   -No definite  new hemorrhage or significant new abnormal parenchymal attenuation   -Separate areas of infarction involving the right parietal lobe and left cerebellum not well seen with CT technique.   -Clinical correlation and continued follow-up advised     [facility] 12/11/22   Grossly stable evolving large left MCA distribution infarct and smaller infarct in the right frontal lobe with possible trace hemorrhagic conversion. Persistent mass effect with sulcal effacement and slight mass effect on the left lateral ventricle. No new or worsening intracranial hemorrhage and no worsening of minimal rightward midline shift.    [facility] 12/9/22   Evolving no enlarged left MCA territory infarction with hypoattenuation and sulcal effacement. Intermediate density along the left basal ganglia compatible with known hemorrhagic conversion. Smaller area of infarction in the right cerebral hemisphere not well seen. Evolving mass effect and edema associated with the left cerebral hemispheric infarction with slight compression of the left lateral ventricle and trace 1 mm of rightward midline shift. No evidence for hydrocephalus    MRI Brain WO Contrast 12/8/22    Exam limited by patient motion artifact. Evolving left MCA territory infarct.  Additional foci of diffusion restriction in the left cerebral consistent with recent infarct.  Interval increase susceptibility artifact in the areas of diffusion restriction consistent with hemorrhagic conversion of recent infarcts.  No hydrocephalus or significant midline shift.      [facility] 12/8/22 16:29   -Evolving large left MCA territory infarction similar to recent CT though increased in size compared to prior MRI concerning for evolving recent to subacute and acute infarcts.  Localized mass effect without significant midline shift or hydrocephalus.  There is evolving recent to subacute age right posterior frontal infarction similar to prior MRI allowing for differences in technique  -There is subtle hyperdensity  along the left basal ganglia posteriorly and along the right posterior frontal cortex which may represent enhancing subacute age components of infarction with petechial hemorrhage felt less likely but cannot be entirely excluded with limitation secondary to recirculation contrast related to recent CTA performed at outside institution.  -Evolving mass effect most pronounced associated with the left MCA territory infarction with sulcal effacement without significant midline shift or hydrocephalus.    [facility] 12/8/22 12:11 (CTA H/N)   1. Left diencephalic hemisphere demonstrates evidence of an acute infarct of left basal ganglia and left caudate lobe.  2. Occlusion of the posterior division of the left M2 branch at the takeoff of the M1 vessel with patency involving the anterior division of the left M2 segment.    [facility] 12/7/22   1.  Moderate size subacute infarction left anterior basal ganglia and anterior left internal capsule appears mildly larger and better well defined compared to CT from 12/3/2020. There is currently greater mass effect on the anterior horn of the left lateral ventricle. There is no associated bleed or midline shift.  2.  Recent MRI demonstrated additional punctate acute infarctions in the left frontal lobe and a mild size acute infarction right centrum semiovale. These are less obvious on CT. No associated bleed.  3.  Additional chronic periventricular white matter hypodensities.    MRI Brain WO contrast 12/3/22   Multifocal areas of restricted diffusion are felt to reflect acute infarcts.    [facility] 12/3/22   Loss of gray-white matter distinction in involving the left basal ganglia could reflect changes of chronic small vessel ischemic disease versus cytotoxic edema from acute infarct.     Carotid US Bilateral 12/3/22   1. Carotid intimal hyperplasia, with no findings of hemodynamically significant carotid arterial stenosis or vascular occlusion.  2. Patent vertebral arteries with antegrade flow  bilaterally.      Cardiac Imaging   [facility] 12/4/22   The left ventricle is small with moderately decreased systolic function.  The estimated ejection fraction is 38%.  Left ventricular diastolic dysfunction.  There are segmental left ventricular wall motion abnormalities.  No spontaneous echo contrast. A moderate protruding layered left ventricular thrombus is present. The thrombus is fixed and located in the apex.  Normal right ventricular size.  Mild left atrial enlargement.  Mild right atrial enlargement.  Mild aortic regurgitation.  No interatrial septal defect present.  Normal appearing left atrial appendage. No thrombus is present in the appendage. SB occluder is absent. Abnormal appendage velocities.  Mild mitral regurgitation.  Agitated saline contrast study did not show any right to left shunt    TTE 11/14/22   The left ventricle is normal in size with mildly decreased systolic function.  The estimated ejection fraction is 40%.  Grade III left ventricular diastolic dysfunction.  Small posterior pericardial effusion.  There is mild left ventricular global hypokinesis.  Normal right ventricular size with normal right ventricular systolic function.  Severe left atrial enlargement.  Moderate right atrial enlargement.  Mild pulmonic regurgitation.  Mild to moderate tricuspid regurgitation.  Mild-to-moderate aortic regurgitation.  Intermediate central venous pressure (8 mmHg).  The estimated PA systolic pressure is 51 mmHg.  There is pulmonary hypertension.

## 2022-12-19 NOTE — PLAN OF CARE
Attempt to review POC with patient; re- inserting NGT; plan to go to IR tomorrow for PEG placement; patient pulled NG tube out this shift and will attempt to pull condom cath tubing and remains with continued need for left soft wrist restraint.  In AM once IR schedules PEG placement and MD will inform nurse of when to hold Heparin drip.  Continue as planned.

## 2022-12-19 NOTE — PLAN OF CARE
NPO (TF off) since 0100 (late due to code on floor). Barium sent from pharmacy and given. Neuro checks remain stable.       Problem: Adult Inpatient Plan of Care  Goal: Plan of Care Review  Outcome: Ongoing, Progressing  Goal: Patient-Specific Goal (Individualized)  Outcome: Ongoing, Progressing     Problem: Altered Behavior (Delirium)  Goal: Improved Behavioral Control  Outcome: Ongoing, Progressing     Problem: Sleep Disturbance (Delirium)  Goal: Improved Sleep  Outcome: Ongoing, Progressing     Problem: Adjustment to Illness (Stroke, Ischemic/Transient Ischemic Attack)  Goal: Optimal Coping  Outcome: Ongoing, Progressing

## 2022-12-19 NOTE — PT/OT/SLP PROGRESS
"Occupational Therapy  Co-Treatment    Name: Spencer Burton Jr.  MRN: [id number]  Admitting Diagnosis:  Embolic stroke involving left middle cerebral artery       Recommendations:     Discharge Recommendations: rehabilitation facility  Discharge Equipment Recommendations:  lift device, hospital bed  Barriers to discharge:  Decreased caregiver support    Assessment:     Spencer Burton Jr. is a 52 y.o. male with a medical diagnosis of Embolic stroke involving left middle cerebral artery.  Performance deficits affecting function are weakness, impaired sensation, impaired balance, impaired self care skills, impaired functional mobility, gait instability, decreased lower extremity function, decreased upper extremity function, decreased safety awareness.     Pt tolerated Tx session fairly well. Pt was pleasant and participatory as able. Pt continues to require significant assist with funct'l mobility and self care at present. Pt will likely cont to benefit from skilled OT Services to maximize funct'l indep, increase safety with funct'l mobility and decrease burden of care on caregiver(s).    Rehab Prognosis:  Fair; patient would benefit from acute skilled OT services to address these deficits and reach maximum level of function.       Plan:     Patient to be seen 4 x/week to address the above listed problems via self-care/home management, therapeutic activities, therapeutic exercises, neuromuscular re-education  Plan of Care Expires: 01/08/23  Plan of Care Reviewed with: patient    Subjective   Pt nonverbal throughout session...Pt nodding head "yes" in agreement to therapy    Pain/Comfort:  Pain Rating 1: 0/10  Pain Rating Post-Intervention 1: 0/10    Objective:     Communicated with: Nurse Seth prior to session.  Patient found HOB elevated with   upon OT entry to room.    Co-treatment performed due to patient's multiple deficits requiring two skilled therapists to appropriately and safely assess patient's strength and " endurance while facilitating functional tasks in addition to accommodating for patient's activity tolerance.      A client care conference was completed by the OTR and the WEISS prior to treatment by the WEISS to discuss the patient's POC and current status.      General Precautions: Standard, aphasia, aspiration, fall, NPO, vision impaired    Orthopedic Precautions:N/A  Braces: N/A  Respiratory Status: Room air     Occupational Performance:     Bed Mobility:    Patient completed Scooting/Bridging with total assistance and 2 persons  Patient completed Supine to Sit with total assistance and 2 persons  Patient completed Sit to Supine with total assistance and 2 persons         AMPAC 6 Click ADL: 6    Treatment & Education:  -Pt re ed role of OT per POC...questionable evidence of learning per Pt  -Pt received PROM 2x5 BUE within all tolerable and available planes to increase strength and ROM for funct'l mobility and self care  -Pt received weight-bearing through LUE (wrist, elbow, shoulder) while seated EOB  -Pt RUE (shoulder) noted with 2-finger subluxation, RUE positioned to comfort and provided support via pillow      Patient left HOB elevated with all lines intact, call button in reach, soft restraints reapplied at end of session, and nurse notified    GOALS:   Multidisciplinary Problems       Occupational Therapy Goals          Problem: Occupational Therapy    Goal Priority Disciplines Outcome Interventions   Occupational Therapy Goal     OT, PT/OT Ongoing, Progressing    Description: Goals to be met by: 12/23/2022     Patient will increase functional independence with ADLs by performing:    UE Dressing with Minimal Assistance.  LE Dressing with Moderate Assistance.  Grooming while EOB with Minimal Assistance.  Toileting from bedside commode with Maximum Assistance for hygiene and clothing management.   Supine to sit with Moderate Assistance.  Stand pivot transfers with Maximum Assistance using LRAD as  needed.  Toilet transfer to bedside commode with Maximum Assistance using LRAD as needed.                         Time Tracking:     OT Date of Treatment: 12/19/22  OT Start Time: 1309  OT Stop Time: 1326  OT Total Time (min): 17 min    Billable Minutes:Therapeutic Activity 17    OT/PRINCESS: PRINCESS     PRINCESS Visit Number: 1    12/19/2022

## 2022-12-20 LAB
ALBUMIN SERPL BCP-MCNC: 2.3 G/DL (ref 3.5–5.2)
ALP SERPL-CCNC: 101 U/L (ref 55–135)
ALT SERPL W/O P-5'-P-CCNC: 62 U/L (ref 10–44)
ANION GAP SERPL CALC-SCNC: 12 MMOL/L (ref 8–16)
APTT BLDCRRT: 38.2 SEC (ref 21–32)
APTT BLDCRRT: 42.4 SEC (ref 21–32)
APTT BLDCRRT: 50.4 SEC (ref 21–32)
AST SERPL-CCNC: 56 U/L (ref 10–40)
BASOPHILS # BLD AUTO: 0.08 K/UL (ref 0–0.2)
BASOPHILS NFR BLD: 0.7 % (ref 0–1.9)
BILIRUB SERPL-MCNC: 0.4 MG/DL (ref 0.1–1)
BUN SERPL-MCNC: 50 MG/DL (ref 6–20)
CALCIUM SERPL-MCNC: 9.3 MG/DL (ref 8.7–10.5)
CHLORIDE SERPL-SCNC: 110 MMOL/L (ref 95–110)
CO2 SERPL-SCNC: 28 MMOL/L (ref 23–29)
CREAT SERPL-MCNC: 4.2 MG/DL (ref 0.5–1.4)
DIFFERENTIAL METHOD: ABNORMAL
EOSINOPHIL # BLD AUTO: 0.2 K/UL (ref 0–0.5)
EOSINOPHIL NFR BLD: 1.7 % (ref 0–8)
ERYTHROCYTE [DISTWIDTH] IN BLOOD BY AUTOMATED COUNT: 15.1 % (ref 11.5–14.5)
EST. GFR  (NO RACE VARIABLE): 16.4 ML/MIN/1.73 M^2
GLUCOSE SERPL-MCNC: 162 MG/DL (ref 70–110)
HCT VFR BLD AUTO: 39.4 % (ref 40–54)
HGB BLD-MCNC: 12 G/DL (ref 14–18)
IMM GRANULOCYTES # BLD AUTO: 0.04 K/UL (ref 0–0.04)
IMM GRANULOCYTES NFR BLD AUTO: 0.4 % (ref 0–0.5)
LYMPHOCYTES # BLD AUTO: 1.3 K/UL (ref 1–4.8)
LYMPHOCYTES NFR BLD: 11.9 % (ref 18–48)
MAGNESIUM SERPL-MCNC: 2.4 MG/DL (ref 1.6–2.6)
MCH RBC QN AUTO: 27 PG (ref 27–31)
MCHC RBC AUTO-ENTMCNC: 30.5 G/DL (ref 32–36)
MCV RBC AUTO: 89 FL (ref 82–98)
MONOCYTES # BLD AUTO: 0.5 K/UL (ref 0.3–1)
MONOCYTES NFR BLD: 5 % (ref 4–15)
NEUTROPHILS # BLD AUTO: 8.7 K/UL (ref 1.8–7.7)
NEUTROPHILS NFR BLD: 80.3 % (ref 38–73)
NRBC BLD-RTO: 0 /100 WBC
PHOSPHATE SERPL-MCNC: 4 MG/DL (ref 2.7–4.5)
PLATELET # BLD AUTO: 253 K/UL (ref 150–450)
PMV BLD AUTO: 13.5 FL (ref 9.2–12.9)
POCT GLUCOSE: 129 MG/DL (ref 70–110)
POCT GLUCOSE: 172 MG/DL (ref 70–110)
POCT GLUCOSE: 174 MG/DL (ref 70–110)
POCT GLUCOSE: 185 MG/DL (ref 70–110)
POTASSIUM SERPL-SCNC: 3.8 MMOL/L (ref 3.5–5.1)
PROT SERPL-MCNC: 6.7 G/DL (ref 6–8.4)
RBC # BLD AUTO: 4.44 M/UL (ref 4.6–6.2)
SODIUM SERPL-SCNC: 150 MMOL/L (ref 136–145)
WBC # BLD AUTO: 10.86 K/UL (ref 3.9–12.7)

## 2022-12-20 PROCEDURE — 36415 COLL VENOUS BLD VENIPUNCTURE: CPT | Performed by: PSYCHIATRY & NEUROLOGY

## 2022-12-20 PROCEDURE — 25500020 PHARM REV CODE 255: Performed by: PSYCHIATRY & NEUROLOGY

## 2022-12-20 PROCEDURE — 83735 ASSAY OF MAGNESIUM: CPT | Performed by: PHYSICIAN ASSISTANT

## 2022-12-20 PROCEDURE — 25000003 PHARM REV CODE 250: Performed by: NURSE PRACTITIONER

## 2022-12-20 PROCEDURE — 11000001 HC ACUTE MED/SURG PRIVATE ROOM

## 2022-12-20 PROCEDURE — 85025 COMPLETE CBC W/AUTO DIFF WBC: CPT | Performed by: NURSE PRACTITIONER

## 2022-12-20 PROCEDURE — 84100 ASSAY OF PHOSPHORUS: CPT | Performed by: PHYSICIAN ASSISTANT

## 2022-12-20 PROCEDURE — 99233 SBSQ HOSP IP/OBS HIGH 50: CPT | Mod: ,,, | Performed by: PSYCHIATRY & NEUROLOGY

## 2022-12-20 PROCEDURE — 25500020 PHARM REV CODE 255

## 2022-12-20 PROCEDURE — 63600175 PHARM REV CODE 636 W HCPCS: Mod: JG | Performed by: RADIOLOGY

## 2022-12-20 PROCEDURE — 85730 THROMBOPLASTIN TIME PARTIAL: CPT | Mod: 91 | Performed by: PSYCHIATRY & NEUROLOGY

## 2022-12-20 PROCEDURE — 25000003 PHARM REV CODE 250: Performed by: PSYCHIATRY & NEUROLOGY

## 2022-12-20 PROCEDURE — 80053 COMPREHEN METABOLIC PANEL: CPT | Performed by: PHYSICIAN ASSISTANT

## 2022-12-20 PROCEDURE — 36415 COLL VENOUS BLD VENIPUNCTURE: CPT | Performed by: PHYSICIAN ASSISTANT

## 2022-12-20 PROCEDURE — A9698 NON-RAD CONTRAST MATERIALNOC: HCPCS

## 2022-12-20 PROCEDURE — 99233 PR SUBSEQUENT HOSPITAL CARE,LEVL III: ICD-10-PCS | Mod: ,,, | Performed by: PSYCHIATRY & NEUROLOGY

## 2022-12-20 RX ORDER — GLUCAGON 1 MG
KIT INJECTION
Status: DISCONTINUED | OUTPATIENT
Start: 2022-12-20 | End: 2022-12-28 | Stop reason: HOSPADM

## 2022-12-20 RX ORDER — AMLODIPINE BESYLATE 10 MG/1
10 TABLET ORAL DAILY
Status: DISCONTINUED | OUTPATIENT
Start: 2022-12-21 | End: 2022-12-28 | Stop reason: HOSPADM

## 2022-12-20 RX ORDER — ESCITALOPRAM OXALATE 20 MG/1
20 TABLET ORAL DAILY
Status: DISCONTINUED | OUTPATIENT
Start: 2022-12-21 | End: 2022-12-28 | Stop reason: HOSPADM

## 2022-12-20 RX ORDER — ATORVASTATIN CALCIUM 40 MG/1
40 TABLET, FILM COATED ORAL DAILY
Status: DISCONTINUED | OUTPATIENT
Start: 2022-12-21 | End: 2022-12-28 | Stop reason: HOSPADM

## 2022-12-20 RX ORDER — MIDAZOLAM HYDROCHLORIDE 1 MG/ML
INJECTION INTRAMUSCULAR; INTRAVENOUS
Status: COMPLETED | OUTPATIENT
Start: 2022-12-20 | End: 2022-12-20

## 2022-12-20 RX ORDER — CEFAZOLIN SODIUM 1 G/50ML
SOLUTION INTRAVENOUS
Status: COMPLETED | OUTPATIENT
Start: 2022-12-20 | End: 2022-12-20

## 2022-12-20 RX ORDER — FUROSEMIDE 40 MG/1
40 TABLET ORAL DAILY
Status: DISCONTINUED | OUTPATIENT
Start: 2022-12-21 | End: 2022-12-28 | Stop reason: HOSPADM

## 2022-12-20 RX ORDER — CARVEDILOL 12.5 MG/1
12.5 TABLET ORAL 2 TIMES DAILY
Status: DISCONTINUED | OUTPATIENT
Start: 2022-12-20 | End: 2022-12-28 | Stop reason: HOSPADM

## 2022-12-20 RX ORDER — FENTANYL CITRATE 50 UG/ML
INJECTION, SOLUTION INTRAMUSCULAR; INTRAVENOUS
Status: COMPLETED | OUTPATIENT
Start: 2022-12-20 | End: 2022-12-20

## 2022-12-20 RX ADMIN — IOHEXOL 15 ML: 300 INJECTION, SOLUTION INTRAVENOUS at 10:12

## 2022-12-20 RX ADMIN — FENTANYL CITRATE 50 MCG: 50 INJECTION, SOLUTION INTRAMUSCULAR; INTRAVENOUS at 10:12

## 2022-12-20 RX ADMIN — ATORVASTATIN CALCIUM 40 MG: 40 TABLET, FILM COATED ORAL at 08:12

## 2022-12-20 RX ADMIN — FUROSEMIDE 40 MG: 40 TABLET ORAL at 08:12

## 2022-12-20 RX ADMIN — ESCITALOPRAM OXALATE 20 MG: 20 TABLET ORAL at 08:12

## 2022-12-20 RX ADMIN — GLUCAGON HYDROCHLORIDE 1 MG: KIT at 10:12

## 2022-12-20 RX ADMIN — BARIUM SULFATE 450 ML: 20 SUSPENSION ORAL at 12:12

## 2022-12-20 RX ADMIN — INSULIN ASPART 9 UNITS: 100 INJECTION, SOLUTION INTRAVENOUS; SUBCUTANEOUS at 12:12

## 2022-12-20 RX ADMIN — AMLODIPINE BESYLATE 10 MG: 10 TABLET ORAL at 08:12

## 2022-12-20 RX ADMIN — CEFAZOLIN SODIUM 1 G: 1 SOLUTION INTRAVENOUS at 10:12

## 2022-12-20 RX ADMIN — MIDAZOLAM HYDROCHLORIDE 1 MG: 1 INJECTION INTRAMUSCULAR; INTRAVENOUS at 10:12

## 2022-12-20 RX ADMIN — CARVEDILOL 12.5 MG: 12.5 TABLET, FILM COATED ORAL at 08:12

## 2022-12-20 NOTE — PROGRESS NOTES
Ulices Stack - Neurosurgery ([facility])  Vascular Neurology  Comprehensive Stroke Center  Progress Note    Assessment/Plan:     * Embolic stroke involving left middle cerebral artery  53 yo M with CHF, COPD, HTN, HLD, DM admitted for acute LMCA stroke. He initially presented to OSH with RSW, OOW for thrombolytics. MRI at that time demonstrated bilateral anterior and posterior circulation strokes concerning for embolic etiology. He was admitted to OSH and RAGHAV revealed LV thrombus, he was not started on AC. During hospital stay noted to be lethargic and aphasic/dysarthric, then on 12/18 had acute neuro change with RSW and global aphasia. NIHSS increased from 6 to 23. CTA demonstrated a L M2 occlusion and patient was transferred to C for possible intervention and higher level of care. He was not taken to IR due to poor ASPECTS and was admitted to ICU for close monitoring and hemicrani watch. Suspect etiology likely cardioembolic given LV thrombus.    NAEO, neuro exam stable. Going for PEG today with IR. Na 150, will resume FWF after procedure. Will transition hep gtt to DOAC once PEG placed and tolerating tube feeds. Dispo recs for IPR.      Antithrombotics for secondary stroke prevention: continue heparin gtt, will switch to DOAC once PEG functional    Statins for secondary stroke prevention and hyperlipidemia, if present:   Statins: Atorvastatin- 40 mg daily    Aggressive risk factor modification: HTN, DM, HLD, Diet, Exercise, LV thrombus     Rehab efforts: IPR    Diagnostics ordered/pending: None     VTE prophylaxis: Mechanical prophylaxis: Place SCDs  None: Reason for No Pharmacological VTE Prophylaxis: Currently on anticoagulation     BP parameters: SBP <180      Oropharyngeal dysphagia  Due to stroke  SLP eval and treat  PEG placed today by IR    JR on CKD  See CKD    Hyperlipidemia associated with type 2 diabetes mellitus  Stroke RF   . 8, goal <70  -Continue atorvastatin 40mg daily    CHF (congestive heart  failure)  Stroke risk factor  RAGHAV with EF 38% and segmental LV wall motion abnormalities  -Strict I/Os  -Continue lasix 40mg    Acute cystitis without hematuria  Klebsiella pneumoniae on culture  Treated with ceftriaxone, completed on 12/16    Debility  2/2 stroke   OT and PT to evaluate   Therapy recommending IPR     Cytotoxic brain edema  -Noted on imaging in the area of the L MCA territory with associated mass effect  -NSGY consulted for hemicrani watch due to malignant MCA, no acute intervention recommended as serial imaging remains stable  -Will continue to monitor with frequent a4h neuro checks while inpatient, as this could indicate worsening/expansion of edema  -Obtain Stat CTH for any acute neuro changes and notify stroke team immediately    LV (left ventricular) mural thrombus  Stroke RF  Continue heparin gtt due to PEG placement today, will switch to DOAC when able once PEG is functional    Stage 4 chronic kidney disease  Cr 4.2 and GFR 16.4 today   - continue to monitor renal function with scheduled labs   - avoid nephrotoxins  - renally dose medications when appropiate   - monitor events that may lead to decreased renal perfusion (hypovolemia, hypotension, sepsis)  - monitor urine output to assure that no obstruction precipitates worsening in GFR      Type 2 diabetes mellitus, with long-term current use of insulin  Stroke RF  A1c 9.1, consider nutrition consult and DM education  BG goal while inpatient 140-180  Currently on Aspart 9units q4h + SSI PRN    Essential hypertension  Stroke RF  SBP <180, MAP >65  Continue amlodipine and coreg  PRN labetalol/hydralazine         12/09/2022 Patient with LV thrombus, will need anticoagulation. NGT in place would recommend heparin gtt until patient able to swallow or PEG placed prior to DOAC initiation. Patient tachycardic today with SBP < 210, metoprolol started by primary team. Febrile with elevated white count and procal, currently on Zosyn and Vanc while cx  pending. NSGY following for hemicrani watch. Patient has been discharged from OT per last note, will need new orders. Continue current ICU care.   12/10/2022: Pending stability scan patient is expected to be started on heparin gtt. Continue hemicrani watch in  ICU. Family at bedside.   12/11/2022: Patient started on heparin gtt overnight, he demonstrated great clinical improvement today, he was able to tell me his name, knew he is in [facility]. Followed single step commands. No family at bedside today.   12/12/2022 Patient remains in ICU for sue-crani watch. NSGY following. On salt tabs for goal of hypernatremia.Will remain in ICU another night. VN to re-evaluate for stepdown tomorrow. Patient is on ceftriaxone for acute cystitis. aPTT today 42.9. CTH following therapeutic heparin levels stable. More alert today. IPR recommendations; SLP with minced and moist diet with thin liquids.   12/13/2022 Remains in NCC, neuro exam unchanged and limited by drowsiness. Overnight had short run of VT/SVT that resolved without any interventions. BPs not consistently at goal of <200, had max BP today of 214/140. Currently NPO with NGT in place and anticipate patient will need PEG placement, SLP recommending ice chips sparingly for pleasure. Anticipate step down tomorrow once BP is consistently at goal  12/14/2022 Pending SD to NPU. Continue to watch BP closely, SBP<200. Patient will likely need a PEG.  12/15/2022. Pending SD to NPU. Poor exam, patient very lethargic, WD on the right.   12/16/2022 Patient stepped down overnight to NPU. Exam stable. Re-evaluated by speech this afternoon, still recommending NPO. Will need to discuss nutrition going forward (I.e. need for PEG). Recommendations for IPR.   12/17/22 Exam stable, elevated sodium, free water boluses added, metoprolol increased for elevated BP & HR.  IR consult placed for G tube placement after discussing with patient's family via phone.  12/18/22 patient displaced NG tube  overnight, adjusted by nursing. KUB reviewed- BG ok to use. Water boluses added yesterday for hypernatremia, Na+ improved to 149. -184, Lopressor discontinued and Coreg ordered for better BP control. Plan for PEG placement on 12/19 .  12/19/2022 Patient remains in NPU, neuro exam poor but stable. Going for PEG today. Will resume free water flushes following procedure given Na+ 150 today. SBP improved today. Consider starting ACE/ARB once PEG in place and safe to use for meds. Dispo recommendations for IPR.   12/20/2022 NAEO, neuro exam stable. Going for PEG today with IR. Na 150, will resume FWF after procedure. Will transition hep gtt to DOAC once PEG placed and tolerating tube feeds. Dispo recs for IPR.      STROKE DOCUMENTATION   Acute Stroke Times   Last Known Normal Date: 12/08/22  Last Known Normal Time: 1145  Symptom Onset Date: 12/08/22  Symptom Onset Time: 1145  Stroke Team Called Date: 12/08/22  Stroke Team Called Time: 1615  Stroke Team Arrival Date: 12/08/22  Stroke Team Arrival Time: 1615  CT Interpretation Time: 1615  Thrombolytic Therapy Recommended: No  CTA Interpretation Time: 1615    NIH Scale:  1a. Level of Consciousness: 0-->Alert, keenly responsive  1b. LOC Questions: 2-->Answers neither question correctly  1c. LOC Commands: 1-->Performs one task correctly  2. Best Gaze: 2-->Forced deviation, or total gaze paresis not overcome by the oculocephalic maneuver  3. Visual: 0-->No visual loss  4. Facial Palsy: 1-->Minor paralysis (flattened nasolabial fold, asymmetry on smiling)  5a. Motor Arm, Left: 0-->No drift, limb holds 90 (or 45) degrees for full 10 secs  5b. Motor Arm, Right: 3-->No effort against gravity, limb falls  6a. Motor Leg, Left: 0-->No drift, leg holds 30 degree position for full 5 secs  6b. Motor Leg, Right: 3-->No effort against gravity, leg falls to bed immediately  7. Limb Ataxia: 0-->Absent  8. Sensory: 1-->Mild-to-moderate sensory loss, patient feels pinprick is less  sharp or is dull on the affected side, or there is a loss of superficial pain with pinprick, but patient is aware of being touched  9. Best Language: 2-->Severe aphasia, all communication is through fragmentary expression, great need for inference, questioning, and guessing by the listener. Range of information that can be exchanged is limited, listener carries burden of. . . (see row details)  10. Dysarthria: 2-->Severe dysarthria, patients speech is so slurred as to be unintelligible in the absence of or out of proportion to any dysphasia, or is mute/anarthric  11. Extinction and Inattention (formerly Neglect): 1-->Visual, tactile, auditory, spatial, or personal inattention or extinction to bilateral simultaneous stimulation in one of the sensory modalities  Total (NIH Stroke Scale): 18       Modified Carlisle Score: 5  Thao Coma Scale:    ABCD2 Score:    [id number] Score:   HAS -BLED Score:   ICH Score:   Hunt & Leblanc Classification:      Hemorrhagic change of an Ischemic Stroke: Does this patient have an ischemic stroke with hemorrhagic changes? No     Neurologic Chief Complaint: L MCA stroke    Subjective:     Interval History: Patient is seen for follow-up neurological assessment and treatment recommendations:   NAEO, neuro exam stable. Going for PEG today with IR. Na 150, will resume FWF after procedure. Will transition hep gtt to DOAC once PEG placed and tolerating tube feeds. Dispo recs for IPR.    HPI, Past Medical, Family, and Social History remains the same as documented in the initial encounter.     Review of Systems   Unable to perform ROS: Other (Aphasia)     Scheduled Meds:   amLODIPine  10 mg Per NG tube Daily    atorvastatin  40 mg Per NG tube Daily    carvediloL  12.5 mg Per NG tube BID    EScitalopram oxalate  20 mg Per NG tube Daily    furosemide  40 mg Per NG tube Daily    insulin aspart U-100  9 Units Subcutaneous 6 times per day     Continuous Infusions:   ceFAZolin 1 g/50ml Dextrose  IVPB      dextrose 10 % in water (D10W)      heparin (porcine) in D5W 14 Units/kg/hr (12/19/22 1914)     PRN Meds:ceFAZolin 1 g/50ml Dextrose IVPB, dextrose 10 % in water (D10W), dextrose 10%, dextrose 10%, fentaNYL, glucagon (human recombinant), insulin aspart U-100, labetaloL, midazolam, ondansetron, sodium chloride 0.9%    Objective:     Vital Signs (Most Recent):  Temp: 97.6 °F (36.4 °C) (12/20/22 0846)  Pulse: 80 (12/20/22 1015)  Resp: 15 (12/20/22 1015)  BP: 116/73 (12/20/22 1015)  SpO2: 95 % (12/20/22 1015)  BP Location: Left arm    Vital Signs Range (Last 24H):  Temp:  [97.2 °F (36.2 °C)-98.6 °F (37 °C)]   Pulse:  [80-95]   Resp:  [14-20]   BP: (116-169)/()   SpO2:  [94 %-100 %]   BP Location: Left arm    Physical Exam  Vitals and nursing note reviewed.   Constitutional:       General: He is not in acute distress.  HENT:      Head: Normocephalic.      Nose: No rhinorrhea.      Comments: NGT in place  Eyes:      General: Visual field deficit present. No scleral icterus.     Conjunctiva/sclera: Conjunctivae normal.   Cardiovascular:      Rate and Rhythm: Normal rate.   Pulmonary:      Effort: No respiratory distress.   Abdominal:      General: There is no distension.   Musculoskeletal:         General: No deformity.   Skin:     General: Skin is warm and dry.   Neurological:      Mental Status: He is alert.      Cranial Nerves: Facial asymmetry present.      Motor: Weakness present.      Comments: Withdraws on R side to noxious stimuli  Moves left side spontaneously  Severe aphasia   Psychiatric:      Comments: Unable to assess mood, thought, insight or judgment 2/2 severe aphasia/level of consciousness       Neurological Exam:   LOC: alert  Attention Span: poor  Language: mixed aphasia, intermittently follows some simple commands  Articulation: Unable to assess due to aphasia  Orientation: Unable to assess due to aphasia  EOM (CN III, IV, VI):  L gaze preference, tracks to midline  Motor: L side moves  spontaneously and antigravity, R side withdraws from pain   Sensation: Withdraws right side to noxious stimuli      Laboratory:  CMP:   Recent Labs   Lab 12/20/22  0133   CALCIUM 9.3   ALBUMIN 2.3*   PROT 6.7   *   K 3.8   CO2 28      BUN 50*   CREATININE 4.2*   ALKPHOS 101   ALT 62*   AST 56*   BILITOT 0.4       BMP:   Recent Labs   Lab 12/20/22  0133   *   K 3.8      CO2 28   BUN 50*   CREATININE 4.2*   CALCIUM 9.3       CBC:   Recent Labs   Lab 12/20/22  0133   WBC 10.86   RBC 4.44*   HGB 12.0*   HCT 39.4*      MCV 89   MCH 27.0   MCHC 30.5*       Lipid Panel:   No results for input(s): CHOL, LDLCALC, HDL, TRIG in the last 168 hours.    Coagulation:   Recent Labs   Lab 12/20/22  0742   APTT 50.4*       Hgb A1C:   No results for input(s): HGBA1C in the last 168 hours.    TSH:   No results for input(s): TSH in the last 168 hours.      Diagnostic Results     Brain/Vessel Imaging   CTH 12/13/22  -Evolving large left MCA infarct with stable mass effect and petechial hemorrhagic conversion.   -No definite new hemorrhage or significant new abnormal parenchymal attenuation   -Separate areas of infarction involving the right parietal lobe and left cerebellum not well seen with CT technique.   -Clinical correlation and continued follow-up advised     CTH 12/11/22   Grossly stable evolving large left MCA distribution infarct and smaller infarct in the right frontal lobe with possible trace hemorrhagic conversion. Persistent mass effect with sulcal effacement and slight mass effect on the left lateral ventricle. No new or worsening intracranial hemorrhage and no worsening of minimal rightward midline shift.    CTH 12/9/22   Evolving no enlarged left MCA territory infarction with hypoattenuation and sulcal effacement. Intermediate density along the left basal ganglia compatible with known hemorrhagic conversion. Smaller area of infarction in the right cerebral hemisphere not well seen. Evolving  mass effect and edema associated with the left cerebral hemispheric infarction with slight compression of the left lateral ventricle and trace 1 mm of rightward midline shift. No evidence for hydrocephalus    MRI Brain WO Contrast 12/8/22    Exam limited by patient motion artifact. Evolving left MCA territory infarct.  Additional foci of diffusion restriction in the left cerebral consistent with recent infarct.  Interval increase susceptibility artifact in the areas of diffusion restriction consistent with hemorrhagic conversion of recent infarcts.  No hydrocephalus or significant midline shift.      CTH 12/8/22 16:29   -Evolving large left MCA territory infarction similar to recent CT though increased in size compared to prior MRI concerning for evolving recent to subacute and acute infarcts.  Localized mass effect without significant midline shift or hydrocephalus.  There is evolving recent to subacute age right posterior frontal infarction similar to prior MRI allowing for differences in technique  -There is subtle hyperdensity along the left basal ganglia posteriorly and along the right posterior frontal cortex which may represent enhancing subacute age components of infarction with petechial hemorrhage felt less likely but cannot be entirely excluded with limitation secondary to recirculation contrast related to recent CTA performed at outside institution.  -Evolving mass effect most pronounced associated with the left MCA territory infarction with sulcal effacement without significant midline shift or hydrocephalus.    CTH 12/8/22 12:11 (CTA H/N)   1. Left diencephalic hemisphere demonstrates evidence of an acute infarct of left basal ganglia and left caudate lobe.  2. Occlusion of the posterior division of the left M2 branch at the takeoff of the M1 vessel with patency involving the anterior division of the left M2 segment.    CTH 12/7/22   1.  Moderate size subacute infarction left anterior basal ganglia and  anterior left internal capsule appears mildly larger and better well defined compared to CT from 12/3/2020. There is currently greater mass effect on the anterior horn of the left lateral ventricle. There is no associated bleed or midline shift.  2.  Recent MRI demonstrated additional punctate acute infarctions in the left frontal lobe and a mild size acute infarction right centrum semiovale. These are less obvious on CT. No associated bleed.  3.  Additional chronic periventricular white matter hypodensities.    MRI Brain WO contrast 12/3/22   Multifocal areas of restricted diffusion are felt to reflect acute infarcts.    CTH 12/3/22   Loss of gray-white matter distinction in involving the left basal ganglia could reflect changes of chronic small vessel ischemic disease versus cytotoxic edema from acute infarct.     Carotid US Bilateral 12/3/22   1. Carotid intimal hyperplasia, with no findings of hemodynamically significant carotid arterial stenosis or vascular occlusion.  2. Patent vertebral arteries with antegrade flow bilaterally.      Cardiac Imaging   RAGHAV 12/4/22    The left ventricle is small with moderately decreased systolic function.   The estimated ejection fraction is 38%.   Left ventricular diastolic dysfunction.   There are segmental left ventricular wall motion abnormalities.   No spontaneous echo contrast. A moderate protruding layered left ventricular thrombus is present. The thrombus is fixed and located in the apex.   Normal right ventricular size.   Mild left atrial enlargement.   Mild right atrial enlargement.   Mild aortic regurgitation.   No interatrial septal defect present.   Normal appearing left atrial appendage. No thrombus is present in the appendage. SB occluder is absent. Abnormal appendage velocities.   Mild mitral regurgitation.   Agitated saline contrast study did not show any right to left shunt        BHAVYA Daugherty  [facility]  Department of  Vascular Neurology   [facility]lices Stack - Neurosurgery ([facility])

## 2022-12-20 NOTE — PT/OT/SLP PROGRESS
Speech Language Pathology      Spencer Solervale .  MRN: [id number]    Patient not seen today secondary to PEG tube placement. Will follow-up per SLP POC.      12/20/2022

## 2022-12-20 NOTE — ASSESSMENT & PLAN NOTE
-Noted on imaging in the area of the L MCA territory with associated mass effect  -NSGY consulted for hemicrani watch due to malignant MCA, no acute intervention recommended as serial imaging remains stable  -Will continue to monitor with frequent a4h neuro checks while inpatient, as this could indicate worsening/expansion of edema  -Obtain Stat CTH for any acute neuro changes and notify stroke team immediately

## 2022-12-20 NOTE — PLAN OF CARE
Pt arrived to IR room 190 via stretcher w/ transporter, awake & alert, no nverbal, name//allergies/procedure verified w/ pt, armband, chart & consent. Pt will be monitored by RN @ bedside throughout procedure/sedation.

## 2022-12-20 NOTE — PLAN OF CARE
Pt VSS and NAD. Pt transport requested for pt to return to [address]. Inpt RN made aware of transport request.

## 2022-12-20 NOTE — SUBJECTIVE & OBJECTIVE
Neurologic Chief Complaint: L MCA stroke    Subjective:     Interval History: Patient is seen for follow-up neurological assessment and treatment recommendations:   NAEO, neuro exam stable. S/p PEG yesterday, NGT removed now that tube feedings restarted and tolerating well. NA remains at 150 but unclear if patient had been getting FWF, order updated and nursing communication in. Heparin gtt transitioned to DOAC today. Dispo recs for IPR, anticipate he will be ready for discharge tomorrow.    HPI, Past Medical, Family, and Social History remains the same as documented in the initial encounter.     Review of Systems   Unable to perform ROS: Other (Aphasia)     Scheduled Meds:   amLODIPine  10 mg Per NG tube Daily    atorvastatin  40 mg Per NG tube Daily    carvediloL  12.5 mg Per NG tube BID    EScitalopram oxalate  20 mg Per NG tube Daily    furosemide  40 mg Per NG tube Daily    insulin aspart U-100  9 Units Subcutaneous 6 times per day     Continuous Infusions:   dextrose 10 % in water (D10W)      heparin (porcine) in D5W 14 Units/kg/hr (12/19/22 1914)     PRN Meds:dextrose 10 % in water (D10W), dextrose 10%, dextrose 10%, glucagon (human recombinant), glucagon (human recombinant), insulin aspart U-100, labetaloL, ondansetron, sodium chloride 0.9%    Objective:     Vital Signs (Most Recent):  Temp: 98 °F (36.7 °C) (12/20/22 1616)  Pulse: 90 (12/20/22 1616)  Resp: 18 (12/20/22 1616)  BP: (!) 162/88 (12/20/22 1616)  SpO2: (!) 94 % (12/20/22 1616)  BP Location: Left arm    Vital Signs Range (Last 24H):  Temp:  [97.2 °F (36.2 °C)-98.6 °F (37 °C)]   Pulse:  [78-95]   Resp:  [14-18]   BP: (116-169)/()   SpO2:  [94 %-100 %]   BP Location: Left arm    Physical Exam  Vitals and nursing note reviewed.   Constitutional:       General: He is not in acute distress.  HENT:      Head: Normocephalic.      Nose: No rhinorrhea.   Eyes:      General: Visual field deficit present. No scleral icterus.     Conjunctiva/sclera:  Conjunctivae normal.   Cardiovascular:      Rate and Rhythm: Normal rate.   Pulmonary:      Effort: No respiratory distress.   Abdominal:      General: There is no distension.      Comments: PEG in place, dressing CDI   Musculoskeletal:         General: No deformity.   Skin:     General: Skin is warm and dry.   Neurological:      Mental Status: He is alert.      Cranial Nerves: Facial asymmetry present.      Motor: Weakness present.      Comments: Withdraws on R side to noxious stimuli  Moves left side spontaneously  Severe aphasia   Psychiatric:      Comments: Unable to assess mood, thought, insight or judgment 2/2 severe aphasia/level of consciousness       Neurological Exam:   LOC: alert  Attention Span: poor  Language: mixed aphasia, intermittently follows some simple commands  Articulation: Unable to assess due to aphasia  Orientation: Unable to assess due to aphasia  EOM (CN III, IV, VI):  L gaze preference, tracks to midline  Motor: L side moves spontaneously and antigravity, R side withdraws from pain   Sensation: Withdraws right side to noxious stimuli      Laboratory:  CMP:   Recent Labs   Lab 12/20/22  0133   CALCIUM 9.3   ALBUMIN 2.3*   PROT 6.7   *   K 3.8   CO2 28      BUN 50*   CREATININE 4.2*   ALKPHOS 101   ALT 62*   AST 56*   BILITOT 0.4       BMP:   Recent Labs   Lab 12/20/22  0133   *   K 3.8      CO2 28   BUN 50*   CREATININE 4.2*   CALCIUM 9.3       CBC:   Recent Labs   Lab 12/20/22  0133   WBC 10.86   RBC 4.44*   HGB 12.0*   HCT 39.4*      MCV 89   MCH 27.0   MCHC 30.5*       Lipid Panel:   No results for input(s): CHOL, LDLCALC, HDL, TRIG in the last 168 hours.    Coagulation:   Recent Labs   Lab 12/20/22  0742   APTT 50.4*       Hgb A1C:   No results for input(s): HGBA1C in the last 168 hours.    TSH:   No results for input(s): TSH in the last 168 hours.      Diagnostic Results     Brain/Vessel Imaging   [facility] 12/13/22  -Evolving large left MCA infarct with  stable mass effect and petechial hemorrhagic conversion.   -No definite new hemorrhage or significant new abnormal parenchymal attenuation   -Separate areas of infarction involving the right parietal lobe and left cerebellum not well seen with CT technique.   -Clinical correlation and continued follow-up advised     [facility] 12/11/22   Grossly stable evolving large left MCA distribution infarct and smaller infarct in the right frontal lobe with possible trace hemorrhagic conversion. Persistent mass effect with sulcal effacement and slight mass effect on the left lateral ventricle. No new or worsening intracranial hemorrhage and no worsening of minimal rightward midline shift.    [facility] 12/9/22   Evolving no enlarged left MCA territory infarction with hypoattenuation and sulcal effacement. Intermediate density along the left basal ganglia compatible with known hemorrhagic conversion. Smaller area of infarction in the right cerebral hemisphere not well seen. Evolving mass effect and edema associated with the left cerebral hemispheric infarction with slight compression of the left lateral ventricle and trace 1 mm of rightward midline shift. No evidence for hydrocephalus    MRI Brain WO Contrast 12/8/22    Exam limited by patient motion artifact. Evolving left MCA territory infarct.  Additional foci of diffusion restriction in the left cerebral consistent with recent infarct.  Interval increase susceptibility artifact in the areas of diffusion restriction consistent with hemorrhagic conversion of recent infarcts.  No hydrocephalus or significant midline shift.      [facility] 12/8/22 16:29   -Evolving large left MCA territory infarction similar to recent CT though increased in size compared to prior MRI concerning for evolving recent to subacute and acute infarcts.  Localized mass effect without significant midline shift or hydrocephalus.  There is evolving recent to subacute age right posterior frontal infarction similar to prior MRI  allowing for differences in technique  -There is subtle hyperdensity along the left basal ganglia posteriorly and along the right posterior frontal cortex which may represent enhancing subacute age components of infarction with petechial hemorrhage felt less likely but cannot be entirely excluded with limitation secondary to recirculation contrast related to recent CTA performed at outside institution.  -Evolving mass effect most pronounced associated with the left MCA territory infarction with sulcal effacement without significant midline shift or hydrocephalus.    [facility] 12/8/22 12:11 (CTA H/N)   1. Left diencephalic hemisphere demonstrates evidence of an acute infarct of left basal ganglia and left caudate lobe.  2. Occlusion of the posterior division of the left M2 branch at the takeoff of the M1 vessel with patency involving the anterior division of the left M2 segment.    [facility] 12/7/22   1.  Moderate size subacute infarction left anterior basal ganglia and anterior left internal capsule appears mildly larger and better well defined compared to CT from 12/3/2020. There is currently greater mass effect on the anterior horn of the left lateral ventricle. There is no associated bleed or midline shift.  2.  Recent MRI demonstrated additional punctate acute infarctions in the left frontal lobe and a mild size acute infarction right centrum semiovale. These are less obvious on CT. No associated bleed.  3.  Additional chronic periventricular white matter hypodensities.    MRI Brain WO contrast 12/3/22   Multifocal areas of restricted diffusion are felt to reflect acute infarcts.    [facility] 12/3/22   Loss of gray-white matter distinction in involving the left basal ganglia could reflect changes of chronic small vessel ischemic disease versus cytotoxic edema from acute infarct.     Carotid US Bilateral 12/3/22   1. Carotid intimal hyperplasia, with no findings of hemodynamically significant carotid arterial stenosis or vascular  occlusion.  2. Patent vertebral arteries with antegrade flow bilaterally.      Cardiac Imaging   [facility] 12/4/22   The left ventricle is small with moderately decreased systolic function.  The estimated ejection fraction is 38%.  Left ventricular diastolic dysfunction.  There are segmental left ventricular wall motion abnormalities.  No spontaneous echo contrast. A moderate protruding layered left ventricular thrombus is present. The thrombus is fixed and located in the apex.  Normal right ventricular size.  Mild left atrial enlargement.  Mild right atrial enlargement.  Mild aortic regurgitation.  No interatrial septal defect present.  Normal appearing left atrial appendage. No thrombus is present in the appendage. SB occluder is absent. Abnormal appendage velocities.  Mild mitral regurgitation.  Agitated saline contrast study did not show any right to left shunt

## 2022-12-20 NOTE — ASSESSMENT & PLAN NOTE
Stroke risk factor  RAG[facility]AV with EF 38% and segmental LV wall motion abnormalities  -Strict I/Os  -Continue lasix 40mg

## 2022-12-20 NOTE — ASSESSMENT & PLAN NOTE
53 yo M with CHF, COPD, HTN, HLD, DM admitted for acute LMCA stroke. He initially presented to OSH with RSW, OOW for thrombolytics. MRI at that time demonstrated bilateral anterior and posterior circulation strokes concerning for embolic etiology. He was admitted to OSH and RAGHAV revealed LV thrombus, he was not started on AC. During hospital stay noted to be lethargic and aphasic/dysarthric, then on 12/18 had acute neuro change with RSW and global aphasia. NIHSS increased from 6 to 23. CTA demonstrated a L M2 occlusion and patient was transferred to [facility] for possible intervention and higher level of care. He was not taken to IR due to poor ASPECTS and was admitted to ICU for close monitoring and hemicrani watch. Suspect etiology likely cardioembolic given LV thrombus.    NAEO, neuro exam stable. Going for PEG today with IR. Na 150, will resume FWF after procedure. Will transition hep gtt to DOAC once PEG placed and tolerating tube feeds. Dispo recs for IPR.      Antithrombotics for secondary stroke prevention: continue heparin gtt, will switch to DOAC once PEG functional    Statins for secondary stroke prevention and hyperlipidemia, if present:   Statins: Atorvastatin- 40 mg daily    Aggressive risk factor modification: HTN, DM, HLD, Diet, Exercise, LV thrombus     Rehab efforts: IPR    Diagnostics ordered/pending: None     VTE prophylaxis: Mechanical prophylaxis: Place SCDs  None: Reason for No Pharmacological VTE Prophylaxis: Currently on anticoagulation     BP parameters: SBP <180

## 2022-12-20 NOTE — NURSING
Pt taken back to unit after having peg placement done in IR,dressing clean intact and dry ,output dark brown minimal in drainage bag

## 2022-12-20 NOTE — ASSESSMENT & PLAN NOTE
Stroke RF  Continue heparin gtt due to PEG placement today, will switch to DOAC when able once PEG is functional

## 2022-12-20 NOTE — PLAN OF CARE
Pt arrived to [facility] bay 7 s/p G- Tube placement. Report received .  Pt placed on continuous bedside cardiac, SpO2, and NIBP monitor. DSG to mid quad remains CDI.   Stretcher low, locked, call bell in reach. Pt resting w eyes closed, VSS. NAD noted. Will continue to monitor

## 2022-12-20 NOTE — SUBJECTIVE & OBJECTIVE
Neurologic Chief Complaint: L MCA stroke    Subjective:     Interval History: Patient is seen for follow-up neurological assessment and treatment recommendations:   NAEO, neuro exam stable. Going for PEG today with IR. Na 150, will resume FWF after procedure. Will transition hep gtt to DOAC once PEG placed and tolerating tube feeds. Dispo recs for IPR.    HPI, Past Medical, Family, and Social History remains the same as documented in the initial encounter.     Review of Systems   Unable to perform ROS: Other (Aphasia)     Scheduled Meds:   amLODIPine  10 mg Per NG tube Daily    atorvastatin  40 mg Per NG tube Daily    carvediloL  12.5 mg Per NG tube BID    EScitalopram oxalate  20 mg Per NG tube Daily    furosemide  40 mg Per NG tube Daily    insulin aspart U-100  9 Units Subcutaneous 6 times per day     Continuous Infusions:   ceFAZolin 1 g/50ml Dextrose IVPB      dextrose 10 % in water (D10W)      heparin (porcine) in D5W 14 Units/kg/hr (12/19/22 1914)     PRN Meds:ceFAZolin 1 g/50ml Dextrose IVPB, dextrose 10 % in water (D10W), dextrose 10%, dextrose 10%, fentaNYL, glucagon (human recombinant), insulin aspart U-100, labetaloL, midazolam, ondansetron, sodium chloride 0.9%    Objective:     Vital Signs (Most Recent):  Temp: 97.6 °F (36.4 °C) (12/20/22 0846)  Pulse: 80 (12/20/22 1015)  Resp: 15 (12/20/22 1015)  BP: 116/73 (12/20/22 1015)  SpO2: 95 % (12/20/22 1015)  BP Location: Left arm    Vital Signs Range (Last 24H):  Temp:  [97.2 °F (36.2 °C)-98.6 °F (37 °C)]   Pulse:  [80-95]   Resp:  [14-20]   BP: (116-169)/()   SpO2:  [94 %-100 %]   BP Location: Left arm    Physical Exam  Vitals and nursing note reviewed.   Constitutional:       General: He is not in acute distress.  HENT:      Head: Normocephalic.      Nose: No rhinorrhea.      Comments: NGT in place  Eyes:      General: Visual field deficit present. No scleral icterus.     Conjunctiva/sclera: Conjunctivae normal.   Cardiovascular:      Rate and  Rhythm: Normal rate.   Pulmonary:      Effort: No respiratory distress.   Abdominal:      General: There is no distension.   Musculoskeletal:         General: No deformity.   Skin:     General: Skin is warm and dry.   Neurological:      Mental Status: He is alert.      Cranial Nerves: Facial asymmetry present.      Motor: Weakness present.      Comments: Withdraws on R side to noxious stimuli  Moves left side spontaneously  Severe aphasia   Psychiatric:      Comments: Unable to assess mood, thought, insight or judgment 2/2 severe aphasia/level of consciousness       Neurological Exam:   LOC: alert  Attention Span: poor  Language: mixed aphasia, intermittently follows some simple commands  Articulation: Unable to assess due to aphasia  Orientation: Unable to assess due to aphasia  EOM (CN III, IV, VI):  L gaze preference, tracks to midline  Motor: L side moves spontaneously and antigravity, R side withdraws from pain   Sensation: Withdraws right side to noxious stimuli      Laboratory:  CMP:   Recent Labs   Lab 12/20/22 0133   CALCIUM 9.3   ALBUMIN 2.3*   PROT 6.7   *   K 3.8   CO2 28      BUN 50*   CREATININE 4.2*   ALKPHOS 101   ALT 62*   AST 56*   BILITOT 0.4       BMP:   Recent Labs   Lab 12/20/22 0133   *   K 3.8      CO2 28   BUN 50*   CREATININE 4.2*   CALCIUM 9.3       CBC:   Recent Labs   Lab 12/20/22 0133   WBC 10.86   RBC 4.44*   HGB 12.0*   HCT 39.4*      MCV 89   MCH 27.0   MCHC 30.5*       Lipid Panel:   No results for input(s): CHOL, LDLCALC, HDL, TRIG in the last 168 hours.    Coagulation:   Recent Labs   Lab 12/20/22  0742   APTT 50.4*       Hgb A1C:   No results for input(s): HGBA1C in the last 168 hours.    TSH:   No results for input(s): TSH in the last 168 hours.      Diagnostic Results     Brain/Vessel Imaging   CT 12/13/22  -Evolving large left MCA infarct with stable mass effect and petechial hemorrhagic conversion.   -No definite new hemorrhage or  significant new abnormal parenchymal attenuation   -Separate areas of infarction involving the right parietal lobe and left cerebellum not well seen with CT technique.   -Clinical correlation and continued follow-up advised     [facility] 12/11/22   Grossly stable evolving large left MCA distribution infarct and smaller infarct in the right frontal lobe with possible trace hemorrhagic conversion. Persistent mass effect with sulcal effacement and slight mass effect on the left lateral ventricle. No new or worsening intracranial hemorrhage and no worsening of minimal rightward midline shift.    [facility] 12/9/22   Evolving no enlarged left MCA territory infarction with hypoattenuation and sulcal effacement. Intermediate density along the left basal ganglia compatible with known hemorrhagic conversion. Smaller area of infarction in the right cerebral hemisphere not well seen. Evolving mass effect and edema associated with the left cerebral hemispheric infarction with slight compression of the left lateral ventricle and trace 1 mm of rightward midline shift. No evidence for hydrocephalus    MRI Brain WO Contrast 12/8/22    Exam limited by patient motion artifact. Evolving left MCA territory infarct.  Additional foci of diffusion restriction in the left cerebral consistent with recent infarct.  Interval increase susceptibility artifact in the areas of diffusion restriction consistent with hemorrhagic conversion of recent infarcts.  No hydrocephalus or significant midline shift.      [facility] 12/8/22 16:29   -Evolving large left MCA territory infarction similar to recent CT though increased in size compared to prior MRI concerning for evolving recent to subacute and acute infarcts.  Localized mass effect without significant midline shift or hydrocephalus.  There is evolving recent to subacute age right posterior frontal infarction similar to prior MRI allowing for differences in technique  -There is subtle hyperdensity along the left basal  ganglia posteriorly and along the right posterior frontal cortex which may represent enhancing subacute age components of infarction with petechial hemorrhage felt less likely but cannot be entirely excluded with limitation secondary to recirculation contrast related to recent CTA performed at outside institution.  -Evolving mass effect most pronounced associated with the left MCA territory infarction with sulcal effacement without significant midline shift or hydrocephalus.    [facility] 12/8/22 12:11 (CTA H/N)   1. Left diencephalic hemisphere demonstrates evidence of an acute infarct of left basal ganglia and left caudate lobe.  2. Occlusion of the posterior division of the left M2 branch at the takeoff of the M1 vessel with patency involving the anterior division of the left M2 segment.    CT[facility] 12/7/22   1.  Moderate size subacute infarction left anterior basal ganglia and anterior left internal capsule appears mildly larger and better well defined compared to CT from 12/3/2020. There is currently greater mass effect on the anterior horn of the left lateral ventricle. There is no associated bleed or midline shift.  2.  Recent MRI demonstrated additional punctate acute infarctions in the left frontal lobe and a mild size acute infarction right centrum semiovale. These are less obvious on CT. No associated bleed.  3.  Additional chronic periventricular white matter hypodensities.    MRI Brain WO contrast 12/3/22   Multifocal areas of restricted diffusion are felt to reflect acute infarcts.    CTH 12/3/22   Loss of gray-white matter distinction in involving the left basal ganglia could reflect changes of chronic small vessel ischemic disease versus cytotoxic edema from acute infarct.     Carotid US Bilateral 12/3/22   1. Carotid intimal hyperplasia, with no findings of hemodynamically significant carotid arterial stenosis or vascular occlusion.  2. Patent vertebral arteries with antegrade flow bilaterally.      Cardiac  Imaging   [facility] 12/4/22   The left ventricle is small with moderately decreased systolic function.  The estimated ejection fraction is 38%.  Left ventricular diastolic dysfunction.  There are segmental left ventricular wall motion abnormalities.  No spontaneous echo contrast. A moderate protruding layered left ventricular thrombus is present. The thrombus is fixed and located in the apex.  Normal right ventricular size.  Mild left atrial enlargement.  Mild right atrial enlargement.  Mild aortic regurgitation.  No interatrial septal defect present.  Normal appearing left atrial appendage. No thrombus is present in the appendage. SB occluder is absent. Abnormal appendage velocities.  Mild mitral regurgitation.  Agitated saline contrast study did not show any right to left shunt

## 2022-12-20 NOTE — ASSESSMENT & PLAN NOTE
Stroke RF  A1c 9.1, consider nutrition consult and DM education  BG goal while inpatient 140-180  Currently on Aspart 9units q4h + SSI PRN

## 2022-12-20 NOTE — PLAN OF CARE
Oriented to self, Npo since mnt for procedure, barium given, condom cath removed for irritation/possible breakdown.       Problem: Adult Inpatient Plan of Care  Goal: Plan of Care Review  Outcome: Ongoing, Progressing  Goal: Absence of Hospital-Acquired Illness or Injury  Outcome: Ongoing, Progressing  Goal: Optimal Comfort and Wellbeing  Outcome: Ongoing, Progressing     Problem: Adjustment to Illness (Delirium)  Goal: Optimal Coping  Outcome: Ongoing, Progressing     Problem: Attention and Thought Clarity Impairment (Delirium)  Goal: Improved Attention and Thought Clarity  Outcome: Ongoing, Progressing     Problem: Adjustment to Illness (Stroke, Ischemic/Transient Ischemic Attack)  Goal: Optimal Coping  Outcome: Ongoing, Progressing     Problem: Cognitive Impairment (Stroke, Ischemic/Transient Ischemic Attack)  Goal: Optimal Cognitive Function  Outcome: Ongoing, Progressing     Problem: Communication Impairment (Stroke, Ischemic/Transient Ischemic Attack)  Goal: Improved Communication Skills  Outcome: Ongoing, Progressing

## 2022-12-20 NOTE — ASSESSMENT & PLAN NOTE
Cr 4.2 and GFR 16.4 today   - continue to monitor renal function with scheduled labs   - avoid nephrotoxins  - renally dose medications when appropiate   - monitor events that may lead to decreased renal perfusion (hypovolemia, hypotension, sepsis)  - monitor urine output to assure that no obstruction precipitates worsening in GFR

## 2022-12-20 NOTE — PLAN OF CARE
Poast procedure bedside report given MPU RN, Amy.     Post procedure phone report given to primary nurse, Izzy.

## 2022-12-21 PROBLEM — N30.00 ACUTE CYSTITIS WITHOUT HEMATURIA: Status: RESOLVED | Noted: 2022-12-10 | Resolved: 2022-12-21

## 2022-12-21 LAB
ALBUMIN SERPL BCP-MCNC: 2.6 G/DL (ref 3.5–5.2)
ALP SERPL-CCNC: 101 U/L (ref 55–135)
ALT SERPL W/O P-5'-P-CCNC: 47 U/L (ref 10–44)
ANION GAP SERPL CALC-SCNC: 13 MMOL/L (ref 8–16)
APTT BLDCRRT: 34.2 SEC (ref 21–32)
APTT BLDCRRT: 43.9 SEC (ref 21–32)
AST SERPL-CCNC: 39 U/L (ref 10–40)
BASOPHILS # BLD AUTO: 0.07 K/UL (ref 0–0.2)
BASOPHILS NFR BLD: 0.6 % (ref 0–1.9)
BILIRUB SERPL-MCNC: 0.6 MG/DL (ref 0.1–1)
BUN SERPL-MCNC: 48 MG/DL (ref 6–20)
CALCIUM SERPL-MCNC: 9.4 MG/DL (ref 8.7–10.5)
CHLORIDE SERPL-SCNC: 109 MMOL/L (ref 95–110)
CO2 SERPL-SCNC: 28 MMOL/L (ref 23–29)
CREAT SERPL-MCNC: 3.9 MG/DL (ref 0.5–1.4)
DIFFERENTIAL METHOD: ABNORMAL
EOSINOPHIL # BLD AUTO: 0.2 K/UL (ref 0–0.5)
EOSINOPHIL NFR BLD: 1.3 % (ref 0–8)
ERYTHROCYTE [DISTWIDTH] IN BLOOD BY AUTOMATED COUNT: 15.2 % (ref 11.5–14.5)
EST. GFR  (NO RACE VARIABLE): 17.9 ML/MIN/1.73 M^2
GLUCOSE SERPL-MCNC: 180 MG/DL (ref 70–110)
HCT VFR BLD AUTO: 40.1 % (ref 40–54)
HGB BLD-MCNC: 12.2 G/DL (ref 14–18)
IMM GRANULOCYTES # BLD AUTO: 0.03 K/UL (ref 0–0.04)
IMM GRANULOCYTES NFR BLD AUTO: 0.2 % (ref 0–0.5)
LYMPHOCYTES # BLD AUTO: 1.5 K/UL (ref 1–4.8)
LYMPHOCYTES NFR BLD: 11.8 % (ref 18–48)
MAGNESIUM SERPL-MCNC: 2.4 MG/DL (ref 1.6–2.6)
MCH RBC QN AUTO: 26.6 PG (ref 27–31)
MCHC RBC AUTO-ENTMCNC: 30.4 G/DL (ref 32–36)
MCV RBC AUTO: 88 FL (ref 82–98)
MONOCYTES # BLD AUTO: 0.7 K/UL (ref 0.3–1)
MONOCYTES NFR BLD: 5.2 % (ref 4–15)
NEUTROPHILS # BLD AUTO: 10.1 K/UL (ref 1.8–7.7)
NEUTROPHILS NFR BLD: 80.9 % (ref 38–73)
NRBC BLD-RTO: 0 /100 WBC
PHOSPHATE SERPL-MCNC: 4.5 MG/DL (ref 2.7–4.5)
PLATELET # BLD AUTO: 265 K/UL (ref 150–450)
PMV BLD AUTO: 13.8 FL (ref 9.2–12.9)
POCT GLUCOSE: 170 MG/DL (ref 70–110)
POCT GLUCOSE: 219 MG/DL (ref 70–110)
POCT GLUCOSE: 219 MG/DL (ref 70–110)
POCT GLUCOSE: 225 MG/DL (ref 70–110)
POTASSIUM SERPL-SCNC: 4 MMOL/L (ref 3.5–5.1)
PROT SERPL-MCNC: 7.5 G/DL (ref 6–8.4)
RBC # BLD AUTO: 4.58 M/UL (ref 4.6–6.2)
SODIUM SERPL-SCNC: 150 MMOL/L (ref 136–145)
WBC # BLD AUTO: 12.5 K/UL (ref 3.9–12.7)

## 2022-12-21 PROCEDURE — 92507 TX SP LANG VOICE COMM INDIV: CPT

## 2022-12-21 PROCEDURE — 80053 COMPREHEN METABOLIC PANEL: CPT | Performed by: PHYSICIAN ASSISTANT

## 2022-12-21 PROCEDURE — 97535 SELF CARE MNGMENT TRAINING: CPT

## 2022-12-21 PROCEDURE — 85730 THROMBOPLASTIN TIME PARTIAL: CPT | Mod: 91 | Performed by: PSYCHIATRY & NEUROLOGY

## 2022-12-21 PROCEDURE — 83735 ASSAY OF MAGNESIUM: CPT | Performed by: PHYSICIAN ASSISTANT

## 2022-12-21 PROCEDURE — 11000001 HC ACUTE MED/SURG PRIVATE ROOM

## 2022-12-21 PROCEDURE — 25000003 PHARM REV CODE 250: Performed by: PHYSICIAN ASSISTANT

## 2022-12-21 PROCEDURE — 63600175 PHARM REV CODE 636 W HCPCS: Performed by: NURSE PRACTITIONER

## 2022-12-21 PROCEDURE — 97542 WHEELCHAIR MNGMENT TRAINING: CPT

## 2022-12-21 PROCEDURE — 36415 COLL VENOUS BLD VENIPUNCTURE: CPT | Performed by: PSYCHIATRY & NEUROLOGY

## 2022-12-21 PROCEDURE — 84100 ASSAY OF PHOSPHORUS: CPT | Performed by: PHYSICIAN ASSISTANT

## 2022-12-21 PROCEDURE — 85025 COMPLETE CBC W/AUTO DIFF WBC: CPT | Performed by: NURSE PRACTITIONER

## 2022-12-21 PROCEDURE — 85730 THROMBOPLASTIN TIME PARTIAL: CPT | Performed by: NURSE PRACTITIONER

## 2022-12-21 PROCEDURE — 99233 SBSQ HOSP IP/OBS HIGH 50: CPT | Mod: ,,, | Performed by: PSYCHIATRY & NEUROLOGY

## 2022-12-21 PROCEDURE — 36415 COLL VENOUS BLD VENIPUNCTURE: CPT | Performed by: PHYSICIAN ASSISTANT

## 2022-12-21 PROCEDURE — 92526 ORAL FUNCTION THERAPY: CPT

## 2022-12-21 PROCEDURE — 97112 NEUROMUSCULAR REEDUCATION: CPT

## 2022-12-21 PROCEDURE — 97530 THERAPEUTIC ACTIVITIES: CPT

## 2022-12-21 PROCEDURE — 99233 PR SUBSEQUENT HOSPITAL CARE,LEVL III: ICD-10-PCS | Mod: ,,, | Performed by: PSYCHIATRY & NEUROLOGY

## 2022-12-21 RX ADMIN — INSULIN ASPART 9 UNITS: 100 INJECTION, SOLUTION INTRAVENOUS; SUBCUTANEOUS at 09:12

## 2022-12-21 RX ADMIN — CARVEDILOL 12.5 MG: 12.5 TABLET, FILM COATED ORAL at 09:12

## 2022-12-21 RX ADMIN — FUROSEMIDE 40 MG: 40 TABLET ORAL at 10:12

## 2022-12-21 RX ADMIN — ESCITALOPRAM OXALATE 20 MG: 20 TABLET ORAL at 10:12

## 2022-12-21 RX ADMIN — INSULIN ASPART 4 UNITS: 100 INJECTION, SOLUTION INTRAVENOUS; SUBCUTANEOUS at 05:12

## 2022-12-21 RX ADMIN — INSULIN ASPART 9 UNITS: 100 INJECTION, SOLUTION INTRAVENOUS; SUBCUTANEOUS at 10:12

## 2022-12-21 RX ADMIN — HEPARIN SODIUM 12 UNITS/KG/HR: 10000 INJECTION, SOLUTION INTRAVENOUS at 04:12

## 2022-12-21 RX ADMIN — INSULIN ASPART 2 UNITS: 100 INJECTION, SOLUTION INTRAVENOUS; SUBCUTANEOUS at 09:12

## 2022-12-21 RX ADMIN — AMLODIPINE BESYLATE 10 MG: 10 TABLET ORAL at 10:12

## 2022-12-21 RX ADMIN — INSULIN ASPART 4 UNITS: 100 INJECTION, SOLUTION INTRAVENOUS; SUBCUTANEOUS at 01:12

## 2022-12-21 RX ADMIN — INSULIN ASPART 9 UNITS: 100 INJECTION, SOLUTION INTRAVENOUS; SUBCUTANEOUS at 01:12

## 2022-12-21 RX ADMIN — APIXABAN 5 MG: 5 TABLET, FILM COATED ORAL at 09:12

## 2022-12-21 RX ADMIN — ATORVASTATIN CALCIUM 40 MG: 40 TABLET, FILM COATED ORAL at 10:12

## 2022-12-21 RX ADMIN — INSULIN ASPART 9 UNITS: 100 INJECTION, SOLUTION INTRAVENOUS; SUBCUTANEOUS at 05:12

## 2022-12-21 RX ADMIN — CARVEDILOL 12.5 MG: 12.5 TABLET, FILM COATED ORAL at 10:12

## 2022-12-21 RX ADMIN — APIXABAN 5 MG: 5 TABLET, FILM COATED ORAL at 01:12

## 2022-12-21 NOTE — PLAN OF CARE
Problem: Physical Therapy  Goal: Physical Therapy Goal  Description: Goals to be met by: 22    Patient will increase functional independence with mobility by performin. Supine to sit with Maximum Assistance  2. Sit to supine with Maximum Assistance  3. Sit to stand transfer with Maximum Assistance  4. Bed to chair transfer with Maximum Assistance using squat pivot technique.  5. Gait  x 5 feet with Moderate Assistance using LRAD.   6. Sitting at edge of bed x8 minutes with Contact Guard Assistance with DYLAN UE support.  7. Pt to propel w/c 120ft with L UE/LE on level surface with CGA-not met    Outcome: Ongoing, Progressing   Pt's goals revised as appropriate and pt will continue to benefit from skilled PT services to work towards improved functional mobility including: bed mobility, transfers, and gait.   2022

## 2022-12-21 NOTE — PT/OT/SLP PROGRESS
Physical Therapy Treatment    Patient Name:  Spencer Burton Jr.   MRN:  [id number]    Recommendations:     Discharge Recommendations: rehabilitation facility  Discharge Equipment Recommendations: hospital bed, lift device  Barriers to discharge: Inaccessible home and Decreased caregiver support    Assessment:     Spencer Burton Jr. is a 52 y.o. male admitted with a medical diagnosis of Embolic stroke involving left middle cerebral artery.  He presents with the following impairments/functional limitations: weakness, impaired self care skills, impaired functional mobility, gait instability, decreased lower extremity function, decreased upper extremity function, impaired balance, decreased safety awareness . Pt is unsafe with functional mobility at this time due to pt requires max assist for bed mobility, total assist for transfers, and moderate assist for w/c mobility due to pt with difficulty avoiding obstacles on the R , difficulty steering the w/c /coordinating using his L UE/LE to advance the w/c. Pt is motivated to progress with functional mobility.     Rehab Prognosis: Good; patient would benefit from acute skilled PT services to address these deficits and reach maximum level of function.    Recent Surgery: * No surgery found *      Plan:     During this hospitalization, patient to be seen 4 x/week to address the identified rehab impairments via gait training, therapeutic activities, therapeutic exercises, neuromuscular re-education, wheelchair management/training and progress toward the following goals:    Plan of Care Expires:  01/09/23    Subjective   Pt answering questions with one word answers    Pain/Comfort:  Pain Rating 1: 0/10 (pt did not appear to be in pain during treatment)  Pain Rating Post-Intervention 1: 0/10      Objective:     Communicated with nurse prior to session.  Patient found HOB elevated with bed alarm, NG tube, PEG Tube, telemetry upon PT entry to room.     General Precautions: Standard,  aphasia, aspiration, fall, NPO, vision impaired  Orthopedic Precautions: N/A  Braces: N/A  Respiratory Status: Room air     Functional Mobility:  Bed Mobility:     Rolling Right: maximal assistance  Supine to Sit: total assistance  Sit to Supine: total assistance  Transfers:     Sit to Stand:  total assistance with hand-held assist  Bed to/from wheel Chair: total assistance with  hand-held assist  using  Stand Pivot  Wheelchair Propulsion:  Pt propelled Standard wheelchair x 60 feet on Level tile with  Left upper extremity and Left lower extremity with Moderate Assistance.     AM-PAC 6 CLICK MOBILITY  Turning over in bed (including adjusting bedclothes, sheets and blankets)?: 2  Sitting down on and standing up from a chair with arms (e.g., wheelchair, bedside commode, etc.): 2  Moving from lying on back to sitting on the side of the bed?: 2  Moving to and from a bed to a chair (including a wheelchair)?: 2  Need to walk in hospital room?: 1  Climbing 3-5 steps with a railing?: 1  Basic Mobility Total Score: 10     Treatment & Education:  Pt noted to be soiled with urine upon PT arrival. Pt cleaned and new brief donned, nurse notified.    Patient left HOB elevated with all lines intact, call button in reach, bed alarm on, and nurse notified..    GOALS:   Multidisciplinary Problems       Physical Therapy Goals          Problem: Physical Therapy    Goal Priority Disciplines Outcome Goal Variances Interventions   Physical Therapy Goal     PT, PT/OT Ongoing, Progressing     Description: Goals to be met by: 22    Patient will increase functional independence with mobility by performin. Supine to sit with Maximum Assistance  2. Sit to supine with Maximum Assistance  3. Sit to stand transfer with Maximum Assistance  4. Bed to chair transfer with Maximum Assistance using squat pivot technique.  5. Gait  x 5 feet with Moderate Assistance using LRAD.   6. Sitting at edge of bed x8 minutes with Contact Guard  Assistance with DYLAN UE support.  7. Pt to propel w/c 120ft with L UE/LE on level surface with CGA-not met                         Time Tracking:     PT Received On: 12/21/22  PT Start Time: 0832     PT Stop Time: 0912  PT Total Time (min): 40 min     Billable Minutes: Therapeutic Activity 15 and Train/Wheelchair Management 25    Treatment Type: Treatment  PT/PTA: PT     PTA Visit Number: 1     12/21/2022

## 2022-12-21 NOTE — PLAN OF CARE
Pt not in restraint at beginning of shift (was not in place at shift change), no issues with pt attempting to remove devices. Discussed leaving NGT and new G tube alone with pt, pt nodded agreement and continued with calm behavior and did not attempt to remove devices. Restraint was not replaced and order was discontinued. G tube to gravity drainage, L NGT still in place. No orders or notes referencing when to resume giving meds or Tfs. Called on-call provider for clarification, did not hear back. Held scheduled insulin due to no intake and glucose under 200 due to concern for hypoglycemia. Q2 hr turns. Pt attempting to verbalize more words, still very slurred but progressing.        Problem: Adult Inpatient Plan of Care  Goal: Plan of Care Review  Outcome: Ongoing, Progressing  Goal: Absence of Hospital-Acquired Illness or Injury  Outcome: Ongoing, Progressing     Problem: Adjustment to Illness (Delirium)  Goal: Optimal Coping  Outcome: Ongoing, Progressing     Problem: Altered Behavior (Delirium)  Goal: Improved Behavioral Control  Outcome: Ongoing, Progressing     Problem: Adjustment to Illness (Stroke, Ischemic/Transient Ischemic Attack)  Goal: Optimal Coping  Outcome: Ongoing, Progressing     Problem: Communication Impairment (Stroke, Ischemic/Transient Ischemic Attack)  Goal: Improved Communication Skills  Outcome: Ongoing, Progressing     Problem: Fall Injury Risk  Goal: Absence of Fall and Fall-Related Injury  Outcome: Ongoing, Progressing     Problem: Restraint, Nonbehavioral (Nonviolent)  Goal: Absence of Harm or Injury  Outcome: Ongoing, Progressing     Problem: Skin Injury Risk Increased  Goal: Skin Health and Integrity  Outcome: Ongoing, Progressing

## 2022-12-21 NOTE — PROGRESS NOTES
Ulices Stack - Neurosurgery ([facility])  Vascular Neurology  Comprehensive Stroke Center  Progress Note    Assessment/Plan:     * Embolic stroke involving left middle cerebral artery  53 yo M with CHF, COPD, HTN, HLD, DM admitted for acute LMCA stroke. He initially presented to OSH with RSW, OOW for thrombolytics. MRI at that time demonstrated bilateral anterior and posterior circulation strokes concerning for embolic etiology. He was admitted to OSH and RAGHAV revealed LV thrombus, he was not started on AC. During hospital stay noted to be lethargic and aphasic/dysarthric, then on 12/18 had acute neuro change with RSW and global aphasia. NIHSS increased from 6 to 23. CTA demonstrated a L M2 occlusion and patient was transferred to C for possible intervention and higher level of care. He was not taken to IR due to poor ASPECTS and was admitted to ICU for close monitoring and hemicrani watch. Suspect etiology likely cardioembolic given LV thrombus.    NAEO, neuro exam stable. S/p PEG yesterday, NGT removed now that tube feedings restarted and tolerating well. NA remains at 150 but unclear if patient had been getting FWF, order updated and nursing communication in. Heparin gtt transitioned to DOAC today. Dispo recs for IPR, anticipate he will be ready for discharge tomorrow.    Antithrombotics for secondary stroke prevention: heparin gtt transitioned to Eliquis 5mg BID    Statins for secondary stroke prevention and hyperlipidemia, if present:   Statins: Atorvastatin- 40 mg daily    Aggressive risk factor modification: HTN, DM, HLD, Diet, Exercise, LV thrombus     Rehab efforts: IPR    Diagnostics ordered/pending: None     VTE prophylaxis: Mechanical prophylaxis: Place SCDs  None: Reason for No Pharmacological VTE Prophylaxis: Currently on anticoagulation     BP parameters: SBP <180      Oropharyngeal dysphagia  Due to stroke  SLP eval and treat  PEG placed today by IR    JR on CKD  See CKD    Hyperlipidemia associated with  type 2 diabetes mellitus  Stroke RF   . 8, goal <70  -Continue atorvastatin 40mg daily    CHF (congestive heart failure)  Stroke risk factor  RAGHAV with EF 38% and segmental LV wall motion abnormalities  -Strict I/Os  -Continue lasix 40mg    Debility  2/2 stroke   OT and PT to evaluate   Therapy recommending IPR     Cytotoxic brain edema  -Noted on imaging in the area of the L MCA territory with associated mass effect  -NSGY consulted for hemicrani watch due to malignant MCA, no acute intervention recommended as serial imaging remains stable  -Will continue to monitor with frequent a4h neuro checks while inpatient, as this could indicate worsening/expansion of edema  -Obtain Stat CTH for any acute neuro changes and notify stroke team immediately    LV (left ventricular) mural thrombus  Stroke RF  Initially started on heparin gtt due for AC until PO route established, s/p PEG on 12/20  Heparin gtt transitioned to Eliquis 5mg BID today (12/21)    Stage 4 chronic kidney disease  Improving, Cr 3.9 and GFR 17.9 today   - continue to monitor renal function with scheduled labs   - avoid nephrotoxins  - renally dose medications when appropiate   - monitor events that may lead to decreased renal perfusion (hypovolemia, hypotension, sepsis)  - monitor urine output to assure that no obstruction precipitates worsening in GFR      Type 2 diabetes mellitus, with long-term current use of insulin  Stroke RF  A1c 9.1, consider nutrition consult and DM education  BG goal while inpatient 140-180  Currently on Aspart 9units q4h + SSI PRN    Essential hypertension  Stroke RF  SBP <180, MAP >65  Continue amlodipine and coreg  PRN labetalol/hydralazine         12/09/2022 Patient with LV thrombus, will need anticoagulation. NGT in place would recommend heparin gtt until patient able to swallow or PEG placed prior to DOAC initiation. Patient tachycardic today with SBP < 210, metoprolol started by primary team. Febrile with elevated  white count and procal, currently on Zosyn and Vanc while cx pending. NSGY following for hemicrani watch. Patient has been discharged from OT per last note, will need new orders. Continue current ICU care.   12/10/2022: Pending stability scan patient is expected to be started on heparin gtt. Continue hemicrani watch in  ICU. Family at bedside.   12/11/2022: Patient started on heparin gtt overnight, he demonstrated great clinical improvement today, he was able to tell me his name, knew he is in [facility]. Followed single step commands. No family at bedside today.   12/12/2022 Patient remains in ICU for sue-crani watch. NSGY following. On salt tabs for goal of hypernatremia.Will remain in ICU another night. VN to re-evaluate for stepdown tomorrow. Patient is on ceftriaxone for acute cystitis. aPTT today 42.9. CTH following therapeutic heparin levels stable. More alert today. IPR recommendations; SLP with minced and moist diet with thin liquids.   12/13/2022 Remains in NCC, neuro exam unchanged and limited by drowsiness. Overnight had short run of VT/SVT that resolved without any interventions. BPs not consistently at goal of <200, had max BP today of 214/140. Currently NPO with NGT in place and anticipate patient will need PEG placement, SLP recommending ice chips sparingly for pleasure. Anticipate step down tomorrow once BP is consistently at goal  12/14/2022 Pending SD to NPU. Continue to watch BP closely, SBP<200. Patient will likely need a PEG.  12/15/2022. Pending SD to NPU. Poor exam, patient very lethargic, WD on the right.   12/16/2022 Patient stepped down overnight to NPU. Exam stable. Re-evaluated by speech this afternoon, still recommending NPO. Will need to discuss nutrition going forward (I.e. need for PEG). Recommendations for IPR.   12/17/22 Exam stable, elevated sodium, free water boluses added, metoprolol increased for elevated BP & HR.  IR consult placed for G tube placement after discussing with  patient's family via phone.  12/18/22 patient displaced NG tube overnight, adjusted by nursing. KUB reviewed- BG ok to use. Water boluses added yesterday for hypernatremia, Na+ improved to 149. -184, Lopressor discontinued and Coreg ordered for better BP control. Plan for PEG placement on 12/19 .  12/19/2022 Patient remains in NPU, neuro exam poor but stable. Going for PEG today. Will resume free water flushes following procedure given Na+ 150 today. SBP improved today. Consider starting ACE/ARB once PEG in place and safe to use for meds. Dispo recommendations for IPR.   12/20/2022 NAEO, neuro exam stable. Going for PEG today with IR. Na 150, will resume FWF after procedure. Will transition hep gtt to DOAC once PEG placed and tolerating tube feeds. Dispo recs for IPR.  12/21/2022 NAEO, neuro exam stable. S/p PEG yesterday, NGT removed now that tube feedings restarted and tolerating well. NA remains at 150 but unclear if patient had been getting FWF, order updated and nursing communication in. Heparin gtt transitioned to DOAC today. Dispo recs for IPR, anticipate he will be ready for discharge tomorrow.        STROKE DOCUMENTATION   Acute Stroke Times   Last Known Normal Date: 12/08/22  Last Known Normal Time: 1145  Symptom Onset Date: 12/08/22  Symptom Onset Time: 1145  Stroke Team Called Date: 12/08/22  Stroke Team Called Time: 1615  Stroke Team Arrival Date: 12/08/22  Stroke Team Arrival Time: 1615  CT Interpretation Time: 1615  Thrombolytic Therapy Recommended: No  CTA Interpretation Time: 1615    NIH Scale:  1a. Level of Consciousness: 0-->Alert, keenly responsive  1b. LOC Questions: 2-->Answers neither question correctly  1c. LOC Commands: 1-->Performs one task correctly  2. Best Gaze: 2-->Forced deviation, or total gaze paresis not overcome by the oculocephalic maneuver  3. Visual: 0-->No visual loss  4. Facial Palsy: 1-->Minor paralysis (flattened nasolabial fold, asymmetry on smiling)  5a. Motor Arm,  Left: 0-->No drift, limb holds 90 (or 45) degrees for full 10 secs  5b. Motor Arm, Right: 3-->No effort against gravity, limb falls  6a. Motor Leg, Left: 0-->No drift, leg holds 30 degree position for full 5 secs  6b. Motor Leg, Right: 3-->No effort against gravity, leg falls to bed immediately  7. Limb Ataxia: 0-->Absent  8. Sensory: 1-->Mild-to-moderate sensory loss, patient feels pinprick is less sharp or is dull on the affected side, or there is a loss of superficial pain with pinprick, but patient is aware of being touched  9. Best Language: 2-->Severe aphasia, all communication is through fragmentary expression, great need for inference, questioning, and guessing by the listener. Range of information that can be exchanged is limited, listener carries burden of. . . (see row details)  10. Dysarthria: 2-->Severe dysarthria, patients speech is so slurred as to be unintelligible in the absence of or out of proportion to any dysphasia, or is mute/anarthric  11. Extinction and Inattention (formerly Neglect): 1-->Visual, tactile, auditory, spatial, or personal inattention or extinction to bilateral simultaneous stimulation in one of the sensory modalities  Total (NIH Stroke Scale): 18       Modified [place] Score: 5  Gary Coma Scale:    ABCD2 Score:    [id number] Score:   HAS -BLED Score:   ICH Score:   Hunt & Leblanc Classification:      Hemorrhagic change of an Ischemic Stroke: Does this patient have an ischemic stroke with hemorrhagic changes? No     Neurologic Chief Complaint: L MCA stroke    Subjective:     Interval History: Patient is seen for follow-up neurological assessment and treatment recommendations:   NAEO, neuro exam stable. S/p PEG yesterday, NGT removed now that tube feedings restarted and tolerating well. NA remains at 150 but unclear if patient had been getting FWF, order updated and nursing communication in. Heparin gtt transitioned to DOAC today. Dispo recs for IPR, anticipate he will be ready  for discharge tomorrow.    HPI, Past Medical, Family, and Social History remains the same as documented in the initial encounter.     Review of Systems   Unable to perform ROS: Other (Aphasia)     Scheduled Meds:   amLODIPine  10 mg Per NG tube Daily    atorvastatin  40 mg Per NG tube Daily    carvediloL  12.5 mg Per NG tube BID    EScitalopram oxalate  20 mg Per NG tube Daily    furosemide  40 mg Per NG tube Daily    insulin aspart U-100  9 Units Subcutaneous 6 times per day     Continuous Infusions:   dextrose 10 % in water (D10W)      heparin (porcine) in D5W 14 Units/kg/hr (12/19/22 1914)     PRN Meds:dextrose 10 % in water (D10W), dextrose 10%, dextrose 10%, glucagon (human recombinant), glucagon (human recombinant), insulin aspart U-100, labetaloL, ondansetron, sodium chloride 0.9%    Objective:     Vital Signs (Most Recent):  Temp: 98 °F (36.7 °C) (12/20/22 1616)  Pulse: 90 (12/20/22 1616)  Resp: 18 (12/20/22 1616)  BP: (!) 162/88 (12/20/22 1616)  SpO2: (!) 94 % (12/20/22 1616)  BP Location: Left arm    Vital Signs Range (Last 24H):  Temp:  [97.2 °F (36.2 °C)-98.6 °F (37 °C)]   Pulse:  [78-95]   Resp:  [14-18]   BP: (116-169)/()   SpO2:  [94 %-100 %]   BP Location: Left arm    Physical Exam  Vitals and nursing note reviewed.   Constitutional:       General: He is not in acute distress.  HENT:      Head: Normocephalic.      Nose: No rhinorrhea.   Eyes:      General: Visual field deficit present. No scleral icterus.     Conjunctiva/sclera: Conjunctivae normal.   Cardiovascular:      Rate and Rhythm: Normal rate.   Pulmonary:      Effort: No respiratory distress.   Abdominal:      General: There is no distension.      Comments: PEG in place, dressing CDI   Musculoskeletal:         General: No deformity.   Skin:     General: Skin is warm and dry.   Neurological:      Mental Status: He is alert.      Cranial Nerves: Facial asymmetry present.      Motor: Weakness present.      Comments: Withdraws on R  side to noxious stimuli  Moves left side spontaneously  Severe aphasia   Psychiatric:      Comments: Unable to assess mood, thought, insight or judgment 2/2 severe aphasia/level of consciousness       Neurological Exam:   LOC: alert  Attention Span: poor  Language: mixed aphasia, intermittently follows some simple commands  Articulation: Unable to assess due to aphasia  Orientation: Unable to assess due to aphasia  EOM (CN III, IV, VI):  L gaze preference, tracks to midline  Motor: L side moves spontaneously and antigravity, R side withdraws from pain   Sensation: Withdraws right side to noxious stimuli      Laboratory:  CMP:   Recent Labs   Lab 12/20/22  0133   CALCIUM 9.3   ALBUMIN 2.3*   PROT 6.7   *   K 3.8   CO2 28      BUN 50*   CREATININE 4.2*   ALKPHOS 101   ALT 62*   AST 56*   BILITOT 0.4       BMP:   Recent Labs   Lab 12/20/22 0133   *   K 3.8      CO2 28   BUN 50*   CREATININE 4.2*   CALCIUM 9.3       CBC:   Recent Labs   Lab 12/20/22 0133   WBC 10.86   RBC 4.44*   HGB 12.0*   HCT 39.4*      MCV 89   MCH 27.0   MCHC 30.5*       Lipid Panel:   No results for input(s): CHOL, LDLCALC, HDL, TRIG in the last 168 hours.    Coagulation:   Recent Labs   Lab 12/20/22  0742   APTT 50.4*       Hgb A1C:   No results for input(s): HGBA1C in the last 168 hours.    TSH:   No results for input(s): TSH in the last 168 hours.      Diagnostic Results     Brain/Vessel Imaging   CTH 12/13/22  -Evolving large left MCA infarct with stable mass effect and petechial hemorrhagic conversion.   -No definite new hemorrhage or significant new abnormal parenchymal attenuation   -Separate areas of infarction involving the right parietal lobe and left cerebellum not well seen with CT technique.   -Clinical correlation and continued follow-up advised     CTH 12/11/22   Grossly stable evolving large left MCA distribution infarct and smaller infarct in the right frontal lobe with possible trace hemorrhagic  conversion. Persistent mass effect with sulcal effacement and slight mass effect on the left lateral ventricle. No new or worsening intracranial hemorrhage and no worsening of minimal rightward midline shift.    CTH 12/9/22   Evolving no enlarged left MCA territory infarction with hypoattenuation and sulcal effacement. Intermediate density along the left basal ganglia compatible with known hemorrhagic conversion. Smaller area of infarction in the right cerebral hemisphere not well seen. Evolving mass effect and edema associated with the left cerebral hemispheric infarction with slight compression of the left lateral ventricle and trace 1 mm of rightward midline shift. No evidence for hydrocephalus    MRI Brain WO Contrast 12/8/22    Exam limited by patient motion artifact. Evolving left MCA territory infarct.  Additional foci of diffusion restriction in the left cerebral consistent with recent infarct.  Interval increase susceptibility artifact in the areas of diffusion restriction consistent with hemorrhagic conversion of recent infarcts.  No hydrocephalus or significant midline shift.      CTH 12/8/22 16:29   -Evolving large left MCA territory infarction similar to recent CT though increased in size compared to prior MRI concerning for evolving recent to subacute and acute infarcts.  Localized mass effect without significant midline shift or hydrocephalus.  There is evolving recent to subacute age right posterior frontal infarction similar to prior MRI allowing for differences in technique  -There is subtle hyperdensity along the left basal ganglia posteriorly and along the right posterior frontal cortex which may represent enhancing subacute age components of infarction with petechial hemorrhage felt less likely but cannot be entirely excluded with limitation secondary to recirculation contrast related to recent CTA performed at outside institution.  -Evolving mass effect most pronounced associated with the left  MCA territory infarction with sulcal effacement without significant midline shift or hydrocephalus.    CTH 12/8/22 12:11 (CTA H/N)   1. Left diencephalic hemisphere demonstrates evidence of an acute infarct of left basal ganglia and left caudate lobe.  2. Occlusion of the posterior division of the left M2 branch at the takeoff of the M1 vessel with patency involving the anterior division of the left M2 segment.    CTH 12/7/22   1.  Moderate size subacute infarction left anterior basal ganglia and anterior left internal capsule appears mildly larger and better well defined compared to CT from 12/3/2020. There is currently greater mass effect on the anterior horn of the left lateral ventricle. There is no associated bleed or midline shift.  2.  Recent MRI demonstrated additional punctate acute infarctions in the left frontal lobe and a mild size acute infarction right centrum semiovale. These are less obvious on CT. No associated bleed.  3.  Additional chronic periventricular white matter hypodensities.    MRI Brain WO contrast 12/3/22   Multifocal areas of restricted diffusion are felt to reflect acute infarcts.    CTH 12/3/22   Loss of gray-white matter distinction in involving the left basal ganglia could reflect changes of chronic small vessel ischemic disease versus cytotoxic edema from acute infarct.     Carotid US Bilateral 12/3/22   1. Carotid intimal hyperplasia, with no findings of hemodynamically significant carotid arterial stenosis or vascular occlusion.  2. Patent vertebral arteries with antegrade flow bilaterally.      Cardiac Imaging   RAGHAV 12/4/22    The left ventricle is small with moderately decreased systolic function.   The estimated ejection fraction is 38%.   Left ventricular diastolic dysfunction.   There are segmental left ventricular wall motion abnormalities.   No spontaneous echo contrast. A moderate protruding layered left ventricular thrombus is present. The thrombus is fixed and  located in the apex.   Normal right ventricular size.   Mild left atrial enlargement.   Mild right atrial enlargement.   Mild aortic regurgitation.   No interatrial septal defect present.   Normal appearing left atrial appendage. No thrombus is present in the appendage. SB occluder is absent. Abnormal appendage velocities.   Mild mitral regurgitation.   Agitated saline contrast study did not show any right to left shunt        BHAVYA Daugherty  [facility]  Department of Vascular Neurology   [facility]

## 2022-12-21 NOTE — ASSESSMENT & PLAN NOTE
53 yo M with CHF, COPD, HTN, HLD, DM admitted for acute LMCA stroke. He initially presented to OSH with RSW, OOW for thrombolytics. MRI at that time demonstrated bilateral anterior and posterior circulation strokes concerning for embolic etiology. He was admitted to OSH and RAGHAV revealed LV thrombus, he was not started on AC. During hospital stay noted to be lethargic and aphasic/dysarthric, then on 12/18 had acute neuro change with RSW and global aphasia. NIHSS increased from 6 to 23. CTA demonstrated a L M2 occlusion and [place] [facility] [place] higher level of care. He was not taken to IR due to poor ASPECTS and was admitted to ICU for close monitoring and hemicrani watch. Suspect etiology likely cardioembolic given LV thrombus.    NAEO, neuro exam stable. S/p PEG yesterday, NGT removed now that tube feedings restarted and tolerating well. NA remains at 150 but unclear if patient had been getting FWF, order updated and nursing communication in. Heparin gtt transitioned to DOAC today. Dispo recs for IPR, anticipate he will be ready for discharge tomorrow.    Antithrombotics for secondary stroke prevention: heparin gtt transitioned to Eliquis 5mg BID    Statins for secondary stroke prevention and hyperlipidemia, if present:   Statins: Atorvastatin- 40 mg daily    Aggressive risk factor modification: HTN, DM, HLD, Diet, Exercise, LV thrombus     Rehab efforts: IPR    Diagnostics ordered/pending: None     VTE prophylaxis: Mechanical prophylaxis: Place SCDs  None: Reason for No Pharmacological VTE Prophylaxis: Currently on anticoagulation     BP parameters: SBP <180

## 2022-12-21 NOTE — PT/OT/SLP PROGRESS
Occupational Therapy  Co Treatment    Name: Spencer Burton Jr.  MRN: [id number]  Admitting Diagnosis:  Embolic stroke involving left middle cerebral artery       Recommendations:     Discharge Recommendations: rehabilitation facility  Discharge Equipment Recommendations:  hospital bed, wheelchair  Barriers to discharge:  Decreased caregiver support  Co-evaluation/treatment performed due to patient's multiple deficits requiring two skilled therapists to appropriately and safely assess patient's strength and endurance while facilitating functional tasks in addition to accommodating for patient's activity tolerance.    Assessment:     Spencer Burton Jr. is a 52 y.o. male with a medical diagnosis of Embolic stroke involving left middle cerebral artery.  He presents with improved alertness and good participation through out session. Performance deficits affecting function are weakness, gait instability, decreased upper extremity function, impaired endurance, impaired balance, impaired self care skills, impaired functional mobility, impaired cognition, decreased safety awareness, decreased coordination. Patient enjoyed music during session, snapping fingers and swaying. Supine to sit with mod (A), EOB CGA with cues for midline and attention to right. Transfer to wheelchair with total assist. WC mobility training for strength, midline, visual scan and motor planning. Cues and assist to avoid obstacles on right side, weight bearing through left LE and patterning initially for LE assist and LUE with assist to position and initiate 60' + 60'.  Great interaction and participate improved engagement. Wc to bed total assist.     Rehab Prognosis:  Good; patient would benefit from acute skilled OT services to address these deficits and reach maximum level of function.       Plan:     Patient to be seen 4 x/week to address the above listed problems via self-care/home management, therapeutic activities, therapeutic exercises,  neuromuscular re-education  Plan of Care Expires: 01/08/23  Plan of Care Reviewed with: patient    Subjective     Pain/Comfort:  Pain Rating 1: 0/10  Pain Rating Post-Intervention 1: 0/10    Objective:     Communicated with: RN prior to session.  Patient found HOB elevated with PEG Tube, NG tube, bed alarm, telemetry upon OT entry to room.    General Precautions: Standard, aphasia, aspiration, fall, NPO, vision impaired    Orthopedic Precautions:N/A  Braces: N/A  Respiratory Status: Room air     Occupational Performance:     Bed Mobility:    Patient completed Supine to Sit with moderate assistance  Patient completed Sit to Supine with moderate assistance     Functional Mobility/Transfers:  Patient completed Sit <> Stand Transfer with total assistance  with  hand-held assist       Activities of Daily Living:  Upper Body Dressing: maximal assistance    Lower Body Dressing: maximal assistance with awareness on gown and covering both legs.       [facility] 6 Click ADL: 10    Treatment & Education:  Educated on progress    Patient left HOB elevated with all lines intact, call button in reach, bed alarm on, and Rn notified    GOALS:   Multidisciplinary Problems       Occupational Therapy Goals          Problem: Occupational Therapy    Goal Priority Disciplines Outcome Interventions   Occupational Therapy Goal     OT, PT/OT Ongoing, Progressing    Description: Goals to be met by: 12/23/2022     Patient will increase functional independence with ADLs by performing:    UE Dressing with Minimal Assistance.  LE Dressing with Moderate Assistance.  Grooming while EOB with Minimal Assistance.  Toileting from bedside commode with Maximum Assistance for hygiene and clothing management.   Supine to sit with Moderate Assistance.  Stand pivot transfers with Maximum Assistance using LRAD as needed.  Toilet transfer to bedside commode with Maximum Assistance using LRAD as needed.                         Time Tracking:     OT Date of  Treatment: 12/21/22  OT Start Time: 0836  OT Stop Time: 0912  OT Total Time (min): 36 min    Billable Minutes:Self Care/Home Management 13  Neuromuscular Re-education 23    OT/PRINCESS: OT     PRINCESS Visit Number: 1    12/21/2022

## 2022-12-21 NOTE — ASSESSMENT & PLAN NOTE
Stroke RF  Initially started on heparin gtt due for AC until PO route established, s/p PEG on 12/20  Heparin gtt transitioned to Eliquis 5mg BID today (12/21)

## 2022-12-21 NOTE — ASSESSMENT & PLAN NOTE
Improving, Cr 3.9 and GFR 17.9 today   - continue to monitor renal function with scheduled labs   - avoid nephrotoxins  - renally dose medications when appropiate   - monitor events that may lead to decreased renal perfusion (hypovolemia, hypotension, sepsis)  - monitor urine output to assure that no obstruction precipitates worsening in GFR

## 2022-12-21 NOTE — PT/OT/SLP PROGRESS
"Speech Language Pathology Treatment    Patient Name:  Spencer Burton Jr.   MRN:  [id number]  Admitting Diagnosis: Embolic stroke involving left middle cerebral artery    Recommendations:                 General Recommendations:  Dysphagia therapy and Speech/language therapy  Diet recommendations:  NPO, Liquid Diet Level: NPO   Aspiration Precautions: Continue alternate means of nutrition, Frequent oral care, Monitor for s/s of aspiration, and Strict aspiration precautions   General Precautions: Standard, aphasia, aspiration, fall, vision impaired  Communication strategies:  go to room if call light pushed ; communication limited by both aphasia and dysarthria    Subjective     "residential." Pt was not oriented to place (hospital or [facility]) despite cues and external aid.     Pain/Comfort:  Pain Rating 1:  (no indications of pain)    Respiratory Status: Room air    Objective:     Has the patient been evaluated by SLP for swallowing?   Yes  Keep patient NPO? Yes   Current Respiratory Status:        Pt was noted to immediately scan to right to locate SLP when she stated pt's name upon entry.  Pt with significantly improved attention to right, though right inattention not fully resolved.  Pt accepted the following PO trials: ice chip x 1; thin water via 1/2 tsp x 1, full tsp x 1, straw sips x 3; puree 1/2 tsp x 1 and full tsp x 2.  SLP was unsure if a swallow response was elicited for ice chip trial, but swallow responses were palpated for all remaining trials.  Delayed cough was present after first straw sip of water, which was large and cyclical.  SLP ensure remained straw sip trials were small and single by pulling straw away to control bolus size.  No other overt s/s of aspiration were observed.  Pt's endurance remains reduced as pt appeared to become fatigued during PO trials.  Pt responses were not intelligible when asked to state his name and read SLP's name tag.  Pt's responses were intelligible, but inaccurate " for remaining orientation q's (place, situation, month, year) despite cues.  Pt was able to name objects in the room on 2 out of 3 trials given cues.  SLP recommends pt remain NPO at this time, though SLP services will continue to provide therapeutic PO trials with goals of resuming PO intake for pleasure in the future.  Cont SLP POC.     Assessment:     Spencer Burton Jr. is a 52 y.o. male with an SLP diagnosis of Aphasia, Dysphagia, Dysarthria, Cognitive-Linguistic Impairment, and Visio-Spatial Impairment.      Goals:   Multidisciplinary Problems       SLP Goals          Problem: SLP    Goal Priority Disciplines Outcome   SLP Goal     SLP    Description: Speech Language Pathology Goals  Updated goals expected to be met by 12/20:  1. Pt will participate in ongoing swallowing assessment to determine if/when safe for PO intake.   2. Pt will respond to complex yes/no q's with 50% accuracy given max cues.   3. Pt will model 1-step commands on 1 out of 5 trials given max cues.   4. Pt will complete automatic speech tasks with 60% accuracy given max cues.   5. Pt will name objects with 2 out of 5 trials given max cues.     Goals expected to be met by 12/16:  1. Pt will participate in ongoing swallowing assessment to determine if/when safe for PO intake.   2. Pt will participate in speech/language/cognitive evaluation when feasible. Initiated 12/13                               Plan:     Patient to be seen:  4 x/week   Plan of Care expires:  01/09/23  Plan of Care reviewed with:  patient   SLP Follow-Up:  Yes       Discharge recommendations:  rehabilitation facility     Time Tracking:     SLP Treatment Date:   12/21/22  Speech Start Time:  0941  Speech Stop Time:  0955     Speech Total Time (min):  14 min    Billable Minutes: Speech Therapy Individual 7 and Treatment Swallowing Dysfunction 7    12/21/2022

## 2022-12-22 LAB
ALBUMIN SERPL BCP-MCNC: 2.6 G/DL (ref 3.5–5.2)
ALP SERPL-CCNC: 103 U/L (ref 55–135)
ALT SERPL W/O P-5'-P-CCNC: 44 U/L (ref 10–44)
ANION GAP SERPL CALC-SCNC: 9 MMOL/L (ref 8–16)
AST SERPL-CCNC: 48 U/L (ref 10–40)
BILIRUB SERPL-MCNC: 0.5 MG/DL (ref 0.1–1)
BUN SERPL-MCNC: 52 MG/DL (ref 6–20)
CALCIUM SERPL-MCNC: 9.6 MG/DL (ref 8.7–10.5)
CHLORIDE SERPL-SCNC: 110 MMOL/L (ref 95–110)
CO2 SERPL-SCNC: 34 MMOL/L (ref 23–29)
CREAT SERPL-MCNC: 4.3 MG/DL (ref 0.5–1.4)
EST. GFR  (NO RACE VARIABLE): 15.9 ML/MIN/1.73 M^2
GLUCOSE SERPL-MCNC: 101 MG/DL (ref 70–110)
MAGNESIUM SERPL-MCNC: 2.6 MG/DL (ref 1.6–2.6)
PHOSPHATE SERPL-MCNC: 3.7 MG/DL (ref 2.7–4.5)
POCT GLUCOSE: 119 MG/DL (ref 70–110)
POCT GLUCOSE: 144 MG/DL (ref 70–110)
POCT GLUCOSE: 163 MG/DL (ref 70–110)
POCT GLUCOSE: 163 MG/DL (ref 70–110)
POCT GLUCOSE: 192 MG/DL (ref 70–110)
POCT GLUCOSE: 202 MG/DL (ref 70–110)
POTASSIUM SERPL-SCNC: 3.8 MMOL/L (ref 3.5–5.1)
PROT SERPL-MCNC: 7.5 G/DL (ref 6–8.4)
SODIUM SERPL-SCNC: 153 MMOL/L (ref 136–145)
SODIUM SERPL-SCNC: 153 MMOL/L (ref 136–145)

## 2022-12-22 PROCEDURE — 25000003 PHARM REV CODE 250: Performed by: PSYCHIATRY & NEUROLOGY

## 2022-12-22 PROCEDURE — 92507 TX SP LANG VOICE COMM INDIV: CPT

## 2022-12-22 PROCEDURE — 99233 PR SUBSEQUENT HOSPITAL CARE,LEVL III: ICD-10-PCS | Mod: ,,, | Performed by: PSYCHIATRY & NEUROLOGY

## 2022-12-22 PROCEDURE — 83735 ASSAY OF MAGNESIUM: CPT | Performed by: NURSE PRACTITIONER

## 2022-12-22 PROCEDURE — 97110 THERAPEUTIC EXERCISES: CPT | Mod: CQ

## 2022-12-22 PROCEDURE — 36415 COLL VENOUS BLD VENIPUNCTURE: CPT | Performed by: PHYSICIAN ASSISTANT

## 2022-12-22 PROCEDURE — 97112 NEUROMUSCULAR REEDUCATION: CPT

## 2022-12-22 PROCEDURE — 92526 ORAL FUNCTION THERAPY: CPT

## 2022-12-22 PROCEDURE — 97535 SELF CARE MNGMENT TRAINING: CPT

## 2022-12-22 PROCEDURE — 80053 COMPREHEN METABOLIC PANEL: CPT | Performed by: NURSE PRACTITIONER

## 2022-12-22 PROCEDURE — 99233 SBSQ HOSP IP/OBS HIGH 50: CPT | Mod: ,,, | Performed by: PSYCHIATRY & NEUROLOGY

## 2022-12-22 PROCEDURE — 11000001 HC ACUTE MED/SURG PRIVATE ROOM

## 2022-12-22 PROCEDURE — 36415 COLL VENOUS BLD VENIPUNCTURE: CPT | Performed by: NURSE PRACTITIONER

## 2022-12-22 PROCEDURE — 97530 THERAPEUTIC ACTIVITIES: CPT | Mod: CQ

## 2022-12-22 PROCEDURE — 84100 ASSAY OF PHOSPHORUS: CPT | Performed by: NURSE PRACTITIONER

## 2022-12-22 PROCEDURE — 84295 ASSAY OF SERUM SODIUM: CPT | Performed by: PHYSICIAN ASSISTANT

## 2022-12-22 PROCEDURE — 25000003 PHARM REV CODE 250: Performed by: PHYSICIAN ASSISTANT

## 2022-12-22 RX ORDER — FUROSEMIDE 40 MG/1
40 TABLET ORAL DAILY
Qty: 30 TABLET | Refills: 11
Start: 2022-12-22 | End: 2023-12-22

## 2022-12-22 RX ORDER — ESCITALOPRAM OXALATE 20 MG/1
20 TABLET ORAL DAILY
Qty: 30 TABLET | Refills: 11
Start: 2022-12-22 | End: 2023-12-22

## 2022-12-22 RX ORDER — CARVEDILOL 12.5 MG/1
12.5 TABLET ORAL 2 TIMES DAILY
Qty: 60 TABLET | Refills: 11
Start: 2022-12-22 | End: 2023-12-22

## 2022-12-22 RX ORDER — ATORVASTATIN CALCIUM 40 MG/1
40 TABLET, FILM COATED ORAL DAILY
Qty: 90 TABLET | Refills: 3
Start: 2022-12-22 | End: 2023-12-22

## 2022-12-22 RX ORDER — AMLODIPINE BESYLATE 10 MG/1
10 TABLET ORAL DAILY
Qty: 30 TABLET | Refills: 11
Start: 2022-12-22 | End: 2023-12-22

## 2022-12-22 RX ADMIN — AMLODIPINE BESYLATE 10 MG: 10 TABLET ORAL at 09:12

## 2022-12-22 RX ADMIN — CARVEDILOL 12.5 MG: 12.5 TABLET, FILM COATED ORAL at 09:12

## 2022-12-22 RX ADMIN — INSULIN ASPART 9 UNITS: 100 INJECTION, SOLUTION INTRAVENOUS; SUBCUTANEOUS at 08:12

## 2022-12-22 RX ADMIN — INSULIN ASPART 9 UNITS: 100 INJECTION, SOLUTION INTRAVENOUS; SUBCUTANEOUS at 04:12

## 2022-12-22 RX ADMIN — APIXABAN 5 MG: 5 TABLET, FILM COATED ORAL at 09:12

## 2022-12-22 RX ADMIN — INSULIN ASPART 4 UNITS: 100 INJECTION, SOLUTION INTRAVENOUS; SUBCUTANEOUS at 08:12

## 2022-12-22 RX ADMIN — CARVEDILOL 12.5 MG: 12.5 TABLET, FILM COATED ORAL at 08:12

## 2022-12-22 RX ADMIN — ESCITALOPRAM OXALATE 20 MG: 20 TABLET ORAL at 09:12

## 2022-12-22 RX ADMIN — APIXABAN 5 MG: 5 TABLET, FILM COATED ORAL at 08:12

## 2022-12-22 RX ADMIN — INSULIN ASPART 9 UNITS: 100 INJECTION, SOLUTION INTRAVENOUS; SUBCUTANEOUS at 12:12

## 2022-12-22 RX ADMIN — ATORVASTATIN CALCIUM 40 MG: 40 TABLET, FILM COATED ORAL at 09:12

## 2022-12-22 RX ADMIN — FUROSEMIDE 40 MG: 40 TABLET ORAL at 09:12

## 2022-12-22 RX ADMIN — INSULIN ASPART 2 UNITS: 100 INJECTION, SOLUTION INTRAVENOUS; SUBCUTANEOUS at 04:12

## 2022-12-22 RX ADMIN — INSULIN ASPART 2 UNITS: 100 INJECTION, SOLUTION INTRAVENOUS; SUBCUTANEOUS at 12:12

## 2022-12-22 RX ADMIN — LABETALOL HYDROCHLORIDE 10 MG: 5 INJECTION, SOLUTION INTRAVENOUS at 04:12

## 2022-12-22 NOTE — PT/OT/SLP PROGRESS
"Speech Language Pathology Treatment    Patient Name:  Spencer Burton Jr.   MRN:  [id number]  Admitting Diagnosis: Embolic stroke involving left middle cerebral artery    Recommendations:                 General Recommendations:  Dysphagia therapy and Speech/language therapy  Diet recommendations:  NPO, Liquid Diet Level: NPO   Aspiration Precautions: Continue alternate means of nutrition, Frequent oral care, and Strict aspiration precautions   General Precautions: Standard, aphasia, aspiration, fall, NPO  Communication strategies:  provide increased time to answer, go to room if call light pushed, and Pt presents with aphasia and dysarthria .    Subjective     "Time watchers." Pt stated when asked 'what do you tell time with?'    Pain/Comfort:  Pain Rating 1: 0/10    Respiratory Status: Room air    Objective:     Has the patient been evaluated by SLP for swallowing?   Yes  Keep patient NPO? Yes   Current Respiratory Status:        Pt was seen for ongoing speech/language and dysphagia therapy.  Sister was present for part of session.  Education was provided to pt and sister regarding role of SLP, ongoing swallowing assessment to determine when safe for PO intake, PEG tube as primary means of nutrition at this time, speech/language goals, and SLP treatment plan and POC.  Pt's sister expressed understanding, but pt's full understanding limited by aphasia.  Pt accepted the following PO trials: ice chip x 1; thin water via 1/2 tsp x 1, full tsp x 1, cup sip x 1, straw sips x 3; puree 1/2 tsp x 1 and full tsp x 2.  Swallow responses were delayed, but elevation and excursion adequate upon palpation.  Delayed coughs present after 2 out of 3 straw sips when pt took large cyclical sips.  Pt was able to perform a few oral postures, but was unable to perform set of 5 OME's.  Pt continues to exhibit right facial droop/asymmetry.  Pt was able to count 1-5 upon command.  Pt answered single word responsive naming q's with 20% accuracy " IND/80% given cues.      Assessment:     Spencer Burtno Jr. is a 52 y.o. male with an SLP diagnosis of Aphasia, Dysphagia, Dysarthria, Cognitive-Linguistic Impairment, and Visio-Spatial Impairment.      Goals:   Multidisciplinary Problems       SLP Goals          Problem: SLP    Goal Priority Disciplines Outcome   SLP Goal     SLP    Description: Speech Language Pathology Goals  Updated goals expected to be met by 12/20:  1. Pt will participate in ongoing swallowing assessment to determine if/when safe for PO intake.   2. Pt will respond to complex yes/no q's with 50% accuracy given max cues.   3. Pt will model 1-step commands on 1 out of 5 trials given max cues.   4. Pt will complete automatic speech tasks with 60% accuracy given max cues.   5. Pt will name objects with 2 out of 5 trials given max cues.     Goals expected to be met by 12/16:  1. Pt will participate in ongoing swallowing assessment to determine if/when safe for PO intake.   2. Pt will participate in speech/language/cognitive evaluation when feasible. Initiated 12/13                               Plan:     Patient to be seen:  4 x/week   Plan of Care expires:  01/09/23  Plan of Care reviewed with:  patient, sibling   SLP Follow-Up:  Yes       Discharge recommendations:  rehabilitation facility     Time Tracking:     SLP Treatment Date:   12/22/22  Speech Start Time:  1105  Speech Stop Time:  1128     Speech Total Time (min):  23 min    Billable Minutes: Speech Therapy Individual 7, Treatment Swallowing Dysfunction 8, and Self Care/Home Management Training 8    12/22/2022

## 2022-12-22 NOTE — PLAN OF CARE
12/22/22 0814   Discharge Reassessment   Assessment Type Discharge Planning Reassessment   Did the patient's condition or plan change since previous assessment? No   Discharge Plan discussed with: Caregiver   Name(s) and Number(s) [place] Guthrie [phone]   Communicated ADELINA with patient/caregiver Yes   Discharge Plan A Rehab   Discharge Plan B Skilled Nursing Facility   Post-Acute Status   Post-Acute Placement Status Referrals Sent     Therapy recs indicating rehab, however, several facilities have stated that patient is too weak at this point to tolerate rehab and the rec is SNF.  SW working with family to secure appropriate placement and will continue to follow.     Late entry from 12/22:  SW met with patient's sister Nadine at bedside to discuss SNF options  SW printed out a list from the insurance for locations near [place].  They are limited to 2-3 in the area.  SW sent referrals and all have declined.  SW will discuss with manager on how to proceed.       Alba Lai, CIARAN  [facility]  [phone]

## 2022-12-22 NOTE — PLAN OF CARE
[facility]    FACILITY TRANSFER ORDERS      Patient Name: Spencer Burton Jr.  YOB: 1970    PCP: Primary Doctor No   PCP Address: None  PCP Phone Number: None  PCP Fax: None    Encounter Date: 12/27/2022    Admit to: inpatient rehab facility    Vital Signs:  Routine    Diagnoses:   Active Hospital Problems    Diagnosis  POA    *Embolic stroke involving left middle cerebral artery [I63.412]  Yes    Eating disorder, unspecified [F50.9]  No    Hypernatremia [E87.0]  No    Oropharyngeal dysphagia [R13.12]  Yes    CHF (congestive heart failure) [I50.9]  Yes     Chronic    Hyperlipidemia associated with type 2 diabetes mellitus [E11.69, E78.5]  Yes     Chronic    JR on CKD [N17.9]  Yes    LV (left ventricular) mural thrombus [I51.3]  Yes    Cytotoxic brain edema [G93.6]  Yes    Debility [R53.81]  Yes    Stage 4 chronic kidney disease [N18.4]  Yes     Chronic    Type 2 diabetes mellitus, with long-term current use of insulin [E11.9, Z79.4]  Not Applicable     Chronic    Essential hypertension [I10]  Yes     Chronic      Resolved Hospital Problems    Diagnosis Date Resolved POA    Ejection fraction < 50% [R94.30] 12/11/2022 Yes    Acute cystitis without hematuria [N30.00] 12/21/2022 Yes    Enlarged LA (left atrium) [I51.7] 12/11/2022 Yes    Type 2 diabetes mellitus without complication, without long-term current use of insulin [E11.9] 12/11/2022 Yes    Hypokalemia [E87.6] 12/12/2022 Yes    Diabetes mellitus with chronic kidney disease [E11.22] 12/09/2022 Yes       Allergies:Review of patient's allergies indicates:  No Known Allergies    Diet:  [employer] 1.4 (or generic equivalent) via PEG @ 55mL/hr ,     Activities: Activity as tolerated    Goals of Care Treatment Preferences:  Code Status: Full Code      Nursing: per facility protocol; aspiration and fall precautions     Labs: per facility procotol    CONSULTS:    Physical Therapy to evaluate and treat. , Occupational Therapy to evaluate and  "treat., and Speech Therapy to evaluate and treat for Language, Swallowing, and Cognition.    MISCELLANEOUS CARE:  PEG Care: Clean site every 24 hours. , Routine Skin for Bedridden Patients: Apply moisture barrier cream to all skin folds and wet areas in perineal area daily and after baths and all bowel movements., and Diabetes Care:   SN to perform and educate Diabetic management with blood glucose monitoring:, Fingerstick blood sugar every 6 hours if patient is unable to eat, and Report CBG < 60 or > 350 to physician.    WOUND CARE ORDERS  None    Medications: Review discharge medications with patient and family and provide education.      Current Discharge Medication List        START taking these medications    Details   apixaban (ELIQUIS) 5 mg Tab 1 tablet (5 mg total) by Per G Tube route 2 (two) times daily.      carvediloL (COREG) 12.5 MG tablet 1 tablet (12.5 mg total) by Per G Tube route 2 (two) times daily.  Qty: 60 tablet, Refills: 11    Comments: .           CONTINUE these medications which have CHANGED    Details   amLODIPine (NORVASC) 10 MG tablet 1 tablet (10 mg total) by Per G Tube route once daily.  Qty: 30 tablet, Refills: 11    Comments: .      atorvastatin (LIPITOR) 40 MG tablet 1 tablet (40 mg total) by Per G Tube route once daily.  Qty: 90 tablet, Refills: 3      EScitalopram oxalate (LEXAPRO) 20 MG tablet 1 tablet (20 mg total) by Per G Tube route once daily.  Qty: 30 tablet, Refills: 11      furosemide (LASIX) 40 MG tablet 1 tablet (40 mg total) by Per G Tube route once daily.  Qty: 30 tablet, Refills: 11           CONTINUE these medications which have NOT CHANGED    Details   acetaminophen (TYLENOL) 325 MG tablet Take 650 mg by mouth every 6 (six) hours as needed.      fluticasone propionate (FLONASE) 50 mcg/actuation nasal spray 1 spray (50 mcg total) by Each Nostril route 2 (two) times daily as needed for Rhinitis.  Qty: 15 g, Refills: 0      insulin needles, disposable, 31 x 3/16 " Ndle " Use daily to inject insulin  DX:250.00  Qty: 100 each, Refills: 11    Associated Diagnoses: Diabetes mellitus due to abnormal insulin      lancets (ONE TOUCH DELICA LANCETS) 33 gauge Misc 1 lancet by Misc.(Non-Drug; Combo Route) route 4 (four) times daily. DX:250.00   Medically necessary to test blood sugar  Qty: 200 each, Refills: 6    Associated Diagnoses: DM (diabetes mellitus)      sildenafiL (VIAGRA) 100 MG tablet TAKE ONE DAILY AS NEEDED      TRUE METRIX AIR GLUCOSE METER Mis USE TO CHECK GLUCOSE TWICE DAILY AS DIRECTED      TRUE METRIX GLUCOSE TEST STRIP Strp 1 strip 2 (two) times daily.           STOP taking these medications       albuterol (PROVENTIL/VENTOLIN HFA) 90 mcg/actuation inhaler Comments:   Reason for Stopping:         aspirin 81 MG Chew Comments:   Reason for Stopping:         calcitRIOL (ROCALTROL) 0.25 MCG Cap Comments:   Reason for Stopping:         calcitRIOL (ROCALTROL) 0.25 MCG Cap Comments:   Reason for Stopping:         ciprofloxacin HCl (CIPRO) 500 MG tablet Comments:   Reason for Stopping:         cloNIDine 0.3 mg/24 hr td ptwk (CATAPRES) 0.3 mg/24 hr Comments:   Reason for Stopping:         clotrimazole (LOTRIMIN) 1 % cream Comments:   Reason for Stopping:         ferrous sulfate (FEOSOL) 325 mg (65 mg iron) Tab tablet Comments:   Reason for Stopping:         glipiZIDE (GLUCOTROL) 10 MG tablet Comments:   Reason for Stopping:         HUMULIN 70/30 U-100 INSULIN 100 unit/mL (70-30) injection Comments:   Reason for Stopping:         HYDROcodone-acetaminophen (NORCO) 5-325 mg per tablet Comments:   Reason for Stopping:         insulin detemir (LEVEMIR) 100 unit/mL injection Comments:   Reason for Stopping:         isosorbide mononitrate (IMDUR) 60 MG 24 hr tablet Comments:   Reason for Stopping:         loratadine (CLARITIN) 10 mg tablet Comments:   Reason for Stopping:         losartan (COZAAR) 100 MG tablet Comments:   Reason for Stopping:         ondansetron (ZOFRAN-ODT) 4 MG TbDL  Comments:   Reason for Stopping:         oxyCODONE-acetaminophen (PERCOCET) 5-325 mg per tablet Comments:   Reason for Stopping:         pioglitazone (ACTOS) 15 MG tablet Comments:   Reason for Stopping:         potassium chloride (KLOR-CON) 10 MEQ TbSR Comments:   Reason for Stopping:         silver sulfADIAZINE 1% (SILVADENE) 1 % cream Comments:   Reason for Stopping:         sulfamethoxazole-trimethoprim 400-80mg (BACTRIM,SEPTRA) 400-80 mg per tablet Comments:   Reason for Stopping:         traMADoL (ULTRAM) 50 mg tablet Comments:   Reason for Stopping:                  Immunizations Administered as of 12/27/2022       No immunizations on file.              Some patients may experience side effects after vaccination.  These may include fever, headache, muscle or joint aches.  Most symptoms resolve with 24-48 hours and do not require urgent medical evaluation unless they persist for more than 72 hours or symptoms are concerning for an unrelated medical condition.          _________________________________  Rosa Maria Mendoaz MD  12/27/2022

## 2022-12-22 NOTE — ASSESSMENT & PLAN NOTE
53 yo M with CHF, COPD, HTN, HLD, DM admitted for acute LMCA stroke. He initially presented to OSH with RSW, OOW for thrombolytics. MRI at that time demonstrated bilateral anterior and posterior circulation strokes concerning for embolic etiology. He was admitted to OSH and RAGHAV revealed LV thrombus, he was not started on AC. During hospital stay noted to be lethargic and aphasic/dysarthric, then on 12/18 had acute neuro change with RSW and global aphasia. NIHSS increased from 6 to 23. CTA demonstrated a L M2 occlusion and patient was transferred to [facility] [place] [place] for possible intervention and higher level of care. He was not taken to IR due to poor ASPECTS and was admitted to ICU for close monitoring and hemicrani watch. Suspect etiology likely cardioembolic given LV thrombus.    NAEO, neuro exam stable. Tolerating tube feeds at goal. Continues to be hypernatremic, discussed FWF q4h with nursing staff. Dispo recs downgraded from IPR to SNF, pending placement.       Antithrombotics for secondary stroke prevention: Eliquis 5mg BID    Statins for secondary stroke prevention and hyperlipidemia, if present:   Statins: Atorvastatin- 40 mg daily    Aggressive risk factor modification: HTN, DM, HLD, Diet, Exercise, LV thrombus     Rehab efforts: SNF    Diagnostics ordered/pending: None     VTE prophylaxis: Mechanical prophylaxis: Place SCDs  None: Reason for No Pharmacological VTE Prophylaxis: Currently on anticoagulation     BP parameters: SBP <180

## 2022-12-22 NOTE — PT/OT/SLP PROGRESS
Occupational Therapy  Co Treatment    Name: Spencer Burton Jr.  MRN: [id number]  Admitting Diagnosis:  Embolic stroke involving left middle cerebral artery       Recommendations:     Discharge Recommendations: rehabilitation facility  Discharge Equipment Recommendations:  hospital bed, lift device, wheelchair  Barriers to discharge:  Decreased caregiver support  Co-evaluation/treatment performed due to patient's multiple deficits requiring two skilled therapists to appropriately and safely assess patient's strength and endurance while facilitating functional tasks in addition to accommodating for patient's activity tolerance.    Assessment:     Spencer Burton Jr. is a 52 y.o. male with a medical diagnosis of Embolic stroke involving left middle cerebral artery.  He presents with c/o abd pain from new PEG, he declined sitting at EOB and transfer to . Performance deficits affecting function are weakness, gait instability, impaired functional mobility, decreased coordination, impaired self care skills, impaired cognition, impaired endurance, impaired balance, decreased upper extremity function, decreased lower extremity function. Patient responded well to music, some spoken words to make needs known with encouragement. After sensory stimulation and deep pressure no movement noted in RUE. Wash face and hands with assist for R hand. Patient more attentive to therapist, music and TV today.    Rehab Prognosis:  Good; patient would benefit from acute skilled OT services to address these deficits and reach maximum level of function.       Plan:     Patient to be seen 4 x/week to address the above listed problems via self-care/home management, therapeutic activities, therapeutic exercises, neuromuscular re-education  Plan of Care Expires: 01/08/23  Plan of Care Reviewed with: patient    Subjective     Pain/Comfort:  Pain Rating 1:  (c/o pain in abd at PEG site not rated)    Objective:     Communicated with: RN prior to session.   Patient found HOB elevated with bed alarm, PEG Tube, telemetry, peripheral IV, SCD upon OT entry to room.    General Precautions: Standard, aspiration, fall    Orthopedic Precautions:N/A  Braces: N/A  Respiratory Status: Room air     Occupational Performance:     [facility] 6 Click ADL: 10    Treatment & Education:  Education on benefits of EOB and out of bed.     Patient left HOB elevated with all lines intact, call button in reach, bed alarm on, and RN notified     GOALS:   Multidisciplinary Problems       Occupational Therapy Goals          Problem: Occupational Therapy    Goal Priority Disciplines Outcome Interventions   Occupational Therapy Goal     OT, PT/OT Ongoing, Progressing    Description: Goals to be met by: 12/23/2022     Patient will increase functional independence with ADLs by performing:    UE Dressing with Minimal Assistance.  LE Dressing with Moderate Assistance.  Grooming while EOB with Minimal Assistance.  Toileting from bedside commode with Maximum Assistance for hygiene and clothing management.   Supine to sit with Moderate Assistance.  Stand pivot transfers with Maximum Assistance using LRAD as needed.  Toilet transfer to bedside commode with Maximum Assistance using LRAD as needed.                         Time Tracking:     OT Date of Treatment: 12/22/22  OT Start Time: 1130  OT Stop Time: 1153  OT Total Time (min): 23 min    Billable Minutes:Self Care/Home Management 12  Neuromuscular Re-education 11    OT/PRINCESS: OT     PRINCESS Visit Number: 1    12/22/2022

## 2022-12-22 NOTE — ASSESSMENT & PLAN NOTE
Stroke RF  Initially started on heparin gtt due for AC until PO route established, s/p PEG on 12/20  Heparin gtt transitioned to Eliquis 5mg BID on 12/21  -Continue AC with eliquis

## 2022-12-22 NOTE — PT/OT/SLP PROGRESS
Physical Therapy Co-Treatment    Patient Name:  Spencer Burton Jr.   MRN:  [id number]    Recommendations:     Discharge Recommendations: rehabilitation facility  Discharge Equipment Recommendations: hospital bed, lift device  Barriers to discharge: Inaccessible home and Decreased caregiver support at current level of assistance     Assessment:     Spencer Burton Jr. is a 52 y.o. male admitted with a medical diagnosis of Embolic stroke involving left middle cerebral artery.  He presents with the following impairments/functional limitations: weakness, impaired endurance, impaired functional mobility, gait instability, decreased coordination, decreased safety awareness, decreased ROM, impaired coordination.  Pt unwilling to participate in OOB mobility today. Pt responds well to music and encouraged to make needs verbally known. Therapist completed PROM on BLE, with more focus on RLE than LLE. Slight gastroc tightness on RLE than LLE. Pt continues to benefit from therapy to improve functional endurance, strength, and mobility.     Rehab Prognosis: Fair; patient would benefit from acute skilled PT services to address these deficits and reach maximum level of function.    Recent Surgery: * No surgery found *      Plan:     During this hospitalization, patient to be seen 4 x/week to address the identified rehab impairments via gait training, therapeutic activities, therapeutic exercises, neuromuscular re-education, wheelchair management/training and progress toward the following goals:    Plan of Care Expires:  01/09/23    Subjective     Patient/Family Comments/goals: pt shakes head when asked about participating in therapy   Pain/Comfort:  Pain Rating 1:  (did not rate)  Location - Orientation 1: generalized  Location 1: abdomen  Pain Addressed 1: Reposition, Distraction  Pain Rating Post-Intervention 1:  (did not rate)      Objective:     Communicated with RN prior to session.  Patient found HOB elevated with bed alarm, PEG  Tube, telemetry, pressure relief boots, SCD, [employer] telesitter upon PT entry to room.     General Precautions: Standard, aspiration, fall  Orthopedic Precautions: N/A  Braces: N/A  Respiratory Status: Room air     Functional Mobility:  Additional staff present: OT  Co-treatment performed due to patient's multiple deficits requiring two skilled therapists to appropriately and safely assess patient's strength and endurance while facilitating functional tasks in addition to accommodating for patient's activity tolerance  Bed Mobility:   Pt unwilling to participate in OOB mobility despite max encouragement    AM-PAC 6 CLICK MOBILITY  Turning over in bed (including adjusting bedclothes, sheets and blankets)?: 2  Sitting down on and standing up from a chair with arms (e.g., wheelchair, bedside commode, etc.): 2  Moving from lying on back to sitting on the side of the bed?: 2  Moving to and from a bed to a chair (including a wheelchair)?: 2  Need to walk in hospital room?: 1  Climbing 3-5 steps with a railing?: 1  Basic Mobility Total Score: 10       Treatment & Education:  PROM on BLE: ankle pumps, knee flexion, hip flexion, and R ER.   Slight R gastroc tightness in RLE.   Patient educated on role of therapy, goals of session, and benefits of out of bed mobility.   Instructed on use of call button and importance of calling nursing staff for assistance with mobility   Questions/concerns addressed within PTA scope of practice  Pt verbalized understanding.  Whiteboard Updated    Patient left HOB elevated with all lines intact, call button in reach, bed alarm on, and RN notified..    GOALS:   Multidisciplinary Problems       Physical Therapy Goals          Problem: Physical Therapy    Goal Priority Disciplines Outcome Goal Variances Interventions   Physical Therapy Goal     PT, PT/OT Ongoing, Progressing     Description: Goals to be met by: 12/30/22    Patient will increase functional independence with mobility by  performin. Supine to sit with Maximum Assistance  2. Sit to supine with Maximum Assistance  3. Sit to stand transfer with Maximum Assistance  4. Bed to chair transfer with Maximum Assistance using squat pivot technique.  5. Gait  x 5 feet with Moderate Assistance using LRAD.   6. Sitting at edge of bed x8 minutes with Contact Guard Assistance with DYLAN UE support.  7. Pt to propel w/c 120ft with L UE/LE on level surface with CGA-not met                         Time Tracking:     PT Received On: 22  PT Start Time: 1130     PT Stop Time: 1153  PT Total Time (min): 23 min     Billable Minutes: Therapeutic Activity 11 and Therapeutic Exercise 12    Treatment Type: Treatment  PT/PTA: PTA     PTA Visit Number: 1     2022

## 2022-12-22 NOTE — SUBJECTIVE & OBJECTIVE
Neurologic Chief Complaint: L MCA stroke    Subjective:     Interval History: Patient is seen for follow-up neurological assessment and treatment recommendations:   NAEO, neuro exam stable. Tolerating tube feeds at goal. Continues to be hypernatremic, discussed FWF q4h with nursing staff. Dispo recs downgraded from IPR to SNF, pending placement.     HPI, Past Medical, Family, and Social History remains the same as documented in the initial encounter.     Review of Systems   Unable to perform ROS: Other (Aphasia)     Scheduled Meds:   amLODIPine  10 mg Per G Tube Daily    apixaban  5 mg Per G Tube BID    atorvastatin  40 mg Per G Tube Daily    carvediloL  12.5 mg Per G Tube BID    EScitalopram oxalate  20 mg Per G Tube Daily    furosemide  40 mg Per G Tube Daily    insulin aspart U-100  9 Units Subcutaneous 6 times per day     Continuous Infusions:   dextrose 10 % in water (D10W)       PRN Meds:dextrose 10 % in water (D10W), dextrose 10%, dextrose 10%, glucagon (human recombinant), glucagon (human recombinant), insulin aspart U-100, labetaloL, ondansetron, sodium chloride 0.9%    Objective:     Vital Signs (Most Recent):  Temp: 97.7 °F (36.5 °C) (12/22/22 0723)  Pulse: 85 (12/22/22 0824)  Resp: 18 (12/22/22 0900)  BP: 139/89 (12/22/22 0723)  SpO2: 95 % (12/22/22 0723)  BP Location: Right arm    Vital Signs Range (Last 24H):  Temp:  [97.3 °F (36.3 °C)-98.5 °F (36.9 °C)]   Pulse:  []   Resp:  [14-20]   BP: (135-175)/()   SpO2:  [95 %-97 %]   BP Location: Right arm    Physical Exam  Vitals and nursing note reviewed.   Constitutional:       General: He is not in acute distress.  HENT:      Head: Normocephalic.      Nose: No rhinorrhea.   Eyes:      General: Visual field deficit present. No scleral icterus.     Conjunctiva/sclera: Conjunctivae normal.   Cardiovascular:      Rate and Rhythm: Normal rate.   Pulmonary:      Effort: No respiratory distress.   Abdominal:      General: There is no distension.       Comments: PEG in place, dressing CDI   Musculoskeletal:         General: No deformity.   Skin:     General: Skin is warm and dry.   Neurological:      Mental Status: He is alert.      Cranial Nerves: Facial asymmetry present.      Motor: Weakness present.      Comments: Withdraws on R side to noxious stimuli  Moves left side spontaneously  Severe aphasia   Psychiatric:      Comments: Unable to assess mood, thought, insight or judgment 2/2 severe aphasia/level of consciousness       Neurological Exam:   LOC: alert  Attention Span: poor  Language: mixed aphasia, intermittently follows some simple commands  Articulation: Unable to assess due to aphasia  Orientation: Unable to assess due to aphasia  EOM (CN III, IV, VI):  L gaze preference, tracks to midline  Motor: L side moves spontaneously and antigravity, R side withdraws from pain   Sensation: Withdraws right side to noxious stimuli      Laboratory:  CMP:   Recent Labs   Lab 12/22/22  0417   CALCIUM 9.6   ALBUMIN 2.6*   PROT 7.5   *   K 3.8   CO2 34*      BUN 52*   CREATININE 4.3*   ALKPHOS 103   ALT 44   AST 48*   BILITOT 0.5       BMP:   Recent Labs   Lab 12/22/22  0417   *   K 3.8      CO2 34*   BUN 52*   CREATININE 4.3*   CALCIUM 9.6       CBC:   Recent Labs   Lab 12/21/22  0420   WBC 12.50   RBC 4.58*   HGB 12.2*   HCT 40.1      MCV 88   MCH 26.6*   MCHC 30.4*       Lipid Panel:   No results for input(s): CHOL, LDLCALC, HDL, TRIG in the last 168 hours.    Coagulation:   Recent Labs   Lab 12/21/22  1133   APTT 43.9*       Hgb A1C:   No results for input(s): HGBA1C in the last 168 hours.    TSH:   No results for input(s): TSH in the last 168 hours.      Diagnostic Results     Brain/Vessel Imaging   CT 12/13/22  -Evolving large left MCA infarct with stable mass effect and petechial hemorrhagic conversion.   -No definite new hemorrhage or significant new abnormal parenchymal attenuation   -Separate areas of infarction involving the  right parietal lobe and left cerebellum not well seen with CT technique.   -Clinical correlation and continued follow-up advised     CTH 12/11/22   Grossly stable evolving large left MCA distribution infarct and smaller infarct in the right frontal lobe with possible trace hemorrhagic conversion. Persistent mass effect with sulcal effacement and slight mass effect on the left lateral ventricle. No new or worsening intracranial hemorrhage and no worsening of minimal rightward midline shift.    CTH 12/9/22   Evolving no enlarged left MCA territory infarction with hypoattenuation and sulcal effacement. Intermediate density along the left basal ganglia compatible with known hemorrhagic conversion. Smaller area of infarction in the right cerebral hemisphere not well seen. Evolving mass effect and edema associated with the left cerebral hemispheric infarction with slight compression of the left lateral ventricle and trace 1 mm of rightward midline shift. No evidence for hydrocephalus    MRI Brain WO Contrast 12/8/22    Exam limited by patient motion artifact. Evolving left MCA territory infarct.  Additional foci of diffusion restriction in the left cerebral consistent with recent infarct.  Interval increase susceptibility artifact in the areas of diffusion restriction consistent with hemorrhagic conversion of recent infarcts.  No hydrocephalus or significant midline shift.      CTH 12/8/22 16:29   -Evolving large left MCA territory infarction similar to recent CT though increased in size compared to prior MRI concerning for evolving recent to subacute and acute infarcts.  Localized mass effect without significant midline shift or hydrocephalus.  There is evolving recent to subacute age right posterior frontal infarction similar to prior MRI allowing for differences in technique  -There is subtle hyperdensity along the left basal ganglia posteriorly and along the right posterior frontal cortex which may represent enhancing  subacute age components of infarction with petechial hemorrhage felt less likely but cannot be entirely excluded with limitation secondary to recirculation contrast related to recent CTA performed at outside institution.  -Evolving mass effect most pronounced associated with the left MCA territory infarction with sulcal effacement without significant midline shift or hydrocephalus.    CTH 12/8/22 12:11 (CTA H/N)   1. Left diencephalic hemisphere demonstrates evidence of an acute infarct of left basal ganglia and left caudate lobe.  2. Occlusion of the posterior division of the left M2 branch at the takeoff of the M1 vessel with patency involving the anterior division of the left M2 segment.    CTH 12/7/22   1.  Moderate size subacute infarction left anterior basal ganglia and anterior left internal capsule appears mildly larger and better well defined compared to CT from 12/3/2020. There is currently greater mass effect on the anterior horn of the left lateral ventricle. There is no associated bleed or midline shift.  2.  Recent MRI demonstrated additional punctate acute infarctions in the left frontal lobe and a mild size acute infarction right centrum semiovale. These are less obvious on CT. No associated bleed.  3.  Additional chronic periventricular white matter hypodensities.    MRI Brain WO contrast 12/3/22   Multifocal areas of restricted diffusion are felt to reflect acute infarcts.    CTH 12/3/22   Loss of gray-white matter distinction in involving the left basal ganglia could reflect changes of chronic small vessel ischemic disease versus cytotoxic edema from acute infarct.     Carotid US Bilateral 12/3/22   1. Carotid intimal hyperplasia, with no findings of hemodynamically significant carotid arterial stenosis or vascular occlusion.  2. Patent vertebral arteries with antegrade flow bilaterally.      Cardiac Imaging   RAGHAV 12/4/22   The left ventricle is small with moderately decreased systolic  function.  The estimated ejection fraction is 38%.  Left ventricular diastolic dysfunction.  There are segmental left ventricular wall motion abnormalities.  No spontaneous echo contrast. A moderate protruding layered left ventricular thrombus is present. The thrombus is fixed and located in the apex.  Normal right ventricular size.  Mild left atrial enlargement.  Mild right atrial enlargement.  Mild aortic regurgitation.  No interatrial septal defect present.  Normal appearing left atrial appendage. No thrombus is present in the appendage. SB occluder is absent. Abnormal appendage velocities.  Mild mitral regurgitation.  Agitated saline contrast study did not show any right to left shunt

## 2022-12-22 NOTE — PLAN OF CARE
[facility] completed LOCET via phone. [facility] faxed PASSR to obtain the 142 for NH admission.    BRENT Mansfield  [phone]

## 2022-12-23 PROBLEM — E87.0 HYPERNATREMIA: Status: ACTIVE | Noted: 2022-12-23

## 2022-12-23 LAB
ALBUMIN SERPL BCP-MCNC: 2.3 G/DL (ref 3.5–5.2)
ALP SERPL-CCNC: 104 U/L (ref 55–135)
ALT SERPL W/O P-5'-P-CCNC: 48 U/L (ref 10–44)
ANION GAP SERPL CALC-SCNC: 13 MMOL/L (ref 8–16)
AST SERPL-CCNC: 55 U/L (ref 10–40)
BASOPHILS # BLD AUTO: 0.04 K/UL (ref 0–0.2)
BASOPHILS NFR BLD: 0.5 % (ref 0–1.9)
BILIRUB SERPL-MCNC: 0.5 MG/DL (ref 0.1–1)
BUN SERPL-MCNC: 54 MG/DL (ref 6–20)
CALCIUM SERPL-MCNC: 9.3 MG/DL (ref 8.7–10.5)
CHLORIDE SERPL-SCNC: 109 MMOL/L (ref 95–110)
CO2 SERPL-SCNC: 28 MMOL/L (ref 23–29)
CREAT SERPL-MCNC: 4 MG/DL (ref 0.5–1.4)
DIFFERENTIAL METHOD: ABNORMAL
EOSINOPHIL # BLD AUTO: 0.1 K/UL (ref 0–0.5)
EOSINOPHIL NFR BLD: 1.5 % (ref 0–8)
ERYTHROCYTE [DISTWIDTH] IN BLOOD BY AUTOMATED COUNT: 15.2 % (ref 11.5–14.5)
EST. GFR  (NO RACE VARIABLE): 17.4 ML/MIN/1.73 M^2
GLUCOSE SERPL-MCNC: 103 MG/DL (ref 70–110)
HCT VFR BLD AUTO: 39.3 % (ref 40–54)
HGB BLD-MCNC: 11.6 G/DL (ref 14–18)
IMM GRANULOCYTES # BLD AUTO: 0.02 K/UL (ref 0–0.04)
IMM GRANULOCYTES NFR BLD AUTO: 0.2 % (ref 0–0.5)
LYMPHOCYTES # BLD AUTO: 1 K/UL (ref 1–4.8)
LYMPHOCYTES NFR BLD: 11 % (ref 18–48)
MAGNESIUM SERPL-MCNC: 2.4 MG/DL (ref 1.6–2.6)
MCH RBC QN AUTO: 26.2 PG (ref 27–31)
MCHC RBC AUTO-ENTMCNC: 29.5 G/DL (ref 32–36)
MCV RBC AUTO: 89 FL (ref 82–98)
MONOCYTES # BLD AUTO: 0.4 K/UL (ref 0.3–1)
MONOCYTES NFR BLD: 4.9 % (ref 4–15)
NEUTROPHILS # BLD AUTO: 7.1 K/UL (ref 1.8–7.7)
NEUTROPHILS NFR BLD: 81.9 % (ref 38–73)
NRBC BLD-RTO: 0 /100 WBC
PHOSPHATE SERPL-MCNC: 3.5 MG/DL (ref 2.7–4.5)
PLATELET # BLD AUTO: 225 K/UL (ref 150–450)
PMV BLD AUTO: 14.1 FL (ref 9.2–12.9)
POCT GLUCOSE: 117 MG/DL (ref 70–110)
POCT GLUCOSE: 162 MG/DL (ref 70–110)
POCT GLUCOSE: 185 MG/DL (ref 70–110)
POCT GLUCOSE: 194 MG/DL (ref 70–110)
POCT GLUCOSE: 201 MG/DL (ref 70–110)
POCT GLUCOSE: 262 MG/DL (ref 70–110)
POTASSIUM SERPL-SCNC: 3.5 MMOL/L (ref 3.5–5.1)
PROT SERPL-MCNC: 6.6 G/DL (ref 6–8.4)
RBC # BLD AUTO: 4.42 M/UL (ref 4.6–6.2)
SODIUM SERPL-SCNC: 150 MMOL/L (ref 136–145)
WBC # BLD AUTO: 8.63 K/UL (ref 3.9–12.7)

## 2022-12-23 PROCEDURE — 92526 ORAL FUNCTION THERAPY: CPT

## 2022-12-23 PROCEDURE — 99233 PR SUBSEQUENT HOSPITAL CARE,LEVL III: ICD-10-PCS | Mod: ,,, | Performed by: PSYCHIATRY & NEUROLOGY

## 2022-12-23 PROCEDURE — 63600175 PHARM REV CODE 636 W HCPCS

## 2022-12-23 PROCEDURE — 99233 SBSQ HOSP IP/OBS HIGH 50: CPT | Mod: ,,, | Performed by: PSYCHIATRY & NEUROLOGY

## 2022-12-23 PROCEDURE — 25000003 PHARM REV CODE 250: Performed by: PHYSICIAN ASSISTANT

## 2022-12-23 PROCEDURE — 84100 ASSAY OF PHOSPHORUS: CPT | Performed by: NURSE PRACTITIONER

## 2022-12-23 PROCEDURE — 97530 THERAPEUTIC ACTIVITIES: CPT

## 2022-12-23 PROCEDURE — 83735 ASSAY OF MAGNESIUM: CPT | Performed by: NURSE PRACTITIONER

## 2022-12-23 PROCEDURE — 36415 COLL VENOUS BLD VENIPUNCTURE: CPT | Performed by: NURSE PRACTITIONER

## 2022-12-23 PROCEDURE — 80053 COMPREHEN METABOLIC PANEL: CPT | Performed by: NURSE PRACTITIONER

## 2022-12-23 PROCEDURE — 97542 WHEELCHAIR MNGMENT TRAINING: CPT

## 2022-12-23 PROCEDURE — 85025 COMPLETE CBC W/AUTO DIFF WBC: CPT | Performed by: PHYSICIAN ASSISTANT

## 2022-12-23 PROCEDURE — 92507 TX SP LANG VOICE COMM INDIV: CPT

## 2022-12-23 PROCEDURE — 11000001 HC ACUTE MED/SURG PRIVATE ROOM

## 2022-12-23 RX ADMIN — ESCITALOPRAM OXALATE 20 MG: 20 TABLET ORAL at 09:12

## 2022-12-23 RX ADMIN — INSULIN ASPART 9 UNITS: 100 INJECTION, SOLUTION INTRAVENOUS; SUBCUTANEOUS at 08:12

## 2022-12-23 RX ADMIN — INSULIN ASPART 15 UNITS: 100 INJECTION, SOLUTION INTRAVENOUS; SUBCUTANEOUS at 04:12

## 2022-12-23 RX ADMIN — INSULIN ASPART 1 UNITS: 100 INJECTION, SOLUTION INTRAVENOUS; SUBCUTANEOUS at 12:12

## 2022-12-23 RX ADMIN — APIXABAN 5 MG: 5 TABLET, FILM COATED ORAL at 09:12

## 2022-12-23 RX ADMIN — CARVEDILOL 12.5 MG: 12.5 TABLET, FILM COATED ORAL at 09:12

## 2022-12-23 RX ADMIN — APIXABAN 5 MG: 5 TABLET, FILM COATED ORAL at 08:12

## 2022-12-23 RX ADMIN — INSULIN ASPART 9 UNITS: 100 INJECTION, SOLUTION INTRAVENOUS; SUBCUTANEOUS at 12:12

## 2022-12-23 RX ADMIN — CARVEDILOL 12.5 MG: 12.5 TABLET, FILM COATED ORAL at 08:12

## 2022-12-23 RX ADMIN — ATORVASTATIN CALCIUM 40 MG: 40 TABLET, FILM COATED ORAL at 09:12

## 2022-12-23 RX ADMIN — AMLODIPINE BESYLATE 10 MG: 10 TABLET ORAL at 09:12

## 2022-12-23 RX ADMIN — INSULIN ASPART 4 UNITS: 100 INJECTION, SOLUTION INTRAVENOUS; SUBCUTANEOUS at 08:12

## 2022-12-23 RX ADMIN — FUROSEMIDE 40 MG: 40 TABLET ORAL at 09:12

## 2022-12-23 RX ADMIN — INSULIN ASPART 9 UNITS: 100 INJECTION, SOLUTION INTRAVENOUS; SUBCUTANEOUS at 03:12

## 2022-12-23 NOTE — PLAN OF CARE
Problem: Physical Therapy  Goal: Physical Therapy Goal  Description: Goals to be met by: 22    Patient will increase functional independence with mobility by performin. Supine to sit with Maximum Assistance  2. Sit to supine with Maximum Assistance  3. Sit to stand transfer with Maximum Assistance  4. Bed to chair transfer with Maximum Assistance using squat pivot technique.  5. Gait  x 5 feet with Moderate Assistance using LRAD.   6. Sitting at edge of bed x8 minutes with Contact Guard Assistance with DYLAN UE support.  7. Pt to propel w/c 120ft with L UE/LE on level surface with CGA-not met    Outcome: Ongoing, Progressing   Pt's goals remain appropriate and pt will continue to benefit from skilled PT services to work towards improved functional mobility including: bed mobility, transfers, w/c mobility, and gait.   2022

## 2022-12-23 NOTE — PROGRESS NOTES
Ulices Stack - Neurosurgery ([facility])  Vascular Neurology  Comprehensive Stroke Center  Progress Note    Assessment/Plan:     * Embolic stroke involving left middle cerebral artery  53 yo M with CHF, COPD, HTN, HLD, DM admitted for acute LMCA stroke. He initially presented to OSH with RSW, OOW for thrombolytics. MRI at that time demonstrated bilateral anterior and posterior circulation strokes concerning for embolic etiology. He was admitted to OSH and RAGHAV revealed LV thrombus, he was not started on AC. During hospital stay noted to be lethargic and aphasic/dysarthric, then on 12/18 had acute neuro change with RSW and global aphasia. NIHSS increased from 6 to 23. CTA demonstrated a L M2 occlusion and patient was transferred to [facility] for possible intervention and higher level of care. He was not taken to IR due to poor ASPECTS and was admitted to ICU for close monitoring and hemicrani watch. Suspect etiology likely cardioembolic given LV thrombus.    NAEO, neuro exam stable. Continues to be hypernatremic although decreased from yesterday, continue FWF q4h and will reach out to nutrition for any updated tube feeding recs. Therapy continues to recommend IPR, dispo planning for IPR but if denied by rehab facilities will pursue SNF.      Antithrombotics for secondary stroke prevention: Eliquis 5mg BID    Statins for secondary stroke prevention and hyperlipidemia, if present:   Statins: Atorvastatin- 40 mg daily    Aggressive risk factor modification: HTN, DM, HLD, Diet, Exercise, LV thrombus     Rehab efforts: IPR    Diagnostics ordered/pending: None     VTE prophylaxis: Mechanical prophylaxis: Place SCDs  None: Reason for No Pharmacological VTE Prophylaxis: Currently on anticoagulation     BP parameters: SBP <180      Hypernatremia  Na 150-153  Continue 250cc FWF q4hr  Nutrition consulted for updated recs    Oropharyngeal dysphagia  Due to stroke  SLP eval and treat  PEG placed by IR, tolerating tube feeds at goal    JR on  CKD  See CKD    Hyperlipidemia associated with type 2 diabetes mellitus  Stroke RF   . 8, goal <70  - Continue atorvastatin 40mg daily    CHF (congestive heart failure)  Stroke risk factor  RAGHAV with EF 38% and segmental LV wall motion abnormalities  - Strict I/Os  - Continue lasix 40mg and coreg 12.5mg BID    Debility  Due to stroke  Aggressive PT/OT/SLP  Dispo recs for IPR, pending placement    Cytotoxic brain edema  -Noted on imaging in the area of the L MCA territory with associated mass effect  -NSGY consulted for hemicrani watch due to malignant MCA, no acute intervention recommended as serial imaging remains stable  -Will continue to monitor with frequent a4h neuro checks while inpatient, as this could indicate worsening/expansion of edema  -Obtain Stat CTH for any acute neuro changes and notify stroke team immediately    LV (left ventricular) mural thrombus  Stroke RF  Initially started on heparin gtt due for AC until PO route established, s/p PEG on 12/20  Heparin gtt transitioned to Eliquis 5mg BID on 12/21  -Continue AC with eliquis    Stage 4 chronic kidney disease  Cr and eGFR stable  - Continue to monitor renal function with daily labs  - Avoid nephrotoxins and renally dose medications when appropiate   - Strict I/Os and routine VS to monitor for signs of decreased renal perfusion (hypovolemia, hypotension, sepsis) and/or obstruction causing worsening GFR    Type 2 diabetes mellitus, with long-term current use of insulin  Stroke RF  A1c 9.1, consider nutrition consult and DM education  BG goal while inpatient 140-180  Currently on Aspart 9units q4h + SSI PRN    Essential hypertension  Stroke RF  SBP <180, MAP >65  Continue amlodipine and coreg  PRN labetalol/hydralazine         12/09/2022 Patient with LV thrombus, will need anticoagulation. NGT in place would recommend heparin gtt until patient able to swallow or PEG placed prior to DOAC initiation. Patient tachycardic today with SBP < 210,  metoprolol started by primary team. Febrile with elevated white count and procal, currently on Zosyn and Vanc while cx pending. NSGY following for hemicrani watch. Patient has been discharged from OT per last note, will need new orders. Continue current ICU care.   12/10/2022: Pending stability scan patient is expected to be started on heparin gtt. Continue hemicrani watch in  ICU. Family at bedside.   12/11/2022: Patient started on heparin gtt overnight, he demonstrated great clinical improvement today, he was able to tell me his name, knew he is in [facility]. Followed single step commands. No family at bedside today.   12/12/2022 Patient remains in ICU for sue-crani watch. NSGY following. On salt tabs for goal of hypernatremia.Will remain in ICU another night. VN to re-evaluate for stepdown tomorrow. Patient is on ceftriaxone for acute cystitis. aPTT today 42.9. CTH following therapeutic heparin levels stable. More alert today. IPR recommendations; SLP with minced and moist diet with thin liquids.   12/13/2022 Remains in NCC, neuro exam unchanged and limited by drowsiness. Overnight had short run of VT/SVT that resolved without any interventions. BPs not consistently at goal of <200, had max BP today of 214/140. Currently NPO with NGT in place and anticipate patient will need PEG placement, SLP recommending ice chips sparingly for pleasure. Anticipate step down tomorrow once BP is consistently at goal  12/14/2022 Pending SD to NPU. Continue to watch BP closely, SBP<200. Patient will likely need a PEG.  12/15/2022. Pending SD to NPU. Poor exam, patient very lethargic, WD on the right.   12/16/2022 Patient stepped down overnight to NPU. Exam stable. Re-evaluated by speech this afternoon, still recommending NPO. Will need to discuss nutrition going forward (I.e. need for PEG). Recommendations for IPR.   12/17/22 Exam stable, elevated sodium, free water boluses added, metoprolol increased for elevated BP & HR.  IR consult  placed for G tube placement after discussing with patient's family via phone.  12/18/22 patient displaced NG tube overnight, adjusted by nursing. KUB reviewed- BG ok to use. Water boluses added yesterday for hypernatremia, Na+ improved to 149. -184, Lopressor discontinued and Coreg ordered for better BP control. Plan for PEG placement on 12/19 .  12/19/2022 Patient remains in NPU, neuro exam poor but stable. Going for PEG today. Will resume free water flushes following procedure given Na+ 150 today. SBP improved today. Consider starting ACE/ARB once PEG in place and safe to use for meds. Dispo recommendations for IPR.   12/20/2022 NAEO, neuro exam stable. Going for PEG today with IR. Na 150, will resume FWF after procedure. Will transition hep gtt to DOAC once PEG placed and tolerating tube feeds. Dispo recs for IPR.  12/21/2022 NAEO, neuro exam stable. S/p PEG yesterday, NGT removed now that tube feedings restarted and tolerating well. NA remains at 150 but unclear if patient had been getting FWF, order updated and nursing communication in. Heparin gtt transitioned to DOAC today. Dispo recs for IPR, anticipate he will be ready for discharge tomorrow.  12/22/2022 NAEO, neuro exam stable. Tolerating tube feeds at goal. Continues to be hypernatremic, discussed FWF q4h with nursing staff. Dispo recs downgraded from IPR to SNF, pending placement.   12/23/2022 NAEO, neuro exam stable. Continues to be hypernatremic although decreased from yesterday, continue FWF q4h and will reach out to nutrition for any updated tube feeding recs. Dispo pending, therapy recs for IPR and placement pending.      STROKE DOCUMENTATION   Acute Stroke Times   Last Known Normal Date: 12/08/22  Last Known Normal Time: 1145  Symptom Onset Date: 12/08/22  Symptom Onset Time: 1145  Stroke Team Called Date: 12/08/22  Stroke Team Called Time: 1615  Stroke Team Arrival Date: 12/08/22  Stroke Team Arrival Time: 1615  CT Interpretation Time:  1615  Thrombolytic Therapy Recommended: No  CTA Interpretation Time: 1615    NIH Scale:  1a. Level of Consciousness: 0-->Alert, keenly responsive  1b. LOC Questions: 2-->Answers neither question correctly  1c. LOC Commands: 1-->Performs one task correctly  2. Best Gaze: 2-->Forced deviation, or total gaze paresis not overcome by the oculocephalic maneuver  3. Visual: 2-->Complete hemianopia  4. Facial Palsy: 1-->Minor paralysis (flattened nasolabial fold, asymmetry on smiling)  5a. Motor Arm, Left: 0-->No drift, limb holds 90 (or 45) degrees for full 10 secs  5b. Motor Arm, Right: 3-->No effort against gravity, limb falls  6a. Motor Leg, Left: 0-->No drift, leg holds 30 degree position for full 5 secs  6b. Motor Leg, Right: 3-->No effort against gravity, leg falls to bed immediately  7. Limb Ataxia: 0-->Absent  8. Sensory: 1-->Mild-to-moderate sensory loss, patient feels pinprick is less sharp or is dull on the affected side, or there is a loss of superficial pain with pinprick, but patient is aware of being touched  9. Best Language: 2-->Severe aphasia, all communication is through fragmentary expression, great need for inference, questioning, and guessing by the listener. Range of information that can be exchanged is limited, listener carries burden of. . . (see row details)  10. Dysarthria: 2-->Severe dysarthria, patients speech is so slurred as to be unintelligible in the absence of or out of proportion to any dysphasia, or is mute/anarthric  11. Extinction and Inattention (formerly Neglect): 1-->Visual, tactile, auditory, spatial, or personal inattention or extinction to bilateral simultaneous stimulation in one of the sensory modalities  Total (NIH Stroke Scale): 20       Modified [place] Score: 5  Thao Coma Scale:    ABCD2 Score:    [id number] Score:   HAS -BLED Score:   ICH Score:   Hunt & Leblanc Classification:      Hemorrhagic change of an Ischemic Stroke: Does this patient have an ischemic stroke with  hemorrhagic changes? No     Neurologic Chief Complaint: L MCA stroke    Subjective:     Interval History: Patient is seen for follow-up neurological assessment and treatment recommendations:   NAEO, neuro exam stable. Continues to be hypernatremic although decreased from yesterday, continue FWF q4h and will reach out to nutrition for any updated tube feeding recs. Dispo pending, therapy recs for IPR and placement pending.    HPI, Past Medical, Family, and Social History remains the same as documented in the initial encounter.     Review of Systems   Unable to perform ROS: Other (Aphasia)     Scheduled Meds:   amLODIPine  10 mg Per G Tube Daily    apixaban  5 mg Per G Tube BID    atorvastatin  40 mg Per G Tube Daily    carvediloL  12.5 mg Per G Tube BID    EScitalopram oxalate  20 mg Per G Tube Daily    furosemide  40 mg Per G Tube Daily    insulin aspart U-100  9 Units Subcutaneous 6 times per day     Continuous Infusions:   dextrose 10 % in water (D10W)       PRN Meds:dextrose 10 % in water (D10W), dextrose 10%, dextrose 10%, glucagon (human recombinant), glucagon (human recombinant), insulin aspart U-100, labetaloL, ondansetron, sodium chloride 0.9%    Objective:     Vital Signs (Most Recent):  Temp: 98.6 °F (37 °C) (12/23/22 1130)  Pulse: 86 (12/23/22 1130)  Resp: 19 (12/23/22 0524)  BP: (!) 145/91 (12/23/22 1130)  SpO2: 95 % (12/23/22 1130)  BP Location: Left arm    Vital Signs Range (Last 24H):  Temp:  [96.1 °F (35.6 °C)-98.6 °F (37 °C)]   Pulse:  [76-91]   Resp:  [18-19]   BP: (138-185)/(89-98)   SpO2:  [95 %-99 %]   BP Location: Left arm    Physical Exam  Vitals and nursing note reviewed.   Constitutional:       General: He is not in acute distress.  HENT:      Head: Normocephalic.      Nose: No rhinorrhea.   Eyes:      General: Visual field deficit present. No scleral icterus.     Conjunctiva/sclera: Conjunctivae normal.   Cardiovascular:      Rate and Rhythm: Normal rate.   Pulmonary:      Effort:  No respiratory distress.   Abdominal:      General: There is no distension.      Comments: PEG in place, dressing CDI   Musculoskeletal:         General: No deformity.   Skin:     General: Skin is warm and dry.   Neurological:      Mental Status: He is alert.      Cranial Nerves: Facial asymmetry present.      Motor: Weakness present.      Comments: Withdraws on R side to noxious stimuli  Moves left side spontaneously  Severe aphasia   Psychiatric:      Comments: Unable to assess mood, thought, insight or judgment 2/2 severe aphasia/level of consciousness       Neurological Exam:   LOC: alert  Attention Span: poor  Language: mixed aphasia, intermittently follows some simple commands  Articulation: Unable to assess due to aphasia  Orientation: Unable to assess due to aphasia  EOM (CN III, IV, VI):  L gaze preference, tracks to midline  Motor: L side moves spontaneously and antigravity, R side withdraws from pain   Sensation: Withdraws right side to noxious stimuli      Laboratory:  CMP:   Recent Labs   Lab 12/23/22 0452   CALCIUM 9.3   ALBUMIN 2.3*   PROT 6.6   *   K 3.5   CO2 28      BUN 54*   CREATININE 4.0*   ALKPHOS 104   ALT 48*   AST 55*   BILITOT 0.5       BMP:   Recent Labs   Lab 12/23/22  0452   *   K 3.5      CO2 28   BUN 54*   CREATININE 4.0*   CALCIUM 9.3       CBC:   Recent Labs   Lab 12/23/22 0452   WBC 8.63   RBC 4.42*   HGB 11.6*   HCT 39.3*      MCV 89   MCH 26.2*   MCHC 29.5*       Lipid Panel:   No results for input(s): CHOL, LDLCALC, HDL, TRIG in the last 168 hours.    Coagulation:   Recent Labs   Lab 12/21/22  1133   APTT 43.9*       Hgb A1C:   No results for input(s): HGBA1C in the last 168 hours.    TSH:   No results for input(s): TSH in the last 168 hours.      Diagnostic Results     Brain/Vessel Imaging   CT 12/13/22  -Evolving large left MCA infarct with stable mass effect and petechial hemorrhagic conversion.   -No definite new hemorrhage or significant new  abnormal parenchymal attenuation   -Separate areas of infarction involving the right parietal lobe and left cerebellum not well seen with CT technique.   -Clinical correlation and continued follow-up advised     CTH 12/11/22   Grossly stable evolving large left MCA distribution infarct and smaller infarct in the right frontal lobe with possible trace hemorrhagic conversion. Persistent mass effect with sulcal effacement and slight mass effect on the left lateral ventricle. No new or worsening intracranial hemorrhage and no worsening of minimal rightward midline shift.    CTH 12/9/22   Evolving no enlarged left MCA territory infarction with hypoattenuation and sulcal effacement. Intermediate density along the left basal ganglia compatible with known hemorrhagic conversion. Smaller area of infarction in the right cerebral hemisphere not well seen. Evolving mass effect and edema associated with the left cerebral hemispheric infarction with slight compression of the left lateral ventricle and trace 1 mm of rightward midline shift. No evidence for hydrocephalus    MRI Brain WO Contrast 12/8/22    Exam limited by patient motion artifact. Evolving left MCA territory infarct.  Additional foci of diffusion restriction in the left cerebral consistent with recent infarct.  Interval increase susceptibility artifact in the areas of diffusion restriction consistent with hemorrhagic conversion of recent infarcts.  No hydrocephalus or significant midline shift.      CTH 12/8/22 16:29   -Evolving large left MCA territory infarction similar to recent CT though increased in size compared to prior MRI concerning for evolving recent to subacute and acute infarcts.  Localized mass effect without significant midline shift or hydrocephalus.  There is evolving recent to subacute age right posterior frontal infarction similar to prior MRI allowing for differences in technique  -There is subtle hyperdensity along the left basal ganglia  posteriorly and along the right posterior frontal cortex which may represent enhancing subacute age components of infarction with petechial hemorrhage felt less likely but cannot be entirely excluded with limitation secondary to recirculation contrast related to recent CTA performed at outside institution.  -Evolving mass effect most pronounced associated with the left MCA territory infarction with sulcal effacement without significant midline shift or hydrocephalus.    CTH 12/8/22 12:11 (CTA H/N)   1. Left diencephalic hemisphere demonstrates evidence of an acute infarct of left basal ganglia and left caudate lobe.  2. Occlusion of the posterior division of the left M2 branch at the takeoff of the M1 vessel with patency involving the anterior division of the left M2 segment.    CTH 12/7/22   1.  Moderate size subacute infarction left anterior basal ganglia and anterior left internal capsule appears mildly larger and better well defined compared to CT from 12/3/2020. There is currently greater mass effect on the anterior horn of the left lateral ventricle. There is no associated bleed or midline shift.  2.  Recent MRI demonstrated additional punctate acute infarctions in the left frontal lobe and a mild size acute infarction right centrum semiovale. These are less obvious on CT. No associated bleed.  3.  Additional chronic periventricular white matter hypodensities.    MRI Brain WO contrast 12/3/22   Multifocal areas of restricted diffusion are felt to reflect acute infarcts.    CTH 12/3/22   Loss of gray-white matter distinction in involving the left basal ganglia could reflect changes of chronic small vessel ischemic disease versus cytotoxic edema from acute infarct.     Carotid US Bilateral 12/3/22   1. Carotid intimal hyperplasia, with no findings of hemodynamically significant carotid arterial stenosis or vascular occlusion.  2. Patent vertebral arteries with antegrade flow bilaterally.      Cardiac Imaging    RAGHAV 12/4/22    The left ventricle is small with moderately decreased systolic function.   The estimated ejection fraction is 38%.   Left ventricular diastolic dysfunction.   There are segmental left ventricular wall motion abnormalities.   No spontaneous echo contrast. A moderate protruding layered left ventricular thrombus is present. The thrombus is fixed and located in the apex.   Normal right ventricular size.   Mild left atrial enlargement.   Mild right atrial enlargement.   Mild aortic regurgitation.   No interatrial septal defect present.   Normal appearing left atrial appendage. No thrombus is present in the appendage. SB occluder is absent. Abnormal appendage velocities.   Mild mitral regurgitation.   Agitated saline contrast study did not show any right to left shunt        BHAVYA Daugherty  Comprehensive Stroke Center  Department of Vascular Neurology   [facility])

## 2022-12-23 NOTE — SUBJECTIVE & OBJECTIVE
Neurologic Chief Complaint: L MCA stroke    Subjective:     Interval History: Patient is seen for follow-up neurological assessment and treatment recommendations:   NAEO, neuro exam stable. Continues to be hypernatremic although decreased from yesterday, continue FWF q4h and will reach out to nutrition for any updated tube feeding recs. Dispo pending, therapy recs for IPR and placement pending.    HPI, Past Medical, Family, and Social History remains the same as documented in the initial encounter.     Review of Systems   Unable to perform ROS: Other (Aphasia)     Scheduled Meds:   amLODIPine  10 mg Per G Tube Daily    apixaban  5 mg Per G Tube BID    atorvastatin  40 mg Per G Tube Daily    carvediloL  12.5 mg Per G Tube BID    EScitalopram oxalate  20 mg Per G Tube Daily    furosemide  40 mg Per G Tube Daily    insulin aspart U-100  9 Units Subcutaneous 6 times per day     Continuous Infusions:   dextrose 10 % in water (D10W)       PRN Meds:dextrose 10 % in water (D10W), dextrose 10%, dextrose 10%, glucagon (human recombinant), glucagon (human recombinant), insulin aspart U-100, labetaloL, ondansetron, sodium chloride 0.9%    Objective:     Vital Signs (Most Recent):  Temp: 98.6 °F (37 °C) (12/23/22 1130)  Pulse: 86 (12/23/22 1130)  Resp: 19 (12/23/22 0524)  BP: (!) 145/91 (12/23/22 1130)  SpO2: 95 % (12/23/22 1130)  BP Location: Left arm    Vital Signs Range (Last 24H):  Temp:  [96.1 °F (35.6 °C)-98.6 °F (37 °C)]   Pulse:  [76-91]   Resp:  [18-19]   BP: (138-185)/(89-98)   SpO2:  [95 %-99 %]   BP Location: Left arm    Physical Exam  Vitals and nursing note reviewed.   Constitutional:       General: He is not in acute distress.  HENT:      Head: Normocephalic.      Nose: No rhinorrhea.   Eyes:      General: Visual field deficit present. No scleral icterus.     Conjunctiva/sclera: Conjunctivae normal.   Cardiovascular:      Rate and Rhythm: Normal rate.   Pulmonary:      Effort: No respiratory distress.   Abdominal:       General: There is no distension.      Comments: PEG in place, dressing CDI   Musculoskeletal:         General: No deformity.   Skin:     General: Skin is warm and dry.   Neurological:      Mental Status: He is alert.      Cranial Nerves: Facial asymmetry present.      Motor: Weakness present.      Comments: Withdraws on R side to noxious stimuli  Moves left side spontaneously  Severe aphasia   Psychiatric:      Comments: Unable to assess mood, thought, insight or judgment 2/2 severe aphasia/level of consciousness       Neurological Exam:   LOC: alert  Attention Span: poor  Language: mixed aphasia, intermittently follows some simple commands  Articulation: Unable to assess due to aphasia  Orientation: Unable to assess due to aphasia  EOM (CN III, IV, VI):  L gaze preference, tracks to midline  Motor: L side moves spontaneously and antigravity, R side withdraws from pain   Sensation: Withdraws right side to noxious stimuli      Laboratory:  CMP:   Recent Labs   Lab 12/23/22  0452   CALCIUM 9.3   ALBUMIN 2.3*   PROT 6.6   *   K 3.5   CO2 28      BUN 54*   CREATININE 4.0*   ALKPHOS 104   ALT 48*   AST 55*   BILITOT 0.5       BMP:   Recent Labs   Lab 12/23/22  0452   *   K 3.5      CO2 28   BUN 54*   CREATININE 4.0*   CALCIUM 9.3       CBC:   Recent Labs   Lab 12/23/22  0452   WBC 8.63   RBC 4.42*   HGB 11.6*   HCT 39.3*      MCV 89   MCH 26.2*   MCHC 29.5*       Lipid Panel:   No results for input(s): CHOL, LDLCALC, HDL, TRIG in the last 168 hours.    Coagulation:   Recent Labs   Lab 12/21/22  1133   APTT 43.9*       Hgb A1C:   No results for input(s): HGBA1C in the last 168 hours.    TSH:   No results for input(s): TSH in the last 168 hours.      Diagnostic Results     Brain/Vessel Imaging   CT 12/13/22  -Evolving large left MCA infarct with stable mass effect and petechial hemorrhagic conversion.   -No definite new hemorrhage or significant new abnormal parenchymal attenuation    -Separate areas of infarction involving the right parietal lobe and left cerebellum not well seen with CT technique.   -Clinical correlation and continued follow-up advised     CTH 12/11/22   Grossly stable evolving large left MCA distribution infarct and smaller infarct in the right frontal lobe with possible trace hemorrhagic conversion. Persistent mass effect with sulcal effacement and slight mass effect on the left lateral ventricle. No new or worsening intracranial hemorrhage and no worsening of minimal rightward midline shift.    CTH 12/9/22   Evolving no enlarged left MCA territory infarction with hypoattenuation and sulcal effacement. Intermediate density along the left basal ganglia compatible with known hemorrhagic conversion. Smaller area of infarction in the right cerebral hemisphere not well seen. Evolving mass effect and edema associated with the left cerebral hemispheric infarction with slight compression of the left lateral ventricle and trace 1 mm of rightward midline shift. No evidence for hydrocephalus    MRI Brain WO Contrast 12/8/22    Exam limited by patient motion artifact. Evolving left MCA territory infarct.  Additional foci of diffusion restriction in the left cerebral consistent with recent infarct.  Interval increase susceptibility artifact in the areas of diffusion restriction consistent with hemorrhagic conversion of recent infarcts.  No hydrocephalus or significant midline shift.      CTH 12/8/22 16:29   -Evolving large left MCA territory infarction similar to recent CT though increased in size compared to prior MRI concerning for evolving recent to subacute and acute infarcts.  Localized mass effect without significant midline shift or hydrocephalus.  There is evolving recent to subacute age right posterior frontal infarction similar to prior MRI allowing for differences in technique  -There is subtle hyperdensity along the left basal ganglia posteriorly and along the right  posterior frontal cortex which may represent enhancing subacute age components of infarction with petechial hemorrhage felt less likely but cannot be entirely excluded with limitation secondary to recirculation contrast related to recent CTA performed at outside institution.  -Evolving mass effect most pronounced associated with the left MCA territory infarction with sulcal effacement without significant midline shift or hydrocephalus.    CTH 12/8/22 12:11 (CTA H/N)   1. Left diencephalic hemisphere demonstrates evidence of an acute infarct of left basal ganglia and left caudate lobe.  2. Occlusion of the posterior division of the left M2 branch at the takeoff of the M1 vessel with patency involving the anterior division of the left M2 segment.    CTH 12/7/22   1.  Moderate size subacute infarction left anterior basal ganglia and anterior left internal capsule appears mildly larger and better well defined compared to CT from 12/3/2020. There is currently greater mass effect on the anterior horn of the left lateral ventricle. There is no associated bleed or midline shift.  2.  Recent MRI demonstrated additional punctate acute infarctions in the left frontal lobe and a mild size acute infarction right centrum semiovale. These are less obvious on CT. No associated bleed.  3.  Additional chronic periventricular white matter hypodensities.    MRI Brain WO contrast 12/3/22   Multifocal areas of restricted diffusion are felt to reflect acute infarcts.    CTH 12/3/22   Loss of gray-white matter distinction in involving the left basal ganglia could reflect changes of chronic small vessel ischemic disease versus cytotoxic edema from acute infarct.     Carotid US Bilateral 12/3/22   1. Carotid intimal hyperplasia, with no findings of hemodynamically significant carotid arterial stenosis or vascular occlusion.  2. Patent vertebral arteries with antegrade flow bilaterally.      Cardiac Imaging   RAGHAV 12/4/22   The left ventricle  is small with moderately decreased systolic function.  The estimated ejection fraction is 38%.  Left ventricular diastolic dysfunction.  There are segmental left ventricular wall motion abnormalities.  No spontaneous echo contrast. A moderate protruding layered left ventricular thrombus is present. The thrombus is fixed and located in the apex.  Normal right ventricular size.  Mild left atrial enlargement.  Mild right atrial enlargement.  Mild aortic regurgitation.  No interatrial septal defect present.  Normal appearing left atrial appendage. No thrombus is present in the appendage. SB occluder is absent. Abnormal appendage velocities.  Mild mitral regurgitation.  Agitated saline contrast study did not show any right to left shunt

## 2022-12-23 NOTE — PT/OT/SLP PROGRESS
Physical Therapy Treatment    Patient Name:  Spencer Burton Jr.   MRN:  [id number]    Recommendations:     Discharge Recommendations: rehabilitation facility  Discharge Equipment Recommendations: hospital bed, lift device, wheelchair  Barriers to discharge: Inaccessible home and Decreased caregiver support    Assessment:     Spencer Burton Jr. is a 52 y.o. male admitted with a medical diagnosis of Embolic stroke involving left middle cerebral artery.  He presents with the following impairments/functional limitations: weakness, impaired balance, visual deficits, decreased safety awareness, decreased coordination, decreased upper extremity function, decreased lower extremity function, impaired functional mobility, impaired self care skills . Pt is unsafe with functional mobility at this time due to pt requires max assist for bed mobility, total assist for transfers, and moderate assist for w/c mobility due to weakness, instability, and R sided neglect. Pt is motivated to progress with functional mobility.     Rehab Prognosis: Fair; patient would benefit from acute skilled PT services to address these deficits and reach maximum level of function.    Recent Surgery: * No surgery found *      Plan:     During this hospitalization, patient to be seen 4 x/week to address the identified rehab impairments via gait training, therapeutic activities, therapeutic exercises, neuromuscular re-education, wheelchair management/training and progress toward the following goals:    Plan of Care Expires:  01/09/23    Subjective   Pt attempted to speak multiple times during treatment but speech is garbled and PT unable to understand what he is trying to say    Pain/Comfort:  Pain Rating 1: 0/10  Pain Rating Post-Intervention 1: 0/10      Objective:     Communicated with nurse prior to session.  Patient found HOB elevated with bed alarm, PEG Tube, pressure relief boots, SCD, telemetry upon PT entry to room.     General Precautions: Standard,  aspiration, fall  Orthopedic Precautions: N/A  Braces: N/A  Respiratory Status: Room air     Functional Mobility:  Bed Mobility:     Rolling Right: maximal assistance  Supine to Sit: maximal assistance  Sit to Supine: total assistance  Transfers:     Sit to Stand:  total assistance and of 2 persons with hand-held assist  Bed to/from wheel Chair: maximal assistance with  hand-held assist  using  Squat Pivot  Wheelchair Propulsion:  Pt propelled Standard wheelchair x 45 feet x 2 trials with rest in between on Level tile with  Left upper extremity and Left lower extremity with Moderate Assistance. Pt required verbal and manual cues throughout for turning and avoiding obstacles on the R.    AM-PAC 6 CLICK MOBILITY  Turning over in bed (including adjusting bedclothes, sheets and blankets)?: 2  Sitting down on and standing up from a chair with arms (e.g., wheelchair, bedside commode, etc.): 2  Moving from lying on back to sitting on the side of the bed?: 2  Moving to and from a bed to a chair (including a wheelchair)?: 2  Need to walk in hospital room?: 1  Climbing 3-5 steps with a railing?: 1  Basic Mobility Total Score: 10     Treatment & Education:  Pt sat on the EOB ~ 4 min with max assist to avoid LOB to the R due to pt pushing to the R with his L UE, improved stability when pt holding onto the bed rail at the end of the bed. Pt stood x 2 trials with total assist of 2 with HHA in L UE for ~ 30 sec each trial.    Patient left HOB elevated with all lines intact, call button in reach, bed alarm on, and nurse notified..    GOALS:   Multidisciplinary Problems       Physical Therapy Goals          Problem: Physical Therapy    Goal Priority Disciplines Outcome Goal Variances Interventions   Physical Therapy Goal     PT, PT/OT Ongoing, Progressing     Description: Goals to be met by: 22    Patient will increase functional independence with mobility by performin. Supine to sit with Maximum Assistance  2. Sit to  supine with Maximum Assistance  3. Sit to stand transfer with Maximum Assistance  4. Bed to chair transfer with Maximum Assistance using squat pivot technique.  5. Gait  x 5 feet with Moderate Assistance using LRAD.   6. Sitting at edge of bed x8 minutes with Contact Guard Assistance with DYLAN UE support.  7. Pt to propel w/c 120ft with L UE/LE on level surface with CGA-not met                         Time Tracking:     PT Received On: 12/23/22  PT Start Time: 0810     PT Stop Time: 0851  PT Total Time (min): 41 min     Billable Minutes: Therapeutic Activity 25 and Train/Wheelchair Management 16    Treatment Type: Treatment  PT/PTA: PT     PTA Visit Number: 1     12/23/2022

## 2022-12-23 NOTE — ASSESSMENT & PLAN NOTE
Dr Garza saw patient today for EMG. He will talk with Dr Donahue regarding patient's results. Dr Donahue is unavailable today. Per Dr Garza, this is not urgent but should let patient know that this conversation and follow up will not occur today.     Called the patient and left a message relaying this to him. Asked him to call if he had questions but to otherwise expect a follow up call after tomorrow.   Stroke RF   . 8, goal <70  - Continue atorvastatin 40mg daily

## 2022-12-23 NOTE — PLAN OF CARE
12/23/22 1302   Post-Acute Status   Post-Acute Authorization Placement   Post-Acute Placement Status Referrals Sent   Coverage Medicaid   Discharge Plan   Discharge Plan A Rehab   Discharge Plan B Skilled Nursing Facility     ABDIEL spoke with sister Salud via phone.  She would like ABDIEL to reach out to [facility] again and also [facility].  ABDIEL did this and [facility] has declined and [facility] does not have beds.  The next choice is [facility] and ABDIEL reached out to them and they are submitting auth.  They will try and obtain today.  ABDIEL will continue to follow.     Alba Lai, CIARAN  [facility]  [phone]

## 2022-12-23 NOTE — PT/OT/SLP PROGRESS
"Speech Language Pathology Treatment    Patient Name:  Spencer Burton Jr.   MRN:  [id number]  Admitting Diagnosis: Embolic stroke involving left middle cerebral artery    Recommendations:                 General Recommendations:  Dysphagia therapy and Speech/language therapy  Diet recommendations:  NPO, Liquid Diet Level: NPO   Aspiration Precautions: Continue alternate means of nutrition, Frequent oral care, and Strict aspiration precautions   General Precautions: Standard, aphasia, aspiration, fall, NPO  Communication strategies:  provide increased time to answer, go to room if call light pushed, and Pt presents with aphasia and dysarthria .    Subjective     "Don't worry about it"     Pain/Comfort:  Pain Rating 1: 0/10  Pain Rating Post-Intervention 1: 0/10    Respiratory Status: Room air    Objective:     Has the patient been evaluated by SLP for swallowing?   Yes  Keep patient NPO? Yes     Pt seen bedside, mod cues to rouse and maintain alertness initially though improved as session progressed.  Continued careful listening required 2/2 significant dysarthria.  Speech strategies reviewed and demonstrated though carryover not evident.  Some pooled secretions noted in R buccal cavity.  Oral suction utilized to clear.  White coating noted to lingual surface.  Pt denied oral pain.  1 step commands followed x4/4 given max cues and model.  Auto speech tasks of counting 1-5 and stating cyn completed with 100% accy with cues to maintain attn to task.  He was oriented to self and place given field of 2.  No additional responses to orientation prompts despite max cues.  HOB raised upright and pt presented with thin liquids via tsp x3 and puree via 1/2 tsp x5.  Oral transit/swallow initiation was delayed up to 10 seconds with verbal cue to elicit swallow on final presentation.  Cough noted x1 with thin and x1 with puree.  Some mild pocketing noted on R with puree.  Oral suction utilized to clear.  Education provided re: ongoing " SLP POC, cont npo, aspiration risk, s/s aspiration, and speech strategies. Education to be ongoing.     Assessment:     Spencer Burton Jr. is a 52 y.o. male with an SLP diagnosis of Aphasia, Dysphagia, Dysarthria, Cognitive-Linguistic Impairment, and Visio-Spatial Impairment.     Goals:   Multidisciplinary Problems       SLP Goals          Problem: SLP    Goal Priority Disciplines Outcome   SLP Goal     SLP    Description: Speech Language Pathology Goals  Updated goals expected to be met by 12/29:  1. Pt will participate in ongoing swallowing assessment to determine if/when safe for PO intake.   2. Pt will respond to complex yes/no q's with 50% accuracy given max cues.   3. Pt will model 1-step commands on 1 out of 5 trials given max cues.   4. Pt will complete automatic speech tasks with 80% accuracy given mod cues.   5. Pt will name objects with 2 out of 5 trials given max cues.       Updated goals expected to be met by 12/20:  1. Pt will participate in ongoing swallowing assessment to determine if/when safe for PO intake. ongoing  2. Pt will respond to complex yes/no q's with 50% accuracy given max cues. ongoing  3. Pt will model 1-step commands on 1 out of 5 trials given max cues. ongoing  4. Pt will complete automatic speech tasks with 60% accuracy given max cues. Goal met  5. Pt will name objects with 2 out of 5 trials given max cues. ongoing    Goals expected to be met by 12/16:  1. Pt will participate in ongoing swallowing assessment to determine if/when safe for PO intake.   2. Pt will participate in speech/language/cognitive evaluation when feasible. Initiated 12/13                               Plan:     Patient to be seen:  4 x/week   Plan of Care expires:  01/09/22  Plan of Care reviewed with:  patient   SLP Follow-Up:  Yes       Discharge recommendations:  rehabilitation facility   Barriers to Discharge:  Level of Skilled Assistance Needed      Time Tracking:     SLP Treatment Date:   12/23/22  Speech  Start Time:  0957  Speech Stop Time:  1013     Speech Total Time (min):  16 min    Billable Minutes: Speech Therapy Individual 8 and Treatment Swallowing Dysfunction 8    12/23/2022

## 2022-12-23 NOTE — ASSESSMENT & PLAN NOTE
51 yo M with CHF, COPD, HTN, HLD, DM admitted for acute LMCA stroke. He initially presented to OSH with RSW, OOW for thrombolytics. MRI at that time demonstrated bilateral anterior and posterior circulation strokes concerning for embolic etiology. He was admitted to OSH and RAGHAV revealed LV thrombus, he was not started on AC. During hospital stay noted to be lethargic and aphasic/dysarthric, then on 12/18 had acute neuro change with RSW and global aphasia. NIHSS increased from 6 to 23. CTA demonstrated a L M2 occlusion and patient was transferred to [facility] for possible intervention and higher level of care. He was not taken to IR due to poor ASPECTS and was admitted to ICU for close monitoring and hemicrani watch. Suspect etiology likely cardioembolic given LV thrombus.    NAEO, neuro exam stable. Continues to be hypernatremic although decreased from yesterday, continue FWF q4h and will reach out to nutrition for any updated tube feeding recs. Therapy continues to recommend IPR, dispo planning for IPR but if denied by rehab facilities will pursue SNF.      Antithrombotics for secondary stroke prevention: Eliquis 5mg BID    Statins for secondary stroke prevention and hyperlipidemia, if present:   Statins: Atorvastatin- 40 mg daily    Aggressive risk factor modification: HTN, DM, HLD, Diet, Exercise, LV thrombus     Rehab efforts: IPR    Diagnostics ordered/pending: None     VTE prophylaxis: Mechanical prophylaxis: Place SCDs  None: Reason for No Pharmacological VTE Prophylaxis: Currently on anticoagulation     BP parameters: SBP <180

## 2022-12-23 NOTE — ASSESSMENT & PLAN NOTE
Stroke risk factor  RAGHAV with EF 38% and segmental LV wall motion abnormalities  - Strict I/Os  - Continue lasix 40mg and coreg 12.5mg BID

## 2022-12-23 NOTE — ASSESSMENT & PLAN NOTE
Cr and eGFR stable  - Continue to monitor renal function with daily labs  - Avoid nephrotoxins and renally dose medications when appropiate   - Strict I/Os and routine VS to monitor for signs of decreased renal perfusion (hypovolemia, hypotension, sepsis) and/or obstruction causing worsening GFR

## 2022-12-24 LAB
ALBUMIN SERPL BCP-MCNC: 2.5 G/DL (ref 3.5–5.2)
ALP SERPL-CCNC: 114 U/L (ref 55–135)
ALT SERPL W/O P-5'-P-CCNC: 56 U/L (ref 10–44)
ANION GAP SERPL CALC-SCNC: 8 MMOL/L (ref 8–16)
AST SERPL-CCNC: 57 U/L (ref 10–40)
BASOPHILS # BLD AUTO: 0.05 K/UL (ref 0–0.2)
BASOPHILS NFR BLD: 0.5 % (ref 0–1.9)
BILIRUB SERPL-MCNC: 0.7 MG/DL (ref 0.1–1)
BUN SERPL-MCNC: 57 MG/DL (ref 6–20)
CALCIUM SERPL-MCNC: 9.5 MG/DL (ref 8.7–10.5)
CHLORIDE SERPL-SCNC: 108 MMOL/L (ref 95–110)
CO2 SERPL-SCNC: 31 MMOL/L (ref 23–29)
CREAT SERPL-MCNC: 4.2 MG/DL (ref 0.5–1.4)
DIFFERENTIAL METHOD: ABNORMAL
EOSINOPHIL # BLD AUTO: 0.2 K/UL (ref 0–0.5)
EOSINOPHIL NFR BLD: 1.4 % (ref 0–8)
ERYTHROCYTE [DISTWIDTH] IN BLOOD BY AUTOMATED COUNT: 15.2 % (ref 11.5–14.5)
EST. GFR  (NO RACE VARIABLE): 16.4 ML/MIN/1.73 M^2
GLUCOSE SERPL-MCNC: 184 MG/DL (ref 70–110)
HCT VFR BLD AUTO: 38.8 % (ref 40–54)
HGB BLD-MCNC: 11.8 G/DL (ref 14–18)
IMM GRANULOCYTES # BLD AUTO: 0.03 K/UL (ref 0–0.04)
IMM GRANULOCYTES NFR BLD AUTO: 0.3 % (ref 0–0.5)
LYMPHOCYTES # BLD AUTO: 1.1 K/UL (ref 1–4.8)
LYMPHOCYTES NFR BLD: 10.5 % (ref 18–48)
MAGNESIUM SERPL-MCNC: 2.5 MG/DL (ref 1.6–2.6)
MCH RBC QN AUTO: 26.2 PG (ref 27–31)
MCHC RBC AUTO-ENTMCNC: 30.4 G/DL (ref 32–36)
MCV RBC AUTO: 86 FL (ref 82–98)
MONOCYTES # BLD AUTO: 0.5 K/UL (ref 0.3–1)
MONOCYTES NFR BLD: 5 % (ref 4–15)
NEUTROPHILS # BLD AUTO: 8.8 K/UL (ref 1.8–7.7)
NEUTROPHILS NFR BLD: 82.3 % (ref 38–73)
NRBC BLD-RTO: 0 /100 WBC
PHOSPHATE SERPL-MCNC: 4.1 MG/DL (ref 2.7–4.5)
PLATELET # BLD AUTO: 233 K/UL (ref 150–450)
PMV BLD AUTO: 13.5 FL (ref 9.2–12.9)
POCT GLUCOSE: 107 MG/DL (ref 70–110)
POCT GLUCOSE: 114 MG/DL (ref 70–110)
POCT GLUCOSE: 124 MG/DL (ref 70–110)
POCT GLUCOSE: 179 MG/DL (ref 70–110)
POCT GLUCOSE: 180 MG/DL (ref 70–110)
POCT GLUCOSE: 229 MG/DL (ref 70–110)
POTASSIUM SERPL-SCNC: 3.7 MMOL/L (ref 3.5–5.1)
PROT SERPL-MCNC: 6.9 G/DL (ref 6–8.4)
RBC # BLD AUTO: 4.5 M/UL (ref 4.6–6.2)
SODIUM SERPL-SCNC: 147 MMOL/L (ref 136–145)
WBC # BLD AUTO: 10.67 K/UL (ref 3.9–12.7)

## 2022-12-24 PROCEDURE — 11000001 HC ACUTE MED/SURG PRIVATE ROOM

## 2022-12-24 PROCEDURE — 85025 COMPLETE CBC W/AUTO DIFF WBC: CPT | Performed by: PHYSICIAN ASSISTANT

## 2022-12-24 PROCEDURE — 99223 1ST HOSP IP/OBS HIGH 75: CPT | Mod: ,,, | Performed by: PSYCHIATRY & NEUROLOGY

## 2022-12-24 PROCEDURE — 63600175 PHARM REV CODE 636 W HCPCS

## 2022-12-24 PROCEDURE — 25000003 PHARM REV CODE 250: Performed by: PHYSICIAN ASSISTANT

## 2022-12-24 PROCEDURE — 99223 PR INITIAL HOSPITAL CARE,LEVL III: ICD-10-PCS | Mod: ,,, | Performed by: PSYCHIATRY & NEUROLOGY

## 2022-12-24 PROCEDURE — 80053 COMPREHEN METABOLIC PANEL: CPT | Performed by: NURSE PRACTITIONER

## 2022-12-24 PROCEDURE — 84100 ASSAY OF PHOSPHORUS: CPT | Performed by: NURSE PRACTITIONER

## 2022-12-24 PROCEDURE — 83735 ASSAY OF MAGNESIUM: CPT | Performed by: NURSE PRACTITIONER

## 2022-12-24 PROCEDURE — 36415 COLL VENOUS BLD VENIPUNCTURE: CPT | Performed by: NURSE PRACTITIONER

## 2022-12-24 RX ORDER — LABETALOL HCL 20 MG/4 ML
10 SYRINGE (ML) INTRAVENOUS EVERY 6 HOURS PRN
Status: DISCONTINUED | OUTPATIENT
Start: 2022-12-24 | End: 2022-12-28 | Stop reason: HOSPADM

## 2022-12-24 RX ADMIN — INSULIN ASPART 9 UNITS: 100 INJECTION, SOLUTION INTRAVENOUS; SUBCUTANEOUS at 05:12

## 2022-12-24 RX ADMIN — CARVEDILOL 12.5 MG: 12.5 TABLET, FILM COATED ORAL at 09:12

## 2022-12-24 RX ADMIN — FUROSEMIDE 40 MG: 40 TABLET ORAL at 09:12

## 2022-12-24 RX ADMIN — INSULIN ASPART 9 UNITS: 100 INJECTION, SOLUTION INTRAVENOUS; SUBCUTANEOUS at 09:12

## 2022-12-24 RX ADMIN — SODIUM CHLORIDE, POTASSIUM CHLORIDE, SODIUM LACTATE AND CALCIUM CHLORIDE 1000 ML: 600; 310; 30; 20 INJECTION, SOLUTION INTRAVENOUS at 02:12

## 2022-12-24 RX ADMIN — AMLODIPINE BESYLATE 10 MG: 10 TABLET ORAL at 09:12

## 2022-12-24 RX ADMIN — APIXABAN 5 MG: 5 TABLET, FILM COATED ORAL at 08:12

## 2022-12-24 RX ADMIN — INSULIN ASPART 9 UNITS: 100 INJECTION, SOLUTION INTRAVENOUS; SUBCUTANEOUS at 12:12

## 2022-12-24 RX ADMIN — CARVEDILOL 12.5 MG: 12.5 TABLET, FILM COATED ORAL at 08:12

## 2022-12-24 RX ADMIN — APIXABAN 5 MG: 5 TABLET, FILM COATED ORAL at 09:12

## 2022-12-24 RX ADMIN — INSULIN ASPART 9 UNITS: 100 INJECTION, SOLUTION INTRAVENOUS; SUBCUTANEOUS at 04:12

## 2022-12-24 RX ADMIN — ATORVASTATIN CALCIUM 40 MG: 40 TABLET, FILM COATED ORAL at 09:12

## 2022-12-24 RX ADMIN — INSULIN ASPART 4 UNITS: 100 INJECTION, SOLUTION INTRAVENOUS; SUBCUTANEOUS at 09:12

## 2022-12-24 RX ADMIN — ESCITALOPRAM OXALATE 20 MG: 20 TABLET ORAL at 09:12

## 2022-12-24 RX ADMIN — INSULIN ASPART 2 UNITS: 100 INJECTION, SOLUTION INTRAVENOUS; SUBCUTANEOUS at 12:12

## 2022-12-24 NOTE — PLAN OF CARE
Problem: Adult Inpatient Plan of Care  Goal: Plan of Care Review  Outcome: Ongoing, Progressing  Goal: Absence of Hospital-Acquired Illness or Injury  Outcome: Ongoing, Progressing  Goal: Optimal Comfort and Wellbeing  Outcome: Ongoing, Progressing  Goal: Readiness for Transition of Care  Outcome: Ongoing, Progressing     Problem: Altered Behavior (Delirium)  Goal: Improved Behavioral Control  Outcome: Ongoing, Progressing     Problem: Attention and Thought Clarity Impairment (Delirium)  Goal: Improved Attention and Thought Clarity  Outcome: Ongoing, Progressing     Problem: Adjustment to Illness (Stroke, Ischemic/Transient Ischemic Attack)  Goal: Optimal Coping  Outcome: Ongoing, Progressing     Problem: Bowel Elimination Impaired (Stroke, Ischemic/Transient Ischemic Attack)  Goal: Effective Bowel Elimination  Outcome: Ongoing, Progressing     Problem: Cerebral Tissue Perfusion (Stroke, Ischemic/Transient Ischemic Attack)  Goal: Optimal Cerebral Tissue Perfusion  Outcome: Ongoing, Progressing     Problem: Cognitive Impairment (Stroke, Ischemic/Transient Ischemic Attack)  Goal: Optimal Cognitive Function  Outcome: Ongoing, Progressing     Problem: Communication Impairment (Stroke, Ischemic/Transient Ischemic Attack)  Goal: Improved Communication Skills  Outcome: Ongoing, Progressing     Problem: Functional Ability Impaired (Stroke, Ischemic/Transient Ischemic Attack)  Goal: Optimal Functional Ability  Outcome: Ongoing, Progressing     Problem: Sensorimotor Impairment (Stroke, Ischemic/Transient Ischemic Attack)  Goal: Improved Sensorimotor Function  Outcome: Ongoing, Progressing     Problem: Swallowing Impairment (Stroke, Ischemic/Transient Ischemic Attack)  Goal: Optimal Eating and Swallowing without Aspiration  Outcome: Ongoing, Progressing     Problem: Urinary Elimination Impaired (Stroke, Ischemic/Transient Ischemic Attack)  Goal: Effective Urinary Elimination  Outcome: Ongoing, Progressing     Problem:  Diabetes Comorbidity  Goal: Blood Glucose Level Within Targeted Range  Outcome: Ongoing, Progressing     Problem: Infection  Goal: Absence of Infection Signs and Symptoms  Outcome: Ongoing, Progressing     Problem: Skin Injury Risk Increased  Goal: Skin Health and Integrity  Outcome: Ongoing, Progressing     Problem: Fluid and Electrolyte Imbalance (Acute Kidney Injury/Impairment)  Goal: Fluid and Electrolyte Balance  Outcome: Ongoing, Progressing     Problem: Oral Intake Inadequate (Acute Kidney Injury/Impairment)  Goal: Optimal Nutrition Intake  Outcome: Ongoing, Progressing     Problem: Renal Function Impairment (Acute Kidney Injury/Impairment)  Goal: Effective Renal Function  Outcome: Ongoing, Progressing

## 2022-12-24 NOTE — CONSULTS
RD consulted for change in TF recommendations d/t altered lab values.    Modifications: [place] 1.4 @ 50 ml/hr to provide 1680 kcals, 74 g of protein, and 74 g of protein, and 864 ml of fluid.    If renal labs still not corrected please modify to:     Glucerna 1.5 to 45 mL/hr- provides 1620 kcals, 89 g pro, and 820 mL free water.

## 2022-12-25 PROBLEM — F50.9 EATING DISORDER, UNSPECIFIED: Status: ACTIVE | Noted: 2022-12-25

## 2022-12-25 LAB
POCT GLUCOSE: 111 MG/DL (ref 70–110)
POCT GLUCOSE: 144 MG/DL (ref 70–110)
POCT GLUCOSE: 147 MG/DL (ref 70–110)
POCT GLUCOSE: 150 MG/DL (ref 70–110)
POCT GLUCOSE: 179 MG/DL (ref 70–110)
POCT GLUCOSE: 183 MG/DL (ref 70–110)
POCT GLUCOSE: 88 MG/DL (ref 70–110)
POCT GLUCOSE: 97 MG/DL (ref 70–110)

## 2022-12-25 PROCEDURE — 94761 N-INVAS EAR/PLS OXIMETRY MLT: CPT

## 2022-12-25 PROCEDURE — 36415 COLL VENOUS BLD VENIPUNCTURE: CPT | Performed by: NURSE PRACTITIONER

## 2022-12-25 PROCEDURE — 11000001 HC ACUTE MED/SURG PRIVATE ROOM

## 2022-12-25 PROCEDURE — 25000003 PHARM REV CODE 250

## 2022-12-25 PROCEDURE — 25000003 PHARM REV CODE 250: Performed by: PHYSICIAN ASSISTANT

## 2022-12-25 PROCEDURE — 99233 PR SUBSEQUENT HOSPITAL CARE,LEVL III: ICD-10-PCS | Mod: ,,, | Performed by: PSYCHIATRY & NEUROLOGY

## 2022-12-25 PROCEDURE — 99233 SBSQ HOSP IP/OBS HIGH 50: CPT | Mod: ,,, | Performed by: PSYCHIATRY & NEUROLOGY

## 2022-12-25 RX ORDER — OLANZAPINE 2.5 MG/1
5 TABLET ORAL NIGHTLY
Status: DISCONTINUED | OUTPATIENT
Start: 2022-12-25 | End: 2022-12-26

## 2022-12-25 RX ADMIN — APIXABAN 5 MG: 5 TABLET, FILM COATED ORAL at 09:12

## 2022-12-25 RX ADMIN — FUROSEMIDE 40 MG: 40 TABLET ORAL at 09:12

## 2022-12-25 RX ADMIN — CARVEDILOL 12.5 MG: 12.5 TABLET, FILM COATED ORAL at 09:12

## 2022-12-25 RX ADMIN — INSULIN ASPART 9 UNITS: 100 INJECTION, SOLUTION INTRAVENOUS; SUBCUTANEOUS at 04:12

## 2022-12-25 RX ADMIN — INSULIN ASPART 9 UNITS: 100 INJECTION, SOLUTION INTRAVENOUS; SUBCUTANEOUS at 09:12

## 2022-12-25 RX ADMIN — ATORVASTATIN CALCIUM 40 MG: 40 TABLET, FILM COATED ORAL at 09:12

## 2022-12-25 RX ADMIN — INSULIN ASPART 9 UNITS: 100 INJECTION, SOLUTION INTRAVENOUS; SUBCUTANEOUS at 01:12

## 2022-12-25 RX ADMIN — OLANZAPINE 5 MG: 2.5 TABLET, FILM COATED ORAL at 09:12

## 2022-12-25 RX ADMIN — ESCITALOPRAM OXALATE 20 MG: 20 TABLET ORAL at 09:12

## 2022-12-25 RX ADMIN — INSULIN ASPART 2 UNITS: 100 INJECTION, SOLUTION INTRAVENOUS; SUBCUTANEOUS at 01:12

## 2022-12-25 RX ADMIN — AMLODIPINE BESYLATE 10 MG: 10 TABLET ORAL at 09:12

## 2022-12-25 RX ADMIN — INSULIN ASPART 2 UNITS: 100 INJECTION, SOLUTION INTRAVENOUS; SUBCUTANEOUS at 09:12

## 2022-12-25 RX ADMIN — INSULIN ASPART 9 UNITS: 100 INJECTION, SOLUTION INTRAVENOUS; SUBCUTANEOUS at 12:12

## 2022-12-25 NOTE — PLAN OF CARE
Problem: Adult Inpatient Plan of Care  Goal: Plan of Care Review  Outcome: Ongoing, Progressing  Goal: Absence of Hospital-Acquired Illness or Injury  Outcome: Ongoing, Progressing  Goal: Optimal Comfort and Wellbeing  Outcome: Ongoing, Progressing  Goal: Readiness for Transition of Care  Outcome: Ongoing, Progressing     Problem: Adjustment to Illness (Delirium)  Goal: Optimal Coping  Outcome: Ongoing, Progressing     Problem: Bowel Elimination Impaired (Stroke, Ischemic/Transient Ischemic Attack)  Goal: Effective Bowel Elimination  Outcome: Ongoing, Progressing     Problem: Cerebral Tissue Perfusion (Stroke, Ischemic/Transient Ischemic Attack)  Goal: Optimal Cerebral Tissue Perfusion  Outcome: Ongoing, Progressing     Problem: Diabetes Comorbidity  Goal: Blood Glucose Level Within Targeted Range  Outcome: Ongoing, Progressing     Problem: Infection  Goal: Absence of Infection Signs and Symptoms  Outcome: Ongoing, Progressing     Problem: Fall Injury Risk  Goal: Absence of Fall and Fall-Related Injury  Outcome: Ongoing, Progressing     Problem: Skin Injury Risk Increased  Goal: Skin Health and Integrity  Outcome: Ongoing, Progressing     Problem: Oral Intake Inadequate (Acute Kidney Injury/Impairment)  Goal: Optimal Nutrition Intake  Outcome: Ongoing, Progressing

## 2022-12-25 NOTE — SUBJECTIVE & OBJECTIVE
Neurologic Chief Complaint: L MCA stroke    Subjective:       HPI, Past Medical, Family, and Social History remains the same as documented in the initial encounter.     Review of Systems   Unable to perform ROS: Other (Aphasia)     Scheduled Meds:   amLODIPine  10 mg Per G Tube Daily    apixaban  5 mg Per G Tube BID    atorvastatin  40 mg Per G Tube Daily    carvediloL  12.5 mg Per G Tube BID    EScitalopram oxalate  20 mg Per G Tube Daily    furosemide  40 mg Per G Tube Daily    insulin aspart U-100  9 Units Subcutaneous 6 times per day    OLANZapine  5 mg Per G Tube QHS     Continuous Infusions:   dextrose 10 % in water (D10W)       PRN Meds:dextrose 10 % in water (D10W), dextrose 10%, dextrose 10%, glucagon (human recombinant), glucagon (human recombinant), insulin aspart U-100, labetaloL, ondansetron, sodium chloride 0.9%    Objective:     Vital Signs (Most Recent):  Temp: 97.6 °F (36.4 °C) (12/25/22 1537)  Pulse: 78 (12/25/22 1537)  Resp: 17 (12/25/22 1537)  BP: 126/87 (12/25/22 1537)  SpO2: 95 % (12/25/22 1537)  BP Location: Right arm    Vital Signs Range (Last 24H):  Temp:  [97.4 °F (36.3 °C)-98.4 °F (36.9 °C)]   Pulse:  [76-92]   Resp:  [16-18]   BP: (122-146)/(85-99)   SpO2:  [92 %-98 %]   BP Location: Right arm    Physical Exam  Vitals and nursing note reviewed.   Constitutional:       General: He is not in acute distress.  HENT:      Head: Normocephalic.      Nose: No rhinorrhea.   Eyes:      General: Visual field deficit present. No scleral icterus.     Conjunctiva/sclera: Conjunctivae normal.   Cardiovascular:      Rate and Rhythm: Normal rate.   Pulmonary:      Effort: No respiratory distress.   Abdominal:      General: There is no distension.      Comments: PEG in place, dressing CDI   Musculoskeletal:         General: No deformity.   Skin:     General: Skin is warm and dry.   Neurological:      Mental Status: He is alert.      Cranial Nerves: Facial asymmetry present.      Motor: Weakness present.       Comments: Withdraws on R side to noxious stimuli  Moves left side spontaneously  Severe aphasia   Psychiatric:         Cognition and Memory: Cognition is impaired.         Judgment: Judgment is impulsive and inappropriate.      Comments: Unable to assess mood, thought, insight or judgment 2/2 severe aphasia/level of consciousness       Neurological Exam:   LOC: alert  Attention Span: poor  Language: mixed aphasia, intermittently follows some simple commands  Articulation: Unable to assess due to aphasia  Orientation: Unable to assess due to aphasia  EOM (CN III, IV, VI):  L gaze preference, tracks to midline  Motor: L side moves spontaneously and antigravity, R side withdraws from pain   Sensation: Withdraws right side to noxious stimuli      Laboratory:  CMP:   No results for input(s): GLUCOSE, CALCIUM, ALBUMIN, PROT, NA, K, CO2, CL, BUN, CREATININE, ALKPHOS, ALT, AST, BILITOT in the last 24 hours.    BMP:   Recent Labs   Lab 12/24/22  0447   *   K 3.7      CO2 31*   BUN 57*   CREATININE 4.2*   CALCIUM 9.5       CBC:   Recent Labs   Lab 12/24/22  0447   WBC 10.67   RBC 4.50*   HGB 11.8*   HCT 38.8*      MCV 86   MCH 26.2*   MCHC 30.4*       Lipid Panel:   No results for input(s): CHOL, LDLCALC, HDL, TRIG in the last 168 hours.    Coagulation:   Recent Labs   Lab 12/21/22  1133   APTT 43.9*       Hgb A1C:   No results for input(s): HGBA1C in the last 168 hours.    TSH:   No results for input(s): TSH in the last 168 hours.      Diagnostic Results     Brain/Vessel Imaging   CT 12/13/22  -Evolving large left MCA infarct with stable mass effect and petechial hemorrhagic conversion.   -No definite new hemorrhage or significant new abnormal parenchymal attenuation   -Separate areas of infarction involving the right parietal lobe and left cerebellum not well seen with CT technique.   -Clinical correlation and continued follow-up advised     CT 12/11/22   Grossly stable evolving large left MCA  distribution infarct and smaller infarct in the right frontal lobe with possible trace hemorrhagic conversion. Persistent mass effect with sulcal effacement and slight mass effect on the left lateral ventricle. No new or worsening intracranial hemorrhage and no worsening of minimal rightward midline shift.    CTH 12/9/22   Evolving no enlarged left MCA territory infarction with hypoattenuation and sulcal effacement. Intermediate density along the left basal ganglia compatible with known hemorrhagic conversion. Smaller area of infarction in the right cerebral hemisphere not well seen. Evolving mass effect and edema associated with the left cerebral hemispheric infarction with slight compression of the left lateral ventricle and trace 1 mm of rightward midline shift. No evidence for hydrocephalus    MRI Brain WO Contrast 12/8/22    Exam limited by patient motion artifact. Evolving left MCA territory infarct.  Additional foci of diffusion restriction in the left cerebral consistent with recent infarct.  Interval increase susceptibility artifact in the areas of diffusion restriction consistent with hemorrhagic conversion of recent infarcts.  No hydrocephalus or significant midline shift.      CTH 12/8/22 16:29   -Evolving large left MCA territory infarction similar to recent CT though increased in size compared to prior MRI concerning for evolving recent to subacute and acute infarcts.  Localized mass effect without significant midline shift or hydrocephalus.  There is evolving recent to subacute age right posterior frontal infarction similar to prior MRI allowing for differences in technique  -There is subtle hyperdensity along the left basal ganglia posteriorly and along the right posterior frontal cortex which may represent enhancing subacute age components of infarction with petechial hemorrhage felt less likely but cannot be entirely excluded with limitation secondary to recirculation contrast related to recent CTA  performed at outside institution.  -Evolving mass effect most pronounced associated with the left MCA territory infarction with sulcal effacement without significant midline shift or hydrocephalus.    CTH 12/8/22 12:11 (CTA H/N)   1. Left diencephalic hemisphere demonstrates evidence of an acute infarct of left basal ganglia and left caudate lobe.  2. Occlusion of the posterior division of the left M2 branch at the takeoff of the M1 vessel with patency involving the anterior division of the left M2 segment.    CTH 12/7/22   1.  Moderate size subacute infarction left anterior basal ganglia and anterior left internal capsule appears mildly larger and better well defined compared to CT from 12/3/2020. There is currently greater mass effect on the anterior horn of the left lateral ventricle. There is no associated bleed or midline shift.  2.  Recent MRI demonstrated additional punctate acute infarctions in the left frontal lobe and a mild size acute infarction right centrum semiovale. These are less obvious on CT. No associated bleed.  3.  Additional chronic periventricular white matter hypodensities.    MRI Brain WO contrast 12/3/22   Multifocal areas of restricted diffusion are felt to reflect acute infarcts.    CTH 12/3/22   Loss of gray-white matter distinction in involving the left basal ganglia could reflect changes of chronic small vessel ischemic disease versus cytotoxic edema from acute infarct.     Carotid US Bilateral 12/3/22   1. Carotid intimal hyperplasia, with no findings of hemodynamically significant carotid arterial stenosis or vascular occlusion.  2. Patent vertebral arteries with antegrade flow bilaterally.      Cardiac Imaging   RAG[facility] 12/4/22   The left ventricle is small with moderately decreased systolic function.  The estimated ejection fraction is 38%.  Left ventricular diastolic dysfunction.  There are segmental left ventricular wall motion abnormalities.  No spontaneous echo contrast. A moderate  protruding layered left ventricular thrombus is present. The thrombus is fixed and located in the apex.  Normal right ventricular size.  Mild left atrial enlargement.  Mild right atrial enlargement.  Mild aortic regurgitation.  No interatrial septal defect present.  Normal appearing left atrial appendage. No thrombus is present in the appendage. SB occluder is absent. Abnormal appendage velocities.  Mild mitral regurgitation.  Agitated saline contrast study did not show any right to left shunt

## 2022-12-25 NOTE — PROGRESS NOTES
[facility] - Neurosurgery ([facility])  Vascular Neurology  Comprehensive Stroke Center  Progress Note    Assessment/Plan:     * Embolic stroke involving left middle cerebral artery  53 yo M with CHF, COPD, HTN, HLD, DM admitted for acute LMCA stroke. He initially presented to OSH with RSW, OOW for thrombolytics. MRI at that time demonstrated bilateral anterior and posterior circulation strokes concerning for embolic etiology. He was admitted to OSH and RAGHAV revealed LV thrombus, he was not started on AC. During hospital stay noted to be lethargic and aphasic/dysarthric, then on 12/18 had acute neuro change with RSW and global aphasia. NIHSS increased from 6 to 23. CTA demonstrated a L M2 occlusion and patient was transferred to [facility] for possible intervention and higher level of care. He was not taken to IR due to poor ASPECTS and was admitted to ICU for close monitoring and hemicrani watch. Suspect etiology likely cardioembolic given LV thrombus.     Therapy continues to recommend IPR, dispo planning for IPR but if denied by rehab facilities will pursue SNF.      Antithrombotics for secondary stroke prevention: Eliquis 5mg BID    Statins for secondary stroke prevention and hyperlipidemia, if present:   Statins: Atorvastatin- 40 mg daily    Aggressive risk factor modification: HTN, DM, HLD, Diet, Exercise, LV thrombus     Rehab efforts: IPR    Diagnostics ordered/pending: None     VTE prophylaxis: Mechanical prophylaxis: Place SCDs  None: Reason for No Pharmacological VTE Prophylaxis: Currently on anticoagulation     BP parameters: SBP <180      Hypernatremia  Na 147, given 500cc LR  Continue 250cc FWF q4hr  Nutrition consulted for updated recs    Oropharyngeal dysphagia  Due to stroke  SLP eval and treat  PEG placed by IR, tolerating tube feeds at goal    JR on CKD  See CKD    Hyperlipidemia associated with type 2 diabetes mellitus  Stroke RF   . 8, goal <70  - Continue atorvastatin 40mg daily    CHF (congestive  heart failure)  Stroke risk factor  RAGHAV with EF 38% and segmental LV wall motion abnormalities  - Strict I/Os  - Continue lasix 40mg and coreg 12.5mg BID    Debility  Due to stroke  Aggressive PT/OT/SLP  Dispo recs for IPR, pending placement    Cytotoxic brain edema  -Noted on imaging in the area of the L MCA territory with associated mass effect  -NSGY consulted for hemicrani watch due to malignant MCA, no acute intervention recommended as serial imaging remains stable  -Will continue to monitor with frequent a4h neuro checks while inpatient, as this could indicate worsening/expansion of edema  -Obtain Stat CTH for any acute neuro changes and notify stroke team immediately    LV (left ventricular) mural thrombus  Stroke RF  Initially started on heparin gtt due for AC until PO route established, s/p PEG on 12/20  Heparin gtt transitioned to Eliquis 5mg BID on 12/21  -Continue AC with eliquis    Stage 4 chronic kidney disease  Cr and eGFR stable  - Continue to monitor renal function with daily labs  - Avoid nephrotoxins and renally dose medications when appropiate   - Strict I/Os and routine VS to monitor for signs of decreased renal perfusion (hypovolemia, hypotension, sepsis) and/or obstruction causing worsening GFR    Type 2 diabetes mellitus, with long-term current use of insulin  Stroke RF  A1c 9.1, consider nutrition consult and DM education  BG goal while inpatient 140-180  Currently on Aspart 9units q4h + SSI PRN    Essential hypertension  Stroke RF  SBP <180, MAP >65  Continue amlodipine and coreg  PRN labetalol/hydralazine         12/09/2022 Patient with LV thrombus, will need anticoagulation. NGT in place would recommend heparin gtt until patient able to swallow or PEG placed prior to DOAC initiation. Patient tachycardic today with SBP < 210, metoprolol started by primary team. Febrile with elevated white count and procal, currently on Zosyn and Vanc while cx pending. NSGY following for hemicrani watch.  Patient has been discharged from OT per last note, will need new orders. Continue current ICU care.   12/10/2022: Pending stability scan patient is expected to be started on heparin gtt. Continue hemicrani watch in  ICU. Family at bedside.   12/11/2022: Patient started on heparin gtt overnight, he demonstrated great clinical improvement today, he was able to tell me his name, knew he is in [facility]. Followed single step commands. No family at bedside today.   12/12/2022 Patient remains in ICU for sue-crani watch. NSGY following. On salt tabs for goal of hypernatremia.Will remain in ICU another night. VN to re-evaluate for stepdown tomorrow. Patient is on ceftriaxone for acute cystitis. aPTT today 42.9. CTH following therapeutic heparin levels stable. More alert today. IPR recommendations; SLP with minced and moist diet with thin liquids.   12/13/2022 Remains in NCC, neuro exam unchanged and limited by drowsiness. Overnight had short run of VT/SVT that resolved without any interventions. BPs not consistently at goal of <200, had max BP today of 214/140. Currently NPO with NGT in place and anticipate patient will need PEG placement, SLP recommending ice chips sparingly for pleasure. Anticipate step down tomorrow once BP is consistently at goal  12/14/2022 Pending SD to NPU. Continue to watch BP closely, SBP<200. Patient will likely need a PEG.  12/15/2022. Pending SD to NPU. Poor exam, patient very lethargic, WD on the right.   12/16/2022 Patient stepped down overnight to NPU. Exam stable. Re-evaluated by speech this afternoon, still recommending NPO. Will need to discuss nutrition going forward (I.e. need for PEG). Recommendations for IPR.   12/17/22 Exam stable, elevated sodium, free water boluses added, metoprolol increased for elevated BP & HR.  IR consult placed for G tube placement after discussing with patient's family via phone.  12/18/22 patient displaced NG tube overnight, adjusted by nursing. KURALF reviewed- BG  ok to use. Water boluses added yesterday for hypernatremia, Na+ improved to 149. -184, Lopressor discontinued and Coreg ordered for better BP control. Plan for PEG placement on 12/19 .  12/19/2022 Patient remains in NPU, neuro exam poor but stable. Going for PEG today. Will resume free water flushes following procedure given Na+ 150 today. SBP improved today. Consider starting ACE/ARB once PEG in place and safe to use for meds. Dispo recommendations for IPR.   12/20/2022 NAEO, neuro exam stable. Going for PEG today with IR. Na 150, will resume FWF after procedure. Will transition hep gtt to DOAC once PEG placed and tolerating tube feeds. Dispo recs for IPR.  12/21/2022 NAEO, neuro exam stable. S/p PEG yesterday, NGT removed now that tube feedings restarted and tolerating well. NA remains at 150 but unclear if patient had been getting FWF, order updated and nursing communication in. Heparin gtt transitioned to DOAC today. Dispo recs for IPR, anticipate he will be ready for discharge tomorrow.  12/22/2022 NAEO, neuro exam stable. Tolerating tube feeds at goal. Continues to be hypernatremic, discussed FWF q4h with nursing staff. Dispo recs downgraded from IPR to SNF, pending placement.   12/23/2022 NAEO, neuro exam stable. Continues to be hypernatremic although decreased from yesterday, continue FWF q4h and will reach out to nutrition for any updated tube feeding recs. Dispo pending, therapy recs for IPR and placement pending.  12/24/2022 Exam Stable. Change TF to [employer] per nutrition given RFP. 500cc LR via IV. Na downtrending.        STROKE DOCUMENTATION   Acute Stroke Times   Last Known Normal Date: 12/08/22  Last Known Normal Time: 1145  Symptom Onset Date: 12/08/22  Symptom Onset Time: 1145  Stroke Team Called Date: 12/08/22  Stroke Team Called Time: 1615  Stroke Team Arrival Date: 12/08/22  Stroke Team Arrival Time: 1615  CT Interpretation Time: 1615  Thrombolytic Therapy Recommended: No  CTA  Interpretation Time: 1615    NIH Scale:  1a. Level of Consciousness: 0-->Alert, keenly responsive  1b. LOC Questions: 2-->Answers neither question correctly  1c. LOC Commands: 1-->Performs one task correctly  2. Best Gaze: 2-->Forced deviation, or total gaze paresis not overcome by the oculocephalic maneuver  3. Visual: 2-->Complete hemianopia  4. Facial Palsy: 1-->Minor paralysis (flattened nasolabial fold, asymmetry on smiling)  5a. Motor Arm, Left: 0-->No drift, limb holds 90 (or 45) degrees for full 10 secs  5b. Motor Arm, Right: 3-->No effort against gravity, limb falls  6a. Motor Leg, Left: 0-->No drift, leg holds 30 degree position for full 5 secs  6b. Motor Leg, Right: 3-->No effort against gravity, leg falls to bed immediately  7. Limb Ataxia: 0-->Absent  8. Sensory: 1-->Mild-to-moderate sensory loss, patient feels pinprick is less sharp or is dull on the affected side, or there is a loss of superficial pain with pinprick, but patient is aware of being touched  9. Best Language: 2-->Severe aphasia, all communication is through fragmentary expression, great need for inference, questioning, and guessing by the listener. Range of information that can be exchanged is limited, listener carries burden of. . . (see row details)  10. Dysarthria: 2-->Severe dysarthria, patients speech is so slurred as to be unintelligible in the absence of or out of proportion to any dysphasia, or is mute/anarthric  11. Extinction and Inattention (formerly Neglect): 1-->Visual, tactile, auditory, spatial, or personal inattention or extinction to bilateral simultaneous stimulation in one of the sensory modalities  Total (NIH Stroke Scale): 20       Modified Potter Score: 5  Thao Coma Scale:    ABCD2 Score:    [id number] Score:   HAS -BLED Score:   ICH Score:   Hunt & Leblanc Classification:      Hemorrhagic change of an Ischemic Stroke: Does this patient have an ischemic stroke with hemorrhagic changes? No     Neurologic Chief  Complaint: L MCA stroke    Subjective:       HPI, Past Medical, Family, and Social History remains the same as documented in the initial encounter.     Review of Systems   Unable to perform ROS: Other (Aphasia)     Scheduled Meds:   amLODIPine  10 mg Per G Tube Daily    apixaban  5 mg Per G Tube BID    atorvastatin  40 mg Per G Tube Daily    carvediloL  12.5 mg Per G Tube BID    EScitalopram oxalate  20 mg Per G Tube Daily    furosemide  40 mg Per G Tube Daily    insulin aspart U-100  9 Units Subcutaneous 6 times per day     Continuous Infusions:   dextrose 10 % in water (D10W)       PRN Meds:dextrose 10 % in water (D10W), dextrose 10%, dextrose 10%, glucagon (human recombinant), glucagon (human recombinant), insulin aspart U-100, labetaloL, ondansetron, sodium chloride 0.9%    Objective:     Vital Signs (Most Recent):  Temp: 98.3 °F (36.8 °C) (12/24/22 1950)  Pulse: 89 (12/24/22 1953)  Resp: 18 (12/24/22 1950)  BP: (!) 144/99 (12/24/22 1950)  SpO2: 98 % (12/24/22 1950)  BP Location: Right arm    Vital Signs Range (Last 24H):  Temp:  [97.4 °F (36.3 °C)-98.7 °F (37.1 °C)]   Pulse:  [79-89]   Resp:  [16-18]   BP: (124-146)/()   SpO2:  [96 %-98 %]   BP Location: Right arm    Physical Exam  Vitals and nursing note reviewed.   Constitutional:       General: He is not in acute distress.  HENT:      Head: Normocephalic.      Nose: No rhinorrhea.   Eyes:      General: Visual field deficit present. No scleral icterus.     Conjunctiva/sclera: Conjunctivae normal.   Cardiovascular:      Rate and Rhythm: Normal rate.   Pulmonary:      Effort: No respiratory distress.   Abdominal:      General: There is no distension.      Comments: PEG in place, dressing CDI   Musculoskeletal:         General: No deformity.   Skin:     General: Skin is warm and dry.   Neurological:      Mental Status: He is alert.      Cranial Nerves: Facial asymmetry present.      Motor: Weakness present.      Comments: Withdraws on R side to  noxious stimuli  Moves left side spontaneously  Severe aphasia   Psychiatric:      Comments: Unable to assess mood, thought, insight or judgment 2/2 severe aphasia/level of consciousness       Neurological Exam:   LOC: alert  Attention Span: poor  Language: mixed aphasia, intermittently follows some simple commands  Articulation: Unable to assess due to aphasia  Orientation: Unable to assess due to aphasia  EOM (CN III, IV, VI):  L gaze preference, tracks to midline  Motor: L side moves spontaneously and antigravity, R side withdraws from pain   Sensation: Withdraws right side to noxious stimuli      Laboratory:  CMP:   Recent Labs   Lab 12/24/22  0447   CALCIUM 9.5   ALBUMIN 2.5*   PROT 6.9   *   K 3.7   CO2 31*      BUN 57*   CREATININE 4.2*   ALKPHOS 114   ALT 56*   AST 57*   BILITOT 0.7       BMP:   Recent Labs   Lab 12/24/22  0447   *   K 3.7      CO2 31*   BUN 57*   CREATININE 4.2*   CALCIUM 9.5       CBC:   Recent Labs   Lab 12/24/22 0447   WBC 10.67   RBC 4.50*   HGB 11.8*   HCT 38.8*      MCV 86   MCH 26.2*   MCHC 30.4*       Lipid Panel:   No results for input(s): CHOL, LDLCALC, HDL, TRIG in the last 168 hours.    Coagulation:   Recent Labs   Lab 12/21/22  1133   APTT 43.9*       Hgb A1C:   No results for input(s): HGBA1C in the last 168 hours.    TSH:   No results for input(s): TSH in the last 168 hours.      Diagnostic Results     Brain/Vessel Imaging   CTH 12/13/22  -Evolving large left MCA infarct with stable mass effect and petechial hemorrhagic conversion.   -No definite new hemorrhage or significant new abnormal parenchymal attenuation   -Separate areas of infarction involving the right parietal lobe and left cerebellum not well seen with CT technique.   -Clinical correlation and continued follow-up advised     CTH 12/11/22   Grossly stable evolving large left MCA distribution infarct and smaller infarct in the right frontal lobe with possible trace hemorrhagic  conversion. Persistent mass effect with sulcal effacement and slight mass effect on the left lateral ventricle. No new or worsening intracranial hemorrhage and no worsening of minimal rightward midline shift.    CTH 12/9/22   Evolving no enlarged left MCA territory infarction with hypoattenuation and sulcal effacement. Intermediate density along the left basal ganglia compatible with known hemorrhagic conversion. Smaller area of infarction in the right cerebral hemisphere not well seen. Evolving mass effect and edema associated with the left cerebral hemispheric infarction with slight compression of the left lateral ventricle and trace 1 mm of rightward midline shift. No evidence for hydrocephalus    MRI Brain WO Contrast 12/8/22    Exam limited by patient motion artifact. Evolving left MCA territory infarct.  Additional foci of diffusion restriction in the left cerebral consistent with recent infarct.  Interval increase susceptibility artifact in the areas of diffusion restriction consistent with hemorrhagic conversion of recent infarcts.  No hydrocephalus or significant midline shift.      CTH 12/8/22 16:29   -Evolving large left MCA territory infarction similar to recent CT though increased in size compared to prior MRI concerning for evolving recent to subacute and acute infarcts.  Localized mass effect without significant midline shift or hydrocephalus.  There is evolving recent to subacute age right posterior frontal infarction similar to prior MRI allowing for differences in technique  -There is subtle hyperdensity along the left basal ganglia posteriorly and along the right posterior frontal cortex which may represent enhancing subacute age components of infarction with petechial hemorrhage felt less likely but cannot be entirely excluded with limitation secondary to recirculation contrast related to recent CTA performed at outside institution.  -Evolving mass effect most pronounced associated with the left  MCA territory infarction with sulcal effacement without significant midline shift or hydrocephalus.    CTH 12/8/22 12:11 (CTA H/N)   1. Left diencephalic hemisphere demonstrates evidence of an acute infarct of left basal ganglia and left caudate lobe.  2. Occlusion of the posterior division of the left M2 branch at the takeoff of the M1 vessel with patency involving the anterior division of the left M2 segment.    CTH 12/7/22   1.  Moderate size subacute infarction left anterior basal ganglia and anterior left internal capsule appears mildly larger and better well defined compared to CT from 12/3/2020. There is currently greater mass effect on the anterior horn of the left lateral ventricle. There is no associated bleed or midline shift.  2.  Recent MRI demonstrated additional punctate acute infarctions in the left frontal lobe and a mild size acute infarction right centrum semiovale. These are less obvious on CT. No associated bleed.  3.  Additional chronic periventricular white matter hypodensities.    MRI Brain WO contrast 12/3/22   Multifocal areas of restricted diffusion are felt to reflect acute infarcts.    CTH 12/3/22   Loss of gray-white matter distinction in involving the left basal ganglia could reflect changes of chronic small vessel ischemic disease versus cytotoxic edema from acute infarct.     Carotid US Bilateral 12/3/22   1. Carotid intimal hyperplasia, with no findings of hemodynamically significant carotid arterial stenosis or vascular occlusion.  2. Patent vertebral arteries with antegrade flow bilaterally.      Cardiac Imaging   RAGHAV 12/4/22    The left ventricle is small with moderately decreased systolic function.   The estimated ejection fraction is 38%.   Left ventricular diastolic dysfunction.   There are segmental left ventricular wall motion abnormalities.   No spontaneous echo contrast. A moderate protruding layered left ventricular thrombus is present. The thrombus is fixed and  located in the apex.   Normal right ventricular size.   Mild left atrial enlargement.   Mild right atrial enlargement.   Mild aortic regurgitation.   No interatrial septal defect present.   Normal appearing left atrial appendage. No thrombus is present in the appendage. SB occluder is absent. Abnormal appendage velocities.   Mild mitral regurgitation.   Agitated saline contrast study did not show any right to left shunt        Rosa Maria Mendoza MD  [facility]  Department of Vascular Neurology   [facility]

## 2022-12-25 NOTE — ASSESSMENT & PLAN NOTE
53 yo M with CHF, COPD, HTN, HLD, DM admitted for acute LMCA stroke. He initially presented to OSH with RSW, OOW for thrombolytics. MRI at that time demonstrated bilateral anterior and posterior circulation strokes concerning for embolic etiology. He was admitted to OSH and RAGHAV revealed LV thrombus, he was not started on AC. During hospital stay noted to be lethargic and aphasic/dysarthric, then on 12/18 had acute neuro change with RSW and global aphasia. NIHSS increased from 6 to 23. CTA demonstrated a L M2 occlusion and patient was transferred to [facility] for possible intervention and higher level of care. He was not taken to IR due to poor ASPECTS and was admitted to ICU for close monitoring and hemicrani watch. Suspect etiology likely cardioembolic given LV thrombus.     Therapy continues to recommend IPR, dispo planning for IPR but if denied by rehab facilities will pursue SNF.      Antithrombotics for secondary stroke prevention: Eliquis 5mg BID    Statins for secondary stroke prevention and hyperlipidemia, if present:   Statins: Atorvastatin- 40 mg daily    Aggressive risk factor modification: HTN, DM, HLD, Diet, Exercise, LV thrombus     Rehab efforts: IPR    Diagnostics ordered/pending: None     VTE prophylaxis: Mechanical prophylaxis: Place SCDs  None: Reason for No Pharmacological VTE Prophylaxis: Currently on anticoagulation     BP parameters: SBP <180

## 2022-12-25 NOTE — SUBJECTIVE & OBJECTIVE
Neurologic Chief Complaint: L MCA stroke    Subjective:       HPI, Past Medical, Family, and Social History remains the same as documented in the initial encounter.     Review of Systems   Unable to perform ROS: Other (Aphasia)     Scheduled Meds:   amLODIPine  10 mg Per G Tube Daily    apixaban  5 mg Per G Tube BID    atorvastatin  40 mg Per G Tube Daily    carvediloL  12.5 mg Per G Tube BID    EScitalopram oxalate  20 mg Per G Tube Daily    furosemide  40 mg Per G Tube Daily    insulin aspart U-100  9 Units Subcutaneous 6 times per day     Continuous Infusions:   dextrose 10 % in water (D10W)       PRN Meds:dextrose 10 % in water (D10W), dextrose 10%, dextrose 10%, glucagon (human recombinant), glucagon (human recombinant), insulin aspart U-100, labetaloL, ondansetron, sodium chloride 0.9%    Objective:     Vital Signs (Most Recent):  Temp: 98.3 °F (36.8 °C) (12/24/22 1950)  Pulse: 89 (12/24/22 1953)  Resp: 18 (12/24/22 1950)  BP: (!) 144/99 (12/24/22 1950)  SpO2: 98 % (12/24/22 1950)  BP Location: Right arm    Vital Signs Range (Last 24H):  Temp:  [97.4 °F (36.3 °C)-98.7 °F (37.1 °C)]   Pulse:  [79-89]   Resp:  [16-18]   BP: (124-146)/()   SpO2:  [96 %-98 %]   BP Location: Right arm    Physical Exam  Vitals and nursing note reviewed.   Constitutional:       General: He is not in acute distress.  HENT:      Head: Normocephalic.      Nose: No rhinorrhea.   Eyes:      General: Visual field deficit present. No scleral icterus.     Conjunctiva/sclera: Conjunctivae normal.   Cardiovascular:      Rate and Rhythm: Normal rate.   Pulmonary:      Effort: No respiratory distress.   Abdominal:      General: There is no distension.      Comments: PEG in place, dressing CDI   Musculoskeletal:         General: No deformity.   Skin:     General: Skin is warm and dry.   Neurological:      Mental Status: He is alert.      Cranial Nerves: Facial asymmetry present.      Motor: Weakness present.      Comments: Withdraws on R  side to noxious stimuli  Moves left side spontaneously  Severe aphasia   Psychiatric:      Comments: Unable to assess mood, thought, insight or judgment 2/2 severe aphasia/level of consciousness       Neurological Exam:   LOC: alert  Attention Span: poor  Language: mixed aphasia, intermittently follows some simple commands  Articulation: Unable to assess due to aphasia  Orientation: Unable to assess due to aphasia  EOM (CN III, IV, VI):  L gaze preference, tracks to midline  Motor: L side moves spontaneously and antigravity, R side withdraws from pain   Sensation: Withdraws right side to noxious stimuli      Laboratory:  CMP:   Recent Labs   Lab 12/24/22  0447   CALCIUM 9.5   ALBUMIN 2.5*   PROT 6.9   *   K 3.7   CO2 31*      BUN 57*   CREATININE 4.2*   ALKPHOS 114   ALT 56*   AST 57*   BILITOT 0.7       BMP:   Recent Labs   Lab 12/24/22  0447   *   K 3.7      CO2 31*   BUN 57*   CREATININE 4.2*   CALCIUM 9.5       CBC:   Recent Labs   Lab 12/24/22  0447   WBC 10.67   RBC 4.50*   HGB 11.8*   HCT 38.8*      MCV 86   MCH 26.2*   MCHC 30.4*       Lipid Panel:   No results for input(s): CHOL, LDLCALC, HDL, TRIG in the last 168 hours.    Coagulation:   Recent Labs   Lab 12/21/22  1133   APTT 43.9*       Hgb A1C:   No results for input(s): HGBA1C in the last 168 hours.    TSH:   No results for input(s): TSH in the last 168 hours.      Diagnostic Results     Brain/Vessel Imaging   [facility] 12/13/22  -Evolving large left MCA infarct with stable mass effect and petechial hemorrhagic conversion.   -No definite new hemorrhage or significant new abnormal parenchymal attenuation   -Separate areas of infarction involving the right parietal lobe and left cerebellum not well seen with CT technique.   -Clinical correlation and continued follow-up advised     [facility] 12/11/22   Grossly stable evolving large left MCA distribution infarct and smaller infarct in the right frontal lobe with possible trace  hemorrhagic conversion. Persistent mass effect with sulcal effacement and slight mass effect on the left lateral ventricle. No new or worsening intracranial hemorrhage and no worsening of minimal rightward midline shift.    [facility] 12/9/22   Evolving no enlarged left MCA territory infarction with hypoattenuation and sulcal effacement. Intermediate density along the left basal ganglia compatible with known hemorrhagic conversion. Smaller area of infarction in the right cerebral hemisphere not well seen. Evolving mass effect and edema associated with the left cerebral hemispheric infarction with slight compression of the left lateral ventricle and trace 1 mm of rightward midline shift. No evidence for hydrocephalus    MRI Brain WO Contrast 12/8/22    Exam limited by patient motion artifact. Evolving left MCA territory infarct.  Additional foci of diffusion restriction in the left cerebral consistent with recent infarct.  Interval increase susceptibility artifact in the areas of diffusion restriction consistent with hemorrhagic conversion of recent infarcts.  No hydrocephalus or significant midline shift.      [facility] 12/8/22 16:29   -Evolving large left MCA territory infarction similar to recent CT though increased in size compared to prior MRI concerning for evolving recent to subacute and acute infarcts.  Localized mass effect without significant midline shift or hydrocephalus.  There is evolving recent to subacute age right posterior frontal infarction similar to prior MRI allowing for differences in technique  -There is subtle hyperdensity along the left basal ganglia posteriorly and along the right posterior frontal cortex which may represent enhancing subacute age components of infarction with petechial hemorrhage felt less likely but cannot be entirely excluded with limitation secondary to recirculation contrast related to recent CTA performed at outside institution.  -Evolving mass effect most pronounced associated  with the left MCA territory infarction with sulcal effacement without significant midline shift or hydrocephalus.    [facility] 12/8/22 12:11 (CTA H/N)   1. Left diencephalic hemisphere demonstrates evidence of an acute infarct of left basal ganglia and left caudate lobe.  2. Occlusion of the posterior division of the left M2 branch at the takeoff of the M1 vessel with patency involving the anterior division of the left M2 segment.    [facility] 12/7/22   1.  Moderate size subacute infarction left anterior basal ganglia and anterior left internal capsule appears mildly larger and better well defined compared to CT from 12/3/2020. There is currently greater mass effect on the anterior horn of the left lateral ventricle. There is no associated bleed or midline shift.  2.  Recent MRI demonstrated additional punctate acute infarctions in the left frontal lobe and a mild size acute infarction right centrum semiovale. These are less obvious on CT. No associated bleed.  3.  Additional chronic periventricular white matter hypodensities.    MRI Brain WO contrast 12/3/22   Multifocal areas of restricted diffusion are felt to reflect acute infarcts.    [facility] 12/3/22   Loss of gray-white matter distinction in involving the left basal ganglia could reflect changes of chronic small vessel ischemic disease versus cytotoxic edema from acute infarct.     Carotid US Bilateral 12/3/22   1. Carotid intimal hyperplasia, with no findings of hemodynamically significant carotid arterial stenosis or vascular occlusion.  2. Patent vertebral arteries with antegrade flow bilaterally.      Cardiac Imaging   RAGHAV 12/4/22   The left ventricle is small with moderately decreased systolic function.  The estimated ejection fraction is 38%.  Left ventricular diastolic dysfunction.  There are segmental left ventricular wall motion abnormalities.  No spontaneous echo contrast. A moderate protruding layered left ventricular thrombus is present. The thrombus is fixed and  located in the apex.  Normal right ventricular size.  Mild left atrial enlargement.  Mild right atrial enlargement.  Mild aortic regurgitation.  No interatrial septal defect present.  Normal appearing left atrial appendage. No thrombus is present in the appendage. SB occluder is absent. Abnormal appendage velocities.  Mild mitral regurgitation.  Agitated saline contrast study did not show any right to left shunt

## 2022-12-26 LAB
ALBUMIN SERPL BCP-MCNC: 2.3 G/DL (ref 3.5–5.2)
ALP SERPL-CCNC: 94 U/L (ref 55–135)
ALT SERPL W/O P-5'-P-CCNC: 59 U/L (ref 10–44)
ANION GAP SERPL CALC-SCNC: 10 MMOL/L (ref 8–16)
AST SERPL-CCNC: 53 U/L (ref 10–40)
BILIRUB SERPL-MCNC: 0.6 MG/DL (ref 0.1–1)
BUN SERPL-MCNC: 57 MG/DL (ref 6–20)
CALCIUM SERPL-MCNC: 8.8 MG/DL (ref 8.7–10.5)
CHLORIDE SERPL-SCNC: 110 MMOL/L (ref 95–110)
CO2 SERPL-SCNC: 28 MMOL/L (ref 23–29)
CREAT SERPL-MCNC: 3.4 MG/DL (ref 0.5–1.4)
EST. GFR  (NO RACE VARIABLE): 21.1 ML/MIN/1.73 M^2
GLUCOSE SERPL-MCNC: 194 MG/DL (ref 70–110)
MAGNESIUM SERPL-MCNC: 2.2 MG/DL (ref 1.6–2.6)
PHOSPHATE SERPL-MCNC: 3.8 MG/DL (ref 2.7–4.5)
POCT GLUCOSE: 134 MG/DL (ref 70–110)
POCT GLUCOSE: 155 MG/DL (ref 70–110)
POCT GLUCOSE: 178 MG/DL (ref 70–110)
POCT GLUCOSE: 178 MG/DL (ref 70–110)
POCT GLUCOSE: 192 MG/DL (ref 70–110)
POTASSIUM SERPL-SCNC: 3.9 MMOL/L (ref 3.5–5.1)
PROT SERPL-MCNC: 6.3 G/DL (ref 6–8.4)
SODIUM SERPL-SCNC: 148 MMOL/L (ref 136–145)

## 2022-12-26 PROCEDURE — 36415 COLL VENOUS BLD VENIPUNCTURE: CPT | Performed by: NURSE PRACTITIONER

## 2022-12-26 PROCEDURE — 25000003 PHARM REV CODE 250

## 2022-12-26 PROCEDURE — 25000003 PHARM REV CODE 250: Performed by: PHYSICIAN ASSISTANT

## 2022-12-26 PROCEDURE — 80053 COMPREHEN METABOLIC PANEL: CPT | Performed by: NURSE PRACTITIONER

## 2022-12-26 PROCEDURE — 11000001 HC ACUTE MED/SURG PRIVATE ROOM

## 2022-12-26 PROCEDURE — 83735 ASSAY OF MAGNESIUM: CPT | Performed by: NURSE PRACTITIONER

## 2022-12-26 PROCEDURE — 84100 ASSAY OF PHOSPHORUS: CPT | Performed by: NURSE PRACTITIONER

## 2022-12-26 RX ORDER — OLANZAPINE 2.5 MG/1
2.5 TABLET ORAL NIGHTLY
Status: DISCONTINUED | OUTPATIENT
Start: 2022-12-26 | End: 2022-12-26

## 2022-12-26 RX ADMIN — DEXTROSE MONOHYDRATE 125 ML: 100 INJECTION, SOLUTION INTRAVENOUS at 10:12

## 2022-12-26 RX ADMIN — ESCITALOPRAM OXALATE 20 MG: 20 TABLET ORAL at 08:12

## 2022-12-26 RX ADMIN — CARVEDILOL 12.5 MG: 12.5 TABLET, FILM COATED ORAL at 08:12

## 2022-12-26 RX ADMIN — INSULIN ASPART 9 UNITS: 100 INJECTION, SOLUTION INTRAVENOUS; SUBCUTANEOUS at 08:12

## 2022-12-26 RX ADMIN — INSULIN ASPART 9 UNITS: 100 INJECTION, SOLUTION INTRAVENOUS; SUBCUTANEOUS at 05:12

## 2022-12-26 RX ADMIN — FUROSEMIDE 40 MG: 40 TABLET ORAL at 08:12

## 2022-12-26 RX ADMIN — INSULIN ASPART 9 UNITS: 100 INJECTION, SOLUTION INTRAVENOUS; SUBCUTANEOUS at 03:12

## 2022-12-26 RX ADMIN — APIXABAN 5 MG: 5 TABLET, FILM COATED ORAL at 08:12

## 2022-12-26 RX ADMIN — AMLODIPINE BESYLATE 10 MG: 10 TABLET ORAL at 08:12

## 2022-12-26 RX ADMIN — ATORVASTATIN CALCIUM 40 MG: 40 TABLET, FILM COATED ORAL at 08:12

## 2022-12-26 RX ADMIN — INSULIN ASPART 2 UNITS: 100 INJECTION, SOLUTION INTRAVENOUS; SUBCUTANEOUS at 03:12

## 2022-12-26 NOTE — ASSESSMENT & PLAN NOTE
51 yo M with CHF, COPD, HTN, HLD, DM admitted for acute LMCA stroke. He initially presented to OSH with RSW, OOW for thrombolytics. MRI at that time demonstrated bilateral anterior and posterior circulation strokes concerning for embolic etiology. He was admitted to OSH and RAGHAV revealed LV thrombus, he was not started on AC. During hospital stay noted to be lethargic and aphasic/dysarthric, then on 12/18 had acute neuro change with RSW and global aphasia. NIHSS increased from 6 to 23. CTA demonstrated a L M2 occlusion and [place] [facility] [place] level of care. He was not taken to IR due to poor ASPECTS and was admitted to ICU for close monitoring and hemicrani watch. Suspect etiology likely cardioembolic given LV thrombus.     Therapy continues to recommend IPR, dispo planning for IPR but if denied by rehab facilities will pursue SNF.      Antithrombotics for secondary stroke prevention: Eliquis 5mg BID    Statins for secondary stroke prevention and hyperlipidemia, if present:   Statins: Atorvastatin- 40 mg daily    Aggressive risk factor modification: HTN, DM, HLD, Diet, Exercise, LV thrombus     Rehab efforts: IPR    Diagnostics ordered/pending: None     VTE prophylaxis: Mechanical prophylaxis: Place SCDs  None: Reason for No Pharmacological VTE Prophylaxis: Currently on anticoagulation     BP parameters: SBP <180

## 2022-12-26 NOTE — PROGRESS NOTES
Ulices Stack - Neurosurgery ([facility])  Vascular Neurology  Comprehensive Stroke Center  Progress Note    Assessment/Plan:     * Embolic stroke involving left middle cerebral artery  51 yo M with CHF, COPD, HTN, HLD, DM admitted for acute LMCA stroke. He initially presented to OSH with RSW, OOW for thrombolytics. MRI at that time demonstrated bilateral anterior and posterior circulation strokes concerning for embolic etiology. He was admitted to OSH and RAGHAV revealed LV thrombus, he was not started on AC. During hospital stay noted to be lethargic and aphasic/dysarthric, then on 12/18 had acute neuro change with RSW and global aphasia. NIHSS increased from 6 to 23. CTA demonstrated a L M2 occlusion and patient was transferred to [facility] for possible intervention and higher level of care. He was not taken to IR due to poor ASPECTS and was admitted to ICU for close monitoring and hemicrani watch. Suspect etiology likely cardioembolic given LV thrombus.     Therapy continues to recommend IPR, dispo planning for IPR but if denied by rehab facilities will pursue SNF.      Antithrombotics for secondary stroke prevention: Eliquis 5mg BID    Statins for secondary stroke prevention and hyperlipidemia, if present:   Statins: Atorvastatin- 40 mg daily    Aggressive risk factor modification: HTN, DM, HLD, Diet, Exercise, LV thrombus     Rehab efforts: IPR    Diagnostics ordered/pending: None     VTE prophylaxis: Mechanical prophylaxis: Place SCDs  None: Reason for No Pharmacological VTE Prophylaxis: Currently on anticoagulation     BP parameters: SBP <180      Eating disorder, unspecified  corpophagia noted by nursing 12/25/2022, possible delirium    --zyprexa 5mg nightly    Hypernatremia  Na 147, given 500cc LR  Continue 250cc FWF q4hr  Nutrition consulted for updated recs    Oropharyngeal dysphagia  Due to stroke  SLP eval and treat  PEG placed by IR, tolerating tube feeds at goal    JR on CKD  See CKD    Hyperlipidemia associated  with type 2 diabetes mellitus  Stroke RF   . 8, goal <70  - Continue atorvastatin 40mg daily    CHF (congestive heart failure)  Stroke risk factor  RAGHAV with EF 38% and segmental LV wall motion abnormalities  - Strict I/Os  - Continue lasix 40mg and coreg 12.5mg BID    Debility  Due to stroke  Aggressive PT/OT/SLP  Dispo recs for IPR, pending placement    Cytotoxic brain edema  -Noted on imaging in the area of the L MCA territory with associated mass effect  -NSGY consulted for hemicrani watch due to malignant MCA, no acute intervention recommended as serial imaging remains stable  -Will continue to monitor with frequent a4h neuro checks while inpatient, as this could indicate worsening/expansion of edema  -Obtain Stat CTH for any acute neuro changes and notify stroke team immediately    LV (left ventricular) mural thrombus  Stroke RF  Initially started on heparin gtt due for AC until PO route established, s/p PEG on 12/20  Heparin gtt transitioned to Eliquis 5mg BID on 12/21  -Continue AC with eliquis    Stage 4 chronic kidney disease  Cr and eGFR stable  - Continue to monitor renal function with daily labs  - Avoid nephrotoxins and renally dose medications when appropiate   - Strict I/Os and routine VS to monitor for signs of decreased renal perfusion (hypovolemia, hypotension, sepsis) and/or obstruction causing worsening GFR    Type 2 diabetes mellitus, with long-term current use of insulin  Stroke RF  A1c 9.1, consider nutrition consult and DM education  BG goal while inpatient 140-180  Currently on Aspart 9units q4h + SSI PRN    Essential hypertension  Stroke RF  SBP <180, MAP >65  Continue amlodipine and coreg  PRN labetalol/hydralazine       12/09/2022 Patient with LV thrombus, will need anticoagulation. NGT in place would recommend heparin gtt until patient able to swallow or PEG placed prior to DOAC initiation. Patient tachycardic today with SBP < 210, metoprolol started by primary team. Febrile with  elevated white count and procal, currently on Zosyn and Vanc while cx pending. NSGY following for hemicrani watch. Patient has been discharged from OT per last note, will need new orders. Continue current ICU care.   12/10/2022: Pending stability scan patient is expected to be started on heparin gtt. Continue hemicrani watch in  ICU. Family at bedside.   12/11/2022: Patient started on heparin gtt overnight, he demonstrated great clinical improvement today, he was able to tell me his name, knew he is in [facility]. Followed single step commands. No family at bedside today.   12/12/2022 Patient remains in ICU for sue-crani watch. NSGY following. On salt tabs for goal of hypernatremia.Will remain in ICU another night. VN to re-evaluate for stepdown tomorrow. Patient is on ceftriaxone for acute cystitis. aPTT today 42.9. CTH following therapeutic heparin levels stable. More alert today. IPR recommendations; SLP with minced and moist diet with thin liquids.   12/13/2022 Remains in NCC, neuro exam unchanged and limited by drowsiness. Overnight had short run of VT/SVT that resolved without any interventions. BPs not consistently at goal of <200, had max BP today of 214/140. Currently NPO with NGT in place and anticipate patient will need PEG placement, SLP recommending ice chips sparingly for pleasure. Anticipate step down tomorrow once BP is consistently at goal  12/14/2022 Pending SD to NPU. Continue to watch BP closely, SBP<200. Patient will likely need a PEG.  12/15/2022. Pending SD to NPU. Poor exam, patient very lethargic, WD on the right.   12/16/2022 Patient stepped down overnight to NPU. Exam stable. Re-evaluated by speech this afternoon, still recommending NPO. Will need to discuss nutrition going forward (I.e. need for PEG). Recommendations for IPR.   12/17/22 Exam stable, elevated sodium, free water boluses added, metoprolol increased for elevated BP & HR.  IR consult placed for G tube placement after discussing  with patient's family via phone.  12/18/22 patient displaced NG tube overnight, adjusted by nursing. KUB reviewed- BG ok to use. Water boluses added yesterday for hypernatremia, Na+ improved to 149. -184, Lopressor discontinued and Coreg ordered for better BP control. Plan for PEG placement on 12/19 .  12/19/2022 Patient remains in NPU, neuro exam poor but stable. Going for PEG today. Will resume free water flushes following procedure given Na+ 150 today. SBP improved today. Consider starting ACE/ARB once PEG in place and safe to use for meds. Dispo recommendations for IPR.   12/20/2022 NAEO, neuro exam stable. Going for PEG today with IR. Na 150, will resume FWF after procedure. Will transition hep gtt to DOAC once PEG placed and tolerating tube feeds. Dispo recs for IPR.  12/21/2022 NAEO, neuro exam stable. S/p PEG yesterday, NGT removed now that tube feedings restarted and tolerating well. NA remains at 150 but unclear if patient had been getting FWF, order updated and nursing communication in. Heparin gtt transitioned to DOAC today. Dispo recs for IPR, anticipate he will be ready for discharge tomorrow.  12/22/2022 NAEO, neuro exam stable. Tolerating tube feeds at goal. Continues to be hypernatremic, discussed FWF q4h with nursing staff. Dispo recs downgraded from IPR to SNF, pending placement.   12/23/2022 NAEO, neuro exam stable. Continues to be hypernatremic although decreased from yesterday, continue FWF q4h and will reach out to nutrition for any updated tube feeding recs. Dispo pending, therapy recs for IPR and placement pending.  12/24/2022 Exam Stable. Change TF to [employer] [employer] per nutrition given RFP. 500cc LR via IV. Na downtrending.  12/25/2022 Exam stable. Patient on exam had stool on blankets. While cleaning, coprophagia noted by nursing.  Starting nightly zyprexa.        STROKE DOCUMENTATION   Acute Stroke Times   Last Known Normal Date: 12/08/22  Last Known Normal Time: 1145  Symptom Onset  Date: 12/08/22  Symptom Onset Time: 1145  Stroke Team Called Date: 12/08/22  Stroke Team Called Time: 1615  Stroke Team Arrival Date: 12/08/22  Stroke Team Arrival Time: 1615  CT Interpretation Time: 1615  Thrombolytic Therapy Recommended: No  CTA Interpretation Time: 1615    NIH Scale:  1a. Level of Consciousness: 0-->Alert, keenly responsive  1b. LOC Questions: 2-->Answers neither question correctly  1c. LOC Commands: 1-->Performs one task correctly  2. Best Gaze: 2-->Forced deviation, or total gaze paresis not overcome by the oculocephalic maneuver  3. Visual: 2-->Complete hemianopia  4. Facial Palsy: 1-->Minor paralysis (flattened nasolabial fold, asymmetry on smiling)  5a. Motor Arm, Left: 0-->No drift, limb holds 90 (or 45) degrees for full 10 secs  5b. Motor Arm, Right: 3-->No effort against gravity, limb falls  6a. Motor Leg, Left: 0-->No drift, leg holds 30 degree position for full 5 secs  6b. Motor Leg, Right: 3-->No effort against gravity, leg falls to bed immediately  7. Limb Ataxia: 0-->Absent  8. Sensory: 1-->Mild-to-moderate sensory loss, patient feels pinprick is less sharp or is dull on the affected side, or there is a loss of superficial pain with pinprick, but patient is aware of being touched  9. Best Language: 2-->Severe aphasia, all communication is through fragmentary expression, great need for inference, questioning, and guessing by the listener. Range of information that can be exchanged is limited, listener carries burden of. . . (see row details)  10. Dysarthria: 2-->Severe dysarthria, patients speech is so slurred as to be unintelligible in the absence of or out of proportion to any dysphasia, or is mute/anarthric  11. Extinction and Inattention (formerly Neglect): 1-->Visual, tactile, auditory, spatial, or personal inattention or extinction to bilateral simultaneous stimulation in one of the sensory modalities  Total (NIH Stroke Scale): 20       Modified [place] Score: 5  Thao Coma  Scale:    ABCD2 Score:    [id number] Score:   HAS -BLED Score:   ICH Score:   Hunt & Leblanc Classification:      Hemorrhagic change of an Ischemic Stroke: Does this patient have an ischemic stroke with hemorrhagic changes? No     Neurologic Chief Complaint: L MCA stroke    Subjective:       HPI, Past Medical, Family, and Social History remains the same as documented in the initial encounter.     Review of Systems   Unable to perform ROS: Other (Aphasia)     Scheduled Meds:   amLODIPine  10 mg Per G Tube Daily    apixaban  5 mg Per G Tube BID    atorvastatin  40 mg Per G Tube Daily    carvediloL  12.5 mg Per G Tube BID    EScitalopram oxalate  20 mg Per G Tube Daily    furosemide  40 mg Per G Tube Daily    insulin aspart U-100  9 Units Subcutaneous 6 times per day    OLANZapine  5 mg Per G Tube QHS     Continuous Infusions:   dextrose 10 % in water (D10W)       PRN Meds:dextrose 10 % in water (D10W), dextrose 10%, dextrose 10%, glucagon (human recombinant), glucagon (human recombinant), insulin aspart U-100, labetaloL, ondansetron, sodium chloride 0.9%    Objective:     Vital Signs (Most Recent):  Temp: 97.6 °F (36.4 °C) (12/25/22 1537)  Pulse: 78 (12/25/22 1537)  Resp: 17 (12/25/22 1537)  BP: 126/87 (12/25/22 1537)  SpO2: 95 % (12/25/22 1537)  BP Location: Right arm    Vital Signs Range (Last 24H):  Temp:  [97.4 °F (36.3 °C)-98.4 °F (36.9 °C)]   Pulse:  [76-92]   Resp:  [16-18]   BP: (122-146)/(85-99)   SpO2:  [92 %-98 %]   BP Location: Right arm    Physical Exam  Vitals and nursing note reviewed.   Constitutional:       General: He is not in acute distress.  HENT:      Head: Normocephalic.      Nose: No rhinorrhea.   Eyes:      General: Visual field deficit present. No scleral icterus.     Conjunctiva/sclera: Conjunctivae normal.   Cardiovascular:      Rate and Rhythm: Normal rate.   Pulmonary:      Effort: No respiratory distress.   Abdominal:      General: There is no distension.      Comments: PEG in  place, dressing CDI   Musculoskeletal:         General: No deformity.   Skin:     General: Skin is warm and dry.   Neurological:      Mental Status: He is alert.      Cranial Nerves: Facial asymmetry present.      Motor: Weakness present.      Comments: Withdraws on R side to noxious stimuli  Moves left side spontaneously  Severe aphasia   Psychiatric:         Cognition and Memory: Cognition is impaired.         Judgment: Judgment is impulsive and inappropriate.      Comments: Unable to assess mood, thought, insight or judgment 2/2 severe aphasia/level of consciousness       Neurological Exam:   LOC: alert  Attention Span: poor  Language: mixed aphasia, intermittently follows some simple commands  Articulation: Unable to assess due to aphasia  Orientation: Unable to assess due to aphasia  EOM (CN III, IV, VI):  L gaze preference, tracks to midline  Motor: L side moves spontaneously and antigravity, R side withdraws from pain   Sensation: Withdraws right side to noxious stimuli      Laboratory:  CMP:   No results for input(s): GLUCOSE, CALCIUM, ALBUMIN, PROT, NA, K, CO2, CL, BUN, CREATININE, ALKPHOS, ALT, AST, BILITOT in the last 24 hours.    BMP:   Recent Labs   Lab 12/24/22  0447   *   K 3.7      CO2 31*   BUN 57*   CREATININE 4.2*   CALCIUM 9.5       CBC:   Recent Labs   Lab 12/24/22  0447   WBC 10.67   RBC 4.50*   HGB 11.8*   HCT 38.8*      MCV 86   MCH 26.2*   MCHC 30.4*       Lipid Panel:   No results for input(s): CHOL, LDLCALC, HDL, TRIG in the last 168 hours.    Coagulation:   Recent Labs   Lab 12/21/22  1133   APTT 43.9*       Hgb A1C:   No results for input(s): HGBA1C in the last 168 hours.    TSH:   No results for input(s): TSH in the last 168 hours.      Diagnostic Results     Brain/Vessel Imaging   CT 12/13/22  -Evolving large left MCA infarct with stable mass effect and petechial hemorrhagic conversion.   -No definite new hemorrhage or significant new abnormal parenchymal  attenuation   -Separate areas of infarction involving the right parietal lobe and left cerebellum not well seen with CT technique.   -Clinical correlation and continued follow-up advised     CTH 12/11/22   Grossly stable evolving large left MCA distribution infarct and smaller infarct in the right frontal lobe with possible trace hemorrhagic conversion. Persistent mass effect with sulcal effacement and slight mass effect on the left lateral ventricle. No new or worsening intracranial hemorrhage and no worsening of minimal rightward midline shift.    CTH 12/9/22   Evolving no enlarged left MCA territory infarction with hypoattenuation and sulcal effacement. Intermediate density along the left basal ganglia compatible with known hemorrhagic conversion. Smaller area of infarction in the right cerebral hemisphere not well seen. Evolving mass effect and edema associated with the left cerebral hemispheric infarction with slight compression of the left lateral ventricle and trace 1 mm of rightward midline shift. No evidence for hydrocephalus    MRI Brain WO Contrast 12/8/22    Exam limited by patient motion artifact. Evolving left MCA territory infarct.  Additional foci of diffusion restriction in the left cerebral consistent with recent infarct.  Interval increase susceptibility artifact in the areas of diffusion restriction consistent with hemorrhagic conversion of recent infarcts.  No hydrocephalus or significant midline shift.      CTH 12/8/22 16:29   -Evolving large left MCA territory infarction similar to recent CT though increased in size compared to prior MRI concerning for evolving recent to subacute and acute infarcts.  Localized mass effect without significant midline shift or hydrocephalus.  There is evolving recent to subacute age right posterior frontal infarction similar to prior MRI allowing for differences in technique  -There is subtle hyperdensity along the left basal ganglia posteriorly and along the  right posterior frontal cortex which may represent enhancing subacute age components of infarction with petechial hemorrhage felt less likely but cannot be entirely excluded with limitation secondary to recirculation contrast related to recent CTA performed at outside institution.  -Evolving mass effect most pronounced associated with the left MCA territory infarction with sulcal effacement without significant midline shift or hydrocephalus.    CTH 12/8/22 12:11 (CTA H/N)   1. Left diencephalic hemisphere demonstrates evidence of an acute infarct of left basal ganglia and left caudate lobe.  2. Occlusion of the posterior division of the left M2 branch at the takeoff of the M1 vessel with patency involving the anterior division of the left M2 segment.    CTH 12/7/22   1.  Moderate size subacute infarction left anterior basal ganglia and anterior left internal capsule appears mildly larger and better well defined compared to CT from 12/3/2020. There is currently greater mass effect on the anterior horn of the left lateral ventricle. There is no associated bleed or midline shift.  2.  Recent MRI demonstrated additional punctate acute infarctions in the left frontal lobe and a mild size acute infarction right centrum semiovale. These are less obvious on CT. No associated bleed.  3.  Additional chronic periventricular white matter hypodensities.    MRI Brain WO contrast 12/3/22   Multifocal areas of restricted diffusion are felt to reflect acute infarcts.    CTH 12/3/22   Loss of gray-white matter distinction in involving the left basal ganglia could reflect changes of chronic small vessel ischemic disease versus cytotoxic edema from acute infarct.     Carotid US Bilateral 12/3/22   1. Carotid intimal hyperplasia, with no findings of hemodynamically significant carotid arterial stenosis or vascular occlusion.  2. Patent vertebral arteries with antegrade flow bilaterally.      Cardiac Imaging   RAGHAV 12/4/22    The left  ventricle is small with moderately decreased systolic function.   The estimated ejection fraction is 38%.   Left ventricular diastolic dysfunction.   There are segmental left ventricular wall motion abnormalities.   No spontaneous echo contrast. A moderate protruding layered left ventricular thrombus is present. The thrombus is fixed and located in the apex.   Normal right ventricular size.   Mild left atrial enlargement.   Mild right atrial enlargement.   Mild aortic regurgitation.   No interatrial septal defect present.   Normal appearing left atrial appendage. No thrombus is present in the appendage. SB occluder is absent. Abnormal appendage velocities.   Mild mitral regurgitation.   Agitated saline contrast study did not show any right to left shunt        Rosa Maria Mendoza MD  Comprehensive Stroke Center  Department of Vascular Neurology   [facility])

## 2022-12-27 VITALS
SYSTOLIC BLOOD PRESSURE: 132 MMHG | WEIGHT: 152.56 LBS | TEMPERATURE: 99 F | HEIGHT: 69 IN | OXYGEN SATURATION: 96 % | DIASTOLIC BLOOD PRESSURE: 92 MMHG | RESPIRATION RATE: 18 BRPM | HEART RATE: 88 BPM | BODY MASS INDEX: 22.6 KG/M2

## 2022-12-27 LAB
ALBUMIN SERPL BCP-MCNC: 2.4 G/DL (ref 3.5–5.2)
ALP SERPL-CCNC: 105 U/L (ref 55–135)
ALT SERPL W/O P-5'-P-CCNC: 63 U/L (ref 10–44)
ANION GAP SERPL CALC-SCNC: 10 MMOL/L (ref 8–16)
AST SERPL-CCNC: 54 U/L (ref 10–40)
BASOPHILS # BLD AUTO: 0.07 K/UL (ref 0–0.2)
BASOPHILS NFR BLD: 0.9 % (ref 0–1.9)
BILIRUB SERPL-MCNC: 0.7 MG/DL (ref 0.1–1)
BUN SERPL-MCNC: 56 MG/DL (ref 6–20)
CALCIUM SERPL-MCNC: 9.5 MG/DL (ref 8.7–10.5)
CHLORIDE SERPL-SCNC: 113 MMOL/L (ref 95–110)
CO2 SERPL-SCNC: 28 MMOL/L (ref 23–29)
CREAT SERPL-MCNC: 3.6 MG/DL (ref 0.5–1.4)
DIFFERENTIAL METHOD: ABNORMAL
EOSINOPHIL # BLD AUTO: 0.2 K/UL (ref 0–0.5)
EOSINOPHIL NFR BLD: 2.6 % (ref 0–8)
ERYTHROCYTE [DISTWIDTH] IN BLOOD BY AUTOMATED COUNT: 15.6 % (ref 11.5–14.5)
EST. GFR  (NO RACE VARIABLE): 19.7 ML/MIN/1.73 M^2
GLUCOSE SERPL-MCNC: 165 MG/DL (ref 70–110)
HCT VFR BLD AUTO: 42.3 % (ref 40–54)
HGB BLD-MCNC: 12.8 G/DL (ref 14–18)
IMM GRANULOCYTES # BLD AUTO: 0.02 K/UL (ref 0–0.04)
IMM GRANULOCYTES NFR BLD AUTO: 0.3 % (ref 0–0.5)
LYMPHOCYTES # BLD AUTO: 1.4 K/UL (ref 1–4.8)
LYMPHOCYTES NFR BLD: 18.2 % (ref 18–48)
MAGNESIUM SERPL-MCNC: 2.4 MG/DL (ref 1.6–2.6)
MCH RBC QN AUTO: 27.2 PG (ref 27–31)
MCHC RBC AUTO-ENTMCNC: 30.3 G/DL (ref 32–36)
MCV RBC AUTO: 90 FL (ref 82–98)
MONOCYTES # BLD AUTO: 0.5 K/UL (ref 0.3–1)
MONOCYTES NFR BLD: 5.9 % (ref 4–15)
NEUTROPHILS # BLD AUTO: 5.6 K/UL (ref 1.8–7.7)
NEUTROPHILS NFR BLD: 72.1 % (ref 38–73)
NRBC BLD-RTO: 0 /100 WBC
PHOSPHATE SERPL-MCNC: 4 MG/DL (ref 2.7–4.5)
PLATELET # BLD AUTO: 202 K/UL (ref 150–450)
PMV BLD AUTO: 13.5 FL (ref 9.2–12.9)
POCT GLUCOSE: 126 MG/DL (ref 70–110)
POCT GLUCOSE: 149 MG/DL (ref 70–110)
POCT GLUCOSE: 188 MG/DL (ref 70–110)
POCT GLUCOSE: 201 MG/DL (ref 70–110)
POCT GLUCOSE: 209 MG/DL (ref 70–110)
POCT GLUCOSE: 212 MG/DL (ref 70–110)
POTASSIUM SERPL-SCNC: 4.1 MMOL/L (ref 3.5–5.1)
PROT SERPL-MCNC: 6.9 G/DL (ref 6–8.4)
RBC # BLD AUTO: 4.71 M/UL (ref 4.6–6.2)
SARS-COV-2 RNA RESP QL NAA+PROBE: NOT DETECTED
SODIUM SERPL-SCNC: 151 MMOL/L (ref 136–145)
WBC # BLD AUTO: 7.81 K/UL (ref 3.9–12.7)

## 2022-12-27 PROCEDURE — 83735 ASSAY OF MAGNESIUM: CPT | Performed by: NURSE PRACTITIONER

## 2022-12-27 PROCEDURE — 97110 THERAPEUTIC EXERCISES: CPT

## 2022-12-27 PROCEDURE — U0003 INFECTIOUS AGENT DETECTION BY NUCLEIC ACID (DNA OR RNA); SEVERE ACUTE RESPIRATORY SYNDROME CORONAVIRUS 2 (SARS-COV-2) (CORONAVIRUS DISEASE [COVID-19]), AMPLIFIED PROBE TECHNIQUE, MAKING USE OF HIGH THROUGHPUT TECHNOLOGIES AS DESCRIBED BY CMS-2020-01-R: HCPCS | Performed by: PSYCHIATRY & NEUROLOGY

## 2022-12-27 PROCEDURE — 97530 THERAPEUTIC ACTIVITIES: CPT | Mod: CO

## 2022-12-27 PROCEDURE — 97542 WHEELCHAIR MNGMENT TRAINING: CPT

## 2022-12-27 PROCEDURE — 85025 COMPLETE CBC W/AUTO DIFF WBC: CPT

## 2022-12-27 PROCEDURE — 84100 ASSAY OF PHOSPHORUS: CPT | Performed by: NURSE PRACTITIONER

## 2022-12-27 PROCEDURE — 25000003 PHARM REV CODE 250: Performed by: PHYSICIAN ASSISTANT

## 2022-12-27 PROCEDURE — 80053 COMPREHEN METABOLIC PANEL: CPT | Performed by: NURSE PRACTITIONER

## 2022-12-27 PROCEDURE — 63600175 PHARM REV CODE 636 W HCPCS

## 2022-12-27 PROCEDURE — 36415 COLL VENOUS BLD VENIPUNCTURE: CPT | Performed by: NURSE PRACTITIONER

## 2022-12-27 PROCEDURE — 99233 PR SUBSEQUENT HOSPITAL CARE,LEVL III: ICD-10-PCS | Mod: ,,, | Performed by: PSYCHIATRY & NEUROLOGY

## 2022-12-27 PROCEDURE — U0005 INFEC AGEN DETEC AMPLI PROBE: HCPCS | Performed by: PSYCHIATRY & NEUROLOGY

## 2022-12-27 PROCEDURE — 99233 SBSQ HOSP IP/OBS HIGH 50: CPT | Mod: ,,, | Performed by: PSYCHIATRY & NEUROLOGY

## 2022-12-27 PROCEDURE — 92526 ORAL FUNCTION THERAPY: CPT

## 2022-12-27 PROCEDURE — 36415 COLL VENOUS BLD VENIPUNCTURE: CPT

## 2022-12-27 PROCEDURE — 11000001 HC ACUTE MED/SURG PRIVATE ROOM

## 2022-12-27 PROCEDURE — 97535 SELF CARE MNGMENT TRAINING: CPT

## 2022-12-27 PROCEDURE — 92507 TX SP LANG VOICE COMM INDIV: CPT

## 2022-12-27 PROCEDURE — 97112 NEUROMUSCULAR REEDUCATION: CPT | Mod: CO

## 2022-12-27 RX ADMIN — CARVEDILOL 12.5 MG: 12.5 TABLET, FILM COATED ORAL at 09:12

## 2022-12-27 RX ADMIN — INSULIN ASPART 4 UNITS: 100 INJECTION, SOLUTION INTRAVENOUS; SUBCUTANEOUS at 12:12

## 2022-12-27 RX ADMIN — INSULIN ASPART 9 UNITS: 100 INJECTION, SOLUTION INTRAVENOUS; SUBCUTANEOUS at 09:12

## 2022-12-27 RX ADMIN — SODIUM CHLORIDE, POTASSIUM CHLORIDE, SODIUM LACTATE AND CALCIUM CHLORIDE 1000 ML: 600; 310; 30; 20 INJECTION, SOLUTION INTRAVENOUS at 09:12

## 2022-12-27 RX ADMIN — APIXABAN 5 MG: 5 TABLET, FILM COATED ORAL at 09:12

## 2022-12-27 RX ADMIN — INSULIN ASPART 9 UNITS: 100 INJECTION, SOLUTION INTRAVENOUS; SUBCUTANEOUS at 04:12

## 2022-12-27 RX ADMIN — AMLODIPINE BESYLATE 10 MG: 10 TABLET ORAL at 09:12

## 2022-12-27 RX ADMIN — INSULIN ASPART 2 UNITS: 100 INJECTION, SOLUTION INTRAVENOUS; SUBCUTANEOUS at 04:12

## 2022-12-27 RX ADMIN — INSULIN ASPART 4 UNITS: 100 INJECTION, SOLUTION INTRAVENOUS; SUBCUTANEOUS at 09:12

## 2022-12-27 RX ADMIN — ATORVASTATIN CALCIUM 40 MG: 40 TABLET, FILM COATED ORAL at 09:12

## 2022-12-27 RX ADMIN — FUROSEMIDE 40 MG: 40 TABLET ORAL at 09:12

## 2022-12-27 RX ADMIN — INSULIN ASPART 9 UNITS: 100 INJECTION, SOLUTION INTRAVENOUS; SUBCUTANEOUS at 12:12

## 2022-12-27 RX ADMIN — ESCITALOPRAM OXALATE 20 MG: 20 TABLET ORAL at 09:12

## 2022-12-27 NOTE — PT/OT/SLP PROGRESS
"Occupational Therapy   Treatment    Name: Spencer Burton Jr.  MRN: [id number]  Admitting Diagnosis:  Embolic stroke involving left middle cerebral artery       Recommendations:     Discharge Recommendations: rehabilitation facility  Discharge Equipment Recommendations:  hospital bed, lift device, wheelchair  Barriers to discharge:  Decreased caregiver support    Assessment:     Spencer Burton Jr. is a 52 y.o. male with a medical diagnosis of Embolic stroke involving left middle cerebral artery. Performance deficits affecting function are weakness, impaired balance, decreased safety awareness, impaired sensation, impaired self care skills, impaired functional mobility, gait instability, decreased lower extremity function, decreased upper extremity function.     Pt tolerated Tx session well. Pt pleasant and participatory as able. Pt continues to require significant assist with funct'l mobility and performance of self care tasks at present, 2* RSW, AMS, and generalized deconditioning. Pt will likely cont to benefit from skilled OT services to maximize funct'l indep, increase safety with funct'l mobility and decrease burden of care on caregiver(s).    Rehab Prognosis:  Good; patient would benefit from acute skilled OT services to address these deficits and reach maximum level of function.       Plan:     Patient to be seen 4 x/week to address the above listed problems via self-care/home management, therapeutic activities, therapeutic exercises, neuromuscular re-education  Plan of Care Expires: 01/08/23  Plan of Care Reviewed with: patient    Subjective   Pt minimally verbal throughout session...nodding head "yes" in agreement to therapy  Pain/Comfort:  Pain Rating 1: 0/10  Pain Rating Post-Intervention 1: 0/10    Objective:     Communicated with: Nurse Thompson prior to session.  Patient found HOB elevated with bed alarm, PEG Tube, telemetry, SCD, pressure relief boots, PureWick upon OT entry to room.    General Precautions: " Standard, fall, aspiration    Orthopedic Precautions:N/A  Braces: N/A  Respiratory Status: Room air     Occupational Performance:     Bed Mobility:    Patient completed Scooting BLE (hips) to EOB with total assistance and use of drawsheet  Pt scoot >HOB while supine/trendelenburg with tube feed held and MaxAx2  Patient completed Supine to Sit with maximal assistance  Patient completed Sit to Supine with total assistance       Activities of Daily Living:  Grooming: maximal assistance for oral and facial g/h while seated EOB ModA-MaxA trunk control while LUE engaged in task Pt with difficulty maintaining seated dynamic balance  ...Pt required frequent verbal and vidual cues for task orientation and sequence 2* AMS (Pt wiping arms instead of face and Pt attempting to put chapstick in mout vs on lips)    [facility] 6 Click ADL: 11    Treatment & Education:  -Pt re ed role of OT per POC...questionable evidence of learning per Pt  -Pt educated on SROM...fair performance while seated EOB, 2* posterior trunk lean  -Pt performed SROM 1x10 BUE curls and shoulder flex/ext while sitting up in bed with HOB elevated     Patient left HOB elevated with all lines intact, call button in reach, bed alarm on, needs in reach, in NAD, and tube feed resumed    GOALS:   Multidisciplinary Problems       Occupational Therapy Goals          Problem: Occupational Therapy    Goal Priority Disciplines Outcome Interventions   Occupational Therapy Goal     OT, PT/OT Ongoing, Progressing    Description: Goals to be met by: 12/23/2022     Patient will increase functional independence with ADLs by performing:    UE Dressing with Minimal Assistance.  LE Dressing with Moderate Assistance.  Grooming while EOB with Minimal Assistance.  Toileting from bedside commode with Maximum Assistance for hygiene and clothing management.   Supine to sit with Moderate Assistance.  Stand pivot transfers with Maximum Assistance using LRAD as needed.  Toilet transfer to  bedside commode with Maximum Assistance using LRAD as needed.                         Time Tracking:     OT Date of Treatment: 12/27/22  OT Start Time: 1028  OT Stop Time: 1054  OT Total Time (min): 26 min    Billable Minutes:Self Care/Home Management 13  Neuromuscular Re-education 13    OT/PRINCESS: PRINCESS SÁNCHEZ Visit Number: 1    12/27/2022

## 2022-12-27 NOTE — PROGRESS NOTES
"Ulices Stack - Neurosurgery ([facility])  Adult Nutrition  Progress Note    SUMMARY       Recommendations    Increase TF rate of [place] 1.4 to 55 mL/hr- provides 1848 kcals, 82 g pro, and 950 mL free water.     If renal labs still not corrected please modify to:   Glucerna 1.5 to 45 mL/hr- provides 1620 kcals, 89 g pro, and 820 mL free water.    3. RD following.    Goals: Will meet % EEN/EPN by next RD f/u.  Nutrition Goal Status: goal met  Communication of RD Recs:  (POC)    Assessment and Plan    Nutrition Problem  Inadequate oral intake     Related to (etiology):   Inability to consume sufficient needs     Signs and Symptoms (as evidenced by):   NPO status      Interventions/Recommendations (treatment strategy):  Collaboration of nutrition care with other providers   EN     Nutrition Diagnosis Status:   Continues    Reason for Assessment    Reason For Assessment: RD follow-up  Diagnosis: stroke/CVA  Relevant Medical History: HTN, T2DM, CKD 4, COPD  Interdisciplinary Rounds: did not attend  General Information Comments: Unable to speak with pt 2/2 remaining aphasic/disoriented. Noted TF regimen running. UBW fluctuates between 186-200#. Noted # and wt on 12/11 186# - assuming wt 2/2 weighing error. RD to continue using 186# to calculate needs. No s/s of malnutrition- appears well-nourished. Noted A1C of 9.1%- RD to provide DM diet handout if/when pt diet advanced. LBM 12/26.  Nutrition Discharge Planning: Pending clinical course    Nutrition Risk Screen    Nutrition Risk Screen: tube feeding or parenteral nutrition    Nutrition/Diet History    Spiritual, Cultural Beliefs, Latter day Practices, Values that Affect Care: no  Food Allergies: NKFA  Factors Affecting Nutritional Intake: NPO, difficulty/impaired swallowing    Anthropometrics    Temp: 98.3 °F (36.8 °C)  Height: 5' 9" (175.3 cm)  Height (inches): 69 in  Weight Method: Bed Scale  Weight: 69.2 kg (152 lb 8.9 oz)  Weight (lb): 152.56 lb  Ideal Body " Weight (IBW), Male: 160 lb  % Ideal Body Weight, Male (lb): 116.84 %  BMI (Calculated): 22.5  BMI Grade: 25 - 29.9 - overweight    Lab/Procedures/Meds    Pertinent Labs Reviewed: reviewed  Pertinent Labs Comments: Glucose 165, A1C 9.1, Albumin 2.4, Sodium 151, AST 54, ALT 63, Creatinine 3.6, GFR 23, BUN 56, BNP 1729  Pertinent Medications Reviewed: reviewed  Pertinent Medications Comments: amlodipine, atorvastatin, furosemide, insulin    Estimated/Assessed Needs    Weight Used For Calorie Calculations: 72.6 kg (160 lb)  Energy Calorie Requirements (kcal): 1814 kcals  Energy Need Method: Kcal/kg (25 kcal/kg IBW)  Protein Requirements: 85 g (1.0 g/kg)  Weight Used For Protein Calculations: 84.8 kg (186 lb 15.2 oz)  Fluid Requirements (mL): 1 ml or fluid per MD  Estimated Fluid Requirement Method: RDA Method  RDA Method (mL): 1814  CHO Requirement: 227 g    Nutrition Prescription Ordered    Current Diet Order: NPO  Current Nutrition Support Formula Ordered: Smile Family Standard 1.4  Current Nutrition Support Rate Ordered: 50 (ml)  Current Nutrition Support Frequency Ordered: mL/hr    Evaluation of Received Nutrient/Fluid Intake    Enteral Calories (kcal): 1680  Enteral Protein (gm): 74  Enteral (Free Water) Fluid (mL): 864  % Kcal Needs: 89%  % Protein Needs: 87%  I/O: +9.8 L since 12/13  Energy Calories Required: meeting needs  Protein Required: meeting needs  Fluid Required: meeting needs  Tolerance: tolerating  % Intake of Estimated Energy Needs: 89%  % Meal Intake: NPO    Nutrition Risk    Level of Risk/Frequency of Follow-up:  (1 time/week)     Monitor and Evaluation    Food and Nutrient Intake: enteral nutrition intake  Food and Nutrient Adminstration: enteral and parenteral nutrition administration  Knowledge/Beliefs/Attitudes: beliefs and attitudes, food and nutrition knowledge/skill  Physical Activity and Function: nutrition-related ADLs and IADLs  Anthropometric Measurements: height/length, weight, weight  change, body mass index  Biochemical Data, Medical Tests and Procedures: electrolyte and renal panel, gastrointestinal profile, inflammatory profile, glucose/endocrine profile, lipid profile  Nutrition-Focused Physical Findings: overall appearance     Nutrition Follow-Up    RD Follow-up?: Yes    Keri Flores, Registration Eligible, Provisional LDN

## 2022-12-27 NOTE — PLAN OF CARE
Problem: Adult Inpatient Plan of Care  Goal: Optimal Comfort and Wellbeing  Outcome: Ongoing, Progressing     Problem: Adjustment to Illness (Delirium)  Goal: Optimal Coping  Outcome: Ongoing, Progressing

## 2022-12-27 NOTE — SUBJECTIVE & OBJECTIVE
Neurologic Chief Complaint: L MCA stroke    Subjective:       HPI, Past Medical, Family, and Social History remains the same as documented in the initial encounter.     Review of Systems   Unable to perform ROS: Other (Aphasia)     Scheduled Meds:   amLODIPine  10 mg Per G Tube Daily    apixaban  5 mg Per G Tube BID    atorvastatin  40 mg Per G Tube Daily    carvediloL  12.5 mg Per G Tube BID    EScitalopram oxalate  20 mg Per G Tube Daily    furosemide  40 mg Per G Tube Daily    insulin aspart U-100  9 Units Subcutaneous 6 times per day    lactated ringers  1,000 mL Intravenous Once     Continuous Infusions:   dextrose 10 % in water (D10W)       PRN Meds:dextrose 10 % in water (D10W), dextrose 10%, dextrose 10%, glucagon (human recombinant), glucagon (human recombinant), insulin aspart U-100, labetaloL, ondansetron, sodium chloride 0.9%    Objective:     Vital Signs (Most Recent):  Temp: 98.3 °F (36.8 °C) (12/27/22 1144)  Pulse: 77 (12/27/22 1155)  Resp: 17 (12/27/22 1144)  BP: (!) 136/92 (12/27/22 1144)  SpO2: 96 % (12/27/22 1144)  BP Location: Right arm    Vital Signs Range (Last 24H):  Temp:  [97.4 °F (36.3 °C)-98.6 °F (37 °C)]   Pulse:  [73-92]   Resp:  [15-17]   BP: (120-158)/(87-98)   SpO2:  [93 %-97 %]   BP Location: Right arm    Physical Exam  Vitals and nursing note reviewed.   Constitutional:       General: He is not in acute distress.  HENT:      Head: Normocephalic.      Nose: No rhinorrhea.   Eyes:      General: Visual field deficit present. No scleral icterus.     Conjunctiva/sclera: Conjunctivae normal.   Cardiovascular:      Rate and Rhythm: Normal rate.   Pulmonary:      Effort: No respiratory distress.   Abdominal:      General: There is no distension.      Comments: PEG in place, dressing CDI   Musculoskeletal:         General: No deformity.   Skin:     General: Skin is warm and dry.   Neurological:      Mental Status: He is alert.      Cranial Nerves: Facial asymmetry present.      Motor:  Weakness present.      Comments: Withdraws on R side to noxious stimuli  Moves left side spontaneously  Severe aphasia   Psychiatric:         Cognition and Memory: Cognition is impaired.         Judgment: Judgment is impulsive and inappropriate.      Comments: Unable to assess mood, thought, insight or judgment 2/2 severe aphasia/level of consciousness       Neurological Exam:   LOC: alert  Attention Span: poor  Language: mixed aphasia, intermittently follows some simple commands  Articulation: Unable to assess due to aphasia  Orientation: Unable to assess due to aphasia  EOM (CN III, IV, VI):  L gaze preference, tracks to midline  Motor: L side moves spontaneously and antigravity, R side withdraws from pain   Sensation: Withdraws right side to noxious stimuli      Laboratory:  CMP:   Recent Labs   Lab 12/27/22  0403   CALCIUM 9.5   ALBUMIN 2.4*   PROT 6.9   *   K 4.1   CO2 28   *   BUN 56*   CREATININE 3.6*   ALKPHOS 105   ALT 63*   AST 54*   BILITOT 0.7       BMP:   Recent Labs   Lab 12/27/22  0403   *   K 4.1   *   CO2 28   BUN 56*   CREATININE 3.6*   CALCIUM 9.5       CBC:   Recent Labs   Lab 12/27/22  0905   WBC 7.81   RBC 4.71   HGB 12.8*   HCT 42.3      MCV 90   MCH 27.2   MCHC 30.3*       Lipid Panel:   No results for input(s): CHOL, LDLCALC, HDL, TRIG in the last 168 hours.    Coagulation:   Recent Labs   Lab 12/21/22  1133   APTT 43.9*       Hgb A1C:   No results for input(s): HGBA1C in the last 168 hours.    TSH:   No results for input(s): TSH in the last 168 hours.      Diagnostic Results     Brain/Vessel Imaging   CT 12/13/22  -Evolving large left MCA infarct with stable mass effect and petechial hemorrhagic conversion.   -No definite new hemorrhage or significant new abnormal parenchymal attenuation   -Separate areas of infarction involving the right parietal lobe and left cerebellum not well seen with CT technique.   -Clinical correlation and continued follow-up advised      CTH 12/11/22   Grossly stable evolving large left MCA distribution infarct and smaller infarct in the right frontal lobe with possible trace hemorrhagic conversion. Persistent mass effect with sulcal effacement and slight mass effect on the left lateral ventricle. No new or worsening intracranial hemorrhage and no worsening of minimal rightward midline shift.    CTH 12/9/22   Evolving no enlarged left MCA territory infarction with hypoattenuation and sulcal effacement. Intermediate density along the left basal ganglia compatible with known hemorrhagic conversion. Smaller area of infarction in the right cerebral hemisphere not well seen. Evolving mass effect and edema associated with the left cerebral hemispheric infarction with slight compression of the left lateral ventricle and trace 1 mm of rightward midline shift. No evidence for hydrocephalus    MRI Brain WO Contrast 12/8/22    Exam limited by patient motion artifact. Evolving left MCA territory infarct.  Additional foci of diffusion restriction in the left cerebral consistent with recent infarct.  Interval increase susceptibility artifact in the areas of diffusion restriction consistent with hemorrhagic conversion of recent infarcts.  No hydrocephalus or significant midline shift.      CTH 12/8/22 16:29   -Evolving large left MCA territory infarction similar to recent CT though increased in size compared to prior MRI concerning for evolving recent to subacute and acute infarcts.  Localized mass effect without significant midline shift or hydrocephalus.  There is evolving recent to subacute age right posterior frontal infarction similar to prior MRI allowing for differences in technique  -There is subtle hyperdensity along the left basal ganglia posteriorly and along the right posterior frontal cortex which may represent enhancing subacute age components of infarction with petechial hemorrhage felt less likely but cannot be entirely excluded with limitation  secondary to recirculation contrast related to recent CTA performed at outside institution.  -Evolving mass effect most pronounced associated with the left MCA territory infarction with sulcal effacement without significant midline shift or hydrocephalus.    CTH 12/8/22 12:11 (CTA H/N)   1. Left diencephalic hemisphere demonstrates evidence of an acute infarct of left basal ganglia and left caudate lobe.  2. Occlusion of the posterior division of the left M2 branch at the takeoff of the M1 vessel with patency involving the anterior division of the left M2 segment.    CTH 12/7/22   1.  Moderate size subacute infarction left anterior basal ganglia and anterior left internal capsule appears mildly larger and better well defined compared to CT from 12/3/2020. There is currently greater mass effect on the anterior horn of the left lateral ventricle. There is no associated bleed or midline shift.  2.  Recent MRI demonstrated additional punctate acute infarctions in the left frontal lobe and a mild size acute infarction right centrum semiovale. These are less obvious on CT. No associated bleed.  3.  Additional chronic periventricular white matter hypodensities.    MRI Brain WO contrast 12/3/22   Multifocal areas of restricted diffusion are felt to reflect acute infarcts.    CTH 12/3/22   Loss of gray-white matter distinction in involving the left basal ganglia could reflect changes of chronic small vessel ischemic disease versus cytotoxic edema from acute infarct.     Carotid US Bilateral 12/3/22   1. Carotid intimal hyperplasia, with no findings of hemodynamically significant carotid arterial stenosis or vascular occlusion.  2. Patent vertebral arteries with antegrade flow bilaterally.      Cardiac Imaging   RAGHAV 12/4/22   The left ventricle is small with moderately decreased systolic function.  The estimated ejection fraction is 38%.  Left ventricular diastolic dysfunction.  There are segmental left ventricular wall  motion abnormalities.  No spontaneous echo contrast. A moderate protruding layered left ventricular thrombus is present. The thrombus is fixed and located in the apex.  Normal right ventricular size.  Mild left atrial enlargement.  Mild right atrial enlargement.  Mild aortic regurgitation.  No interatrial septal defect present.  Normal appearing left atrial appendage. No thrombus is present in the appendage. SB occluder is absent. Abnormal appendage velocities.  Mild mitral regurgitation.  Agitated saline contrast study did not show any right to left shunt

## 2022-12-27 NOTE — ASSESSMENT & PLAN NOTE
51 yo M with CHF, COPD, HTN, HLD, DM admitted for acute LMCA stroke. He initially presented to OSH with RSW, OOW for thrombolytics. MRI at that time demonstrated bilateral anterior and posterior circulation strokes concerning for embolic etiology. He was admitted to OSH and RAGHAV revealed LV thrombus, he was not started on AC. During hospital stay noted to be lethargic and aphasic/dysarthric, then on 12/18 had acute neuro change with RSW and global aphasia. NIHSS increased from 6 to 23. CTA demonstrated a L M2 occlusion and [place] [facility] [place] [facility] and higher level of care. He was not taken to IR due to poor ASPECTS and was admitted to ICU for close monitoring and hemicrani watch. Suspect etiology likely cardioembolic given LV thrombus.     Therapy continues to recommend IPR, dispo planning for IPR but if denied by rehab facilities will pursue SNF.      Antithrombotics for secondary stroke prevention: Eliquis 5mg BID    Statins for secondary stroke prevention and hyperlipidemia, if present:   Statins: Atorvastatin- 40 mg daily    Aggressive risk factor modification: HTN, DM, HLD, Diet, Exercise, LV thrombus     Rehab efforts: IPR    Diagnostics ordered/pending: None     VTE prophylaxis: Mechanical prophylaxis: Place SCDs  None: Reason for No Pharmacological VTE Prophylaxis: Currently on anticoagulation     BP parameters: SBP <180

## 2022-12-27 NOTE — PT/OT/SLP PROGRESS
"Speech Language Pathology Treatment    Patient Name:  Spencer Burton Jr.   MRN:  [id number]  Admitting Diagnosis: Embolic stroke involving left middle cerebral artery    Recommendations:                 General Recommendations:  Dysphagia therapy and Speech/language therapy  Diet recommendations:  NPO, Liquid Diet Level: NPO   Aspiration Precautions: Continue alternate means of nutrition, Frequent oral care, and Strict aspiration precautions   General Precautions: Standard, aphasia, aspiration, fall, NPO, vision impaired  Communication strategies:  go to room if call light pushed ; pt presents with aphasia and dysarthria    Subjective     "The 12th." Pt's response when asked tor recall the current month. Given cues, pt was not able to recall "December."     Pain/Comfort:  Pain Rating 1: 0/10    Respiratory Status: Room air    Objective:     Has the patient been evaluated by SLP for swallowing?   Yes  Keep patient NPO? Yes   Current Respiratory Status:        Pt awake/alert and sitting up in bed upon entry.  Pt able to attend to SLP on his right side with improving ability.  Oral care was provided prior to PO presentations.  Pt accepted the following PO presentations: ice chip x 1, thin water via tsp x 2, cup sip x 1, straw sips x 3; puree 1/2 tsp x 1 and full tsp x 5.  Anterior loss was present for cup sip of water and 2 out of 3 straw sips.  Swallow responses mostly delayed.  Throat clear and delayed cough preset for straw sip x 1 and immediate cough for straw sip x 1.  Pt tended to take large straw sips despite SLP's effort to control bolus size by pulling straw away.  Pt stated his name upon request.  He was oriented to city/state IND and to place/hospital given binary choices.  Pt was not oriented place/[facility], situation, or year despite cues.  Pt stated "the 12th" when asked to recall the month, but was not able to recall "December" despite cues.  Pt counted 1-5 upon request.  Pt named objects in the room with 20% " accuracy IND/90% given cues.  Education was provided to pt regarding role of SLP, oral care prior to PO trials, PO trials, aspiration, overt s/s of aspiration, speech/language therapy tasks, and SLP treatment plan and POC.  Pt may be approaching readiness to begin to receive puree for pleasure outside of ST sessions.  Will reassess over next few sessions.     Assessment:     Spencer Burton Jr. is a 52 y.o. male with an SLP diagnosis of Aphasia, Dysphagia, Dysarthria, Cognitive-Linguistic Impairment, and Visio-Spatial Impairment.     Goals:   Multidisciplinary Problems       SLP Goals          Problem: SLP    Goal Priority Disciplines Outcome   SLP Goal     SLP    Description: Speech Language Pathology Goals  Updated goals expected to be met by 12/29:  1. Pt will participate in ongoing swallowing assessment to determine if/when safe for PO intake.   2. Pt will respond to complex yes/no q's with 50% accuracy given max cues.   3. Pt will model 1-step commands on 1 out of 5 trials given max cues.   4. Pt will complete automatic speech tasks with 80% accuracy given mod cues.   5. Pt will name objects with 2 out of 5 trials given max cues.       Updated goals expected to be met by 12/20:  1. Pt will participate in ongoing swallowing assessment to determine if/when safe for PO intake. ongoing  2. Pt will respond to complex yes/no q's with 50% accuracy given max cues. ongoing  3. Pt will model 1-step commands on 1 out of 5 trials given max cues. ongoing  4. Pt will complete automatic speech tasks with 60% accuracy given max cues. Goal met  5. Pt will name objects with 2 out of 5 trials given max cues. ongoing    Goals expected to be met by 12/16:  1. Pt will participate in ongoing swallowing assessment to determine if/when safe for PO intake.   2. Pt will participate in speech/language/cognitive evaluation when feasible. Initiated 12/13                               Plan:     Patient to be seen:  4 x/week   Plan of Care  expires:  01/09/22  Plan of Care reviewed with:  patient   SLP Follow-Up:  Yes       Discharge recommendations:  rehabilitation facility     Time Tracking:     SLP Treatment Date:   12/27/22  Speech Start Time:  1126  Speech Stop Time:  1151     Speech Total Time (min):  25 min    Billable Minutes: Speech Therapy Individual 8, Treatment Swallowing Dysfunction 9, and Self Care/Home Management Training 8    12/27/2022

## 2022-12-27 NOTE — PT/OT/SLP PROGRESS
Physical Therapy Treatment    Patient Name:  Spencer Burton Jr.   MRN:  [id number]    Recommendations:     Discharge Recommendations: rehabilitation facility  Discharge Equipment Recommendations: hospital bed, lift device, wheelchair  Barriers to discharge: Inaccessible home and Decreased caregiver support    Assessment:     Spencer Burton Jr. is a 52 y.o. male admitted with a medical diagnosis of Embolic stroke involving left middle cerebral artery.  He presents with the following impairments/functional limitations: weakness, impaired balance, decreased safety awareness, impaired sensation, impaired self care skills, impaired functional mobility, gait instability, decreased lower extremity function, decreased upper extremity function . Pt is unsafe with functional mobility at this time due to pt requires max assist for bed mobility, total assist for transfers, and moderate assist for w/c mobility using L UE/LE due to difficulty avoiding obstacles on the R . Pt is motivated to progress with functional mobility.     Rehab Prognosis: Good; patient would benefit from acute skilled PT services to address these deficits and reach maximum level of function.    Recent Surgery: * No surgery found *      Plan:     During this hospitalization, patient to be seen 4 x/week to address the identified rehab impairments via gait training, therapeutic activities, therapeutic exercises, neuromuscular re-education, wheelchair management/training and progress toward the following goals:    Plan of Care Expires:  01/09/23    Subjective   Pt trying to speak during treatment, PT unable to understand    Pain/Comfort:  Pain Rating 1: 0/10  Pain Rating Post-Intervention 1: 0/10      Objective:     Communicated with nurse prior to session.  Patient found HOB elevated with bed alarm, PEG Tube, telemetry, SCD, pressure relief boots upon PT entry to room.     General Precautions: Standard, fall, aspiration  Orthopedic Precautions: N/A  Braces:  N/A  Respiratory Status: Room air     Functional Mobility:  Bed Mobility:     Rolling Right: moderate assistance  Supine to Sit: maximal assistance  Sit to Supine: maximal assistance  Transfers:     Sit to Stand:  total assistance with hand-held assist  Bed to Chair: maximal assistance with  hand-held assist  using  Stand Pivot  Wheelchair Propulsion:  Pt propelled Standard wheelchair x 40 feet on Level tile with  Left upper extremity and Left lower extremity with Moderate Assistance.     AM-PAC 6 CLICK MOBILITY  Turning over in bed (including adjusting bedclothes, sheets and blankets)?: 2  Sitting down on and standing up from a chair with arms (e.g., wheelchair, bedside commode, etc.): 2  Moving from lying on back to sitting on the side of the bed?: 2  Moving to and from a bed to a chair (including a wheelchair)?: 2  Need to walk in hospital room?: 1  Climbing 3-5 steps with a railing?: 1  Basic Mobility Total Score: 10     Treatment & Education:   pt sat on the EOB ~6 min with moderate to max assist  due to pt pushed to the R with his L UE at times causing LOB to the R. Pt required only moderate assist for sitting balance when he held onto the foot board with his L UE. Pt stood x 3 trials with total assist and HHA for ~ 25 sec each trial. Pt required max manual cues for R hip/knee ext and for upright posture.Pt received verbal and manual cues for midline orientation and upright posture.     Patient left HOB elevated with all lines intact, call button in reach, bed alarm on, and nurse notified..    GOALS:   Multidisciplinary Problems       Physical Therapy Goals          Problem: Physical Therapy    Goal Priority Disciplines Outcome Goal Variances Interventions   Physical Therapy Goal     PT, PT/OT Ongoing, Progressing     Description: Goals to be met by: 22    Patient will increase functional independence with mobility by performin. Supine to sit with Maximum Assistance  2. Sit to supine with Maximum  Assistance  3. Sit to stand transfer with Maximum Assistance  4. Bed to chair transfer with Maximum Assistance using squat pivot technique.  5. Gait  x 5 feet with Moderate Assistance using LRAD.   6. Sitting at edge of bed x8 minutes with Contact Guard Assistance with DYLAN UE support.  7. Pt to propel w/c 120ft with L UE/LE on level surface with CGA-not met                         Time Tracking:     PT Received On: 12/27/22  PT Start Time: 0808     PT Stop Time: 0903  PT Total Time (min): 55 min     Billable Minutes: Therapeutic Activity 30 and Train/Wheelchair Management 25    Treatment Type: Treatment  PT/PTA: PT     PTA Visit Number: 1     12/27/2022

## 2022-12-27 NOTE — PLAN OF CARE
Problem: Adult Inpatient Plan of Care  Goal: Plan of Care Review  Outcome: Ongoing, Progressing  Goal: Patient-Specific Goal (Individualized)  Outcome: Ongoing, Progressing  Goal: Absence of Hospital-Acquired Illness or Injury  Outcome: Ongoing, Progressing  Goal: Optimal Comfort and Wellbeing  Outcome: Ongoing, Progressing  Goal: Readiness for Transition of Care  Outcome: Ongoing, Progressing     Problem: Adjustment to Illness (Delirium)  Goal: Optimal Coping  Outcome: Ongoing, Progressing     Problem: Altered Behavior (Delirium)  Goal: Improved Behavioral Control  Outcome: Ongoing, Progressing     Problem: Attention and Thought Clarity Impairment (Delirium)  Goal: Improved Attention and Thought Clarity  Outcome: Ongoing, Progressing     Problem: Sleep Disturbance (Delirium)  Goal: Improved Sleep  Outcome: Ongoing, Progressing     Problem: Adjustment to Illness (Stroke, Ischemic/Transient Ischemic Attack)  Goal: Optimal Coping  Outcome: Ongoing, Progressing     Problem: Bowel Elimination Impaired (Stroke, Ischemic/Transient Ischemic Attack)  Goal: Effective Bowel Elimination  Outcome: Ongoing, Progressing     Problem: Cerebral Tissue Perfusion (Stroke, Ischemic/Transient Ischemic Attack)  Goal: Optimal Cerebral Tissue Perfusion  Outcome: Ongoing, Progressing     Problem: Cognitive Impairment (Stroke, Ischemic/Transient Ischemic Attack)  Goal: Optimal Cognitive Function  Outcome: Ongoing, Progressing     Problem: Communication Impairment (Stroke, Ischemic/Transient Ischemic Attack)  Goal: Improved Communication Skills  Outcome: Ongoing, Progressing     Problem: Functional Ability Impaired (Stroke, Ischemic/Transient Ischemic Attack)  Goal: Optimal Functional Ability  Outcome: Ongoing, Progressing     Problem: Respiratory Compromise (Stroke, Ischemic/Transient Ischemic Attack)  Goal: Effective Oxygenation and Ventilation  Outcome: Ongoing, Progressing     Problem: Sensorimotor Impairment (Stroke, Ischemic/Transient  Ischemic Attack)  Goal: Improved Sensorimotor Function  Outcome: Ongoing, Progressing     Problem: Swallowing Impairment (Stroke, Ischemic/Transient Ischemic Attack)  Goal: Optimal Eating and Swallowing without Aspiration  Outcome: Ongoing, Progressing     Problem: Urinary Elimination Impaired (Stroke, Ischemic/Transient Ischemic Attack)  Goal: Effective Urinary Elimination  Outcome: Ongoing, Progressing     Problem: Diabetes Comorbidity  Goal: Blood Glucose Level Within Targeted Range  Outcome: Ongoing, Progressing     Problem: Infection  Goal: Absence of Infection Signs and Symptoms  Outcome: Ongoing, Progressing     Problem: Fall Injury Risk  Goal: Absence of Fall and Fall-Related Injury  Outcome: Ongoing, Progressing     Problem: Restraint, Nonbehavioral (Nonviolent)  Goal: Absence of Harm or Injury  Outcome: Ongoing, Progressing     Problem: Skin Injury Risk Increased  Goal: Skin Health and Integrity  Outcome: Ongoing, Progressing     Problem: Fluid and Electrolyte Imbalance (Acute Kidney Injury/Impairment)  Goal: Fluid and Electrolyte Balance  Outcome: Ongoing, Progressing     Problem: Oral Intake Inadequate (Acute Kidney Injury/Impairment)  Goal: Optimal Nutrition Intake  Outcome: Ongoing, Progressing     Problem: Renal Function Impairment (Acute Kidney Injury/Impairment)  Goal: Effective Renal Function  Outcome: Ongoing, Progressing

## 2022-12-27 NOTE — PLAN OF CARE
Recommendations     Increase TF rate of [place] 1.4 to 55 mL/hr- provides 1848 kcals, 82 g pro, and 950 mL free water.      If renal labs still not corrected please modify to:   Glucerna 1.5 to 45 mL/hr- provides 1620 kcals, 89 g pro, and 820 mL free water.     3. RD following.     Goals: Will meet % EEN/EPN by next RD f/u.  Nutrition Goal Status: goal met  Communication of RD Recs:  (POC)    Keri Flores, Registration Eligible, Provisional LDN

## 2022-12-27 NOTE — PLAN OF CARE
12/27/22 1548   Post-Acute Status   Post-Acute Authorization Placement   Post-Acute Placement Status Set-up Complete/Auth obtained   Coverage Medicaid   Discharge Plan   Discharge Plan A Rehab     SW received call from Janice at [facility].  She confirmed that auth was obtained, but needed a covid test.  RN orders stat and it is pending.  SW messaged on call to f/u this evening with facility and contact patieint's mother Mitzi is he is approved to transfer.      Alba Lai, CIARAN  [facility]  [phone]

## 2022-12-27 NOTE — PLAN OF CARE
Faxed covid result to R. Waiting on medical team at [facility] to sign intake paperwork     Maria D Davidson LCSW  Case Management/[facility]  [phone]     6:16 PM  Report info given to bedside. Stretcher ordered to transport pt to [address] [place], [place] [address]melania Kimbrough, CLAUDETTE Miller [id number], [facility].    Maria D Davidson LCSW  Case Management/[facility]  [phone]     6:51 PM  Informed family.    Maria D Davidson LCSW  Case Management/[place] [phone]

## 2022-12-27 NOTE — PROGRESS NOTES
[facility] - Neurosurgery ([facility])  Vascular Neurology  Comprehensive Stroke Center  Progress Note    Assessment/Plan:     * Embolic stroke involving left middle cerebral artery  53 yo M with CHF, COPD, HTN, HLD, DM admitted for acute LMCA stroke. He initially presented to OSH with RSW, OOW for thrombolytics. MRI at that time demonstrated bilateral anterior and posterior circulation strokes concerning for embolic etiology. He was admitted to OSH and RAGHAV revealed LV thrombus, he was not started on AC. During hospital stay noted to be lethargic and aphasic/dysarthric, then on 12/18 had acute neuro change with RSW and global aphasia. NIHSS increased from 6 to 23. CTA demonstrated a L M2 occlusion and patient was transferred to C for possible intervention and higher level of care. He was not taken to IR due to poor ASPECTS and was admitted to ICU for close monitoring and hemicrani watch. Suspect etiology likely cardioembolic given LV thrombus.     Therapy continues to recommend IPR, dispo planning for IPR but if denied by rehab facilities will pursue SNF.      Antithrombotics for secondary stroke prevention: Eliquis 5mg BID    Statins for secondary stroke prevention and hyperlipidemia, if present:   Statins: Atorvastatin- 40 mg daily    Aggressive risk factor modification: HTN, DM, HLD, Diet, Exercise, LV thrombus     Rehab efforts: IPR    Diagnostics ordered/pending: None     VTE prophylaxis: Mechanical prophylaxis: Place SCDs  None: Reason for No Pharmacological VTE Prophylaxis: Currently on anticoagulation     BP parameters: SBP <180      Eating disorder, unspecified  corpophagia noted by nursing, possible delirium    --If needed, zyprexa 2.5mg nightly     Hypernatremia  Na 147, given 500cc LR  Continue 30cc FWF q4hr  Nutrition consulted for updated recs    Oropharyngeal dysphagia  Due to stroke  SLP eval and treat  PEG placed by IR, tolerating tube feeds at goal    JR on CKD  See CKD    Hyperlipidemia associated  with type 2 diabetes mellitus  Stroke RF   . 8, goal <70  - Continue atorvastatin 40mg daily    CHF (congestive heart failure)  Stroke risk factor  RAGHAV with EF 38% and segmental LV wall motion abnormalities  - Strict I/Os  - Continue lasix 40mg and coreg 12.5mg BID    Debility  Due to stroke  Aggressive PT/OT/SLP  Dispo recs for IPR, pending placement    Cytotoxic brain edema  -Noted on imaging in the area of the L MCA territory with associated mass effect  -NSGY consulted for hemicrani watch due to malignant MCA, no acute intervention recommended as serial imaging remains stable  -Will continue to monitor with frequent a4h neuro checks while inpatient, as this could indicate worsening/expansion of edema  -Obtain Stat CTH for any acute neuro changes and notify stroke team immediately    LV (left ventricular) mural thrombus  Stroke RF  Initially started on heparin gtt due for AC until PO route established, s/p PEG on 12/20  Heparin gtt transitioned to Eliquis 5mg BID on 12/21  -Continue AC with eliquis    Stage 4 chronic kidney disease  Cr and eGFR stable  - Continue to monitor renal function with daily labs  - Avoid nephrotoxins and renally dose medications when appropiate   - Strict I/Os and routine VS to monitor for signs of decreased renal perfusion (hypovolemia, hypotension, sepsis) and/or obstruction causing worsening GFR    Type 2 diabetes mellitus, with long-term current use of insulin  Stroke RF  A1c 9.1, consider nutrition consult and DM education  BG goal while inpatient 140-180  Currently on Aspart 9units q4h + SSI PRN    Essential hypertension  Stroke RF  SBP <180, MAP >65  Continue amlodipine and coreg  PRN labetalol/hydralazine         12/09/2022 Patient with LV thrombus, will need anticoagulation. NGT in place would recommend heparin gtt until patient able to swallow or PEG placed prior to DOAC initiation. Patient tachycardic today with SBP < 210, metoprolol started by primary team. Febrile  with elevated white count and procal, currently on Zosyn and Vanc while cx pending. NSGY following for hemicrani watch. Patient has been discharged from OT per last note, will need new orders. Continue current ICU care.   12/10/2022: Pending stability scan patient is expected to be started on heparin gtt. Continue hemicrani watch in  ICU. Family at bedside.   12/11/2022: Patient started on heparin gtt overnight, he demonstrated great clinical improvement today, he was able to tell me his name, knew he is in [facility]. Followed single step commands. No family at bedside today.   12/12/2022 Patient remains in ICU for sue-crani watch. NSGY following. On salt tabs for goal of hypernatremia.Will remain in ICU another night. VN to re-evaluate for stepdown tomorrow. Patient is on ceftriaxone for acute cystitis. aPTT today 42.9. CTH following therapeutic heparin levels stable. More alert today. IPR recommendations; SLP with minced and moist diet with thin liquids.   12/13/2022 Remains in NCC, neuro exam unchanged and limited by drowsiness. Overnight had short run of VT/SVT that resolved without any interventions. BPs not consistently at goal of <200, had max BP today of 214/140. Currently NPO with NGT in place and anticipate patient will need PEG placement, SLP recommending ice chips sparingly for pleasure. Anticipate step down tomorrow once BP is consistently at goal  12/14/2022 Pending SD to NPU. Continue to watch BP closely, SBP<200. Patient will likely need a PEG.  12/15/2022. Pending SD to NPU. Poor exam, patient very lethargic, WD on the right.   12/16/2022 Patient stepped down overnight to NPU. Exam stable. Re-evaluated by speech this afternoon, still recommending NPO. Will need to discuss nutrition going forward (I.e. need for PEG). Recommendations for IPR.   12/17/22 Exam stable, elevated sodium, free water boluses added, metoprolol increased for elevated BP & HR.  IR consult placed for G tube placement after  discussing with patient's family via phone.  12/18/22 patient displaced NG tube overnight, adjusted by nursing. KUB reviewed- BG ok to use. Water boluses added yesterday for hypernatremia, Na+ improved to 149. -184, Lopressor discontinued and Coreg ordered for better BP control. Plan for PEG placement on 12/19 .  12/19/2022 Patient remains in NPU, neuro exam poor but stable. Going for PEG today. Will resume free water flushes following procedure given Na+ 150 today. SBP improved today. Consider starting ACE/ARB once PEG in place and safe to use for meds. Dispo recommendations for IPR.   12/20/2022 NAEO, neuro exam stable. Going for PEG today with IR. Na 150, will resume FWF after procedure. Will transition hep gtt to DOAC once PEG placed and tolerating tube feeds. Dispo recs for IPR.  12/21/2022 NAEO, neuro exam stable. S/p PEG yesterday, NGT removed now that tube feedings restarted and tolerating well. NA remains at 150 but unclear if patient had been getting FWF, order updated and nursing communication in. Heparin gtt transitioned to DOAC today. Dispo recs for IPR, anticipate he will be ready for discharge tomorrow.  12/22/2022 NAEO, neuro exam stable. Tolerating tube feeds at goal. Continues to be hypernatremic, discussed FWF q4h with nursing staff. Dispo recs downgraded from IPR to SNF, pending placement.   12/23/2022 NAEO, neuro exam stable. Continues to be hypernatremic although decreased from yesterday, continue FWF q4h and will reach out to nutrition for any updated tube feeding recs. Dispo pending, therapy recs for IPR and placement pending.  12/24/2022 Exam Stable. Change TF to [employer] per nutrition given RFP. 500cc LR via IV. Na downtrending.  12/25/2022 Exam stable. Patient on exam had stool on blankets. While cleaning, coprophagia noted by nursing.  Starting nightly zyprexa.  12/26/2022 Drowsy today. Cr down to 3.4. D10 bolus 125cc given for hypernatremia. Dc'd zyprexa.  12/27/2022 Improved  alertness. Increased FWF to 300cc q4. Pending IPR.        STROKE DOCUMENTATION   Acute Stroke Times   Last Known Normal Date: 12/08/22  Last Known Normal Time: 1145  Symptom Onset Date: 12/08/22  Symptom Onset Time: 1145  Stroke Team Called Date: 12/08/22  Stroke Team Called Time: 1615  Stroke Team Arrival Date: 12/08/22  Stroke Team Arrival Time: 1615  CT Interpretation Time: 1615  Thrombolytic Therapy Recommended: No  CTA Interpretation Time: 1615    NIH Scale:  1a. Level of Consciousness: 0-->Alert, keenly responsive  1b. LOC Questions: 0-->Answers both questions correctly  1c. LOC Commands: 0-->Performs both tasks correctly  2. Best Gaze: 0-->Normal  3. Visual: 0-->No visual loss  4. Facial Palsy: 0-->Normal symmetrical movements  5a. Motor Arm, Left: 0-->No drift, limb holds 90 (or 45) degrees for full 10 secs  5b. Motor Arm, Right: 0-->No drift, limb holds 90 (or 45) degrees for full 10 secs  6a. Motor Leg, Left: 0-->No drift, leg holds 30 degree position for full 5 secs  6b. Motor Leg, Right: 0-->No drift, leg holds 30 degree position for full 5 secs  7. Limb Ataxia: 0-->Absent  8. Sensory: 0-->Normal, no sensory loss  9. Best Language: 0-->No aphasia, normal  10. Dysarthria: 0-->Normal  11. Extinction and Inattention (formerly Neglect): 0-->No abnormality  Total (NIH Stroke Scale): 0       Modified Excelsior Score: 5  Thao Coma Scale:    ABCD2 Score:    [id number] Score:   HAS -BLED Score:   ICH Score:   Hunt & Leblanc Classification:      Hemorrhagic change of an Ischemic Stroke: Does this patient have an ischemic stroke with hemorrhagic changes? No     Neurologic Chief Complaint: L MCA stroke    Subjective:       HPI, Past Medical, Family, and Social History remains the same as documented in the initial encounter.     Review of Systems   Unable to perform ROS: Other (Aphasia)     Scheduled Meds:   amLODIPine  10 mg Per G Tube Daily    apixaban  5 mg Per G Tube BID    atorvastatin  40 mg Per G Tube Daily     carvediloL  12.5 mg Per G Tube BID    EScitalopram oxalate  20 mg Per G Tube Daily    furosemide  40 mg Per G Tube Daily    insulin aspart U-100  9 Units Subcutaneous 6 times per day    lactated ringers  1,000 mL Intravenous Once     Continuous Infusions:   dextrose 10 % in water (D10W)       PRN Meds:dextrose 10 % in water (D10W), dextrose 10%, dextrose 10%, glucagon (human recombinant), glucagon (human recombinant), insulin aspart U-100, labetaloL, ondansetron, sodium chloride 0.9%    Objective:     Vital Signs (Most Recent):  Temp: 98.3 °F (36.8 °C) (12/27/22 1144)  Pulse: 77 (12/27/22 1155)  Resp: 17 (12/27/22 1144)  BP: (!) 136/92 (12/27/22 1144)  SpO2: 96 % (12/27/22 1144)  BP Location: Right arm    Vital Signs Range (Last 24H):  Temp:  [97.4 °F (36.3 °C)-98.6 °F (37 °C)]   Pulse:  [73-92]   Resp:  [15-17]   BP: (120-158)/(87-98)   SpO2:  [93 %-97 %]   BP Location: Right arm    Physical Exam  Vitals and nursing note reviewed.   Constitutional:       General: He is not in acute distress.  HENT:      Head: Normocephalic.      Nose: No rhinorrhea.   Eyes:      General: Visual field deficit present. No scleral icterus.     Conjunctiva/sclera: Conjunctivae normal.   Cardiovascular:      Rate and Rhythm: Normal rate.   Pulmonary:      Effort: No respiratory distress.   Abdominal:      General: There is no distension.      Comments: PEG in place, dressing CDI   Musculoskeletal:         General: No deformity.   Skin:     General: Skin is warm and dry.   Neurological:      Mental Status: He is alert.      Cranial Nerves: Facial asymmetry present.      Motor: Weakness present.      Comments: Withdraws on R side to noxious stimuli  Moves left side spontaneously  Severe aphasia   Psychiatric:         Cognition and Memory: Cognition is impaired.         Judgment: Judgment is impulsive and inappropriate.      Comments: Unable to assess mood, thought, insight or judgment 2/2 severe aphasia/level of consciousness        Neurological Exam:   LOC: alert  Attention Span: poor  Language: mixed aphasia, intermittently follows some simple commands  Articulation: Unable to assess due to aphasia  Orientation: Unable to assess due to aphasia  EOM (CN III, IV, VI):  L gaze preference, tracks to midline  Motor: L side moves spontaneously and antigravity, R side withdraws from pain   Sensation: Withdraws right side to noxious stimuli      Laboratory:  CMP:   Recent Labs   Lab 12/27/22  0403   CALCIUM 9.5   ALBUMIN 2.4*   PROT 6.9   *   K 4.1   CO2 28   *   BUN 56*   CREATININE 3.6*   ALKPHOS 105   ALT 63*   AST 54*   BILITOT 0.7       BMP:   Recent Labs   Lab 12/27/22  0403   *   K 4.1   *   CO2 28   BUN 56*   CREATININE 3.6*   CALCIUM 9.5       CBC:   Recent Labs   Lab 12/27/22  0905   WBC 7.81   RBC 4.71   HGB 12.8*   HCT 42.3      MCV 90   MCH 27.2   MCHC 30.3*       Lipid Panel:   No results for input(s): CHOL, LDLCALC, HDL, TRIG in the last 168 hours.    Coagulation:   Recent Labs   Lab 12/21/22  1133   APTT 43.9*       Hgb A1C:   No results for input(s): HGBA1C in the last 168 hours.    TSH:   No results for input(s): TSH in the last 168 hours.      Diagnostic Results     Brain/Vessel Imaging   [facility] 12/13/22  -Evolving large left MCA infarct with stable mass effect and petechial hemorrhagic conversion.   -No definite new hemorrhage or significant new abnormal parenchymal attenuation   -Separate areas of infarction involving the right parietal lobe and left cerebellum not well seen with CT technique.   -Clinical correlation and continued follow-up advised     CT 12/11/22   Grossly stable evolving large left MCA distribution infarct and smaller infarct in the right frontal lobe with possible trace hemorrhagic conversion. Persistent mass effect with sulcal effacement and slight mass effect on the left lateral ventricle. No new or worsening intracranial hemorrhage and no worsening of minimal rightward  midline shift.    [facility] 12/9/22   Evolving no enlarged left MCA territory infarction with hypoattenuation and sulcal effacement. Intermediate density along the left basal ganglia compatible with known hemorrhagic conversion. Smaller area of infarction in the right cerebral hemisphere not well seen. Evolving mass effect and edema associated with the left cerebral hemispheric infarction with slight compression of the left lateral ventricle and trace 1 mm of rightward midline shift. No evidence for hydrocephalus    MRI Brain WO Contrast 12/8/22    Exam limited by patient motion artifact. Evolving left MCA territory infarct.  Additional foci of diffusion restriction in the left cerebral consistent with recent infarct.  Interval increase susceptibility artifact in the areas of diffusion restriction consistent with hemorrhagic conversion of recent infarcts.  No hydrocephalus or significant midline shift.      [facility] 12/8/22 16:29   -Evolving large left MCA territory infarction similar to recent CT though increased in size compared to prior MRI concerning for evolving recent to subacute and acute infarcts.  Localized mass effect without significant midline shift or hydrocephalus.  There is evolving recent to subacute age right posterior frontal infarction similar to prior MRI allowing for differences in technique  -There is subtle hyperdensity along the left basal ganglia posteriorly and along the right posterior frontal cortex which may represent enhancing subacute age components of infarction with petechial hemorrhage felt less likely but cannot be entirely excluded with limitation secondary to recirculation contrast related to recent CTA performed at outside institution.  -Evolving mass effect most pronounced associated with the left MCA territory infarction with sulcal effacement without significant midline shift or hydrocephalus.    [facility] 12/8/22 12:11 (CTA H/N)   1. Left diencephalic hemisphere demonstrates evidence of an  acute infarct of left basal ganglia and left caudate lobe.  2. Occlusion of the posterior division of the left M2 branch at the takeoff of the M1 vessel with patency involving the anterior division of the left M2 segment.    CT[facility] 12/7/22   1.  Moderate size subacute infarction left anterior basal ganglia and anterior left internal capsule appears mildly larger and better well defined compared to CT from 12/3/2020. There is currently greater mass effect on the anterior horn of the left lateral ventricle. There is no associated bleed or midline shift.  2.  Recent MRI demonstrated additional punctate acute infarctions in the left frontal lobe and a mild size acute infarction right centrum semiovale. These are less obvious on CT. No associated bleed.  3.  Additional chronic periventricular white matter hypodensities.    MRI Brain WO contrast 12/3/22   Multifocal areas of restricted diffusion are felt to reflect acute infarcts.    [facility] 12/3/22   Loss of gray-white matter distinction in involving the left basal ganglia could reflect changes of chronic small vessel ischemic disease versus cytotoxic edema from acute infarct.     Carotid US Bilateral 12/3/22   1. Carotid intimal hyperplasia, with no findings of hemodynamically significant carotid arterial stenosis or vascular occlusion.  2. Patent vertebral arteries with antegrade flow bilaterally.      Cardiac Imaging   RAGHAV 12/4/22    The left ventricle is small with moderately decreased systolic function.   The estimated ejection fraction is 38%.   Left ventricular diastolic dysfunction.   There are segmental left ventricular wall motion abnormalities.   No spontaneous echo contrast. A moderate protruding layered left ventricular thrombus is present. The thrombus is fixed and located in the apex.   Normal right ventricular size.   Mild left atrial enlargement.   Mild right atrial enlargement.   Mild aortic regurgitation.   No interatrial septal defect  present.   Normal appearing left atrial appendage. No thrombus is present in the appendage. SB occluder is absent. Abnormal appendage velocities.   Mild mitral regurgitation.   Agitated saline contrast study did not show any right to left shunt        Rosa Maria Mendoza MD  [facility] Stroke Center  Department of Vascular Neurology   [facility])

## 2022-12-27 NOTE — ASSESSMENT & PLAN NOTE
51 yo M with CHF, COPD, HTN, HLD, DM admitted for acute LMCA stroke. He initially presented to OSH with RSW, OOW for thrombolytics. MRI at that time demonstrated bilateral anterior and posterior circulation strokes concerning for embolic etiology. He was admitted to OSH and RAGHAV revealed LV thrombus, he was not started on AC. During hospital stay noted to be lethargic and aphasic/dysarthric, then on 12/18 had acute neuro change with RSW and global aphasia. NIHSS increased from 6 to 23. CTA demonstrated a L M2 occlusion and patient was transferred to [facility] for possible intervention and higher level of care. He was not taken to IR due to poor ASPECTS and was admitted to ICU for close monitoring and hemicrani watch. Suspect etiology likely cardioembolic given LV thrombus.     Therapy continues to recommend IPR, dispo planning for IPR but if denied by rehab facilities will pursue SNF.      Antithrombotics for secondary stroke prevention: Eliquis 5mg BID    Statins for secondary stroke prevention and hyperlipidemia, if present:   Statins: Atorvastatin- 40 mg daily    Aggressive risk factor modification: HTN, DM, HLD, Diet, Exercise, LV thrombus     Rehab efforts: IPR    Diagnostics ordered/pending: None     VTE prophylaxis: Mechanical prophylaxis: Place SCDs  None: Reason for No Pharmacological VTE Prophylaxis: Currently on anticoagulation     BP parameters: SBP <180

## 2022-12-28 NOTE — NURSING
Patient discharged to LTAC, located within [facility]. Spoke with patient's mom and informed her. Spoke with receiving nurse and let her know to notify mom of his arrival. IV and tele removed prior to discharge.

## 2022-12-28 NOTE — NURSING
Pt's sister David is not agreeing with the placement of the patient. She wishes to speak with the SW as she has already spoken with SW and expressed how she was not interested in a facility that far away. Call back number for pt's sister left Lety [phone]).

## 2022-12-28 NOTE — DISCHARGE SUMMARY
[facility]lic[facility] - Neurosurgery ([facility])  Vascular Neurology  Comprehensive Stroke Center  Discharge Summary     Summary:     Admit Date: 12/8/2022  4:18 PM    Discharge Date and Time: 12/27/2022  9:56 PM    Attending Physician: No att. providers found     Discharge Provider: Rosa Maria Mendoza MD    History of Present Illness: MR Burton is a 52yoM with CHF who initially presented to OSH with RSW. Did not receive tPA, as he was outside of the window. MRI at that time demonstrated bilateral anterior and posterior circulation strokes concerning for embolic etiology. Over hospital course, the patient was noted to have been lethargic and aphasic with dysarthria. TTE at OSH demosntrated a L ventricular thrombus (not on AC). He was noted to have had an acute change in neuro exam on 12/8 at which time he had RSW with global aphasia. NIH had been 6 prior to the event and was noted to have been 23 following. CTA demonstrated a L M2 occlusion for which the patient was transferred to [facility] for possible intervention and higher level of care.     On arrival to ED, the patient was globally aphasic with L gaze and RSW. Exam limited due to mentation and aphasia.         Hospital Course (synopsis of major diagnoses, care, treatment, and services provided during the course of the hospital stay): 53 yo M with CHF, COPD, HTN, HLD, DM admitted for acute LMCA stroke. He initially presented to OSH with RSW, OOW for thrombolytics. MRI at that time demonstrated bilateral anterior and posterior circulation strokes concerning for embolic etiology. He was admitted to OSH and RAGHAV revealed LV thrombus, he was not started on AC. During hospital stay noted to be lethargic and aphasic/dysarthric, then on 12/18 had acute neuro change with RSW and global aphasia. NIHSS increased from 6 to 23. CTA demonstrated a L M2 occlusion and patient was transferred to [facility] for possible intervention and higher level of care. He was not taken to IR due to poor ASPECTS and  was admitted to ICU for close monitoring and hemicrani watch. Suspect etiology likely cardioembolic given LV thrombus.   During his hospitalization, he was treated for acute cystitis with ceftriaxone. PEG was placed without complication and hypernatremia was effectively controlled with increasing FWFs. Patient discharged to [facility] per PT/OT recommendations.      Goals of Care Treatment Preferences:  Code Status: Full Code      Stroke Etiology: Ischemic Cardioembolic Dilated cardiomyopathy (w/ EF <45% and/or fractional shortening < 25%)    STROKE DOCUMENTATION   Acute Stroke Times   Last Known Normal Date: 12/08/22  Last Known Normal Time: 1145  Symptom Onset Date: 12/08/22  Symptom Onset Time: 1145  Stroke Team Called Date: 12/08/22  Stroke Team Called Time: 1615  Stroke Team Arrival Date: 12/08/22  Stroke Team Arrival Time: 1615  CT Interpretation Time: 1615  Thrombolytic Therapy Recommended: No  CTA Interpretation Time: 1615     NIH Scale:  1a. Level of Consciousness: 0-->Alert, keenly responsive  1b. LOC Questions: 0-->Answers both questions correctly  1c. LOC Commands: 0-->Performs both tasks correctly  2. Best Gaze: 0-->Normal  3. Visual: 0-->No visual loss  4. Facial Palsy: 0-->Normal symmetrical movements  5a. Motor Arm, Left: 0-->No drift, limb holds 90 (or 45) degrees for full 10 secs  5b. Motor Arm, Right: 0-->No drift, limb holds 90 (or 45) degrees for full 10 secs  6a. Motor Leg, Left: 0-->No drift, leg holds 30 degree position for full 5 secs  6b. Motor Leg, Right: 0-->No drift, leg holds 30 degree position for full 5 secs  7. Limb Ataxia: 0-->Absent  8. Sensory: 0-->Normal, no sensory loss  9. Best Language: 0-->No aphasia, normal  10. Dysarthria: 0-->Normal  11. Extinction and Inattention (formerly Neglect): 0-->No abnormality  Total (NIH Stroke Scale): 0        Modified [place] Score: 4  [place] Coma Scale:    ABCD2 Score:    [id number] Score:   HAS -BLED Score:   ICH Score:   Hunt & Leblanc  Classification:       Assessment/Plan:     Diagnostic Results:    Brain/Vessel Imaging   CTH 12/13/22  -Evolving large left MCA infarct with stable mass effect and petechial hemorrhagic conversion.   -No definite new hemorrhage or significant new abnormal parenchymal attenuation   -Separate areas of infarction involving the right parietal lobe and left cerebellum not well seen with CT technique.   -Clinical correlation and continued follow-up advised      CTH 12/11/22   Grossly stable evolving large left MCA distribution infarct and smaller infarct in the right frontal lobe with possible trace hemorrhagic conversion. Persistent mass effect with sulcal effacement and slight mass effect on the left lateral ventricle. No new or worsening intracranial hemorrhage and no worsening of minimal rightward midline shift.     CTH 12/9/22   Evolving no enlarged left MCA territory infarction with hypoattenuation and sulcal effacement. Intermediate density along the left basal ganglia compatible with known hemorrhagic conversion. Smaller area of infarction in the right cerebral hemisphere not well seen. Evolving mass effect and edema associated with the left cerebral hemispheric infarction with slight compression of the left lateral ventricle and trace 1 mm of rightward midline shift. No evidence for hydrocephalus     MRI Brain WO Contrast 12/8/22    Exam limited by patient motion artifact. Evolving left MCA territory infarct.  Additional foci of diffusion restriction in the left cerebral consistent with recent infarct.  Interval increase susceptibility artifact in the areas of diffusion restriction consistent with hemorrhagic conversion of recent infarcts.  No hydrocephalus or significant midline shift.       CTH 12/8/22 16:29   -Evolving large left MCA territory infarction similar to recent CT though increased in size compared to prior MRI concerning for evolving recent to subacute and acute infarcts.  Localized mass effect  without significant midline shift or hydrocephalus.  There is evolving recent to subacute age right posterior frontal infarction similar to prior MRI allowing for differences in technique  -There is subtle hyperdensity along the left basal ganglia posteriorly and along the right posterior frontal cortex which may represent enhancing subacute age components of infarction with petechial hemorrhage felt less likely but cannot be entirely excluded with limitation secondary to recirculation contrast related to recent CTA performed at outside institution.  -Evolving mass effect most pronounced associated with the left MCA territory infarction with sulcal effacement without significant midline shift or hydrocephalus.     CTH 12/8/22 12:11 (CTA H/N)   1. Left diencephalic hemisphere demonstrates evidence of an acute infarct of left basal ganglia and left caudate lobe.  2. Occlusion of the posterior division of the left M2 branch at the takeoff of the M1 vessel with patency involving the anterior division of the left M2 segment.     CTH 12/7/22   1.  Moderate size subacute infarction left anterior basal ganglia and anterior left internal capsule appears mildly larger and better well defined compared to CT from 12/3/2020. There is currently greater mass effect on the anterior horn of the left lateral ventricle. There is no associated bleed or midline shift.  2.  Recent MRI demonstrated additional punctate acute infarctions in the left frontal lobe and a mild size acute infarction right centrum semiovale. These are less obvious on CT. No associated bleed.  3.  Additional chronic periventricular white matter hypodensities.     MRI Brain WO contrast 12/3/22   Multifocal areas of restricted diffusion are felt to reflect acute infarcts.     CTH 12/3/22   Loss of gray-white matter distinction in involving the left basal ganglia could reflect changes of chronic small vessel ischemic disease versus cytotoxic edema from acute infarct.       Carotid US Bilateral 12/3/22   1. Carotid intimal hyperplasia, with no findings of hemodynamically significant carotid arterial stenosis or vascular occlusion.  2. Patent vertebral arteries with antegrade flow bilaterally.        Cardiac Imaging   RAGHAV 12/4/22    The left ventricle is small with moderately decreased systolic function.   The estimated ejection fraction is 38%.   Left ventricular diastolic dysfunction.   There are segmental left ventricular wall motion abnormalities.   No spontaneous echo contrast. A moderate protruding layered left ventricular thrombus is present. The thrombus is fixed and located in the apex.   Normal right ventricular size.   Mild left atrial enlargement.   Mild right atrial enlargement.   Mild aortic regurgitation.   No interatrial septal defect present.   Normal appearing left atrial appendage. No thrombus is present in the appendage. SB occluder is absent. Abnormal appendage velocities.   Mild mitral regurgitation.   Agitated saline contrast study did not show any right to left shunt    Interventions: None    Complications: None    Disposition: Rehab Facility    Final Active Diagnoses:    Diagnosis Date Noted POA    PRINCIPAL PROBLEM:  Embolic stroke involving left middle cerebral artery [I63.412] 12/08/2022 Yes    Eating disorder, unspecified [F50.9] 12/25/2022 No    Hypernatremia [E87.0] 12/23/2022 No    Oropharyngeal dysphagia [R13.12] 12/12/2022 Yes    CHF (congestive heart failure) [I50.9] 12/11/2022 Yes     Chronic    Hyperlipidemia associated with type 2 diabetes mellitus [E11.69, E78.5] 12/11/2022 Yes     Chronic    JR on CKD [N17.9] 12/11/2022 Yes    LV (left ventricular) mural thrombus [I51.3] 12/08/2022 Yes    Cytotoxic brain edema [G93.6] 12/08/2022 Yes    Debility [R53.81] 12/08/2022 Yes    Stage 4 chronic kidney disease [N18.4] 11/02/2021 Yes     Chronic    Type 2 diabetes mellitus, with long-term current use of insulin [E11.9, Z79.4]  11/06/2012 Not Applicable     Chronic    Essential hypertension [I10] 05/12/2012 Yes     Chronic      Problems Resolved During this Admission:    Diagnosis Date Noted Date Resolved POA    Ejection fraction < 50% [R94.30] 12/10/2022 12/11/2022 Yes    Acute cystitis without hematuria [N30.00] 12/10/2022 12/21/2022 Yes    Enlarged LA (left atrium) [I51.7] 12/10/2022 12/11/2022 Yes    Type 2 diabetes mellitus without complication, without long-term current use of insulin [E11.9] 09/04/2015 12/11/2022 Yes    Hypokalemia [E87.6] 09/01/2015 12/12/2022 Yes    Diabetes mellitus with chronic kidney disease [E11.22] 11/13/2014 12/09/2022 Yes     * Embolic stroke involving left middle cerebral artery  51 yo M with CHF, COPD, HTN, HLD, DM admitted for acute LMCA stroke. He initially presented to OSH with RSW, OOW for thrombolytics. MRI at that time demonstrated bilateral anterior and posterior circulation strokes concerning for embolic etiology. He was admitted to OSH and RAGHAV revealed LV thrombus, he was not started on AC. During hospital stay noted to be lethargic and aphasic/dysarthric, then on 12/18 had acute neuro change with RSW and global aphasia. NIHSS increased from 6 to 23. CTA demonstrated a L M2 occlusion and [place] [facility] [place] and higher level of care. He was not taken to IR due to poor ASPECTS and was admitted to ICU for close monitoring and hemicrani watch. Suspect etiology likely cardioembolic given LV thrombus.     Therapy continues to recommend IPR, dispo planning for IPR but if denied by rehab facilities will pursue SNF.      Antithrombotics for secondary stroke prevention: Eliquis 5mg BID    Statins for secondary stroke prevention and hyperlipidemia, if present:   Statins: Atorvastatin- 40 mg daily    Aggressive risk factor modification: HTN, DM, HLD, Diet, Exercise, LV thrombus     Rehab efforts: IPR    Diagnostics ordered/pending: None     VTE prophylaxis:  Mechanical prophylaxis: Place SCDs  None: Reason for No Pharmacological VTE Prophylaxis: Currently on anticoagulation     BP parameters: SBP <180      Eating disorder, unspecified  corpophagia noted by nursing, possible delirium    --If needed, zyprexa 2.5mg nightly     Oropharyngeal dysphagia  Due to stroke  SLP eval and treat  PEG placed by IR, tolerating tube feeds at goal        Recommendations:     Post-discharge complication risks: None    Stroke Education given to: patient    Follow-up in Stroke Clinic in 4-6 weeks.     Discharge Plan:  Statin: Atorvastatin 40mg  Anticoagulant: Apixaban 5mg  Aggresive risk factor modification:  Hypertension  Diabetes  High Cholesterol    Follow Up:      Patient Instructions:      Ambulatory referral/consult to Vascular Neurology   Standing Status: Future   Referral Priority: Routine Referral Type: Consultation   Referral Reason: Specialty Services Required   Requested Specialty: Vascular Neurology   Number of Visits Requested: 1     Ambulatory referral/consult to Endocrinology   Standing Status: Future   Referral Priority: Routine Referral Type: Consultation   Requested Specialty: Endocrinology   Number of Visits Requested: 1       Medications:  Reconciled Home Medications:      Medication List      START taking these medications    apixaban 5 mg Tab  Commonly known as: ELIQUIS  1 tablet (5 mg total) by Per G Tube route 2 (two) times daily.     carvediloL 12.5 MG tablet  Commonly known as: COREG  1 tablet (12.5 mg total) by Per G Tube route 2 (two) times daily.        CHANGE how you take these medications    amLODIPine 10 MG tablet  Commonly known as: NORVASC  1 tablet (10 mg total) by Per G Tube route once daily.  What changed:   · how to take this  · Another medication with the same name was removed. Continue taking this medication, and follow the directions you see here.     atorvastatin 40 MG tablet  Commonly known as: LIPITOR  1 tablet (40 mg total) by Per G Tube route  "once daily.  What changed:   · how to take this  · when to take this     EScitalopram oxalate 20 MG tablet  Commonly known as: LEXAPRO  1 tablet (20 mg total) by Per G Tube route once daily.  What changed: how to take this     furosemide 40 MG tablet  Commonly known as: LASIX  1 tablet (40 mg total) by Per G Tube route once daily.  What changed:   · how to take this  · when to take this     lancets 33 gauge Misc  Commonly known as: ONETOUCH DELICA LANCETS  1 lancet by Misc.(Non-Drug; Combo Route) route 4 (four) times daily. DX:250.00   Medically necessary to test blood sugar  What changed: Another medication with the same name was removed. Continue taking this medication, and follow the directions you see here.        CONTINUE taking these medications    acetaminophen 325 MG tablet  Commonly known as: TYLENOL  Take 650 mg by mouth every 6 (six) hours as needed.     fluticasone propionate 50 mcg/actuation nasal spray  Commonly known as: FLONASE  1 spray (50 mcg total) by Each Nostril route 2 (two) times daily as needed for Rhinitis.     pen needle, diabetic 31 gauge x 3/16" Ndle  Use daily to inject insulin  DX:250.00     sildenafiL 100 MG tablet  Commonly known as: VIAGRA  TAKE ONE DAILY AS NEEDED     TRUE METRIX AIR GLUCOSE METER Misc  Generic drug: blood-glucose meter  USE TO CHECK GLUCOSE TWICE DAILY AS DIRECTED     TRUE METRIX GLUCOSE TEST STRIP Strp  Generic drug: blood sugar diagnostic  1 strip 2 (two) times daily.        STOP taking these medications    albuterol 90 mcg/actuation inhaler  Commonly known as: PROVENTIL/VENTOLIN HFA     aspirin 81 MG Chew     calcitRIOL 0.25 MCG Cap  Commonly known as: ROCALTROL     ciprofloxacin HCl 500 MG tablet  Commonly known as: CIPRO     cloNIDine 0.3 mg/24 hr td ptwk 0.3 mg/24 hr  Commonly known as: CATAPRES     clotrimazole 1 % cream  Commonly known as: LOTRIMIN     ferrous sulfate 325 mg (65 mg iron) Tab tablet  Commonly known as: FEOSOL     glipiZIDE 10 MG " tablet  Commonly known as: GLUCOTROL     HumuLIN 70/30 U-100 Insulin 100 unit/mL (70-30) injection  Generic drug: insulin NPH-insulin regular (70/30)     HYDROcodone-acetaminophen 5-325 mg per tablet  Commonly known as: NORCO     insulin detemir U-100 100 unit/mL injection  Commonly known as: LEVEMIR U-100 INSULIN     isosorbide mononitrate 60 MG 24 hr tablet  Commonly known as: IMDUR     loratadine 10 mg tablet  Commonly known as: CLARITIN     losartan 100 MG tablet  Commonly known as: COZAAR     ondansetron 4 MG Tbdl  Commonly known as: ZOFRAN-ODT     oxyCODONE-acetaminophen 5-325 mg per tablet  Commonly known as: PERCOCET     pioglitazone 15 MG tablet  Commonly known as: ACTOS     potassium chloride 10 MEQ Tbsr  Commonly known as: KLOR-CON     silver sulfADIAZINE 1% 1 % cream  Commonly known as: SILVADENE     sulfamethoxazole-trimethoprim 400-80mg 400-80 mg per tablet  Commonly known as: BACTRIM,SEPTRA     traMADoL 50 mg tablet  Commonly known as: THONG Mendoza MD  Comprehensive Stroke Center  Department of Vascular Neurology   [facility] Neurosurgery \[facility] [place] [facility]\"")

## 2022-12-28 NOTE — PLAN OF CARE
SW was contacted by nurse on floor. Allegedly patient's sister is upset and against patient going to a facility in [place]. Patient lives with mother.     SW contacted mother. Mother stated that the patient does live with her and no one has power of  but patients sister makes decisions regarding patient's care.     Sister: Sara Brown [phone]    SW contacted patient's sister. Sister was very upset and doesn't want the patient moved far away because his family. SW explained to patient's sister the circumstances around getting placement can sometimes be unplanned. SW did get sister to agree with patient being transferred to [place].     Sister would like nurse to call her when the patient leaves from this facility and when he get to the receiving facility.     SW contacted charge nurse Linette ext.[phone]. Charge nurse was notified of this conversation and requests.     ROSHNI Cuevas, MSW-SW  Medical Social Worker/  ER Department

## 2022-12-28 NOTE — NURSING
1825- Report given to Lori at [facility], completed by 1832. Will remove IV accesses as they are no longer indicated. Call back information left.     1850- ABDIEL Davidson will update family on patient's new placement.

## 2023-03-06 PROBLEM — I63.512 ACUTE ISCHEMIC LEFT MCA STROKE: Status: RESOLVED | Noted: 2022-12-03 | Resolved: 2023-03-06

## 2023-03-13 PROBLEM — N17.9 AKI (ACUTE KIDNEY INJURY): Status: RESOLVED | Noted: 2022-12-11 | Resolved: 2023-03-13

## 2023-03-13 PROBLEM — I63.412 EMBOLIC STROKE INVOLVING LEFT MIDDLE CEREBRAL ARTERY: Status: RESOLVED | Noted: 2022-12-08 | Resolved: 2023-03-13

## 2023-03-13 NOTE — PROGRESS NOTES
Has no ACP [facility] - Neurosurgery ([facility])  Vascular Neurology  Comprehensive Stroke Center  Progress Note    Assessment/Plan:     * Embolic stroke involving left middle cerebral artery  53 yo M with CHF, COPD, HTN, HLD, DM admitted for acute LMCA stroke. He initially presented to OSH with RSW, OOW for thrombolytics. MRI at that time demonstrated bilateral anterior and posterior circulation strokes concerning for embolic etiology. He was admitted to OSH and RAGHAV revealed LV thrombus, he was not started on AC. During hospital stay noted to be lethargic and aphasic/dysarthric, then on 12/18 had acute neuro change with RSW and global aphasia. NIHSS increased from 6 to 23. CTA demonstrated a L M2 occlusion and patient was transferred to OM for possible intervention and higher level of care. He was not taken to IR due to poor ASPECTS and was admitted to ICU for close monitoring and hemicrani watch. Suspect etiology likely cardioembolic given LV thrombus.    NAEO, neuro exam stable. Tolerating tube feeds at goal. Continues to be hypernatremic, discussed FWF q4h with nursing staff. Dispo recs downgraded from IPR to SNF, pending placement.       Antithrombotics for secondary stroke prevention: Eliquis 5mg BID    Statins for secondary stroke prevention and hyperlipidemia, if present:   Statins: Atorvastatin- 40 mg daily    Aggressive risk factor modification: HTN, DM, HLD, Diet, Exercise, LV thrombus     Rehab efforts: SNF    Diagnostics ordered/pending: None     VTE prophylaxis: Mechanical prophylaxis: Place SCDs  None: Reason for No Pharmacological VTE Prophylaxis: Currently on anticoagulation     BP parameters: SBP <180      Oropharyngeal dysphagia  Due to stroke  SLP eval and treat  PEG placed by IR, tolerating tube feeds at goal    JR on CKD  See CKD    Hyperlipidemia associated with type 2 diabetes mellitus  Stroke RF   . 8, goal <70  -Continue atorvastatin 40mg daily    CHF (congestive heart failure)  Stroke risk  factor  RAGHAV with EF 38% and segmental LV wall motion abnormalities  -Strict I/Os  -Continue lasix 40mg    Debility  2/2 stroke   OT and PT to evaluate   Dispo recs for SNF, pending auth    Cytotoxic brain edema  -Noted on imaging in the area of the L MCA territory with associated mass effect  -NSGY consulted for hemicrani watch due to malignant MCA, no acute intervention recommended as serial imaging remains stable  -Will continue to monitor with frequent a4h neuro checks while inpatient, as this could indicate worsening/expansion of edema  -Obtain Stat CTH for any acute neuro changes and notify stroke team immediately    LV (left ventricular) mural thrombus  Stroke RF  Initially started on heparin gtt due for AC until PO route established, s/p PEG on 12/20  Heparin gtt transitioned to Eliquis 5mg BID on 12/21  -Continue AC with eliquis    Stage 4 chronic kidney disease  Improving, Cr 3.9 and GFR 17.9 today   - continue to monitor renal function with scheduled labs   - avoid nephrotoxins  - renally dose medications when appropiate   - monitor events that may lead to decreased renal perfusion (hypovolemia, hypotension, sepsis)  - monitor urine output to assure that no obstruction precipitates worsening in GFR      Type 2 diabetes mellitus, with long-term current use of insulin  Stroke RF  A1c 9.1, consider nutrition consult and DM education  BG goal while inpatient 140-180  Currently on Aspart 9units q4h + SSI PRN    Essential hypertension  Stroke RF  SBP <180, MAP >65  Continue amlodipine and coreg  PRN labetalol/hydralazine         12/09/2022 Patient with LV thrombus, will need anticoagulation. NGT in place would recommend heparin gtt until patient able to swallow or PEG placed prior to DOAC initiation. Patient tachycardic today with SBP < 210, metoprolol started by primary team. Febrile with elevated white count and procal, currently on Zosyn and Vanc while cx pending. NSGY following for hemicrani watch. Patient  has been discharged from OT per last note, will need new orders. Continue current ICU care.   12/10/2022: Pending stability scan patient is expected to be started on heparin gtt. Continue hemicrani watch in  ICU. Family at bedside.   12/11/2022: Patient started on heparin gtt overnight, he demonstrated great clinical improvement today, he was able to tell me his name, knew he is in [facility]. Followed single step commands. No family at bedside today.   12/12/2022 Patient remains in ICU for sue-crani watch. NSGY following. On salt tabs for goal of hypernatremia.Will remain in ICU another night. VN to re-evaluate for stepdown tomorrow. Patient is on ceftriaxone for acute cystitis. aPTT today 42.9. CTH following therapeutic heparin levels stable. More alert today. IPR recommendations; SLP with minced and moist diet with thin liquids.   12/13/2022 Remains in NCC, neuro exam unchanged and limited by drowsiness. Overnight had short run of VT/SVT that resolved without any interventions. BPs not consistently at goal of <200, had max BP today of 214/140. Currently NPO with NGT in place and anticipate patient will need PEG placement, SLP recommending ice chips sparingly for pleasure. Anticipate step down tomorrow once BP is consistently at goal  12/14/2022 Pending SD to NPU. Continue to watch BP closely, SBP<200. Patient will likely need a PEG.  12/15/2022. Pending SD to NPU. Poor exam, patient very lethargic, WD on the right.   12/16/2022 Patient stepped down overnight to NPU. Exam stable. Re-evaluated by speech this afternoon, still recommending NPO. Will need to discuss nutrition going forward (I.e. need for PEG). Recommendations for IPR.   12/17/22 Exam stable, elevated sodium, free water boluses added, metoprolol increased for elevated BP & HR.  IR consult placed for G tube placement after discussing with patient's family via phone.  12/18/22 patient displaced NG tube overnight, adjusted by nursing. NICOL reviewed- BG ok to  use. Water boluses added yesterday for hypernatremia, Na+ improved to 149. -184, Lopressor discontinued and Coreg ordered for better BP control. Plan for PEG placement on 12/19 .  12/19/2022 Patient remains in NPU, neuro exam poor but stable. Going for PEG today. Will resume free water flushes following procedure given Na+ 150 today. SBP improved today. Consider starting ACE/ARB once PEG in place and safe to use for meds. Dispo recommendations for IPR.   12/20/2022 NAEO, neuro exam stable. Going for PEG today with IR. Na 150, will resume FWF after procedure. Will transition hep gtt to DOAC once PEG placed and tolerating tube feeds. Dispo recs for IPR.  12/21/2022 NAEO, neuro exam stable. S/p PEG yesterday, NGT removed now that tube feedings restarted and tolerating well. NA remains at 150 but unclear if patient had been getting FWF, order updated and nursing communication in. Heparin gtt transitioned to DOAC today. Dispo recs for IPR, anticipate he will be ready for discharge tomorrow.  12/22/2022 NAEO, neuro exam stable. Tolerating tube feeds at goal. Continues to be hypernatremic, discussed FWF q4h with nursing staff. Dispo recs downgraded from IPR to SNF, pending placement.         STROKE DOCUMENTATION   Acute Stroke Times   Last Known Normal Date: 12/08/22  Last Known Normal Time: 1145  Symptom Onset Date: 12/08/22  Symptom Onset Time: 1145  Stroke Team Called Date: 12/08/22  Stroke Team Called Time: 1615  Stroke Team Arrival Date: 12/08/22  Stroke Team Arrival Time: 1615  CT Interpretation Time: 1615  Thrombolytic Therapy Recommended: No  CTA Interpretation Time: 1615    NIH Scale:  1a. Level of Consciousness: 0-->Alert, keenly responsive  1b. LOC Questions: 2-->Answers neither question correctly  1c. LOC Commands: 1-->Performs one task correctly  2. Best Gaze: 2-->Forced deviation, or total gaze paresis not overcome by the oculocephalic maneuver  3. Visual: 0-->No visual loss  4. Facial Palsy: 1-->Minor  paralysis (flattened nasolabial fold, asymmetry on smiling)  5a. Motor Arm, Left: 0-->No drift, limb holds 90 (or 45) degrees for full 10 secs  5b. Motor Arm, Right: 3-->No effort against gravity, limb falls  6a. Motor Leg, Left: 0-->No drift, leg holds 30 degree position for full 5 secs  6b. Motor Leg, Right: 3-->No effort against gravity, leg falls to bed immediately  7. Limb Ataxia: 0-->Absent  8. Sensory: 1-->Mild-to-moderate sensory loss, patient feels pinprick is less sharp or is dull on the affected side, or there is a loss of superficial pain with pinprick, but patient is aware of being touched  9. Best Language: 2-->Severe aphasia, all communication is through fragmentary expression, great need for inference, questioning, and guessing by the listener. Range of information that can be exchanged is limited, listener carries burden of. . . (see row details)  10. Dysarthria: 2-->Severe dysarthria, patients speech is so slurred as to be unintelligible in the absence of or out of proportion to any dysphasia, or is mute/anarthric  11. Extinction and Inattention (formerly Neglect): 1-->Visual, tactile, auditory, spatial, or personal inattention or extinction to bilateral simultaneous stimulation in one of the sensory modalities  Total (NIH Stroke Scale): 18       Modified Craig Score: 5  Chandler Coma Scale:    ABCD2 Score:    [id number] Score:   HAS -BLED Score:   ICH Score:   Hunt & Leblanc Classification:      Hemorrhagic change of an Ischemic Stroke: Does this patient have an ischemic stroke with hemorrhagic changes? No     Neurologic Chief Complaint: L MCA stroke    Subjective:     Interval History: Patient is seen for follow-up neurological assessment and treatment recommendations:   NAEO, neuro exam stable. Tolerating tube feeds at goal. Continues to be hypernatremic, discussed FWF q4h with nursing staff. Dispo recs downgraded from IPR to SNF, pending placement.     HPI, Past Medical, Family, and Social  History remains the same as documented in the initial encounter.     Review of Systems   Unable to perform ROS: Other (Aphasia)     Scheduled Meds:   amLODIPine  10 mg Per G Tube Daily    apixaban  5 mg Per G Tube BID    atorvastatin  40 mg Per G Tube Daily    carvediloL  12.5 mg Per G Tube BID    EScitalopram oxalate  20 mg Per G Tube Daily    furosemide  40 mg Per G Tube Daily    insulin aspart U-100  9 Units Subcutaneous 6 times per day     Continuous Infusions:   dextrose 10 % in water (D10W)       PRN Meds:dextrose 10 % in water (D10W), dextrose 10%, dextrose 10%, glucagon (human recombinant), glucagon (human recombinant), insulin aspart U-100, labetaloL, ondansetron, sodium chloride 0.9%    Objective:     Vital Signs (Most Recent):  Temp: 97.7 °F (36.5 °C) (12/22/22 0723)  Pulse: 85 (12/22/22 0824)  Resp: 18 (12/22/22 0900)  BP: 139/89 (12/22/22 0723)  SpO2: 95 % (12/22/22 0723)  BP Location: Right arm    Vital Signs Range (Last 24H):  Temp:  [97.3 °F (36.3 °C)-98.5 °F (36.9 °C)]   Pulse:  []   Resp:  [14-20]   BP: (135-175)/()   SpO2:  [95 %-97 %]   BP Location: Right arm    Physical Exam  Vitals and nursing note reviewed.   Constitutional:       General: He is not in acute distress.  HENT:      Head: Normocephalic.      Nose: No rhinorrhea.   Eyes:      General: Visual field deficit present. No scleral icterus.     Conjunctiva/sclera: Conjunctivae normal.   Cardiovascular:      Rate and Rhythm: Normal rate.   Pulmonary:      Effort: No respiratory distress.   Abdominal:      General: There is no distension.      Comments: PEG in place, dressing CDI   Musculoskeletal:         General: No deformity.   Skin:     General: Skin is warm and dry.   Neurological:      Mental Status: He is alert.      Cranial Nerves: Facial asymmetry present.      Motor: Weakness present.      Comments: Withdraws on R side to noxious stimuli  Moves left side spontaneously  Severe aphasia   Psychiatric:       Comments: Unable to assess mood, thought, insight or judgment 2/2 severe aphasia/level of consciousness       Neurological Exam:   LOC: alert  Attention Span: poor  Language: mixed aphasia, intermittently follows some simple commands  Articulation: Unable to assess due to aphasia  Orientation: Unable to assess due to aphasia  EOM (CN III, IV, VI):  L gaze preference, tracks to midline  Motor: L side moves spontaneously and antigravity, R side withdraws from pain   Sensation: Withdraws right side to noxious stimuli      Laboratory:  CMP:   Recent Labs   Lab 12/22/22  0417   CALCIUM 9.6   ALBUMIN 2.6*   PROT 7.5   *   K 3.8   CO2 34*      BUN 52*   CREATININE 4.3*   ALKPHOS 103   ALT 44   AST 48*   BILITOT 0.5       BMP:   Recent Labs   Lab 12/22/22  0417   *   K 3.8      CO2 34*   BUN 52*   CREATININE 4.3*   CALCIUM 9.6       CBC:   Recent Labs   Lab 12/21/22  0420   WBC 12.50   RBC 4.58*   HGB 12.2*   HCT 40.1      MCV 88   MCH 26.6*   MCHC 30.4*       Lipid Panel:   No results for input(s): CHOL, LDLCALC, HDL, TRIG in the last 168 hours.    Coagulation:   Recent Labs   Lab 12/21/22  1133   APTT 43.9*       Hgb A1C:   No results for input(s): HGBA1C in the last 168 hours.    TSH:   No results for input(s): TSH in the last 168 hours.      Diagnostic Results     Brain/Vessel Imaging   CTH 12/13/22  -Evolving large left MCA infarct with stable mass effect and petechial hemorrhagic conversion.   -No definite new hemorrhage or significant new abnormal parenchymal attenuation   -Separate areas of infarction involving the right parietal lobe and left cerebellum not well seen with CT technique.   -Clinical correlation and continued follow-up advised     CTH 12/11/22   Grossly stable evolving large left MCA distribution infarct and smaller infarct in the right frontal lobe with possible trace hemorrhagic conversion. Persistent mass effect with sulcal effacement and slight mass effect on the  left lateral ventricle. No new or worsening intracranial hemorrhage and no worsening of minimal rightward midline shift.    CTH 12/9/22   Evolving no enlarged left MCA territory infarction with hypoattenuation and sulcal effacement. Intermediate density along the left basal ganglia compatible with known hemorrhagic conversion. Smaller area of infarction in the right cerebral hemisphere not well seen. Evolving mass effect and edema associated with the left cerebral hemispheric infarction with slight compression of the left lateral ventricle and trace 1 mm of rightward midline shift. No evidence for hydrocephalus    MRI Brain WO Contrast 12/8/22    Exam limited by patient motion artifact. Evolving left MCA territory infarct.  Additional foci of diffusion restriction in the left cerebral consistent with recent infarct.  Interval increase susceptibility artifact in the areas of diffusion restriction consistent with hemorrhagic conversion of recent infarcts.  No hydrocephalus or significant midline shift.      CTH 12/8/22 16:29   -Evolving large left MCA territory infarction similar to recent CT though increased in size compared to prior MRI concerning for evolving recent to subacute and acute infarcts.  Localized mass effect without significant midline shift or hydrocephalus.  There is evolving recent to subacute age right posterior frontal infarction similar to prior MRI allowing for differences in technique  -There is subtle hyperdensity along the left basal ganglia posteriorly and along the right posterior frontal cortex which may represent enhancing subacute age components of infarction with petechial hemorrhage felt less likely but cannot be entirely excluded with limitation secondary to recirculation contrast related to recent CTA performed at outside institution.  -Evolving mass effect most pronounced associated with the left MCA territory infarction with sulcal effacement without significant midline shift or  hydrocephalus.    CTH 12/8/22 12:11 (CTA H/N)   1. Left diencephalic hemisphere demonstrates evidence of an acute infarct of left basal ganglia and left caudate lobe.  2. Occlusion of the posterior division of the left M2 branch at the takeoff of the M1 vessel with patency involving the anterior division of the left M2 segment.    CTH 12/7/22   1.  Moderate size subacute infarction left anterior basal ganglia and anterior left internal capsule appears mildly larger and better well defined compared to CT from 12/3/2020. There is currently greater mass effect on the anterior horn of the left lateral ventricle. There is no associated bleed or midline shift.  2.  Recent MRI demonstrated additional punctate acute infarctions in the left frontal lobe and a mild size acute infarction right centrum semiovale. These are less obvious on CT. No associated bleed.  3.  Additional chronic periventricular white matter hypodensities.    MRI Brain WO contrast 12/3/22   Multifocal areas of restricted diffusion are felt to reflect acute infarcts.    CTH 12/3/22   Loss of gray-white matter distinction in involving the left basal ganglia could reflect changes of chronic small vessel ischemic disease versus cytotoxic edema from acute infarct.     Carotid US Bilateral 12/3/22   1. Carotid intimal hyperplasia, with no findings of hemodynamically significant carotid arterial stenosis or vascular occlusion.  2. Patent vertebral arteries with antegrade flow bilaterally.      Cardiac Imaging   RAGHAV 12/4/22    The left ventricle is small with moderately decreased systolic function.   The estimated ejection fraction is 38%.   Left ventricular diastolic dysfunction.   There are segmental left ventricular wall motion abnormalities.   No spontaneous echo contrast. A moderate protruding layered left ventricular thrombus is present. The thrombus is fixed and located in the apex.   Normal right ventricular size.   Mild left atrial  enlargement.   Mild right atrial enlargement.   Mild aortic regurgitation.   No interatrial septal defect present.   Normal appearing left atrial appendage. No thrombus is present in the appendage. SB occluder is absent. Abnormal appendage velocities.   Mild mitral regurgitation.   Agitated saline contrast study did not show any right to left shunt        BHAVYA Daugherty  [facility]  Department of Vascular Neurology   [facility])